# Patient Record
Sex: FEMALE | Race: WHITE | NOT HISPANIC OR LATINO | Employment: FULL TIME
[De-identification: names, ages, dates, MRNs, and addresses within clinical notes are randomized per-mention and may not be internally consistent; named-entity substitution may affect disease eponyms.]

---

## 2017-03-14 ENCOUNTER — PRIOR ORIGINAL RECORDS (OUTPATIENT)
Dept: OTHER | Age: 62
End: 2017-03-14

## 2017-04-06 ENCOUNTER — HOSPITAL ENCOUNTER (OUTPATIENT)
Facility: HOSPITAL | Age: 62
Setting detail: OBSERVATION
Discharge: HOME | End: 2017-04-07
Attending: EMERGENCY MEDICINE | Admitting: INTERNAL MEDICINE
Payer: COMMERCIAL

## 2017-04-06 DIAGNOSIS — I95.9 HYPOTENSION, UNSPECIFIED HYPOTENSION TYPE: ICD-10-CM

## 2017-04-06 DIAGNOSIS — R55 SYNCOPE, NEAR: ICD-10-CM

## 2017-04-06 DIAGNOSIS — R19.7 DIARRHEA, UNSPECIFIED TYPE: Primary | ICD-10-CM

## 2017-04-06 PROCEDURE — 99219 INITIAL OBSERVATION CARE,LEVL II: CPT | Performed by: INTERNAL MEDICINE

## 2017-04-06 RX ORDER — ONDANSETRON 2 MG/ML
4 INJECTION INTRAMUSCULAR; INTRAVENOUS EVERY 4 HOURS PRN
Status: DISCONTINUED | OUTPATIENT
Start: 2017-04-06 | End: 2017-04-06

## 2017-04-06 RX ORDER — POTASSIUM CHLORIDE 20 MEQ/1
40 TABLET, EXTENDED RELEASE ORAL ONCE
Status: COMPLETED | OUTPATIENT
Start: 2017-04-06 | End: 2017-04-06

## 2017-04-06 RX ORDER — DULOXETIN HYDROCHLORIDE 60 MG/1
60 CAPSULE, DELAYED RELEASE ORAL DAILY
COMMUNITY
End: 2019-01-07

## 2017-04-06 RX ORDER — HYDROCODONE BITARTRATE AND ACETAMINOPHEN 5; 325 MG/1; MG/1
1 TABLET ORAL EVERY 6 HOURS PRN
Status: DISCONTINUED | OUTPATIENT
Start: 2017-04-06 | End: 2017-04-07

## 2017-04-06 RX ORDER — LISINOPRIL 40 MG/1
40 TABLET ORAL DAILY
Status: ON HOLD | COMMUNITY
End: 2017-04-07

## 2017-04-06 RX ORDER — HYDROCODONE BITARTRATE AND ACETAMINOPHEN 5; 325 MG/1; MG/1
1 TABLET ORAL ONCE
Status: COMPLETED | OUTPATIENT
Start: 2017-04-06 | End: 2017-04-06

## 2017-04-06 RX ORDER — ASPIRIN 81 MG/1
81 TABLET ORAL DAILY
Status: DISCONTINUED | OUTPATIENT
Start: 2017-04-06 | End: 2017-04-07

## 2017-04-06 RX ORDER — SODIUM CHLORIDE 9 MG/ML
INJECTION, SOLUTION INTRAVENOUS ONCE
Status: COMPLETED | OUTPATIENT
Start: 2017-04-06 | End: 2017-04-06

## 2017-04-06 RX ORDER — DULOXETIN HYDROCHLORIDE 30 MG/1
30 CAPSULE, DELAYED RELEASE ORAL DAILY
Status: DISCONTINUED | OUTPATIENT
Start: 2017-04-06 | End: 2017-04-07

## 2017-04-06 RX ORDER — ATORVASTATIN CALCIUM 10 MG/1
10 TABLET, FILM COATED ORAL NIGHTLY
Status: DISCONTINUED | OUTPATIENT
Start: 2017-04-06 | End: 2017-04-07

## 2017-04-06 RX ORDER — HYDROCODONE BITARTRATE AND ACETAMINOPHEN 5; 325 MG/1; MG/1
1 TABLET ORAL EVERY 6 HOURS PRN
COMMUNITY
End: 2021-06-17

## 2017-04-06 RX ORDER — PYRIDOXINE HCL (VITAMIN B6) 50 MG
500 TABLET ORAL DAILY
COMMUNITY

## 2017-04-06 RX ORDER — PRIMIDONE 50 MG/1
50 TABLET ORAL EVERY 6 HOURS SCHEDULED
COMMUNITY
End: 2017-04-06

## 2017-04-06 RX ORDER — CLOPIDOGREL BISULFATE 75 MG/1
75 TABLET ORAL DAILY
Status: ON HOLD | COMMUNITY
End: 2018-06-04

## 2017-04-06 RX ORDER — ONDANSETRON 2 MG/ML
4 INJECTION INTRAMUSCULAR; INTRAVENOUS EVERY 6 HOURS PRN
Status: DISCONTINUED | OUTPATIENT
Start: 2017-04-06 | End: 2017-04-07

## 2017-04-06 RX ORDER — PRIMIDONE 50 MG/1
100 TABLET ORAL NIGHTLY
COMMUNITY
End: 2018-12-18

## 2017-04-06 RX ORDER — PRIMIDONE 50 MG/1
50 TABLET ORAL 2 TIMES DAILY
COMMUNITY
End: 2019-01-07

## 2017-04-06 RX ORDER — ACETAMINOPHEN 325 MG/1
650 TABLET ORAL EVERY 6 HOURS PRN
Status: DISCONTINUED | OUTPATIENT
Start: 2017-04-06 | End: 2017-04-07

## 2017-04-06 RX ORDER — SODIUM CHLORIDE 9 MG/ML
INJECTION, SOLUTION INTRAVENOUS CONTINUOUS
Status: DISCONTINUED | OUTPATIENT
Start: 2017-04-06 | End: 2017-04-07

## 2017-04-06 RX ORDER — GABAPENTIN 300 MG/1
600 CAPSULE ORAL 2 TIMES DAILY
Status: DISCONTINUED | OUTPATIENT
Start: 2017-04-06 | End: 2017-04-07

## 2017-04-06 RX ORDER — FLECAINIDE ACETATE 100 MG/1
100 TABLET ORAL 2 TIMES DAILY
Status: ON HOLD | COMMUNITY
End: 2018-06-07

## 2017-04-06 RX ORDER — SODIUM CHLORIDE 9 MG/ML
INJECTION, SOLUTION INTRAVENOUS CONTINUOUS
Status: ACTIVE | OUTPATIENT
Start: 2017-04-06 | End: 2017-04-06

## 2017-04-06 NOTE — H&P
[facility]ITAL[facility]  History and Physical     Kell Route Patient Status:  Emergency    1/15/1955 MRN [id number]   Location [facility] Attending Shanita Devlin MD   Hosp Day # 0 PCP Elenora Books     Chief Complaint: COLONOSCOPY  2/7/2014    Comment Procedure: COLONOSCOPY;  Surgeon: Alexandro Crane MD;  Location: [address] [place] [address] ENDOSCOPY    TUBAL LIGATION      OTHER SURGICAL HISTORY      Comment Lt bunionectomy    OTHER SURGICAL HISTORY      Comment stent in [facility] Oral Tab TAKE 1 TO 2 TABLETS BY MOUTH EVERY 4 HOURS AS NEEDED Disp: 60 tablet Rfl: 0   Vitamin B12 (CYANOCOBALAMIN) 100 MCG Oral Tab Take 100 mcg by mouth daily. Disp:  Rfl:    aspirin 81 MG Oral Tab Take 81 mg by mouth daily.  Disp:  Rfl:        Review of after fluid hydration  2.  Diarrhea -timing seems to be in line with entero-toxic or viral gastritis from lunch or breakfast.  Check all stool cultures and shigella toxin   hold any antibiotics and antidiarrheal until further tests are done or diarrhea self

## 2017-04-06 NOTE — ED PROVIDER NOTES
Patient Seen in: [facility] Emergency Department    History   Patient presents with:  Nausea/Vomiting/Diarrhea (gastrointestinal)    Stated Complaint: hypotention    HPI    Patient is a pleasant 20-year-old female, who arrives via EMS for evaluation every 6 (six) hours as needed for Pain.    Acetaminophen-Codeine #3 (TYLENOL #3) 300-30 MG Oral Tab,  TAKE 1 OR 2 TABLETS BY MOUTH EVERY 4 HOURS AS NEEDED FOR PAIN   HYDROcodone-acetaminophen (NORCO) 5-325 MG Oral Tab,  1-2 tab Q 4-6 hrs PRN   HYDROcodone-a Current:BP 87/64 mmHg  Pulse 86  Temp(Src) 97.8 °F (36.6 °C)  Resp 18  Ht 157.5 cm (5' 2\")  Wt 50.803 kg  BMI 20.48 kg/m2  SpO2 100%      Physical Exam    Vital signs are reviewed noted as documented in the nursing chart.    GENERAL: Patient is awake DIFFERENTIAL[[id number]]          Abnormal            Final result                 Please view results for these tests on the individual orders.    RAINBOW DRAW BLUE   RAINBOW DRAW GOLD   RAINBOW DRAW LAVENDER   RAINBOW DRAW LIGHT GREEN   C. DIFFICILE(TOXIGE studies pending. Remainder of the patient's emergency room stay was without incident.     Disposition and Plan     Clinical Impression:  Diarrhea, unspecified type  (primary encounter diagnosis)  Hypotension, unspecified hypotension type  Syncope, near

## 2017-04-06 NOTE — ED INITIAL ASSESSMENT (HPI)
Pt states had diarrhea at work and than felt like passing out, medics got 40's over palp in field, had 2 fluid boluses

## 2017-04-07 ENCOUNTER — APPOINTMENT (OUTPATIENT)
Dept: GENERAL RADIOLOGY | Facility: HOSPITAL | Age: 62
End: 2017-04-07
Attending: HOSPITALIST
Payer: COMMERCIAL

## 2017-04-07 ENCOUNTER — APPOINTMENT (OUTPATIENT)
Dept: CT IMAGING | Facility: HOSPITAL | Age: 62
End: 2017-04-07
Attending: INTERNAL MEDICINE
Payer: COMMERCIAL

## 2017-04-07 VITALS
HEART RATE: 81 BPM | BODY MASS INDEX: 20.61 KG/M2 | OXYGEN SATURATION: 97 % | SYSTOLIC BLOOD PRESSURE: 134 MMHG | HEIGHT: 62 IN | RESPIRATION RATE: 20 BRPM | WEIGHT: 112 LBS | DIASTOLIC BLOOD PRESSURE: 63 MMHG | TEMPERATURE: 98 F

## 2017-04-07 PROBLEM — Z91.81 AT RISK FOR FALLING: Status: ACTIVE | Noted: 2017-04-07

## 2017-04-07 PROCEDURE — 74176 CT ABD & PELVIS W/O CONTRAST: CPT

## 2017-04-07 PROCEDURE — 99217 OBSERVATION CARE DISCHARGE: CPT | Performed by: INTERNAL MEDICINE

## 2017-04-07 PROCEDURE — 73502 X-RAY EXAM HIP UNI 2-3 VIEWS: CPT

## 2017-04-07 RX ORDER — CLOPIDOGREL BISULFATE 75 MG/1
75 TABLET ORAL DAILY
Status: DISCONTINUED | OUTPATIENT
Start: 2017-04-07 | End: 2017-04-07

## 2017-04-07 RX ORDER — LISINOPRIL 40 MG/1
40 TABLET ORAL DAILY
Status: DISCONTINUED | OUTPATIENT
Start: 2017-04-07 | End: 2017-04-07

## 2017-04-07 RX ORDER — FLECAINIDE ACETATE 100 MG/1
100 TABLET ORAL 2 TIMES DAILY
Status: DISCONTINUED | OUTPATIENT
Start: 2017-04-07 | End: 2017-04-07

## 2017-04-07 RX ORDER — MORPHINE SULFATE 2 MG/ML
2 INJECTION, SOLUTION INTRAMUSCULAR; INTRAVENOUS EVERY 4 HOURS PRN
Status: DISCONTINUED | OUTPATIENT
Start: 2017-04-07 | End: 2017-04-07

## 2017-04-07 RX ORDER — PRIMIDONE 50 MG/1
100 TABLET ORAL NIGHTLY
Status: DISCONTINUED | OUTPATIENT
Start: 2017-04-07 | End: 2017-04-07

## 2017-04-07 RX ORDER — PRIMIDONE 50 MG/1
150 TABLET ORAL
Status: DISCONTINUED | OUTPATIENT
Start: 2017-04-07 | End: 2017-04-07

## 2017-04-07 NOTE — DISCHARGE SUMMARY
[facility] HOSPITAL[facility]  DISCHARGE SUMMARY     Rosalinda Hart Patient Status:  Observation    1/15/1955 MRN [id number]   [facility] 4NW-A Attending Jose Canales MD   [facility] Day # 1 PCP Rob Garsia     Date of Admission: 2017  Date of throughout the week. Brief Synopsis: Patient is admitted and monitored with given aggressive fluids replacement and she stool was checked for infectious etiology.   She had a CT abdomen pelvis done and lactate which were normal. Home meds resumed except Acetate 100 MG Tabs   Last time this was given:  100 mg on 4/7/2017 11:12 AM   Commonly known as:  TAMBOCOR        Take 100 mg by mouth 2 (two) times daily.     Refills:  0       gabapentin 300 MG Caps   Last time this was given:  600 mg on 4/7/2017  8:19 A

## 2017-04-07 NOTE — PAYOR COMM NOTE
Attending Physician: Adilene Novoa MD    Review Type: ADMISSION   Reviewer: Glynn LOMAX       Date: April 7, 2017 - 9:07 AM  Payor: [facility]  Authorization Number: N/A  Admit date: 4/6/2017  1:38 PM   Admitted from Emergency Dept.: yes      REVIEWER HYDROcodone-acetaminophen (NORCO) 5-325 MG per tab 1 tablet     Date Action Dose Route User    4/7/2017 0819 Given 1 tablet Oral Marlyn Javed RN    4/7/2017 0051 Given 1 tablet Oral Celestina Hensley RN    4/6/2017 1841 Given 1 tablet Oral Aleksandr Turk 34.8 (*)    TROPONIN I - Normal   C. DIFFICILE(TOXIGENIC)PCR - Normal   STOOL CULTURE(P)   SHIGATOXIN       ASSESSMENT / PLAN:        1.  Syncope most likely from large volume shifts with copious diarrhea                  Continue aggressive fluid hydration

## 2017-04-07 NOTE — PROGRESS NOTES
[facility]  Progress Note     Codie Winston Patient Status:  Observation    1/15/1955 MRN [id number]   [facility] 4NW-A Attending Zoltan Deng MD   Hosp Day # 1 PCP Alex Quach     Chief Complaint: diarrhea    S: Patient h reviewed in Epic. Medications:   • aspirin  81 mg Oral Daily   • Atorvastatin Calcium  10 mg Oral Nightly   • DULoxetine HCl  30 mg Oral Daily   • gabapentin  600 mg Oral BID       ASSESSMENT / PLAN:     1.  Syncope most likely from large volume shifts w

## 2017-04-07 NOTE — PLAN OF CARE
GASTROINTESTINAL - ADULT    • Maintains or returns to baseline bowel function Not Progressing          GASTROINTESTINAL - ADULT    • Minimal or absence of nausea and vomiting Progressing        PAIN - ADULT    • Verbalizes/displays adequate comfort level o

## 2017-04-07 NOTE — PLAN OF CARE
GASTROINTESTINAL - ADULT    • Maintains or returns to baseline bowel function Not Progressing          MUSCULOSKELETAL - ADULT    • Return mobility to safest level of function Progressing        PAIN - ADULT    • Verbalizes/displays adequate comfort level

## 2017-04-07 NOTE — PLAN OF CARE
GASTROINTESTINAL - ADULT    • Minimal or absence of nausea and vomiting Adequate for Discharge    • Maintains or returns to baseline bowel function Adequate for Discharge        MUSCULOSKELETAL - ADULT    • Return mobility to safest level of function Adequ

## 2018-01-04 ENCOUNTER — PRIOR ORIGINAL RECORDS (OUTPATIENT)
Dept: OTHER | Age: 63
End: 2018-01-04

## 2018-01-04 ENCOUNTER — HOSPITAL ENCOUNTER (EMERGENCY)
Facility: HOSPITAL | Age: 63
Discharge: HOME | End: 2018-01-04
Attending: EMERGENCY MEDICINE
Payer: COMMERCIAL

## 2018-01-04 ENCOUNTER — APPOINTMENT (OUTPATIENT)
Dept: GENERAL RADIOLOGY | Facility: HOSPITAL | Age: 63
End: 2018-01-04
Attending: EMERGENCY MEDICINE
Payer: COMMERCIAL

## 2018-01-04 VITALS
SYSTOLIC BLOOD PRESSURE: 144 MMHG | BODY MASS INDEX: 20.24 KG/M2 | WEIGHT: 110 LBS | OXYGEN SATURATION: 98 % | TEMPERATURE: 99 F | HEIGHT: 62 IN | RESPIRATION RATE: 15 BRPM | HEART RATE: 97 BPM | DIASTOLIC BLOOD PRESSURE: 76 MMHG

## 2018-01-04 DIAGNOSIS — D64.9 ANEMIA, UNSPECIFIED TYPE: ICD-10-CM

## 2018-01-04 DIAGNOSIS — B34.9 VIRAL SYNDROME: ICD-10-CM

## 2018-01-04 DIAGNOSIS — E87.1 HYPONATREMIA: ICD-10-CM

## 2018-01-04 DIAGNOSIS — R53.83 OTHER FATIGUE: Primary | ICD-10-CM

## 2018-01-04 LAB
ALBUMIN SERPL-MCNC: 3.7 G/DL (ref 3.5–4.8)
ALP LIVER SERPL-CCNC: 58 U/L (ref 50–130)
ALT SERPL-CCNC: 17 U/L (ref 14–54)
APTT PPP: 39.9 SECONDS (ref 25–34)
AST SERPL-CCNC: 20 U/L (ref 15–41)
ATRIAL RATE: 99 BPM
BASOPHILS # BLD AUTO: 0.01 X10(3) UL (ref 0–0.1)
BASOPHILS NFR BLD AUTO: 0.2 %
BILIRUB SERPL-MCNC: 0.2 MG/DL (ref 0.1–2)
BILIRUB UR QL STRIP.AUTO: NEGATIVE
BUN BLD-MCNC: 9 MG/DL (ref 8–20)
CALCIUM BLD-MCNC: 8.9 MG/DL (ref 8.3–10.3)
CHLORIDE: 93 MMOL/L (ref 101–111)
CLARITY UR REFRACT.AUTO: CLEAR
CO2: 26 MMOL/L (ref 22–32)
COLOR UR AUTO: YELLOW
CREAT BLD-MCNC: 1.12 MG/DL (ref 0.55–1.02)
EOSINOPHIL # BLD AUTO: 0 X10(3) UL (ref 0–0.3)
EOSINOPHIL NFR BLD AUTO: 0 %
ERYTHROCYTE [DISTWIDTH] IN BLOOD BY AUTOMATED COUNT: 15.3 % (ref 11.5–16)
GLUCOSE BLD-MCNC: 101 MG/DL (ref 70–99)
GLUCOSE UR STRIP.AUTO-MCNC: NEGATIVE MG/DL
HCT VFR BLD AUTO: 25.7 % (ref 34–50)
HGB BLD-MCNC: 8.5 G/DL (ref 12–16)
IMMATURE GRANULOCYTE COUNT: 0.02 X10(3) UL (ref 0–1)
IMMATURE GRANULOCYTE RATIO %: 0.5 %
INR BLD: 1.01 (ref 0.89–1.11)
KETONES UR STRIP.AUTO-MCNC: NEGATIVE MG/DL
LACTIC ACID: 1.2 MMOL/L (ref 0.5–2)
LEUKOCYTE ESTERASE UR QL STRIP.AUTO: NEGATIVE
LYMPHOCYTES # BLD AUTO: 0.43 X10(3) UL (ref 0.9–4)
LYMPHOCYTES NFR BLD AUTO: 10.2 %
M PROTEIN MFR SERPL ELPH: 7.3 G/DL (ref 6.1–8.3)
MCH RBC QN AUTO: 28.9 PG (ref 27–33.2)
MCHC RBC AUTO-ENTMCNC: 33.1 G/DL (ref 31–37)
MCV RBC AUTO: 87.4 FL (ref 81–100)
MONOCYTES # BLD AUTO: 0.56 X10(3) UL (ref 0.1–0.6)
MONOCYTES NFR BLD AUTO: 13.3 %
NEUTROPHIL ABS PRELIM: 3.19 X10 (3) UL (ref 1.3–6.7)
NEUTROPHILS # BLD AUTO: 3.19 X10(3) UL (ref 1.3–6.7)
NEUTROPHILS NFR BLD AUTO: 75.8 %
NITRITE UR QL STRIP.AUTO: NEGATIVE
P AXIS: 67 DEGREES
P-R INTERVAL: 170 MS
PH UR STRIP.AUTO: 7 [PH] (ref 4.5–8)
PLATELET # BLD AUTO: 202 10(3)UL (ref 150–450)
POTASSIUM SERPL-SCNC: 3.9 MMOL/L (ref 3.6–5.1)
PROT UR STRIP.AUTO-MCNC: NEGATIVE MG/DL
PSA SERPL DL<=0.01 NG/ML-MCNC: 13.3 SECONDS (ref 12–14.3)
Q-T INTERVAL: 372 MS
QRS DURATION: 102 MS
QTC CALCULATION (BEZET): 477 MS
R AXIS: 51 DEGREES
RBC # BLD AUTO: 2.94 X10(6)UL (ref 3.8–5.1)
RBC UR QL AUTO: NEGATIVE
RED CELL DISTRIBUTION WIDTH-SD: 49.1 FL (ref 35.1–46.3)
SODIUM SERPL-SCNC: 128 MMOL/L (ref 136–144)
SP GR UR STRIP.AUTO: 1.01 (ref 1–1.03)
T AXIS: 56 DEGREES
TROPONIN: <0.046 NG/ML (ref ?–0.05)
UROBILINOGEN UR STRIP.AUTO-MCNC: <2 MG/DL
VENTRICULAR RATE: 99 BPM
WBC # BLD AUTO: 4.2 X10(3) UL (ref 4–13)

## 2018-01-04 PROCEDURE — 36415 COLL VENOUS BLD VENIPUNCTURE: CPT

## 2018-01-04 PROCEDURE — 83605 ASSAY OF LACTIC ACID: CPT | Performed by: EMERGENCY MEDICINE

## 2018-01-04 PROCEDURE — 81003 URINALYSIS AUTO W/O SCOPE: CPT | Performed by: EMERGENCY MEDICINE

## 2018-01-04 PROCEDURE — 93010 ELECTROCARDIOGRAM REPORT: CPT

## 2018-01-04 PROCEDURE — 99285 EMERGENCY DEPT VISIT HI MDM: CPT

## 2018-01-04 PROCEDURE — 96360 HYDRATION IV INFUSION INIT: CPT

## 2018-01-04 PROCEDURE — 85025 COMPLETE CBC W/AUTO DIFF WBC: CPT | Performed by: EMERGENCY MEDICINE

## 2018-01-04 PROCEDURE — 80053 COMPREHEN METABOLIC PANEL: CPT | Performed by: EMERGENCY MEDICINE

## 2018-01-04 PROCEDURE — 85610 PROTHROMBIN TIME: CPT | Performed by: EMERGENCY MEDICINE

## 2018-01-04 PROCEDURE — 71046 X-RAY EXAM CHEST 2 VIEWS: CPT | Performed by: EMERGENCY MEDICINE

## 2018-01-04 PROCEDURE — 93005 ELECTROCARDIOGRAM TRACING: CPT

## 2018-01-04 PROCEDURE — 96361 HYDRATE IV INFUSION ADD-ON: CPT

## 2018-01-04 PROCEDURE — 84484 ASSAY OF TROPONIN QUANT: CPT | Performed by: EMERGENCY MEDICINE

## 2018-01-04 PROCEDURE — 85730 THROMBOPLASTIN TIME PARTIAL: CPT | Performed by: EMERGENCY MEDICINE

## 2018-01-04 PROCEDURE — 87040 BLOOD CULTURE FOR BACTERIA: CPT | Performed by: EMERGENCY MEDICINE

## 2018-01-04 RX ORDER — DIAZEPAM 2 MG/1
2 TABLET ORAL
COMMUNITY
End: 2019-01-07

## 2018-01-04 RX ORDER — ALPRAZOLAM 0.25 MG/1
0.25 TABLET ORAL 2 TIMES DAILY PRN
COMMUNITY
End: 2021-06-01

## 2018-01-04 RX ORDER — FERROUS SULFATE 325(65) MG
325 TABLET ORAL
Qty: 30 TABLET | Refills: 0 | Status: SHIPPED | OUTPATIENT
Start: 2018-01-04 | End: 2018-12-18

## 2018-01-04 NOTE — ED PROVIDER NOTES
Patient Seen in: [facility] Emergency Department    History   Patient presents with:  Dizziness (neurologic)    Stated Complaint:     HPI    This is a 79-year-old female who presents with complaints of malaise, flulike symptoms and dizziness.   The p Cigarettes     Quit date: 9/14/2011  Smokeless tobacco: Former User                     Alcohol use: Yes           2.4 oz/week     Cans of beer: 4 per week     Comment: 4/week      Review of Systems    Positive for stated complaint:   Other systems are as limits    Narrative: The aPTT Heparin Therapeutic Range is approximately 65- 104 seconds. The therapeutic range has been validated against 0.3-0.7 heparin anti-Xa units/mL.      CBC W/ DIFFERENTIAL - Abnormal; Notable for the following:     RBC 2.94 (*) normal, urinalysis was negative,.   I discussed with her with the 2 abnormalities seen I would recommend that she stay in the hospital  Xr Chest Pa + Lat Chest (cpt=71046)    Result Date: 1/4/2018  PROCEDURE:  XR CHEST PA + LAT CHEST (CPT=71020)  INDICATION She was discharged home she feels much better she has no complaints of headache, chest pain, dizziness.   She does have a little bit of a cold, runny nose this is probably a viral syndrome as there is no overt pneumonia she is not having any fever lactic ac

## 2018-03-27 ENCOUNTER — PRIOR ORIGINAL RECORDS (OUTPATIENT)
Dept: OTHER | Age: 63
End: 2018-03-27

## 2018-04-02 LAB
ALBUMIN: 3.7 G/DL
ALKALINE PHOSPHATATE(ALK PHOS): 58 IU/L
BILIRUBIN TOTAL: 0.2 MG/DL
BUN: 9 MG/DL
CALCIUM: 8.9 MG/DL
CHLORIDE: 93 MEQ/L
CREATININE, SERUM: 1.12 MG/DL
GLUCOSE: 101 MG/DL
HEMATOCRIT: 25.7 %
HEMOGLOBIN: 8.5 G/DL
PLATELETS: 202 K/UL
POTASSIUM, SERUM: 3.9 MEQ/L
PROTEIN, TOTAL: 7.3 G/DL
RED BLOOD COUNT: 2.94 X 10-6/U
SGOT (AST): 20 IU/L
SGPT (ALT): 17 IU/L
SODIUM: 128 MEQ/L
WHITE BLOOD COUNT: 4.2 X 10-3/U

## 2018-04-03 ENCOUNTER — PRIOR ORIGINAL RECORDS (OUTPATIENT)
Dept: OTHER | Age: 63
End: 2018-04-03

## 2018-04-20 ENCOUNTER — HOSPITAL ENCOUNTER (OUTPATIENT)
Dept: CV DIAGNOSTICS | Facility: HOSPITAL | Age: 63
Discharge: HOME | End: 2018-04-20
Attending: INTERNAL MEDICINE
Payer: COMMERCIAL

## 2018-04-20 ENCOUNTER — HOSPITAL ENCOUNTER (OUTPATIENT)
Dept: CARDIOLOGY CLINIC | Facility: HOSPITAL | Age: 63
Discharge: HOME | End: 2018-04-20
Attending: INTERNAL MEDICINE

## 2018-04-20 ENCOUNTER — MYAURORA ACCOUNT LINK (OUTPATIENT)
Dept: OTHER | Age: 63
End: 2018-04-20

## 2018-04-20 DIAGNOSIS — I65.23 BILATERAL CAROTID ARTERY STENOSIS: ICD-10-CM

## 2018-04-20 DIAGNOSIS — I48.0 AF (PAROXYSMAL ATRIAL FIBRILLATION) (HCC): ICD-10-CM

## 2018-04-20 PROCEDURE — 78452 HT MUSCLE IMAGE SPECT MULT: CPT | Performed by: INTERNAL MEDICINE

## 2018-04-20 PROCEDURE — 93018 CV STRESS TEST I&R ONLY: CPT | Performed by: INTERNAL MEDICINE

## 2018-04-20 PROCEDURE — 93017 CV STRESS TEST TRACING ONLY: CPT | Performed by: INTERNAL MEDICINE

## 2018-05-31 ENCOUNTER — APPOINTMENT (OUTPATIENT)
Dept: GENERAL RADIOLOGY | Facility: HOSPITAL | Age: 63
DRG: 378 | End: 2018-05-31
Attending: EMERGENCY MEDICINE
Payer: COMMERCIAL

## 2018-05-31 ENCOUNTER — HOSPITAL ENCOUNTER (INPATIENT)
Facility: HOSPITAL | Age: 63
LOS: 3 days | Discharge: HOME | DRG: 378 | End: 2018-06-04
Attending: EMERGENCY MEDICINE | Admitting: HOSPITALIST
Payer: COMMERCIAL

## 2018-05-31 DIAGNOSIS — E87.1 HYPONATREMIA: ICD-10-CM

## 2018-05-31 DIAGNOSIS — K92.2 GASTROINTESTINAL HEMORRHAGE, UNSPECIFIED GASTROINTESTINAL HEMORRHAGE TYPE: Primary | ICD-10-CM

## 2018-05-31 DIAGNOSIS — R58 BLEEDING: ICD-10-CM

## 2018-05-31 DIAGNOSIS — D64.9 ANEMIA, UNSPECIFIED TYPE: ICD-10-CM

## 2018-05-31 PROCEDURE — 30233N1 TRANSFUSION OF NONAUTOLOGOUS RED BLOOD CELLS INTO PERIPHERAL VEIN, PERCUTANEOUS APPROACH: ICD-10-PCS | Performed by: HOSPITALIST

## 2018-05-31 PROCEDURE — 99223 1ST HOSP IP/OBS HIGH 75: CPT | Performed by: HOSPITALIST

## 2018-05-31 PROCEDURE — 71045 X-RAY EXAM CHEST 1 VIEW: CPT | Performed by: EMERGENCY MEDICINE

## 2018-06-01 ENCOUNTER — APPOINTMENT (OUTPATIENT)
Dept: CT IMAGING | Facility: HOSPITAL | Age: 63
DRG: 378 | End: 2018-06-01
Attending: INTERNAL MEDICINE
Payer: COMMERCIAL

## 2018-06-01 PROBLEM — K92.2 GASTROINTESTINAL HEMORRHAGE, UNSPECIFIED GASTROINTESTINAL HEMORRHAGE TYPE: Status: ACTIVE | Noted: 2018-06-01

## 2018-06-01 PROBLEM — D64.9 ANEMIA, UNSPECIFIED TYPE: Status: ACTIVE | Noted: 2018-06-01

## 2018-06-01 PROCEDURE — 74177 CT ABD & PELVIS W/CONTRAST: CPT | Performed by: INTERNAL MEDICINE

## 2018-06-01 PROCEDURE — 99233 SBSQ HOSP IP/OBS HIGH 50: CPT | Performed by: HOSPITALIST

## 2018-06-01 RX ORDER — ALPRAZOLAM 0.25 MG/1
0.25 TABLET ORAL NIGHTLY
Status: DISCONTINUED | OUTPATIENT
Start: 2018-06-01 | End: 2018-06-04

## 2018-06-01 RX ORDER — ACETAMINOPHEN 325 MG/1
650 TABLET ORAL EVERY 6 HOURS PRN
Status: DISCONTINUED | OUTPATIENT
Start: 2018-06-01 | End: 2018-06-04

## 2018-06-01 RX ORDER — TRAZODONE HYDROCHLORIDE 50 MG/1
50 TABLET ORAL NIGHTLY PRN
Status: DISCONTINUED | OUTPATIENT
Start: 2018-06-01 | End: 2018-06-04

## 2018-06-01 RX ORDER — ASPIRIN 81 MG/1
81 TABLET, CHEWABLE ORAL DAILY
Status: DISCONTINUED | OUTPATIENT
Start: 2018-06-01 | End: 2018-06-01

## 2018-06-01 RX ORDER — PRIMIDONE 50 MG/1
100 TABLET ORAL NIGHTLY
Status: DISCONTINUED | OUTPATIENT
Start: 2018-06-01 | End: 2018-06-04

## 2018-06-01 RX ORDER — DULOXETIN HYDROCHLORIDE 30 MG/1
60 CAPSULE, DELAYED RELEASE ORAL DAILY
Status: DISCONTINUED | OUTPATIENT
Start: 2018-06-01 | End: 2018-06-04

## 2018-06-01 RX ORDER — DIAZEPAM 2 MG/1
2 TABLET ORAL EVERY 12 HOURS PRN
Status: DISCONTINUED | OUTPATIENT
Start: 2018-06-01 | End: 2018-06-04

## 2018-06-01 RX ORDER — FLECAINIDE ACETATE 100 MG/1
100 TABLET ORAL 2 TIMES DAILY
Status: DISCONTINUED | OUTPATIENT
Start: 2018-06-01 | End: 2018-06-04

## 2018-06-01 RX ORDER — HYDROCODONE BITARTRATE AND ACETAMINOPHEN 5; 325 MG/1; MG/1
1 TABLET ORAL EVERY 6 HOURS PRN
Status: DISCONTINUED | OUTPATIENT
Start: 2018-06-01 | End: 2018-06-04

## 2018-06-01 RX ORDER — GABAPENTIN 300 MG/1
600 CAPSULE ORAL 2 TIMES DAILY
Status: DISCONTINUED | OUTPATIENT
Start: 2018-06-01 | End: 2018-06-04

## 2018-06-01 RX ORDER — ATORVASTATIN CALCIUM 10 MG/1
10 TABLET, FILM COATED ORAL NIGHTLY
Status: DISCONTINUED | OUTPATIENT
Start: 2018-06-01 | End: 2018-06-04

## 2018-06-01 RX ORDER — ONDANSETRON 2 MG/ML
4 INJECTION INTRAMUSCULAR; INTRAVENOUS EVERY 6 HOURS PRN
Status: DISCONTINUED | OUTPATIENT
Start: 2018-06-01 | End: 2018-06-04

## 2018-06-01 RX ORDER — PRIMIDONE 50 MG/1
150 TABLET ORAL
Status: DISCONTINUED | OUTPATIENT
Start: 2018-06-01 | End: 2018-06-04

## 2018-06-01 RX ORDER — ONDANSETRON 2 MG/ML
4 INJECTION INTRAMUSCULAR; INTRAVENOUS EVERY 4 HOURS PRN
Status: DISCONTINUED | OUTPATIENT
Start: 2018-06-01 | End: 2018-06-01

## 2018-06-01 RX ORDER — SODIUM CHLORIDE 9 MG/ML
INJECTION, SOLUTION INTRAVENOUS CONTINUOUS
Status: DISCONTINUED | OUTPATIENT
Start: 2018-06-01 | End: 2018-06-02

## 2018-06-01 RX ORDER — SODIUM CHLORIDE 9 MG/ML
INJECTION, SOLUTION INTRAVENOUS CONTINUOUS
Status: ACTIVE | OUTPATIENT
Start: 2018-06-01 | End: 2018-06-01

## 2018-06-01 NOTE — PLAN OF CARE
NURSING ADMISSION NOTE  Celso Crisostomo  1/15/1955    Patient admitted via Cart  Oriented to room. Safety precautions initiated. Bed in low position. Call light in reach.   /76 (BP Location: Right arm)   Pulse 80   Temp 98.3 °F (36.8 °C) (O

## 2018-06-01 NOTE — PAYOR COMM NOTE
--------------  ADMISSION REVIEW     Payor: [facility]  Subscriber #:  [id number]  Authorization Number: [id number]    Admit date: 6/1/18  Admit time: 4264       Admitting Physician: Beni Connor MD  Attending Physician:  Tonie Kaur MD  Primary Care C  1/1991: HYSTERECTOMY  No date: LUMPECTOMY RIGHT  No date: OTHER SURGICAL HISTORY      Comment: Lt bunionectomy  No date: OTHER SURGICAL HISTORY      Comment: stent in CMA  No date: TUBAL LIGATION        Family History:   Family History   Problem Relatio Pulse 75   Temp 97.1 °F (36.2 °C) (Temporal)   Resp 16   Ht 5' 2\" (1.575 m)   Wt 110 lb 0.2 oz (49.9 kg)   SpO2 97%   BMI 20.12 kg/m²    General: No acute distress. Alert and oriented x 3. HEENT: Normocephalic atraumatic. Moist mucous membranes.  EOM-I. ASA; cautiously hold plavix for now; GI consult to advise on antiplatelet therapy, along with cards input  4.  essential tremors-resume primidone; has vagal stimulator  5.  Paroxysmal aifb-resume flecainide; cards consult to advise on anticoag and antiplate Estela Hendrix

## 2018-06-01 NOTE — PROGRESS NOTES
[facility]ITAL[facility]  Progress Note     Rashawn Quintero Patient Status:  Inpatient    1/15/1955 MRN [id number]   [facility] 7NE-A Attending Concetta Currie MD   Hosp Day # 0 PCP Jazmin Bagley     Chief Complaint: Hematochezia    S: Alma Kruse Oral BID   • gabapentin  600 mg Oral BID   • primidone  150 mg Oral Daily with breakfast   • primidone  100 mg Oral Nightly       ASSESSMENT / PLAN:     1.  Symptomatic anemia, Hgb improved with PRBCs  2. GI bleed, GI to eval, hold plavix at this time, ?col

## 2018-06-01 NOTE — H&P
[facility]ITAL[facility]  History and Physical     Bill Florez Patient Status:  Emergency    1/15/1955 MRN [id number]   Location [facility] Attending Syed Devlin MD   Hosp Day # 0 PCP Doris Mann     Chief Complaint: she has been smoking Cigarettes. She has a 18.00 pack-year smoking history. She has quit using smokeless tobacco. She reports that she drinks about 2.4 oz of alcohol per week . She reports that she does not use drugs.     Family History:   Family History mouth daily. Disp:  Rfl:        Review of Systems:   A comprehensive 14 point review of systems was completed. Pertinent positives and negatives noted in the HPI.     Physical Exam:    /55   Pulse 75   Temp 97.1 °F (36.2 °C) (Temporal)   Resp 16 intake-check urine osm; since will be NPO, will give gentle NS; monitor bmp; when resumes diet, will likely need fluid restriction  3.  Ischemic bowel s/p SMA, HERNANDEZ stenting in 2013; on DAPT; resume ASA; cautiously hold plavix for now; GI consult to advise o

## 2018-06-01 NOTE — ED PROVIDER NOTES
Patient Seen in: [facility] Emergency Department    History   Patient presents with:  Fatigue (constitutional, neurologic)    Stated Complaint: Fatigue    HPI    Patient is a pleasant 22-year-old female, presenting for evaluation of general weakness Location: [facility] ENDOSCOPY  No date: D & C  1/1991: HYSTERECTOMY  No date: LUMPECTOMY RIGHT  No date: OTHER SURGICAL HISTORY      Comment: Lt bunionectomy  No date: OTHER SURGICAL HISTORY      Comment: stent in CMA  No date: TUBAL LIGATION        Smoking statu identified.   RECTAL:  Gross blood present    ED Course     Labs Reviewed   COMP METABOLIC PANEL (14) - Abnormal; Notable for the following:        Result Value    BUN 5 (*)     Calcium, Total 7.8 (*)     Alkaline Phosphatase 44 (*)     AST 11 (*)     Total SCREEN   PREPARE RBC   RAINBOW DRAW BLUE   RAINBOW DRAW LAVENDER   RAINBOW DRAW LIGHT GREEN   RAINBOW DRAW GOLD     EKG: The EKG revealed rate, intervals, and axis as noted on the EKG report. I have reviewed and agree with these readings.   Sinus rhythm at 7 the plan as recommended. Remainder the patient's emergency room stay was without incident.     Disposition and Plan     Clinical Impression:  Gastrointestinal hemorrhage, unspecified gastrointestinal hemorrhage type  (primary encounter diagnosis)  Anemia

## 2018-06-01 NOTE — CONSULTS
[facility] Patient Status:  Inpatient   Date of Birth 1/15/1955 MRN [id number]   [facility] 7NE-A Attending Concetta Currie MD   Hosp Day # 0 PCP Jazmin Bagley     Date of Consultation , End Date , Taking? Yes, Authorizing Provider External/Patient, Reported    Medication Flecainide Acetate 100 MG Oral Tab, Sig Take 100 mg by mouth 2 (two) times daily. , Start Date , End Date , Taking?  Yes, Authorizing Provider External/Patient, Reported mg by mouth every 12 (twelve) hours as needed for Anxiety. , Start Date , End Date , Taking? , Authorizing Provider External/Patient, Reported    Medication Ferrous Sulfate 325 (65 Fe) MG Oral Tab, Sig Take 1 tablet (325 mg total) by mouth daily with breakf noted in the the HPI.       Physical Exam:    Vital Signs:    06/01/18  1244   BP: 156/69   Pulse: 76   Resp: 16   Temp: 97.8 °F (36.6 °C)     General: Alert, oriented and in no acute distress  HEENT: normocephalic; non-icteric; oral cavity free of lesions

## 2018-06-01 NOTE — HISTORICAL OFFICE NOTE
Ravi Romaine  : 01/15/1955  ACCOUNT:  [id number]  [phone]  PCP: Dr. Izabel Ruiz     TODAY'S DATE: 2018  DICTATED BY:  [Dr. Bernard Noyola: [Followup of .  CAD, established, Followup of Atherosclerosis, aorta, Followup denies smoking. CAFFEINE: 1 beverage daily. ALCOHOL: denies drinking. EXERCISE: walks 1 mile daily. DIET: no special diet. MARITAL STATUS: . OCCUPATION: accounts receiveable supervisor.      ALLERGIES: Sulfa Antibiotics - CLASS    MEDICATIONS: [employer] Previous studies have demonstrated non-obstructive carotid disease but we do not have any recent studies in our system.   If they have not been performed through the [employer] system we can consider that when she has her stress test.    Overall very pleased w

## 2018-06-01 NOTE — CONSULTS
[facility]    Report of Consultation    Martin Nieto Patient Status:  Inpatient    1/15/1955 MRN [id number]   [facility] 7NE-A Attending Isabella Villatoro MD   Hosp Day # 0 PCP Mandi Emery     Date of Admission:  2018 Procedure: COLONOSCOPY;  Surgeon: Deborah Byrne MD;  Location: [address] [place] [address] ENDOSCOPY  No date: D & C  1/1991: HYSTERECTOMY  No date: LUMPECTOMY RIGHT  No date: OTHER SURGICAL HISTORY      Comment: Lt bunionectomy  No date: OTHER SURGICAL HISTORY lb 0.2 oz (49.9 kg), SpO2 97 %.   Temp (24hrs), Av.1 °F (36.7 °C), Min:97.1 °F (36.2 °C), Max:98.7 °F (37.1 °C)    Wt Readings from Last 3 Encounters:  18 : 110 lb 0.2 oz (49.9 kg)  18 : 110 lb (49.9 kg)  17 : 112 lb (50.8 kg)      Tel origin     Vasovagal syncope     Blood in stool     Anemia, unspecified     Hyponatremia     Diarrhea, unspecified type     Hypotension, unspecified hypotension type     Syncope, near     Hypotension     Abdominal cramping     Hx of ischemic bowel disease

## 2018-06-01 NOTE — PLAN OF CARE
Received pt A/Ox4, RA, NSR on tele at 0700  Pt has tremors in BUE which is baseline and pt has brain stimulator  Pt last hgb 8.8  Pt has chronic back and neck pain, receiving Norco per MAR  Pt on clear liquid diet   CT of ABD/pelvis ordered  Hematology con

## 2018-06-02 PROBLEM — K55.9 ISCHEMIC COLITIS (HCC): Status: ACTIVE | Noted: 2018-06-02

## 2018-06-02 PROCEDURE — 99233 SBSQ HOSP IP/OBS HIGH 50: CPT | Performed by: HOSPITALIST

## 2018-06-02 PROCEDURE — 99253 IP/OBS CNSLTJ NEW/EST LOW 45: CPT | Performed by: INTERNAL MEDICINE

## 2018-06-02 RX ORDER — ASPIRIN 81 MG/1
81 TABLET, CHEWABLE ORAL DAILY
Status: DISCONTINUED | OUTPATIENT
Start: 2018-06-02 | End: 2018-06-04

## 2018-06-02 RX ORDER — CLOPIDOGREL BISULFATE 75 MG/1
75 TABLET ORAL DAILY
Status: DISCONTINUED | OUTPATIENT
Start: 2018-06-02 | End: 2018-06-04

## 2018-06-02 NOTE — PROGRESS NOTES
[facility]PITAL[facility]  Progress Note     [facility] Patient Status:  Inpatient    1/15/1955 MRN [id number]   [facility] 7NE-A Attending John Paulino MD   Hosp Day # 1 PCP Litzy Camargo     Chief Complaint: Hematochezia    S: Michael in [address]     Medications:   • iron sucrose  200 mg Intravenous Once   • ALPRAZolam  0.25 mg Oral Nightly   • atorvastatin  10 mg Oral Nightly   • DULoxetine HCl  60 mg Oral Daily   • Flecainide Acetate  100 mg Oral BID   • gabapentin  600 mg Oral BID   • pr

## 2018-06-02 NOTE — PLAN OF CARE
Assumed care of pt @ 1900. Pt A/O x4, VSS. C/o of 8/10 pain on her neck and back, states chronic considering multiple spinal surgeries. norco provided PRN. SR on tele. Dr Nathalie Ferguson notified of CT results, no new orders at this time. Voids frequently.  Up with

## 2018-06-02 NOTE — PROGRESS NOTES
[facility] Cardiology Progress Note    Subjective:  jsut starting to eat. Has a bad headache. Still a bit nauseated.      Objective:  BP (!) 164/84 (BP Location: Right arm)   Pulse 74   Temp 98.4 °F (36.9 °C) (Oral)   Resp 18   Ht 157.5 cm (5' 2\")   Wt 110 lb

## 2018-06-02 NOTE — PROGRESS NOTES
[facility]  GI Progress Note      Barbara Duke Patient Status:  Inpatient    1/15/1955 MRN [id number]   [facility] 7NE-A Attending Camille Aldridge MD   Hosp Day # 1 PCP JAIRO SANCHEZ          SUBJECTIVE:     Had small smear of b atrophy, abnormal density, or significant focal lesion. BILIARY:  No visible dilatation or calcification. PANCREAS:  The pancreatic duct at the level pancreatic head appears dilated measuring 5 mm. This can be retrospectively seen and is stable.   No noted and appears within normal limits. Dictated by: Soha Del Rosario DO on 6/01/2018 at 18:34       Approved by: Soha Del Rosario DO      ASSESSMENT:    1. Rectal bleeding  2. History of ischemic colitis  3.  Iron deficiency     The etiology of her ble

## 2018-06-02 NOTE — CONSULTS
[facility]  Report of Consultation    Mya Lawson Patient Status:  Inpatient    1/15/1955 MRN [id number]   [facility] 7NE-A Attending Jorge L Moser MD   Hosp Day # 1 PCP Silvestre Carreon     Reason for Consultation:    Recurrent bunionectomy  No date: OTHER SURGICAL HISTORY      Comment: stent in CMA  No date: TUBAL LIGATION  Family History   Problem Relation Age of Onset   • Hypertension Father    • Heart Disorder Mother    • Cancer Mother      pharyngeal      reports that she ha CA 8.4 06/01/2018   MG 2.0 06/01/2018       Imaging:    Imaging data reviewed in Epic. Assessment/Plan:    # Recurrent ischemic colitis: Known to have vascular disease, active smoker. No h/o DVT/PE.   But given recurrent episodes, can consider hyperco

## 2018-06-03 ENCOUNTER — SURGERY (OUTPATIENT)
Age: 63
End: 2018-06-03

## 2018-06-03 PROCEDURE — 99233 SBSQ HOSP IP/OBS HIGH 50: CPT | Performed by: HOSPITALIST

## 2018-06-03 PROCEDURE — 0W3P8ZZ CONTROL BLEEDING IN GASTROINTESTINAL TRACT, VIA NATURAL OR ARTIFICIAL OPENING ENDOSCOPIC: ICD-10-PCS | Performed by: INTERNAL MEDICINE

## 2018-06-03 RX ORDER — SODIUM CHLORIDE 9 MG/ML
INJECTION, SOLUTION INTRAVENOUS CONTINUOUS
Status: DISCONTINUED | OUTPATIENT
Start: 2018-06-03 | End: 2018-06-04

## 2018-06-03 RX ORDER — MIDAZOLAM HYDROCHLORIDE 1 MG/ML
INJECTION INTRAMUSCULAR; INTRAVENOUS
Status: DISCONTINUED | OUTPATIENT
Start: 2018-06-03 | End: 2018-06-03 | Stop reason: HOSPADM

## 2018-06-03 NOTE — PLAN OF CARE
Assumed care at 0700  A&0x4  No c/o pain   Colonoscopy today   Nursing will continue to monitor           GASTROINTESTINAL - ADULT    • Maintains or returns to baseline bowel function Progressing        HEMATOLOGIC - ADULT    • Maintains hematologic stabil

## 2018-06-03 NOTE — OPERATIVE REPORT
[facility]  Colonoscopy Report      Jona William Patient Status:  Inpatient    1/15/1955 MRN [id number]   [facility] ENDOSCOPY Attending Davey Duran MD       DATE OF OPERATION: 6/3/2018     PREOPERATIVE DIAGNOSIS:     1.  Rect nurse was present to assist in monitoring the patient during the entire length of moderate sedation time. RECOMMENDATIONS:    1. Advance diet. 2. Anticipate discharge tomorrow.

## 2018-06-03 NOTE — PROGRESS NOTES
[facility] Cardiology Progress Note    Subjective:  2 bloody stools overnight. No further HA.      Objective:  /67   Pulse 78   Temp 98.6 °F (37 °C) (Oral)   Resp 12   Ht 157.5 cm (5' 2\")   Wt 110 lb 0.2 oz (49.9 kg)   SpO2 98%   BMI 20.12 kg/m²     Tel

## 2018-06-03 NOTE — PLAN OF CARE
Assumed care at [address] [place] [address]. Patient had x 1 bloody stool during day shift and another during shift change. Dr. Dolly Yip notified. Dr. Neo Beltran notified, plan for colonoscopy tomorrow at 6. Clear liq diet, bowel prep ordered.   Patient c/o nausea, PRN zofran with

## 2018-06-03 NOTE — PROGRESS NOTES
[facility]PITAL[facility]  Progress Note     Alicia Johana Patient Status:  Inpatient    1/15/1955 MRN [id number]   [facility] 7NE-A Attending Rome Martin MD   Hosp Day # 2 PCP Lei Morales     Chief Complaint: Hematochezia    S: Michael mg/dL). Recent Labs   Lab  05/31/18   2132   PTP  14.2   INR  1.06       No results for input(s): TROP, CK in the last 168 hours. Imaging: Imaging data reviewed in Epic.     Medications:   • aspirin  81 mg Oral Daily   • Clopidogrel Bisulfate  75

## 2018-06-03 NOTE — PLAN OF CARE
Assumed care at 0730. A&Ox4, slow responses, hand tremors noted (pts baseline). 100% on Ra. NSR on tele. PRN norco given for chronic back pain. Tylenol given for Headache. PRN zofran given for nausea w/ relief. Up w/ standby.    Blood tinged stool n

## 2018-06-04 VITALS
DIASTOLIC BLOOD PRESSURE: 70 MMHG | RESPIRATION RATE: 20 BRPM | TEMPERATURE: 98 F | SYSTOLIC BLOOD PRESSURE: 144 MMHG | HEART RATE: 81 BPM | WEIGHT: 110 LBS | BODY MASS INDEX: 20.24 KG/M2 | HEIGHT: 62 IN | OXYGEN SATURATION: 100 %

## 2018-06-04 PROCEDURE — 99239 HOSP IP/OBS DSCHRG MGMT >30: CPT | Performed by: HOSPITALIST

## 2018-06-04 RX ORDER — CLOPIDOGREL BISULFATE 75 MG/1
75 TABLET ORAL DAILY
Qty: 30 TABLET | Refills: 5 | Status: SHIPPED | OUTPATIENT
Start: 2018-06-08 | End: 2021-02-09

## 2018-06-04 NOTE — PROGRESS NOTES
[facility]    Progress Note    Rosalinda Hart Patient Status:  Inpatient    1/15/1955 MRN [id number]   [facility] 7NE-A Attending Siomara Arnold MD   [address] Day # 3 PCP Rob Garsia     Subjective:  Rosalinda Hart is a(n) 6 cauterization. Plan:  [address] Jeanine Ritchie to d/c home today from a GI standpoint as pt has no further GI bleeding  Pt on Plavix for SMA stent - recommend restarting Plavix 5 days after GI intervention (So restart Friday 6/8) if ok with Cardiology.       Clarence Asher  6/4/20

## 2018-06-04 NOTE — PAYOR COMM NOTE
--------------  DISCHARGE REVIEW    Payor: [employer]  Subscriber #:  [id number]  Authorization Number: [id number]    Admit date: 6/1/18  Admit time:  0037  Discharge Date: 6/4/2018  5:10 PM     Admitting Physician: Glo Del Cid MD  Attending Physician: Reviewed weekend course with Dr. Ld Aranda. Endoscopy results noted. Certainly fine to hold aspirin and plavix as long as requested. I would be conservative here and hold even longer if it would reduce her risk of recurrent bleeding.  Can start by resuming low Aronchick bowel prep score was 1. (1 - excellent > 95% mucosa seen; 2 - good - clear liquid up to 25% of the mucosa, 90% mucosa seen;  3 - fair - semisolid stool not suctioned, but 90% of the mucosa seen; 4 - poor - semisolid stool not suctioned, but < 90% [facility]     [address], [address]     6/2/2018         Electronically signed by Kristyn Gill MD at 6/2/2018 10:58 AM      ssment:      · Acute gi bleed, secondary to colitis, hgb 6.7 to 8

## 2018-06-04 NOTE — PROGRESS NOTES
[facility]  Cardiology Progress Note    Subjective:  No chest pain or shortness of breath.     Objective:  /68 (BP Location: Right arm)   Pulse 75   Temp 98.3 °F (36.8 °C) (Oral)   Resp 20   Ht 5' 2\" (1.575 m)   Wt 110 lb 0.2 oz (49.9 kg)   SpO2

## 2018-06-04 NOTE — PLAN OF CARE
Assumed care at [address]. C/o headache and chronic back pain, PRN tylenol/norco with relief. No BM over night. No s/s of distress. Plan to d/c home tomorrow. Will cont to monitor.     GASTROINTESTINAL - ADULT    • Maintains or returns to baseline bowel fun

## 2018-06-04 NOTE — PLAN OF CARE
GASTROINTESTINAL - ADULT    • Maintains or returns to baseline bowel function Adequate for Discharge        HEMATOLOGIC - ADULT    • Maintains hematologic stability Adequate for Discharge        PAIN - ADULT    • Verbalizes/displays adequate comfort level

## 2018-06-04 NOTE — PROGRESS NOTES
[facility]PITAL[facility]  Progress Note     Alicia Vaughn Patient Status:  Inpatient    1/15/1955 MRN [id number]   [facility] 7NE-A Attending Rome Martin MD   Hosp Day # 3 PCP Lei Morales     Chief Complaint: Hematochezia    S: Michael 1.06       No results for input(s): TROP, CK in the last 168 hours. Imaging: Imaging data reviewed in Epic.     Medications:   • aspirin  81 mg Oral Daily   • Clopidogrel Bisulfate  75 mg Oral Daily   • ALPRAZolam  0.25 mg Oral Nightly   • atorvasta

## 2018-06-05 NOTE — DISCHARGE SUMMARY
[facility] HOSPITALIST  DISCHARGE SUMMARY     Robbie Christina Patient Status:  Inpatient    1/15/1955 MRN [id number]   [facility] 7NE-A Attending No att. providers found   Hosp Day # 3  [address]     Date of Admission: 2018  John resuming a low-dose aspirin and Plavix if no further bleeding. All questions/concerns addressed patient discharged home in stable condition.     Procedures during hospitalization:   • Colonoscopy with cauterization of AVMs    Incidental or significant find HYDROcodone-acetaminophen 5-325 MG Tabs  Commonly known as:  NORCO      Take 1 tablet by mouth every 6 (six) hours as needed for Pain. Refills:  0     primidone 50 MG Tabs  Commonly known as:   MYSOLINE      Take 150 mg by mouth daily with breakfast.

## 2018-06-06 ENCOUNTER — PRIOR ORIGINAL RECORDS (OUTPATIENT)
Dept: OTHER | Age: 63
End: 2018-06-06

## 2018-06-06 ENCOUNTER — HOSPITAL ENCOUNTER (OUTPATIENT)
Age: 63
Discharge: ANOTHER HOSPITAL | End: 2018-06-06
Attending: EMERGENCY MEDICINE
Payer: COMMERCIAL

## 2018-06-06 ENCOUNTER — HOSPITAL ENCOUNTER (OUTPATIENT)
Facility: HOSPITAL | Age: 63
Setting detail: OBSERVATION
Discharge: HOME | End: 2018-06-07
Attending: EMERGENCY MEDICINE | Admitting: HOSPITALIST
Payer: COMMERCIAL

## 2018-06-06 VITALS
OXYGEN SATURATION: 98 % | DIASTOLIC BLOOD PRESSURE: 87 MMHG | HEART RATE: 98 BPM | RESPIRATION RATE: 16 BRPM | SYSTOLIC BLOOD PRESSURE: 150 MMHG | TEMPERATURE: 97 F

## 2018-06-06 DIAGNOSIS — I48.92 ATRIAL FLUTTER, PAROXYSMAL (HCC): Primary | ICD-10-CM

## 2018-06-06 DIAGNOSIS — I48.20 ATRIAL FIBRILLATION, CHRONIC (HCC): Primary | ICD-10-CM

## 2018-06-06 PROCEDURE — 93010 ELECTROCARDIOGRAM REPORT: CPT | Performed by: INTERNAL MEDICINE

## 2018-06-06 PROCEDURE — 99214 OFFICE O/P EST MOD 30 MIN: CPT

## 2018-06-06 PROCEDURE — 93010 ELECTROCARDIOGRAM REPORT: CPT

## 2018-06-06 PROCEDURE — 93005 ELECTROCARDIOGRAM TRACING: CPT

## 2018-06-06 PROCEDURE — 99220 INITIAL OBSERVATION CARE,LEVL III: CPT | Performed by: HOSPITALIST

## 2018-06-06 RX ORDER — HEPARIN SODIUM 5000 [USP'U]/ML
5000 INJECTION, SOLUTION INTRAVENOUS; SUBCUTANEOUS EVERY 8 HOURS SCHEDULED
Status: DISCONTINUED | OUTPATIENT
Start: 2018-06-06 | End: 2018-06-07

## 2018-06-06 RX ORDER — HYDROCODONE BITARTRATE AND ACETAMINOPHEN 5; 325 MG/1; MG/1
1 TABLET ORAL ONCE
Status: COMPLETED | OUTPATIENT
Start: 2018-06-06 | End: 2018-06-06

## 2018-06-06 RX ORDER — PRIMIDONE 50 MG/1
100 TABLET ORAL NIGHTLY
Status: DISCONTINUED | OUTPATIENT
Start: 2018-06-06 | End: 2018-06-07

## 2018-06-06 RX ORDER — SODIUM CHLORIDE 9 MG/ML
125 INJECTION, SOLUTION INTRAVENOUS CONTINUOUS
Status: DISCONTINUED | OUTPATIENT
Start: 2018-06-06 | End: 2018-06-07

## 2018-06-06 RX ORDER — HYDROCODONE BITARTRATE AND ACETAMINOPHEN 5; 325 MG/1; MG/1
1 TABLET ORAL EVERY 6 HOURS PRN
Status: DISCONTINUED | OUTPATIENT
Start: 2018-06-06 | End: 2018-06-07

## 2018-06-06 RX ORDER — ATORVASTATIN CALCIUM 10 MG/1
10 TABLET, FILM COATED ORAL NIGHTLY
Status: DISCONTINUED | OUTPATIENT
Start: 2018-06-06 | End: 2018-06-07

## 2018-06-06 RX ORDER — FLECAINIDE ACETATE 100 MG/1
100 TABLET ORAL 2 TIMES DAILY
Status: DISCONTINUED | OUTPATIENT
Start: 2018-06-06 | End: 2018-06-07

## 2018-06-06 RX ORDER — ALPRAZOLAM 0.25 MG/1
0.25 TABLET ORAL NIGHTLY
Status: DISCONTINUED | OUTPATIENT
Start: 2018-06-06 | End: 2018-06-07

## 2018-06-06 RX ORDER — PRIMIDONE 50 MG/1
150 TABLET ORAL
Status: DISCONTINUED | OUTPATIENT
Start: 2018-06-07 | End: 2018-06-07

## 2018-06-06 RX ORDER — GABAPENTIN 300 MG/1
600 CAPSULE ORAL 2 TIMES DAILY
Status: DISCONTINUED | OUTPATIENT
Start: 2018-06-06 | End: 2018-06-07

## 2018-06-06 RX ORDER — DULOXETIN HYDROCHLORIDE 30 MG/1
60 CAPSULE, DELAYED RELEASE ORAL DAILY
Status: DISCONTINUED | OUTPATIENT
Start: 2018-06-06 | End: 2018-06-07

## 2018-06-06 RX ORDER — MELATONIN
325
Status: DISCONTINUED | OUTPATIENT
Start: 2018-06-07 | End: 2018-06-07

## 2018-06-06 RX ORDER — METOPROLOL TARTRATE 5 MG/5ML
5 INJECTION INTRAVENOUS
Status: DISCONTINUED | OUTPATIENT
Start: 2018-06-06 | End: 2018-06-07

## 2018-06-07 ENCOUNTER — APPOINTMENT (OUTPATIENT)
Dept: CV DIAGNOSTICS | Facility: HOSPITAL | Age: 63
End: 2018-06-07
Attending: INTERNAL MEDICINE
Payer: COMMERCIAL

## 2018-06-07 VITALS
HEART RATE: 74 BPM | TEMPERATURE: 98 F | SYSTOLIC BLOOD PRESSURE: 149 MMHG | DIASTOLIC BLOOD PRESSURE: 84 MMHG | BODY MASS INDEX: 21.1 KG/M2 | RESPIRATION RATE: 18 BRPM | WEIGHT: 114.63 LBS | HEIGHT: 62 IN | OXYGEN SATURATION: 99 %

## 2018-06-07 PROCEDURE — 93306 TTE W/DOPPLER COMPLETE: CPT | Performed by: INTERNAL MEDICINE

## 2018-06-07 PROCEDURE — 99217 OBSERVATION CARE DISCHARGE: CPT | Performed by: INTERNAL MEDICINE

## 2018-06-07 RX ORDER — METOPROLOL SUCCINATE 25 MG/1
25 TABLET, EXTENDED RELEASE ORAL
Status: DISCONTINUED | OUTPATIENT
Start: 2018-06-07 | End: 2018-06-07

## 2018-06-07 RX ORDER — METOPROLOL SUCCINATE 25 MG/1
25 TABLET, EXTENDED RELEASE ORAL
Qty: 30 TABLET | Refills: 5 | Status: SHIPPED | OUTPATIENT
Start: 2018-06-08 | End: 2018-12-18

## 2018-06-07 RX ORDER — FLECAINIDE ACETATE 150 MG/1
150 TABLET ORAL 2 TIMES DAILY
Qty: 60 TABLET | Refills: 5 | Status: ON HOLD | OUTPATIENT
Start: 2018-06-07 | End: 2018-06-20

## 2018-06-07 RX ORDER — FLECAINIDE ACETATE 100 MG/1
150 TABLET ORAL 2 TIMES DAILY
Status: DISCONTINUED | OUTPATIENT
Start: 2018-06-07 | End: 2018-06-07

## 2018-06-07 NOTE — PROGRESS NOTES
06/06/18 2332 06/06/18 2335 06/06/18 2339   Vital Signs   Temp 97.6 °F (36.4 °C) 97.6 °F (36.4 °C) 97.6 °F (36.4 °C)   Temp src Oral Oral Oral   Pulse 69 75 76   Heart Rate Source Monitor Monitor Monitor   Resp 16 21 18   Respiratory Quality Normal Norm

## 2018-06-07 NOTE — PAYOR COMM NOTE
--------------  ADMISSION REVIEW     Payor: [facility] PP  Subscriber #:  [id number]  Authorization Number: N/A    Admit date: 6/6/18      Admitting Physician: Leydi Roy MD  Attending Physician:  Adriane Rey MD  Primary Care Physician: Elba Dougherty antineoplastic chemotherapy    • Personal history of endometriosis    • Plaque psoriasis        Past Surgical History:  No date: APPENDECTOMY  1/1999: BACK SURGERY      Comment: lumbar laminectomy L1-L4  6/2004:  BACK SURGERY      Comment: cervical spinal f pulses are strong and symmetric. Abdomen: Soft, nondistended. Completely nontender  Extremities: Unremarkable. Calves nonswollen, symmetric, nontender. No pedal edema. Neurologic:  Mental status as above.   Patient moves all extremities with good stren Impression:  Atrial flutter, paroxysmal ([facility] [address]  (primary encounter diagnosis)        Present on Admission  Date Reviewed: 6/26/2014          ICD-10-CM Noted POA    Atrial flutter, paroxysmal ([facility] [address] I48.92 6/6/2018 Unknown                [facility]  KOMAL atraumatic. Moist mucous membranes. EOM-I. PERRLA. Anicteric. Neck: No lymphadenopathy. No JVD. No carotid bruits. Respiratory: Clear to auscultation bilaterally. No wheezes. No rhonchi. Cardiovascular: S1, S2. Regular rate and rhythm.  No murmurs, rubs Imaging: Imaging data reviewed in Epic.       ASSESSMENT / PLAN:     Recent anemia and GI bleed with Ischemic colitis-stable now off of aspirin and Plavix  Hyponatremia mild monitor  CAD resume aspirin and Plavix on [place] [address]  Sol pickett Dose Route User    6/7/2018 0830 Given 25 mg Oral Todd Francisco, PITO      metoprolol Tartrate (LOPRESSOR) 5 MG/5ML injection 5 mg     Date Action Dose Route User    6/6/2018 2146 Given 5 mg Intravenous Nicole Palmer      primidone (MYSOLINE) tab 150

## 2018-06-07 NOTE — PLAN OF CARE
CARDIOVASCULAR - ADULT    • Maintains optimal cardiac output and hemodynamic stability Progressing    • Absence of cardiac arrhythmias or at baseline Progressing        Pt A&O x 4, SPO2 95% on RA, NSR on tele, denies any pain/SOB at this time, IVF 0.9 @ 12

## 2018-06-07 NOTE — ED PROVIDER NOTES
Patient Seen in: [facility] Emergency Department    History   Patient presents with:  Arrythmia/Palpitations (cardiovascular)    Stated Complaint: in afib    HPI    Patient presented to urgent care.   She was just discharged from the hospital on [facility] L1-L4  2004:  BACK SURGERY      Comment: cervical spinal fusion C1-6  No date: BRAIN SURGERY      Comment: Brain stimulator   No date:       Comment: x3  2014: COLONOSCOPY      Comment: Procedure: COLONOSCOPY;  Surgeon: Jillian Kathleen, with no JVD  Lungs: Clear to auscultation bilaterally. No rhonchi or rales. Heart: Irregular S1 and S2, without murmur or rub. Distal pulses are strong and symmetric. Abdomen: Soft, nondistended. Completely nontender  Extremities: Unremarkable.   Calve variable block  Reading:  nonspecific ST change with atrial flutter with variable block           ED Course as of Jun 06 2230  ------------------------------------------------------------      MDM   Patient has a history of atrial fibrillation.   She is in

## 2018-06-07 NOTE — PROGRESS NOTES
[facility]  Progress Note    Arlinda Cooks Patient Status:  Observation    1/15/1955 MRN [id number]   [facility] 8NE-A Attending Maki Segura MD   Hosp Day # 0 PCP JAIRO SANCHEZ       Assessment:    · PAF  · Anemia  · Recent G Flecainide Acetate  150 mg Oral BID   • metoprolol succinate  25 mg Oral Daily Beta Blocker   • ALPRAZolam  0.25 mg Oral Nightly   • atorvastatin  10 mg Oral Nightly   • DULoxetine HCl  60 mg Oral Daily   • ferrous sulfate  325 mg Oral Daily with breakfast

## 2018-06-07 NOTE — PROGRESS NOTES
06/07/18 1104   Clinical Encounter Type   Visited With Patient and family together   Sacramental Encounters   Sacrament of Sick-Anointing Anointed   The patient was seen by Arabella Aguilar.  Received prayer, Scripture, support and Sacrament of t

## 2018-06-07 NOTE — ED INITIAL ASSESSMENT (HPI)
Was discharged from [facility] on Monday for a GI bleed. Had elevated BP on discharge. States she goes in and out of A-fib for the past 3 days. Cardiologist suggested she go to the ED and patient wanted to come here. Feels short of breath.   Stopped P

## 2018-06-07 NOTE — DISCHARGE SUMMARY
[facility]  Discharge Summary    Rita Bolaños Patient Status:  Observation    1/15/1955 MRN [id number]   [facility] 8NE-A Attending Dash Duque MD   Hosp Day # 0 PCP Meenu Ego     Date of Admission: 2018    Date of D compliant with all her medicines. Please refer to to history and physical done by Dr. Nael Medina for details on admission    Brief Synopsis: Monitored on telemetry. Seen by cardiology.   Patient received IV Lopressor for atrial fibrillation with rapid ventricu pending at Discharge:   · None      Discharge Medications:        Discharge Medications      START taking these medications      Instructions Prescription details   Metoprolol Succinate ER 25 MG Tb24  Commonly known as:   Toprol XL  Start taking on:  6/8/20 times daily. Refills:  0     HYDROcodone-acetaminophen 5-325 MG Tabs  Commonly known as:  NORCO      Take 1 tablet by mouth every 6 (six) hours as needed for Pain. Refills:  0     primidone 50 MG Tabs  Commonly known as:   MYSOLINE      Take 150 mg by m See electronic chart      More than 30 minutes on discharge    Kayla Gill MD  6/7/2018  5:01 PM

## 2018-06-07 NOTE — ED INITIAL ASSESSMENT (HPI)
Presents with heart racing and SOB. Pt states has felt heart racing since Monday. Today woke up this am with rapid heart beat and increased SOB today. Discharged on Monday from [facility] [place] [facility] for ruptured AVM.

## 2018-06-07 NOTE — CONSULTS
[facility]    Report of Cardiology Consultation    Nessa Mckeon Patient Status:  Observation    1/15/1955 MRN [id number]   [facility] 8NE-A Attending Claudia Camara MD   Hosp Day # 0 PCP Kevin Guillermo     Date of Admission:   hospital.  No urinary symptoms.   No calf pain or swelling    CBC shows hemoglobin 9.3 with adequate platelets  Metabolic panel shows normal glucose, creatinine, LFTs, and only mild hyponatremia  Troponin negative  D-dimer negative  Iv lopressor given in er file    Social History Narrative    None on file            Current Medications:    Current Facility-Administered Medications:  0.9%  NaCl infusion 125 mL/hr Intravenous Continuous   metoprolol Tartrate (LOPRESSOR) 5 MG/5ML injection 5 mg 5 mg Intravenous SILVER) Oral Tab Take 1 tablet by mouth daily. gabapentin (NEURONTIN) 300 MG Oral Cap Take 600 mg by mouth 2 (two) times daily. Atorvastatin Calcium (LIPITOR) 10 MG Oral Tab Take 10 mg by mouth nightly.    aspirin 81 MG Oral Tab Take 81 mg by mouth yola (L) 06/06/2018   TP 7.1 06/06/2018   AST 23 06/06/2018   ALT 24 06/06/2018   PTT 33.1 06/06/2018   INR 0.98 06/06/2018   PTP 13.4 06/06/2018   T4F 1.1 02/10/2014   TSH 4.500 01/29/2014   LIP 65 (L) 01/28/2014   DDIMER <0.27 06/06/2018   MG 2.0 06/01/2018

## 2018-06-07 NOTE — H&P
[facility]  History and Physical     Gay Hodgkin Patient Status:  Emergency    1/15/1955 MRN [id number]   Location [address] Attending Chantel Lee MD   Hosp Day # 0 SHANTA Champagne Rank     Chief Complaint: p No date:       Comment: x3  2014: COLONOSCOPY      Comment: Procedure: COLONOSCOPY;  Surgeon: Jeramy Ojeda MD;  Location: [address] [place] [address]  6/3/2018: COLONOSCOPY N/A      Comment: Procedure: COLONOSCOPY;  Surgeon: Rajat Brewer, Cyanocobalamin (B-12) 500 MCG Oral Tab Take 500 mcg by mouth daily. Disp:  Rfl:    DULoxetine HCl 60 MG Oral Cap DR Particles Take 60 mg by mouth daily.  Disp:  Rfl:    Calcium Carbonate-Vitamin D (OSCAL-500) 500-400 MG-UNIT Oral Tab Take 2 tablets by meghana MCV  81.2   < >  84.4  84.7  85.5  86.3   --   85.8   PLT  241.0   < >  250.0  222.0  227.0  269.0   --   258.0   INR  1.06   --    --    --    --    --    --   0.98    < > = values in this interval not displayed.        Recent Labs   Lab  05/31/18   [id number]

## 2018-06-07 NOTE — PROGRESS NOTES
[facility]  Progress Note    Robbie Christina Patient Status:  Observation    1/15/1955 MRN [id number]   [facility] 8NE-A Attending Audra De Dios MD   Hosp Day # 0  [facility]     CC: Palpitations    SUBJECTIVE:  Palpitation 06/07/2018   K 4.0 06/07/2018    06/07/2018   CO2 24.0 06/07/2018   GLU 77 06/07/2018   CA 8.1 06/07/2018   ALB 3.8 06/06/2018   ALKPHO 48 06/06/2018   BILT 0.2 06/06/2018   TP 7.1 06/06/2018   AST 23 06/06/2018   ALT 24 06/06/2018   PTT 33.1 06/06/2 hospital on 6/4/2018  3. Anemia due to recent GI bleed with dilutional anemia–Hep-Lock IV,. Follow CBC in 4 hours. Hemoglobin 8.1 in a.m. Today  4. Hyperlipidemia–continue statin  5. Depression–continue home medication  6.  Essential tremor– patient on pr

## 2018-06-07 NOTE — PROGRESS NOTES
Explained discharge instructions including medications and follow ups to the patient, verbalize understanding, IV removed, tele monitor discontinued, will be transported via wheelchair.

## 2018-06-07 NOTE — ED PROVIDER NOTES
Patient Seen in: [facility]    History   Patient presents with:  Arrythmia/Palpitations (cardiovascular)    Stated Complaint: A-FIB TROUBLE    HPI    78-year-old white female who was just discharged out of the hospital on M Sheryl Boyd MD;  Location: [facility] ENDOSCOPY  No date: D & C  1/1991: HYSTERECTOMY  No date: LUMPECTOMY RIGHT  No date: OTHER SURGICAL HISTORY      Comment: Lt bunionectomy  No date: OTHER SURGICAL HISTORY      Comment: stent in CMA  No date: Reviewed - No data to display  EKG    Rate, intervals and axes as noted on EKG Report. Rate: 96 bpm  Rhythm: Atrial flutter  Reading: Atrial flutter with variable AV block. No acute ischemic changes are noted.   Incomplete right bundle branch block is not

## 2018-06-17 ENCOUNTER — HOSPITAL ENCOUNTER (OUTPATIENT)
Facility: HOSPITAL | Age: 63
Setting detail: OBSERVATION
Discharge: HOME | End: 2018-06-20
Attending: EMERGENCY MEDICINE | Admitting: HOSPITALIST
Payer: COMMERCIAL

## 2018-06-17 ENCOUNTER — APPOINTMENT (OUTPATIENT)
Dept: GENERAL RADIOLOGY | Facility: HOSPITAL | Age: 63
End: 2018-06-17
Attending: EMERGENCY MEDICINE
Payer: COMMERCIAL

## 2018-06-17 ENCOUNTER — PRIOR ORIGINAL RECORDS (OUTPATIENT)
Dept: OTHER | Age: 63
End: 2018-06-17

## 2018-06-17 ENCOUNTER — APPOINTMENT (OUTPATIENT)
Dept: CT IMAGING | Facility: HOSPITAL | Age: 63
End: 2018-06-17
Attending: EMERGENCY MEDICINE
Payer: COMMERCIAL

## 2018-06-17 DIAGNOSIS — J18.9 COMMUNITY ACQUIRED PNEUMONIA, UNSPECIFIED LATERALITY: Primary | ICD-10-CM

## 2018-06-17 DIAGNOSIS — R53.1 WEAKNESS GENERALIZED: ICD-10-CM

## 2018-06-17 DIAGNOSIS — R06.00 DYSPNEA, UNSPECIFIED TYPE: ICD-10-CM

## 2018-06-17 PROCEDURE — 71275 CT ANGIOGRAPHY CHEST: CPT | Performed by: EMERGENCY MEDICINE

## 2018-06-17 PROCEDURE — 71046 X-RAY EXAM CHEST 2 VIEWS: CPT | Performed by: EMERGENCY MEDICINE

## 2018-06-17 PROCEDURE — 99220 INITIAL OBSERVATION CARE,LEVL III: CPT | Performed by: STUDENT IN AN ORGANIZED HEALTH CARE EDUCATION/TRAINING PROGRAM

## 2018-06-17 RX ORDER — FUROSEMIDE 10 MG/ML
20 INJECTION INTRAMUSCULAR; INTRAVENOUS ONCE
Status: DISCONTINUED | OUTPATIENT
Start: 2018-06-17 | End: 2018-06-17

## 2018-06-17 RX ORDER — ONDANSETRON 2 MG/ML
4 INJECTION INTRAMUSCULAR; INTRAVENOUS EVERY 4 HOURS PRN
Status: DISCONTINUED | OUTPATIENT
Start: 2018-06-17 | End: 2018-06-18

## 2018-06-17 RX ORDER — HYDROMORPHONE HYDROCHLORIDE 1 MG/ML
0.5 INJECTION, SOLUTION INTRAMUSCULAR; INTRAVENOUS; SUBCUTANEOUS ONCE
Status: COMPLETED | OUTPATIENT
Start: 2018-06-17 | End: 2018-06-17

## 2018-06-18 ENCOUNTER — TELEPHONE (OUTPATIENT)
Dept: HEMATOLOGY/ONCOLOGY | Facility: HOSPITAL | Age: 63
End: 2018-06-18

## 2018-06-18 ENCOUNTER — APPOINTMENT (OUTPATIENT)
Dept: CT IMAGING | Facility: HOSPITAL | Age: 63
End: 2018-06-18
Attending: HOSPITALIST
Payer: COMMERCIAL

## 2018-06-18 PROCEDURE — 99226 SUBSEQUENT OBSERVATION CARE: CPT | Performed by: HOSPITALIST

## 2018-06-18 PROCEDURE — 70450 CT HEAD/BRAIN W/O DYE: CPT | Performed by: HOSPITALIST

## 2018-06-18 RX ORDER — ALPRAZOLAM 0.25 MG/1
0.25 TABLET ORAL NIGHTLY
Status: DISCONTINUED | OUTPATIENT
Start: 2018-06-18 | End: 2018-06-18

## 2018-06-18 RX ORDER — ACETAMINOPHEN 325 MG/1
650 TABLET ORAL EVERY 6 HOURS PRN
Status: DISCONTINUED | OUTPATIENT
Start: 2018-06-18 | End: 2018-06-20

## 2018-06-18 RX ORDER — METHYLPREDNISOLONE SODIUM SUCCINATE 125 MG/2ML
60 INJECTION, POWDER, LYOPHILIZED, FOR SOLUTION INTRAMUSCULAR; INTRAVENOUS EVERY 8 HOURS
Status: DISCONTINUED | OUTPATIENT
Start: 2018-06-18 | End: 2018-06-19

## 2018-06-18 RX ORDER — METOCLOPRAMIDE HYDROCHLORIDE 5 MG/ML
10 INJECTION INTRAMUSCULAR; INTRAVENOUS EVERY 8 HOURS PRN
Status: DISCONTINUED | OUTPATIENT
Start: 2018-06-18 | End: 2018-06-20

## 2018-06-18 RX ORDER — FLECAINIDE ACETATE 100 MG/1
150 TABLET ORAL 2 TIMES DAILY
Status: DISCONTINUED | OUTPATIENT
Start: 2018-06-18 | End: 2018-06-20

## 2018-06-18 RX ORDER — GABAPENTIN 300 MG/1
600 CAPSULE ORAL 2 TIMES DAILY
Status: DISCONTINUED | OUTPATIENT
Start: 2018-06-18 | End: 2018-06-20

## 2018-06-18 RX ORDER — HYDROCODONE BITARTRATE AND ACETAMINOPHEN 5; 325 MG/1; MG/1
2 TABLET ORAL EVERY 6 HOURS PRN
Status: DISCONTINUED | OUTPATIENT
Start: 2018-06-18 | End: 2018-06-20

## 2018-06-18 RX ORDER — IPRATROPIUM BROMIDE AND ALBUTEROL SULFATE 2.5; .5 MG/3ML; MG/3ML
3 SOLUTION RESPIRATORY (INHALATION)
Status: DISCONTINUED | OUTPATIENT
Start: 2018-06-18 | End: 2018-06-20

## 2018-06-18 RX ORDER — ONDANSETRON 2 MG/ML
4 INJECTION INTRAMUSCULAR; INTRAVENOUS EVERY 6 HOURS PRN
Status: DISCONTINUED | OUTPATIENT
Start: 2018-06-18 | End: 2018-06-20

## 2018-06-18 RX ORDER — ASPIRIN 81 MG/1
81 TABLET, CHEWABLE ORAL DAILY
Status: DISCONTINUED | OUTPATIENT
Start: 2018-06-18 | End: 2018-06-20

## 2018-06-18 RX ORDER — IPRATROPIUM BROMIDE AND ALBUTEROL SULFATE 2.5; .5 MG/3ML; MG/3ML
3 SOLUTION RESPIRATORY (INHALATION)
Status: DISCONTINUED | OUTPATIENT
Start: 2018-06-18 | End: 2018-06-18

## 2018-06-18 RX ORDER — HYDROCODONE BITARTRATE AND ACETAMINOPHEN 5; 325 MG/1; MG/1
1-2 TABLET ORAL EVERY 6 HOURS PRN
Status: DISCONTINUED | OUTPATIENT
Start: 2018-06-18 | End: 2018-06-18 | Stop reason: SDUPTHER

## 2018-06-18 RX ORDER — DULOXETIN HYDROCHLORIDE 30 MG/1
60 CAPSULE, DELAYED RELEASE ORAL DAILY
Status: DISCONTINUED | OUTPATIENT
Start: 2018-06-18 | End: 2018-06-20

## 2018-06-18 RX ORDER — METOPROLOL SUCCINATE 25 MG/1
25 TABLET, EXTENDED RELEASE ORAL
Status: DISCONTINUED | OUTPATIENT
Start: 2018-06-19 | End: 2018-06-20

## 2018-06-18 RX ORDER — HYDROCODONE BITARTRATE AND ACETAMINOPHEN 5; 325 MG/1; MG/1
1 TABLET ORAL EVERY 6 HOURS PRN
Status: DISCONTINUED | OUTPATIENT
Start: 2018-06-18 | End: 2018-06-20

## 2018-06-18 RX ORDER — ATORVASTATIN CALCIUM 10 MG/1
10 TABLET, FILM COATED ORAL NIGHTLY
Status: DISCONTINUED | OUTPATIENT
Start: 2018-06-18 | End: 2018-06-20

## 2018-06-18 RX ORDER — HYDROCODONE BITARTRATE AND ACETAMINOPHEN 5; 325 MG/1; MG/1
1 TABLET ORAL EVERY 6 HOURS PRN
Status: DISCONTINUED | OUTPATIENT
Start: 2018-06-18 | End: 2018-06-18

## 2018-06-18 RX ORDER — CLOPIDOGREL BISULFATE 75 MG/1
75 TABLET ORAL DAILY
Status: DISCONTINUED | OUTPATIENT
Start: 2018-06-18 | End: 2018-06-20

## 2018-06-18 RX ORDER — FUROSEMIDE 10 MG/ML
20 INJECTION INTRAMUSCULAR; INTRAVENOUS ONCE
Status: COMPLETED | OUTPATIENT
Start: 2018-06-18 | End: 2018-06-18

## 2018-06-18 RX ORDER — MELATONIN
325
Status: DISCONTINUED | OUTPATIENT
Start: 2018-06-18 | End: 2018-06-20

## 2018-06-18 RX ORDER — PRIMIDONE 50 MG/1
150 TABLET ORAL
Status: DISCONTINUED | OUTPATIENT
Start: 2018-06-18 | End: 2018-06-20

## 2018-06-18 RX ORDER — ENOXAPARIN SODIUM 100 MG/ML
40 INJECTION SUBCUTANEOUS DAILY
Status: DISCONTINUED | OUTPATIENT
Start: 2018-06-18 | End: 2018-06-18

## 2018-06-18 RX ORDER — ALPRAZOLAM 0.25 MG/1
0.25 TABLET ORAL EVERY 6 HOURS PRN
Status: DISCONTINUED | OUTPATIENT
Start: 2018-06-18 | End: 2018-06-20

## 2018-06-18 RX ORDER — CHOLECALCIFEROL (VITAMIN D3) 125 MCG
500 CAPSULE ORAL DAILY
Status: DISCONTINUED | OUTPATIENT
Start: 2018-06-18 | End: 2018-06-20

## 2018-06-18 RX ORDER — PRIMIDONE 50 MG/1
100 TABLET ORAL NIGHTLY
Status: DISCONTINUED | OUTPATIENT
Start: 2018-06-18 | End: 2018-06-20

## 2018-06-18 NOTE — PLAN OF CARE
NURSING ADMISSION NOTE      Patient admitted via Cart  Oriented to room. Safety precautions initiated. Bed in low position. Call light in reach. Patient received from ER. Patient a&ox4. SR on tele. Lungs diminished with crackles on 2LO2. .  C/o

## 2018-06-18 NOTE — PROGRESS NOTES
06/18/18 1810   Clinical Encounter Type   Visited With Patient  (Responded to Advanced Directive consult.)   Patient's Supportive Strategies/Resources Patient was not interested in receiving information about Advanced Directives or the POA for [employer]

## 2018-06-18 NOTE — H&P
[facility]  History and Physical     Ruth Nieto Patient Status:  Observation    1/15/1955 MRN [id number]   [facility] 2NE-A Attending Elza Dimas MD   Hosp Day # 0 PCP Francheska Mccall     Chief Complaint: SOB    History spinal fusion C1-6  No date: BRAIN SURGERY      Comment: Brain stimulator   No date:       Comment: x3  2014: COLONOSCOPY      Comment: Procedure: COLONOSCOPY;  Surgeon: Jac Jiang MD;  Location: [facility] ENDOSCOPY  6/3/2018: COL 500 mcg by mouth daily. Disp:  Rfl:    DULoxetine HCl 60 MG Oral Cap DR Particles Take 60 mg by mouth daily. Disp:  Rfl:    Calcium Carbonate-Vitamin D (OSCAL-500) 500-400 MG-UNIT Oral Tab Take 1 tablet by mouth daily.    Disp:  Rfl:    Multiple Vitamins-Mi 87.8   PLT  306.0       Recent Labs   Lab  06/17/18   1903   GLU  82   BUN  8   CREATSERUM  0.76   GFRAA  97   GFRNAA  84   CA  9.1   ALB  3.6   NA  134*   K  4.1   CL  101   CO2  27.0   ALKPHO  63   AST  23   ALT  35   BILT  0.3   TP  6.8       Estimated

## 2018-06-18 NOTE — PAYOR COMM NOTE
--------------  ADMISSION REVIEW     Payor: [facility] PPO  Subscriber #:  [id number]  Authorization Number: N/A    Admit date: 6/17/18      Admitting Physician: Erica Moreno MD  Attending Physician:  Lexus Pa MD  Primary Care Physician: Josué Juarez BACK SURGERY      Comment: lumbar laminectomy L1-L4  2004:  BACK SURGERY      Comment: cervical spinal fusion C1-6  No date: BRAIN SURGERY      Comment: Brain stimulator   No date:       Comment: x3  2014: COLONOSCOPY      Comment: Procedure: Neuro: Grossly intact to patient's baseline      ED Course     Labs Reviewed   COMP METABOLIC PANEL (14) - Abnormal; Notable for the following:        Result Value    Sodium 134 (*)     All other components within normal limits   URINALYSIS WITH CULTURE acquired pneumonia J18.9 2018 Unknown                [facility]  History and Physical     Abranome Kranthi Patient Status:  Emergency    1/15/1955 MRN [id number]   Location [address] [place] [address] Attending Nikki Guerin every 12 (twelve) hours as needed for Anxiety. Disp:  Rfl:    ALPRAZolam 0.25 MG Oral Tab Take 0.25 mg by mouth nightly.  Disp:  Rfl:    Ferrous Sulfate 325 (65 Fe) MG Oral Tab Take 1 tablet (325 mg total) by mouth daily with breakfast. Disp: 30 tablet Rfl: rebound, guarding or organomegaly. Neurologic: No focal neurological deficits. CNII-XII grossly intact. Musculoskeletal: Moves all extremities. Extremities: No edema or cyanosis. Integument: No rashes or lesions.    Psychiatric: Appropriate mood and aff MG per tab 1 tablet     Date Action Dose Route User    6/18/2018 0859 Given 1 tablet Oral Rian Dsouza RN    6/18/2018 0228 Given 1 tablet Oral Jennifer Zuluaga RN      HYDROmorphone HCl (DILAUDID) 1 MG/ML injection 0.5 mg     Date Action Dose Route User

## 2018-06-18 NOTE — SLP NOTE
ADULT SWALLOWING EVALUATION    ASSESSMENT    ASSESSMENT/OVERALL IMPRESSION:  Patient seen for swallowing assessment to rule out aspiration given concern for pneumonia and her associated risk factors.   Patient does have a deep brain stimulator as well as hi • Benign essential tremor    • Bowel obstruction (HCC)    • Cancer (HCC)     Right breast   • Cervical spine degeneration    • Colitis     Ischemic Colitis   • Depression    • Essential hypertension    • Exposure to medical diagnostic radiation    • GI b Phase of Swallow: No complaints consistent with possible esophageal involvement            FOLLOW UP  Treatment Plan/Recommendations: No further inpatient SLP service warranted  Number of Visits to Meet Established Goals: 0  Follow Up Needed: No       Than

## 2018-06-18 NOTE — CM/SW NOTE
06/18/18 1524   CM/SW Screening   Referral [facility] staff; Chart review;Nursing rounds   Patient's Mental Status Alert;Oriented   Patient's [address]   Patient lives with Spouse   Discharge Needs   Anticip

## 2018-06-18 NOTE — ED PROVIDER NOTES
Patient Seen in: [facility] Emergency Department    History   Patient presents with:  Dyspnea COLLIN SOB (respiratory)    Stated Complaint: shortness of breath    HPI    Patient complains that over the last 3 days she is developing weakness, easy fatig Deborah Byrne MD;  Location: [address] [place] [address]  6/3/2018: COLONOSCOPY N/A      Comment: Procedure: COLONOSCOPY;  Surgeon: Deborah Byrne MD;  Location: [address] [place] [address] ENDOSCOPY  No date: D & C  1/1991: HYSTERECTOMY  No date: LUMPECTOMY JOSEPH tenderness, swelling, deformity   Skin: No significant lesions   Neuro: Grossly intact to patient's baseline      ED Course     Labs Reviewed   COMP METABOLIC PANEL (14) - Abnormal; Notable for the following:        Result Value    Sodium 134 (*)     All o shortness of breath and sense of weakness. Will start a workup for PE, pneumonia, other etiologies. Patient's workup shows pneumonia but also surprisingly shows fluid consistent with CHF.   Patient had normal echocardiogram 1 week ago with an EF of 60-6

## 2018-06-18 NOTE — CONSULTS
[facility]    PATIENT'S NAME: Bradley Ordaz   ATTENDING PHYSICIAN: [facility]: Williams Manuel M.D.    PATIENT ACCOUNT#:   [de-identified]    LOCATION:  [address] [place] [address] [facility]  MEDICAL RECORD #:   [id number]       DATE OF B artery disease. REVIEW OF SYSTEMS:  Shortness of breath, dizziness. PHYSICAL EXAMINATION:    VITAL SIGNS:  98, 57, 17, 148/70, 100% on room air. NECK:  No carotid bruits. No increased jugular venous pressure at 90 degrees or at 30 degrees.   LUNGS

## 2018-06-18 NOTE — PROGRESS NOTES
[facility]  Progress Note     All Rush Patient Status:  Observation    1/15/1955 MRN [id number]   [facility] 2NE-A Attending Logan Calle MD   Hosp Day # 0 PCP Rosanna Courser     Chief Complaint: dyspnea    S: Patient Nightly   • Flecainide Acetate  150 mg Oral BID   • MethylPREDNISolone Sodium Succ  60 mg Intravenous Q8H   • ipratropium-albuterol  3 mL Nebulization Q6H WA       ASSESSMENT / PLAN:     1. Dyspnea  1. S/s not consistent with PNA or CHF  2.  Mild fluid on C

## 2018-06-18 NOTE — CONSULTS
Cardiology Consult Note     PRIMARY CARDIOLOGIST: DIRK/KENNEDY      CONSULT FOR: DIZZINESS AND SOB        HISTORY: 64 Y/O FEMALE WITH KNOWN PAF AND TREATED WITH FLEICANIDE AND NO ANTICOAG WITH GI BLEED HX.  ECHO : NORMAL EF AND MILD/MOD ME  STRESS NUC: NORM

## 2018-06-18 NOTE — H&P
[facility]  History and Physical     Mirella Shabazz Patient Status:  Emergency    1/15/1955 MRN [id number]   Location [facility] Attending Tommy Wilde MD   Hosp Day # 0 PCP Jacinda Saldivar     Chief Complai Comment: cervical spinal fusion C1-6  No date: BRAIN SURGERY      Comment: Brain stimulator   No date:       Comment: x3  2014: COLONOSCOPY      Comment: Procedure: COLONOSCOPY;  Surgeon: Deborah Byrne MD;  Location: [address] [place] [address] tablet by mouth every 6 (six) hours as needed for Pain. Disp:  Rfl:    primidone 50 MG Oral Tab Take 150 mg by mouth daily with breakfast. Disp:  Rfl:    primidone 50 MG Oral Tab Take 100 mg by mouth nightly.  Disp:  Rfl:    Cyanocobalamin (B-12) 500 MCG Or 06/17/18 1903   WBC  4.7   HGB  9.4*   MCV  87.8   PLT  306.0       Recent Labs   Lab  06/17/18 1903   GLU  82   BUN  8   CREATSERUM  0.76   GFRAA  97   GFRNAA  84   CA  9.1   ALB  3.6   NA  134*   K  4.1   CL  101   CO2  27.0   ALKPHO  63   AST  23

## 2018-06-18 NOTE — PROGRESS NOTES
06/18/18 1810   Clinical Encounter Type   Visited With Patient  (Responded to Advanced Directive consult.)   Continue Visiting No  ( remains available at pager #[phone] as needed/requested.)   Patient's Supportive Strategies/Resources Patient was n

## 2018-06-18 NOTE — RESPIRATORY THERAPY NOTE
RT Attempted to obtain sputum culture via Deep Nasal Suction at this time with no success. Patient given specimen cup and instructed to produce sample in it and notify RN when she is able to do so.

## 2018-06-18 NOTE — HISTORICAL OFFICE NOTE
Capochelsie Fernando  : 01/15/1955  ACCOUNT:  [id number]  [id number]  PCP: Dr. Mariama Valles     TODAY'S DATE: 2018  DICTATED BY:  [Dr. Vaughn [facility]: [Followup of .  CAD, established, Followup of Atherosclerosis, aorta, Followup denies smoking. CAFFEINE: 1 beverage daily. ALCOHOL: denies drinking. EXERCISE: walks 1 mile daily. DIET: no special diet. MARITAL STATUS: . OCCUPATION: accounts receiveable supervisor.      ALLERGIES: Sulfa Antibiotics - CLASS    MEDICATIONS: Select Previous studies have demonstrated non-obstructive carotid disease but we do not have any recent studies in our system.   If they have not been performed through the [employer] system we can consider that when she has her stress test.    Overall very pleased w

## 2018-06-18 NOTE — HOME CARE LIAISON
Referral received from [facility]. Met with patient at bedside to discuss home health care services. Patient declined home health at this time.

## 2018-06-19 ENCOUNTER — APPOINTMENT (OUTPATIENT)
Dept: HEMATOLOGY/ONCOLOGY | Facility: HOSPITAL | Age: 63
End: 2018-06-19
Attending: INTERNAL MEDICINE
Payer: COMMERCIAL

## 2018-06-19 ENCOUNTER — APPOINTMENT (OUTPATIENT)
Dept: CT IMAGING | Facility: HOSPITAL | Age: 63
End: 2018-06-19
Attending: HOSPITALIST
Payer: COMMERCIAL

## 2018-06-19 ENCOUNTER — PRIOR ORIGINAL RECORDS (OUTPATIENT)
Dept: OTHER | Age: 63
End: 2018-06-19

## 2018-06-19 PROCEDURE — 70498 CT ANGIOGRAPHY NECK: CPT | Performed by: HOSPITALIST

## 2018-06-19 PROCEDURE — 99225 SUBSEQUENT OBSERVATION CARE: CPT | Performed by: HOSPITALIST

## 2018-06-19 PROCEDURE — 70496 CT ANGIOGRAPHY HEAD: CPT | Performed by: HOSPITALIST

## 2018-06-19 PROCEDURE — 0042T CTA BRAIN+CTA NECK+CT BRAIN PERFUSION (CPT=70496/70498/0042T): CPT | Performed by: HOSPITALIST

## 2018-06-19 NOTE — PLAN OF CARE
Impaired Swallowing    • Minimize aspiration risk Progressing            Patient is alert and oriented. Patient given norco for chronic back pain as ordered. CT of the head ordered and completed. Vitals are stable and call light in reach.  Patient up ambula

## 2018-06-19 NOTE — PROGRESS NOTES
[facility]  Cardiology Progress Note    All Wing Patient Status:  Observation    1/15/1955 MRN [id number]   [facility] 2NE-A Attending Logan Calle MD   Hosp Day # 0 PCP Rosanna Courser     Subjective:  C/o blurred vision improved. CT with bronchitis, on nebs, steroids  · PAF - currently in SR. Not on [facility] historically due to AVM and chronic anemia. On Flecainide  · Normal LV, diastolic dysfunction, moderate MR  · HTN  · Dyslipidemia  · Parkonsonism    Plan:   1.  No further

## 2018-06-19 NOTE — HISTORICAL OFFICE NOTE
Shirazhumberto Davisjoey  : 01/15/1955  ACCOUNT:  [id number]  [phone]  PCP: Dr. Dayami Horn     TODAY'S DATE: 2018  DICTATED BY:  [Dr. Joselin Justin: [Followup of .  CAD, established, Followup of Atherosclerosis, aorta, Followup denies smoking. CAFFEINE: 1 beverage daily. ALCOHOL: denies drinking. EXERCISE: walks 1 mile daily. DIET: no special diet. MARITAL STATUS: . OCCUPATION: accounts receiveable supervisor.      ALLERGIES: Sulfa Antibiotics - CLASS    MEDICATIONS: Select Previous studies have demonstrated non-obstructive carotid disease but we do not have any recent studies in our system.   If they have not been performed through the [employer] system we can consider that when she has her stress test.    Overall very pleased w

## 2018-06-19 NOTE — PROGRESS NOTES
[facility]  Progress Note     Traci Sanchez Patient Status:  Observation    1/15/1955 MRN [id number]   [facility] 2NE-A Attending Raudel Conner MD   Hosp Day # 0 PCP Ahmet Starr     Chief Complaint: dyspnea    S: Patient Bisulfate  75 mg Oral Daily   • Vitamin B-12  500 mcg Oral Daily   • DULoxetine HCl  60 mg Oral Daily   • ferrous sulfate  325 mg Oral Daily with breakfast   • gabapentin  600 mg Oral BID   • primidone  150 mg Oral Daily with breakfast   • primidone  100 m

## 2018-06-19 NOTE — PLAN OF CARE
CARDIOVASCULAR - ADULT    • Maintains optimal cardiac output and hemodynamic stability Progressing    • Absence of cardiac arrhythmias or at baseline Progressing        Impaired Swallowing    • Minimize aspiration risk Progressing        Tele monitoring.  I

## 2018-06-20 VITALS
HEIGHT: 62 IN | TEMPERATURE: 98 F | RESPIRATION RATE: 18 BRPM | DIASTOLIC BLOOD PRESSURE: 67 MMHG | HEART RATE: 76 BPM | OXYGEN SATURATION: 97 % | SYSTOLIC BLOOD PRESSURE: 145 MMHG | WEIGHT: 114 LBS | BODY MASS INDEX: 20.98 KG/M2

## 2018-06-20 PROCEDURE — 99217 OBSERVATION CARE DISCHARGE: CPT | Performed by: HOSPITALIST

## 2018-06-20 RX ORDER — PREDNISONE 10 MG/1
TABLET ORAL
Qty: 33 TABLET | Refills: 0 | Status: SHIPPED | OUTPATIENT
Start: 2018-06-20 | End: 2018-12-18

## 2018-06-20 RX ORDER — ALBUTEROL SULFATE 90 UG/1
2 AEROSOL, METERED RESPIRATORY (INHALATION) EVERY 6 HOURS PRN
Qty: 1 INHALER | Refills: 6 | Status: SHIPPED | OUTPATIENT
Start: 2018-06-20 | End: 2018-12-18

## 2018-06-20 RX ORDER — FLECAINIDE ACETATE 100 MG/1
100 TABLET ORAL 2 TIMES DAILY
Status: DISCONTINUED | OUTPATIENT
Start: 2018-06-20 | End: 2018-06-20

## 2018-06-20 RX ORDER — FLECAINIDE ACETATE 100 MG/1
100 TABLET ORAL 2 TIMES DAILY
Status: SHIPPED | COMMUNITY
Start: 2018-06-20

## 2018-06-20 RX ORDER — BUDESONIDE AND FORMOTEROL FUMARATE DIHYDRATE 160; 4.5 UG/1; UG/1
2 AEROSOL RESPIRATORY (INHALATION) 2 TIMES DAILY
Qty: 1 INHALER | Refills: 6 | Status: SHIPPED | OUTPATIENT
Start: 2018-06-20 | End: 2018-12-18

## 2018-06-20 NOTE — PROGRESS NOTES
[facility]  Progress Note     Crystal Soto Patient Status:  Observation    1/15/1955 MRN [id number]   [facility] 2NE-A Attending Rayray Arnold [address] Day # 0 PCP Daniel Chamberlain     Chief Complaint: Dyspnea    S: P reviewed in Epic.     Medications:   • Flecainide Acetate  100 mg Oral BID   • predniSONE  60 mg Oral Daily with breakfast   • aspirin  81 mg Oral Daily   • atorvastatin  10 mg Oral Nightly   • Clopidogrel Bisulfate  75 mg Oral Daily   • Vitamin B-12  500 m

## 2018-06-20 NOTE — DISCHARGE SUMMARY
[facility] [place] [facility]  DISCHARGE SUMMARY     Deonte Akins Patient Status:  Observation    1/15/1955 MRN [id number]   [facility] 2NE-A Attending Dr. Andrei Avila Day # 0 PCP Vercarlo Miracle     Date of Admission: 2018  Date of D any chocking incidents with food intake but does get botox injection for cervical disc dx? And states she had a few episodes after most recent injection ~ 3 mo ago. Brief Synopsis: Patient is a 80-year-old female who presented with worsening SOB.   She Inhale 2 puffs into the lungs every 6 (six) hours as needed for Wheezing or Shortness of Breath.    Quantity:  1 Inhaler  Refills:  6     Budesonide-Formoterol Fumarate 160-4.5 MCG/ACT Aero  Commonly known as:  SYMBICORT      Inhale 2 puffs into the lung mouth daily with breakfast.   Quantity:  30 tablet  Refills:  0     gabapentin 300 MG Caps  Commonly known as:  NEURONTIN      Take 600 mg by mouth 2 (two) times daily.    Refills:  0     HYDROcodone-acetaminophen 5-325 MG Tabs  Commonly known as:  Reyes Juan Please see physical examined daily progress

## 2018-06-20 NOTE — PROGRESS NOTES
[facility]  Cardiology Progress Note    Karishma Hay Patient Status:  Observation    1/15/1955 MRN [id number]   [facility] 2NE-A Attending Keo Weaver MD   Hosp Day # 0 PCP JAIRO SANCHEZ     Subjective:  No blurred vision o 325 mg Oral Daily with breakfast   • gabapentin  600 mg Oral BID   • primidone  150 mg Oral Daily with breakfast   • primidone  100 mg Oral Nightly   • Flecainide Acetate  150 mg Oral BID   • ipratropium-albuterol  3 mL Nebulization QID   • Metoprolol Succ

## 2018-06-21 PROBLEM — G20.A1 PARKINSON'S DISEASE: Chronic | Status: ACTIVE | Noted: 2018-06-21

## 2018-06-21 PROBLEM — F17.219 CIGARETTE NICOTINE DEPENDENCE WITH NICOTINE-INDUCED DISORDER: Chronic | Status: ACTIVE | Noted: 2018-06-21

## 2018-06-21 PROBLEM — G20.A1 PARKINSON'S DISEASE (HCC): Chronic | Status: ACTIVE | Noted: 2018-06-21

## 2018-06-21 PROBLEM — G20 PARKINSON'S DISEASE (HCC): Chronic | Status: ACTIVE | Noted: 2018-06-21

## 2018-06-21 PROBLEM — G20 PARKINSON'S DISEASE: Chronic | Status: ACTIVE | Noted: 2018-06-21

## 2018-06-21 PROBLEM — E78.00 PURE HYPERCHOLESTEROLEMIA: Chronic | Status: ACTIVE | Noted: 2018-06-21

## 2018-09-18 ENCOUNTER — PRIOR ORIGINAL RECORDS (OUTPATIENT)
Dept: OTHER | Age: 63
End: 2018-09-18

## 2018-09-18 ENCOUNTER — MYAURORA ACCOUNT LINK (OUTPATIENT)
Dept: OTHER | Age: 63
End: 2018-09-18

## 2018-09-27 LAB
ALBUMIN: 3.5 G/DL
ALKALINE PHOSPHATATE(ALK PHOS): 51 IU/L
BILIRUBIN TOTAL: 0.2 MG/DL
BUN: 10 MG/DL
CALCIUM: 8.9 MG/DL
CHLORIDE: 100 MEQ/L
CREATININE, SERUM: 0.67 MG/DL
GLUCOSE: 125 MG/DL
HEMATOCRIT: 30 %
HEMOGLOBIN: 9.6 G/DL
MAGNESIUM: 1.9 MG/DL
PLATELETS: 269 K/UL
POTASSIUM, SERUM: 4.4 MEQ/L
PROBNP: 1840 PG/ML
PROTEIN, TOTAL: 6.7 G/DL
RED BLOOD COUNT: 3.34 X 10-6/U
SGOT (AST): 10 IU/L
SGPT (ALT): 28 IU/L
SODIUM: 137 MEQ/L
THYROID STIMULATING HORMONE: 0.57 MLU/L
WHITE BLOOD COUNT: 5.3 X 10-3/U

## 2018-12-10 ENCOUNTER — APPOINTMENT (OUTPATIENT)
Dept: GENERAL RADIOLOGY | Facility: HOSPITAL | Age: 63
End: 2018-12-10
Attending: EMERGENCY MEDICINE
Payer: COMMERCIAL

## 2018-12-10 ENCOUNTER — HOSPITAL ENCOUNTER (EMERGENCY)
Facility: HOSPITAL | Age: 63
Discharge: HOME | End: 2018-12-10
Attending: EMERGENCY MEDICINE
Payer: COMMERCIAL

## 2018-12-10 VITALS
RESPIRATION RATE: 16 BRPM | OXYGEN SATURATION: 98 % | HEIGHT: 62 IN | HEART RATE: 78 BPM | SYSTOLIC BLOOD PRESSURE: 136 MMHG | DIASTOLIC BLOOD PRESSURE: 71 MMHG | WEIGHT: 116 LBS | BODY MASS INDEX: 21.35 KG/M2 | TEMPERATURE: 99 F

## 2018-12-10 DIAGNOSIS — D64.9 SYMPTOMATIC ANEMIA: Primary | ICD-10-CM

## 2018-12-10 PROCEDURE — 99285 EMERGENCY DEPT VISIT HI MDM: CPT | Performed by: EMERGENCY MEDICINE

## 2018-12-10 PROCEDURE — 86900 BLOOD TYPING SEROLOGIC ABO: CPT | Performed by: EMERGENCY MEDICINE

## 2018-12-10 PROCEDURE — 86920 COMPATIBILITY TEST SPIN: CPT

## 2018-12-10 PROCEDURE — 80053 COMPREHEN METABOLIC PANEL: CPT | Performed by: EMERGENCY MEDICINE

## 2018-12-10 PROCEDURE — 85025 COMPLETE CBC W/AUTO DIFF WBC: CPT | Performed by: EMERGENCY MEDICINE

## 2018-12-10 PROCEDURE — 93005 ELECTROCARDIOGRAM TRACING: CPT

## 2018-12-10 PROCEDURE — 84484 ASSAY OF TROPONIN QUANT: CPT | Performed by: EMERGENCY MEDICINE

## 2018-12-10 PROCEDURE — 36415 COLL VENOUS BLD VENIPUNCTURE: CPT | Performed by: EMERGENCY MEDICINE

## 2018-12-10 PROCEDURE — 71046 X-RAY EXAM CHEST 2 VIEWS: CPT | Performed by: EMERGENCY MEDICINE

## 2018-12-10 PROCEDURE — 86850 RBC ANTIBODY SCREEN: CPT | Performed by: EMERGENCY MEDICINE

## 2018-12-10 PROCEDURE — 93010 ELECTROCARDIOGRAM REPORT: CPT | Performed by: EMERGENCY MEDICINE

## 2018-12-10 PROCEDURE — 86901 BLOOD TYPING SEROLOGIC RH(D): CPT | Performed by: EMERGENCY MEDICINE

## 2018-12-10 PROCEDURE — 81003 URINALYSIS AUTO W/O SCOPE: CPT | Performed by: EMERGENCY MEDICINE

## 2018-12-10 RX ORDER — HYDROCODONE BITARTRATE AND ACETAMINOPHEN 5; 325 MG/1; MG/1
1 TABLET ORAL ONCE
Status: COMPLETED | OUTPATIENT
Start: 2018-12-10 | End: 2018-12-10

## 2018-12-10 NOTE — ED PROVIDER NOTES
Patient Seen in: [facility] Emergency Department    History   Patient presents with:  Anemia (hematologic)  Abnormal Result (metabolic, cardiac)    Stated Complaint: Sent here for anemia , possible GI bleeding, hgb by fingerstick was 8.5    HPI    6 COLONOSCOPY N/A 6/3/2018    Performed by Teresa Mai MD at [facility] ENDOSCOPY   • COLONOSCOPY N/A 2/7/2014    Performed by Teresa Mai MD at [facility] ENDOSCOPY   • D & C     • HYSTERECTOMY  1/1991   • LUMPECTOMY RIGHT     • OTHER SURGICAL HISTORY       ginger (*)     AST 12 (*)     All other components within normal limits   CBC W/ DIFFERENTIAL - Abnormal; Notable for the following components:    WBC 3.9 (*)     RBC 2.83 (*)     HGB 7.8 (*)     HCT 24.8 (*)     All other components within normal limits   TROPON in the typical transfusing range the patient reports this is how she has felt in the past when she needed transfusions. She reports significant fatigue and difficulty getting around due to rapid shortness of breath and fatigue.   I discussed this case with

## 2018-12-10 NOTE — ED INITIAL ASSESSMENT (HPI)
Hx of anemia a few months ago. Today at PMD's office c/o feeling tired recently and a fingerstick Hgb was 8.5. + nausea, denies vomiting or dark stools.

## 2018-12-10 NOTE — ED NOTES
Patient reported that she was at her PCPs office and was told to go to the ER due to her hemoglobin being 8. Patient reports that she is chronically around 10.  Noted patient does have tremors, per patient and her  at the bedside this is normal for p

## 2018-12-10 NOTE — ED NOTES
Orthostatic BP completed. Lyin/89 HR 76  Sittin/83 HR 89  Standin/84 HR 94  Patient did report feeling light headed when she initially stood up.

## 2018-12-18 ENCOUNTER — OFFICE VISIT (OUTPATIENT)
Dept: HEMATOLOGY/ONCOLOGY | Facility: HOSPITAL | Age: 63
End: 2018-12-18
Attending: INTERNAL MEDICINE
Payer: COMMERCIAL

## 2018-12-18 ENCOUNTER — TELEPHONE (OUTPATIENT)
Dept: HEMATOLOGY/ONCOLOGY | Facility: HOSPITAL | Age: 63
End: 2018-12-18

## 2018-12-18 VITALS
BODY MASS INDEX: 20.63 KG/M2 | HEIGHT: 62.52 IN | RESPIRATION RATE: 18 BRPM | SYSTOLIC BLOOD PRESSURE: 163 MMHG | HEART RATE: 104 BPM | OXYGEN SATURATION: 99 % | DIASTOLIC BLOOD PRESSURE: 75 MMHG | WEIGHT: 115 LBS | TEMPERATURE: 97 F

## 2018-12-18 VITALS — SYSTOLIC BLOOD PRESSURE: 136 MMHG | DIASTOLIC BLOOD PRESSURE: 79 MMHG | HEART RATE: 89 BPM

## 2018-12-18 DIAGNOSIS — D50.0 IRON DEFICIENCY ANEMIA DUE TO CHRONIC BLOOD LOSS: ICD-10-CM

## 2018-12-18 DIAGNOSIS — D64.9 ANEMIA, UNSPECIFIED TYPE: Primary | ICD-10-CM

## 2018-12-18 DIAGNOSIS — K92.2 GASTROINTESTINAL HEMORRHAGE, UNSPECIFIED GASTROINTESTINAL HEMORRHAGE TYPE: ICD-10-CM

## 2018-12-18 PROCEDURE — 82728 ASSAY OF FERRITIN: CPT

## 2018-12-18 PROCEDURE — 36415 COLL VENOUS BLD VENIPUNCTURE: CPT

## 2018-12-18 PROCEDURE — 86901 BLOOD TYPING SEROLOGIC RH(D): CPT

## 2018-12-18 PROCEDURE — 83550 IRON BINDING TEST: CPT

## 2018-12-18 PROCEDURE — 99214 OFFICE O/P EST MOD 30 MIN: CPT | Performed by: INTERNAL MEDICINE

## 2018-12-18 PROCEDURE — 86850 RBC ANTIBODY SCREEN: CPT

## 2018-12-18 PROCEDURE — 96374 THER/PROPH/DIAG INJ IV PUSH: CPT

## 2018-12-18 PROCEDURE — 83540 ASSAY OF IRON: CPT

## 2018-12-18 PROCEDURE — 80053 COMPREHEN METABOLIC PANEL: CPT

## 2018-12-18 PROCEDURE — 85025 COMPLETE CBC W/AUTO DIFF WBC: CPT

## 2018-12-18 PROCEDURE — 86900 BLOOD TYPING SEROLOGIC ABO: CPT

## 2018-12-18 NOTE — PROGRESS NOTES
Pt here for post hospital f/u. Pt was seen inpatient 6 months ago. Pt received 1 U RBC last week from her PCP. Pt denies any bleeding, unable to tolerated oral iron, craving ice, no SOB, but very fatigued. Pt states she is still working full time.  Plan tod

## 2018-12-18 NOTE — TELEPHONE ENCOUNTER
Jasbir Slater MD  P [facility] [address] [facility] [address]             Call, fe quite low, diya 4 additional dose venofer per pt convenience, labs 1 month after last dose     Pt already notified and appts scheduled.

## 2018-12-18 NOTE — PROGRESS NOTES
Education Record    Learner:  Patient    Disease / Diagnosis: CINTIA    Barriers / Limitations:  None   Comments:    Method:  Brief focused   Comments:    General Topics:  Medication, Procedure, Side effects and symptom management and Plan of care reviewed

## 2018-12-18 NOTE — PROGRESS NOTES
[facility] Progress Note      Patient Name:  Jamil Wood  YOB: 1955  Medical Record Number:  [id number]    Date of visit:  12/18/2018    CHIEF COMPLAINT: Anemia    HPI:     61year old   female that I saw in [facility] 6/18 for ane Calcium Carbonate-Vitamin D (OSCAL-500) 500-400 MG-UNIT Oral Tab Take 1 tablet by mouth daily. Disp:  Rfl:    gabapentin (NEURONTIN) 300 MG Oral Cap Take 600 mg by mouth 2 (two) times daily.  Disp:  Rfl:    Atorvastatin Calcium (LIPITOR) 10 MG Oral Tab 77557    12/18/2018

## 2018-12-20 ENCOUNTER — OFFICE VISIT (OUTPATIENT)
Dept: HEMATOLOGY/ONCOLOGY | Facility: HOSPITAL | Age: 63
End: 2018-12-20
Attending: INTERNAL MEDICINE
Payer: COMMERCIAL

## 2018-12-20 VITALS
RESPIRATION RATE: 18 BRPM | HEART RATE: 76 BPM | TEMPERATURE: 99 F | DIASTOLIC BLOOD PRESSURE: 82 MMHG | SYSTOLIC BLOOD PRESSURE: 137 MMHG | OXYGEN SATURATION: 97 %

## 2018-12-20 DIAGNOSIS — D50.0 IRON DEFICIENCY ANEMIA DUE TO CHRONIC BLOOD LOSS: Primary | ICD-10-CM

## 2018-12-20 PROCEDURE — 96374 THER/PROPH/DIAG INJ IV PUSH: CPT

## 2018-12-20 NOTE — PROGRESS NOTES
Education Record    Learner:  Patient    Disease / Diagnosis: Venofer infusion #2    Barriers / Limitations:  None   Comments:    Method:  Brief focused   Comments:    General Topics:  Plan of care reviewed   Comments:    Outcome:  Shows understanding   Co

## 2018-12-24 ENCOUNTER — OFFICE VISIT (OUTPATIENT)
Dept: HEMATOLOGY/ONCOLOGY | Facility: HOSPITAL | Age: 63
End: 2018-12-24
Attending: INTERNAL MEDICINE
Payer: COMMERCIAL

## 2018-12-24 VITALS
WEIGHT: 116 LBS | HEART RATE: 96 BPM | TEMPERATURE: 97 F | DIASTOLIC BLOOD PRESSURE: 83 MMHG | SYSTOLIC BLOOD PRESSURE: 172 MMHG | HEIGHT: 62.52 IN | OXYGEN SATURATION: 100 % | BODY MASS INDEX: 20.81 KG/M2

## 2018-12-24 DIAGNOSIS — D50.0 IRON DEFICIENCY ANEMIA DUE TO CHRONIC BLOOD LOSS: Primary | ICD-10-CM

## 2018-12-24 PROCEDURE — 96374 THER/PROPH/DIAG INJ IV PUSH: CPT

## 2018-12-24 NOTE — PROGRESS NOTES
Education Record    Learner:  Patient    Disease / Diagnosis:fe def anemia    Barriers / Limitations:  None   Comments:    Method:  Discussion   Comments:    General Topics:  Medication, Side effects and symptom management, Plan of care reviewed and Fall r

## 2018-12-26 ENCOUNTER — OFFICE VISIT (OUTPATIENT)
Dept: HEMATOLOGY/ONCOLOGY | Facility: HOSPITAL | Age: 63
End: 2018-12-26
Attending: INTERNAL MEDICINE
Payer: COMMERCIAL

## 2018-12-26 VITALS
DIASTOLIC BLOOD PRESSURE: 71 MMHG | RESPIRATION RATE: 18 BRPM | OXYGEN SATURATION: 99 % | HEART RATE: 88 BPM | TEMPERATURE: 101 F | SYSTOLIC BLOOD PRESSURE: 124 MMHG

## 2018-12-26 DIAGNOSIS — D50.0 IRON DEFICIENCY ANEMIA DUE TO CHRONIC BLOOD LOSS: Primary | ICD-10-CM

## 2018-12-26 PROCEDURE — 96374 THER/PROPH/DIAG INJ IV PUSH: CPT

## 2018-12-26 NOTE — PROGRESS NOTES
Education Record    Learner:  Patient    Disease / Diagnosis: anemia    Barriers / Limitations:  None   Comments:    Method:  Brief focused   Comments:    General Topics:  Plan of care reviewed   Comments:    Outcome:  Shows understanding   Comments:

## 2019-01-02 ENCOUNTER — OFFICE VISIT (OUTPATIENT)
Dept: HEMATOLOGY/ONCOLOGY | Facility: HOSPITAL | Age: 64
End: 2019-01-02
Attending: INTERNAL MEDICINE
Payer: COMMERCIAL

## 2019-01-02 VITALS
TEMPERATURE: 98 F | DIASTOLIC BLOOD PRESSURE: 83 MMHG | HEART RATE: 97 BPM | OXYGEN SATURATION: 99 % | SYSTOLIC BLOOD PRESSURE: 131 MMHG | RESPIRATION RATE: 16 BRPM

## 2019-01-02 DIAGNOSIS — D50.0 IRON DEFICIENCY ANEMIA DUE TO CHRONIC BLOOD LOSS: Primary | ICD-10-CM

## 2019-01-02 PROCEDURE — 96374 THER/PROPH/DIAG INJ IV PUSH: CPT

## 2019-01-02 NOTE — PROGRESS NOTES
Education Record    Learner:  Patient    Disease / Diagnosis: Venofer    Barriers / Limitations:  None   Comments:    Method:  Brief focused   Comments:    General Topics:  Medication, Side effects and symptom management and Plan of care reviewed   Comment

## 2019-01-07 ENCOUNTER — HOSPITAL ENCOUNTER (OUTPATIENT)
Facility: HOSPITAL | Age: 64
Setting detail: OBSERVATION
Discharge: HOME | End: 2019-01-09
Attending: EMERGENCY MEDICINE | Admitting: HOSPITALIST
Payer: COMMERCIAL

## 2019-01-07 DIAGNOSIS — E86.0 DEHYDRATION: ICD-10-CM

## 2019-01-07 DIAGNOSIS — R55 NEAR SYNCOPE: Primary | ICD-10-CM

## 2019-01-07 DIAGNOSIS — R19.7 DIARRHEA, UNSPECIFIED TYPE: ICD-10-CM

## 2019-01-07 LAB
ALBUMIN SERPL-MCNC: 3.5 G/DL (ref 3.1–4.5)
ALBUMIN/GLOB SERPL: 1.1 {RATIO} (ref 1–2)
ALP LIVER SERPL-CCNC: 57 U/L (ref 50–130)
ALT SERPL-CCNC: 17 U/L (ref 14–54)
ANION GAP SERPL CALC-SCNC: 7 MMOL/L (ref 0–18)
AST SERPL-CCNC: 33 U/L (ref 15–41)
ATRIAL RATE: 84 BPM
BASOPHILS # BLD AUTO: 0.03 X10(3) UL (ref 0–0.1)
BASOPHILS NFR BLD AUTO: 0.4 %
BILIRUB SERPL-MCNC: 0.3 MG/DL (ref 0.1–2)
BUN BLD-MCNC: 17 MG/DL (ref 8–20)
BUN/CREAT SERPL: 14.8 (ref 10–20)
CALCIUM BLD-MCNC: 8.7 MG/DL (ref 8.3–10.3)
CHLORIDE SERPL-SCNC: 106 MMOL/L (ref 101–111)
CO2 SERPL-SCNC: 24 MMOL/L (ref 22–32)
CREAT BLD-MCNC: 1.15 MG/DL (ref 0.55–1.02)
EOSINOPHIL # BLD AUTO: 0.15 X10(3) UL (ref 0–0.3)
EOSINOPHIL NFR BLD AUTO: 1.8 %
ERYTHROCYTE [DISTWIDTH] IN BLOOD BY AUTOMATED COUNT: 20.3 % (ref 11.5–16)
GLOBULIN PLAS-MCNC: 3.3 G/DL (ref 2.8–4.4)
GLUCOSE BLD-MCNC: 114 MG/DL (ref 70–99)
HCT VFR BLD AUTO: 31.8 % (ref 34–50)
HGB BLD-MCNC: 10.3 G/DL (ref 12–16)
IMMATURE GRANULOCYTE COUNT: 0.03 X10(3) UL (ref 0–1)
IMMATURE GRANULOCYTE RATIO %: 0.4 %
LYMPHOCYTES # BLD AUTO: 1 X10(3) UL (ref 0.9–4)
LYMPHOCYTES NFR BLD AUTO: 11.8 %
M PROTEIN MFR SERPL ELPH: 6.8 G/DL (ref 6.4–8.2)
MCH RBC QN AUTO: 30.5 PG (ref 27–33.2)
MCHC RBC AUTO-ENTMCNC: 32.4 G/DL (ref 31–37)
MCV RBC AUTO: 94.1 FL (ref 81–100)
MONOCYTES # BLD AUTO: 0.61 X10(3) UL (ref 0.1–1)
MONOCYTES NFR BLD AUTO: 7.2 %
NEUTROPHIL ABS PRELIM: 6.67 X10 (3) UL (ref 1.3–6.7)
NEUTROPHILS # BLD AUTO: 6.67 X10(3) UL (ref 1.3–6.7)
NEUTROPHILS NFR BLD AUTO: 78.4 %
OSMOLALITY SERPL CALC.SUM OF ELEC: 286 MOSM/KG (ref 275–295)
P AXIS: 88 DEGREES
P-R INTERVAL: 160 MS
PLATELET # BLD AUTO: 205 10(3)UL (ref 150–450)
PLATELET MORPHOLOGY: NORMAL
POTASSIUM SERPL-SCNC: 4.6 MMOL/L (ref 3.6–5.1)
Q-T INTERVAL: 416 MS
QRS DURATION: 106 MS
QTC CALCULATION (BEZET): 491 MS
R AXIS: 67 DEGREES
RBC # BLD AUTO: 3.38 X10(6)UL (ref 3.8–5.1)
RED CELL DISTRIBUTION WIDTH-SD: 68.4 FL (ref 35.1–46.3)
SODIUM SERPL-SCNC: 137 MMOL/L (ref 136–144)
T AXIS: 71 DEGREES
VENTRICULAR RATE: 84 BPM
WBC # BLD AUTO: 8.5 X10(3) UL (ref 4–13)

## 2019-01-07 PROCEDURE — 99220 INITIAL OBSERVATION CARE,LEVL III: CPT | Performed by: INTERNAL MEDICINE

## 2019-01-07 RX ORDER — DULOXETIN HYDROCHLORIDE 30 MG/1
30 CAPSULE, DELAYED RELEASE ORAL DAILY
Status: DISCONTINUED | OUTPATIENT
Start: 2019-01-08 | End: 2019-01-09

## 2019-01-07 RX ORDER — ONDANSETRON 2 MG/ML
4 INJECTION INTRAMUSCULAR; INTRAVENOUS EVERY 6 HOURS PRN
Status: DISCONTINUED | OUTPATIENT
Start: 2019-01-07 | End: 2019-01-09

## 2019-01-07 RX ORDER — CLOPIDOGREL BISULFATE 75 MG/1
75 TABLET ORAL DAILY
Status: DISCONTINUED | OUTPATIENT
Start: 2019-01-08 | End: 2019-01-08

## 2019-01-07 RX ORDER — CHOLECALCIFEROL (VITAMIN D3) 125 MCG
500 CAPSULE ORAL DAILY
Status: DISCONTINUED | OUTPATIENT
Start: 2019-01-08 | End: 2019-01-09

## 2019-01-07 RX ORDER — PRIMIDONE 50 MG/1
100 TABLET ORAL NIGHTLY
Status: DISCONTINUED | OUTPATIENT
Start: 2019-01-07 | End: 2019-01-09

## 2019-01-07 RX ORDER — HYDROCODONE BITARTRATE AND ACETAMINOPHEN 5; 325 MG/1; MG/1
1 TABLET ORAL EVERY 6 HOURS PRN
Status: DISCONTINUED | OUTPATIENT
Start: 2019-01-07 | End: 2019-01-09

## 2019-01-07 RX ORDER — MORPHINE SULFATE 4 MG/ML
2 INJECTION, SOLUTION INTRAMUSCULAR; INTRAVENOUS ONCE
Status: COMPLETED | OUTPATIENT
Start: 2019-01-07 | End: 2019-01-07

## 2019-01-07 RX ORDER — METOCLOPRAMIDE HYDROCHLORIDE 5 MG/ML
10 INJECTION INTRAMUSCULAR; INTRAVENOUS EVERY 8 HOURS PRN
Status: DISCONTINUED | OUTPATIENT
Start: 2019-01-07 | End: 2019-01-09

## 2019-01-07 RX ORDER — ASPIRIN 81 MG/1
81 TABLET, CHEWABLE ORAL DAILY
Status: DISCONTINUED | OUTPATIENT
Start: 2019-01-08 | End: 2019-01-08

## 2019-01-07 RX ORDER — ALPRAZOLAM 0.25 MG/1
0.25 TABLET ORAL NIGHTLY
Status: DISCONTINUED | OUTPATIENT
Start: 2019-01-07 | End: 2019-01-09

## 2019-01-07 RX ORDER — DULOXETIN HYDROCHLORIDE 30 MG/1
30 CAPSULE, DELAYED RELEASE ORAL DAILY
Refills: 0 | COMMUNITY
Start: 2018-11-18 | End: 2019-03-22

## 2019-01-07 RX ORDER — ONDANSETRON 2 MG/ML
4 INJECTION INTRAMUSCULAR; INTRAVENOUS ONCE
Status: COMPLETED | OUTPATIENT
Start: 2019-01-07 | End: 2019-01-07

## 2019-01-07 RX ORDER — SODIUM CHLORIDE 9 MG/ML
INJECTION, SOLUTION INTRAVENOUS CONTINUOUS
Status: ACTIVE | OUTPATIENT
Start: 2019-01-07 | End: 2019-01-07

## 2019-01-07 RX ORDER — ATORVASTATIN CALCIUM 10 MG/1
10 TABLET, FILM COATED ORAL DAILY
Status: DISCONTINUED | OUTPATIENT
Start: 2019-01-08 | End: 2019-01-09

## 2019-01-07 RX ORDER — SODIUM CHLORIDE 9 MG/ML
INJECTION, SOLUTION INTRAVENOUS CONTINUOUS
Status: DISCONTINUED | OUTPATIENT
Start: 2019-01-07 | End: 2019-01-09

## 2019-01-07 RX ORDER — PRIMIDONE 50 MG/1
100 TABLET ORAL 2 TIMES DAILY
COMMUNITY

## 2019-01-07 RX ORDER — FLECAINIDE ACETATE 100 MG/1
100 TABLET ORAL 2 TIMES DAILY
Status: DISCONTINUED | OUTPATIENT
Start: 2019-01-07 | End: 2019-01-09

## 2019-01-07 RX ORDER — ENOXAPARIN SODIUM 100 MG/ML
40 INJECTION SUBCUTANEOUS DAILY
Status: DISCONTINUED | OUTPATIENT
Start: 2019-01-07 | End: 2019-01-08

## 2019-01-07 RX ORDER — PRIMIDONE 50 MG/1
150 TABLET ORAL NIGHTLY
COMMUNITY
End: 2021-01-25

## 2019-01-07 RX ORDER — ONDANSETRON 2 MG/ML
4 INJECTION INTRAMUSCULAR; INTRAVENOUS EVERY 4 HOURS PRN
Status: DISCONTINUED | OUTPATIENT
Start: 2019-01-07 | End: 2019-01-07

## 2019-01-07 RX ORDER — GABAPENTIN 300 MG/1
600 CAPSULE ORAL 2 TIMES DAILY
Status: DISCONTINUED | OUTPATIENT
Start: 2019-01-07 | End: 2019-01-09

## 2019-01-07 RX ORDER — PRIMIDONE 50 MG/1
50 TABLET ORAL 2 TIMES DAILY
Status: DISCONTINUED | OUTPATIENT
Start: 2019-01-07 | End: 2019-01-07

## 2019-01-07 RX ORDER — PRIMIDONE 50 MG/1
150 TABLET ORAL EVERY MORNING
Status: DISCONTINUED | OUTPATIENT
Start: 2019-01-07 | End: 2019-01-09

## 2019-01-07 NOTE — H&P
[facility]ITAL[facility]  History and Physical     [facility] Patient Status:  Observation    1/15/1955 MRN [id number]   [facility] 4NW-A Attending Solange Weinberg MD   Hosp Day # 0 PCP Litzy Camarog     Chief Complaint: N/V/diarrhea bunionectomy   • OTHER SURGICAL HISTORY      stent in CMA   • TUBAL LIGATION         Social History:  reports that she quit smoking about 2 weeks ago. Her smoking use included cigarettes. She has a 18.00 pack-year smoking history.  She has quit using smokel positives and negatives noted in the HPI. Physical Exam:    /57 (BP Location: Right arm)   Pulse 90   Temp 97.9 °F (36.6 °C) (Oral)   Resp 18   Ht 5' 2\" (1.575 m)   Wt 112 lb (50.8 kg)   SpO2 98%   BMI 20.49 kg/m²   General: No acute distress.  Al 8. nicotine dependence  9. Dyslipidemia   10. Hx of Breast cancer   11. Parkinson's syndrome  12. anxiety  13. Quality:  · DVT Prophylaxis: lovenox  · CODE status: full  · Alfaro: none    Plan of care discussed with pt and ER.     Addendum: stool neg

## 2019-01-07 NOTE — ED INITIAL ASSESSMENT (HPI)
Arrives via [facility] after was found on the floor in the bathroom at place of work. EMS states BP was initially in the 50s. Started an IV, gave fluids, BP increased to 90 PTA. Pt reports has had vomiting and diarrhea for the last few days.   Report

## 2019-01-07 NOTE — ED PROVIDER NOTES
Patient Seen in: [facility] Emergency Department    History   Patient presents with:  Syncope (cardiovascular, neurologic)  Hypotension (cardiovascular)    Stated Complaint: syncope    HPI    12-year-old female presents emergency room with chief com COLONOSCOPY N/A 6/3/2018    Performed by Konrad Rosenthal MD at [facility] ENDOSCOPY   • COLONOSCOPY N/A 2/7/2014    Performed by Konrad Rosenthal MD at [facility] ENDOSCOPY   • D & C     • HYSTERECTOMY  1/1991   • LUMPECTOMY RIGHT     • OTHER SURGICAL HISTORY      Lt brown No midline thoracic or lumbar spine tenderness step-off, tenderness to the musculature to the right and left of the lumbar spine without erythema or rash.   EXTREMITIES: No peripheral edema, no calf tenderness, dorsal pedal pulses present and equal bilatera to EKG dated December 10, 2018                    MDM   The patient had IV established, placed on cardiac monitor and given IV fluid hydration.   Patient's blood pressure has responded to the IV fluids, patient's vomiting and diarrhea are consistent with vi

## 2019-01-08 ENCOUNTER — APPOINTMENT (OUTPATIENT)
Dept: CT IMAGING | Facility: HOSPITAL | Age: 64
End: 2019-01-08
Attending: INTERNAL MEDICINE
Payer: COMMERCIAL

## 2019-01-08 LAB
ANION GAP SERPL CALC-SCNC: 5 MMOL/L (ref 0–18)
BUN BLD-MCNC: 11 MG/DL (ref 8–20)
BUN/CREAT SERPL: 14.5 (ref 10–20)
CALCIUM BLD-MCNC: 7.4 MG/DL (ref 8.3–10.3)
CHLORIDE SERPL-SCNC: 107 MMOL/L (ref 101–111)
CO2 SERPL-SCNC: 21 MMOL/L (ref 22–32)
CREAT BLD-MCNC: 0.76 MG/DL (ref 0.55–1.02)
GLUCOSE BLD-MCNC: 91 MG/DL (ref 70–99)
HAV IGM SER QL: 1.6 MG/DL (ref 1.8–2.5)
LACTIC ACID: 0.7 MMOL/L (ref 0.5–2)
OSMOLALITY SERPL CALC.SUM OF ELEC: 275 MOSM/KG (ref 275–295)
POTASSIUM SERPL-SCNC: 3.7 MMOL/L (ref 3.6–5.1)
SODIUM SERPL-SCNC: 133 MMOL/L (ref 136–144)

## 2019-01-08 PROCEDURE — 74177 CT ABD & PELVIS W/CONTRAST: CPT | Performed by: INTERNAL MEDICINE

## 2019-01-08 PROCEDURE — 99225 SUBSEQUENT OBSERVATION CARE: CPT | Performed by: INTERNAL MEDICINE

## 2019-01-08 RX ORDER — HYDROMORPHONE HYDROCHLORIDE 1 MG/ML
0.5 INJECTION, SOLUTION INTRAMUSCULAR; INTRAVENOUS; SUBCUTANEOUS EVERY 2 HOUR PRN
Status: DISCONTINUED | OUTPATIENT
Start: 2019-01-08 | End: 2019-01-09

## 2019-01-08 RX ORDER — METRONIDAZOLE 500 MG/100ML
500 INJECTION, SOLUTION INTRAVENOUS EVERY 8 HOURS
Status: DISCONTINUED | OUTPATIENT
Start: 2019-01-08 | End: 2019-01-09

## 2019-01-08 RX ORDER — MAGNESIUM SULFATE HEPTAHYDRATE 40 MG/ML
2 INJECTION, SOLUTION INTRAVENOUS ONCE
Status: DISCONTINUED | OUTPATIENT
Start: 2019-01-08 | End: 2019-01-08

## 2019-01-08 RX ORDER — MAGNESIUM OXIDE 400 MG (241.3 MG MAGNESIUM) TABLET
400 TABLET ONCE
Status: COMPLETED | OUTPATIENT
Start: 2019-01-08 | End: 2019-01-08

## 2019-01-08 RX ORDER — CIPROFLOXACIN 2 MG/ML
400 INJECTION, SOLUTION INTRAVENOUS EVERY 12 HOURS
Status: DISCONTINUED | OUTPATIENT
Start: 2019-01-08 | End: 2019-01-09

## 2019-01-08 RX ORDER — ACETAMINOPHEN 325 MG/1
650 TABLET ORAL EVERY 6 HOURS PRN
Status: DISCONTINUED | OUTPATIENT
Start: 2019-01-08 | End: 2019-01-09

## 2019-01-08 RX ORDER — POTASSIUM CHLORIDE 20 MEQ/1
40 TABLET, EXTENDED RELEASE ORAL ONCE
Status: COMPLETED | OUTPATIENT
Start: 2019-01-08 | End: 2019-01-08

## 2019-01-08 NOTE — PLAN OF CARE
DISCHARGE PLANNING    • Discharge to home or other facility with appropriate resources Progressing        PAIN - ADULT    • Verbalizes/displays adequate comfort level or patient's stated pain goal Progressing          Pt alert and oriented x4. CASSIDY Cox

## 2019-01-08 NOTE — CONSULTS
[facility] Patient Status:  Observation    1/15/1955 MRN [id number]   [facility] 4NW-A Attending Randall Aschoff, MD   Hosp Day # 0 PCP JAIRO SANCHEZ     Eduardo Stokes is a 61year old female for diarrhea ab [facility] ENDOSCOPY   • COLONOSCOPY N/A 2/7/2014    Performed by Mago Jensen MD at [facility] ENDOSCOPY   • D & C     • HYSTERECTOMY  1/1991   • LUMPECTOMY RIGHT     • OTHER SURGICAL HISTORY      Lt bunionectomy   • OTHER SURGICAL HISTORY      stent in CMA   • TUBAL follow. 1.  Cipro/Falgy x 10 days. 2.  Liquid diet. cc: Dr. Jana Amanda    The patient indicates understanding of these issues and agrees to the plan.     Tomas Davidson MD  1/8/2019  2:59 PM

## 2019-01-08 NOTE — PROGRESS NOTES
[facility]  Progress Note     Ana M Course Patient Status:  Observation    1/15/1955 MRN [id number]   [facility] 4NW-A Attending Zelda Frank MD   Hosp Day # 0 PCP Ronnie Stuart     Chief Complaint: abd cramping     S: Felix Cohn Daily   • gabapentin  600 mg Oral BID   • Flecainide Acetate  100 mg Oral BID   • enoxaparin  40 mg Subcutaneous Daily   • primidone  150 mg Oral QAM   • primidone  100 mg Oral Nightly       ASSESSMENT / PLAN:     1.   Hypotension due to hypovolemia /dehydr

## 2019-01-08 NOTE — PROGRESS NOTES
NURSING ADMISSION NOTE      Patient admitted via Cart  Oriented to room. Safety precautions initiated. Bed in low position. Call light in reach.   Pt is admitted from ER for N/V/D, near syncope, aaox4, accompanied by her , pt appears very weak

## 2019-01-09 VITALS
RESPIRATION RATE: 20 BRPM | HEART RATE: 92 BPM | HEIGHT: 62 IN | BODY MASS INDEX: 20.61 KG/M2 | SYSTOLIC BLOOD PRESSURE: 131 MMHG | OXYGEN SATURATION: 98 % | WEIGHT: 112 LBS | DIASTOLIC BLOOD PRESSURE: 48 MMHG | TEMPERATURE: 99 F

## 2019-01-09 LAB
HAV IGM SER QL: 1.8 MG/DL (ref 1.8–2.5)
POTASSIUM SERPL-SCNC: 3.9 MMOL/L (ref 3.6–5.1)

## 2019-01-09 PROCEDURE — 99217 OBSERVATION CARE DISCHARGE: CPT | Performed by: HOSPITALIST

## 2019-01-09 RX ORDER — CIPROFLOXACIN 500 MG/1
500 TABLET, FILM COATED ORAL 2 TIMES DAILY
Qty: 20 TABLET | Refills: 0 | Status: ON HOLD | OUTPATIENT
Start: 2019-01-09 | End: 2019-01-15

## 2019-01-09 RX ORDER — METRONIDAZOLE 500 MG/1
TABLET ORAL
Qty: 20 TABLET | Refills: 2 | Status: ON HOLD | OUTPATIENT
Start: 2019-01-09 | End: 2019-01-15

## 2019-01-09 NOTE — PROGRESS NOTES
[facility]  Progress Note     Jona William Patient Status:  Observation    1/15/1955 MRN [id number]   [facility] 4NW-A Attending Inocente Homans, MD   Hosp Day # 0 PCP Shadi De Leon     Chief Complaint: abd cramping     S: Rufino Doing atorvastatin  10 mg Oral Daily   • Vitamin B-12  500 mcg Oral Daily   • DULoxetine HCl  30 mg Oral Daily   • gabapentin  600 mg Oral BID   • Flecainide Acetate  100 mg Oral BID   • primidone  150 mg Oral QAM   • primidone  100 mg Oral Nightly       ASSESSM

## 2019-01-09 NOTE — PLAN OF CARE
DISCHARGE PLANNING    • Discharge to home or other facility with appropriate resources Progressing        PAIN - ADULT    • Verbalizes/displays adequate comfort level or patient's stated pain goal Progressing        Patient received this am alert and orien

## 2019-01-09 NOTE — PROGRESS NOTES
[facility]  Progress Note    Natacha Brush Patient Status:  Observation    1/15/1955 MRN [id number]   [facility] 4NW-A Attending Emmanuel Myers MD   Hosp Day # 0 PCP Luis Thurman     Subjective:  Natacha Brush is a(n) 61 gastrointestinal hemorrhage type     Anemia, unspecified type     Ischemic colitis ([facility]     Atrial flutter, paroxysmal ([facility]     Community acquired pneumonia     Community acquired pneumonia, unspecified laterality     Dyspnea, unspecified type     [employer]

## 2019-01-09 NOTE — PLAN OF CARE
DISCHARGE PLANNING    • Discharge to home or other facility with appropriate resources Progressing        PAIN - ADULT    • Verbalizes/displays adequate comfort level or patient's stated pain goal Progressing          Pt alert and oriented x4, hx of tremor

## 2019-01-09 NOTE — PROGRESS NOTES
Alert and oriented x 4, v.s.s., pt has history of essential tremors with neurotransmitter in place, medicated for c/o back pain, continues on IVF and antibiotics, tolerating liquid diet, no signs of distress, will continue to monitor.

## 2019-01-10 NOTE — DISCHARGE SUMMARY
[facility] HOSPITALIST  DISCHARGE SUMMARY     Karishma Hay Patient Status:  Observation    1/15/1955 MRN [id number]   [facility] 4NW-A Attending No att. providers found   Hosp Day # 0 PCP JAIRO SANCHEZ     Date of Admission: 2019  D medications      Instructions Prescription details   Ciprofloxacin HCl 500 MG Tabs  Commonly known as:  CIPRO      Take 1 tablet (500 mg total) by mouth 2 (two) times daily for 10 days.    Stop taking on:  1/19/2019  Quantity:  20 tablet  Refills:  0     me [address] [facility], [address] [place] [address], [phone], [address] [facility], [address] [address]    Phone:  [phone]   · Ciprofloxacin HCl 500 MG Tabs  · metRONIDAZOLE 500 MG Tabs         [place]    Follow-up appoint

## 2019-01-13 ENCOUNTER — HOSPITAL ENCOUNTER (INPATIENT)
Facility: HOSPITAL | Age: 64
LOS: 3 days | Discharge: HOME | DRG: 392 | End: 2019-01-16
Attending: INTERNAL MEDICINE | Admitting: INTERNAL MEDICINE
Payer: COMMERCIAL

## 2019-01-13 PROCEDURE — 99223 1ST HOSP IP/OBS HIGH 75: CPT | Performed by: INTERNAL MEDICINE

## 2019-01-13 RX ORDER — ONDANSETRON 2 MG/ML
4 INJECTION INTRAMUSCULAR; INTRAVENOUS EVERY 6 HOURS PRN
Status: DISCONTINUED | OUTPATIENT
Start: 2019-01-13 | End: 2019-01-16

## 2019-01-13 RX ORDER — PRIMIDONE 50 MG/1
100 TABLET ORAL EVERY EVENING
Status: DISCONTINUED | OUTPATIENT
Start: 2019-01-13 | End: 2019-01-16

## 2019-01-13 RX ORDER — CIPROFLOXACIN 2 MG/ML
400 INJECTION, SOLUTION INTRAVENOUS EVERY 12 HOURS
Status: DISCONTINUED | OUTPATIENT
Start: 2019-01-13 | End: 2019-01-15

## 2019-01-13 RX ORDER — PRIMIDONE 50 MG/1
150 TABLET ORAL EVERY MORNING
Status: DISCONTINUED | OUTPATIENT
Start: 2019-01-14 | End: 2019-01-16

## 2019-01-13 RX ORDER — MORPHINE SULFATE 4 MG/ML
2 INJECTION, SOLUTION INTRAMUSCULAR; INTRAVENOUS EVERY 2 HOUR PRN
Status: DISCONTINUED | OUTPATIENT
Start: 2019-01-13 | End: 2019-01-16

## 2019-01-13 RX ORDER — ASPIRIN 81 MG/1
81 TABLET, CHEWABLE ORAL DAILY
Status: DISCONTINUED | OUTPATIENT
Start: 2019-01-14 | End: 2019-01-16

## 2019-01-13 RX ORDER — HYDROCODONE BITARTRATE AND ACETAMINOPHEN 5; 325 MG/1; MG/1
2 TABLET ORAL EVERY 4 HOURS PRN
Status: DISCONTINUED | OUTPATIENT
Start: 2019-01-13 | End: 2019-01-16

## 2019-01-13 RX ORDER — CLOPIDOGREL BISULFATE 75 MG/1
75 TABLET ORAL DAILY
Status: DISCONTINUED | OUTPATIENT
Start: 2019-01-14 | End: 2019-01-16

## 2019-01-13 RX ORDER — METRONIDAZOLE 500 MG/100ML
500 INJECTION, SOLUTION INTRAVENOUS EVERY 8 HOURS
Status: DISCONTINUED | OUTPATIENT
Start: 2019-01-13 | End: 2019-01-15

## 2019-01-13 RX ORDER — ALPRAZOLAM 0.25 MG/1
0.25 TABLET ORAL NIGHTLY
Status: DISCONTINUED | OUTPATIENT
Start: 2019-01-13 | End: 2019-01-16

## 2019-01-13 RX ORDER — ATORVASTATIN CALCIUM 10 MG/1
10 TABLET, FILM COATED ORAL NIGHTLY
Status: DISCONTINUED | OUTPATIENT
Start: 2019-01-13 | End: 2019-01-16

## 2019-01-13 RX ORDER — FLECAINIDE ACETATE 100 MG/1
100 TABLET ORAL 2 TIMES DAILY
Status: DISCONTINUED | OUTPATIENT
Start: 2019-01-13 | End: 2019-01-16

## 2019-01-13 RX ORDER — GABAPENTIN 300 MG/1
600 CAPSULE ORAL 3 TIMES DAILY
Status: DISCONTINUED | OUTPATIENT
Start: 2019-01-13 | End: 2019-01-14

## 2019-01-13 RX ORDER — MORPHINE SULFATE 4 MG/ML
1 INJECTION, SOLUTION INTRAMUSCULAR; INTRAVENOUS EVERY 2 HOUR PRN
Status: DISCONTINUED | OUTPATIENT
Start: 2019-01-13 | End: 2019-01-16

## 2019-01-13 RX ORDER — DULOXETIN HYDROCHLORIDE 30 MG/1
30 CAPSULE, DELAYED RELEASE ORAL DAILY
Status: DISCONTINUED | OUTPATIENT
Start: 2019-01-14 | End: 2019-01-16

## 2019-01-13 RX ORDER — ENOXAPARIN SODIUM 100 MG/ML
40 INJECTION SUBCUTANEOUS NIGHTLY
Status: DISCONTINUED | OUTPATIENT
Start: 2019-01-13 | End: 2019-01-16

## 2019-01-13 RX ORDER — ACETAMINOPHEN 325 MG/1
650 TABLET ORAL EVERY 4 HOURS PRN
Status: DISCONTINUED | OUTPATIENT
Start: 2019-01-13 | End: 2019-01-16

## 2019-01-13 RX ORDER — HYDROCODONE BITARTRATE AND ACETAMINOPHEN 5; 325 MG/1; MG/1
1 TABLET ORAL EVERY 4 HOURS PRN
Status: DISCONTINUED | OUTPATIENT
Start: 2019-01-13 | End: 2019-01-16

## 2019-01-13 RX ORDER — MORPHINE SULFATE 4 MG/ML
4 INJECTION, SOLUTION INTRAMUSCULAR; INTRAVENOUS EVERY 2 HOUR PRN
Status: DISCONTINUED | OUTPATIENT
Start: 2019-01-13 | End: 2019-01-16

## 2019-01-13 RX ORDER — SODIUM CHLORIDE 9 MG/ML
INJECTION, SOLUTION INTRAVENOUS CONTINUOUS
Status: DISCONTINUED | OUTPATIENT
Start: 2019-01-13 | End: 2019-01-16

## 2019-01-14 LAB
ANION GAP SERPL CALC-SCNC: 5 MMOL/L (ref 0–18)
BASOPHILS # BLD AUTO: 0.01 X10(3) UL (ref 0–0.1)
BASOPHILS NFR BLD AUTO: 0.2 %
BUN BLD-MCNC: 3 MG/DL (ref 8–20)
BUN/CREAT SERPL: 4.8 (ref 10–20)
CALCIUM BLD-MCNC: 7.8 MG/DL (ref 8.3–10.3)
CHLORIDE SERPL-SCNC: 108 MMOL/L (ref 101–111)
CO2 SERPL-SCNC: 26 MMOL/L (ref 22–32)
CREAT BLD-MCNC: 0.63 MG/DL (ref 0.55–1.02)
EOSINOPHIL # BLD AUTO: 0.12 X10(3) UL (ref 0–0.3)
EOSINOPHIL NFR BLD AUTO: 3 %
ERYTHROCYTE [DISTWIDTH] IN BLOOD BY AUTOMATED COUNT: 20.5 % (ref 11.5–16)
GLUCOSE BLD-MCNC: 74 MG/DL (ref 70–99)
HAV IGM SER QL: 1.8 MG/DL (ref 1.8–2.5)
HCT VFR BLD AUTO: 24.6 % (ref 34–50)
HGB BLD-MCNC: 7.9 G/DL (ref 12–16)
IMMATURE GRANULOCYTE COUNT: 0.02 X10(3) UL (ref 0–1)
IMMATURE GRANULOCYTE RATIO %: 0.5 %
LYMPHOCYTES # BLD AUTO: 1.11 X10(3) UL (ref 0.9–4)
LYMPHOCYTES NFR BLD AUTO: 27.6 %
MCH RBC QN AUTO: 30.9 PG (ref 27–33.2)
MCHC RBC AUTO-ENTMCNC: 32.1 G/DL (ref 31–37)
MCV RBC AUTO: 96.1 FL (ref 81–100)
MONOCYTES # BLD AUTO: 0.57 X10(3) UL (ref 0.1–1)
MONOCYTES NFR BLD AUTO: 14.2 %
NEUTROPHIL ABS PRELIM: 2.19 X10 (3) UL (ref 1.3–6.7)
NEUTROPHILS # BLD AUTO: 2.19 X10(3) UL (ref 1.3–6.7)
NEUTROPHILS NFR BLD AUTO: 54.5 %
OSMOLALITY SERPL CALC.SUM OF ELEC: 283 MOSM/KG (ref 275–295)
PLATELET # BLD AUTO: 207 10(3)UL (ref 150–450)
PLATELET MORPHOLOGY: NORMAL
POTASSIUM SERPL-SCNC: 3.4 MMOL/L (ref 3.6–5.1)
POTASSIUM SERPL-SCNC: 4.1 MMOL/L (ref 3.6–5.1)
RBC # BLD AUTO: 2.56 X10(6)UL (ref 3.8–5.1)
RED CELL DISTRIBUTION WIDTH-SD: 70.9 FL (ref 35.1–46.3)
SODIUM SERPL-SCNC: 139 MMOL/L (ref 136–144)
WBC # BLD AUTO: 4 X10(3) UL (ref 4–13)

## 2019-01-14 PROCEDURE — 99232 SBSQ HOSP IP/OBS MODERATE 35: CPT | Performed by: INTERNAL MEDICINE

## 2019-01-14 RX ORDER — DICYCLOMINE HYDROCHLORIDE 10 MG/1
10 CAPSULE ORAL 3 TIMES DAILY PRN
Status: DISCONTINUED | OUTPATIENT
Start: 2019-01-14 | End: 2019-01-16

## 2019-01-14 RX ORDER — MAGNESIUM OXIDE 400 MG (241.3 MG MAGNESIUM) TABLET
400 TABLET ONCE
Status: COMPLETED | OUTPATIENT
Start: 2019-01-14 | End: 2019-01-14

## 2019-01-14 RX ORDER — POTASSIUM CHLORIDE 20 MEQ/1
40 TABLET, EXTENDED RELEASE ORAL EVERY 4 HOURS
Status: COMPLETED | OUTPATIENT
Start: 2019-01-14 | End: 2019-01-14

## 2019-01-14 RX ORDER — GABAPENTIN 300 MG/1
600 CAPSULE ORAL 2 TIMES DAILY
Status: DISCONTINUED | OUTPATIENT
Start: 2019-01-15 | End: 2019-01-16

## 2019-01-14 NOTE — PAYOR COMM NOTE
--------------  ADMISSION REVIEW     Payor: [facility]  Subscriber #:  [id number]  Authorization Number: [id number]    Admit date: 1/13/19  Admit time: 1775       Admitting Physician: Robbie Arce DO  Attending Physician:  Opal Ramos MD  Primary C rebound tenderness  Integument: Psoriasis lesions seen on extensor surface of bilateral forearm      Diagnostic Data:      Laboratory Data:   Labs were done at urgent care center reviewed as below   Ref.  Range 1/13/2019 13:02   WBC Latest Ref Range: 4.00 - Albumin Latest Ref Range: 3.5 - 4.8 g/dL 3.0 (L)       Recent Labs   Lab  01/13/19   1302   TROP  <0.017     Imaging:  Imaging data done at urgent care center reviewed in [facility] as below    [address]: 01.13.2019  CT ANGIOGRAPHY, MESENTERIC ARTERIES ( 2.56      Patient has received 5 doses IVP Morphine since admission.           MEDICATIONS ADMINISTERED IN LAST 1 DAY:  Ciprofloxacin in D5W (CIPRO) IVPB premix SOLN 400 mg     Date Action Dose Route User    1/14/2019 0818 New Bag 400 mg Intravenous Chance Sweeney,

## 2019-01-14 NOTE — H&P
[facility]                                                               History & Physical         Martin Nieto Patient Status:  Inpatient    1/15/1955 MRN [id number]   [facility] 3NW-A Attending Cristal Lino MD   [facility] Day antineoplastic chemotherapy    • Personal history of endometriosis    • Plaque psoriasis        Past Surgical History:   Procedure Laterality Date   • APPENDECTOMY     • BACK SURGERY  1/1999    lumbar laminectomy L1-L4   • BACK SURGERY  6/2004    cervical times daily. Disp:  Rfl:  1/13/2019 at Unknown time   Clopidogrel Bisulfate 75 MG Oral Tab Take 1 tablet (75 mg total) by mouth daily. Disp: 30 tablet Rfl: 5 1/13/2019 at Unknown time   ALPRAZolam 0.25 MG Oral Tab Take 0.25 mg by mouth nightly.  Disp:  Rfl: of bilateral forearm  Psychiatric: Appropriate mood and affect. Diagnostic Data:      Laboratory Data:   Labs were done at urgent care center reviewed as below   Ref.  Range 1/13/2019 13:02   WBC Latest Ref Range: 4.00 - 13.00 10^3/uL 4.77   Hemoglobin g/dL 3.0 (L)       Recent Labs   Lab  01/13/19   1302   TROP  <0.017     Imaging:  Imaging data done at urgent care center reviewed in [address] [place] [address] Rd as below    [address]: 01.13.2019  CT ANGIOGRAPHY, MESENTERIC ARTERIES (CPT=74175)  CLINICAL INDICATION: Gener series  602.2 and seen on axial images 45 through 48 of series 2. Contrast extends distally without evidence  of significant focal delay. The inferior mesenteric artery opacifies and is without significant atherosclerosis.    MAIN RENAL ARTERIES: Calcified the visualized lungs. There is no free air or significant free fluid.  =====  IMPRESSION:  1. Long segment of mucosal irregularity again identified in the distal transverse through proximal  sigmoid colon.  Endovascular stents of the superior mesenteric a admissions under [facility] hospitalist team.  Discussed in detail with patient,, RN            Kim Leigh MD  1/13/2019  6:22 PM

## 2019-01-14 NOTE — PLAN OF CARE
Pt a&o, VSS. Tremors noted to head and BLE, baseline per pt report. On RA, no sob noted. Tele NSR 80s. Active bowel sounds, reports flatus, denies N/V. States she had 4 BMs PTA, no BM overnight. Pain localized to LLQ, prn dilaudid.  NPO status discussed, ve

## 2019-01-14 NOTE — PLAN OF CARE
Problem: Patient/Family Goals  Goal: Patient/Family Long Term Goal  Patient's Long Term Goal: WANTS TO GO HOME     Interventions:  -  NPO   UP IN ROOM AND AMBULATED IN VALIENTE   IVF , IV ANTIBIOTICS   - See additional Care Plan goals for specific intervention

## 2019-01-14 NOTE — PROGRESS NOTES
[facility]  Progress Note    Loreto Tolentino Patient Status:  Inpatient    1/15/1955 MRN [id number]   [facility] 3NW-A Attending Riya Deng MD   Hosp Day # 1 PCP Jacinda Saldivar     CC: Abdominal pain nausea vomiting and diarrhe 01/14/2019         Meds:     Current Facility-Administered Medications:  Dicyclomine HCl (BENTYL) cap 10 mg 10 mg Oral TID PRN   aspirin chewable tab 81 mg 81 mg Oral Daily   gabapentin (NEURONTIN) cap 600 mg 600 mg Oral TID   atorvastatin (LIPITOR) tab 10 tremor–continue home medication          Quality:  · DVT Prophylaxis: SCD, subcu and his Lovenox  · CODE status: Full  · Alfaro: No  · Central line: No    Estimated date of discharge: 1-2 days  Discharge is dependent on: Clinical progress, tolerates diet  A

## 2019-01-14 NOTE — PROGRESS NOTES
NURSING ADMISSION NOTE      Patient admitted via Wheelchair  Oriented to room. Safety precautions initiated. Bed in low position. Call light in reach. RECEIVED PATIENT FROM IMMEDIATE CARE .  ALERT AND ORIENT X 4 , ON ROOM AIR , TELE , B/L SCD'S , ABD

## 2019-01-14 NOTE — CONSULTS
GASTROENTEROLOGY CONSULTATION  Faby Simental MD    Department of Gastroenterology  [address] [facility] [address] Patient Status:  Inpatient    1/15/1955 MRN [id number]   [facility] 3NW-A Attending Qing Mendoza MD   [facility] Day # Date of Consultation:  6/1/2018     History of Present Illness:     Jack William is a 61year old female who is known to me with ischemic colitis.      She presents with an episode of hematochezia last night. He has not had further bleeding.  She den • OTHER SURGICAL HISTORY      Lt bunionectomy   • OTHER SURGICAL HISTORY      stent in CMA   • TUBAL LIGATION       Family History   Problem Relation Age of Onset   • Hypertension Father    • Heart Disorder Mother    • Cancer Mother         pharyngeal •  metRONIDAZOLE in NaCl (FLAGYL) 5 mg/ml IVPB premix 500 mg, 500 mg, Intravenous, Q8H  •  Ciprofloxacin in D5W (CIPRO) IVPB premix SOLN 400 mg, 400 mg, Intravenous, Q12H    Review of Systems:  Gastrointestinal: See above  General: Denies fatigue, chills/f Component Value Date    WBC 4.0 01/14/2019    HGB 7.9 01/14/2019    HCT 24.6 01/14/2019    .0 01/14/2019    CREATSERUM 0.63 01/14/2019    BUN 3 01/14/2019     01/14/2019    K 3.4 01/14/2019     01/14/2019    CO2 26.0 01/14/2019    GLU 74 without associated thrombus or occlusion. Very minimal attenuation due to normal curvature is seen  which is likely not significant. It is well appreciated on sagittal images 112 and 113 of series  602.2 and seen on axial images 45 through 48 of series 2. MESENTERY/RETROPERITONEUM: There is no evidence of mesenteric, retroperitoneal or inguinal  adenopathy. OSSEOUS STRUCTURES: The visualized osseous structures are without significant focal lytic or blastic  lesions.   LUNG BASES: There are no discrete anya The procedure was reviewed with the patient. The patient is aware of the indications. The risks of bleeding, perforation and anesthetic complications have been reviewed.  The possibility of missed lesions were reviewed.     After the patient was placed in t Over the last week, pt developed a watery diarrheal illness associated with LLQ pain, lasted about 3 days, and has been followed by recurrent post prandial LLQ pain. Prior hx of ischemic colitis, s/p stenting of the SMA.   STools for c diff and culture n

## 2019-01-14 NOTE — PLAN OF CARE
Problem: Patient/Family Goals  Goal: Patient/Family Short Term Goal  Patient's Short Term Goal: FREE FROM PAIN     Interventions:   -  PROVIDED PAIN MEDICINE AS NEEDED   - See additional Care Plan goals for specific interventions  Outcome: Progressing scale  - Administer analgesics based on type and severity of pain and evaluate response  - Implement non-pharmacological measures as appropriate and evaluate response  - Consider cultural and social influences on pain and pain management  - Manage/alleviat

## 2019-01-15 LAB
ANION GAP SERPL CALC-SCNC: 4 MMOL/L (ref 0–18)
BASOPHILS # BLD AUTO: 0 X10(3) UL (ref 0–0.1)
BASOPHILS NFR BLD AUTO: 0 %
BUN BLD-MCNC: 3 MG/DL (ref 8–20)
BUN/CREAT SERPL: 4.2 (ref 10–20)
CALCIUM BLD-MCNC: 8 MG/DL (ref 8.3–10.3)
CHLORIDE SERPL-SCNC: 109 MMOL/L (ref 101–111)
CO2 SERPL-SCNC: 27 MMOL/L (ref 22–32)
CREAT BLD-MCNC: 0.71 MG/DL (ref 0.55–1.02)
EOSINOPHIL # BLD AUTO: 0.08 X10(3) UL (ref 0–0.3)
EOSINOPHIL NFR BLD AUTO: 2.3 %
ERYTHROCYTE [DISTWIDTH] IN BLOOD BY AUTOMATED COUNT: 20.6 % (ref 11.5–16)
GLUCOSE BLD-MCNC: 102 MG/DL (ref 70–99)
HAV IGM SER QL: 1.8 MG/DL (ref 1.8–2.5)
HCT VFR BLD AUTO: 25.1 % (ref 34–50)
HGB BLD-MCNC: 8.2 G/DL (ref 12–16)
IMMATURE GRANULOCYTE COUNT: 0.01 X10(3) UL (ref 0–1)
IMMATURE GRANULOCYTE RATIO %: 0.3 %
LYMPHOCYTES # BLD AUTO: 0.74 X10(3) UL (ref 0.9–4)
LYMPHOCYTES NFR BLD AUTO: 21.5 %
MCH RBC QN AUTO: 30.8 PG (ref 27–33.2)
MCHC RBC AUTO-ENTMCNC: 32.7 G/DL (ref 31–37)
MCV RBC AUTO: 94.4 FL (ref 81–100)
MONOCYTES # BLD AUTO: 0.54 X10(3) UL (ref 0.1–1)
MONOCYTES NFR BLD AUTO: 15.7 %
NEUTROPHIL ABS PRELIM: 2.07 X10 (3) UL (ref 1.3–6.7)
NEUTROPHILS # BLD AUTO: 2.07 X10(3) UL (ref 1.3–6.7)
NEUTROPHILS NFR BLD AUTO: 60.2 %
OSMOLALITY SERPL CALC.SUM OF ELEC: 287 MOSM/KG (ref 275–295)
PLATELET # BLD AUTO: 235 10(3)UL (ref 150–450)
PLATELET MORPHOLOGY: NORMAL
POTASSIUM SERPL-SCNC: 3.8 MMOL/L (ref 3.6–5.1)
RBC # BLD AUTO: 2.66 X10(6)UL (ref 3.8–5.1)
RED CELL DISTRIBUTION WIDTH-SD: 69.1 FL (ref 35.1–46.3)
SODIUM SERPL-SCNC: 140 MMOL/L (ref 136–144)
WBC # BLD AUTO: 3.4 X10(3) UL (ref 4–13)

## 2019-01-15 PROCEDURE — 99232 SBSQ HOSP IP/OBS MODERATE 35: CPT | Performed by: INTERNAL MEDICINE

## 2019-01-15 RX ORDER — METRONIDAZOLE 500 MG/1
500 TABLET ORAL EVERY 8 HOURS SCHEDULED
Status: DISCONTINUED | OUTPATIENT
Start: 2019-01-15 | End: 2019-01-16

## 2019-01-15 RX ORDER — CIPROFLOXACIN 500 MG/1
500 TABLET, FILM COATED ORAL EVERY 12 HOURS SCHEDULED
Status: DISCONTINUED | OUTPATIENT
Start: 2019-01-15 | End: 2019-01-16

## 2019-01-15 RX ORDER — MAGNESIUM OXIDE 400 MG (241.3 MG MAGNESIUM) TABLET
400 TABLET ONCE
Status: COMPLETED | OUTPATIENT
Start: 2019-01-15 | End: 2019-01-15

## 2019-01-15 NOTE — PLAN OF CARE
GASTROINTESTINAL - ADULT    • Minimal or absence of nausea and vomiting Progressing    • Maintains or returns to baseline bowel function Progressing        GENITOURINARY - ADULT    • Absence of urinary retention Progressing        PAIN - ADULT    • Christopher Talavera

## 2019-01-15 NOTE — PLAN OF CARE
GASTROINTESTINAL - ADULT    • Minimal or absence of nausea and vomiting Progressing    • Maintains or returns to baseline bowel function Progressing        GENITOURINARY - ADULT    • Absence of urinary retention Progressing        PAIN - ADULT    • Cindi Hall

## 2019-01-15 NOTE — CONSULTS
[facility] Pharmacy Note: Route Optimization for Ciprofloxacin (CIPRO)    Patient is currently on Ciprofloxacin (CIPRO) 400 mg IV every 12 hours.    The patient meets the criteria to convert to the oral equivalent as established by the IV to Oral conversion protoc

## 2019-01-15 NOTE — PAYOR COMM NOTE
--------------  CONTINUED STAY REVIEW    Payor: [facility] PPO  Subscriber #:  [id number]  Authorization Number: [id number]    Admit date: 1/13/19  Admit time: 1648    Admitting Physician: Corey Tran MD  Attending Physician:  Corey Tran MD  Primary Car 01/14/2019      01/14/2019     CO2 26.0 01/14/2019     GLU 74 01/14/2019     CA 7.8 01/14/2019     MG 1.8 01/14/2019            Meds:      Current Facility-Administered Medications:  Dicyclomine HCl (BENTYL) cap 10 mg 10 mg Oral TID PRN   aspirin jose rafael Plavix  3. Hyperlipidemia–continue statin  4. Depression anxiety and panic disorder–continue home medications  5.  Benign essential tremor–continue home medication              Quality:  · DVT Prophylaxis: SCD, subcu and his Lovenox  · CODE status: Full  · 1/14/2019 1935 Given 4 mg Intravenous Clifton Fam RN      ondansetron HCl [facility]) injection 4 mg     Date Action Dose Route User    1/14/2019 0928 Given 4 mg Intravenous Clifton Fam RN      Potassium Chloride ER (K-DUR M20) CR tab 40 mEq     Date

## 2019-01-15 NOTE — PROGRESS NOTES
[facility]  Progress Note    Mya Lawson Patient Status:  Inpatient    1/15/1955 MRN [id number]   [facility] 3NW-A Attending Francheska Garcia MD   [facility] Day # 2 PCP JAIRO SANCHEZ     Subjective:  Feeling better.   Had some diarrhe tremor    • Bowel obstruction (HCC)    • Cancer (HCC)     Right breast   • Cervical spine degeneration    • Colitis     Ischemic Colitis   • Depression    • Essential hypertension    • Exposure to medical diagnostic radiation    • GI bleed    • High blood Value Date    K 4.1 01/14/2019    MG 1.8 01/15/2019       Assessment:     LLQ abdominal pain and diarrhea  Hx of ischemic colitis  Hx of SBO        Improved today with no abdominal pain - tolerating diet without diarrhea.          Presentation most consiste

## 2019-01-15 NOTE — PROGRESS NOTES
[facility]  Progress Note    Jona William Patient Status:  Inpatient    1/15/1955 MRN [id number]   [facility] 3NW-A Attending Bubab Bermudez MD   Hosp Day # 2 PCP Shadi De Leon     CC: Abdominal pain nausea vomiting and diarrhe Oral 2 times per day   Dicyclomine HCl (BENTYL) cap 10 mg 10 mg Oral TID PRN   gabapentin (NEURONTIN) cap 600 mg 600 mg Oral BID   aspirin chewable tab 81 mg 81 mg Oral Daily   atorvastatin (LIPITOR) tab 10 mg 10 mg Oral Nightly   ALPRAZolam (XANAX) tab 0. products for the time being for at least 2 weeks. Advised brat diet.     Plan of care : will advance to soft diet and if tolerates with no increasing pain or nausea vomiting or diarrhea will plan discharge today          Quality:  · DVT Prophylaxis: LIVAN s

## 2019-01-16 VITALS
OXYGEN SATURATION: 94 % | HEART RATE: 74 BPM | BODY MASS INDEX: 20.98 KG/M2 | WEIGHT: 114 LBS | DIASTOLIC BLOOD PRESSURE: 66 MMHG | HEIGHT: 62 IN | RESPIRATION RATE: 18 BRPM | SYSTOLIC BLOOD PRESSURE: 140 MMHG | TEMPERATURE: 99 F

## 2019-01-16 LAB
ANION GAP SERPL CALC-SCNC: 5 MMOL/L (ref 0–18)
BASOPHILS # BLD AUTO: 0.01 X10(3) UL (ref 0–0.1)
BASOPHILS NFR BLD AUTO: 0.3 %
BUN BLD-MCNC: 3 MG/DL (ref 8–20)
BUN/CREAT SERPL: 4.2 (ref 10–20)
CALCIUM BLD-MCNC: 8.4 MG/DL (ref 8.3–10.3)
CHLORIDE SERPL-SCNC: 112 MMOL/L (ref 101–111)
CO2 SERPL-SCNC: 26 MMOL/L (ref 22–32)
CREAT BLD-MCNC: 0.71 MG/DL (ref 0.55–1.02)
EOSINOPHIL # BLD AUTO: 0.14 X10(3) UL (ref 0–0.3)
EOSINOPHIL NFR BLD AUTO: 4.9 %
ERYTHROCYTE [DISTWIDTH] IN BLOOD BY AUTOMATED COUNT: 20.8 % (ref 11.5–16)
GLUCOSE BLD-MCNC: 95 MG/DL (ref 70–99)
HAV IGM SER QL: 1.8 MG/DL (ref 1.8–2.5)
HCT VFR BLD AUTO: 25.4 % (ref 34–50)
HGB BLD-MCNC: 8.4 G/DL (ref 12–16)
IMMATURE GRANULOCYTE COUNT: 0.01 X10(3) UL (ref 0–1)
IMMATURE GRANULOCYTE RATIO %: 0.3 %
LYMPHOCYTES # BLD AUTO: 0.73 X10(3) UL (ref 0.9–4)
LYMPHOCYTES NFR BLD AUTO: 25.5 %
MCH RBC QN AUTO: 31.1 PG (ref 27–33.2)
MCHC RBC AUTO-ENTMCNC: 33.1 G/DL (ref 31–37)
MCV RBC AUTO: 94.1 FL (ref 81–100)
MONOCYTES # BLD AUTO: 0.4 X10(3) UL (ref 0.1–1)
MONOCYTES NFR BLD AUTO: 14 %
NEUTROPHIL ABS PRELIM: 1.57 X10 (3) UL (ref 1.3–6.7)
NEUTROPHILS # BLD AUTO: 1.57 X10(3) UL (ref 1.3–6.7)
NEUTROPHILS NFR BLD AUTO: 55 %
OSMOLALITY SERPL CALC.SUM OF ELEC: 292 MOSM/KG (ref 275–295)
PLATELET # BLD AUTO: 258 10(3)UL (ref 150–450)
POTASSIUM SERPL-SCNC: 3.9 MMOL/L (ref 3.6–5.1)
RBC # BLD AUTO: 2.7 X10(6)UL (ref 3.8–5.1)
RED CELL DISTRIBUTION WIDTH-SD: 69.2 FL (ref 35.1–46.3)
SODIUM SERPL-SCNC: 143 MMOL/L (ref 136–144)
WBC # BLD AUTO: 2.9 X10(3) UL (ref 4–13)

## 2019-01-16 PROCEDURE — 99239 HOSP IP/OBS DSCHRG MGMT >30: CPT | Performed by: INTERNAL MEDICINE

## 2019-01-16 RX ORDER — MAGNESIUM OXIDE 400 MG (241.3 MG MAGNESIUM) TABLET
400 TABLET ONCE
Status: COMPLETED | OUTPATIENT
Start: 2019-01-16 | End: 2019-01-16

## 2019-01-16 RX ORDER — DICYCLOMINE HYDROCHLORIDE 10 MG/1
CAPSULE ORAL
Qty: 30 CAPSULE | Refills: 2 | Status: SHIPPED | OUTPATIENT
Start: 2019-01-16 | End: 2019-07-19

## 2019-01-16 RX ORDER — DICYCLOMINE HYDROCHLORIDE 10 MG/1
10 CAPSULE ORAL 3 TIMES DAILY PRN
Status: DISCONTINUED | OUTPATIENT
Start: 2019-01-16 | End: 2019-01-16

## 2019-01-16 NOTE — PLAN OF CARE
GASTROINTESTINAL - ADULT    • Minimal or absence of nausea and vomiting Not Progressing    • Maintains or returns to baseline bowel function Not Progressing        GENITOURINARY - ADULT    • Absence of urinary retention Not Progressing        PAIN - ADULT

## 2019-01-16 NOTE — DISCHARGE SUMMARY
[facility]  Discharge Summary    Barbara Duke Patient Status:  Inpatient    1/15/1955 MRN [id number]   [facility] 3NW-A Attending Wendi Gomes MD   [address] Day # 3 PCP Vandana Patel     Date of Admission: 2019    Date of Luis Turner abdomen and pelvis. This data was then used  to create three dimensional coronal and sagittal reconstructed images. Additional manipulation of  the images was performed at an independent console and MRP images were produced.  Nonionic  intravenous contrast seem to extend distally without occlusion. ILIAC ARTERIES: Calcified atherosclerosis extends into the common, internal and external iliac  arteries without significant stenosis.   NONVASCULAR FINDINGS:  BOWEL: Circumferential mucosal thickening is reidenti be  excluded and again, infectious or inflammatory etiology cannot be excluded. 2. Calcified aortoiliac atherosclerosis and additional calcified atherosclerosis at the origins of  the main renal arteries bilaterally.   3. Right renal cortical cyst.  4. Sta LAURA    Consultations:   Mar Torres MD      Gastroenterology     Procedures during hospitalization:   • none    Incidental or significant findings and recommendations (brief descriptions):  • none    Lab/Test results pending at Discharge:   · none mg by mouth every evening. Refills:  0        STOP taking these medications    Ciprofloxacin HCl 500 MG Tabs  Commonly known as:  CIPRO        metRONIDAZOLE 500 MG Tabs  Commonly known as:  FLAGYL                  Follow up Visits:  Follow-up with JAIRO SON discharge    Simon Cesar MD  1/16/2019  2:17 PM

## 2019-01-16 NOTE — PROGRESS NOTES
NURSING DISCHARGE NOTE    Discharged Home via Wheelchair. Accompanied by Support staff  Belongings Taken by patient/family     VERBAL AND WRITTEN DISCHARGE INSTRUCTIONS GIVEN TO PATIENT. VERBALIZED UNDERSTANDING. TOLERATED DIET. DENIES PAIN AT THIS TIME.

## 2019-01-16 NOTE — PROGRESS NOTES
[facility]  Progress Note    Ranjeet Keenan Patient Status:  Inpatient    1/15/1955 MRN [id number]   [facility] 3NW-A Attending Jaqueline Hancock MD   Hosp Day # 3 PCP JAIRO SANCHEZ     Subjective:  Pt nearly at baseline.   No furthe • Cancer [facility])     Right breast   • Cervical spine degeneration    • Colitis     Ischemic Colitis   • Depression    • Essential hypertension    • Exposure to medical diagnostic radiation    • GI bleed    • High blood pressure    • High cholesterol    • H HGB 8.4 01/16/2019    HCT 25.4 01/16/2019    .0 01/16/2019    CREATSERUM 0.71 01/16/2019    BUN 3 01/16/2019     01/16/2019    K 3.9 01/16/2019     01/16/2019    CO2 26.0 01/16/2019    GLU 95 01/16/2019    CA 8.4 01/16/2019    MG 1.8

## 2019-01-16 NOTE — PROGRESS NOTES
[facility]  Progress Note    Rita Bolaños Patient Status:  Inpatient    1/15/1955 MRN [id number]   [facility] 3NW-A Attending Dash Duque MD   Hosp Day # 3 PCP Meenu Bates     CC: Abdominal pain nausea vomiting and diarrhe GLU 95 01/16/2019    CA 8.4 01/16/2019    MG 1.8 01/16/2019         Meds:     Current Facility-Administered Medications:  metRONIDAZOLE (FLAGYL) tab 500 mg 500 mg Oral Q8H [facility]   Ciprofloxacin HCl (CIPRO) tab 500 mg 500 mg Oral 2 times per day   Dicyclom Plavix  3. Hyperlipidemia–continue statin  4. Depression anxiety and panic disorder–continue home medications  5. Benign essential tremor–continue home medication  6.  Diarrhea– possible lactose intolerance– advised to avoid milk products for the time being

## 2019-01-24 NOTE — PAYOR COMM NOTE
--------------  REQUESTING APPROVAL FOR ALL DAYS UP TO ( but not including)  1/16    PLEASE FAX  DAYS AUTHORIZED  [facility] YOU      DISCHARGE REVIEW    Payor: KENDY SHARPE  Subscriber #:  [id number]  Authorization Number: [id number]    Admit date: 1/13/19  Admi Synopsis:  Imaging data done at urgent care center reviewed in [facility] as below     DATE OF SERVICE: 01.13.2019  CT ANGIOGRAPHY, MESENTERIC ARTERIES (CPT=74175)  CLINICAL INDICATION: Generalized abdominal pain. COMPARISON STUDY: January 11, 2019 CT.   TECHNIQ extends distally without evidence  of significant focal delay. The inferior mesenteric artery opacifies and is without significant atherosclerosis. MAIN RENAL ARTERIES: Calcified atherosclerosis is seen at the origins of both main renal arteries.   Some fluid.  =====  IMPRESSION:  1.  Long segment of mucosal irregularity again identified in the distal transverse through proximal  sigmoid colon.  Endovascular stents of the superior mesenteric artery does not demonstrate  significant stenosis, thrombosis or condition      TCM Diagnosis at discharge from Hospital: Other: see above; still recommend for TCM follow-up    Lace+ Score: 64  59-90 High Risk  29-58 Medium Risk  0-28   Low Risk. TCM Follow-Up Recommendation:Tamara Meraz   LACE > 58:  High Ris ACETAMINOPHEN IN 24 HRS  *DON'T DRIVE OR DRINK ALCOHOL IF TAKING NORCO      Take 1 tablet by mouth every 6 (six) hours as needed for Pain. Refills:  0     primidone 50 MG Tabs  Commonly known as: MYSOLINE      Take 150 mg by mouth every morning.    Refil extremities normal,no cyanosis or edema  Pulses: 2+ and symmetric  Skin: Psoriasis lesions seen on extensor surface of bilateral forearm  Neurologic: Awake,alert,nonfocal  Psych: Mood and affect appears normal          Discharge Condition: stable    Patien

## 2019-01-25 ENCOUNTER — NURSE ONLY (OUTPATIENT)
Dept: HEMATOLOGY/ONCOLOGY | Facility: HOSPITAL | Age: 64
End: 2019-01-25
Attending: INTERNAL MEDICINE
Payer: COMMERCIAL

## 2019-01-25 ENCOUNTER — TELEPHONE (OUTPATIENT)
Dept: HEMATOLOGY/ONCOLOGY | Facility: HOSPITAL | Age: 64
End: 2019-01-25

## 2019-01-25 DIAGNOSIS — D64.9 ANEMIA, UNSPECIFIED TYPE: ICD-10-CM

## 2019-01-25 LAB
ALBUMIN SERPL-MCNC: 3.6 G/DL (ref 3.1–4.5)
ALBUMIN/GLOB SERPL: 1 {RATIO} (ref 1–2)
ALP LIVER SERPL-CCNC: 55 U/L (ref 50–130)
ALT SERPL-CCNC: 16 U/L (ref 14–54)
ANION GAP SERPL CALC-SCNC: 7 MMOL/L (ref 0–18)
AST SERPL-CCNC: 16 U/L (ref 15–41)
BASOPHILS # BLD AUTO: 0.03 X10(3) UL (ref 0–0.1)
BASOPHILS NFR BLD AUTO: 0.5 %
BILIRUB SERPL-MCNC: 0.2 MG/DL (ref 0.1–2)
BUN BLD-MCNC: 8 MG/DL (ref 8–20)
BUN/CREAT SERPL: 9.8 (ref 10–20)
CALCIUM BLD-MCNC: 8.7 MG/DL (ref 8.3–10.3)
CHLORIDE SERPL-SCNC: 103 MMOL/L (ref 101–111)
CO2 SERPL-SCNC: 27 MMOL/L (ref 22–32)
CREAT BLD-MCNC: 0.82 MG/DL (ref 0.55–1.02)
DEPRECATED HBV CORE AB SER IA-ACNC: 179.3 NG/ML (ref 18–340)
EOSINOPHIL # BLD AUTO: 0.08 X10(3) UL (ref 0–0.3)
EOSINOPHIL NFR BLD AUTO: 1.2 %
ERYTHROCYTE [DISTWIDTH] IN BLOOD BY AUTOMATED COUNT: 21 % (ref 11.5–16)
GLOBULIN PLAS-MCNC: 3.7 G/DL (ref 2.8–4.4)
GLUCOSE BLD-MCNC: 85 MG/DL (ref 70–99)
HCT VFR BLD AUTO: 30.6 % (ref 34–50)
HGB BLD-MCNC: 9.8 G/DL (ref 12–16)
IMMATURE GRANULOCYTE COUNT: 0.02 X10(3) UL (ref 0–1)
IMMATURE GRANULOCYTE RATIO %: 0.3 %
IRON SATURATION: 8 % (ref 15–50)
IRON: 21 UG/DL (ref 28–170)
LYMPHOCYTES # BLD AUTO: 0.73 X10(3) UL (ref 0.9–4)
LYMPHOCYTES NFR BLD AUTO: 11.2 %
M PROTEIN MFR SERPL ELPH: 7.3 G/DL (ref 6.4–8.2)
MCH RBC QN AUTO: 31.1 PG (ref 27–33.2)
MCHC RBC AUTO-ENTMCNC: 32 G/DL (ref 31–37)
MCV RBC AUTO: 97.1 FL (ref 81–100)
MONOCYTES # BLD AUTO: 0.91 X10(3) UL (ref 0.1–1)
MONOCYTES NFR BLD AUTO: 13.9 %
NEUTROPHIL ABS PRELIM: 4.77 X10 (3) UL (ref 1.3–6.7)
NEUTROPHILS # BLD AUTO: 4.77 X10(3) UL (ref 1.3–6.7)
NEUTROPHILS NFR BLD AUTO: 72.9 %
OSMOLALITY SERPL CALC.SUM OF ELEC: 282 MOSM/KG (ref 275–295)
PLATELET # BLD AUTO: 375 10(3)UL (ref 150–450)
PLATELET MORPHOLOGY: NORMAL
POTASSIUM SERPL-SCNC: 3.9 MMOL/L (ref 3.6–5.1)
RBC # BLD AUTO: 3.15 X10(6)UL (ref 3.8–5.1)
RED CELL DISTRIBUTION WIDTH-SD: 74.5 FL (ref 35.1–46.3)
SODIUM SERPL-SCNC: 137 MMOL/L (ref 136–144)
TOTAL IRON BINDING CAPACITY: 267 UG/DL (ref 240–450)
TRANSFERRIN SERPL-MCNC: 179 MG/DL (ref 200–360)
WBC # BLD AUTO: 6.5 X10(3) UL (ref 4–13)

## 2019-01-25 PROCEDURE — 83540 ASSAY OF IRON: CPT

## 2019-01-25 PROCEDURE — 83550 IRON BINDING TEST: CPT

## 2019-01-25 PROCEDURE — 85025 COMPLETE CBC W/AUTO DIFF WBC: CPT

## 2019-01-25 PROCEDURE — 36415 COLL VENOUS BLD VENIPUNCTURE: CPT

## 2019-01-25 PROCEDURE — 82728 ASSAY OF FERRITIN: CPT

## 2019-01-25 PROCEDURE — 80053 COMPREHEN METABOLIC PANEL: CPT

## 2019-01-30 ENCOUNTER — OFFICE VISIT (OUTPATIENT)
Dept: HEMATOLOGY/ONCOLOGY | Facility: HOSPITAL | Age: 64
End: 2019-01-30
Attending: INTERNAL MEDICINE
Payer: COMMERCIAL

## 2019-01-30 NOTE — PROGRESS NOTES
Pt. Will not be here today due to a current [facility] hospitalization. She will be calling to reschedule.

## 2019-02-04 NOTE — PAYOR COMM NOTE
-----SERVICES PROVIDED AT [facility]---------    --------------REQUESTING ADDITIONAL DAYS 1/14 AND 1/15 WITH DISCHARGE ON 1/16  PLEASE FAX BACK APPROVED DAYS OR UPDATE [place].   THANK YOU.    DISCHARGE REVIEW    Payor: [facility] PPO  Subscriber #:  [id number] Shane Waite is a 61year old female admitted with abdominal pain nausea vomiting and diarrhea. Left lower quadrant abdominal pain 7 out of 10 constant started 3 days back.   Associated with intractable nausea vomiting and diarrhea and abdominal cra THORACIC AND ABDOMINAL AORTA: Some scattered calcified and noncalcified atherosclerosis is  identified extending through the visualized aorta.  Small focal ulceration is seen in the anterior  and right lateral aspect of the infrarenal abdominal aorta and a LIVER: The liver is grossly normal in size and homogeneous without focal solid or cystic lesions. There is no intrahepatic biliary ductal dilatation. SPLEEN: The spleen is grossly normal in size and homogeneous without focal solid or cystic lesions.   PAN Abdominal pain with nausea acute colitis treated conservatively. Kept n.p.o. initially and was given IV fluids and IV Zofran as needed. Given IV antibiotics IV Cipro and Flagyl. Seen by GI on consult.   Given dental as needed for abdominal pain and cramp Instructions Prescription details   ALPRAZolam 0.25 MG Tabs  Commonly known as:  XANAX      Take 0.25 mg by mouth nightly. Refills:  0     aspirin 81 MG Tabs      Take 81 mg by mouth daily.    Refills:  0     atorvastatin 10 MG Tabs  Commonly known as: Appointments for Next 30 Days 1/16/2019 - 2/15/2019      Date Arrival Time Visit Type Length Department Provider     1/25/2019 11:00 AM  PERIPHERAL LAB [2088] 10 min. [address]    Location Instructions:       Your appoi CC: Abdominal pain nausea vomiting and diarrhea     SUBJECTIVE:  Abdominal pain improving. Nausea and vomiting improving. Diarrhea improving. Afebrile. Denies any chest pain or shortness of breath.   Review of Systems:   A comprehensive 14 point review aspirin chewable tab 81 mg 81 mg Oral Daily   gabapentin (NEURONTIN) cap 600 mg 600 mg Oral TID   atorvastatin (LIPITOR) tab 10 mg 10 mg Oral Nightly   ALPRAZolam (XANAX) tab 0.25 mg 0.25 mg Oral Nightly   Clopidogrel Bisulfate (PLAVIX) tab 75 mg 75 mg Ora · CODE status: Full  · Alfaro: No  · Central line: No     Estimated date of discharge: 1-2 days  Discharge is dependent on: Clinical progress, tolerates diet  At this point Ms. Priscilla Sharpe  is expected to be discharge to: Home           Questions/concerns and Heart: S1, S2 normal, no murmur,  regular rate and rhythm  Abdomen: soft,mild LLQ tenderness; bowel sounds normal  Extremities: extremities normal,no cyanosis or edema  Pulses: 2+ and symmetric  Skin: Psoriasis lesions seen on extensor surface of bilateral 1. Acute colitis with abdominal pain nausea vomiting and diarrhea  1. improving with IV antibiotics. 2. Diet being advanced to soft diet. 3.  GI on consult. 4. Patient has history of mesenteric angina with previous SMA stent.   5. Advised to avoid mi

## 2019-02-07 ENCOUNTER — OFFICE VISIT (OUTPATIENT)
Dept: HEMATOLOGY/ONCOLOGY | Facility: HOSPITAL | Age: 64
End: 2019-02-07
Attending: INTERNAL MEDICINE
Payer: COMMERCIAL

## 2019-02-07 VITALS
WEIGHT: 111.38 LBS | HEIGHT: 62.52 IN | DIASTOLIC BLOOD PRESSURE: 66 MMHG | TEMPERATURE: 98 F | BODY MASS INDEX: 19.98 KG/M2 | SYSTOLIC BLOOD PRESSURE: 115 MMHG | OXYGEN SATURATION: 97 % | HEART RATE: 78 BPM | RESPIRATION RATE: 16 BRPM

## 2019-02-07 DIAGNOSIS — D50.0 IRON DEFICIENCY ANEMIA DUE TO CHRONIC BLOOD LOSS: Primary | ICD-10-CM

## 2019-02-07 PROCEDURE — 96374 THER/PROPH/DIAG INJ IV PUSH: CPT

## 2019-02-07 RX ORDER — VANCOMYCIN HYDROCHLORIDE 25 MG/ML
KIT ORAL
Refills: 0 | COMMUNITY
Start: 2019-02-01 | End: 2019-07-19

## 2019-02-07 RX ORDER — RANITIDINE 300 MG/1
TABLET ORAL
Refills: 5 | COMMUNITY
Start: 2019-01-25 | End: 2019-07-19

## 2019-02-07 RX ORDER — ENOXAPARIN SODIUM 100 MG/ML
INJECTION SUBCUTANEOUS
Refills: 0 | COMMUNITY
Start: 2019-01-31 | End: 2019-03-22

## 2019-02-07 RX ORDER — WARFARIN SODIUM 2.5 MG/1
TABLET ORAL
Refills: 2 | COMMUNITY
Start: 2019-02-01 | End: 2019-04-28

## 2019-02-07 RX ORDER — DULOXETIN HYDROCHLORIDE 60 MG/1
CAPSULE, DELAYED RELEASE ORAL
Refills: 4 | COMMUNITY
Start: 2019-01-22 | End: 2020-03-13

## 2019-02-07 NOTE — PROGRESS NOTES
Education Record    Learner:  Patient    Disease / Diagnosis: anemia    Barriers / Limitations:  None   Comments:    Method:  Brief focused and Discussion   Comments:    General Topics:  Medication, Precautions, Side effects and symptom management, Plan of

## 2019-02-11 ENCOUNTER — OFFICE VISIT (OUTPATIENT)
Dept: HEMATOLOGY/ONCOLOGY | Facility: HOSPITAL | Age: 64
End: 2019-02-11
Attending: INTERNAL MEDICINE
Payer: COMMERCIAL

## 2019-02-11 VITALS
DIASTOLIC BLOOD PRESSURE: 73 MMHG | RESPIRATION RATE: 16 BRPM | TEMPERATURE: 97 F | OXYGEN SATURATION: 96 % | HEART RATE: 80 BPM | SYSTOLIC BLOOD PRESSURE: 114 MMHG

## 2019-02-11 DIAGNOSIS — D50.0 IRON DEFICIENCY ANEMIA DUE TO CHRONIC BLOOD LOSS: Primary | ICD-10-CM

## 2019-02-11 PROCEDURE — 96374 THER/PROPH/DIAG INJ IV PUSH: CPT

## 2019-02-13 ENCOUNTER — APPOINTMENT (OUTPATIENT)
Dept: HEMATOLOGY/ONCOLOGY | Facility: HOSPITAL | Age: 64
End: 2019-02-13
Attending: INTERNAL MEDICINE
Payer: COMMERCIAL

## 2019-02-15 ENCOUNTER — OFFICE VISIT (OUTPATIENT)
Dept: HEMATOLOGY/ONCOLOGY | Facility: HOSPITAL | Age: 64
End: 2019-02-15
Attending: INTERNAL MEDICINE
Payer: COMMERCIAL

## 2019-02-15 VITALS
OXYGEN SATURATION: 99 % | TEMPERATURE: 97 F | DIASTOLIC BLOOD PRESSURE: 65 MMHG | RESPIRATION RATE: 18 BRPM | SYSTOLIC BLOOD PRESSURE: 121 MMHG | HEART RATE: 73 BPM

## 2019-02-15 DIAGNOSIS — D50.0 IRON DEFICIENCY ANEMIA DUE TO CHRONIC BLOOD LOSS: Primary | ICD-10-CM

## 2019-02-15 PROCEDURE — 96374 THER/PROPH/DIAG INJ IV PUSH: CPT

## 2019-02-15 NOTE — PROGRESS NOTES
Education Record    Learner:  Patient    Disease / Diagnosis: iron deficiency anemia    Barriers / Limitations:  None   Comments:    Method:  Brief focused   Comments:    General Topics:  Plan of care reviewed   Comments:    Outcome:  Shows understanding

## 2019-02-19 ENCOUNTER — OFFICE VISIT (OUTPATIENT)
Dept: HEMATOLOGY/ONCOLOGY | Facility: HOSPITAL | Age: 64
End: 2019-02-19
Attending: INTERNAL MEDICINE
Payer: COMMERCIAL

## 2019-02-19 VITALS
HEART RATE: 81 BPM | DIASTOLIC BLOOD PRESSURE: 65 MMHG | SYSTOLIC BLOOD PRESSURE: 103 MMHG | OXYGEN SATURATION: 99 % | RESPIRATION RATE: 16 BRPM | TEMPERATURE: 97 F

## 2019-02-19 DIAGNOSIS — D50.0 IRON DEFICIENCY ANEMIA DUE TO CHRONIC BLOOD LOSS: Primary | ICD-10-CM

## 2019-02-19 PROCEDURE — 96374 THER/PROPH/DIAG INJ IV PUSH: CPT

## 2019-02-19 NOTE — PROGRESS NOTES
Education Record    Learner:  Patient    Disease / Diagnosis: anemia  Barriers / Limitations:  None   Comments:    Method:  Brief focused   Comments:    General Topics:  Plan of care reviewed   Comments:    Outcome:  Shows understanding   Comments:    Sandra

## 2019-02-21 ENCOUNTER — APPOINTMENT (OUTPATIENT)
Dept: HEMATOLOGY/ONCOLOGY | Facility: HOSPITAL | Age: 64
End: 2019-02-21
Attending: INTERNAL MEDICINE
Payer: COMMERCIAL

## 2019-02-21 VITALS
SYSTOLIC BLOOD PRESSURE: 124 MMHG | HEART RATE: 80 BPM | OXYGEN SATURATION: 99 % | DIASTOLIC BLOOD PRESSURE: 73 MMHG | RESPIRATION RATE: 16 BRPM | TEMPERATURE: 96 F

## 2019-02-21 DIAGNOSIS — D50.0 IRON DEFICIENCY ANEMIA DUE TO CHRONIC BLOOD LOSS: Primary | ICD-10-CM

## 2019-02-21 PROCEDURE — 96374 THER/PROPH/DIAG INJ IV PUSH: CPT

## 2019-02-22 ENCOUNTER — APPOINTMENT (OUTPATIENT)
Dept: HEMATOLOGY/ONCOLOGY | Facility: HOSPITAL | Age: 64
End: 2019-02-22
Attending: INTERNAL MEDICINE
Payer: COMMERCIAL

## 2019-02-27 RX ORDER — DULOXETIN HYDROCHLORIDE 30 MG/1
CAPSULE, DELAYED RELEASE ORAL
COMMUNITY
Start: 2012-12-04 | End: 2019-03-05 | Stop reason: SDUPTHER

## 2019-02-27 RX ORDER — HYDROCODONE BITARTRATE AND ACETAMINOPHEN 5; 325 MG/1; MG/1
TABLET ORAL
COMMUNITY
Start: 2014-04-29

## 2019-02-27 RX ORDER — CLOPIDOGREL BISULFATE 75 MG/1
TABLET ORAL
COMMUNITY
Start: 2014-03-13 | End: 2019-03-05 | Stop reason: SDUPTHER

## 2019-02-27 RX ORDER — FLECAINIDE ACETATE 100 MG/1
TABLET ORAL
COMMUNITY
Start: 2018-09-18 | End: 2019-03-05 | Stop reason: SDUPTHER

## 2019-02-27 RX ORDER — ATORVASTATIN CALCIUM 10 MG/1
TABLET, FILM COATED ORAL
COMMUNITY
Start: 2013-04-09 | End: 2019-03-05 | Stop reason: SDUPTHER

## 2019-02-27 RX ORDER — LANOLIN ALCOHOL/MO/W.PET/CERES
CREAM (GRAM) TOPICAL
COMMUNITY
End: 2019-03-05 | Stop reason: SDUPTHER

## 2019-02-27 RX ORDER — DIPHENOXYLATE HYDROCHLORIDE AND ATROPINE SULFATE 2.5; .025 MG/1; MG/1
TABLET ORAL
COMMUNITY
Start: 2012-12-04

## 2019-02-27 RX ORDER — GABAPENTIN 300 MG/1
CAPSULE ORAL
COMMUNITY
Start: 2013-01-18 | End: 2020-02-01 | Stop reason: SDUPTHER

## 2019-02-27 RX ORDER — CALCIUM CARBONATE 500(1250)
TABLET ORAL
COMMUNITY

## 2019-02-27 RX ORDER — LISINOPRIL 40 MG/1
TABLET ORAL
COMMUNITY
Start: 2017-03-14

## 2019-02-28 VITALS
WEIGHT: 118 LBS | HEIGHT: 62 IN | HEART RATE: 85 BPM | BODY MASS INDEX: 21.71 KG/M2 | DIASTOLIC BLOOD PRESSURE: 90 MMHG | SYSTOLIC BLOOD PRESSURE: 178 MMHG

## 2019-02-28 VITALS
DIASTOLIC BLOOD PRESSURE: 66 MMHG | SYSTOLIC BLOOD PRESSURE: 130 MMHG | WEIGHT: 111 LBS | BODY MASS INDEX: 20.43 KG/M2 | HEART RATE: 88 BPM | HEIGHT: 62 IN

## 2019-03-01 VITALS
HEIGHT: 62 IN | SYSTOLIC BLOOD PRESSURE: 146 MMHG | DIASTOLIC BLOOD PRESSURE: 88 MMHG | WEIGHT: 120 LBS | HEART RATE: 84 BPM | BODY MASS INDEX: 22.08 KG/M2

## 2019-03-05 ENCOUNTER — OFFICE VISIT (OUTPATIENT)
Dept: CARDIOLOGY | Age: 64
End: 2019-03-05

## 2019-03-05 VITALS
BODY MASS INDEX: 20.49 KG/M2 | HEART RATE: 78 BPM | DIASTOLIC BLOOD PRESSURE: 64 MMHG | WEIGHT: 112 LBS | SYSTOLIC BLOOD PRESSURE: 100 MMHG

## 2019-03-05 DIAGNOSIS — E78.00 PURE HYPERCHOLESTEROLEMIA: ICD-10-CM

## 2019-03-05 DIAGNOSIS — I48.0 AF (PAROXYSMAL ATRIAL FIBRILLATION) (CMD): ICD-10-CM

## 2019-03-05 DIAGNOSIS — I25.10 CAD IN NATIVE ARTERY: Primary | ICD-10-CM

## 2019-03-05 DIAGNOSIS — I70.0 ATHEROSCLEROSIS OF ABDOMINAL AORTA (CMD): ICD-10-CM

## 2019-03-05 DIAGNOSIS — I10 ESSENTIAL HYPERTENSION: ICD-10-CM

## 2019-03-05 PROCEDURE — 99215 OFFICE O/P EST HI 40 MIN: CPT | Performed by: INTERNAL MEDICINE

## 2019-03-05 PROCEDURE — 3078F DIAST BP <80 MM HG: CPT | Performed by: INTERNAL MEDICINE

## 2019-03-05 PROCEDURE — 3074F SYST BP LT 130 MM HG: CPT | Performed by: INTERNAL MEDICINE

## 2019-03-05 RX ORDER — CLOPIDOGREL BISULFATE 75 MG/1
75 TABLET ORAL
COMMUNITY
Start: 2018-05-31 | End: 2019-07-09 | Stop reason: SDUPTHER

## 2019-03-05 RX ORDER — ATORVASTATIN CALCIUM 10 MG/1
TABLET, FILM COATED ORAL
COMMUNITY
Start: 2015-05-21

## 2019-03-05 RX ORDER — ALPRAZOLAM 0.25 MG/1
0.25 TABLET ORAL
COMMUNITY
Start: 2015-07-09

## 2019-03-05 RX ORDER — WARFARIN SODIUM 2.5 MG/1
TABLET ORAL
COMMUNITY
Start: 2019-02-01 | End: 2021-02-16

## 2019-03-05 RX ORDER — DULOXETIN HYDROCHLORIDE 30 MG/1
30 CAPSULE, DELAYED RELEASE ORAL
COMMUNITY
Start: 2018-11-18

## 2019-03-05 RX ORDER — CYANOCOBALAMIN (VITAMIN B-12) 500 MCG
500 TABLET ORAL
COMMUNITY

## 2019-03-05 RX ORDER — PRIMIDONE 50 MG/1
TABLET ORAL
COMMUNITY
Start: 2018-08-17

## 2019-03-05 RX ORDER — FLECAINIDE ACETATE 100 MG/1
100 TABLET ORAL
COMMUNITY
Start: 2018-06-20 | End: 2019-04-15 | Stop reason: SDUPTHER

## 2019-03-21 NOTE — PROGRESS NOTES
[facility] Progress Note      Patient Name:  Jatinder Augustin  YOB: 1955  Medical Record Number:  [id number]    Date of visit:  3/22/2019    CHIEF COMPLAINT: Anemia    HPI:     59year old   female that I saw in [facility] 6/18 for anem needed for Pain. Disp:  Rfl:    Cyanocobalamin (B-12) 500 MCG Oral Tab Take 500 mcg by mouth daily. Disp:  Rfl:    Calcium Carbonate-Vitamin D (OSCAL-500) 500-400 MG-UNIT Oral Tab Take 1 tablet by mouth daily.    Disp:  Rfl:    gabapentin (NEURONTIN) 300 MG - 4.4 g/dL    A/G Ratio 1.1 1.0 - 2.0   FERRITIN    Collection Time: 03/22/19 11:08 AM   Result Value Ref Range    Ferritin 463.3 (H) 18.0 - 340.0 ng/mL   IRON AND TIBC    Collection Time: 03/22/19 11:08 AM   Result Value Ref Range    Iron 90 50 - 170 ug/d for Labs, MD visit. Rancho Sutton M.D.     [facility] [place] [facility]    [facility] [address], [address] [id number]    3/22/2019

## 2019-03-22 ENCOUNTER — TELEPHONE (OUTPATIENT)
Dept: HEMATOLOGY/ONCOLOGY | Facility: HOSPITAL | Age: 64
End: 2019-03-22

## 2019-03-22 ENCOUNTER — OFFICE VISIT (OUTPATIENT)
Dept: HEMATOLOGY/ONCOLOGY | Facility: HOSPITAL | Age: 64
End: 2019-03-22
Attending: INTERNAL MEDICINE
Payer: COMMERCIAL

## 2019-03-22 VITALS
OXYGEN SATURATION: 99 % | DIASTOLIC BLOOD PRESSURE: 99 MMHG | WEIGHT: 110 LBS | RESPIRATION RATE: 18 BRPM | SYSTOLIC BLOOD PRESSURE: 164 MMHG | HEART RATE: 83 BPM | BODY MASS INDEX: 20 KG/M2 | TEMPERATURE: 97 F

## 2019-03-22 DIAGNOSIS — K92.2 GASTROINTESTINAL HEMORRHAGE, UNSPECIFIED GASTROINTESTINAL HEMORRHAGE TYPE: Primary | ICD-10-CM

## 2019-03-22 DIAGNOSIS — D50.0 IRON DEFICIENCY ANEMIA DUE TO CHRONIC BLOOD LOSS: Primary | ICD-10-CM

## 2019-03-22 DIAGNOSIS — D50.0 IRON DEFICIENCY ANEMIA DUE TO CHRONIC BLOOD LOSS: ICD-10-CM

## 2019-03-22 LAB
ALBUMIN SERPL-MCNC: 4.1 G/DL (ref 3.4–5)
ALBUMIN/GLOB SERPL: 1.1 {RATIO} (ref 1–2)
ALP LIVER SERPL-CCNC: 64 U/L (ref 50–130)
ALT SERPL-CCNC: 25 U/L (ref 13–56)
ANION GAP SERPL CALC-SCNC: 5 MMOL/L (ref 0–18)
AST SERPL-CCNC: 12 U/L (ref 15–37)
BASOPHILS # BLD AUTO: 0.01 X10(3) UL (ref 0–0.2)
BASOPHILS NFR BLD AUTO: 0.4 %
BILIRUB SERPL-MCNC: 0.2 MG/DL (ref 0.1–2)
BUN BLD-MCNC: 12 MG/DL (ref 7–18)
BUN/CREAT SERPL: 13 (ref 10–20)
CALCIUM BLD-MCNC: 9.2 MG/DL (ref 8.5–10.1)
CHLORIDE SERPL-SCNC: 100 MMOL/L (ref 98–107)
CO2 SERPL-SCNC: 28 MMOL/L (ref 21–32)
CREAT BLD-MCNC: 0.92 MG/DL (ref 0.55–1.02)
DEPRECATED HBV CORE AB SER IA-ACNC: 463.3 NG/ML (ref 18–340)
DEPRECATED RDW RBC AUTO: 53.6 FL (ref 35.1–46.3)
EOSINOPHIL # BLD AUTO: 0.06 X10(3) UL (ref 0–0.7)
EOSINOPHIL NFR BLD AUTO: 2.7 %
ERYTHROCYTE [DISTWIDTH] IN BLOOD BY AUTOMATED COUNT: 14.7 % (ref 11–15)
GLOBULIN PLAS-MCNC: 3.6 G/DL (ref 2.8–4.4)
GLUCOSE BLD-MCNC: 60 MG/DL (ref 70–99)
HCT VFR BLD AUTO: 33.6 % (ref 35–48)
HGB BLD-MCNC: 11.1 G/DL (ref 12–16)
IMM GRANULOCYTES # BLD AUTO: 0 X10(3) UL (ref 0–1)
IMM GRANULOCYTES NFR BLD: 0 %
IRON SATURATION: 32 % (ref 15–50)
IRON SERPL-MCNC: 90 UG/DL (ref 50–170)
LYMPHOCYTES # BLD AUTO: 0.8 X10(3) UL (ref 1–4)
LYMPHOCYTES NFR BLD AUTO: 35.6 %
M PROTEIN MFR SERPL ELPH: 7.7 G/DL (ref 6.4–8.2)
MCH RBC QN AUTO: 33.4 PG (ref 26–34)
MCHC RBC AUTO-ENTMCNC: 33 G/DL (ref 31–37)
MCV RBC AUTO: 101.2 FL (ref 80–100)
MONOCYTES # BLD AUTO: 0.29 X10(3) UL (ref 0.1–1)
MONOCYTES NFR BLD AUTO: 12.9 %
NEUTROPHILS # BLD AUTO: 1.09 X10 (3) UL (ref 1.5–7.7)
NEUTROPHILS # BLD AUTO: 1.09 X10(3) UL (ref 1.5–7.7)
NEUTROPHILS NFR BLD AUTO: 48.4 %
OSMOLALITY SERPL CALC.SUM OF ELEC: 274 MOSM/KG (ref 275–295)
PLATELET # BLD AUTO: 187 10(3)UL (ref 150–450)
POTASSIUM SERPL-SCNC: 4.8 MMOL/L (ref 3.5–5.1)
RBC # BLD AUTO: 3.32 X10(6)UL (ref 3.8–5.3)
SODIUM SERPL-SCNC: 133 MMOL/L (ref 136–145)
TOTAL IRON BINDING CAPACITY: 282 UG/DL (ref 240–450)
TRANSFERRIN SERPL-MCNC: 189 MG/DL (ref 200–360)
WBC # BLD AUTO: 2.3 X10(3) UL (ref 4–11)

## 2019-03-22 PROCEDURE — 96374 THER/PROPH/DIAG INJ IV PUSH: CPT

## 2019-03-22 PROCEDURE — 99213 OFFICE O/P EST LOW 20 MIN: CPT | Performed by: INTERNAL MEDICINE

## 2019-03-22 NOTE — TELEPHONE ENCOUNTER
Tabby House MD  P [facility]w [facility] Rns             Call, Hb and Fe better, may continue I.V iron q month as previously diya x 3 then see me in 3 months      Patient made aware of results/MD orders and verbalized understanding.     Call transferred to PS

## 2019-03-22 NOTE — PROGRESS NOTES
Pt here for MD f/ga for CINTIA and due to have 1st monthly maintenance dose of venofer. Reports that she is feeling fatigue. Pt also states she was diagnosed with intestinal blood clot in January and has been on coumadin since; PCP managing.  She was told the b

## 2019-03-22 NOTE — PROGRESS NOTES
Education Record    Learner:  Patient    Disease / Diagnosis: Iron Deficiency    Barriers / Limitations:  None   Comments:    Method:  Brief focused   Comments:    General Topics:  Plan of care reviewed   Comments:    Outcome:  Shows understanding   Commen

## 2019-04-15 RX ORDER — FLECAINIDE ACETATE 100 MG/1
TABLET ORAL
Qty: 180 TABLET | Refills: 0 | Status: SHIPPED | OUTPATIENT
Start: 2019-04-15 | End: 2019-08-30 | Stop reason: SDUPTHER

## 2019-04-19 ENCOUNTER — OFFICE VISIT (OUTPATIENT)
Dept: HEMATOLOGY/ONCOLOGY | Facility: HOSPITAL | Age: 64
End: 2019-04-19
Attending: INTERNAL MEDICINE
Payer: COMMERCIAL

## 2019-04-19 VITALS
OXYGEN SATURATION: 98 % | DIASTOLIC BLOOD PRESSURE: 80 MMHG | TEMPERATURE: 99 F | RESPIRATION RATE: 16 BRPM | HEART RATE: 78 BPM | SYSTOLIC BLOOD PRESSURE: 148 MMHG

## 2019-04-19 DIAGNOSIS — D50.0 IRON DEFICIENCY ANEMIA DUE TO CHRONIC BLOOD LOSS: Primary | ICD-10-CM

## 2019-04-19 PROCEDURE — 96374 THER/PROPH/DIAG INJ IV PUSH: CPT

## 2019-04-19 NOTE — PROGRESS NOTES
Pt here for venofer. Pt tolerated the IVP and 30min. Observation period without incident. VSS upon discharge. She will RTC in 1 month for another dose.     Education Record    Learner:  Patient    Disease / Diagnosis:    Barriers / Limitations:  None   C

## 2019-04-28 ENCOUNTER — APPOINTMENT (OUTPATIENT)
Dept: GENERAL RADIOLOGY | Age: 64
End: 2019-04-28
Attending: FAMILY MEDICINE
Payer: COMMERCIAL

## 2019-04-28 ENCOUNTER — HOSPITAL ENCOUNTER (OUTPATIENT)
Age: 64
Discharge: HOME | End: 2019-04-28
Attending: FAMILY MEDICINE
Payer: COMMERCIAL

## 2019-04-28 VITALS
SYSTOLIC BLOOD PRESSURE: 133 MMHG | OXYGEN SATURATION: 97 % | WEIGHT: 110 LBS | BODY MASS INDEX: 20.24 KG/M2 | RESPIRATION RATE: 18 BRPM | HEIGHT: 62 IN | TEMPERATURE: 97 F | DIASTOLIC BLOOD PRESSURE: 79 MMHG | HEART RATE: 95 BPM

## 2019-04-28 DIAGNOSIS — J40 BRONCHITIS: Primary | ICD-10-CM

## 2019-04-28 PROCEDURE — 71046 X-RAY EXAM CHEST 2 VIEWS: CPT | Performed by: FAMILY MEDICINE

## 2019-04-28 PROCEDURE — 99214 OFFICE O/P EST MOD 30 MIN: CPT

## 2019-04-28 PROCEDURE — 99213 OFFICE O/P EST LOW 20 MIN: CPT

## 2019-04-28 RX ORDER — ALBUTEROL SULFATE 90 UG/1
2 AEROSOL, METERED RESPIRATORY (INHALATION) EVERY 6 HOURS PRN
Qty: 1 INHALER | Refills: 0 | Status: SHIPPED | OUTPATIENT
Start: 2019-04-28 | End: 2019-07-19

## 2019-04-28 RX ORDER — AZITHROMYCIN 250 MG/1
TABLET, FILM COATED ORAL
Qty: 1 PACKAGE | Refills: 0 | Status: SHIPPED | OUTPATIENT
Start: 2019-04-28 | End: 2019-05-03

## 2019-04-28 RX ORDER — METHYLPREDNISOLONE 4 MG/1
TABLET ORAL
Qty: 1 PACKAGE | Refills: 0 | Status: SHIPPED | OUTPATIENT
Start: 2019-04-28 | End: 2019-07-19

## 2019-04-28 NOTE — ED PROVIDER NOTES
Patient Seen in: [address] [facility]    History   Patient presents with:  Cough/URI    Stated Complaint: cough x 10 days    HPI    20-year-old female presents for worsening cough and congestion.   States started with cold symptoms 10 history reviewed and is not pertinent to presenting problem.     Social History    Tobacco Use      Smoking status: Former Smoker        Packs/day: 0.50        Years: 36.00        Pack years: 18        Types: Cigarettes        Quit date: 12/24/2018        Y Patient presents with worsening cough and congestion for past 10 days. Chest x-ray was unremarkable. Start Z-Marek, Medrol Dosepak and [employer] as prescribed. Monitor for worsening symptoms, follow-up with PCP if not better.           Disposition and Pl

## 2019-04-28 NOTE — ED INITIAL ASSESSMENT (HPI)
PT. C/o cough for 10 days. Saw APN at [place] 4/22/19, had neg. Chest x-ray. Saw PCP @ [place] on 4/23/19. Continued to treat as viral bronchitis. Received Tessaon for cough. PT. Feeling she is getting worse.   Reports a fever at beginning of illness, b

## 2019-05-17 ENCOUNTER — OFFICE VISIT (OUTPATIENT)
Dept: HEMATOLOGY/ONCOLOGY | Facility: HOSPITAL | Age: 64
End: 2019-05-17
Attending: INTERNAL MEDICINE
Payer: COMMERCIAL

## 2019-05-17 VITALS
DIASTOLIC BLOOD PRESSURE: 68 MMHG | HEART RATE: 83 BPM | OXYGEN SATURATION: 98 % | SYSTOLIC BLOOD PRESSURE: 112 MMHG | RESPIRATION RATE: 17 BRPM | TEMPERATURE: 98 F

## 2019-05-17 DIAGNOSIS — D50.0 IRON DEFICIENCY ANEMIA DUE TO CHRONIC BLOOD LOSS: Primary | ICD-10-CM

## 2019-05-17 PROCEDURE — 96374 THER/PROPH/DIAG INJ IV PUSH: CPT

## 2019-05-17 NOTE — PROGRESS NOTES
Education Record    Learner:  Patient    Disease / Joann Schultz deficiency anemia due to chronic blood loss     Barriers / Limitations:  None   Comments:    Method:  Brief focused   Comments:    General Topics:  Infection, Medication, Pain, Precautions,

## 2019-05-20 ENCOUNTER — OFFICE VISIT (OUTPATIENT)
Dept: HEMATOLOGY/ONCOLOGY | Facility: HOSPITAL | Age: 64
End: 2019-05-20
Attending: INTERNAL MEDICINE
Payer: COMMERCIAL

## 2019-05-20 VITALS
SYSTOLIC BLOOD PRESSURE: 137 MMHG | TEMPERATURE: 97 F | HEART RATE: 71 BPM | RESPIRATION RATE: 18 BRPM | DIASTOLIC BLOOD PRESSURE: 81 MMHG | OXYGEN SATURATION: 98 %

## 2019-05-20 DIAGNOSIS — D50.0 IRON DEFICIENCY ANEMIA DUE TO CHRONIC BLOOD LOSS: Primary | ICD-10-CM

## 2019-05-20 PROCEDURE — 96374 THER/PROPH/DIAG INJ IV PUSH: CPT

## 2019-05-20 NOTE — PROGRESS NOTES
Education Record    Learner:  Patient and Family Member    Disease / Diagnosis: anemia    Barriers / Limitations:  None   Comments:    Method:  Brief focused   Comments:    General Topics:  Plan of care reviewed   Comments:    Outcome:  Shows understanding

## 2019-06-21 ENCOUNTER — OFFICE VISIT (OUTPATIENT)
Dept: HEMATOLOGY/ONCOLOGY | Facility: HOSPITAL | Age: 64
End: 2019-06-21
Attending: INTERNAL MEDICINE
Payer: COMMERCIAL

## 2019-06-21 VITALS
TEMPERATURE: 97 F | WEIGHT: 106.63 LBS | OXYGEN SATURATION: 99 % | HEIGHT: 62.01 IN | RESPIRATION RATE: 20 BRPM | SYSTOLIC BLOOD PRESSURE: 159 MMHG | BODY MASS INDEX: 19.38 KG/M2 | DIASTOLIC BLOOD PRESSURE: 89 MMHG | HEART RATE: 84 BPM

## 2019-06-21 DIAGNOSIS — D50.0 IRON DEFICIENCY ANEMIA DUE TO CHRONIC BLOOD LOSS: Primary | ICD-10-CM

## 2019-06-21 DIAGNOSIS — D64.9 ANEMIA, UNSPECIFIED TYPE: ICD-10-CM

## 2019-06-21 DIAGNOSIS — D50.0 IRON DEFICIENCY ANEMIA DUE TO CHRONIC BLOOD LOSS: ICD-10-CM

## 2019-06-21 PROCEDURE — 36415 COLL VENOUS BLD VENIPUNCTURE: CPT

## 2019-06-21 PROCEDURE — 80053 COMPREHEN METABOLIC PANEL: CPT

## 2019-06-21 PROCEDURE — 85025 COMPLETE CBC W/AUTO DIFF WBC: CPT

## 2019-06-21 PROCEDURE — 82728 ASSAY OF FERRITIN: CPT

## 2019-06-21 PROCEDURE — 99214 OFFICE O/P EST MOD 30 MIN: CPT | Performed by: NURSE PRACTITIONER

## 2019-06-21 PROCEDURE — 83540 ASSAY OF IRON: CPT

## 2019-06-21 PROCEDURE — 83550 IRON BINDING TEST: CPT

## 2019-06-21 NOTE — PROGRESS NOTES
ANP Visit Note    Patient Name: Crystal Soto   YOB: 1955   Medical Record Number: [id number]   CSN: [id number]   Date of visit: 6/21/2019       Chief Complaint/Reason for Visit: Follow up/ Anemia     History of Present Illness: 64 year Community acquired pneumonia     Community acquired pneumonia, unspecified laterality     Dyspnea, unspecified type     Weakness generalized     Grade II diastolic dysfunction     AVM (arteriovenous malformation)     CAD in native artery     Benign essent Relation Age of Onset   • Hypertension Father    • Heart Disorder Mother    • Cancer Mother         pharyngeal       Social History:  Social History    Socioeconomic History      Marital status:       Spouse name: Not on file      Number of children Disp: , Rfl: 0  •  DULoxetine HCl 60 MG Oral Cap DR Particles, , Disp: , Rfl: 4  •  primidone 50 MG Oral Tab, Take 150 mg by mouth every morning., Disp: , Rfl:   •  primidone 50 MG Oral Tab, Take 100 mg by mouth every evening., Disp: , Rfl:   •  Flecainide m (5' 2.01\")   Wt 48.4 kg (106 lb 9.6 oz)   SpO2 99%   BMI 19.49 kg/m²   HEENT: EOMs intact. PERRL. Oropharynx is clear. Neck: No JVD. No palpable lymphadenopathy. Neck is supple. Chest: Clear to auscultation. Heart: Regular rate and rhythm.    Abdomen American                     Date: 03/22/2019  Value: 66          Ref range: >=60               Status: Final  GFR, -American                         Date: 03/22/2019  Value: 76          Ref range: >=60               Status: Final  AST 03/22/2019  Value: 189*        Ref range: 200 - 360 mg/dL    Status: Final  Total Iron Binding Capacity                   Date: 03/22/2019  Value: 282         Ref range: 240 - 450 ug/dL    Status: Final  % Saturation                                  Date: Ref range: 1.00 - 4.00 x10(*  Status: Final  Monocyte Absolute                             Date: 03/22/2019  Value: 0.29        Ref range: 0.10 - 1.00 x10(*  Status: Final  Eosinophil Absolute                           Date: 03/22/2019  Value: 0.06 care.    Emotional Well Being:  I have assessed the patient's emotional well-being and any concerns about anxiety or depression. We discussed issues of distress, coping difficulties and social support systems and currently there are no active problems.

## 2019-06-21 NOTE — PROGRESS NOTES
Patient presents with: Follow - Up: apn assessment with possible infusion    Apn assessment prior to possible infusion. Pt states she is feeling tired and has pain in her spine 5/10 on pain scale.     Education Record    Learner:  Patient    Disease / Cornell Moe

## 2019-07-10 RX ORDER — CLOPIDOGREL BISULFATE 75 MG/1
TABLET ORAL
Qty: 30 TABLET | Refills: 4 | Status: SHIPPED | OUTPATIENT
Start: 2019-07-10 | End: 2019-11-22 | Stop reason: SDUPTHER

## 2019-07-18 NOTE — PROGRESS NOTES
[facility] Progress Note      Patient Name:  Traci Sanchez  YOB: 1955  Medical Record Number:  [id number]    Date of visit: 7/19/2019    CHIEF COMPLAINT: Anemia    HPI:     59year old   female that I saw in [facility] 6/18 for anemi gabapentin (NEURONTIN) 300 MG Oral Cap Take 600 mg by mouth 3 (three) times daily. Disp:  Rfl:    Atorvastatin Calcium (LIPITOR) 10 MG Oral Tab Take 10 mg by mouth daily. Disp:  Rfl:    aspirin 81 MG Oral Tab Take 81 mg by mouth.  Disp:  Rfl:    lisin Gaurav Soliz, [id number]    7/19/2019

## 2019-07-19 ENCOUNTER — OFFICE VISIT (OUTPATIENT)
Dept: HEMATOLOGY/ONCOLOGY | Facility: HOSPITAL | Age: 64
End: 2019-07-19
Attending: INTERNAL MEDICINE
Payer: COMMERCIAL

## 2019-07-19 VITALS
DIASTOLIC BLOOD PRESSURE: 77 MMHG | OXYGEN SATURATION: 100 % | TEMPERATURE: 97 F | WEIGHT: 105.38 LBS | HEIGHT: 62.01 IN | HEART RATE: 91 BPM | BODY MASS INDEX: 19.15 KG/M2 | SYSTOLIC BLOOD PRESSURE: 131 MMHG | RESPIRATION RATE: 16 BRPM

## 2019-07-19 VITALS
TEMPERATURE: 98 F | SYSTOLIC BLOOD PRESSURE: 126 MMHG | OXYGEN SATURATION: 99 % | DIASTOLIC BLOOD PRESSURE: 72 MMHG | HEART RATE: 79 BPM | RESPIRATION RATE: 16 BRPM

## 2019-07-19 DIAGNOSIS — K92.2 GASTROINTESTINAL HEMORRHAGE, UNSPECIFIED GASTROINTESTINAL HEMORRHAGE TYPE: ICD-10-CM

## 2019-07-19 DIAGNOSIS — D50.0 IRON DEFICIENCY ANEMIA DUE TO CHRONIC BLOOD LOSS: Primary | ICD-10-CM

## 2019-07-19 PROCEDURE — 99213 OFFICE O/P EST LOW 20 MIN: CPT | Performed by: INTERNAL MEDICINE

## 2019-07-19 PROCEDURE — 96374 THER/PROPH/DIAG INJ IV PUSH: CPT

## 2019-07-19 RX ORDER — LISINOPRIL 20 MG/1
TABLET ORAL
Refills: 2 | Status: ON HOLD | COMMUNITY
Start: 2019-07-04 | End: 2020-12-08

## 2019-07-19 RX ORDER — PROPRANOLOL HYDROCHLORIDE 120 MG/1
120 CAPSULE, EXTENDED RELEASE ORAL
COMMUNITY
End: 2020-07-07

## 2019-07-19 NOTE — PROGRESS NOTES
Education Record    Learner:  Patient    Disease / Diagnosis: CINTIA - IV venofer infusion    Barriers / Limitations:  None   Comments:    Method:  Brief focused and Reinforcement   Comments:    General Topics:  Plan of care reviewed   Comments:    Outcome:

## 2019-07-19 NOTE — PROGRESS NOTES
MD f/u for CINTIA. Has been receiving monthly venofer infusions. Labs were drawn last month when she saw APN. States she is feeling well. Denies any issues or concerns at this time.      Education Record    Learner:  Patient    Barriers / Limitations:  None

## 2019-08-30 RX ORDER — FLECAINIDE ACETATE 100 MG/1
TABLET ORAL
Qty: 180 TABLET | Refills: 2 | Status: SHIPPED | OUTPATIENT
Start: 2019-08-30 | End: 2020-04-29

## 2019-09-13 ENCOUNTER — TELEPHONE (OUTPATIENT)
Dept: HEMATOLOGY/ONCOLOGY | Facility: HOSPITAL | Age: 64
End: 2019-09-13

## 2019-09-13 ENCOUNTER — OFFICE VISIT (OUTPATIENT)
Dept: HEMATOLOGY/ONCOLOGY | Facility: HOSPITAL | Age: 64
End: 2019-09-13
Attending: INTERNAL MEDICINE
Payer: COMMERCIAL

## 2019-09-13 VITALS
WEIGHT: 106 LBS | BODY MASS INDEX: 19.26 KG/M2 | HEIGHT: 62.01 IN | DIASTOLIC BLOOD PRESSURE: 90 MMHG | RESPIRATION RATE: 18 BRPM | OXYGEN SATURATION: 99 % | TEMPERATURE: 97 F | SYSTOLIC BLOOD PRESSURE: 163 MMHG | HEART RATE: 80 BPM

## 2019-09-13 DIAGNOSIS — D50.0 IRON DEFICIENCY ANEMIA DUE TO CHRONIC BLOOD LOSS: Primary | ICD-10-CM

## 2019-09-13 LAB
ALBUMIN SERPL-MCNC: 4.4 G/DL (ref 3.4–5)
ALBUMIN/GLOB SERPL: 1.4 {RATIO} (ref 1–2)
ALP LIVER SERPL-CCNC: 57 U/L (ref 50–130)
ALT SERPL-CCNC: 22 U/L (ref 13–56)
ANION GAP SERPL CALC-SCNC: 5 MMOL/L (ref 0–18)
AST SERPL-CCNC: 17 U/L (ref 15–37)
BASOPHILS # BLD AUTO: 0.02 X10(3) UL (ref 0–0.2)
BASOPHILS NFR BLD AUTO: 0.6 %
BILIRUB SERPL-MCNC: 0.2 MG/DL (ref 0.1–2)
BUN BLD-MCNC: 12 MG/DL (ref 7–18)
BUN/CREAT SERPL: 15.4 (ref 10–20)
CALCIUM BLD-MCNC: 9.7 MG/DL (ref 8.5–10.1)
CHLORIDE SERPL-SCNC: 98 MMOL/L (ref 98–112)
CO2 SERPL-SCNC: 28 MMOL/L (ref 21–32)
CREAT BLD-MCNC: 0.78 MG/DL (ref 0.55–1.02)
DEPRECATED HBV CORE AB SER IA-ACNC: 191 NG/ML (ref 18–340)
DEPRECATED RDW RBC AUTO: 46.4 FL (ref 35.1–46.3)
EOSINOPHIL # BLD AUTO: 0.04 X10(3) UL (ref 0–0.7)
EOSINOPHIL NFR BLD AUTO: 1.2 %
ERYTHROCYTE [DISTWIDTH] IN BLOOD BY AUTOMATED COUNT: 12.2 % (ref 11–15)
GLOBULIN PLAS-MCNC: 3.2 G/DL (ref 2.8–4.4)
GLUCOSE BLD-MCNC: 76 MG/DL (ref 70–99)
HCT VFR BLD AUTO: 35.3 % (ref 35–48)
HGB BLD-MCNC: 11.9 G/DL (ref 12–16)
IMM GRANULOCYTES # BLD AUTO: 0.01 X10(3) UL (ref 0–1)
IMM GRANULOCYTES NFR BLD: 0.3 %
IRON SATURATION: 22 % (ref 15–50)
IRON SERPL-MCNC: 72 UG/DL (ref 50–170)
LYMPHOCYTES # BLD AUTO: 0.92 X10(3) UL (ref 1–4)
LYMPHOCYTES NFR BLD AUTO: 27.1 %
M PROTEIN MFR SERPL ELPH: 7.6 G/DL (ref 6.4–8.2)
MCH RBC QN AUTO: 34.8 PG (ref 26–34)
MCHC RBC AUTO-ENTMCNC: 33.7 G/DL (ref 31–37)
MCV RBC AUTO: 103.2 FL (ref 80–100)
MONOCYTES # BLD AUTO: 0.43 X10(3) UL (ref 0.1–1)
MONOCYTES NFR BLD AUTO: 12.7 %
NEUTROPHILS # BLD AUTO: 1.97 X10 (3) UL (ref 1.5–7.7)
NEUTROPHILS # BLD AUTO: 1.97 X10(3) UL (ref 1.5–7.7)
NEUTROPHILS NFR BLD AUTO: 58.1 %
OSMOLALITY SERPL CALC.SUM OF ELEC: 271 MOSM/KG (ref 275–295)
PLATELET # BLD AUTO: 245 10(3)UL (ref 150–450)
POTASSIUM SERPL-SCNC: 4.8 MMOL/L (ref 3.5–5.1)
RBC # BLD AUTO: 3.42 X10(6)UL (ref 3.8–5.3)
SODIUM SERPL-SCNC: 131 MMOL/L (ref 136–145)
TOTAL IRON BINDING CAPACITY: 328 UG/DL (ref 240–450)
TRANSFERRIN SERPL-MCNC: 220 MG/DL (ref 200–360)
WBC # BLD AUTO: 3.4 X10(3) UL (ref 4–11)

## 2019-09-13 PROCEDURE — 96374 THER/PROPH/DIAG INJ IV PUSH: CPT

## 2019-09-13 PROCEDURE — 36415 COLL VENOUS BLD VENIPUNCTURE: CPT

## 2019-09-13 PROCEDURE — 83550 IRON BINDING TEST: CPT

## 2019-09-13 PROCEDURE — 83540 ASSAY OF IRON: CPT

## 2019-09-13 PROCEDURE — 80053 COMPREHEN METABOLIC PANEL: CPT

## 2019-09-13 PROCEDURE — 85025 COMPLETE CBC W/AUTO DIFF WBC: CPT

## 2019-09-13 PROCEDURE — 82728 ASSAY OF FERRITIN: CPT

## 2019-09-13 NOTE — PROGRESS NOTES
Education Record    Learner:  Patient    Disease / Reina Plata deficiency anemia due to chronic blood loss    Barriers / Limitations:  None   Comments:    Method:  Brief focused   Comments:    General Topics:  Infection, Medication, Pain, Precautions, P

## 2019-09-13 NOTE — TELEPHONE ENCOUNTER
Margo Shine MD  P [facility] [address] [facility] Rns             Call, Hb and fe better, continue monthly iron. Labs and MD 2-3 months     Pt viewed results on [employer]. Attempted to reach pt, but NA.  Left VM stating to continue with same plan and call back to schedu

## 2019-11-07 NOTE — PROGRESS NOTES
[facility] Progress Note      Patient Name:  Cristina Wu  YOB: 1955  Medical Record Number:  [id number]    Date of visit: 11/8/2019    CHIEF COMPLAINT: Anemia    HPI:     59year old female that I saw in [facility] 6/18 for anemia Cyanocobalamin (B-12) 500 MCG Oral Tab Take 500 mcg by mouth daily. • Calcium Carbonate-Vitamin D (OSCAL-500) 500-400 MG-UNIT Oral Tab Take 1 tablet by mouth daily.        • gabapentin (NEURONTIN) 300 MG Oral Cap Take 600 mg by mouth 3 (three) times elisabeth Osmolality 282 275 - 295 mOsm/kg    GFR, Non- 70 >=60    GFR, -American 80 >=60    AST 15 15 - 37 U/L    ALT 20 13 - 56 U/L    Alkaline Phosphatase 52 50 - 130 U/L    Bilirubin, Total 0.2 0.1 - 2.0 mg/dL    Total Protein 7.2 6.4 - 8. patient to eat small infrequent meals. If additional labs for anemia/leukopenia are unremarkable, will need bone marrow biopsy.     ORDERS PLACED:        VITAMIN B12      SED RATE      Reticulocyte Count      LDH      MYELOID MALIGNANCIES MUT PNL BY NSG

## 2019-11-08 ENCOUNTER — OFFICE VISIT (OUTPATIENT)
Dept: HEMATOLOGY/ONCOLOGY | Facility: HOSPITAL | Age: 64
End: 2019-11-08
Attending: INTERNAL MEDICINE
Payer: COMMERCIAL

## 2019-11-08 VITALS
SYSTOLIC BLOOD PRESSURE: 146 MMHG | HEART RATE: 96 BPM | HEIGHT: 62.01 IN | RESPIRATION RATE: 16 BRPM | WEIGHT: 107 LBS | BODY MASS INDEX: 19.44 KG/M2 | OXYGEN SATURATION: 100 % | TEMPERATURE: 97 F | DIASTOLIC BLOOD PRESSURE: 71 MMHG

## 2019-11-08 DIAGNOSIS — D75.89 MACROCYTOSIS: ICD-10-CM

## 2019-11-08 DIAGNOSIS — D64.9 ANEMIA, UNSPECIFIED TYPE: ICD-10-CM

## 2019-11-08 DIAGNOSIS — D50.0 IRON DEFICIENCY ANEMIA DUE TO CHRONIC BLOOD LOSS: Primary | ICD-10-CM

## 2019-11-08 DIAGNOSIS — D72.819 LEUKOPENIA, UNSPECIFIED TYPE: ICD-10-CM

## 2019-11-08 PROCEDURE — 96374 THER/PROPH/DIAG INJ IV PUSH: CPT

## 2019-11-08 PROCEDURE — 99214 OFFICE O/P EST MOD 30 MIN: CPT | Performed by: INTERNAL MEDICINE

## 2019-11-08 NOTE — PROGRESS NOTES
Education Record    Learner:  Patient    Disease / Diagnosis:fe def anemia    Barriers / Limitations:  None   Comments:    Method:  Discussion and Reinforcement   Comments:    General Topics:  Medication, Side effects and symptom management, Plan of care r

## 2019-11-08 NOTE — PROGRESS NOTES
MD f/u for CINTIA. Last labs done 9/13/19. Pt has been getting Venofer approx every other month. Reports that energy level has not improved much. She will be going to [facility] today for a reclast infusion.      Education Record    Learner:  Patient    Barriers /

## 2019-11-25 RX ORDER — CLOPIDOGREL BISULFATE 75 MG/1
TABLET ORAL
Qty: 30 TABLET | Refills: 3 | Status: SHIPPED | OUTPATIENT
Start: 2019-11-25 | End: 2020-03-30

## 2019-11-26 ENCOUNTER — TELEPHONE (OUTPATIENT)
Dept: HEMATOLOGY/ONCOLOGY | Facility: HOSPITAL | Age: 64
End: 2019-11-26

## 2019-11-26 NOTE — TELEPHONE ENCOUNTER
Spoke to pt about Myelod malig panel results and diya BM bx. She is agreeable. Will schedule in Dec and she is starting Medicare in [place]. Should hold Plavix for a week.  She will contact her GI at [facility] to let them know, they are prescribing Plavix for isch

## 2019-11-27 ENCOUNTER — TELEPHONE (OUTPATIENT)
Dept: HEMATOLOGY/ONCOLOGY | Facility: HOSPITAL | Age: 64
End: 2019-11-27

## 2019-11-27 NOTE — TELEPHONE ENCOUNTER
----- Message from Juan Escudero MD sent at 11/26/2019  3:49 PM CST -----  Please contact pt to schedule BM bx in Dec thanks    Rox Ordaz

## 2019-12-18 ENCOUNTER — OFFICE VISIT (OUTPATIENT)
Dept: HEMATOLOGY/ONCOLOGY | Facility: HOSPITAL | Age: 64
End: 2019-12-18
Attending: INTERNAL MEDICINE
Payer: COMMERCIAL

## 2019-12-18 VITALS
BODY MASS INDEX: 19.08 KG/M2 | OXYGEN SATURATION: 100 % | DIASTOLIC BLOOD PRESSURE: 99 MMHG | HEIGHT: 62.01 IN | RESPIRATION RATE: 16 BRPM | SYSTOLIC BLOOD PRESSURE: 157 MMHG | WEIGHT: 105 LBS | HEART RATE: 77 BPM | TEMPERATURE: 99 F

## 2019-12-18 DIAGNOSIS — D50.0 IRON DEFICIENCY ANEMIA DUE TO CHRONIC BLOOD LOSS: Primary | ICD-10-CM

## 2019-12-18 DIAGNOSIS — D46.9 MDS (MYELODYSPLASTIC SYNDROME) (HCC): ICD-10-CM

## 2019-12-18 DIAGNOSIS — D64.9 ANEMIA, UNSPECIFIED TYPE: ICD-10-CM

## 2019-12-18 DIAGNOSIS — D72.819 LEUKOPENIA, UNSPECIFIED TYPE: ICD-10-CM

## 2019-12-18 PROCEDURE — 38222 DX BONE MARROW BX & ASPIR: CPT | Performed by: INTERNAL MEDICINE

## 2019-12-18 NOTE — PROGRESS NOTES
Pt here with spouse for bone marrow biopsy. Consent signed, procedure explained. Procedure performed by Dr. Felicita Severe with no complications, pressure dressing applied. Pt given post bone marrow biopsy instructions. Pt understands.  Pt discharged home in stable

## 2019-12-18 NOTE — PROGRESS NOTES
PROCEDURE NOTE-BONE MARROW ASPIRATION AND BIOPSY      Date of visit: 12/18/2019    Diagnosis:    Anemia    Narrative:    Barbara Duke presents for a bone marrow biopsy and aspirate.   I explained the procedure to the patient, including the risk and b

## 2019-12-23 ENCOUNTER — TELEPHONE (OUTPATIENT)
Dept: HEMATOLOGY/ONCOLOGY | Facility: HOSPITAL | Age: 64
End: 2019-12-23

## 2019-12-23 NOTE — TELEPHONE ENCOUNTER
Pt called. NA. LMTCB triage RN for test results. Per Dr. Eli Bueno- Call, preliminary bone marrow biopsy-no cancer.  Will call when all results back

## 2020-01-09 ENCOUNTER — TELEPHONE (OUTPATIENT)
Dept: HEMATOLOGY/ONCOLOGY | Facility: HOSPITAL | Age: 65
End: 2020-01-09

## 2020-01-09 NOTE — TELEPHONE ENCOUNTER
Spoke to pt. MARY JANE bx ok. [facility] labs this fri. If Fe low end normal again, can increase frequency.  Patient verbalized understanding, has f/u with me in Mar.

## 2020-01-10 ENCOUNTER — OFFICE VISIT (OUTPATIENT)
Dept: HEMATOLOGY/ONCOLOGY | Facility: HOSPITAL | Age: 65
End: 2020-01-10
Attending: INTERNAL MEDICINE
Payer: MEDICARE

## 2020-01-10 VITALS
RESPIRATION RATE: 16 BRPM | SYSTOLIC BLOOD PRESSURE: 130 MMHG | BODY MASS INDEX: 19.44 KG/M2 | OXYGEN SATURATION: 98 % | HEART RATE: 77 BPM | HEIGHT: 62.01 IN | WEIGHT: 107 LBS | TEMPERATURE: 98 F | DIASTOLIC BLOOD PRESSURE: 74 MMHG

## 2020-01-10 DIAGNOSIS — D72.819 LEUKOPENIA, UNSPECIFIED TYPE: ICD-10-CM

## 2020-01-10 DIAGNOSIS — D50.0 IRON DEFICIENCY ANEMIA DUE TO CHRONIC BLOOD LOSS: Primary | ICD-10-CM

## 2020-01-10 DIAGNOSIS — D64.9 ANEMIA, UNSPECIFIED TYPE: ICD-10-CM

## 2020-01-10 LAB
ALBUMIN SERPL-MCNC: 4 G/DL (ref 3.4–5)
ALBUMIN/GLOB SERPL: 1.3 {RATIO} (ref 1–2)
ALP LIVER SERPL-CCNC: 42 U/L (ref 50–130)
ALT SERPL-CCNC: 17 U/L (ref 13–56)
ANION GAP SERPL CALC-SCNC: 2 MMOL/L (ref 0–18)
AST SERPL-CCNC: 16 U/L (ref 15–37)
BASOPHILS # BLD AUTO: 0.01 X10(3) UL (ref 0–0.2)
BASOPHILS NFR BLD AUTO: 0.3 %
BILIRUB SERPL-MCNC: 0.2 MG/DL (ref 0.1–2)
BUN BLD-MCNC: 12 MG/DL (ref 7–18)
BUN/CREAT SERPL: 14.5 (ref 10–20)
CALCIUM BLD-MCNC: 9.2 MG/DL (ref 8.5–10.1)
CHLORIDE SERPL-SCNC: 101 MMOL/L (ref 98–112)
CO2 SERPL-SCNC: 29 MMOL/L (ref 21–32)
CREAT BLD-MCNC: 0.83 MG/DL (ref 0.55–1.02)
DEPRECATED HBV CORE AB SER IA-ACNC: 45.9 NG/ML (ref 18–340)
DEPRECATED RDW RBC AUTO: 48.8 FL (ref 35.1–46.3)
EOSINOPHIL # BLD AUTO: 0.04 X10(3) UL (ref 0–0.7)
EOSINOPHIL NFR BLD AUTO: 1 %
ERYTHROCYTE [DISTWIDTH] IN BLOOD BY AUTOMATED COUNT: 12.9 % (ref 11–15)
GLOBULIN PLAS-MCNC: 3.2 G/DL (ref 2.8–4.4)
GLUCOSE BLD-MCNC: 88 MG/DL (ref 70–99)
HCT VFR BLD AUTO: 29.1 % (ref 35–48)
HGB BLD-MCNC: 9.7 G/DL (ref 12–16)
IGA SERPL-MCNC: 147 MG/DL (ref 70–312)
IGM SERPL-MCNC: 187 MG/DL (ref 43–279)
IMM GRANULOCYTES # BLD AUTO: 0.02 X10(3) UL (ref 0–1)
IMM GRANULOCYTES NFR BLD: 0.5 %
IMMUNOGLOBULIN PNL SER-MCNC: 850 MG/DL (ref 791–1643)
IRON SATURATION: 26 % (ref 15–50)
IRON SERPL-MCNC: 88 UG/DL (ref 50–170)
LYMPHOCYTES # BLD AUTO: 0.64 X10(3) UL (ref 1–4)
LYMPHOCYTES NFR BLD AUTO: 16.4 %
M PROTEIN MFR SERPL ELPH: 7.2 G/DL (ref 6.4–8.2)
MCH RBC QN AUTO: 34.6 PG (ref 26–34)
MCHC RBC AUTO-ENTMCNC: 33.3 G/DL (ref 31–37)
MCV RBC AUTO: 103.9 FL (ref 80–100)
MONOCYTES # BLD AUTO: 0.34 X10(3) UL (ref 0.1–1)
MONOCYTES NFR BLD AUTO: 8.7 %
NEUTROPHILS # BLD AUTO: 2.86 X10 (3) UL (ref 1.5–7.7)
NEUTROPHILS # BLD AUTO: 2.86 X10(3) UL (ref 1.5–7.7)
NEUTROPHILS NFR BLD AUTO: 73.1 %
OSMOLALITY SERPL CALC.SUM OF ELEC: 273 MOSM/KG (ref 275–295)
PATIENT FASTING Y/N/NP: NO
PLATELET # BLD AUTO: 254 10(3)UL (ref 150–450)
POTASSIUM SERPL-SCNC: 4.3 MMOL/L (ref 3.5–5.1)
RBC # BLD AUTO: 2.8 X10(6)UL (ref 3.8–5.3)
SODIUM SERPL-SCNC: 132 MMOL/L (ref 136–145)
TOTAL IRON BINDING CAPACITY: 334 UG/DL (ref 240–450)
TRANSFERRIN SERPL-MCNC: 224 MG/DL (ref 200–360)
WBC # BLD AUTO: 3.9 X10(3) UL (ref 4–11)

## 2020-01-10 PROCEDURE — 86334 IMMUNOFIX E-PHORESIS SERUM: CPT

## 2020-01-10 PROCEDURE — 83550 IRON BINDING TEST: CPT

## 2020-01-10 PROCEDURE — 83540 ASSAY OF IRON: CPT

## 2020-01-10 PROCEDURE — 80053 COMPREHEN METABOLIC PANEL: CPT

## 2020-01-10 PROCEDURE — 85025 COMPLETE CBC W/AUTO DIFF WBC: CPT

## 2020-01-10 PROCEDURE — 86038 ANTINUCLEAR ANTIBODIES: CPT

## 2020-01-10 PROCEDURE — 84165 PROTEIN E-PHORESIS SERUM: CPT

## 2020-01-10 PROCEDURE — 36415 COLL VENOUS BLD VENIPUNCTURE: CPT

## 2020-01-10 PROCEDURE — 86235 NUCLEAR ANTIGEN ANTIBODY: CPT

## 2020-01-10 PROCEDURE — 96374 THER/PROPH/DIAG INJ IV PUSH: CPT

## 2020-01-10 PROCEDURE — 83883 ASSAY NEPHELOMETRY NOT SPEC: CPT

## 2020-01-10 PROCEDURE — 82728 ASSAY OF FERRITIN: CPT

## 2020-01-10 PROCEDURE — 82784 ASSAY IGA/IGD/IGG/IGM EACH: CPT

## 2020-01-10 PROCEDURE — 86225 DNA ANTIBODY NATIVE: CPT

## 2020-01-10 NOTE — PROGRESS NOTES
Education Record    Learner:  Patient    Disease / Diagnosis:iron deficiency  Barriers / Limitations:  None   Comments:    Method:  Discussion   Comments:    General Topics:  Medication and Plan of care reviewed   Comments:    Outcome:  Shows understanding

## 2020-01-13 ENCOUNTER — TELEPHONE (OUTPATIENT)
Dept: CARDIOLOGY | Age: 65
End: 2020-01-13

## 2020-01-13 LAB
ANA SCREEN: POSITIVE
CENTROMERE AUTOAB: <100 AU/ML (ref ?–100)
DSDNA AUTOAB: <100 IU/ML (ref ?–100)
HISTONE AUTOAB: <100 AU/ML (ref ?–100)
JO-1 AUTOAB: <100 AU/ML (ref ?–100)
KAPPA FREE LIGHT CHAIN: 1.65 MG/DL (ref 0.33–1.94)
KAPPA/LAMBDA FLC RATIO: 1.19 (ref 0.26–1.65)
LAMBDA FREE LIGHT CHAIN: 1.39 MG/DL (ref 0.57–2.63)
RNP AUTOAB: <100 AU/ML (ref ?–100)
SCL-70 AUTOAB: <100 AU/ML (ref ?–100)
SM AUTOAB (SMITH): <100 AU/ML (ref ?–100)
SSA AUTOAB: 139 AU/ML (ref ?–100)
SSB AUTOAB: <100 AU/ML (ref ?–100)

## 2020-01-14 LAB
ALBUMIN SERPL ELPH-MCNC: 4.37 G/DL (ref 3.75–5.21)
ALBUMIN/GLOB SERPL: 1.72 {RATIO} (ref 1–2)
ALPHA1 GLOB SERPL ELPH-MCNC: 0.33 G/DL (ref 0.19–0.46)
ALPHA2 GLOB SERPL ELPH-MCNC: 0.67 G/DL (ref 0.48–1.05)
B-GLOBULIN SERPL ELPH-MCNC: 0.64 G/DL (ref 0.68–1.23)
GAMMA GLOB SERPL ELPH-MCNC: 0.89 G/DL (ref 0.62–1.7)
M PROTEIN MFR SERPL ELPH: 6.9 G/DL (ref 6.4–8.2)

## 2020-01-17 ENCOUNTER — TELEPHONE (OUTPATIENT)
Dept: HEMATOLOGY/ONCOLOGY | Facility: HOSPITAL | Age: 65
End: 2020-01-17

## 2020-01-17 NOTE — TELEPHONE ENCOUNTER
Spoke to pt, MERCEDES +. Has arthritis and did see rheumatology few yrs back for poss psoriatic arthritis. This can cause anemia chr dis. [facility]argas consider rheum eval and f/u with me in Mar as planned.   Patient verbalized understanding, encouraged to call for quest

## 2020-01-20 ENCOUNTER — TELEPHONE (OUTPATIENT)
Dept: CARDIOLOGY | Age: 65
End: 2020-01-20

## 2020-01-28 ENCOUNTER — TELEPHONE (OUTPATIENT)
Dept: CARDIOLOGY | Age: 65
End: 2020-01-28

## 2020-02-11 ENCOUNTER — OFFICE VISIT (OUTPATIENT)
Dept: CARDIOLOGY | Age: 65
End: 2020-02-11

## 2020-02-11 VITALS
DIASTOLIC BLOOD PRESSURE: 80 MMHG | HEIGHT: 61 IN | SYSTOLIC BLOOD PRESSURE: 140 MMHG | WEIGHT: 104 LBS | HEART RATE: 68 BPM | BODY MASS INDEX: 19.63 KG/M2

## 2020-02-11 DIAGNOSIS — I70.0 ATHEROSCLEROSIS OF ABDOMINAL AORTA (CMD): ICD-10-CM

## 2020-02-11 DIAGNOSIS — I48.0 AF (PAROXYSMAL ATRIAL FIBRILLATION) (CMD): ICD-10-CM

## 2020-02-11 DIAGNOSIS — E78.00 PURE HYPERCHOLESTEROLEMIA: Primary | ICD-10-CM

## 2020-02-11 DIAGNOSIS — I25.10 CAD IN NATIVE ARTERY: ICD-10-CM

## 2020-02-11 DIAGNOSIS — K55.1 MESENTERIC ISCHEMIA, CHRONIC (CMD): ICD-10-CM

## 2020-02-11 DIAGNOSIS — I10 ESSENTIAL HYPERTENSION: ICD-10-CM

## 2020-02-11 PROCEDURE — 93000 ELECTROCARDIOGRAM COMPLETE: CPT | Performed by: INTERNAL MEDICINE

## 2020-02-11 PROCEDURE — 99214 OFFICE O/P EST MOD 30 MIN: CPT | Performed by: INTERNAL MEDICINE

## 2020-02-11 PROCEDURE — X1094 ELECTROCARDIOGRAM 12-LEAD: HCPCS | Performed by: INTERNAL MEDICINE

## 2020-02-11 ASSESSMENT — PATIENT HEALTH QUESTIONNAIRE - PHQ9
2. FEELING DOWN, DEPRESSED OR HOPELESS: NOT AT ALL
1. LITTLE INTEREST OR PLEASURE IN DOING THINGS: NOT AT ALL
SUM OF ALL RESPONSES TO PHQ9 QUESTIONS 1 AND 2: 0
SUM OF ALL RESPONSES TO PHQ9 QUESTIONS 1 AND 2: 0

## 2020-03-06 ENCOUNTER — APPOINTMENT (OUTPATIENT)
Dept: HEMATOLOGY/ONCOLOGY | Facility: HOSPITAL | Age: 65
End: 2020-03-06
Attending: INTERNAL MEDICINE
Payer: MEDICARE

## 2020-03-12 NOTE — PROGRESS NOTES
[facility] Progress Note      Patient Name:  Barbara Duke  YOB: 1955  Medical Record Number:  [id number]    Date of visit: 3/13/2020    CHIEF COMPLAINT: Anemia    HPI:     72year old female that I saw in [facility] 6/18 for anemia total) by mouth 2 (two) times daily. • Clopidogrel Bisulfate 75 MG Oral Tab Take 1 tablet (75 mg total) by mouth daily. (Patient not taking: Reported on 12/18/2019 ) 30 tablet 5   • ALPRAZolam 0.25 MG Oral Tab Take 0.25 mg by mouth nightly.      • HYDRO mmol/L    Chloride 98 98 - 112 mmol/L    CO2 24.0 21.0 - 32.0 mmol/L    Anion Gap 8 0 - 18 mmol/L    BUN 13 7 - 18 mg/dL    Creatinine 0.75 0.55 - 1.02 mg/dL    BUN/CREA Ratio 17.3 10.0 - 20.0    Calcium, Total 9.3 8.5 - 10.1 mg/dL    Calculated Osmolality Colonoscopy 6/18-Colonic AVM with bleeding. Has had colitis. On venofer every 2 months. Bone marrow biopsy 12/19-no malignancy. Also likely anemia of chronic disease, has positive MERCEDES. She has not seen rheumatologist yet.   Today, iron level is a little

## 2020-03-13 ENCOUNTER — OFFICE VISIT (OUTPATIENT)
Dept: HEMATOLOGY/ONCOLOGY | Facility: HOSPITAL | Age: 65
End: 2020-03-13
Attending: INTERNAL MEDICINE
Payer: MEDICARE

## 2020-03-13 VITALS
OXYGEN SATURATION: 94 % | RESPIRATION RATE: 16 BRPM | HEIGHT: 62.01 IN | SYSTOLIC BLOOD PRESSURE: 142 MMHG | TEMPERATURE: 97 F | BODY MASS INDEX: 19.17 KG/M2 | DIASTOLIC BLOOD PRESSURE: 71 MMHG | HEART RATE: 81 BPM | WEIGHT: 105.5 LBS

## 2020-03-13 VITALS
SYSTOLIC BLOOD PRESSURE: 111 MMHG | HEART RATE: 74 BPM | RESPIRATION RATE: 16 BRPM | OXYGEN SATURATION: 98 % | TEMPERATURE: 98 F | DIASTOLIC BLOOD PRESSURE: 69 MMHG

## 2020-03-13 DIAGNOSIS — D46.9 MDS (MYELODYSPLASTIC SYNDROME) (HCC): ICD-10-CM

## 2020-03-13 DIAGNOSIS — D50.0 IRON DEFICIENCY ANEMIA DUE TO CHRONIC BLOOD LOSS: Primary | ICD-10-CM

## 2020-03-13 DIAGNOSIS — D64.9 ANEMIA, UNSPECIFIED TYPE: ICD-10-CM

## 2020-03-13 DIAGNOSIS — D72.819 LEUKOPENIA, UNSPECIFIED TYPE: ICD-10-CM

## 2020-03-13 PROCEDURE — 96374 THER/PROPH/DIAG INJ IV PUSH: CPT

## 2020-03-13 PROCEDURE — 99214 OFFICE O/P EST MOD 30 MIN: CPT | Performed by: INTERNAL MEDICINE

## 2020-03-13 RX ORDER — DULOXETIN HYDROCHLORIDE 30 MG/1
30 CAPSULE, DELAYED RELEASE ORAL DAILY
COMMUNITY
Start: 2020-02-10 | End: 2021-10-07

## 2020-03-13 NOTE — PROGRESS NOTES
Pt here for venofer.   Arrives Ambulating independently, accompanied by Self           Patient reports possible pregnancy since last therapy cycle: No    Modifications in dose or schedule: No     Frequency of blood return and site check throughout administr

## 2020-03-13 NOTE — PROGRESS NOTES
Pt energy level low. Has cold symptoms at this time.    Education Record    Learner:  Patient    Disease / Kandy Kellering of care    Barriers / Limitations:  None   Comments:    Method:  Discussion   Comments:    General Topics:  Plan of care reviewed   C

## 2020-03-30 RX ORDER — CLOPIDOGREL BISULFATE 75 MG/1
TABLET ORAL
Qty: 90 TABLET | Refills: 3 | Status: SHIPPED | OUTPATIENT
Start: 2020-03-30 | End: 2021-02-16

## 2020-04-10 ENCOUNTER — OFFICE VISIT (OUTPATIENT)
Dept: HEMATOLOGY/ONCOLOGY | Facility: HOSPITAL | Age: 65
End: 2020-04-10
Attending: INTERNAL MEDICINE
Payer: MEDICARE

## 2020-04-10 VITALS
DIASTOLIC BLOOD PRESSURE: 74 MMHG | SYSTOLIC BLOOD PRESSURE: 135 MMHG | WEIGHT: 107 LBS | OXYGEN SATURATION: 100 % | RESPIRATION RATE: 18 BRPM | TEMPERATURE: 98 F | HEIGHT: 62.01 IN | BODY MASS INDEX: 19.44 KG/M2 | HEART RATE: 76 BPM

## 2020-04-10 DIAGNOSIS — D50.0 IRON DEFICIENCY ANEMIA DUE TO CHRONIC BLOOD LOSS: Primary | ICD-10-CM

## 2020-04-10 PROCEDURE — 96374 THER/PROPH/DIAG INJ IV PUSH: CPT

## 2020-04-10 NOTE — PROGRESS NOTES
Education Record    Learner:  Patient    Disease / Diagnosis:  Anemia    Barriers / Limitations:  None   Comments:    Method:  Brief focused   Comments:    General Topics:  Medication, Procedure, Side effects and symptom management and Plan of care reviewe

## 2020-04-10 NOTE — PROGRESS NOTES
Pt. Tolerated iron infusion without incident. 30 min post vitals- 125/82, 76, 98.6, 18. Will return in 4 weeks as already scheduled.

## 2020-04-29 RX ORDER — FLECAINIDE ACETATE 100 MG/1
TABLET ORAL
Qty: 180 TABLET | Refills: 3 | Status: SHIPPED | OUTPATIENT
Start: 2020-04-29 | End: 2021-04-30

## 2020-05-08 ENCOUNTER — APPOINTMENT (OUTPATIENT)
Dept: HEMATOLOGY/ONCOLOGY | Facility: HOSPITAL | Age: 65
End: 2020-05-08
Attending: INTERNAL MEDICINE
Payer: MEDICARE

## 2020-05-15 ENCOUNTER — OFFICE VISIT (OUTPATIENT)
Dept: HEMATOLOGY/ONCOLOGY | Facility: HOSPITAL | Age: 65
End: 2020-05-15
Attending: INTERNAL MEDICINE
Payer: MEDICARE

## 2020-05-15 VITALS
HEART RATE: 81 BPM | SYSTOLIC BLOOD PRESSURE: 140 MMHG | DIASTOLIC BLOOD PRESSURE: 77 MMHG | TEMPERATURE: 98 F | OXYGEN SATURATION: 100 % | RESPIRATION RATE: 18 BRPM

## 2020-05-15 DIAGNOSIS — D50.0 IRON DEFICIENCY ANEMIA DUE TO CHRONIC BLOOD LOSS: Primary | ICD-10-CM

## 2020-05-15 PROCEDURE — 96374 THER/PROPH/DIAG INJ IV PUSH: CPT

## 2020-05-15 NOTE — PROGRESS NOTES
Education Record    Learner:  Patient    Disease / Diagnosis: CINTIA - Venofer    Barriers / Limitations:  None   Comments:    Method:  Brief focused   Comments:    General Topics:  Medication, Side effects and symptom management and Plan of care reviewed   C

## 2020-06-12 ENCOUNTER — OFFICE VISIT (OUTPATIENT)
Dept: HEMATOLOGY/ONCOLOGY | Facility: HOSPITAL | Age: 65
End: 2020-06-12
Attending: INTERNAL MEDICINE
Payer: MEDICARE

## 2020-06-12 VITALS
TEMPERATURE: 99 F | HEART RATE: 84 BPM | BODY MASS INDEX: 19 KG/M2 | OXYGEN SATURATION: 97 % | DIASTOLIC BLOOD PRESSURE: 74 MMHG | WEIGHT: 103 LBS | SYSTOLIC BLOOD PRESSURE: 141 MMHG | RESPIRATION RATE: 16 BRPM

## 2020-06-12 DIAGNOSIS — D50.0 IRON DEFICIENCY ANEMIA DUE TO CHRONIC BLOOD LOSS: Primary | ICD-10-CM

## 2020-06-12 PROCEDURE — 96374 THER/PROPH/DIAG INJ IV PUSH: CPT

## 2020-06-12 NOTE — PROGRESS NOTES
Education Record    Learner:  Patient    Disease / Diagnosis: CNITIA - IV Venofer infusion    Barriers / Limitations:  None   Comments:    Method:  Brief focused and Reinforcement   Comments:    General Topics:  Plan of care reviewed   Comments:    Outcome:

## 2020-07-06 NOTE — PROGRESS NOTES
[facility] Progress Note      Patient Name:  Leti Cruz  YOB: 1955  Medical Record Number:  [id number]    Date of visit: 7/7/2020    CHIEF COMPLAINT: Anemia    HPI:     72year old female that I saw in [facility] 6/18 for anemia d (two) times daily. • Clopidogrel Bisulfate 75 MG Oral Tab Take 1 tablet (75 mg total) by mouth daily. 30 tablet 5   • ALPRAZolam 0.25 MG Oral Tab Take 0.25 mg by mouth nightly.      • HYDROcodone-acetaminophen 5-325 MG Oral Tab Take 1 tablet by mouth ev macrocytosis, concern for MDS. Also check monoclonal protein studies. # Hypoglycemia: Noted before. Instructed patient to eat small infrequent meals. ORDERS PLACED:      Return for I.V iron 1,2,3,4 months. Labs, MD 4 months.     Leona Selby M.D.

## 2020-07-07 ENCOUNTER — OFFICE VISIT (OUTPATIENT)
Dept: HEMATOLOGY/ONCOLOGY | Facility: HOSPITAL | Age: 65
End: 2020-07-07
Attending: INTERNAL MEDICINE
Payer: MEDICARE

## 2020-07-07 ENCOUNTER — TELEPHONE (OUTPATIENT)
Dept: HEMATOLOGY/ONCOLOGY | Facility: HOSPITAL | Age: 65
End: 2020-07-07

## 2020-07-07 VITALS
RESPIRATION RATE: 18 BRPM | HEART RATE: 76 BPM | TEMPERATURE: 97 F | DIASTOLIC BLOOD PRESSURE: 63 MMHG | SYSTOLIC BLOOD PRESSURE: 119 MMHG

## 2020-07-07 VITALS
BODY MASS INDEX: 19 KG/M2 | WEIGHT: 104.81 LBS | RESPIRATION RATE: 18 BRPM | SYSTOLIC BLOOD PRESSURE: 166 MMHG | OXYGEN SATURATION: 100 % | HEART RATE: 90 BPM | TEMPERATURE: 98 F | DIASTOLIC BLOOD PRESSURE: 75 MMHG

## 2020-07-07 DIAGNOSIS — D50.0 IRON DEFICIENCY ANEMIA DUE TO CHRONIC BLOOD LOSS: ICD-10-CM

## 2020-07-07 DIAGNOSIS — D46.9 MDS (MYELODYSPLASTIC SYNDROME) (HCC): ICD-10-CM

## 2020-07-07 DIAGNOSIS — K92.2 GASTROINTESTINAL HEMORRHAGE, UNSPECIFIED GASTROINTESTINAL HEMORRHAGE TYPE: ICD-10-CM

## 2020-07-07 DIAGNOSIS — D72.819 LEUKOPENIA, UNSPECIFIED TYPE: Primary | ICD-10-CM

## 2020-07-07 DIAGNOSIS — D50.0 IRON DEFICIENCY ANEMIA DUE TO CHRONIC BLOOD LOSS: Primary | ICD-10-CM

## 2020-07-07 LAB
ALBUMIN SERPL-MCNC: 4.2 G/DL (ref 3.4–5)
ALBUMIN/GLOB SERPL: 1.3 {RATIO} (ref 1–2)
ALP LIVER SERPL-CCNC: 43 U/L (ref 50–130)
ALT SERPL-CCNC: 21 U/L (ref 13–56)
ANION GAP SERPL CALC-SCNC: 4 MMOL/L (ref 0–18)
AST SERPL-CCNC: 16 U/L (ref 15–37)
BASOPHILS # BLD AUTO: 0.01 X10(3) UL (ref 0–0.2)
BASOPHILS NFR BLD AUTO: 0.3 %
BILIRUB SERPL-MCNC: 0.2 MG/DL (ref 0.1–2)
BUN BLD-MCNC: 9 MG/DL (ref 7–18)
BUN/CREAT SERPL: 12.2 (ref 10–20)
CALCIUM BLD-MCNC: 8.6 MG/DL (ref 8.5–10.1)
CHLORIDE SERPL-SCNC: 98 MMOL/L (ref 98–112)
CO2 SERPL-SCNC: 27 MMOL/L (ref 21–32)
CREAT BLD-MCNC: 0.74 MG/DL (ref 0.55–1.02)
DEPRECATED HBV CORE AB SER IA-ACNC: 35.8 NG/ML (ref 18–340)
DEPRECATED RDW RBC AUTO: 51.4 FL (ref 35.1–46.3)
EOSINOPHIL # BLD AUTO: 0.02 X10(3) UL (ref 0–0.7)
EOSINOPHIL NFR BLD AUTO: 0.6 %
ERYTHROCYTE [DISTWIDTH] IN BLOOD BY AUTOMATED COUNT: 13.8 % (ref 11–15)
GLOBULIN PLAS-MCNC: 3.2 G/DL (ref 2.8–4.4)
GLUCOSE BLD-MCNC: 91 MG/DL (ref 70–99)
HCT VFR BLD AUTO: 29.3 % (ref 35–48)
HGB BLD-MCNC: 9.6 G/DL (ref 12–16)
IMM GRANULOCYTES # BLD AUTO: 0 X10(3) UL (ref 0–1)
IMM GRANULOCYTES NFR BLD: 0 %
IRON SATURATION: 13 % (ref 15–50)
IRON SERPL-MCNC: 56 UG/DL (ref 50–170)
LDH SERPL L TO P-CCNC: 120 U/L
LYMPHOCYTES # BLD AUTO: 0.6 X10(3) UL (ref 1–4)
LYMPHOCYTES NFR BLD AUTO: 19.4 %
M PROTEIN MFR SERPL ELPH: 7.4 G/DL (ref 6.4–8.2)
MCH RBC QN AUTO: 33 PG (ref 26–34)
MCHC RBC AUTO-ENTMCNC: 32.8 G/DL (ref 31–37)
MCV RBC AUTO: 100.7 FL (ref 80–100)
MONOCYTES # BLD AUTO: 0.3 X10(3) UL (ref 0.1–1)
MONOCYTES NFR BLD AUTO: 9.7 %
NEUTROPHILS # BLD AUTO: 2.16 X10 (3) UL (ref 1.5–7.7)
NEUTROPHILS # BLD AUTO: 2.16 X10(3) UL (ref 1.5–7.7)
NEUTROPHILS NFR BLD AUTO: 70 %
OSMOLALITY SERPL CALC.SUM OF ELEC: 266 MOSM/KG (ref 275–295)
PATIENT FASTING Y/N/NP: NO
PLATELET # BLD AUTO: 243 10(3)UL (ref 150–450)
POTASSIUM SERPL-SCNC: 4.4 MMOL/L (ref 3.5–5.1)
RBC # BLD AUTO: 2.91 X10(6)UL (ref 3.8–5.3)
SODIUM SERPL-SCNC: 129 MMOL/L (ref 136–145)
TOTAL IRON BINDING CAPACITY: 420 UG/DL (ref 240–450)
TRANSFERRIN SERPL-MCNC: 282 MG/DL (ref 200–360)
WBC # BLD AUTO: 3.1 X10(3) UL (ref 4–11)

## 2020-07-07 PROCEDURE — 96374 THER/PROPH/DIAG INJ IV PUSH: CPT

## 2020-07-07 PROCEDURE — 99214 OFFICE O/P EST MOD 30 MIN: CPT | Performed by: INTERNAL MEDICINE

## 2020-07-07 NOTE — TELEPHONE ENCOUNTER
Shalom Irving MD  P Edw Vandana Koehler Rns             Call, Fe still low, will change to monthly feraheme. Vargas I.V iron monthly and labs/md 4 months      Reviewed the above with patient, appts already made accordingly.

## 2020-07-07 NOTE — PROGRESS NOTES
Education Record    Learner:  Patient  Barriers / Limitations:  None  Method:  Discussion  Outcome:  Shows understanding  Comments:pt feeling well.

## 2020-07-07 NOTE — PROGRESS NOTES
Education Record    Learner:  Patient    Disease / Diagnosis: iron-deficiency anemia    Barriers / Limitations:  None   Comments:    Method:  Brief focused and Discussion   Comments:    General Topics:  Medication, Side effects and symptom management, Plan

## 2020-08-04 ENCOUNTER — OFFICE VISIT (OUTPATIENT)
Dept: HEMATOLOGY/ONCOLOGY | Facility: HOSPITAL | Age: 65
End: 2020-08-04
Attending: INTERNAL MEDICINE
Payer: MEDICARE

## 2020-08-04 VITALS
RESPIRATION RATE: 16 BRPM | SYSTOLIC BLOOD PRESSURE: 108 MMHG | BODY MASS INDEX: 19 KG/M2 | OXYGEN SATURATION: 99 % | TEMPERATURE: 99 F | HEART RATE: 90 BPM | DIASTOLIC BLOOD PRESSURE: 65 MMHG | WEIGHT: 104.38 LBS

## 2020-08-04 DIAGNOSIS — D50.0 IRON DEFICIENCY ANEMIA DUE TO CHRONIC BLOOD LOSS: Primary | ICD-10-CM

## 2020-08-04 PROCEDURE — 96374 THER/PROPH/DIAG INJ IV PUSH: CPT

## 2020-08-04 NOTE — PROGRESS NOTES
Pt here for Feraheme.   Arrives Ambulating independently, accompanied by Self           Patient reports possible pregnancy since last therapy cycle: No    Modifications in dose or schedule: No     Frequency of blood return and site check throughout administ

## 2020-09-01 ENCOUNTER — OFFICE VISIT (OUTPATIENT)
Dept: HEMATOLOGY/ONCOLOGY | Facility: HOSPITAL | Age: 65
End: 2020-09-01
Attending: INTERNAL MEDICINE
Payer: MEDICARE

## 2020-09-01 VITALS
TEMPERATURE: 98 F | DIASTOLIC BLOOD PRESSURE: 71 MMHG | HEART RATE: 61 BPM | RESPIRATION RATE: 16 BRPM | OXYGEN SATURATION: 97 % | SYSTOLIC BLOOD PRESSURE: 123 MMHG

## 2020-09-01 DIAGNOSIS — D50.0 IRON DEFICIENCY ANEMIA DUE TO CHRONIC BLOOD LOSS: Primary | ICD-10-CM

## 2020-09-01 PROCEDURE — 96374 THER/PROPH/DIAG INJ IV PUSH: CPT

## 2020-09-01 NOTE — PROGRESS NOTES
Education Record    Learner:  Patient    Disease / Diagnosis: CINTIA - IV Feraheme infusion    Barriers / Limitations:  None   Comments:    Method:  Brief focused and Reinforcement   Comments:    General Topics:  Plan of care reviewed   Comments:    Outcome:

## 2020-09-06 ENCOUNTER — APPOINTMENT (OUTPATIENT)
Dept: GENERAL RADIOLOGY | Facility: HOSPITAL | Age: 65
End: 2020-09-06
Attending: EMERGENCY MEDICINE
Payer: MEDICARE

## 2020-09-06 ENCOUNTER — HOSPITAL ENCOUNTER (OUTPATIENT)
Facility: HOSPITAL | Age: 65
Setting detail: OBSERVATION
Discharge: HOME | End: 2020-09-09
Attending: EMERGENCY MEDICINE | Admitting: HOSPITALIST
Payer: MEDICARE

## 2020-09-06 ENCOUNTER — APPOINTMENT (OUTPATIENT)
Dept: CT IMAGING | Facility: HOSPITAL | Age: 65
End: 2020-09-06
Attending: EMERGENCY MEDICINE
Payer: MEDICARE

## 2020-09-06 DIAGNOSIS — K55.9 ISCHEMIC COLITIS (HCC): Primary | ICD-10-CM

## 2020-09-06 DIAGNOSIS — R55 SYNCOPE, VASOVAGAL: ICD-10-CM

## 2020-09-06 PROBLEM — D63.8 ANEMIA OF CHRONIC DISEASE: Status: ACTIVE | Noted: 2018-06-01

## 2020-09-06 PROBLEM — R73.9 HYPERGLYCEMIA: Status: ACTIVE | Noted: 2020-09-06

## 2020-09-06 LAB
ALBUMIN SERPL-MCNC: 3.5 G/DL (ref 3.4–5)
ALBUMIN/GLOB SERPL: 1.3 {RATIO} (ref 1–2)
ALP LIVER SERPL-CCNC: 60 U/L (ref 50–130)
ALT SERPL-CCNC: 15 U/L (ref 13–56)
ANION GAP SERPL CALC-SCNC: 6 MMOL/L (ref 0–18)
APTT PPP: 29.9 SECONDS (ref 25.4–36.1)
AST SERPL-CCNC: 12 U/L (ref 15–37)
BASOPHILS # BLD AUTO: 0.01 X10(3) UL (ref 0–0.2)
BASOPHILS NFR BLD AUTO: 0.3 %
BILIRUB SERPL-MCNC: 0.2 MG/DL (ref 0.1–2)
BUN BLD-MCNC: 10 MG/DL (ref 7–18)
BUN/CREAT SERPL: 11.8 (ref 10–20)
CALCIUM BLD-MCNC: 8.6 MG/DL (ref 8.5–10.1)
CHLORIDE SERPL-SCNC: 101 MMOL/L (ref 98–112)
CO2 SERPL-SCNC: 25 MMOL/L (ref 21–32)
CREAT BLD-MCNC: 0.85 MG/DL (ref 0.55–1.02)
DEPRECATED RDW RBC AUTO: 58.4 FL (ref 35.1–46.3)
EOSINOPHIL # BLD AUTO: 0.09 X10(3) UL (ref 0–0.7)
EOSINOPHIL NFR BLD AUTO: 2.7 %
ERYTHROCYTE [DISTWIDTH] IN BLOOD BY AUTOMATED COUNT: 15.9 % (ref 11–15)
GLOBULIN PLAS-MCNC: 2.6 G/DL (ref 2.8–4.4)
GLUCOSE BLD-MCNC: 102 MG/DL (ref 70–99)
HCT VFR BLD AUTO: 27.1 % (ref 35–48)
HGB BLD-MCNC: 9 G/DL (ref 12–16)
IMM GRANULOCYTES # BLD AUTO: 0 X10(3) UL (ref 0–1)
IMM GRANULOCYTES NFR BLD: 0 %
INR BLD: 0.96 (ref 0.89–1.11)
LYMPHOCYTES # BLD AUTO: 1.25 X10(3) UL (ref 1–4)
LYMPHOCYTES NFR BLD AUTO: 37.2 %
M PROTEIN MFR SERPL ELPH: 6.1 G/DL (ref 6.4–8.2)
MCH RBC QN AUTO: 33.3 PG (ref 26–34)
MCHC RBC AUTO-ENTMCNC: 33.2 G/DL (ref 31–37)
MCV RBC AUTO: 100.4 FL (ref 80–100)
MONOCYTES # BLD AUTO: 0.35 X10(3) UL (ref 0.1–1)
MONOCYTES NFR BLD AUTO: 10.4 %
NEUTROPHILS # BLD AUTO: 1.66 X10 (3) UL (ref 1.5–7.7)
NEUTROPHILS # BLD AUTO: 1.66 X10(3) UL (ref 1.5–7.7)
NEUTROPHILS NFR BLD AUTO: 49.4 %
OSMOLALITY SERPL CALC.SUM OF ELEC: 273 MOSM/KG (ref 275–295)
PLATELET # BLD AUTO: 284 10(3)UL (ref 150–450)
POTASSIUM SERPL-SCNC: 4.1 MMOL/L (ref 3.5–5.1)
PSA SERPL DL<=0.01 NG/ML-MCNC: 13.1 SECONDS (ref 12.4–14.6)
RBC # BLD AUTO: 2.7 X10(6)UL (ref 3.8–5.3)
SARS-COV-2 RNA RESP QL NAA+PROBE: NOT DETECTED
SODIUM SERPL-SCNC: 132 MMOL/L (ref 136–145)
TROPONIN I SERPL-MCNC: <0.045 NG/ML (ref ?–0.04)
WBC # BLD AUTO: 3.4 X10(3) UL (ref 4–11)

## 2020-09-06 PROCEDURE — 71045 X-RAY EXAM CHEST 1 VIEW: CPT | Performed by: EMERGENCY MEDICINE

## 2020-09-06 PROCEDURE — 70450 CT HEAD/BRAIN W/O DYE: CPT | Performed by: EMERGENCY MEDICINE

## 2020-09-06 PROCEDURE — 99220 INITIAL OBSERVATION CARE,LEVL III: CPT | Performed by: HOSPITALIST

## 2020-09-06 PROCEDURE — 74177 CT ABD & PELVIS W/CONTRAST: CPT | Performed by: EMERGENCY MEDICINE

## 2020-09-06 RX ORDER — ONDANSETRON 2 MG/ML
4 INJECTION INTRAMUSCULAR; INTRAVENOUS EVERY 4 HOURS PRN
Status: DISCONTINUED | OUTPATIENT
Start: 2020-09-06 | End: 2020-09-06

## 2020-09-06 RX ORDER — PRIMIDONE 50 MG/1
100 TABLET ORAL EVERY EVENING
Status: DISCONTINUED | OUTPATIENT
Start: 2020-09-06 | End: 2020-09-06

## 2020-09-06 RX ORDER — ONDANSETRON 2 MG/ML
INJECTION INTRAMUSCULAR; INTRAVENOUS
Status: COMPLETED
Start: 2020-09-06 | End: 2020-09-06

## 2020-09-06 RX ORDER — GABAPENTIN 300 MG/1
600 CAPSULE ORAL 3 TIMES DAILY
Status: DISCONTINUED | OUTPATIENT
Start: 2020-09-06 | End: 2020-09-06

## 2020-09-06 RX ORDER — ONDANSETRON 2 MG/ML
4 INJECTION INTRAMUSCULAR; INTRAVENOUS EVERY 6 HOURS PRN
Status: DISCONTINUED | OUTPATIENT
Start: 2020-09-07 | End: 2020-09-09

## 2020-09-06 RX ORDER — PRIMIDONE 50 MG/1
100 TABLET ORAL DAILY
Status: DISCONTINUED | OUTPATIENT
Start: 2020-09-07 | End: 2020-09-09

## 2020-09-06 RX ORDER — MORPHINE SULFATE 2 MG/ML
2 INJECTION, SOLUTION INTRAMUSCULAR; INTRAVENOUS ONCE
Status: COMPLETED | OUTPATIENT
Start: 2020-09-06 | End: 2020-09-06

## 2020-09-06 RX ORDER — ONDANSETRON 2 MG/ML
4 INJECTION INTRAMUSCULAR; INTRAVENOUS ONCE
Status: COMPLETED | OUTPATIENT
Start: 2020-09-06 | End: 2020-09-06

## 2020-09-06 RX ORDER — FLECAINIDE ACETATE 100 MG/1
100 TABLET ORAL 2 TIMES DAILY
Status: DISCONTINUED | OUTPATIENT
Start: 2020-09-06 | End: 2020-09-09

## 2020-09-06 RX ORDER — ATORVASTATIN CALCIUM 10 MG/1
10 TABLET, FILM COATED ORAL NIGHTLY
Status: DISCONTINUED | OUTPATIENT
Start: 2020-09-06 | End: 2020-09-09

## 2020-09-06 RX ORDER — PRIMIDONE 50 MG/1
150 TABLET ORAL NIGHTLY
Status: DISCONTINUED | OUTPATIENT
Start: 2020-09-06 | End: 2020-09-09

## 2020-09-06 RX ORDER — MORPHINE SULFATE 2 MG/ML
2 INJECTION, SOLUTION INTRAMUSCULAR; INTRAVENOUS EVERY 2 HOUR PRN
Status: DISCONTINUED | OUTPATIENT
Start: 2020-09-06 | End: 2020-09-09

## 2020-09-06 RX ORDER — CLOPIDOGREL BISULFATE 75 MG/1
75 TABLET ORAL DAILY
Status: DISCONTINUED | OUTPATIENT
Start: 2020-09-07 | End: 2020-09-07

## 2020-09-06 RX ORDER — GABAPENTIN 300 MG/1
600 CAPSULE ORAL 2 TIMES DAILY
Status: DISCONTINUED | OUTPATIENT
Start: 2020-09-07 | End: 2020-09-06

## 2020-09-06 RX ORDER — ONDANSETRON 2 MG/ML
4 INJECTION INTRAMUSCULAR; INTRAVENOUS ONCE
Status: DISCONTINUED | OUTPATIENT
Start: 2020-09-06 | End: 2020-09-09

## 2020-09-06 RX ORDER — PRIMIDONE 50 MG/1
150 TABLET ORAL EVERY MORNING
Status: DISCONTINUED | OUTPATIENT
Start: 2020-09-07 | End: 2020-09-06

## 2020-09-06 RX ORDER — MORPHINE SULFATE 2 MG/ML
INJECTION, SOLUTION INTRAMUSCULAR; INTRAVENOUS
Status: COMPLETED
Start: 2020-09-06 | End: 2020-09-06

## 2020-09-06 RX ORDER — SODIUM CHLORIDE 9 MG/ML
INJECTION, SOLUTION INTRAVENOUS CONTINUOUS
Status: ACTIVE | OUTPATIENT
Start: 2020-09-06 | End: 2020-09-06

## 2020-09-06 RX ORDER — MORPHINE SULFATE 2 MG/ML
2 INJECTION, SOLUTION INTRAMUSCULAR; INTRAVENOUS EVERY 30 MIN PRN
Status: DISCONTINUED | OUTPATIENT
Start: 2020-09-06 | End: 2020-09-06

## 2020-09-06 RX ORDER — SODIUM CHLORIDE 9 MG/ML
1000 INJECTION, SOLUTION INTRAVENOUS ONCE
Status: COMPLETED | OUTPATIENT
Start: 2020-09-06 | End: 2020-09-06

## 2020-09-06 RX ORDER — MORPHINE SULFATE 2 MG/ML
4 INJECTION, SOLUTION INTRAMUSCULAR; INTRAVENOUS EVERY 2 HOUR PRN
Status: DISCONTINUED | OUTPATIENT
Start: 2020-09-06 | End: 2020-09-09

## 2020-09-06 RX ORDER — DULOXETIN HYDROCHLORIDE 30 MG/1
30 CAPSULE, DELAYED RELEASE ORAL DAILY
Status: DISCONTINUED | OUTPATIENT
Start: 2020-09-07 | End: 2020-09-09

## 2020-09-06 RX ORDER — MORPHINE SULFATE 2 MG/ML
1 INJECTION, SOLUTION INTRAMUSCULAR; INTRAVENOUS EVERY 2 HOUR PRN
Status: DISCONTINUED | OUTPATIENT
Start: 2020-09-06 | End: 2020-09-09

## 2020-09-06 RX ORDER — GABAPENTIN 300 MG/1
600 CAPSULE ORAL 2 TIMES DAILY
Status: DISCONTINUED | OUTPATIENT
Start: 2020-09-06 | End: 2020-09-09

## 2020-09-06 RX ORDER — METOCLOPRAMIDE HYDROCHLORIDE 5 MG/ML
10 INJECTION INTRAMUSCULAR; INTRAVENOUS EVERY 8 HOURS PRN
Status: DISCONTINUED | OUTPATIENT
Start: 2020-09-06 | End: 2020-09-09

## 2020-09-06 RX ORDER — ACETAMINOPHEN 500 MG
1000 TABLET ORAL EVERY 6 HOURS PRN
Status: DISCONTINUED | OUTPATIENT
Start: 2020-09-06 | End: 2020-09-09

## 2020-09-06 RX ORDER — ASPIRIN 81 MG/1
81 TABLET ORAL DAILY
Status: DISCONTINUED | OUTPATIENT
Start: 2020-09-07 | End: 2020-09-07

## 2020-09-06 RX ORDER — SODIUM CHLORIDE 9 MG/ML
INJECTION, SOLUTION INTRAVENOUS CONTINUOUS
Status: DISCONTINUED | OUTPATIENT
Start: 2020-09-06 | End: 2020-09-08

## 2020-09-06 NOTE — ED PROVIDER NOTES
Patient Seen in: [facility] Emergency Department      History   Patient presents with:  Nausea/Vomiting/Diarrhea  Abdomen/Flank Pain    Stated Complaint: abd pain, N/V, hypotension    HPI    Patient is a pleasant 26-year-old female, with history of COLONOSCOPY N/A 2/7/2014    Performed by Francisco Adkins MD at [facility] ENDOSCOPY   • D & C     • HYSTERECTOMY  1/1991   • LUMPECTOMY RIGHT     • OTHER SURGICAL HISTORY      Lt bunionectomy   • OTHER SURGICAL HISTORY      stent in CMA   • TUBAL LIGATION The patient moves all 4 extremities freely. No cyanosis, clubbing, or edema. NEUROLOGIC: The patient is awake, alert, and oriented x3. Cranial nerves are grossly intact. There is no gross motor or sensory deficits identified.       ED Course     Labs Rev injury  TECHNIQUE:  Noncontrast CT scanning is performed through the brain. Dose reduction techniques were used.  Dose information is transmitted to the ACR ([facility] [employer]) Rosa. Tiffanie Benítez [address] ([address]) which includes the Dose Inde ABDOMEN PELVIS IV CONTRAST, NO ORAL (ER)  COMPARISON:  [facility] , CT, CT ABDOMEN+PELVIS(CONTRAST ONLY)(CPT=74177), 1/08/2019, 11:18 AM.  INDICATIONS:  abd pain, N/V, hypotension  TECHNIQUE:  CT scanning was performed from the dome of the diaphragm to the pub URINARY BLADDER:  No visible focal wall thickening, lesion, or calculus. PELVIC NODES:  No adenopathy. PELVIC ORGANS:  No visible mass. Pelvic organs appropriate for patient age. BONES:  No bony lesion or fracture.   Moderate degenerative changes most p vasovagal    Disposition:  Admit  9/6/2020  8:02 pm    Follow-up:  No follow-up provider specified.         Medications Prescribed:  Current Discharge Medication List                       Present on Admission  Date Reviewed: 7/7/2020          ICD-10-CM Not

## 2020-09-07 LAB
ANION GAP SERPL CALC-SCNC: 3 MMOL/L (ref 0–18)
ANTIBODY SCREEN: NEGATIVE
BUN BLD-MCNC: 10 MG/DL (ref 7–18)
BUN/CREAT SERPL: 12.7 (ref 10–20)
C DIFF TOX B STL QL: NEGATIVE
CALCIUM BLD-MCNC: 6.8 MG/DL (ref 8.5–10.1)
CHLORIDE SERPL-SCNC: 107 MMOL/L (ref 98–112)
CO2 SERPL-SCNC: 24 MMOL/L (ref 21–32)
CREAT BLD-MCNC: 0.79 MG/DL (ref 0.55–1.02)
DEPRECATED RDW RBC AUTO: 63.2 FL (ref 35.1–46.3)
ERYTHROCYTE [DISTWIDTH] IN BLOOD BY AUTOMATED COUNT: 16.2 % (ref 11–15)
GLUCOSE BLD-MCNC: 97 MG/DL (ref 70–99)
HCT VFR BLD AUTO: 20.5 % (ref 35–48)
HGB BLD-MCNC: 6.5 G/DL (ref 12–16)
HGB BLD-MCNC: 8.1 G/DL (ref 12–16)
LACTATE SERPL-SCNC: 1.1 MMOL/L (ref 0.4–2)
MCH RBC QN AUTO: 33.9 PG (ref 26–34)
MCHC RBC AUTO-ENTMCNC: 31.7 G/DL (ref 31–37)
MCV RBC AUTO: 106.8 FL (ref 80–100)
OSMOLALITY SERPL CALC.SUM OF ELEC: 277 MOSM/KG (ref 275–295)
PLATELET # BLD AUTO: 193 10(3)UL (ref 150–450)
POTASSIUM SERPL-SCNC: 4.3 MMOL/L (ref 3.5–5.1)
RBC # BLD AUTO: 1.92 X10(6)UL (ref 3.8–5.3)
RH BLOOD TYPE: POSITIVE
SODIUM SERPL-SCNC: 134 MMOL/L (ref 136–145)
WBC # BLD AUTO: 7.1 X10(3) UL (ref 4–11)

## 2020-09-07 PROCEDURE — 99225 SUBSEQUENT OBSERVATION CARE: CPT | Performed by: INTERNAL MEDICINE

## 2020-09-07 PROCEDURE — 30233N1 TRANSFUSION OF NONAUTOLOGOUS RED BLOOD CELLS INTO PERIPHERAL VEIN, PERCUTANEOUS APPROACH: ICD-10-PCS | Performed by: HOSPITALIST

## 2020-09-07 RX ORDER — SODIUM CHLORIDE 9 MG/ML
INJECTION, SOLUTION INTRAVENOUS ONCE
Status: COMPLETED | OUTPATIENT
Start: 2020-09-07 | End: 2020-09-07

## 2020-09-07 RX ORDER — CIPROFLOXACIN 2 MG/ML
400 INJECTION, SOLUTION INTRAVENOUS EVERY 12 HOURS SCHEDULED
Status: DISCONTINUED | OUTPATIENT
Start: 2020-09-07 | End: 2020-09-09

## 2020-09-07 RX ORDER — DICYCLOMINE HYDROCHLORIDE 10 MG/1
10 CAPSULE ORAL 3 TIMES DAILY PRN
Status: DISCONTINUED | OUTPATIENT
Start: 2020-09-07 | End: 2020-09-09

## 2020-09-07 RX ORDER — ALPRAZOLAM 0.25 MG/1
0.25 TABLET ORAL NIGHTLY PRN
Status: DISCONTINUED | OUTPATIENT
Start: 2020-09-07 | End: 2020-09-08

## 2020-09-07 RX ORDER — METRONIDAZOLE 500 MG/100ML
500 INJECTION, SOLUTION INTRAVENOUS EVERY 8 HOURS SCHEDULED
Status: DISCONTINUED | OUTPATIENT
Start: 2020-09-07 | End: 2020-09-09

## 2020-09-07 NOTE — CONSULTS
[facility] IP Report of Consultation Gastroenterology    9/7/2020    Eulah Laughter  female   Rajan Waterman     [id number]  1/15/1955 Primary Care Physician  [address] [place] [address] [place]     Reason for Consultation: ischemic colitis    HPI:   71 yo F with histor Atrial flutter, paroxysmal ([facility] 75.)     Community acquired pneumonia     Community acquired pneumonia, unspecified laterality     Dyspnea, unspecified type     Weakness generalized     Grade II diastolic dysfunction     AVM (arteriovenous malformation)     CA LIGATION        Family History   Problem Relation Age of Onset   • Hypertension Father    • Heart Disorder Mother    • Cancer Mother         pharyngeal      Social History    Tobacco Use      Smoking status: Former Smoker        Packs/day: 0.50        Year REVIEW OF SYSTEMS:   10 point ros reviewed. Pertinent positives per HPI.       EXAM:   /62 (BP Location: Right arm)   Pulse 85   Temp 98 °F (36.7 °C) (Oral)   Resp 18   Ht 5' 2\" (1.575 m)   Wt 108 lb (49 kg)   SpO2 99%   BMI 19.75 kg/m²  Body mas Colonscopy 2018  The visualized colonic mucosa reveals a punctate AVM with active oozing of blood in the proximal ascending colon. Hemostasis was achieved using BiCap cautery.  The remainder of the colon is normal.  At the anal verge, grade 2 internal h

## 2020-09-07 NOTE — PROGRESS NOTES
[facility]ITAL[facility]  Progress Note     Ed Eye Patient Status:  Observation    1/15/1955 MRN [id number]   [facility] 7NE-A Attending Dianelys Arzate, DO   Hosp Day # 0 PCP Charmcodinah Gavin     Chief Complaint: abdominal pain --        Estimated Creatinine Clearance: 54.9 mL/min (based on SCr of 0.79 mg/dL). Recent Labs   Lab 09/06/20  1615   PTP 13.1   INR 0.96       Recent Labs   Lab 09/06/20  1615   TROP <0.045            Imaging: Imaging data reviewed in Epic.     Kamaljit Bueno

## 2020-09-07 NOTE — PLAN OF CARE
Received report at 0700. Patient alert and oriented. X4. Complains of abdominal pain, somewhat relieved by Tylenol and IV morphine. Need to take BP prior to morphine administration due to hypotensive episodes after morphine being given. Spouse at bedside.

## 2020-09-07 NOTE — H&P
[facility]  History and Physical     Karishma Hay Patient Status:  Emergency    1/15/1955 MRN [id number]   Location [address] [facility] Attending Batool Devlin MD   Hosp Day # 0 PCP Randa Cordero     Chief Complaint: Amanda Barron MD at [facility] ENDOSCOPY   • COLONOSCOPY N/A 2/7/2014    Performed by Raul Reid MD at [facility] ENDOSCOPY   • D & C     • HYSTERECTOMY  1/1991   • LUMPECTOMY RIGHT     • OTHER SURGICAL HISTORY      Lt bunionectomy   • OTHER SURGICAL HISTORY      stent in Disp: , Rfl:   gabapentin (NEURONTIN) 300 MG Oral Cap, Take 600 mg by mouth 3 (three) times daily. , Disp: , Rfl:   Atorvastatin Calcium (LIPITOR) 10 MG Oral Tab, Take 10 mg by mouth daily.   , Disp: , Rfl:       Physical Exam:    BP 98/50   Pulse 92   Tem fluids and hold blood pressure medications  6. Ascites-no intervention at this time  7. Depression-SSRI  8. Dyslipidemia-statin  9. Neuropathy-gabapentin  10. A. fib on flecainide and status post permanent pacemaker  11.  Parkinson's syndrome- primidone

## 2020-09-07 NOTE — PLAN OF CARE
Patient admitted from ER. Admission navigator verified with patient. Home medications verified with patient and spouse. Patient is alert and oriented. Baseline tremors noted to upper extremities. Complaining of severe abdominal pain.  IV Morphine administer

## 2020-09-08 LAB
ATRIAL RATE: 74 BPM
BLOOD TYPE BARCODE: 6200
HGB BLD-MCNC: 8.4 G/DL (ref 12–16)
P AXIS: 61 DEGREES
P-R INTERVAL: 140 MS
Q-T INTERVAL: 416 MS
QRS DURATION: 96 MS
QTC CALCULATION (BEZET): 461 MS
R AXIS: 55 DEGREES
T AXIS: 74 DEGREES
VENTRICULAR RATE: 74 BPM

## 2020-09-08 PROCEDURE — 99225 SUBSEQUENT OBSERVATION CARE: CPT | Performed by: INTERNAL MEDICINE

## 2020-09-08 RX ORDER — ALPRAZOLAM 0.25 MG/1
0.25 TABLET ORAL 2 TIMES DAILY PRN
Status: DISCONTINUED | OUTPATIENT
Start: 2020-09-08 | End: 2020-09-09

## 2020-09-08 NOTE — PLAN OF CARE
Assumed care at 0730  A&Ox4  NSR on tele  PRN morphine given for pain with relief  On full liquids, c/o nausea after meal  PRN zofran given with relief  IV abx infusing per order  Up x1 assist tolerating well  Will continue to monitor

## 2020-09-08 NOTE — PROGRESS NOTES
[facility]  Progress Note     Alejandro Miss Patient Status:  Observation    1/15/1955 MRN [id number]   [facility] 7NE-A Attending Juan Castillo,    Hosp Day # 0 PCP Familia Lindo     Chief Complaint: abdominal pain Recent Labs   Lab 09/06/20  1615   TROP <0.045            Imaging: Imaging data reviewed in Epic.     Medications:   • metRONIDAZOLE  500 mg Intravenous [facility]   • ciprofloxacin  400 mg Intravenous Q12H   • ondansetron HCl  4 mg Intravenous Once   • On Physical Exam:   General: Alert and Oriented x 3, NAD  Lungs: CTAB, no wheezing  Heart: RRR, no murmurs  Abdomen: Soft, nondistended, diffusely TTP, bowel sounds normoactive in all 4 quadrants  Extremities: No Edema     Assessment and Plan:  Felt quit

## 2020-09-08 NOTE — PLAN OF CARE
Patient is alert and oriented. Continues to report abdominal cramping, relieved with IV Morphine. Occasional complaints of nausea but not emesis. Zofran administered. Oxygen saturation remains above 90% on room air. NSR on telemetry.  Briefed, occasionally

## 2020-09-08 NOTE — PROGRESS NOTES
[facility]  1/15/1955  [id number]   Provider:    Litzy Camargo         No diarrhea or blood in stool overnight. Patient still with abdominal pain alleviated with medication.   Allergies:    Sulfa Drugs Cross R*    RASH    Comment Colitis   • Depression    • Endometriosis    • Essential hypertension    • Exposure to medical diagnostic radiation    • GI bleed    • High blood pressure    • High cholesterol    • History of blood transfusion     Jan 2018   • Osteoarthritis    • Osteoart kg/m²  Body mass index is 19.75 kg/m². GENERAL: Well developed, well nourished, in no obvious distress. ABDOMEN: Bowel sounds normoactive. Soft, no organomegaly or masses appreciated. TTP diffusely. EXTREMITIES: Without cyanosis.  No peripheral edema

## 2020-09-09 VITALS
DIASTOLIC BLOOD PRESSURE: 74 MMHG | OXYGEN SATURATION: 96 % | WEIGHT: 108 LBS | HEART RATE: 81 BPM | SYSTOLIC BLOOD PRESSURE: 145 MMHG | TEMPERATURE: 99 F | BODY MASS INDEX: 19.88 KG/M2 | RESPIRATION RATE: 20 BRPM | HEIGHT: 62 IN

## 2020-09-09 LAB
ANION GAP SERPL CALC-SCNC: 1 MMOL/L (ref 0–18)
BASOPHILS # BLD AUTO: 0.02 X10(3) UL (ref 0–0.2)
BASOPHILS NFR BLD AUTO: 0.4 %
BUN BLD-MCNC: 3 MG/DL (ref 7–18)
BUN/CREAT SERPL: 4.2 (ref 10–20)
CALCIUM BLD-MCNC: 8.1 MG/DL (ref 8.5–10.1)
CHLORIDE SERPL-SCNC: 107 MMOL/L (ref 98–112)
CO2 SERPL-SCNC: 28 MMOL/L (ref 21–32)
CREAT BLD-MCNC: 0.71 MG/DL (ref 0.55–1.02)
DEPRECATED RDW RBC AUTO: 62.5 FL (ref 35.1–46.3)
EOSINOPHIL # BLD AUTO: 0.09 X10(3) UL (ref 0–0.7)
EOSINOPHIL NFR BLD AUTO: 1.8 %
ERYTHROCYTE [DISTWIDTH] IN BLOOD BY AUTOMATED COUNT: 16.3 % (ref 11–15)
GLUCOSE BLD-MCNC: 88 MG/DL (ref 70–99)
HCT VFR BLD AUTO: 26.7 % (ref 35–48)
HGB BLD-MCNC: 8.6 G/DL (ref 12–16)
IMM GRANULOCYTES # BLD AUTO: 0.02 X10(3) UL (ref 0–1)
IMM GRANULOCYTES NFR BLD: 0.4 %
LYMPHOCYTES # BLD AUTO: 0.59 X10(3) UL (ref 1–4)
LYMPHOCYTES NFR BLD AUTO: 12.1 %
MCH RBC QN AUTO: 33.7 PG (ref 26–34)
MCHC RBC AUTO-ENTMCNC: 32.2 G/DL (ref 31–37)
MCV RBC AUTO: 104.7 FL (ref 80–100)
MONOCYTES # BLD AUTO: 0.45 X10(3) UL (ref 0.1–1)
MONOCYTES NFR BLD AUTO: 9.2 %
NEUTROPHILS # BLD AUTO: 3.71 X10 (3) UL (ref 1.5–7.7)
NEUTROPHILS # BLD AUTO: 3.71 X10(3) UL (ref 1.5–7.7)
NEUTROPHILS NFR BLD AUTO: 76.1 %
OSMOLALITY SERPL CALC.SUM OF ELEC: 278 MOSM/KG (ref 275–295)
PLATELET # BLD AUTO: 175 10(3)UL (ref 150–450)
POTASSIUM SERPL-SCNC: 4.4 MMOL/L (ref 3.5–5.1)
RBC # BLD AUTO: 2.55 X10(6)UL (ref 3.8–5.3)
SODIUM SERPL-SCNC: 136 MMOL/L (ref 136–145)
WBC # BLD AUTO: 4.9 X10(3) UL (ref 4–11)

## 2020-09-09 PROCEDURE — 99217 OBSERVATION CARE DISCHARGE: CPT | Performed by: INTERNAL MEDICINE

## 2020-09-09 RX ORDER — DICYCLOMINE HYDROCHLORIDE 10 MG/1
10 CAPSULE ORAL 3 TIMES DAILY PRN
Qty: 15 CAPSULE | Refills: 0 | Status: SHIPPED | OUTPATIENT
Start: 2020-09-09 | End: 2021-01-25 | Stop reason: ALTCHOICE

## 2020-09-09 RX ORDER — METRONIDAZOLE 500 MG/1
500 TABLET ORAL 3 TIMES DAILY
Qty: 12 TABLET | Refills: 0 | Status: SHIPPED | OUTPATIENT
Start: 2020-09-09 | End: 2020-09-13

## 2020-09-09 RX ORDER — CIPROFLOXACIN 500 MG/1
500 TABLET, FILM COATED ORAL 2 TIMES DAILY
Qty: 8 TABLET | Refills: 0 | Status: SHIPPED | OUTPATIENT
Start: 2020-09-09 | End: 2020-09-13

## 2020-09-09 NOTE — PROGRESS NOTES
[facility]  Progress Note     All Wing Patient Status:  Observation    1/15/1955 MRN [id number]   [facility] 7NE-A Attending Maura Burgess, DO    Hosp Day # 0 PCP Rosanna Courser     Chief Complaint: abdominal pain    S: P Estimated Creatinine Clearance: 61.1 mL/min (based on SCr of 0.71 mg/dL). Recent Labs   Lab 09/06/20  1615   PTP 13.1   INR 0.96       Recent Labs   Lab 09/06/20 1615   TROP <0.045            Imaging: Imaging data reviewed in Epic.     Medications discussed with patient, PITO Welsh, NP               Patient seen and examined independently, agree with above except as otherwise noted. Subjective: Patient tolerated breakfast today without any significant nausea.   She has been moving well per

## 2020-09-09 NOTE — PROGRESS NOTES
Valeriano Cruz  1/15/1955  [id number]   Provider:    Litzy Camargo         Abdominal pain improved. Patient would like to try general diet and go home if tolerates.    Allergies:    Sulfa Drugs Cross R*    RASH    Comment:Headache • Exposure to medical diagnostic radiation    • GI bleed    • High blood pressure    • High cholesterol    • History of blood transfusion     Jan 2018   • Osteoarthritis    • Osteoarthrosis, unspecified whether generalized or localized, unspecified site nourished, in no obvious distress. ABDOMEN: Bowel sounds normoactive. Soft, no organomegaly or masses appreciated. Non-tender. CV: RRR  PULM: CTAB  EXTREMITIES: Without cyanosis. No peripheral edema appreciated. NEURO: Alert and Oriented x 3.     Lab

## 2020-09-09 NOTE — PLAN OF CARE
Assumed care at 0730  Patient alert and oriented x4  Advanced to regular diet for breakfast. No nausea.  Tolerating well  Tylenol given for pain  AVS reviewed with patient and spouse   All questions answered    NURSING DISCHARGE NOTE    Discharged Home via

## 2020-09-10 NOTE — DISCHARGE SUMMARY
[facility]  DISCHARGE SUMMARY     Bill Florez Patient Status:  Observation    1/15/1955 MRN [id number]   [facility] 7NE-A Attending No att. providers found   Hosp Day # 0 [facility] [address] [place]     Date of Admission: 2020  D diet patient has been asymptomatic as far as dizziness no drops in blood pressure noted. Has been cleared for discharge home to continue antibiotics.   Okay to resume aspirin Plavix with her physicians        Lace+ Score: 58  59-90 High Risk  29-58 Medium XANAX      Take 0.25 mg by mouth 2 (two) times daily as needed. Refills:  0     aspirin 81 MG Tabs      Take 81 mg by mouth. Refills:  0     atorvastatin 10 MG Tabs  Commonly known as:  LIPITOR      Take 10 mg by mouth daily.    Refills:  0     [address] [facility] [address]  [phone]    Schedule an appointment as soon as possible for a visit      Denver Brewer, [address] [address] Vaishali Hyatt [phone]      As needed    Appointments for Next 30 Days 9/9/2020 - bowel sounds. NT  No rebound or guarding.  + flatus no BM   Neurologic: No focal neurological deficits. Musculoskeletal: Moves all extremities. Extremities: No edema.   -----------------------------------------------------------------------------------

## 2020-09-29 ENCOUNTER — OFFICE VISIT (OUTPATIENT)
Dept: HEMATOLOGY/ONCOLOGY | Facility: HOSPITAL | Age: 65
End: 2020-09-29
Attending: INTERNAL MEDICINE
Payer: MEDICARE

## 2020-09-29 VITALS
OXYGEN SATURATION: 94 % | RESPIRATION RATE: 18 BRPM | DIASTOLIC BLOOD PRESSURE: 71 MMHG | SYSTOLIC BLOOD PRESSURE: 118 MMHG | HEART RATE: 80 BPM | TEMPERATURE: 97 F

## 2020-09-29 DIAGNOSIS — D50.0 IRON DEFICIENCY ANEMIA DUE TO CHRONIC BLOOD LOSS: Primary | ICD-10-CM

## 2020-09-29 PROCEDURE — 96374 THER/PROPH/DIAG INJ IV PUSH: CPT

## 2020-09-29 NOTE — PROGRESS NOTES
Education Record    Learner:  Patient    Disease / Diagnosis: feraheme infusion - tolerated well without issue. Vitals taken 30 minutes post infusion.      Barriers / Limitations:  None   Comments:    Method:  Brief focused   Comments:    General Topics:  P

## 2020-10-26 NOTE — PROGRESS NOTES
[facility] Progress Note      Patient Name:  Jona William  YOB: 1955  Medical Record Number:  [id number]    Date of visit: 10/27/2020    CHIEF COMPLAINT: Anemia    HPI:     72year old female that I saw in [facility] 6/18 for anemia mg by mouth. • lisinopril 20 MG Oral Tab TK 1 T PO D  2   • primidone 50 MG Oral Tab Take 150 mg by mouth nightly. • primidone 50 MG Oral Tab Take 100 mg by mouth daily.        • Flecainide Acetate 100 MG Oral Tab Take 1 tablet (100 mg total) by m 112 mmol/L    CO2 26.0 21.0 - 32.0 mmol/L    Anion Gap 4 0 - 18 mmol/L    BUN 10 7 - 18 mg/dL    Creatinine 0.74 0.55 - 1.02 mg/dL    BUN/CREA Ratio 13.5 10.0 - 20.0    Calcium, Total 8.9 8.5 - 10.1 mg/dL    Calculated Osmolality 267 (L) 275 - 295 mOsm/kg 249 pg/mL   FOLIC ACID SERUM(FOLATE)    Collection Time: 10/27/20 11:14 AM   Result Value Ref Range    Folate (Folic Acid) 44.8 >=5.0 ng/mL       ASSESSMENT AND PLAN:     # Anemia: Previously had GIB and noted to be Fe defi.  Colonoscopy 6/18-Colonic AVM wi

## 2020-10-27 ENCOUNTER — OFFICE VISIT (OUTPATIENT)
Dept: HEMATOLOGY/ONCOLOGY | Facility: HOSPITAL | Age: 65
End: 2020-10-27
Attending: INTERNAL MEDICINE
Payer: MEDICARE

## 2020-10-27 VITALS
RESPIRATION RATE: 18 BRPM | DIASTOLIC BLOOD PRESSURE: 74 MMHG | HEART RATE: 70 BPM | OXYGEN SATURATION: 100 % | TEMPERATURE: 99 F | SYSTOLIC BLOOD PRESSURE: 124 MMHG

## 2020-10-27 VITALS
SYSTOLIC BLOOD PRESSURE: 146 MMHG | OXYGEN SATURATION: 100 % | HEART RATE: 94 BPM | BODY MASS INDEX: 18 KG/M2 | WEIGHT: 100.81 LBS | RESPIRATION RATE: 18 BRPM | TEMPERATURE: 97 F | DIASTOLIC BLOOD PRESSURE: 84 MMHG

## 2020-10-27 DIAGNOSIS — D46.4 REFRACTORY ANEMIA, UNSPECIFIED (HCC): ICD-10-CM

## 2020-10-27 DIAGNOSIS — D50.0 IRON DEFICIENCY ANEMIA DUE TO CHRONIC BLOOD LOSS: Primary | ICD-10-CM

## 2020-10-27 DIAGNOSIS — K92.2 GASTROINTESTINAL HEMORRHAGE, UNSPECIFIED GASTROINTESTINAL HEMORRHAGE TYPE: ICD-10-CM

## 2020-10-27 DIAGNOSIS — D46.9 MDS (MYELODYSPLASTIC SYNDROME) (HCC): ICD-10-CM

## 2020-10-27 DIAGNOSIS — D50.0 IRON DEFICIENCY ANEMIA DUE TO CHRONIC BLOOD LOSS: ICD-10-CM

## 2020-10-27 DIAGNOSIS — D64.9 ANEMIA, UNSPECIFIED TYPE: Primary | ICD-10-CM

## 2020-10-27 DIAGNOSIS — D75.89 MACROCYTOSIS: ICD-10-CM

## 2020-10-27 PROCEDURE — 99214 OFFICE O/P EST MOD 30 MIN: CPT | Performed by: INTERNAL MEDICINE

## 2020-10-27 PROCEDURE — 96374 THER/PROPH/DIAG INJ IV PUSH: CPT

## 2020-10-27 RX ORDER — OMEPRAZOLE 40 MG/1
CAPSULE, DELAYED RELEASE ORAL
COMMUNITY
Start: 2020-10-05 | End: 2020-12-04

## 2020-10-27 NOTE — PROGRESS NOTES
MD follow up for anemia. Had an upper GI at [facility], showed an ulcer. Biopsies negative. Energy level is good.    Education Record    Learner:  Patient    Disease / Del Ar of care    Barriers / Limitations:  None   Comments:    Method:  Discussio

## 2020-10-27 NOTE — PROGRESS NOTES
Education Record    Learner:  Patient    Disease / Diagnosis:iron Deficiency Anemia    Barriers / Limitations:  None   Comments:    Method:  Discussion   Comments:    General Topics:  Plan of care reviewed   Comments:    Outcome:  Shows understanding   Com

## 2020-11-24 ENCOUNTER — OFFICE VISIT (OUTPATIENT)
Dept: HEMATOLOGY/ONCOLOGY | Facility: HOSPITAL | Age: 65
End: 2020-11-24
Attending: INTERNAL MEDICINE
Payer: MEDICARE

## 2020-11-24 VITALS
RESPIRATION RATE: 16 BRPM | SYSTOLIC BLOOD PRESSURE: 119 MMHG | DIASTOLIC BLOOD PRESSURE: 71 MMHG | HEART RATE: 96 BPM | BODY MASS INDEX: 18.47 KG/M2 | OXYGEN SATURATION: 100 % | HEIGHT: 62.01 IN | WEIGHT: 101.63 LBS

## 2020-11-24 DIAGNOSIS — D50.0 IRON DEFICIENCY ANEMIA DUE TO CHRONIC BLOOD LOSS: Primary | ICD-10-CM

## 2020-11-24 PROCEDURE — 85025 COMPLETE CBC W/AUTO DIFF WBC: CPT

## 2020-11-24 PROCEDURE — 80053 COMPREHEN METABOLIC PANEL: CPT

## 2020-11-24 PROCEDURE — 96365 THER/PROPH/DIAG IV INF INIT: CPT

## 2020-11-24 PROCEDURE — 36415 COLL VENOUS BLD VENIPUNCTURE: CPT

## 2020-11-24 PROCEDURE — 83550 IRON BINDING TEST: CPT

## 2020-11-24 PROCEDURE — 83540 ASSAY OF IRON: CPT

## 2020-11-24 PROCEDURE — 82728 ASSAY OF FERRITIN: CPT

## 2020-11-24 NOTE — PROGRESS NOTES
Education Record    Learner:  Patient    Disease / Diagnosis: CINTIA    Barriers / Limitations:  None   Comments:    Method:  Brief focused   Comments:    General Topics:  Plan of care reviewed   Comments:    Outcome:  Shows understanding   Comments:    Dillon

## 2020-12-04 ENCOUNTER — HOSPITAL ENCOUNTER (INPATIENT)
Facility: HOSPITAL | Age: 65
LOS: 4 days | Discharge: HOME | DRG: 315 | End: 2020-12-08
Attending: STUDENT IN AN ORGANIZED HEALTH CARE EDUCATION/TRAINING PROGRAM | Admitting: HOSPITALIST
Payer: MEDICARE

## 2020-12-04 ENCOUNTER — APPOINTMENT (OUTPATIENT)
Dept: GENERAL RADIOLOGY | Facility: HOSPITAL | Age: 65
DRG: 315 | End: 2020-12-04
Attending: STUDENT IN AN ORGANIZED HEALTH CARE EDUCATION/TRAINING PROGRAM
Payer: MEDICARE

## 2020-12-04 ENCOUNTER — APPOINTMENT (OUTPATIENT)
Dept: CT IMAGING | Facility: HOSPITAL | Age: 65
DRG: 315 | End: 2020-12-04
Attending: STUDENT IN AN ORGANIZED HEALTH CARE EDUCATION/TRAINING PROGRAM
Payer: MEDICARE

## 2020-12-04 DIAGNOSIS — R55 SYNCOPE AND COLLAPSE: Primary | ICD-10-CM

## 2020-12-04 DIAGNOSIS — D64.9 ANEMIA, UNSPECIFIED TYPE: ICD-10-CM

## 2020-12-04 DIAGNOSIS — N28.9 RENAL INSUFFICIENCY: ICD-10-CM

## 2020-12-04 DIAGNOSIS — I95.9 HYPOTENSION, UNSPECIFIED HYPOTENSION TYPE: ICD-10-CM

## 2020-12-04 DIAGNOSIS — E87.1 HYPONATREMIA: ICD-10-CM

## 2020-12-04 PROCEDURE — 70450 CT HEAD/BRAIN W/O DYE: CPT | Performed by: STUDENT IN AN ORGANIZED HEALTH CARE EDUCATION/TRAINING PROGRAM

## 2020-12-04 PROCEDURE — 99223 1ST HOSP IP/OBS HIGH 75: CPT | Performed by: HOSPITALIST

## 2020-12-04 PROCEDURE — 73140 X-RAY EXAM OF FINGER(S): CPT | Performed by: STUDENT IN AN ORGANIZED HEALTH CARE EDUCATION/TRAINING PROGRAM

## 2020-12-04 PROCEDURE — 74176 CT ABD & PELVIS W/O CONTRAST: CPT | Performed by: STUDENT IN AN ORGANIZED HEALTH CARE EDUCATION/TRAINING PROGRAM

## 2020-12-04 RX ORDER — PRIMIDONE 50 MG/1
150 TABLET ORAL NIGHTLY
Status: DISCONTINUED | OUTPATIENT
Start: 2020-12-04 | End: 2020-12-08

## 2020-12-04 RX ORDER — PRIMIDONE 50 MG/1
100 TABLET ORAL DAILY
Status: DISCONTINUED | OUTPATIENT
Start: 2020-12-05 | End: 2020-12-08

## 2020-12-04 RX ORDER — FLECAINIDE ACETATE 100 MG/1
100 TABLET ORAL 2 TIMES DAILY
Status: DISCONTINUED | OUTPATIENT
Start: 2020-12-04 | End: 2020-12-08

## 2020-12-04 RX ORDER — SODIUM CHLORIDE 9 MG/ML
INJECTION, SOLUTION INTRAVENOUS CONTINUOUS
Status: ACTIVE | OUTPATIENT
Start: 2020-12-04 | End: 2020-12-04

## 2020-12-04 RX ORDER — PANTOPRAZOLE SODIUM 40 MG/1
40 TABLET, DELAYED RELEASE ORAL
Status: DISCONTINUED | OUTPATIENT
Start: 2020-12-05 | End: 2020-12-08

## 2020-12-04 RX ORDER — ONDANSETRON 2 MG/ML
4 INJECTION INTRAMUSCULAR; INTRAVENOUS EVERY 6 HOURS PRN
Status: DISCONTINUED | OUTPATIENT
Start: 2020-12-04 | End: 2020-12-08

## 2020-12-04 RX ORDER — MORPHINE SULFATE 2 MG/ML
2 INJECTION, SOLUTION INTRAMUSCULAR; INTRAVENOUS EVERY 2 HOUR PRN
Status: DISCONTINUED | OUTPATIENT
Start: 2020-12-04 | End: 2020-12-08

## 2020-12-04 RX ORDER — ATORVASTATIN CALCIUM 10 MG/1
10 TABLET, FILM COATED ORAL NIGHTLY
Status: DISCONTINUED | OUTPATIENT
Start: 2020-12-04 | End: 2020-12-08

## 2020-12-04 RX ORDER — ALPRAZOLAM 0.25 MG/1
0.25 TABLET ORAL 2 TIMES DAILY PRN
Status: DISCONTINUED | OUTPATIENT
Start: 2020-12-04 | End: 2020-12-08

## 2020-12-04 RX ORDER — HYDROCODONE BITARTRATE AND ACETAMINOPHEN 5; 325 MG/1; MG/1
2 TABLET ORAL ONCE
Status: COMPLETED | OUTPATIENT
Start: 2020-12-04 | End: 2020-12-04

## 2020-12-04 RX ORDER — LANSOPRAZOLE 30 MG/1
30 CAPSULE, DELAYED RELEASE ORAL
COMMUNITY
End: 2021-01-25 | Stop reason: ALTCHOICE

## 2020-12-04 RX ORDER — DULOXETIN HYDROCHLORIDE 30 MG/1
30 CAPSULE, DELAYED RELEASE ORAL DAILY
Status: DISCONTINUED | OUTPATIENT
Start: 2020-12-05 | End: 2020-12-08

## 2020-12-04 RX ORDER — GABAPENTIN 300 MG/1
600 CAPSULE ORAL 2 TIMES DAILY
Status: DISCONTINUED | OUTPATIENT
Start: 2020-12-04 | End: 2020-12-08

## 2020-12-04 RX ORDER — DOXEPIN HYDROCHLORIDE 50 MG/1
1 CAPSULE ORAL DAILY
Status: DISCONTINUED | OUTPATIENT
Start: 2020-12-05 | End: 2020-12-08

## 2020-12-04 RX ORDER — CHOLECALCIFEROL (VITAMIN D3) 125 MCG
500 CAPSULE ORAL DAILY
Status: DISCONTINUED | OUTPATIENT
Start: 2020-12-05 | End: 2020-12-08

## 2020-12-04 RX ORDER — HYDROCODONE BITARTRATE AND ACETAMINOPHEN 5; 325 MG/1; MG/1
1 TABLET ORAL EVERY 8 HOURS
Status: DISCONTINUED | OUTPATIENT
Start: 2020-12-04 | End: 2020-12-08

## 2020-12-04 RX ORDER — SODIUM CHLORIDE 9 MG/ML
INJECTION, SOLUTION INTRAVENOUS CONTINUOUS
Status: DISCONTINUED | OUTPATIENT
Start: 2020-12-04 | End: 2020-12-07

## 2020-12-04 RX ORDER — MORPHINE SULFATE 2 MG/ML
1 INJECTION, SOLUTION INTRAMUSCULAR; INTRAVENOUS EVERY 2 HOUR PRN
Status: DISCONTINUED | OUTPATIENT
Start: 2020-12-04 | End: 2020-12-08

## 2020-12-04 NOTE — ED INITIAL ASSESSMENT (HPI)
PT reports falling this am, denies hitting head, or LOC. PT on blood thinners.  Reports R 5th digit pain and back pain
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
## 2020-12-05 ENCOUNTER — APPOINTMENT (OUTPATIENT)
Dept: CV DIAGNOSTICS | Facility: HOSPITAL | Age: 65
DRG: 315 | End: 2020-12-05
Attending: HOSPITALIST
Payer: MEDICARE

## 2020-12-05 ENCOUNTER — APPOINTMENT (OUTPATIENT)
Dept: GENERAL RADIOLOGY | Facility: HOSPITAL | Age: 65
DRG: 315 | End: 2020-12-05
Attending: INTERNAL MEDICINE
Payer: MEDICARE

## 2020-12-05 PROCEDURE — 71045 X-RAY EXAM CHEST 1 VIEW: CPT | Performed by: INTERNAL MEDICINE

## 2020-12-05 PROCEDURE — 30233N1 TRANSFUSION OF NONAUTOLOGOUS RED BLOOD CELLS INTO PERIPHERAL VEIN, PERCUTANEOUS APPROACH: ICD-10-PCS | Performed by: INTERNAL MEDICINE

## 2020-12-05 PROCEDURE — 93306 TTE W/DOPPLER COMPLETE: CPT | Performed by: HOSPITALIST

## 2020-12-05 PROCEDURE — 99232 SBSQ HOSP IP/OBS MODERATE 35: CPT | Performed by: INTERNAL MEDICINE

## 2020-12-05 PROCEDURE — 99221 1ST HOSP IP/OBS SF/LOW 40: CPT | Performed by: INTERNAL MEDICINE

## 2020-12-05 RX ORDER — SODIUM CHLORIDE 9 MG/ML
INJECTION, SOLUTION INTRAVENOUS ONCE
Status: COMPLETED | OUTPATIENT
Start: 2020-12-05 | End: 2020-12-05

## 2020-12-05 RX ORDER — ACETAMINOPHEN 325 MG/1
650 TABLET ORAL EVERY 6 HOURS PRN
Status: DISCONTINUED | OUTPATIENT
Start: 2020-12-05 | End: 2020-12-08

## 2020-12-05 NOTE — CONSULTS
[address] [facility] Patient Status:  Inpatient   Date of Birth 1/15/1955 MRN [id number]   [facility] 2NE-A Attending Barrett Bartholomew MD   Hosp Day # 1 PCP Doris Mann     Date of Consultat mg total) by mouth 3 (three) times daily as needed (cramping). , Start Date 9/9/20, End Date , Taking?  Yes, Authorizing Provider Candelaria Euceda NP    Medication Multiple Vitamin (MULTI-VITAMIN) Oral Tab, Sig Take 1 tablet by mouth., Start Date , End Devante Tab, Sig Take 500 mcg by mouth daily. , Start Date , End Date , Taking? Yes, Authorizing Provider External/Patient, Reported    Medication Calcium Carbonate-Vitamin D (OSCAL-500) 500-400 MG-UNIT Oral Tab, Sig Take 1 tablet by mouth daily.   , Start Date , En Social History:    Social History    Tobacco Use      Smoking status: Former Smoker        Packs/day: 0.50        Years: 36.00        Pack years: 18        Types: Cigarettes        Quit date: 2018        Years since quittin.9      Smokeless diaphragm to the pubic symphysis. Dose reduction techniques were used. Dose information is transmitted to the [facility] [employer] [facility] Radiology) NRDR ([address]) which includes the Dose Index Registry.      PATIENT STATED HISTORY: is not quite as severe as the study from 9/6/2020. Dictated by (CST): Tanja Homans, MD on 12/04/2020 at 7:11 PM       Finalized by (CST): Tanja Homans, MD on 12/04/2020 at 7:15 PM     Assessment:    1.  Ischemic colitis    The patient has ischemic co

## 2020-12-05 NOTE — PHYSICAL THERAPY NOTE
PT orders received, chart reviewed. Spoke with RN, pt HgB 6.3, pt being transfused at this time. Will  Re attempt to see pt when appropriate and as able.

## 2020-12-05 NOTE — H&P
[facility]  History and Physical     Karishma Hay Patient Status:  Emergency    1/15/1955 MRN [id number]   [address] Attending Addie Beck MD   Hosp Day # 0 PCP Randa Cordero     Chief Complaint cervical spinal fusion C1-6   • BRAIN SURGERY      Brain stimulator    •       x3   • COLONOSCOPY N/A 6/3/2018    Performed by Francisco Adkins MD at [facility] ENDOSCOPY   • COLONOSCOPY N/A 2014    Performed by Francisco Adkins MD at [facility] ENDOSCOPY   • D mouth daily. , Disp: , Rfl:     •  Flecainide Acetate 100 MG Oral Tab, Take 1 tablet (100 mg total) by mouth 2 (two) times daily. , Disp: , Rfl:     •  Clopidogrel Bisulfate 75 MG Oral Tab, Take 1 tablet (75 mg total) by mouth daily. , Disp: 30 tablet, Rfl: 8.4*   ALB 3.3*   *   K 4.0   CL 98   CO2 27.0   ALKPHO 61   AST 8*   ALT 17   BILT 0.1   TP 6.1*       Estimated Creatinine Clearance: 28.4 mL/min (A) (based on SCr of 1.43 mg/dL (H)).     Recent Labs   Lab 12/04/20  1744   PTP 12.6   INR 0.92

## 2020-12-05 NOTE — PLAN OF CARE
Patient A&Ox4. NSR on tele. Lung sounds clear on room air. Denies SOB. Patient in chronic pain prior to admission, still verbalizes pain today. IV morphine 2mg given Q2 and scheduled norco given as scheduled.  Patient received one unit of PRBC with no compl

## 2020-12-05 NOTE — SIGNIFICANT EVENT
Received patient from ED. She was alert and oriented. She is having tremors in her hands. Right fifth finger has a splint. She was oriented to room set up. Needs attended.

## 2020-12-05 NOTE — ED NOTES
Upon further assessment PT states she experiences lower abdominal pain and cramping prior to the syncopal and dizzy spells.  PT states yesterday she experienced the lower abdominal pain just prior to the event and also experienced lower abdominal pain today

## 2020-12-05 NOTE — PHYSICAL THERAPY NOTE
Per RN pt ok to be seen. Attempted to see pt for PT eval, pt reporting dizziness and light headedness in supine and is transfusing and not appropriate for OOB activity at this time. RN aware. Will re attempt to see pt when pt is appropriate and as able.

## 2020-12-05 NOTE — PROGRESS NOTES
[facility]  Progress Note     Karishma Hay Patient Status:  Inpatient    1/15/1955 MRN [id number]   [facility] 2NE-A Attending Dinh Combs MD   Hosp Day # 1 PCP Randa Cordero     Chief Complaint: syncope    S: Chantale Carmichael 12/04/20  1744   TROP <0.045            Imaging: Imaging data reviewed in Epic.     Medications:   • atorvastatin  10 mg Oral Nightly   • Vitamin B-12  500 mcg Oral Daily   • DULoxetine HCl  30 mg Oral Daily   • gabapentin  600 mg Oral BID   • Flecainide Ac current state of illness, I anticipate that, after discharge, patient will require TBD.

## 2020-12-05 NOTE — ED PROVIDER NOTES
Patient Seen in: [facility] Emergency Department      History   Patient presents with:  Syncope    Stated Complaint: syncope     HPI    Patient is a 59-year-old female who presents emergency department reporting a syncopal episode yesterday while  Plaque psoriasis    • Status post deep brain stimulator placement               Past Surgical History:   Procedure Laterality Date   • APPENDECTOMY     • BACK SURGERY  1/1999    lumbar laminectomy L1-L4   • BACK SURGERY  6/2004    cervical spinal fusion C1 Ear: External ear normal. No hemotympanum. Nose: Nose normal.   Mouth/Throat: Oropharynx is clear and moist.   Eyes: Conjunctivae and EOM are normal. Pupils are equal, round, and reactive to light.    Neck: Normal range of motion and full passive range of ---------                               -----------         ------                     CBC W/ DIFFERENTIAL[[id number]]          Abnormal            Final result                 Please view results for these tests on the individual orders.    URINALYSIS WIT type  Anemia, unspecified type  Renal insufficiency  Hyponatremia    Disposition:  Admit  12/4/2020  7:27 pm    Follow-up:  No follow-up provider specified.         Medications Prescribed:  Current Discharge Medication List                          Present

## 2020-12-06 ENCOUNTER — APPOINTMENT (OUTPATIENT)
Dept: CT IMAGING | Facility: HOSPITAL | Age: 65
DRG: 315 | End: 2020-12-06
Attending: INTERNAL MEDICINE
Payer: MEDICARE

## 2020-12-06 PROCEDURE — 99232 SBSQ HOSP IP/OBS MODERATE 35: CPT | Performed by: INTERNAL MEDICINE

## 2020-12-06 PROCEDURE — 72128 CT CHEST SPINE W/O DYE: CPT | Performed by: INTERNAL MEDICINE

## 2020-12-06 NOTE — PHYSICAL THERAPY NOTE
PHYSICAL THERAPY EVALUATION - INPATIENT     Room Number: 9365/6113-D  Evaluation Date: 12/6/2020  Type of Evaluation: Initial  Physician Order: PT Eval and Treat    Presenting Problem: syncope and collapse  Reason for Therapy: Mobility Dysfunction an significant labs:  hgb 7.8           Problem List  Principal Problem:    Syncope and collapse  Active Problems:    Hyponatremia    Hypotension, unspecified hypotension type    Anemia, unspecified type    Renal insufficiency      Past Medical History  Past Level of [place]: Pt reports typically ind with self care and mobility. Pt spouse present and reports he assists as needed. Pt reports spouse is helpful with cooking, cleaning and chores. Pt typically does not use ad.  Pt denies history of falls except wheelchair)?: A Little   -   Need to walk in hospital room?: A Little   -   Climbing 3-5 steps with a railing?: A Little       AM-PAC Score:  Raw Score: 21   Approx Degree of Impairment: 28.97%   Standardized Score (AM-PAC Scale): 50.25   CMS Modifier (G-C demonstrating a 28.97% degree of impairment in mobility. Research supports that patients with this level of impairment may benefit from home with HHPT.     Based on this evaluation, patient's clinical presentation is stable and overall the evaluation comple

## 2020-12-06 NOTE — PROGRESS NOTES
Received pt at 1930. A&Ox4. NSR on tele, breath sounds clear bilaterally on RA. Pt complains of chronic back pain. Morphine 2mg Q2H given along with scheduled norco. Urine culture sent tonight, no abnormalities. Pt up with SBA. 0.9% NS running at 100ml/hr.

## 2020-12-06 NOTE — PROGRESS NOTES
[facility]  Progress Note     Barbara Duke Patient Status:  Inpatient    1/15/1955 MRN [id number]   [facility] 2NE-A Attending Philip Solomon MD   [facility] Day # 2 PCP Vandana Patel     Chief Complaint: syncope    S: Nikki Lius TP 6.1* 5.3*  --        Estimated Creatinine Clearance: 68.8 mL/min (based on SCr of 0.58 mg/dL).     Recent Labs   Lab 12/04/20 1744   PTP 12.6   INR 0.92       Recent Labs   Lab 12/04/20 1744   TROP <0.045            Imaging: Imaging data reviewed in Hospitalization - Initial Certification    Patient will require inpatient services that will reasonably be expected to span two midnight's based on the clinical documentation in H+P.    Based on patients current state of illness, I anticipate that, after [facility]

## 2020-12-06 NOTE — PROGRESS NOTES
[facility]  Cardiology Progress Note    Mya Lawson Patient Status:  Inpatient    1/15/1955 MRN [id number]   [facility] [place] 2NE-A Attending Monica Ellis MD   Hosp Day # 2 PCP JAIRO SANCHEZ       Subjective:  Denies any cardia Flecainide Acetate  100 mg Oral BID   • multivitamin  1 tablet Oral Daily   • HYDROcodone-acetaminophen  1 tablet Oral Q8H   • Pantoprazole Sodium  40 mg Oral QAM AC   • primidone  100 mg Oral Daily   • primidone  150 mg Oral Nightly     • sodium chloride

## 2020-12-06 NOTE — PLAN OF CARE
Patient A&Ox4. NSR on tele. Lung sounds clear on room air. Denies SOB. Patient is consistently rating pain score around 7/10, morphine given IV and norco given as scheduled.  Patient reports that this helps her pain and has been keeping the pain at a tolera

## 2020-12-06 NOTE — PROGRESS NOTES
[facility]  Progress Note    Jamil Wood Patient Status:  Inpatient    1/15/1955 MRN [id number]   [facility] 2NE-A Attending Cherry Tovar MD   Hosp Day # 2 PCP Dallas Estrada     Subjective:  Jamil Wood is a(n) 72 unspecified type     Hypotension, unspecified hypotension type     Syncope, near     Hypotension     Abdominal cramping     Hx of ischemic bowel disease     At risk for falling     Gastrointestinal hemorrhage     Alcohol dependence with unspecified alcohol

## 2020-12-07 ENCOUNTER — TELEPHONE (OUTPATIENT)
Dept: HEMATOLOGY/ONCOLOGY | Facility: HOSPITAL | Age: 65
End: 2020-12-07

## 2020-12-07 PROCEDURE — 99222 1ST HOSP IP/OBS MODERATE 55: CPT | Performed by: INTERNAL MEDICINE

## 2020-12-07 PROCEDURE — 99232 SBSQ HOSP IP/OBS MODERATE 35: CPT | Performed by: NURSE PRACTITIONER

## 2020-12-07 PROCEDURE — 99232 SBSQ HOSP IP/OBS MODERATE 35: CPT | Performed by: INTERNAL MEDICINE

## 2020-12-07 RX ORDER — DIPHENHYDRAMINE HCL 25 MG
25 CAPSULE ORAL ONCE
Status: DISCONTINUED | OUTPATIENT
Start: 2020-12-07 | End: 2020-12-08

## 2020-12-07 RX ORDER — SODIUM CHLORIDE 9 MG/ML
INJECTION, SOLUTION INTRAVENOUS ONCE
Status: DISCONTINUED | OUTPATIENT
Start: 2020-12-07 | End: 2020-12-07

## 2020-12-07 RX ORDER — ACETAMINOPHEN 325 MG/1
650 TABLET ORAL ONCE
Status: DISCONTINUED | OUTPATIENT
Start: 2020-12-07 | End: 2020-12-08

## 2020-12-07 NOTE — PLAN OF CARE
Received patient at 0730. Alert and Oriented x4. Tele Rhythm NSR. O2 saturation 96% On room air. Breath sounds clear. Bed is locked and in low position. Call light and personal items within reach. No C/O chest pain or SOB. Pt voiding with no issue.  Pt tole signs of decreased cardiac output  - Evaluate effectiveness of vasoactive medications to optimize hemodynamic stability  - Monitor arterial and/or venous puncture sites for bleeding and/or hematoma  - Assess quality of pulses, skin color and temperature  -

## 2020-12-07 NOTE — PROGRESS NOTES
[facility]  Progress Note    Ranjeet Keenan Patient Status:  Inpatient    1/15/1955 MRN [id number]   [facility] 2NE-A Attending Ashley Tucker MD   Hosp Day # 3 PCP Delayne Bi     Subjective:  Ranjeet Kenean is a(n) 72 (ZOFRAN) injection 4 mg, 4 mg, Intravenous, Q6H PRN    •  morphINE sulfate (PF) 2 MG/ML injection 1 mg, 1 mg, Intravenous, Q2H PRN    Or    •  morphINE sulfate (PF) 2 MG/ML injection 2 mg, 2 mg, Intravenous, Q2H PRN         Objective:    /75 (BP Loca Hypotension, unspecified hypotension type     Syncope, near     Hypotension     Abdominal cramping     Hx of ischemic bowel disease     At risk for falling     Gastrointestinal hemorrhage     Alcohol dependence with unspecified alcohol-induced disorder (HC demonstrated understanding of risks of morbidity/mortality if diagnoses and/or treatments were delayed balanced against risks of further investigation and/or treatment.      --the /patient demonstrated understanding of the results and recommendations

## 2020-12-07 NOTE — PHYSICAL THERAPY NOTE
PHYSICAL THERAPY TREATMENT NOTE - INPATIENT    Room Number: 9594/6873-N     Session: 1   Number of Visits to Meet Established Goals: 2    Presenting Problem: syncope and collapse    Problem List  Principal Problem:    Syncope and collapse  Active Problems RESTRICTION  Weight Bearing Restriction: None                PAIN ASSESSMENT   Ratin  Location: Back  Management Techniques:  Activity promotion;Relaxation;Repositioning    BALANCE exercises, including ankle pumps, LAQ and marching x 10 each      Patient End of Session: Up in chair;Needs met;Call light within reach;RN aware of session/findings; All patient questions and concerns addressed    ASSESSMENT   The patient tolerated treatmen

## 2020-12-07 NOTE — CONSULTS
[facility]  Report of Consultation    Gladys Alfaro Patient Status:  Inpatient    1/15/1955 MRN [id number]   [facility] 2NE-A Attending Cyntha Leventhal, MD   Hosp Day # 3 PCP Lossie Holiday     Reason for Consultation:    Anemia Brain stimulator    •       x3   • COLONOSCOPY N/A 6/3/2018    Performed by Mago Jensen MD at [facility] ENDOSCOPY   • COLONOSCOPY N/A 2014    Performed by Mago Jensen MD at [facility] ENDOSCOPY   • D & C     • HYSTERECTOMY  1991   • LUMPECTOMY [facility] Appears pale, frustrated and tearful. HEENT: Moist mucous membranes. EOM-I. PERRL  Neck: No lymphadenopathy. No JVD. Respiratory: Clear to auscultation bilaterally. No wheezes. No rhonchi. Cardiovascular: S1, S2.  Regular rate and rhythm.   No murmurs

## 2020-12-07 NOTE — PROGRESS NOTES
[facility]  Progress Note     Aliciabulmaro Vaughn Patient Status:  Inpatient    1/15/1955 MRN [id number]   [facility] 2NE-A Attending Yomaira Thompson MD   Hosp Day # 3 PCP Lei Morales     Chief Complaint: syncope    S: [facility] Clearance: 72.8 mL/min (based on SCr of 0.58 mg/dL). Recent Labs   Lab 12/04/20 1744   PTP 12.6   INR 0.92       Recent Labs   Lab 12/04/20 1744   TROP <0.045            Imaging: Imaging data reviewed in Epic.     Medications:   • iron sucrose  200 mg RN    Natalia Malagon MD          **Certification      PHYSICIAN Certification of Need for Inpatient Hospitalization - Initial Certification    Patient will require inpatient services that will reasonably be expected to span two midnight's based on the cl
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
## 2020-12-07 NOTE — PLAN OF CARE
A&Ox4. NSR on tele, breath sounds clear bilaterally on RA. Chronic back pain, pt receiving scheduled norco and Q2 morphine PRN. Pt complained of more pain in back today, CT spine done and negative for acute changes.  Pt up with stand by assist. SCDs applied

## 2020-12-08 VITALS
DIASTOLIC BLOOD PRESSURE: 89 MMHG | HEART RATE: 84 BPM | WEIGHT: 105.19 LBS | TEMPERATURE: 98 F | SYSTOLIC BLOOD PRESSURE: 160 MMHG | HEIGHT: 62 IN | BODY MASS INDEX: 19.36 KG/M2 | RESPIRATION RATE: 18 BRPM | OXYGEN SATURATION: 97 %

## 2020-12-08 PROCEDURE — 99232 SBSQ HOSP IP/OBS MODERATE 35: CPT | Performed by: INTERNAL MEDICINE

## 2020-12-08 PROCEDURE — 99233 SBSQ HOSP IP/OBS HIGH 50: CPT | Performed by: INTERNAL MEDICINE

## 2020-12-08 PROCEDURE — 99239 HOSP IP/OBS DSCHRG MGMT >30: CPT | Performed by: INTERNAL MEDICINE

## 2020-12-08 RX ORDER — POLYETHYLENE GLYCOL 3350 17 G/17G
17 POWDER, FOR SOLUTION ORAL DAILY
Status: DISCONTINUED | OUTPATIENT
Start: 2020-12-08 | End: 2020-12-08

## 2020-12-08 NOTE — PROGRESS NOTES
[facility]  Progress Note    Ranjeet Keenan Patient Status:  Inpatient    1/15/1955 MRN [id number]   [facility] 2NE-A Attending Ashley Tucker MD   Hosp Day # 4 PCP JAIRO SANCHEZ       SUBJECTIVE:    Better today.     OBJECTIVE EXAM:    General: Patient is alert and oriented x 3, not in acute distress. Chest: Clear to auscultation. Heart: Regular rate and rhythm. Abdomen: Soft, non tender with good bowel sounds. Extremities: No edema. Neurological: Grossly intact.      RADIO

## 2020-12-08 NOTE — CM/SW NOTE
Order noted for HH/PT. Spoke with patient who states she has not had HHC in the past.  Patient states she and her  have a good system in place at home as far as him assisting her. She does not feel HHC is necessary at this time.

## 2020-12-08 NOTE — PROGRESS NOTES
[facility]  Progress Note    Sanjay Carrion Patient Status:  Inpatient    1/15/1955 MRN [id number]   [facility] 2NE-A Attending Femi Joseph MD   [address] Day # 4 PCP JAIRO SANCHEZ     Subjective:  Sanjaynerissa Carrion is a(n) 72 bilaterally  Heart: reg rate  Abdomen: soft. Mild tender w/ deep palp in llq, but no pain even w/ deep palpation in other quadrants.  No reg/guard/rigidity  Extremities: no le edema  Neurologic: Grossly normal          Labs:     Recent Labs   Lab 12/05/20 Dyspnea, unspecified type     Weakness generalized     Grade II diastolic dysfunction     AVM (arteriovenous malformation)     CAD in native artery     Essential hypertension     Bronchitis     Cigarette nicotine dependence with nicotine-induced disorder

## 2020-12-08 NOTE — PROGRESS NOTES
Discharge instructions discussed with patient and given. Unable to set up event monitor at this time, [facility] APN aware. Patient aware to call tomorrow to schedule event monitor. IV and tele discontinued.    Waiting for patient's  to pick patie

## 2020-12-08 NOTE — PROGRESS NOTES
[facility]  Progress Note     Claradinah Tashi Patient Status:  Inpatient    1/15/1955 MRN [id number]   [facility] 2NE-A Attending Jasmin Stafford MD   Hosp Day # 4 PCP Nazario Rausch     Chief Complaint: syncope    S: Law Seo BILT 0.1 0.2  --    TP 6.1* 5.3*  --        Estimated Creatinine Clearance: 72.8 mL/min (based on SCr of 0.58 mg/dL).     Recent Labs   Lab 12/04/20 1744   PTP 12.6   INR 0.92       Recent Labs   Lab 12/04/20 1744   TROP <0.045            Imaging: Imag point Ms. Luciano Marie is expected to be discharge to: home    Plan of care discussed with patient, RN    Daryle Ast, MD          **Certification      PHYSICIAN Certification of Need for Inpatient Hospitalization - Initial Certification    Patient will re
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
## 2020-12-08 NOTE — PLAN OF CARE
Problem: Impaired Activities of Daily Living  Goal: Achieve highest/safest level of independence in self care  Description: Interventions:  - Assess ability and encourage patient to participate in ADLs to maximize function  - Promote sitting position [facility]

## 2020-12-08 NOTE — PROGRESS NOTES
12/08/20 0707 12/08/20 0708 12/08/20 0709   Vital Signs   BP (!) 148/95 132/79 131/74   MAP (mmHg) 107 93 86   BP Location Left arm Left arm Left arm   BP Method Automatic Automatic Automatic   Patient Position Lying Sitting Standing   Ortho's.  Pt. Is a
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
## 2020-12-08 NOTE — PROGRESS NOTES
12/08/20 0645   Vital Signs   BP (!) 168/94   MAP (mmHg) 110   BP Location Right arm   BP Method Automatic   Patient Position Lying   Pt. Stated she felt dizzy this morning. BP was checked. WILMAN Kang notified.  This RN received orders to recheck BP i
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
## 2020-12-08 NOTE — DISCHARGE SUMMARY
[facility]  DISCHARGE SUMMARY     Natacha Brush Patient Status:  Inpatient    1/15/1955 MRN [id number]   [place] 2NE-A Attending Uzma Cole MD   [facility] Day # 4 PCP Luis Thurman     Date of Admission: 2020  Date Discharge Medications      CONTINUE taking these medications      Instructions Prescription details   ALPRAZolam 0.25 MG Tabs  Commonly known as: XANAX      Take 0.25 mg by mouth 2 (two) times daily as needed.    Refills: 0     aspirin 81 MG Tabs      [facility] Irineo Harden MD  [phone]    In 6 weeks      Appointments for Next 30 Days 12/10/2020 - 1/9/2021      Date Arrival Time Visit Type Length Department Provider     12/22/2020 11:15 AM  ON TREATMENT VISIT FOL Juan 112, we have put the following visitor restrictions in place during the Coronavirus outbreak.   -Visitors 18 years and under are not allowed at the [facility]. -Only one visitor is allowed to accompany you to a physician visit. -No vis

## 2020-12-10 ENCOUNTER — OFFICE VISIT (OUTPATIENT)
Dept: HEMATOLOGY/ONCOLOGY | Facility: HOSPITAL | Age: 65
End: 2020-12-10
Attending: INTERNAL MEDICINE
Payer: MEDICARE

## 2020-12-10 VITALS
RESPIRATION RATE: 16 BRPM | HEART RATE: 85 BPM | OXYGEN SATURATION: 99 % | SYSTOLIC BLOOD PRESSURE: 126 MMHG | DIASTOLIC BLOOD PRESSURE: 78 MMHG | TEMPERATURE: 98 F

## 2020-12-10 DIAGNOSIS — D46.4 REFRACTORY ANEMIA, UNSPECIFIED (HCC): ICD-10-CM

## 2020-12-10 DIAGNOSIS — D64.9 ANEMIA, UNSPECIFIED TYPE: ICD-10-CM

## 2020-12-10 DIAGNOSIS — D50.0 IRON DEFICIENCY ANEMIA DUE TO CHRONIC BLOOD LOSS: Primary | ICD-10-CM

## 2020-12-10 PROCEDURE — 36415 COLL VENOUS BLD VENIPUNCTURE: CPT

## 2020-12-10 PROCEDURE — 82525 ASSAY OF COPPER: CPT

## 2020-12-10 PROCEDURE — 82728 ASSAY OF FERRITIN: CPT

## 2020-12-10 PROCEDURE — 83540 ASSAY OF IRON: CPT

## 2020-12-10 PROCEDURE — 83550 IRON BINDING TEST: CPT

## 2020-12-10 PROCEDURE — 96374 THER/PROPH/DIAG INJ IV PUSH: CPT

## 2020-12-10 PROCEDURE — 85025 COMPLETE CBC W/AUTO DIFF WBC: CPT

## 2020-12-10 NOTE — PROGRESS NOTES
Education Record    Learner:  Patient    Disease / Donnal Arrant deficiency anemia    Barriers / Limitations:  None   Comments:    Method:  Discussion   Comments:    General Topics:  Medication, Side effects and symptom management and Plan of care reviewe

## 2020-12-21 NOTE — PROGRESS NOTES
[facility] Progress Note      Patient Name:  Eduardo Stokes  YOB: 1955  Medical Record Number:  [id number]    Date of visit: 12/22/2020    CHIEF COMPLAINT: Anemia    HPI:     72year old female that I saw in [facility] 6/18 for anemia needed (cramping). 15 capsule 0   • Multiple Vitamin (MULTI-VITAMIN) Oral Tab Take 1 tablet by mouth. • aspirin 81 MG Oral Tab Take 81 mg by mouth. • primidone 50 MG Oral Tab Take 150 mg by mouth nightly.        • primidone 50 MG Oral Tab Take 100 m acute psychosocial intervention were identified.      LABS:     Recent Results (from the past 24 hour(s))   COMP METABOLIC PANEL (14)    Collection Time: 12/22/20 11:23 AM   Result Value Ref Range    Glucose 88 70 - 99 mg/dL    Sodium 133 (L) 136 - 145 mmol 0.20 x10(3) uL    Immature Granulocyte Absolute 0.00 0.00 - 1.00 x10(3) uL    Neutrophil % 51.2 %    Lymphocyte % 30.4 %    Monocyte % 15.7 %    Eosinophil % 2.0 %    Basophil % 0.7 %    Immature Granulocyte % 0.0 %       ASSESSMENT AND PLAN:     # Anemia: Darrian Wilson M.D.     [facility]    [address], [address]    12/22/2020

## 2020-12-22 ENCOUNTER — OFFICE VISIT (OUTPATIENT)
Dept: HEMATOLOGY/ONCOLOGY | Facility: HOSPITAL | Age: 65
End: 2020-12-22
Attending: INTERNAL MEDICINE
Payer: MEDICARE

## 2020-12-22 VITALS — SYSTOLIC BLOOD PRESSURE: 165 MMHG | DIASTOLIC BLOOD PRESSURE: 79 MMHG | HEART RATE: 84 BPM | OXYGEN SATURATION: 99 %

## 2020-12-22 VITALS
DIASTOLIC BLOOD PRESSURE: 78 MMHG | HEIGHT: 62.01 IN | SYSTOLIC BLOOD PRESSURE: 145 MMHG | BODY MASS INDEX: 18.53 KG/M2 | HEART RATE: 83 BPM | TEMPERATURE: 98 F | RESPIRATION RATE: 18 BRPM | OXYGEN SATURATION: 100 % | WEIGHT: 102 LBS

## 2020-12-22 DIAGNOSIS — C50.811 MALIGNANT NEOPLASM OF OVERLAPPING SITES OF RIGHT BREAST IN FEMALE, ESTROGEN RECEPTOR NEGATIVE (HCC): Primary | ICD-10-CM

## 2020-12-22 DIAGNOSIS — Z17.1 MALIGNANT NEOPLASM OF OVERLAPPING SITES OF RIGHT BREAST IN FEMALE, ESTROGEN RECEPTOR NEGATIVE (HCC): Primary | ICD-10-CM

## 2020-12-22 DIAGNOSIS — D50.0 IRON DEFICIENCY ANEMIA DUE TO CHRONIC BLOOD LOSS: ICD-10-CM

## 2020-12-22 DIAGNOSIS — R63.4 WEIGHT LOSS: ICD-10-CM

## 2020-12-22 DIAGNOSIS — D50.0 IRON DEFICIENCY ANEMIA DUE TO CHRONIC BLOOD LOSS: Primary | ICD-10-CM

## 2020-12-22 DIAGNOSIS — K92.2 GASTROINTESTINAL HEMORRHAGE, UNSPECIFIED GASTROINTESTINAL HEMORRHAGE TYPE: ICD-10-CM

## 2020-12-22 DIAGNOSIS — D64.9 ANEMIA, UNSPECIFIED TYPE: ICD-10-CM

## 2020-12-22 DIAGNOSIS — D72.819 LEUKOPENIA, UNSPECIFIED TYPE: ICD-10-CM

## 2020-12-22 PROCEDURE — 96374 THER/PROPH/DIAG INJ IV PUSH: CPT

## 2020-12-22 PROCEDURE — 99215 OFFICE O/P EST HI 40 MIN: CPT | Performed by: INTERNAL MEDICINE

## 2020-12-22 RX ORDER — LISINOPRIL 20 MG/1
TABLET ORAL
COMMUNITY
Start: 2020-12-21

## 2020-12-22 NOTE — PROGRESS NOTES
Pt tolerated FERAHEME infusion well without issue or complaint and completed 30 minute post observation period.      Education Record    Learner:  Patient    Disease / Diagnosis: CINTIA    Barriers / Limitations:  None   Comments:    Method:  Discussion   Comm
95

## 2020-12-22 NOTE — PROGRESS NOTES
MD follow up for anemia. Pt feeling tired.    Education Record    Learner:  Patient    Disease / Del Ar of care    Barriers / Limitations:  None   Comments:    Method:  Discussion   Comments:    General Topics:  Plan of care reviewed   Comments:

## 2020-12-30 ENCOUNTER — HOSPITAL ENCOUNTER (OUTPATIENT)
Dept: MAMMOGRAPHY | Facility: HOSPITAL | Age: 65
Discharge: HOME | End: 2020-12-30
Attending: INTERNAL MEDICINE
Payer: MEDICARE

## 2020-12-30 DIAGNOSIS — C50.811 MALIGNANT NEOPLASM OF OVERLAPPING SITES OF RIGHT BREAST IN FEMALE, ESTROGEN RECEPTOR NEGATIVE (HCC): ICD-10-CM

## 2020-12-30 DIAGNOSIS — Z17.1 MALIGNANT NEOPLASM OF OVERLAPPING SITES OF RIGHT BREAST IN FEMALE, ESTROGEN RECEPTOR NEGATIVE (HCC): ICD-10-CM

## 2020-12-30 PROCEDURE — 77066 DX MAMMO INCL CAD BI: CPT | Performed by: INTERNAL MEDICINE

## 2020-12-30 PROCEDURE — 77062 BREAST TOMOSYNTHESIS BI: CPT | Performed by: INTERNAL MEDICINE

## 2021-01-05 ENCOUNTER — NURSE ONLY (OUTPATIENT)
Dept: HEMATOLOGY/ONCOLOGY | Facility: HOSPITAL | Age: 66
End: 2021-01-05
Attending: INTERNAL MEDICINE
Payer: MEDICARE

## 2021-01-05 DIAGNOSIS — D50.0 IRON DEFICIENCY ANEMIA DUE TO CHRONIC BLOOD LOSS: ICD-10-CM

## 2021-01-05 LAB
ALBUMIN SERPL-MCNC: 3.3 G/DL (ref 3.4–5)
ALBUMIN/GLOB SERPL: 0.9 {RATIO} (ref 1–2)
ALP LIVER SERPL-CCNC: 72 U/L
ALT SERPL-CCNC: 16 U/L
ANION GAP SERPL CALC-SCNC: 4 MMOL/L (ref 0–18)
AST SERPL-CCNC: 11 U/L (ref 15–37)
BASOPHILS # BLD AUTO: 0.01 X10(3) UL (ref 0–0.2)
BASOPHILS NFR BLD AUTO: 0.2 %
BILIRUB SERPL-MCNC: 0.1 MG/DL (ref 0.1–2)
BUN BLD-MCNC: 15 MG/DL (ref 7–18)
BUN/CREAT SERPL: 11 (ref 10–20)
CALCIUM BLD-MCNC: 9.2 MG/DL (ref 8.5–10.1)
CHLORIDE SERPL-SCNC: 101 MMOL/L (ref 98–112)
CO2 SERPL-SCNC: 26 MMOL/L (ref 21–32)
CREAT BLD-MCNC: 1.36 MG/DL
DEPRECATED HBV CORE AB SER IA-ACNC: 854.4 NG/ML
DEPRECATED RDW RBC AUTO: 53.7 FL (ref 35.1–46.3)
EOSINOPHIL # BLD AUTO: 0.1 X10(3) UL (ref 0–0.7)
EOSINOPHIL NFR BLD AUTO: 1.7 %
ERYTHROCYTE [DISTWIDTH] IN BLOOD BY AUTOMATED COUNT: 13.9 % (ref 11–15)
GLOBULIN PLAS-MCNC: 3.7 G/DL (ref 2.8–4.4)
GLUCOSE BLD-MCNC: 97 MG/DL (ref 70–99)
HCT VFR BLD AUTO: 27 %
HGB BLD-MCNC: 8.7 G/DL
IMM GRANULOCYTES # BLD AUTO: 0.04 X10(3) UL (ref 0–1)
IMM GRANULOCYTES NFR BLD: 0.7 %
IRON SATURATION: 16 %
IRON SERPL-MCNC: 34 UG/DL
LYMPHOCYTES # BLD AUTO: 0.59 X10(3) UL (ref 1–4)
LYMPHOCYTES NFR BLD AUTO: 10.3 %
M PROTEIN MFR SERPL ELPH: 7 G/DL (ref 6.4–8.2)
MCH RBC QN AUTO: 34.1 PG (ref 26–34)
MCHC RBC AUTO-ENTMCNC: 32.2 G/DL (ref 31–37)
MCV RBC AUTO: 105.9 FL
MONOCYTES # BLD AUTO: 0.58 X10(3) UL (ref 0.1–1)
MONOCYTES NFR BLD AUTO: 10.1 %
NEUTROPHILS # BLD AUTO: 4.43 X10 (3) UL (ref 1.5–7.7)
NEUTROPHILS # BLD AUTO: 4.43 X10(3) UL (ref 1.5–7.7)
NEUTROPHILS NFR BLD AUTO: 77 %
OSMOLALITY SERPL CALC.SUM OF ELEC: 273 MOSM/KG (ref 275–295)
PLATELET # BLD AUTO: 243 10(3)UL (ref 150–450)
POTASSIUM SERPL-SCNC: 4.6 MMOL/L (ref 3.5–5.1)
RBC # BLD AUTO: 2.55 X10(6)UL
SODIUM SERPL-SCNC: 131 MMOL/L (ref 136–145)
TOTAL IRON BINDING CAPACITY: 216 UG/DL (ref 240–450)
TRANSFERRIN SERPL-MCNC: 145 MG/DL (ref 200–360)
WBC # BLD AUTO: 5.8 X10(3) UL (ref 4–11)

## 2021-01-05 PROCEDURE — 85025 COMPLETE CBC W/AUTO DIFF WBC: CPT

## 2021-01-05 PROCEDURE — 80053 COMPREHEN METABOLIC PANEL: CPT

## 2021-01-05 PROCEDURE — 36415 COLL VENOUS BLD VENIPUNCTURE: CPT

## 2021-01-05 PROCEDURE — 83540 ASSAY OF IRON: CPT

## 2021-01-05 PROCEDURE — 82728 ASSAY OF FERRITIN: CPT

## 2021-01-05 PROCEDURE — 83550 IRON BINDING TEST: CPT

## 2021-01-06 ENCOUNTER — APPOINTMENT (OUTPATIENT)
Dept: CT IMAGING | Facility: HOSPITAL | Age: 66
End: 2021-01-06
Attending: EMERGENCY MEDICINE
Payer: MEDICARE

## 2021-01-06 ENCOUNTER — APPOINTMENT (OUTPATIENT)
Dept: GENERAL RADIOLOGY | Facility: HOSPITAL | Age: 66
End: 2021-01-06
Attending: EMERGENCY MEDICINE
Payer: MEDICARE

## 2021-01-06 ENCOUNTER — HOSPITAL ENCOUNTER (EMERGENCY)
Facility: HOSPITAL | Age: 66
Discharge: HOME | End: 2021-01-06
Attending: EMERGENCY MEDICINE
Payer: MEDICARE

## 2021-01-06 VITALS
OXYGEN SATURATION: 100 % | RESPIRATION RATE: 20 BRPM | HEIGHT: 62 IN | SYSTOLIC BLOOD PRESSURE: 123 MMHG | TEMPERATURE: 99 F | BODY MASS INDEX: 18.58 KG/M2 | DIASTOLIC BLOOD PRESSURE: 98 MMHG | WEIGHT: 101 LBS | HEART RATE: 77 BPM

## 2021-01-06 DIAGNOSIS — K55.1 CHRONIC ISCHEMIC COLITIS (HCC): Primary | ICD-10-CM

## 2021-01-06 DIAGNOSIS — D64.9 ANEMIA, UNSPECIFIED TYPE: ICD-10-CM

## 2021-01-06 LAB
ALBUMIN SERPL-MCNC: 3.3 G/DL (ref 3.4–5)
ALBUMIN/GLOB SERPL: 0.9 {RATIO} (ref 1–2)
ALP LIVER SERPL-CCNC: 70 U/L
ALT SERPL-CCNC: 14 U/L
ANION GAP SERPL CALC-SCNC: 7 MMOL/L (ref 0–18)
APTT PPP: 46.4 SECONDS (ref 25.4–36.1)
AST SERPL-CCNC: 7 U/L (ref 15–37)
BASOPHILS # BLD AUTO: 0.02 X10(3) UL (ref 0–0.2)
BASOPHILS NFR BLD AUTO: 0.4 %
BILIRUB SERPL-MCNC: 0.1 MG/DL (ref 0.1–2)
BILIRUB UR QL STRIP.AUTO: NEGATIVE
BUN BLD-MCNC: 11 MG/DL (ref 7–18)
BUN/CREAT SERPL: 8.7 (ref 10–20)
CALCIUM BLD-MCNC: 8.7 MG/DL (ref 8.5–10.1)
CHLORIDE SERPL-SCNC: 100 MMOL/L (ref 98–112)
CLARITY UR REFRACT.AUTO: CLEAR
CO2 SERPL-SCNC: 25 MMOL/L (ref 21–32)
CREAT BLD-MCNC: 1.26 MG/DL
DEPRECATED RDW RBC AUTO: 52.7 FL (ref 35.1–46.3)
EOSINOPHIL # BLD AUTO: 0.11 X10(3) UL (ref 0–0.7)
EOSINOPHIL NFR BLD AUTO: 2 %
ERYTHROCYTE [DISTWIDTH] IN BLOOD BY AUTOMATED COUNT: 13.8 % (ref 11–15)
GLOBULIN PLAS-MCNC: 3.8 G/DL (ref 2.8–4.4)
GLUCOSE BLD-MCNC: 93 MG/DL (ref 70–99)
GLUCOSE UR STRIP.AUTO-MCNC: NEGATIVE MG/DL
HCT VFR BLD AUTO: 26.7 %
HGB BLD-MCNC: 8.8 G/DL
IMM GRANULOCYTES # BLD AUTO: 0.02 X10(3) UL (ref 0–1)
IMM GRANULOCYTES NFR BLD: 0.4 %
INR BLD: 0.96 (ref 0.89–1.11)
KETONES UR STRIP.AUTO-MCNC: NEGATIVE MG/DL
LIPASE SERPL-CCNC: 95 U/L (ref 73–393)
LYMPHOCYTES # BLD AUTO: 0.48 X10(3) UL (ref 1–4)
LYMPHOCYTES NFR BLD AUTO: 8.6 %
M PROTEIN MFR SERPL ELPH: 7.1 G/DL (ref 6.4–8.2)
MCH RBC QN AUTO: 34.1 PG (ref 26–34)
MCHC RBC AUTO-ENTMCNC: 33 G/DL (ref 31–37)
MCV RBC AUTO: 103.5 FL
MONOCYTES # BLD AUTO: 0.46 X10(3) UL (ref 0.1–1)
MONOCYTES NFR BLD AUTO: 8.3 %
NEUTROPHILS # BLD AUTO: 4.47 X10 (3) UL (ref 1.5–7.7)
NEUTROPHILS # BLD AUTO: 4.47 X10(3) UL (ref 1.5–7.7)
NEUTROPHILS NFR BLD AUTO: 80.3 %
NITRITE UR QL STRIP.AUTO: NEGATIVE
OSMOLALITY SERPL CALC.SUM OF ELEC: 273 MOSM/KG (ref 275–295)
PH UR STRIP.AUTO: 6 [PH] (ref 4.5–8)
PLATELET # BLD AUTO: 269 10(3)UL (ref 150–450)
POTASSIUM SERPL-SCNC: 4 MMOL/L (ref 3.5–5.1)
PROT UR STRIP.AUTO-MCNC: NEGATIVE MG/DL
PSA SERPL DL<=0.01 NG/ML-MCNC: 13.1 SECONDS (ref 12.4–14.6)
RBC # BLD AUTO: 2.58 X10(6)UL
RBC UR QL AUTO: NEGATIVE
SARS-COV-2 RNA RESP QL NAA+PROBE: NOT DETECTED
SODIUM SERPL-SCNC: 132 MMOL/L (ref 136–145)
SP GR UR STRIP.AUTO: <1.005 (ref 1–1.03)
UROBILINOGEN UR STRIP.AUTO-MCNC: <2 MG/DL
WBC # BLD AUTO: 5.6 X10(3) UL (ref 4–11)

## 2021-01-06 PROCEDURE — 99284 EMERGENCY DEPT VISIT MOD MDM: CPT | Performed by: EMERGENCY MEDICINE

## 2021-01-06 PROCEDURE — 80053 COMPREHEN METABOLIC PANEL: CPT | Performed by: EMERGENCY MEDICINE

## 2021-01-06 PROCEDURE — 36415 COLL VENOUS BLD VENIPUNCTURE: CPT | Performed by: EMERGENCY MEDICINE

## 2021-01-06 PROCEDURE — 74177 CT ABD & PELVIS W/CONTRAST: CPT | Performed by: EMERGENCY MEDICINE

## 2021-01-06 PROCEDURE — 85025 COMPLETE CBC W/AUTO DIFF WBC: CPT | Performed by: EMERGENCY MEDICINE

## 2021-01-06 PROCEDURE — 71045 X-RAY EXAM CHEST 1 VIEW: CPT | Performed by: EMERGENCY MEDICINE

## 2021-01-06 PROCEDURE — 85610 PROTHROMBIN TIME: CPT | Performed by: EMERGENCY MEDICINE

## 2021-01-06 PROCEDURE — 83690 ASSAY OF LIPASE: CPT | Performed by: EMERGENCY MEDICINE

## 2021-01-06 PROCEDURE — 87086 URINE CULTURE/COLONY COUNT: CPT | Performed by: EMERGENCY MEDICINE

## 2021-01-06 PROCEDURE — 81001 URINALYSIS AUTO W/SCOPE: CPT | Performed by: EMERGENCY MEDICINE

## 2021-01-06 PROCEDURE — 85730 THROMBOPLASTIN TIME PARTIAL: CPT | Performed by: EMERGENCY MEDICINE

## 2021-01-06 RX ORDER — ACETAMINOPHEN AND CODEINE PHOSPHATE 300; 30 MG/1; MG/1
1 TABLET ORAL ONCE
Status: COMPLETED | OUTPATIENT
Start: 2021-01-06 | End: 2021-01-06

## 2021-01-06 RX ORDER — HYDROCODONE BITARTRATE AND ACETAMINOPHEN 5; 325 MG/1; MG/1
1-2 TABLET ORAL EVERY 4 HOURS PRN
Qty: 20 TABLET | Refills: 0 | Status: SHIPPED | OUTPATIENT
Start: 2021-01-06 | End: 2021-03-09

## 2021-01-06 NOTE — ED PROVIDER NOTES
Patient Seen in: [facility] Emergency Department      History   Patient presents with:  Abdomen/Flank Pain    Stated Complaint: abd pain x 4 days blood in stool     HPI/Subjective:   HPI    55-year-old female presents to the emerge part with complai Procedure Laterality Date   • APPENDECTOMY     • APPENDECTOMY     • BACK SURGERY  1999    lumbar laminectomy L1-L4   • BACK SURGERY  2004    cervical spinal fusion C1-6   • BRAIN SURGERY      Brain stimulator    •       x3   • CHEMOTHERAPY well-developed and normal weight. HENT:      Head: Normocephalic and atraumatic. Eyes:      Pupils: Pupils are equal, round, and reactive to light. Neck:      Musculoskeletal: Normal range of motion and neck supple.    Cardiovascular:      Rate and Rh Notable for the following components:    RBC 2.58 (*)     HGB 8.8 (*)     HCT 26.7 (*)     .5 (*)     MCH 34.1 (*)     RDW-SD 52.7 (*)     Lymphocyte Absolute 0.48 (*)     All other components within normal limits   LIPASE - Normal   PROTHROMBIN JORDAN the superior to midpole region of the right kidney measuring 2.4 cm. ADRENALS:  No mass or enlargement. AORTA/VASCULAR:  Mild to moderate diffuse atherosclerotic calcifications of the aorta and iliac vessels without evidence of aneurysmal dilatation.  RETR (CPT=71045), 12/05/2020, 4:07 PM.  INDICATIONS:  abd pain x 4 days blood in stool  PATIENT STATED HISTORY: (As transcribed by Technologist)  She complains of abdominal pain and bloody diarrhea for the past 4 days.     FINDINGS:  Normal heart size and pulmon Lisandro. [address]  [phone]    Call in 1 day            Medications Prescribed:  Discharge Medication List as of 1/6/2021 12:40 PM    START taking these medications    !! HYDROcodone-acetaminophen 5-325 MG Oral Tab  Take 1-2 tablets by meghana

## 2021-01-06 NOTE — ED INITIAL ASSESSMENT (HPI)
Pt here from MD for abdominal pain and bloody diarrhea for past 4 days. Pt statea nausea. Pt denies v or fever.

## 2021-01-07 ENCOUNTER — OFFICE VISIT (OUTPATIENT)
Dept: HEMATOLOGY/ONCOLOGY | Facility: HOSPITAL | Age: 66
End: 2021-01-07
Attending: INTERNAL MEDICINE
Payer: MEDICARE

## 2021-01-07 VITALS
HEIGHT: 62.01 IN | BODY MASS INDEX: 18.17 KG/M2 | WEIGHT: 100 LBS | SYSTOLIC BLOOD PRESSURE: 110 MMHG | DIASTOLIC BLOOD PRESSURE: 66 MMHG | HEART RATE: 87 BPM | OXYGEN SATURATION: 95 % | RESPIRATION RATE: 16 BRPM | TEMPERATURE: 99 F

## 2021-01-07 DIAGNOSIS — D50.0 IRON DEFICIENCY ANEMIA DUE TO CHRONIC BLOOD LOSS: Primary | ICD-10-CM

## 2021-01-07 PROCEDURE — 96374 THER/PROPH/DIAG INJ IV PUSH: CPT

## 2021-01-07 NOTE — PROGRESS NOTES
Nutrition Consultation    Patient Name: Karishma Hay  YOB: 1955  Medical Record Number: [id number]   Account Number: [de-identified]  Dietitian: Jaimie Zepeda RD, LDN      Date of visit: 1/7/2021    Diet Rx: low residue, small/frequent Rfl:   •  aspirin 81 MG Oral Tab, Take 81 mg by mouth., Disp: , Rfl:   •  primidone 50 MG Oral Tab, Take 150 mg by mouth nightly.  , Disp: , Rfl:   •  primidone 50 MG Oral Tab, Take 100 mg by mouth daily.   , Disp: , Rfl:   •  Flecainide Acetate 100 MG Oral removed. Pt noted ~ 15-25 lbs wt loss over the past 5 years. Pt stated she has never been told what to eat w/ active colitis so will often choose not to eat.  After most recent bout and hospitalization in early December 2020 and ER visit earlier this wee

## 2021-01-08 ENCOUNTER — APPOINTMENT (OUTPATIENT)
Dept: HEMATOLOGY/ONCOLOGY | Facility: HOSPITAL | Age: 66
End: 2021-01-08
Attending: INTERNAL MEDICINE
Payer: MEDICARE

## 2021-01-12 ENCOUNTER — ANCILLARY PROCEDURE (OUTPATIENT)
Dept: CARDIOLOGY | Age: 66
End: 2021-01-12
Attending: INTERNAL MEDICINE

## 2021-01-12 ENCOUNTER — TELEPHONE (OUTPATIENT)
Dept: CARDIOLOGY | Age: 66
End: 2021-01-12

## 2021-01-12 ENCOUNTER — APPOINTMENT (OUTPATIENT)
Dept: CARDIOLOGY | Age: 66
End: 2021-01-12

## 2021-01-12 DIAGNOSIS — R55 SYNCOPE AND COLLAPSE: ICD-10-CM

## 2021-01-12 DIAGNOSIS — R55 SYNCOPE AND COLLAPSE: Primary | ICD-10-CM

## 2021-01-14 ENCOUNTER — OFFICE VISIT (OUTPATIENT)
Dept: HEMATOLOGY/ONCOLOGY | Facility: HOSPITAL | Age: 66
End: 2021-01-14
Attending: INTERNAL MEDICINE
Payer: MEDICARE

## 2021-01-14 ENCOUNTER — APPOINTMENT (OUTPATIENT)
Dept: CARDIOLOGY | Age: 66
End: 2021-01-14

## 2021-01-14 VITALS
SYSTOLIC BLOOD PRESSURE: 117 MMHG | RESPIRATION RATE: 16 BRPM | HEART RATE: 83 BPM | OXYGEN SATURATION: 99 % | DIASTOLIC BLOOD PRESSURE: 71 MMHG | TEMPERATURE: 99 F

## 2021-01-14 DIAGNOSIS — D50.0 IRON DEFICIENCY ANEMIA DUE TO CHRONIC BLOOD LOSS: Primary | ICD-10-CM

## 2021-01-14 PROCEDURE — 96374 THER/PROPH/DIAG INJ IV PUSH: CPT

## 2021-01-14 NOTE — PROGRESS NOTES
Nutrition F/U Consultation     Patient Name: Rashawn Quintero  YOB: 1955  Medical Record Number: [id number]      Account Number: [de-identified]  Dietitian: Hoda Hough RD, VIGNESH     Date of visit: 1/14/2021     Diet Rx: low residue, small/freq has appointments made. She does c/o 1st normal formed BM on 1/12 since her bout of colitis. She had another small, BM today but \"marbles. \" She's not sure what to take/do to maintain daily normal BM.  She has tried Miralax in the past but states BM are

## 2021-01-19 ENCOUNTER — APPOINTMENT (OUTPATIENT)
Dept: HEMATOLOGY/ONCOLOGY | Facility: HOSPITAL | Age: 66
End: 2021-01-19
Attending: INTERNAL MEDICINE
Payer: MEDICARE

## 2021-01-22 ENCOUNTER — OFFICE VISIT (OUTPATIENT)
Dept: HEMATOLOGY/ONCOLOGY | Facility: HOSPITAL | Age: 66
End: 2021-01-22
Attending: INTERNAL MEDICINE
Payer: MEDICARE

## 2021-01-22 ENCOUNTER — TELEPHONE (OUTPATIENT)
Dept: HEMATOLOGY/ONCOLOGY | Facility: HOSPITAL | Age: 66
End: 2021-01-22

## 2021-01-22 VITALS
OXYGEN SATURATION: 99 % | BODY MASS INDEX: 18.81 KG/M2 | SYSTOLIC BLOOD PRESSURE: 93 MMHG | TEMPERATURE: 98 F | HEIGHT: 62.01 IN | WEIGHT: 103.5 LBS | HEART RATE: 79 BPM | RESPIRATION RATE: 18 BRPM | DIASTOLIC BLOOD PRESSURE: 60 MMHG

## 2021-01-22 DIAGNOSIS — D64.9 ANEMIA, UNSPECIFIED TYPE: Primary | ICD-10-CM

## 2021-01-22 DIAGNOSIS — D50.0 IRON DEFICIENCY ANEMIA DUE TO CHRONIC BLOOD LOSS: Primary | ICD-10-CM

## 2021-01-22 DIAGNOSIS — D64.9 ANEMIA, UNSPECIFIED TYPE: ICD-10-CM

## 2021-01-22 LAB
BASOPHILS # BLD AUTO: 0.03 X10(3) UL (ref 0–0.2)
BASOPHILS NFR BLD AUTO: 0.8 %
DEPRECATED HBV CORE AB SER IA-ACNC: 1669.8 NG/ML
DEPRECATED RDW RBC AUTO: 52.7 FL (ref 35.1–46.3)
EOSINOPHIL # BLD AUTO: 0.12 X10(3) UL (ref 0–0.7)
EOSINOPHIL NFR BLD AUTO: 3 %
ERYTHROCYTE [DISTWIDTH] IN BLOOD BY AUTOMATED COUNT: 13.7 % (ref 11–15)
HCT VFR BLD AUTO: 25.6 %
HGB BLD-MCNC: 8.5 G/DL
HGB RETIC QN AUTO: 40.9 PG (ref 28.2–36.6)
IMM GRANULOCYTES # BLD AUTO: 0.01 X10(3) UL (ref 0–1)
IMM GRANULOCYTES NFR BLD: 0.3 %
IMM RETICS NFR: 0.12 RATIO (ref 0.1–0.3)
IRON SATURATION: 37 %
IRON SERPL-MCNC: 95 UG/DL
LYMPHOCYTES # BLD AUTO: 0.57 X10(3) UL (ref 1–4)
LYMPHOCYTES NFR BLD AUTO: 14.4 %
MCH RBC QN AUTO: 35 PG (ref 26–34)
MCHC RBC AUTO-ENTMCNC: 33.2 G/DL (ref 31–37)
MCV RBC AUTO: 105.3 FL
MONOCYTES # BLD AUTO: 0.44 X10(3) UL (ref 0.1–1)
MONOCYTES NFR BLD AUTO: 11.1 %
NEUTROPHILS # BLD AUTO: 2.8 X10 (3) UL (ref 1.5–7.7)
NEUTROPHILS # BLD AUTO: 2.8 X10(3) UL (ref 1.5–7.7)
NEUTROPHILS NFR BLD AUTO: 70.4 %
PLATELET # BLD AUTO: 381 10(3)UL (ref 150–450)
RBC # BLD AUTO: 2.43 X10(6)UL
RETICS # AUTO: 108.2 X10(3) UL (ref 22.5–147.5)
RETICS/RBC NFR AUTO: 4.4 %
TOTAL IRON BINDING CAPACITY: 255 UG/DL (ref 240–450)
TRANSFERRIN SERPL-MCNC: 171 MG/DL (ref 200–360)
WBC # BLD AUTO: 4 X10(3) UL (ref 4–11)

## 2021-01-22 PROCEDURE — 85025 COMPLETE CBC W/AUTO DIFF WBC: CPT

## 2021-01-22 PROCEDURE — 86356 MONONUCLEAR CELL ANTIGEN: CPT

## 2021-01-22 PROCEDURE — 96374 THER/PROPH/DIAG INJ IV PUSH: CPT

## 2021-01-22 PROCEDURE — 82728 ASSAY OF FERRITIN: CPT

## 2021-01-22 PROCEDURE — 83550 IRON BINDING TEST: CPT

## 2021-01-22 PROCEDURE — 83540 ASSAY OF IRON: CPT

## 2021-01-22 PROCEDURE — 85045 AUTOMATED RETICULOCYTE COUNT: CPT

## 2021-01-22 NOTE — TELEPHONE ENCOUNTER
Spoke to pt. Fe better but Hb still low. Vargas repeat BM bx, she is agreeable. Will schedule with IR. Got1st covid vax already.  Keep on weekly iron

## 2021-01-22 NOTE — PROGRESS NOTES
Iron schedule reviewed with dr Jessi Be and orders received to draw cbc and iron studies today, infuse iron today, and she will reassess need based on lab results. Patient states understanding.     Education Record    Learner:  Patient    Disease / Diagnosis

## 2021-01-25 LAB
% PNH MONOCYTES: 0 %
% PNH PMN: 0 %
% PNH RBC: 0 %

## 2021-01-25 NOTE — OR NURSING
Patient has a cardiology monitor that she has to wear for 30 days. I instructed her to call Dr. Herring Ip office and ask for advisement on what to do before CT bone biopsy.

## 2021-01-28 ENCOUNTER — OFFICE VISIT (OUTPATIENT)
Dept: HEMATOLOGY/ONCOLOGY | Facility: HOSPITAL | Age: 66
End: 2021-01-28
Attending: INTERNAL MEDICINE
Payer: MEDICARE

## 2021-01-28 VITALS
SYSTOLIC BLOOD PRESSURE: 99 MMHG | DIASTOLIC BLOOD PRESSURE: 55 MMHG | TEMPERATURE: 97 F | BODY MASS INDEX: 19 KG/M2 | HEART RATE: 83 BPM | OXYGEN SATURATION: 100 % | WEIGHT: 102.19 LBS | RESPIRATION RATE: 16 BRPM

## 2021-01-28 DIAGNOSIS — D50.0 IRON DEFICIENCY ANEMIA DUE TO CHRONIC BLOOD LOSS: Primary | ICD-10-CM

## 2021-01-28 PROCEDURE — 96374 THER/PROPH/DIAG INJ IV PUSH: CPT

## 2021-01-28 NOTE — PROGRESS NOTES
Pt tolerated Feraheme infusion well without issue or complaint and completed 30 minute post observation period.      Education Record    Learner:  Patient    Disease / Diagnosis: CINTIA    Barriers / Limitations:  None   Comments:    Method:  Discussion   Comm

## 2021-01-30 ENCOUNTER — LAB ENCOUNTER (OUTPATIENT)
Dept: LAB | Facility: HOSPITAL | Age: 66
End: 2021-01-30
Attending: INTERNAL MEDICINE
Payer: MEDICARE

## 2021-01-30 DIAGNOSIS — D64.9 ANEMIA, UNSPECIFIED TYPE: ICD-10-CM

## 2021-01-31 LAB — SARS-COV-2 RNA RESP QL NAA+PROBE: NOT DETECTED

## 2021-02-02 ENCOUNTER — HOSPITAL ENCOUNTER (OUTPATIENT)
Dept: CT IMAGING | Facility: HOSPITAL | Age: 66
Discharge: HOME | End: 2021-02-02
Attending: INTERNAL MEDICINE
Payer: MEDICARE

## 2021-02-02 ENCOUNTER — NURSE ONLY (OUTPATIENT)
Dept: LAB | Facility: HOSPITAL | Age: 66
End: 2021-02-02
Attending: INTERNAL MEDICINE
Payer: MEDICARE

## 2021-02-02 VITALS
TEMPERATURE: 98 F | HEIGHT: 62 IN | WEIGHT: 104 LBS | DIASTOLIC BLOOD PRESSURE: 64 MMHG | OXYGEN SATURATION: 100 % | BODY MASS INDEX: 19.14 KG/M2 | RESPIRATION RATE: 16 BRPM | HEART RATE: 79 BPM | SYSTOLIC BLOOD PRESSURE: 100 MMHG

## 2021-02-02 DIAGNOSIS — D64.9 ANEMIA, UNSPECIFIED: Primary | ICD-10-CM

## 2021-02-02 DIAGNOSIS — D64.9 ANEMIA, UNSPECIFIED TYPE: ICD-10-CM

## 2021-02-02 DIAGNOSIS — D50.0 IRON DEFICIENCY ANEMIA DUE TO CHRONIC BLOOD LOSS: ICD-10-CM

## 2021-02-02 DIAGNOSIS — D64.9 ANEMIA, UNSPECIFIED: ICD-10-CM

## 2021-02-02 LAB
ALBUMIN SERPL-MCNC: 3.6 G/DL (ref 3.4–5)
ALBUMIN/GLOB SERPL: 1 {RATIO} (ref 1–2)
ALP LIVER SERPL-CCNC: 58 U/L
ALT SERPL-CCNC: 18 U/L
ANION GAP SERPL CALC-SCNC: 1 MMOL/L (ref 0–18)
AST SERPL-CCNC: 12 U/L (ref 15–37)
BASOPHILS # BLD AUTO: 0.04 X10(3) UL (ref 0–0.2)
BASOPHILS NFR BLD AUTO: 1.1 %
BILIRUB SERPL-MCNC: 0.1 MG/DL (ref 0.1–2)
BUN BLD-MCNC: 18 MG/DL (ref 7–18)
BUN/CREAT SERPL: 14.9 (ref 10–20)
CALCIUM BLD-MCNC: 9 MG/DL (ref 8.5–10.1)
CHLORIDE SERPL-SCNC: 108 MMOL/L (ref 98–112)
CO2 SERPL-SCNC: 27 MMOL/L (ref 21–32)
CREAT BLD-MCNC: 1.21 MG/DL
DEPRECATED HBV CORE AB SER IA-ACNC: 1651.9 NG/ML
DEPRECATED RDW RBC AUTO: 53.7 FL (ref 35.1–46.3)
EOSINOPHIL # BLD AUTO: 0.1 X10(3) UL (ref 0–0.7)
EOSINOPHIL NFR BLD AUTO: 2.8 %
ERYTHROCYTE [DISTWIDTH] IN BLOOD BY AUTOMATED COUNT: 13.7 % (ref 11–15)
GLOBULIN PLAS-MCNC: 3.6 G/DL (ref 2.8–4.4)
GLUCOSE BLD-MCNC: 98 MG/DL (ref 70–99)
HCT VFR BLD AUTO: 27.2 %
HGB BLD-MCNC: 8.7 G/DL
IMM GRANULOCYTES # BLD AUTO: 0.01 X10(3) UL (ref 0–1)
IMM GRANULOCYTES NFR BLD: 0.3 %
INR BLD: 1 (ref 0.89–1.11)
IRON SATURATION: 106 %
IRON SERPL-MCNC: 220 UG/DL
LYMPHOCYTES # BLD AUTO: 0.69 X10(3) UL (ref 1–4)
LYMPHOCYTES NFR BLD AUTO: 19.1 %
M PROTEIN MFR SERPL ELPH: 7.2 G/DL (ref 6.4–8.2)
MCH RBC QN AUTO: 34.9 PG (ref 26–34)
MCHC RBC AUTO-ENTMCNC: 32 G/DL (ref 31–37)
MCV RBC AUTO: 109.2 FL
MONOCYTES # BLD AUTO: 0.33 X10(3) UL (ref 0.1–1)
MONOCYTES NFR BLD AUTO: 9.1 %
NEUTROPHILS # BLD AUTO: 2.45 X10 (3) UL (ref 1.5–7.7)
NEUTROPHILS # BLD AUTO: 2.45 X10(3) UL (ref 1.5–7.7)
NEUTROPHILS NFR BLD AUTO: 67.6 %
OSMOLALITY SERPL CALC.SUM OF ELEC: 284 MOSM/KG (ref 275–295)
PATIENT FASTING Y/N/NP: YES
PLATELET # BLD AUTO: 285 10(3)UL (ref 150–450)
POTASSIUM SERPL-SCNC: 4.7 MMOL/L (ref 3.5–5.1)
PSA SERPL DL<=0.01 NG/ML-MCNC: 13.5 SECONDS (ref 12.4–14.6)
RBC # BLD AUTO: 2.49 X10(6)UL
SODIUM SERPL-SCNC: 136 MMOL/L (ref 136–145)
TOTAL IRON BINDING CAPACITY: 207 UG/DL (ref 240–450)
TRANSFERRIN SERPL-MCNC: 139 MG/DL (ref 200–360)
WBC # BLD AUTO: 3.6 X10(3) UL (ref 4–11)

## 2021-02-02 PROCEDURE — 88237 TISSUE CULTURE BONE MARROW: CPT

## 2021-02-02 PROCEDURE — 88185 FLOWCYTOMETRY/TC ADD-ON: CPT | Performed by: INTERNAL MEDICINE

## 2021-02-02 PROCEDURE — 36415 COLL VENOUS BLD VENIPUNCTURE: CPT

## 2021-02-02 PROCEDURE — 85610 PROTHROMBIN TIME: CPT

## 2021-02-02 PROCEDURE — 80053 COMPREHEN METABOLIC PANEL: CPT

## 2021-02-02 PROCEDURE — 38221 DX BONE MARROW BIOPSIES: CPT | Performed by: INTERNAL MEDICINE

## 2021-02-02 PROCEDURE — 83550 IRON BINDING TEST: CPT

## 2021-02-02 PROCEDURE — 88264 CHROMOSOME ANALYSIS 20-25: CPT

## 2021-02-02 PROCEDURE — 83540 ASSAY OF IRON: CPT

## 2021-02-02 PROCEDURE — 99152 MOD SED SAME PHYS/QHP 5/>YRS: CPT | Performed by: INTERNAL MEDICINE

## 2021-02-02 PROCEDURE — 88341 IMHCHEM/IMCYTCHM EA ADD ANTB: CPT | Performed by: INTERNAL MEDICINE

## 2021-02-02 PROCEDURE — 88305 TISSUE EXAM BY PATHOLOGIST: CPT | Performed by: INTERNAL MEDICINE

## 2021-02-02 PROCEDURE — 77012 CT SCAN FOR NEEDLE BIOPSY: CPT | Performed by: INTERNAL MEDICINE

## 2021-02-02 PROCEDURE — 88313 SPECIAL STAINS GROUP 2: CPT | Performed by: INTERNAL MEDICINE

## 2021-02-02 PROCEDURE — 88184 FLOWCYTOMETRY/ TC 1 MARKER: CPT | Performed by: INTERNAL MEDICINE

## 2021-02-02 PROCEDURE — 85025 COMPLETE CBC W/AUTO DIFF WBC: CPT

## 2021-02-02 PROCEDURE — 88342 IMHCHEM/IMCYTCHM 1ST ANTB: CPT | Performed by: INTERNAL MEDICINE

## 2021-02-02 PROCEDURE — 82728 ASSAY OF FERRITIN: CPT

## 2021-02-02 PROCEDURE — 38222 DX BONE MARROW BX & ASPIR: CPT | Performed by: INTERNAL MEDICINE

## 2021-02-02 PROCEDURE — 85097 BONE MARROW INTERPRETATION: CPT | Performed by: INTERNAL MEDICINE

## 2021-02-02 PROCEDURE — 88291 CYTO/MOLECULAR REPORT: CPT

## 2021-02-02 RX ORDER — MIDAZOLAM HYDROCHLORIDE 1 MG/ML
1 INJECTION INTRAMUSCULAR; INTRAVENOUS EVERY 5 MIN PRN
Status: ACTIVE | OUTPATIENT
Start: 2021-02-02 | End: 2021-02-02

## 2021-02-02 RX ORDER — MIDAZOLAM HYDROCHLORIDE 1 MG/ML
INJECTION INTRAMUSCULAR; INTRAVENOUS
Status: COMPLETED
Start: 2021-02-02 | End: 2021-02-02

## 2021-02-02 RX ORDER — HYDROCODONE BITARTRATE AND ACETAMINOPHEN 5; 325 MG/1; MG/1
TABLET ORAL
Status: DISPENSED
Start: 2021-02-02 | End: 2021-02-02

## 2021-02-02 RX ORDER — SODIUM CHLORIDE 9 MG/ML
INJECTION, SOLUTION INTRAVENOUS CONTINUOUS
Status: DISCONTINUED | OUTPATIENT
Start: 2021-02-02 | End: 2021-02-04

## 2021-02-02 RX ORDER — FLUMAZENIL 0.1 MG/ML
0.2 INJECTION, SOLUTION INTRAVENOUS AS NEEDED
Status: DISCONTINUED | OUTPATIENT
Start: 2021-02-02 | End: 2021-02-04

## 2021-02-02 RX ORDER — NALOXONE HYDROCHLORIDE 0.4 MG/ML
80 INJECTION, SOLUTION INTRAMUSCULAR; INTRAVENOUS; SUBCUTANEOUS AS NEEDED
Status: DISCONTINUED | OUTPATIENT
Start: 2021-02-02 | End: 2021-02-04

## 2021-02-02 RX ORDER — HYDROCODONE BITARTRATE AND ACETAMINOPHEN 5; 325 MG/1; MG/1
1 TABLET ORAL ONCE AS NEEDED
Status: COMPLETED | OUTPATIENT
Start: 2021-02-02 | End: 2021-02-02

## 2021-02-02 RX ADMIN — HYDROCODONE BITARTRATE AND ACETAMINOPHEN 1 TABLET: 5; 325 TABLET ORAL at 09:52:00

## 2021-02-02 RX ADMIN — MIDAZOLAM HYDROCHLORIDE 1 MG: 1 INJECTION INTRAMUSCULAR; INTRAVENOUS at 09:02:00

## 2021-02-02 RX ADMIN — SODIUM CHLORIDE: 9 INJECTION, SOLUTION INTRAVENOUS at 08:50:00

## 2021-02-02 NOTE — PROCEDURES
[facility]  Procedure Note    Mya Frierson Patient Status:  Outpatient    1/15/1955 MRN [id number]   [facility] Attending Rosie Vides MD   Hosp Day # 0 PCP Silvestre Carreon     Procedure: Bone marrow biopsy    Pre-Procedu

## 2021-02-02 NOTE — IMAGING NOTE
Dr. Ceasar Solano explained procedure to patient and . Patient signed consent. Time out performed. Dressing clean and dry. Patient able to roll over form table to bed. Remained stable prior to transport. Yong Khan RN received report.

## 2021-02-03 ENCOUNTER — OFFICE VISIT (OUTPATIENT)
Dept: HEMATOLOGY/ONCOLOGY | Facility: HOSPITAL | Age: 66
End: 2021-02-03
Attending: INTERNAL MEDICINE
Payer: MEDICARE

## 2021-02-03 VITALS
DIASTOLIC BLOOD PRESSURE: 62 MMHG | HEART RATE: 90 BPM | SYSTOLIC BLOOD PRESSURE: 95 MMHG | OXYGEN SATURATION: 100 % | RESPIRATION RATE: 16 BRPM | TEMPERATURE: 98 F

## 2021-02-03 DIAGNOSIS — D50.0 IRON DEFICIENCY ANEMIA DUE TO CHRONIC BLOOD LOSS: Primary | ICD-10-CM

## 2021-02-03 PROCEDURE — 96374 THER/PROPH/DIAG INJ IV PUSH: CPT

## 2021-02-05 ENCOUNTER — APPOINTMENT (OUTPATIENT)
Dept: HEMATOLOGY/ONCOLOGY | Facility: HOSPITAL | Age: 66
End: 2021-02-05
Attending: INTERNAL MEDICINE
Payer: MEDICARE

## 2021-02-08 NOTE — PROGRESS NOTES
[facility] Progress Note      Patient Name:  Deonte Akins  YOB: 1955  Medical Record Number:  [id number]    Date of visit: 2/9/2021    CHIEF COMPLAINT: Anemia    HPI:     77year old female that I have followed since 6/18 fo aspirin 81 MG Oral Tab Take 81 mg by mouth. • primidone 50 MG Oral Tab Take 100 mg by mouth daily. • Flecainide Acetate 100 MG Oral Tab Take 1 tablet (100 mg total) by mouth 2 (two) times daily.      • ALPRAZolam 0.25 MG Oral Tab Take 0.25 mg by m once approved. # Recurrent ischemic colitis: likely due to vascular dis, smoking. Hypercoag w/u negative. Last admitted 12/20. Continue to follow closely. # Leucopenia: Noted since 2/19. Bone marrow biopsy 12/19-no malignancy.  Myeloid malignancy pa

## 2021-02-09 ENCOUNTER — OFFICE VISIT (OUTPATIENT)
Dept: HEMATOLOGY/ONCOLOGY | Facility: HOSPITAL | Age: 66
End: 2021-02-09
Attending: INTERNAL MEDICINE
Payer: MEDICARE

## 2021-02-09 VITALS
DIASTOLIC BLOOD PRESSURE: 71 MMHG | OXYGEN SATURATION: 98 % | RESPIRATION RATE: 18 BRPM | SYSTOLIC BLOOD PRESSURE: 117 MMHG | HEART RATE: 85 BPM

## 2021-02-09 VITALS
OXYGEN SATURATION: 97 % | HEART RATE: 102 BPM | WEIGHT: 100.63 LBS | BODY MASS INDEX: 18 KG/M2 | SYSTOLIC BLOOD PRESSURE: 148 MMHG | RESPIRATION RATE: 18 BRPM | TEMPERATURE: 98 F | DIASTOLIC BLOOD PRESSURE: 87 MMHG

## 2021-02-09 DIAGNOSIS — C50.811 MALIGNANT NEOPLASM OF OVERLAPPING SITES OF RIGHT BREAST IN FEMALE, ESTROGEN RECEPTOR NEGATIVE (HCC): ICD-10-CM

## 2021-02-09 DIAGNOSIS — Z17.1 MALIGNANT NEOPLASM OF OVERLAPPING SITES OF RIGHT BREAST IN FEMALE, ESTROGEN RECEPTOR NEGATIVE (HCC): ICD-10-CM

## 2021-02-09 DIAGNOSIS — D46.9 MDS (MYELODYSPLASTIC SYNDROME) (HCC): ICD-10-CM

## 2021-02-09 DIAGNOSIS — D46.4 REFRACTORY ANEMIA, UNSPECIFIED (HCC): ICD-10-CM

## 2021-02-09 DIAGNOSIS — D50.0 IRON DEFICIENCY ANEMIA DUE TO CHRONIC BLOOD LOSS: Primary | ICD-10-CM

## 2021-02-09 DIAGNOSIS — K92.2 GASTROINTESTINAL HEMORRHAGE, UNSPECIFIED GASTROINTESTINAL HEMORRHAGE TYPE: ICD-10-CM

## 2021-02-09 DIAGNOSIS — Z23 NEED FOR VACCINATION: ICD-10-CM

## 2021-02-09 DIAGNOSIS — R63.4 WEIGHT LOSS: ICD-10-CM

## 2021-02-09 PROCEDURE — 99214 OFFICE O/P EST MOD 30 MIN: CPT | Performed by: INTERNAL MEDICINE

## 2021-02-09 PROCEDURE — 96374 THER/PROPH/DIAG INJ IV PUSH: CPT

## 2021-02-09 NOTE — PROGRESS NOTES
Education Record    Per MD, patient to receive feraheme today. Will then return weekly for labs, and alternate procrit inj and feraheme. So:  2/16 - pl, procrit  2/23 - pl, feraheme  3/2 - pl, procrit  3/9 - pl, will see calixto DEL ROSARIO. Iron.     Patient aware of

## 2021-02-09 NOTE — PROGRESS NOTES
Pt is being weaned off xanax, and slowly off hydrocodone. Has stopped plavix as well, asa only. Feeling about the same.    Education Record    Learner:  Patient    Disease / Varinder Wagoner of care    Barriers / Limitations:  None   Comments:    Method:  D

## 2021-02-15 NOTE — PROGRESS NOTES
Nutrition F/U Consultation     Patient Name: Tamara Oh  YOB: 1955  Medical Record Number: [id number]      Account Number: [de-identified]  Dietitian: Marta Jules RD, LDN     Date of visit: 2/9/2021     Diet Rx: low residue, small/frequ standpoint. \" She noted oncologist/hematologist is to start her on Procrit.      She noted she continues wearing a heart monitor and has f/u appointment w/ cardiologist next week.      She requested RD reach out to GI via email requesting where pt should go

## 2021-02-16 ENCOUNTER — OFFICE VISIT (OUTPATIENT)
Dept: CARDIOLOGY | Age: 66
End: 2021-02-16

## 2021-02-16 ENCOUNTER — OFFICE VISIT (OUTPATIENT)
Dept: HEMATOLOGY/ONCOLOGY | Facility: HOSPITAL | Age: 66
End: 2021-02-16
Attending: INTERNAL MEDICINE
Payer: MEDICARE

## 2021-02-16 VITALS
WEIGHT: 102 LBS | HEART RATE: 100 BPM | HEIGHT: 62 IN | BODY MASS INDEX: 18.77 KG/M2 | DIASTOLIC BLOOD PRESSURE: 68 MMHG | SYSTOLIC BLOOD PRESSURE: 134 MMHG

## 2021-02-16 VITALS
BODY MASS INDEX: 18.53 KG/M2 | SYSTOLIC BLOOD PRESSURE: 126 MMHG | TEMPERATURE: 97 F | DIASTOLIC BLOOD PRESSURE: 75 MMHG | OXYGEN SATURATION: 98 % | WEIGHT: 102 LBS | RESPIRATION RATE: 16 BRPM | HEART RATE: 100 BPM | HEIGHT: 62.01 IN

## 2021-02-16 DIAGNOSIS — K55.1 MESENTERIC ISCHEMIA, CHRONIC (CMD): ICD-10-CM

## 2021-02-16 DIAGNOSIS — E78.00 PURE HYPERCHOLESTEROLEMIA: ICD-10-CM

## 2021-02-16 DIAGNOSIS — I70.0 ATHEROSCLEROSIS OF ABDOMINAL AORTA (CMD): ICD-10-CM

## 2021-02-16 DIAGNOSIS — I25.10 CAD IN NATIVE ARTERY: Primary | ICD-10-CM

## 2021-02-16 DIAGNOSIS — I48.0 AF (PAROXYSMAL ATRIAL FIBRILLATION) (CMD): ICD-10-CM

## 2021-02-16 DIAGNOSIS — I10 ESSENTIAL HYPERTENSION: ICD-10-CM

## 2021-02-16 DIAGNOSIS — D46.9 MDS (MYELODYSPLASTIC SYNDROME) (HCC): Primary | ICD-10-CM

## 2021-02-16 LAB
BASOPHILS # BLD AUTO: 0.03 X10(3) UL (ref 0–0.2)
BASOPHILS NFR BLD AUTO: 0.5 %
DEPRECATED RDW RBC AUTO: 47.4 FL (ref 35.1–46.3)
EOSINOPHIL # BLD AUTO: 0.08 X10(3) UL (ref 0–0.7)
EOSINOPHIL NFR BLD AUTO: 1.5 %
ERYTHROCYTE [DISTWIDTH] IN BLOOD BY AUTOMATED COUNT: 12.6 % (ref 11–15)
HCT VFR BLD AUTO: 27.7 %
HGB BLD-MCNC: 9.4 G/DL
IMM GRANULOCYTES # BLD AUTO: 0.02 X10(3) UL (ref 0–1)
IMM GRANULOCYTES NFR BLD: 0.4 %
LYMPHOCYTES # BLD AUTO: 0.95 X10(3) UL (ref 1–4)
LYMPHOCYTES NFR BLD AUTO: 17.4 %
MCH RBC QN AUTO: 35.2 PG (ref 26–34)
MCHC RBC AUTO-ENTMCNC: 33.9 G/DL (ref 31–37)
MCV RBC AUTO: 103.7 FL
MONOCYTES # BLD AUTO: 0.46 X10(3) UL (ref 0.1–1)
MONOCYTES NFR BLD AUTO: 8.4 %
NEUTROPHILS # BLD AUTO: 3.93 X10 (3) UL (ref 1.5–7.7)
NEUTROPHILS # BLD AUTO: 3.93 X10(3) UL (ref 1.5–7.7)
NEUTROPHILS NFR BLD AUTO: 71.8 %
PLATELET # BLD AUTO: 216 10(3)UL (ref 150–450)
RBC # BLD AUTO: 2.67 X10(6)UL
WBC # BLD AUTO: 5.5 X10(3) UL (ref 4–11)

## 2021-02-16 PROCEDURE — 99215 OFFICE O/P EST HI 40 MIN: CPT | Performed by: INTERNAL MEDICINE

## 2021-02-16 PROCEDURE — 36415 COLL VENOUS BLD VENIPUNCTURE: CPT

## 2021-02-16 PROCEDURE — 96372 THER/PROPH/DIAG INJ SC/IM: CPT

## 2021-02-16 PROCEDURE — 85025 COMPLETE CBC W/AUTO DIFF WBC: CPT

## 2021-02-16 PROCEDURE — 93272 ECG/REVIEW INTERPRET ONLY: CPT | Performed by: INTERNAL MEDICINE

## 2021-02-16 SDOH — HEALTH STABILITY: MENTAL HEALTH: HOW OFTEN DO YOU HAVE A DRINK CONTAINING ALCOHOL?: NEVER

## 2021-02-16 ASSESSMENT — PATIENT HEALTH QUESTIONNAIRE - PHQ9
SUM OF ALL RESPONSES TO PHQ9 QUESTIONS 1 AND 2: 0
SUM OF ALL RESPONSES TO PHQ9 QUESTIONS 1 AND 2: 0
CLINICAL INTERPRETATION OF PHQ2 SCORE: NO FURTHER SCREENING NEEDED
1. LITTLE INTEREST OR PLEASURE IN DOING THINGS: NOT AT ALL
CLINICAL INTERPRETATION OF PHQ9 SCORE: NO FURTHER SCREENING NEEDED
2. FEELING DOWN, DEPRESSED OR HOPELESS: NOT AT ALL

## 2021-02-16 NOTE — PROGRESS NOTES
Education Record    Learner:  Patient    Disease / Diagnosis: MDS    Barriers / Limitations:  None   Comments:    Method:  Brief focused and Discussion   Comments:    General Topics:  Medication, Side effects and symptom management, Plan of care reviewed a

## 2021-02-23 ENCOUNTER — OFFICE VISIT (OUTPATIENT)
Dept: HEMATOLOGY/ONCOLOGY | Facility: HOSPITAL | Age: 66
End: 2021-02-23
Attending: INTERNAL MEDICINE
Payer: MEDICARE

## 2021-02-23 VITALS
OXYGEN SATURATION: 99 % | WEIGHT: 102 LBS | BODY MASS INDEX: 19 KG/M2 | RESPIRATION RATE: 18 BRPM | HEART RATE: 77 BPM | DIASTOLIC BLOOD PRESSURE: 69 MMHG | TEMPERATURE: 98 F | SYSTOLIC BLOOD PRESSURE: 112 MMHG

## 2021-02-23 DIAGNOSIS — D50.0 IRON DEFICIENCY ANEMIA DUE TO CHRONIC BLOOD LOSS: Primary | ICD-10-CM

## 2021-02-23 DIAGNOSIS — D46.9 MDS (MYELODYSPLASTIC SYNDROME) (HCC): Primary | ICD-10-CM

## 2021-02-23 LAB
BASOPHILS # BLD AUTO: 0.01 X10(3) UL (ref 0–0.2)
BASOPHILS NFR BLD AUTO: 0.3 %
DEPRECATED RDW RBC AUTO: 49.9 FL (ref 35.1–46.3)
EOSINOPHIL # BLD AUTO: 0.06 X10(3) UL (ref 0–0.7)
EOSINOPHIL NFR BLD AUTO: 2 %
ERYTHROCYTE [DISTWIDTH] IN BLOOD BY AUTOMATED COUNT: 13.8 % (ref 11–15)
HCT VFR BLD AUTO: 30.8 %
HGB BLD-MCNC: 10.5 G/DL
IMM GRANULOCYTES # BLD AUTO: 0.01 X10(3) UL (ref 0–1)
IMM GRANULOCYTES NFR BLD: 0.3 %
LYMPHOCYTES # BLD AUTO: 0.52 X10(3) UL (ref 1–4)
LYMPHOCYTES NFR BLD AUTO: 17.6 %
MCH RBC QN AUTO: 36.3 PG (ref 26–34)
MCHC RBC AUTO-ENTMCNC: 34.1 G/DL (ref 31–37)
MCV RBC AUTO: 106.6 FL
MONOCYTES # BLD AUTO: 0.38 X10(3) UL (ref 0.1–1)
MONOCYTES NFR BLD AUTO: 12.9 %
NEUTROPHILS # BLD AUTO: 1.97 X10 (3) UL (ref 1.5–7.7)
NEUTROPHILS # BLD AUTO: 1.97 X10(3) UL (ref 1.5–7.7)
NEUTROPHILS NFR BLD AUTO: 66.9 %
PLATELET # BLD AUTO: 253 10(3)UL (ref 150–450)
RBC # BLD AUTO: 2.89 X10(6)UL
WBC # BLD AUTO: 3 X10(3) UL (ref 4–11)

## 2021-02-23 PROCEDURE — 36415 COLL VENOUS BLD VENIPUNCTURE: CPT

## 2021-02-23 PROCEDURE — 85025 COMPLETE CBC W/AUTO DIFF WBC: CPT

## 2021-02-23 PROCEDURE — 96374 THER/PROPH/DIAG INJ IV PUSH: CPT

## 2021-02-23 NOTE — PROGRESS NOTES
Education Record    Learner:  Patient    Disease / Diagnosis: MDS    Barriers / Limitations:  None   Comments:    Method:  Brief focused and Discussion   Comments:    General Topics:  Medication, Side effects and symptom management and Plan of care reviewe

## 2021-03-02 ENCOUNTER — OFFICE VISIT (OUTPATIENT)
Dept: HEMATOLOGY/ONCOLOGY | Facility: HOSPITAL | Age: 66
End: 2021-03-02
Attending: INTERNAL MEDICINE
Payer: MEDICARE

## 2021-03-02 DIAGNOSIS — D46.9 MDS (MYELODYSPLASTIC SYNDROME) (HCC): Primary | ICD-10-CM

## 2021-03-02 LAB
BASOPHILS # BLD AUTO: 0.01 X10(3) UL (ref 0–0.2)
BASOPHILS NFR BLD AUTO: 0.3 %
DEPRECATED RDW RBC AUTO: 52.3 FL (ref 35.1–46.3)
EOSINOPHIL # BLD AUTO: 0.04 X10(3) UL (ref 0–0.7)
EOSINOPHIL NFR BLD AUTO: 1 %
ERYTHROCYTE [DISTWIDTH] IN BLOOD BY AUTOMATED COUNT: 13.7 % (ref 11–15)
HCT VFR BLD AUTO: 31.6 %
HGB BLD-MCNC: 10.8 G/DL
IMM GRANULOCYTES # BLD AUTO: 0 X10(3) UL (ref 0–1)
IMM GRANULOCYTES NFR BLD: 0 %
LYMPHOCYTES # BLD AUTO: 0.6 X10(3) UL (ref 1–4)
LYMPHOCYTES NFR BLD AUTO: 15.7 %
MCH RBC QN AUTO: 36 PG (ref 26–34)
MCHC RBC AUTO-ENTMCNC: 34.2 G/DL (ref 31–37)
MCV RBC AUTO: 105.3 FL
MONOCYTES # BLD AUTO: 0.39 X10(3) UL (ref 0.1–1)
MONOCYTES NFR BLD AUTO: 10.2 %
NEUTROPHILS # BLD AUTO: 2.79 X10 (3) UL (ref 1.5–7.7)
NEUTROPHILS # BLD AUTO: 2.79 X10(3) UL (ref 1.5–7.7)
NEUTROPHILS NFR BLD AUTO: 72.8 %
PLATELET # BLD AUTO: 158 10(3)UL (ref 150–450)
RBC # BLD AUTO: 3 X10(6)UL
WBC # BLD AUTO: 3.8 X10(3) UL (ref 4–11)

## 2021-03-02 PROCEDURE — 85025 COMPLETE CBC W/AUTO DIFF WBC: CPT

## 2021-03-02 PROCEDURE — 36415 COLL VENOUS BLD VENIPUNCTURE: CPT

## 2021-03-02 NOTE — PROGRESS NOTES
Patient here for lab draw and possible injection. Patient with hgb level of 10.8 today. Therefore, no retacrit injection indicated. Patient to return in one week for iron infusion. Patient verbalized understanding of plan of care.     Education Record    [place]
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
## 2021-03-09 ENCOUNTER — OFFICE VISIT (OUTPATIENT)
Dept: HEMATOLOGY/ONCOLOGY | Facility: HOSPITAL | Age: 66
End: 2021-03-09
Attending: INTERNAL MEDICINE
Payer: MEDICARE

## 2021-03-09 VITALS
BODY MASS INDEX: 18.17 KG/M2 | WEIGHT: 100 LBS | OXYGEN SATURATION: 99 % | DIASTOLIC BLOOD PRESSURE: 77 MMHG | TEMPERATURE: 98 F | SYSTOLIC BLOOD PRESSURE: 131 MMHG | HEIGHT: 62.01 IN | HEART RATE: 68 BPM | RESPIRATION RATE: 16 BRPM

## 2021-03-09 VITALS
HEART RATE: 78 BPM | DIASTOLIC BLOOD PRESSURE: 77 MMHG | OXYGEN SATURATION: 98 % | SYSTOLIC BLOOD PRESSURE: 117 MMHG | RESPIRATION RATE: 16 BRPM

## 2021-03-09 DIAGNOSIS — D50.0 IRON DEFICIENCY ANEMIA DUE TO CHRONIC BLOOD LOSS: Primary | ICD-10-CM

## 2021-03-09 DIAGNOSIS — D46.9 MDS (MYELODYSPLASTIC SYNDROME) (HCC): Primary | ICD-10-CM

## 2021-03-09 DIAGNOSIS — D46.4 REFRACTORY ANEMIA, UNSPECIFIED (HCC): ICD-10-CM

## 2021-03-09 DIAGNOSIS — D50.0 IRON DEFICIENCY ANEMIA DUE TO CHRONIC BLOOD LOSS: ICD-10-CM

## 2021-03-09 DIAGNOSIS — D72.819 LEUKOPENIA, UNSPECIFIED TYPE: ICD-10-CM

## 2021-03-09 LAB
ALBUMIN SERPL-MCNC: 3.5 G/DL (ref 3.4–5)
ALBUMIN/GLOB SERPL: 1.2 {RATIO} (ref 1–2)
ALP LIVER SERPL-CCNC: 44 U/L
ALT SERPL-CCNC: 18 U/L
ANION GAP SERPL CALC-SCNC: 5 MMOL/L (ref 0–18)
AST SERPL-CCNC: 11 U/L (ref 15–37)
BASOPHILS # BLD AUTO: 0.01 X10(3) UL (ref 0–0.2)
BASOPHILS NFR BLD AUTO: 0.4 %
BILIRUB SERPL-MCNC: 0.1 MG/DL (ref 0.1–2)
BUN BLD-MCNC: 10 MG/DL (ref 7–18)
BUN/CREAT SERPL: 14.1 (ref 10–20)
CALCIUM BLD-MCNC: 8.9 MG/DL (ref 8.5–10.1)
CHLORIDE SERPL-SCNC: 101 MMOL/L (ref 98–112)
CO2 SERPL-SCNC: 26 MMOL/L (ref 21–32)
CREAT BLD-MCNC: 0.71 MG/DL
DEPRECATED HBV CORE AB SER IA-ACNC: 1909.8 NG/ML
DEPRECATED RDW RBC AUTO: 48.1 FL (ref 35.1–46.3)
EOSINOPHIL # BLD AUTO: 0.02 X10(3) UL (ref 0–0.7)
EOSINOPHIL NFR BLD AUTO: 0.8 %
ERYTHROCYTE [DISTWIDTH] IN BLOOD BY AUTOMATED COUNT: 13.1 % (ref 11–15)
GLOBULIN PLAS-MCNC: 3 G/DL (ref 2.8–4.4)
GLUCOSE BLD-MCNC: 90 MG/DL (ref 70–99)
HCT VFR BLD AUTO: 31.1 %
HGB BLD-MCNC: 10.9 G/DL
IMM GRANULOCYTES # BLD AUTO: 0.01 X10(3) UL (ref 0–1)
IMM GRANULOCYTES NFR BLD: 0.4 %
IRON SATURATION: 87 %
IRON SERPL-MCNC: 166 UG/DL
LYMPHOCYTES # BLD AUTO: 0.7 X10(3) UL (ref 1–4)
LYMPHOCYTES NFR BLD AUTO: 27.9 %
M PROTEIN MFR SERPL ELPH: 6.5 G/DL (ref 6.4–8.2)
MCH RBC QN AUTO: 36 PG (ref 26–34)
MCHC RBC AUTO-ENTMCNC: 35 G/DL (ref 31–37)
MCV RBC AUTO: 102.6 FL
MONOCYTES # BLD AUTO: 0.34 X10(3) UL (ref 0.1–1)
MONOCYTES NFR BLD AUTO: 13.5 %
NEUTROPHILS # BLD AUTO: 1.43 X10 (3) UL (ref 1.5–7.7)
NEUTROPHILS # BLD AUTO: 1.43 X10(3) UL (ref 1.5–7.7)
NEUTROPHILS NFR BLD AUTO: 57 %
OSMOLALITY SERPL CALC.SUM OF ELEC: 273 MOSM/KG (ref 275–295)
PATIENT FASTING Y/N/NP: NO
PLATELET # BLD AUTO: 165 10(3)UL (ref 150–450)
POTASSIUM SERPL-SCNC: 4 MMOL/L (ref 3.5–5.1)
RBC # BLD AUTO: 3.03 X10(6)UL
SODIUM SERPL-SCNC: 132 MMOL/L (ref 136–145)
TOTAL IRON BINDING CAPACITY: 191 UG/DL (ref 240–450)
TRANSFERRIN SERPL-MCNC: 128 MG/DL (ref 200–360)
WBC # BLD AUTO: 2.5 X10(3) UL (ref 4–11)

## 2021-03-09 PROCEDURE — 99214 OFFICE O/P EST MOD 30 MIN: CPT | Performed by: INTERNAL MEDICINE

## 2021-03-09 PROCEDURE — 96374 THER/PROPH/DIAG INJ IV PUSH: CPT

## 2021-03-09 NOTE — PROGRESS NOTES
Routine MD visit. Reports sl increase in energy.    Education Record    Learner:  Patient    Disease / Varinder Wagoner of care    Barriers / Limitations:  None   Comments:    Method:  Discussion   Comments:    General Topics:  Plan of care reviewed   Comme

## 2021-03-11 LAB
CD10 CELLS NFR SPEC: 1 %
CD11C CELLS NFR SPEC: 9 %
CD14 CELLS NFR SPEC: 1 %
CD19 CELLS NFR SPEC: 13 %
CD19/CD10 CELLS: 1 %
CD20 CELLS NFR SPEC: 15 %
CD22 CELLS NFR SPEC: 13 %
CD23 CELLS NFR SPEC: 3 %
CD3 CELLS NFR SPEC: 68 %
CD3+CD4+ CELLS NFR SPEC: 21 %
CD3+CD4+ CELLS/CD3+CD8+ CLL SPEC: 0.5
CD3+CD8+ CELLS NFR SPEC: 46 %
CD45 CELLS NFR SPEC: 100 %
CD5 CELLS NFR SPEC: 63 %
CD5/CD19 CELLS: 1 %
CD56 CELLS NFR SPEC: 20 %
CD7 CELLS NFR SPEC: 82 %
CELL SURF KAPPA/LAMBDA RATIO: 1.7
CELL SURF LAMBDA LIGHT CHAIN: 3 %
CELL SURFACE KAPPA LIGHT CHAIN: 5 %
FMC7 CELLS NFR SPEC: 6 %

## 2021-03-11 NOTE — PROGRESS NOTES
Nutrition F/U Consultation     Patient Name: Tamara Jennings  YOB: 1955  Medical Record Number: [id number]      Account Number: [de-identified]  Dietitian: Zoila Worley RD, VIGNESH     Date of visit: 3/9/2021     Diet Rx: low residue, small/frequ steady despite feeling she is eating well. She noted diarrhea controlled. She noted apt w/ cardiologist went well w/ good report.      RD inquired whether pt had ever been tested for celiac dz. Pt could not recall for sure.      Pending MD discretion, pt ma

## 2021-03-23 ENCOUNTER — OFFICE VISIT (OUTPATIENT)
Dept: HEMATOLOGY/ONCOLOGY | Facility: HOSPITAL | Age: 66
End: 2021-03-23
Attending: INTERNAL MEDICINE
Payer: MEDICARE

## 2021-03-23 DIAGNOSIS — D46.9 MDS (MYELODYSPLASTIC SYNDROME) (HCC): ICD-10-CM

## 2021-03-23 LAB
BASOPHILS # BLD AUTO: 0.02 X10(3) UL (ref 0–0.2)
BASOPHILS NFR BLD AUTO: 0.5 %
DEPRECATED RDW RBC AUTO: 44.9 FL (ref 35.1–46.3)
EOSINOPHIL # BLD AUTO: 0.04 X10(3) UL (ref 0–0.7)
EOSINOPHIL NFR BLD AUTO: 0.9 %
ERYTHROCYTE [DISTWIDTH] IN BLOOD BY AUTOMATED COUNT: 12.3 % (ref 11–15)
HCT VFR BLD AUTO: 29.8 %
HGB BLD-MCNC: 10.4 G/DL
IMM GRANULOCYTES # BLD AUTO: 0.01 X10(3) UL (ref 0–1)
IMM GRANULOCYTES NFR BLD: 0.2 %
LYMPHOCYTES # BLD AUTO: 0.76 X10(3) UL (ref 1–4)
LYMPHOCYTES NFR BLD AUTO: 17.9 %
MCH RBC QN AUTO: 35.4 PG (ref 26–34)
MCHC RBC AUTO-ENTMCNC: 34.9 G/DL (ref 31–37)
MCV RBC AUTO: 101.4 FL
MONOCYTES # BLD AUTO: 0.35 X10(3) UL (ref 0.1–1)
MONOCYTES NFR BLD AUTO: 8.2 %
NEUTROPHILS # BLD AUTO: 3.07 X10 (3) UL (ref 1.5–7.7)
NEUTROPHILS # BLD AUTO: 3.07 X10(3) UL (ref 1.5–7.7)
NEUTROPHILS NFR BLD AUTO: 72.3 %
PLATELET # BLD AUTO: 185 10(3)UL (ref 150–450)
RBC # BLD AUTO: 2.94 X10(6)UL
WBC # BLD AUTO: 4.3 X10(3) UL (ref 4–11)

## 2021-03-23 PROCEDURE — 36415 COLL VENOUS BLD VENIPUNCTURE: CPT

## 2021-03-23 PROCEDURE — 85025 COMPLETE CBC W/AUTO DIFF WBC: CPT

## 2021-03-23 NOTE — PROGRESS NOTES
Education Record    Learner:  Patient    Disease / Diagnosis: Did not receive retacrit injection; Hbg. 10.4 today    Barriers / Limitations:  None   Comments:    Method:  Discussion   Comments:    General Topics:  Medication and Plan of care reviewed- awar

## 2021-04-06 ENCOUNTER — APPOINTMENT (OUTPATIENT)
Dept: HEMATOLOGY/ONCOLOGY | Facility: HOSPITAL | Age: 66
End: 2021-04-06
Attending: INTERNAL MEDICINE
Payer: MEDICARE

## 2021-04-08 ENCOUNTER — OFFICE VISIT (OUTPATIENT)
Dept: HEMATOLOGY/ONCOLOGY | Facility: HOSPITAL | Age: 66
End: 2021-04-08
Attending: INTERNAL MEDICINE
Payer: MEDICARE

## 2021-04-08 VITALS
HEART RATE: 93 BPM | SYSTOLIC BLOOD PRESSURE: 119 MMHG | DIASTOLIC BLOOD PRESSURE: 63 MMHG | BODY MASS INDEX: 18.53 KG/M2 | OXYGEN SATURATION: 96 % | WEIGHT: 102 LBS | HEIGHT: 62.01 IN | RESPIRATION RATE: 18 BRPM | TEMPERATURE: 98 F

## 2021-04-08 DIAGNOSIS — D50.0 IRON DEFICIENCY ANEMIA DUE TO CHRONIC BLOOD LOSS: Primary | ICD-10-CM

## 2021-04-08 PROCEDURE — 96374 THER/PROPH/DIAG INJ IV PUSH: CPT

## 2021-04-08 NOTE — PROGRESS NOTES
Education Record    Learner:  Patient    Disease / Diagnosis: iron-deficiency anemia    Barriers / Limitations:  None   Comments:    Method:  Brief focused and Discussion   Comments:    General Topics:  Plan of care reviewed and Fall risk and prevention
calm

## 2021-04-15 ENCOUNTER — OFFICE VISIT (OUTPATIENT)
Dept: HEMATOLOGY/ONCOLOGY | Facility: HOSPITAL | Age: 66
End: 2021-04-15
Attending: INTERNAL MEDICINE
Payer: MEDICARE

## 2021-04-15 DIAGNOSIS — D50.0 IRON DEFICIENCY ANEMIA DUE TO CHRONIC BLOOD LOSS: Primary | ICD-10-CM

## 2021-04-15 DIAGNOSIS — D46.9 MDS (MYELODYSPLASTIC SYNDROME) (HCC): ICD-10-CM

## 2021-04-15 PROCEDURE — 80053 COMPREHEN METABOLIC PANEL: CPT

## 2021-04-15 PROCEDURE — 96372 THER/PROPH/DIAG INJ SC/IM: CPT

## 2021-04-15 PROCEDURE — 83540 ASSAY OF IRON: CPT

## 2021-04-15 PROCEDURE — 36415 COLL VENOUS BLD VENIPUNCTURE: CPT

## 2021-04-15 PROCEDURE — 83550 IRON BINDING TEST: CPT

## 2021-04-15 PROCEDURE — 85025 COMPLETE CBC W/AUTO DIFF WBC: CPT

## 2021-04-15 PROCEDURE — 82728 ASSAY OF FERRITIN: CPT

## 2021-04-15 NOTE — PROGRESS NOTES
Education Record    Learner:  Patient    Disease / Diagnosis: MDS, hgb 9.1 - retacrit injection    Barriers / Limitations:  None   Comments:    Method:  Brief focused and Reinforcement   Comments:    General Topics:  Plan of care reviewed   Comments:     Franco Daly

## 2021-04-19 ENCOUNTER — TELEPHONE (OUTPATIENT)
Dept: HEMATOLOGY/ONCOLOGY | Facility: HOSPITAL | Age: 66
End: 2021-04-19

## 2021-04-20 ENCOUNTER — APPOINTMENT (OUTPATIENT)
Dept: HEMATOLOGY/ONCOLOGY | Facility: HOSPITAL | Age: 66
End: 2021-04-20
Attending: INTERNAL MEDICINE
Payer: MEDICARE

## 2021-04-30 RX ORDER — FLECAINIDE ACETATE 100 MG/1
TABLET ORAL
Qty: 180 TABLET | Refills: 3 | Status: SHIPPED | OUTPATIENT
Start: 2021-04-30

## 2021-05-04 ENCOUNTER — APPOINTMENT (OUTPATIENT)
Dept: HEMATOLOGY/ONCOLOGY | Facility: HOSPITAL | Age: 66
End: 2021-05-04
Attending: INTERNAL MEDICINE
Payer: MEDICARE

## 2021-05-11 ENCOUNTER — OFFICE VISIT (OUTPATIENT)
Dept: HEMATOLOGY/ONCOLOGY | Facility: HOSPITAL | Age: 66
End: 2021-05-11
Attending: INTERNAL MEDICINE
Payer: MEDICARE

## 2021-05-11 DIAGNOSIS — D50.0 IRON DEFICIENCY ANEMIA DUE TO CHRONIC BLOOD LOSS: ICD-10-CM

## 2021-05-11 DIAGNOSIS — D46.9 MDS (MYELODYSPLASTIC SYNDROME) (HCC): Primary | ICD-10-CM

## 2021-05-11 PROCEDURE — 82728 ASSAY OF FERRITIN: CPT

## 2021-05-11 PROCEDURE — 85025 COMPLETE CBC W/AUTO DIFF WBC: CPT

## 2021-05-11 PROCEDURE — 36415 COLL VENOUS BLD VENIPUNCTURE: CPT

## 2021-05-11 PROCEDURE — 80053 COMPREHEN METABOLIC PANEL: CPT

## 2021-05-11 PROCEDURE — 83550 IRON BINDING TEST: CPT

## 2021-05-11 PROCEDURE — 83540 ASSAY OF IRON: CPT

## 2021-05-11 NOTE — PROGRESS NOTES
Education Record    Learner:  Patient    Disease / Diagnosis: MDS    Barriers / Limitations:  None   Comments:    Method:  Brief focused   Comments:    General Topics:  Plan of care reviewed   Comments:    Outcome:  Shows understanding   Comments:    Dillon

## 2021-05-18 ENCOUNTER — OFFICE VISIT (OUTPATIENT)
Dept: HEMATOLOGY/ONCOLOGY | Facility: HOSPITAL | Age: 66
End: 2021-05-18
Attending: INTERNAL MEDICINE
Payer: MEDICARE

## 2021-05-18 DIAGNOSIS — D46.9 MDS (MYELODYSPLASTIC SYNDROME) (HCC): ICD-10-CM

## 2021-05-18 PROCEDURE — 36415 COLL VENOUS BLD VENIPUNCTURE: CPT

## 2021-05-18 PROCEDURE — 85025 COMPLETE CBC W/AUTO DIFF WBC: CPT

## 2021-05-25 ENCOUNTER — OFFICE VISIT (OUTPATIENT)
Dept: HEMATOLOGY/ONCOLOGY | Facility: HOSPITAL | Age: 66
End: 2021-05-25
Attending: INTERNAL MEDICINE
Payer: MEDICARE

## 2021-05-25 DIAGNOSIS — D46.9 MDS (MYELODYSPLASTIC SYNDROME) (HCC): Primary | ICD-10-CM

## 2021-05-25 PROCEDURE — 96372 THER/PROPH/DIAG INJ SC/IM: CPT

## 2021-05-25 PROCEDURE — 85025 COMPLETE CBC W/AUTO DIFF WBC: CPT

## 2021-05-25 PROCEDURE — 36415 COLL VENOUS BLD VENIPUNCTURE: CPT

## 2021-05-25 NOTE — PROGRESS NOTES
Education Record    Learner:  Patient    Disease / Diagnosis: MDS, hgb 9.7 - retacrit injection    Barriers / Limitations:  None   Comments:    Method:  Brief focused and Reinforcement   Comments:    General Topics:  Plan of care reviewed   Comments:     Charmaine Ambriz

## 2021-06-01 ENCOUNTER — OFFICE VISIT (OUTPATIENT)
Dept: HEMATOLOGY/ONCOLOGY | Facility: HOSPITAL | Age: 66
End: 2021-06-01
Attending: INTERNAL MEDICINE
Payer: MEDICARE

## 2021-06-01 VITALS
HEART RATE: 88 BPM | BODY MASS INDEX: 18.13 KG/M2 | TEMPERATURE: 98 F | OXYGEN SATURATION: 99 % | HEIGHT: 62.01 IN | WEIGHT: 99.81 LBS | SYSTOLIC BLOOD PRESSURE: 129 MMHG | RESPIRATION RATE: 16 BRPM | DIASTOLIC BLOOD PRESSURE: 80 MMHG

## 2021-06-01 DIAGNOSIS — D72.819 LEUKOPENIA, UNSPECIFIED TYPE: ICD-10-CM

## 2021-06-01 DIAGNOSIS — C50.811 MALIGNANT NEOPLASM OF OVERLAPPING SITES OF RIGHT BREAST IN FEMALE, ESTROGEN RECEPTOR NEGATIVE (HCC): ICD-10-CM

## 2021-06-01 DIAGNOSIS — Z17.1 MALIGNANT NEOPLASM OF OVERLAPPING SITES OF RIGHT BREAST IN FEMALE, ESTROGEN RECEPTOR NEGATIVE (HCC): ICD-10-CM

## 2021-06-01 DIAGNOSIS — D50.0 IRON DEFICIENCY ANEMIA DUE TO CHRONIC BLOOD LOSS: ICD-10-CM

## 2021-06-01 DIAGNOSIS — D46.9 MDS (MYELODYSPLASTIC SYNDROME) (HCC): ICD-10-CM

## 2021-06-01 DIAGNOSIS — D46.4 REFRACTORY ANEMIA, UNSPECIFIED (HCC): Primary | ICD-10-CM

## 2021-06-01 DIAGNOSIS — D46.9 MDS (MYELODYSPLASTIC SYNDROME) (HCC): Primary | ICD-10-CM

## 2021-06-01 PROBLEM — R55 SYNCOPE AND COLLAPSE: Status: RESOLVED | Noted: 2020-12-04 | Resolved: 2021-06-01

## 2021-06-01 PROBLEM — K92.2 GASTROINTESTINAL HEMORRHAGE: Status: RESOLVED | Noted: 2018-05-31 | Resolved: 2021-06-01

## 2021-06-01 PROBLEM — J18.9 COMMUNITY ACQUIRED PNEUMONIA: Status: RESOLVED | Noted: 2018-06-17 | Resolved: 2021-06-01

## 2021-06-01 PROBLEM — K92.2 GASTROINTESTINAL HEMORRHAGE, UNSPECIFIED GASTROINTESTINAL HEMORRHAGE TYPE: Status: RESOLVED | Noted: 2018-06-01 | Resolved: 2021-06-01

## 2021-06-01 PROBLEM — K55.9 ISCHEMIC COLITIS (HCC): Status: RESOLVED | Noted: 2018-06-02 | Resolved: 2021-06-01

## 2021-06-01 PROBLEM — R55 SYNCOPE, VASOVAGAL: Status: RESOLVED | Noted: 2020-09-06 | Resolved: 2021-06-01

## 2021-06-01 PROBLEM — E86.0 DEHYDRATION: Status: RESOLVED | Noted: 2019-01-07 | Resolved: 2021-06-01

## 2021-06-01 PROBLEM — R55 SYNCOPE, NEAR: Status: RESOLVED | Noted: 2017-04-06 | Resolved: 2021-06-01

## 2021-06-01 PROBLEM — R06.00 DYSPNEA, UNSPECIFIED TYPE: Status: RESOLVED | Noted: 2018-06-17 | Resolved: 2021-06-01

## 2021-06-01 PROBLEM — J18.9 COMMUNITY ACQUIRED PNEUMONIA, UNSPECIFIED LATERALITY: Status: RESOLVED | Noted: 2018-06-17 | Resolved: 2021-06-01

## 2021-06-01 PROBLEM — I95.9 HYPOTENSION, UNSPECIFIED HYPOTENSION TYPE: Status: RESOLVED | Noted: 2017-04-06 | Resolved: 2021-06-01

## 2021-06-01 PROBLEM — M81.0 AGE-RELATED OSTEOPOROSIS WITHOUT CURRENT PATHOLOGICAL FRACTURE: Status: ACTIVE | Noted: 2021-06-01

## 2021-06-01 PROBLEM — N28.9 RENAL INSUFFICIENCY: Status: RESOLVED | Noted: 2020-12-04 | Resolved: 2021-06-01

## 2021-06-01 PROBLEM — Z91.81 AT RISK FOR FALLING: Status: RESOLVED | Noted: 2017-04-07 | Resolved: 2021-06-01

## 2021-06-01 PROBLEM — Z85.3 HISTORY OF BREAST CANCER: Status: ACTIVE | Noted: 2021-06-01

## 2021-06-01 PROBLEM — R55 NEAR SYNCOPE: Status: RESOLVED | Noted: 2019-01-07 | Resolved: 2021-06-01

## 2021-06-01 PROBLEM — Z87.19 HISTORY OF GI BLEED: Status: ACTIVE | Noted: 2021-06-01

## 2021-06-01 PROBLEM — D63.8 ANEMIA OF CHRONIC DISEASE: Status: RESOLVED | Noted: 2018-06-01 | Resolved: 2021-06-01

## 2021-06-01 PROBLEM — R19.7 DIARRHEA, UNSPECIFIED TYPE: Status: RESOLVED | Noted: 2017-04-06 | Resolved: 2021-06-01

## 2021-06-01 PROBLEM — R53.1 WEAKNESS GENERALIZED: Status: RESOLVED | Noted: 2018-06-17 | Resolved: 2021-06-01

## 2021-06-01 PROCEDURE — 96372 THER/PROPH/DIAG INJ SC/IM: CPT

## 2021-06-01 PROCEDURE — 99214 OFFICE O/P EST MOD 30 MIN: CPT | Performed by: INTERNAL MEDICINE

## 2021-06-01 RX ORDER — CLONAZEPAM 0.5 MG/1
0.5 TABLET ORAL NIGHTLY
COMMUNITY
Start: 2021-05-20 | End: 2021-07-14

## 2021-06-11 ENCOUNTER — TELEPHONE (OUTPATIENT)
Dept: PAIN CLINIC | Facility: CLINIC | Age: 66
End: 2021-06-11

## 2021-06-11 ENCOUNTER — OFFICE VISIT (OUTPATIENT)
Dept: PAIN CLINIC | Facility: CLINIC | Age: 66
End: 2021-06-11
Payer: MEDICARE

## 2021-06-11 VITALS
HEART RATE: 83 BPM | SYSTOLIC BLOOD PRESSURE: 112 MMHG | OXYGEN SATURATION: 99 % | DIASTOLIC BLOOD PRESSURE: 58 MMHG | WEIGHT: 99 LBS | BODY MASS INDEX: 18 KG/M2

## 2021-06-11 DIAGNOSIS — M48.061 SPINAL STENOSIS OF LUMBAR REGION WITHOUT NEUROGENIC CLAUDICATION: ICD-10-CM

## 2021-06-11 DIAGNOSIS — Z98.1 S/P CERVICAL SPINAL FUSION: Primary | ICD-10-CM

## 2021-06-11 DIAGNOSIS — G20 PARKINSON'S DISEASE (HCC): Chronic | ICD-10-CM

## 2021-06-11 DIAGNOSIS — M54.16 LUMBAR RADICULOPATHY: Primary | ICD-10-CM

## 2021-06-11 PROCEDURE — 99204 OFFICE O/P NEW MOD 45 MIN: CPT | Performed by: ANESTHESIOLOGY

## 2021-06-11 NOTE — H&P
Name: Nessa Mckeon   : 1/15/1955   DOS: 2021     Chief complaint: Low back pain    History of present illness:  Nessa Mckeon is a 77year old female with a history of atrial fibrillation, Parkinson's status post deep brain stimulator, endometriosis    • Plaque psoriasis    • Psoriatic arthropathy ([facility] [address] 11/1/2011   • Status post deep brain stimulator placement       Current Outpatient Medications   Medication Sig Dispense Refill   • clonazePAM 0.5 MG Oral Tab Take 0.5 mg by mouth.      • TOTAL ABDOM HYSTERECTOMY     • TUBAL LIGATION     • UPPER GI ENDOSCOPY,BIOPSY      ulcer, at [address] History   Problem Relation Age of Onset   • Hypertension Father    • Heart Disorder Mother    • Cancer Mother         pharyngeal   • Breast Canc 5   Hip Flexion:    5    5   Knee Extension:   5    5   Knee Flexion:    5    5   Ant. Tibialis:    5    5  Extensor Hallucis Longus:   5    5  Peroneals:     5    5  Gastrocsoleus:     5    5    Radiology diagnostic studies:   Lumbar MRI report reviewed.

## 2021-06-11 NOTE — TELEPHONE ENCOUNTER
[address]  PRE-PROCEDURE INSTRUCTIONS WITHOUT SEDATION    Appointment Date: 6/15/2021  Check-In Time: 12 Noon       ** TO AVOID CANCELLATION AND/OR RESCHEDULING: PLEASE CALL [facility] PRE-PROCEDURE LINE -[phone] FOR DETAILED INSTRUCTIONS hours  • Greater than 81mg but less than 325mg   5 days  • 325mg and greater                  7 days  • Coumadin       5 days  • Procedure may be cancelled if INR is elevated.    • Excedrin (with aspirin) 7 days  • Plavix (Clopidogrel) comfort.       ** TO AVOID CANCELLATION AND/OR RESCHEDULING: PLEASE CALL [facility] PRE-PROCEDURE LINE -[phone] FOR DETAILED INSTRUCTIONS FIVE TO SEVEN DAYS PRIOR TO PROCEDURE**

## 2021-06-11 NOTE — PROGRESS NOTES
HPI/Subjective:   Patient ID: Crystal Soto is a 77year old female. HPI    History/Other:   Review of Systems  Current Outpatient Medications   Medication Sig Dispense Refill   • clonazePAM 0.5 MG Oral Tab Take 0.5 mg by mouth.      • triamcinolon Minimum Pain:   5  Maximum Pain  9    Distribution of Pain:    bilateral    Quality of Pain:   aching and throbbing    Origin of Pain:    Degenerative    Aggravating Factors:    Cold, Sitting, Standing and Walking    Past Treatments for Current Pain

## 2021-06-15 ENCOUNTER — HOSPITAL ENCOUNTER (OUTPATIENT)
Facility: HOSPITAL | Age: 66
Setting detail: HOSPITAL OUTPATIENT SURGERY
Discharge: HOME | End: 2021-06-15
Attending: ANESTHESIOLOGY | Admitting: ANESTHESIOLOGY
Payer: MEDICARE

## 2021-06-15 ENCOUNTER — APPOINTMENT (OUTPATIENT)
Dept: HEMATOLOGY/ONCOLOGY | Facility: HOSPITAL | Age: 66
End: 2021-06-15
Attending: INTERNAL MEDICINE
Payer: MEDICARE

## 2021-06-15 ENCOUNTER — APPOINTMENT (OUTPATIENT)
Dept: GENERAL RADIOLOGY | Facility: HOSPITAL | Age: 66
End: 2021-06-15
Attending: ANESTHESIOLOGY
Payer: MEDICARE

## 2021-06-15 VITALS
TEMPERATURE: 98 F | RESPIRATION RATE: 18 BRPM | DIASTOLIC BLOOD PRESSURE: 77 MMHG | HEART RATE: 82 BPM | SYSTOLIC BLOOD PRESSURE: 152 MMHG | OXYGEN SATURATION: 100 %

## 2021-06-15 DIAGNOSIS — M54.16 LUMBAR RADICULOPATHY: ICD-10-CM

## 2021-06-15 PROCEDURE — 3E0R33Z INTRODUCTION OF ANTI-INFLAMMATORY INTO SPINAL CANAL, PERCUTANEOUS APPROACH: ICD-10-PCS | Performed by: ANESTHESIOLOGY

## 2021-06-15 RX ORDER — SODIUM CHLORIDE 9 MG/ML
INJECTION INTRAVENOUS
Status: DISCONTINUED | OUTPATIENT
Start: 2021-06-15 | End: 2021-06-15

## 2021-06-15 RX ORDER — DIPHENHYDRAMINE HYDROCHLORIDE 50 MG/ML
50 INJECTION INTRAMUSCULAR; INTRAVENOUS ONCE AS NEEDED
Status: CANCELLED | OUTPATIENT
Start: 2021-06-15 | End: 2021-06-15

## 2021-06-15 RX ORDER — ONDANSETRON 2 MG/ML
4 INJECTION INTRAMUSCULAR; INTRAVENOUS ONCE AS NEEDED
Status: CANCELLED | OUTPATIENT
Start: 2021-06-15 | End: 2021-06-15

## 2021-06-15 RX ORDER — DEXAMETHASONE SODIUM PHOSPHATE 10 MG/ML
INJECTION, SOLUTION INTRAMUSCULAR; INTRAVENOUS
Status: DISCONTINUED | OUTPATIENT
Start: 2021-06-15 | End: 2021-06-15

## 2021-06-15 RX ORDER — LIDOCAINE HYDROCHLORIDE 10 MG/ML
INJECTION, SOLUTION EPIDURAL; INFILTRATION; INTRACAUDAL; PERINEURAL
Status: DISCONTINUED | OUTPATIENT
Start: 2021-06-15 | End: 2021-06-15

## 2021-06-15 NOTE — H&P
History & Physical Examination    Patient Name: Cristina Wu  MRN: [id number]  CSN: [id number]  YOB: 1955    Pre-Operative Diagnosis:  Lumbar radiculopathy [M54.16]    Present Illness: Patient with lumbar radiculopathy for transforamina pathological fracture 6/1/2021   • Anemia    • Arrhythmia     afib   • Back problem    • Benign essential tremor    • Bowel obstruction (HCC)    • Breast cancer ([facility] [address] 2002   • Cancer [facility])     Right breast   • Cervical spine degeneration    • Colitis     I Family History   Problem Relation Age of Onset   • Hypertension Father    • Heart Disorder Mother    • Cancer Mother         pharyngeal   • Breast Cancer Self      Social History    Tobacco Use      Smoking status: Former Smoker        Packs/day: 0.50

## 2021-06-15 NOTE — OPERATIVE REPORT
[facility]  Operative Report  6/15/2021     Jatinder Augustin Patient Status:  Hospital Outpatient Surgery    1/15/1955 MRN [id number]   Location [id number] [place] [address] Attending Tamika Moreira MD   Hosp Day # 0 PCP Karis Dsouza of [facility] [address] was injected. An excellent contrast spread along the epidural space and the nerve root was obtained. At this point, 2 cc of normal saline with 10 mg of dexamethasone was injected without complication.   The needle was withdrawn with style

## 2021-06-15 NOTE — OR NURSING
Pt up at cartside with steady gait. Pt denies weakness to BLLE. Discharge instructions provided to pt with verb understanding.

## 2021-06-16 ENCOUNTER — OFFICE VISIT (OUTPATIENT)
Dept: HEMATOLOGY/ONCOLOGY | Facility: HOSPITAL | Age: 66
End: 2021-06-16
Attending: INTERNAL MEDICINE
Payer: MEDICARE

## 2021-06-16 DIAGNOSIS — D46.9 MDS (MYELODYSPLASTIC SYNDROME) (HCC): Primary | ICD-10-CM

## 2021-06-16 PROCEDURE — 36415 COLL VENOUS BLD VENIPUNCTURE: CPT

## 2021-06-16 PROCEDURE — 85025 COMPLETE CBC W/AUTO DIFF WBC: CPT

## 2021-06-16 NOTE — PROGRESS NOTES
Patient here today for labs and possible injection. Hgb level 11.3 today. Therefore, no retacrit injection indicated. Patient with next appointment already set up.     Education Record    Learner:  Patient    Disease / Diagnosis: MDS    Barriers / Shabbir Gallardo

## 2021-06-17 ENCOUNTER — OFFICE VISIT (OUTPATIENT)
Dept: INTERNAL MEDICINE CLINIC | Facility: CLINIC | Age: 66
End: 2021-06-17
Payer: MEDICARE

## 2021-06-17 VITALS
TEMPERATURE: 98 F | HEIGHT: 61.25 IN | OXYGEN SATURATION: 98 % | HEART RATE: 83 BPM | SYSTOLIC BLOOD PRESSURE: 126 MMHG | DIASTOLIC BLOOD PRESSURE: 68 MMHG | WEIGHT: 95 LBS | BODY MASS INDEX: 17.71 KG/M2

## 2021-06-17 DIAGNOSIS — I25.10 CAD IN NATIVE ARTERY: Primary | ICD-10-CM

## 2021-06-17 DIAGNOSIS — M48.061 SPINAL STENOSIS OF LUMBAR REGION WITHOUT NEUROGENIC CLAUDICATION: ICD-10-CM

## 2021-06-17 DIAGNOSIS — F33.40 RECURRENT MAJOR DEPRESSIVE DISORDER, IN REMISSION (HCC): ICD-10-CM

## 2021-06-17 DIAGNOSIS — M81.0 AGE-RELATED OSTEOPOROSIS WITHOUT CURRENT PATHOLOGICAL FRACTURE: ICD-10-CM

## 2021-06-17 DIAGNOSIS — Z85.3 HISTORY OF BREAST CANCER: ICD-10-CM

## 2021-06-17 DIAGNOSIS — F17.200 TOBACCO DEPENDENCE: ICD-10-CM

## 2021-06-17 DIAGNOSIS — G25.0 BENIGN ESSENTIAL TREMOR: ICD-10-CM

## 2021-06-17 DIAGNOSIS — K55.1 SUPERIOR MESENTERIC ARTERY ATHEROSCLEROSIS (HCC): ICD-10-CM

## 2021-06-17 DIAGNOSIS — I48.0 PAROXYSMAL ATRIAL FIBRILLATION (HCC): ICD-10-CM

## 2021-06-17 DIAGNOSIS — E78.00 PURE HYPERCHOLESTEROLEMIA: ICD-10-CM

## 2021-06-17 DIAGNOSIS — D50.0 IRON DEFICIENCY ANEMIA DUE TO CHRONIC BLOOD LOSS: ICD-10-CM

## 2021-06-17 DIAGNOSIS — I73.9 PAD (PERIPHERAL ARTERY DISEASE) (HCC): ICD-10-CM

## 2021-06-17 DIAGNOSIS — G24.3 CERVICAL DYSTONIA: ICD-10-CM

## 2021-06-17 DIAGNOSIS — D46.9 MDS (MYELODYSPLASTIC SYNDROME) (HCC): ICD-10-CM

## 2021-06-17 DIAGNOSIS — F10.19 DISORDER DUE TO ALCOHOL ABUSE (HCC): ICD-10-CM

## 2021-06-17 DIAGNOSIS — Z87.19 HX OF ISCHEMIC BOWEL DISEASE: ICD-10-CM

## 2021-06-17 DIAGNOSIS — Z86.73 HISTORY OF CVA (CEREBROVASCULAR ACCIDENT): ICD-10-CM

## 2021-06-17 PROBLEM — G20 PARKINSON'S DISEASE: Chronic | Status: RESOLVED | Noted: 2018-06-21 | Resolved: 2021-06-17

## 2021-06-17 PROBLEM — Z86.718 HISTORY OF DVT (DEEP VEIN THROMBOSIS): Status: ACTIVE | Noted: 2021-06-17

## 2021-06-17 PROBLEM — A04.72 INTESTINAL INFECTION DUE TO CLOSTRIDIUM DIFFICILE: Status: ACTIVE | Noted: 2021-06-17

## 2021-06-17 PROBLEM — G20 PARKINSON'S DISEASE (HCC): Chronic | Status: RESOLVED | Noted: 2018-06-21 | Resolved: 2021-06-17

## 2021-06-17 PROBLEM — G20.A1 PARKINSON'S DISEASE: Chronic | Status: RESOLVED | Noted: 2018-06-21 | Resolved: 2021-06-17

## 2021-06-17 PROBLEM — G20.A1 PARKINSON'S DISEASE (HCC): Chronic | Status: RESOLVED | Noted: 2018-06-21 | Resolved: 2021-06-17

## 2021-06-17 PROCEDURE — G2212 PROLONG OUTPT/OFFICE VIS: HCPCS | Performed by: INTERNAL MEDICINE

## 2021-06-17 PROCEDURE — 99215 OFFICE O/P EST HI 40 MIN: CPT | Performed by: INTERNAL MEDICINE

## 2021-06-17 RX ORDER — HYDROCODONE BITARTRATE AND ACETAMINOPHEN 5; 325 MG/1; MG/1
1 TABLET ORAL EVERY 6 HOURS PRN
Qty: 90 TABLET | Refills: 0 | Status: SHIPPED | OUTPATIENT
Start: 2021-06-17 | End: 2021-07-30

## 2021-06-17 NOTE — H&P
[facility],  ADELA THOMPSON    History and Physical    Eulah Laughter Patient Status:  No patient class for patient encounter    1/15/1955 MRN [id number]   Location [address] Attending No at spinal fusion C1-6   • CHEMOTHERAPY  2002   • COLONOSCOPY  2/7/2014    Procedure: COLONOSCOPY;  Surgeon: Raul Reid MD;  Location: [facility] ENDOSCOPY   • COLONOSCOPY N/A 6/3/2018    Procedure: COLONOSCOPY;  Surgeon: Raul Reid MD;  Location: [facility] ENDOSCO HCT 33.4 (L) 06/16/2021    .0 06/16/2021    CREATSERUM 0.81 06/01/2021    BUN 15 06/01/2021     (L) 06/01/2021    K 4.6 06/01/2021     06/01/2021    CO2 30.0 06/01/2021     (H) 06/01/2021    CA 9.4 06/01/2021    ALB 3.7 06/01/2021

## 2021-06-17 NOTE — PROGRESS NOTES
[facility]    CHIEF COMPLAINT:  Patient presents with:  Establish Care  Medication Follow-Up  Follow - Up: multiple issues        HISTORY OF PRESENT ILLNESS:  The patient is a 77year old year old female who presents with multiple medical issues bowel   • Intestinal infection due to Clostridium difficile 6/17/2021 2019   • Ischemic colitis ([facility] 6/2/2018   • Lacunar infarction ([facility] 9/9/2013   • Neuropathy    • Osteoarthritis    • Osteoarthrosis, unspecified whether generalized or localized, un native artery     Essential hypertension     Tobacco dependence     Pure hypercholesterolemia     Iron deficiency anemia due to chronic blood loss     Hyperglycemia     Anemia, unspecified type     MDS (myelodysplastic syndrome) ([facility] [address]     History of GI ble tablet 0         Allergies:     Allergies As of Date: 06/17/2021  Allergen                          Noted       Reaction  SULFA DRUGS CROSS REACTORS        10/24/2011  RASH    Review Complete  06/17/2021      SOCIAL HISTORY:  Social History    Socioeconomic Sexually Abused:     FAMILY HISTORY:   Family History   Problem Relation Age of Onset   • Hypertension Father    • Heart Disorder Mother    • Cancer Mother         pharyngeal   • Breast Cancer Self    • Lung Disorder Sister         COPD   • Heart Attack Br dependence- counseled re: role in her various other comoribidities, needs to quit,      Discussion: I informed pt that I am not comfortable prescribing opiates, benzos and gabapentin at hs, I informed her she needs to have a pain contract with pain clinic

## 2021-06-17 NOTE — PROGRESS NOTES
[facility]    CHIEF COMPLAINT:  Patient presents with:  Establish Care  Medication Follow-Up  Follow - Up: multiple issues        HISTORY OF PRESENT ILLNESS:  The patient is a 77year old year old female who presents with need to transfer pain, b bleed    • High blood pressure    • High cholesterol    • History of blood transfusion     Jan 2018   • History of breast cancer 6/1/2021    XRT/Chemo   • History of GI bleed 6/1/2021    Ischemic bowel   • Ischem[facility] ([facility] 6/2/2018   • Neuropathy (arteriovenous malformation)     CAD in native artery     Essential hypertension     Cigarette nicotine dependence with nicotine-induced disorder     Parkinson's disease ([facility] [address]     Pure hypercholesterolemia     Iron deficiency anemia due to chronic blood lo Not on file      Number of children: Not on file      Years of education: Not on file      Highest education level: Not on file    Occupational History      Not on file    Tobacco Use      Smoking status: Former Smoker        Packs/day: 0.50        Years: sweats, weight loss, rash, joint pain,   SKIN: denies any unusual skin lesions, no bothersome rashes  EYES:denies visual disturbances, or blurred vision, eye discharge, redness or irritation  HEENT: denies nasal congestion, sinus pain , recurrent STs or sw tenderness  EXTREMITIES: no cyanosis, clubbing or edema, no varicosities or stasis change  NEURO: Oriented times three,cranial nerves 2-12 are intact,motor and  gait normal    ASSESSMENT AND PLAN:   There are no diagnoses linked to this encounter.     Karina Herrera

## 2021-06-25 ENCOUNTER — HOSPITAL ENCOUNTER (OUTPATIENT)
Dept: MAMMOGRAPHY | Facility: HOSPITAL | Age: 66
Discharge: HOME | End: 2021-06-25
Attending: INTERNAL MEDICINE
Payer: MEDICARE

## 2021-06-25 DIAGNOSIS — Z17.1 MALIGNANT NEOPLASM OF OVERLAPPING SITES OF RIGHT BREAST IN FEMALE, ESTROGEN RECEPTOR NEGATIVE (HCC): ICD-10-CM

## 2021-06-25 DIAGNOSIS — R92.8 ABNORMAL MAMMOGRAM OF RIGHT BREAST: Primary | ICD-10-CM

## 2021-06-25 DIAGNOSIS — C50.811 MALIGNANT NEOPLASM OF OVERLAPPING SITES OF RIGHT BREAST IN FEMALE, ESTROGEN RECEPTOR NEGATIVE (HCC): ICD-10-CM

## 2021-06-25 PROCEDURE — 77065 DX MAMMO INCL CAD UNI: CPT | Performed by: INTERNAL MEDICINE

## 2021-06-25 PROCEDURE — 77061 BREAST TOMOSYNTHESIS UNI: CPT | Performed by: INTERNAL MEDICINE

## 2021-06-25 PROCEDURE — 76642 ULTRASOUND BREAST LIMITED: CPT | Performed by: INTERNAL MEDICINE

## 2021-06-25 NOTE — IMAGING NOTE
Maciej Weinberg \"Deya\" Moise Later is recommended for an ultrasound guided biopsy of the right breast by .     History-History of breast cancer-right breast: lumpectomy,radiation, chemotherapy  INDICATIONS:  Z17.1 Malignant neoplasm of overlapping sites o Pain. 90 tablet 0   • clonazePAM 0.5 MG Oral Tab Take 0.5 mg by mouth nightly.        • triamcinolone acetonide 0.1 % External Cream Apply topically to affected area 2 times daily     • lisinopril 20 MG Oral Tab TAKE 1 TABLET BY MOUTH EVERY DAY     • Multip

## 2021-06-28 NOTE — IMAGING NOTE
Pt called and LM on breast care RN VM asking if she should hold her baby ASA for breast bx. Called returned and LM on VM that baby ASA does not need to be held. Call back # left if further questions.

## 2021-06-29 ENCOUNTER — OFFICE VISIT (OUTPATIENT)
Dept: HEMATOLOGY/ONCOLOGY | Facility: HOSPITAL | Age: 66
End: 2021-06-29
Attending: INTERNAL MEDICINE
Payer: MEDICARE

## 2021-06-29 ENCOUNTER — LAB ENCOUNTER (OUTPATIENT)
Dept: LAB | Age: 66
End: 2021-06-29
Attending: INTERNAL MEDICINE
Payer: MEDICARE

## 2021-06-29 VITALS
TEMPERATURE: 99 F | DIASTOLIC BLOOD PRESSURE: 76 MMHG | OXYGEN SATURATION: 99 % | RESPIRATION RATE: 18 BRPM | SYSTOLIC BLOOD PRESSURE: 133 MMHG | HEART RATE: 95 BPM

## 2021-06-29 DIAGNOSIS — D46.9 MDS (MYELODYSPLASTIC SYNDROME) (HCC): Primary | ICD-10-CM

## 2021-06-29 DIAGNOSIS — I25.10 CAD IN NATIVE ARTERY: ICD-10-CM

## 2021-06-29 LAB
CHOLEST SMN-MCNC: 169 MG/DL (ref ?–200)
HDLC SERPL-MCNC: 87 MG/DL (ref 40–59)
LDLC SERPL CALC-MCNC: 50 MG/DL (ref ?–100)
NONHDLC SERPL-MCNC: 82 MG/DL (ref ?–130)
PATIENT FASTING Y/N/NP: YES
TRIGL SERPL-MCNC: 203 MG/DL (ref 30–149)
VLDLC SERPL CALC-MCNC: 29 MG/DL (ref 0–30)

## 2021-06-29 PROCEDURE — 36415 COLL VENOUS BLD VENIPUNCTURE: CPT

## 2021-06-29 PROCEDURE — 80061 LIPID PANEL: CPT

## 2021-06-29 PROCEDURE — 85025 COMPLETE CBC W/AUTO DIFF WBC: CPT

## 2021-06-29 NOTE — PROGRESS NOTES
Education Record    Learner:  Patient    Disease / Diagnosis:MDS (myelodysplastic syndrome    Barriers / Limitations:  None   Comments:    Method:  Brief focused   Comments:    General Topics:  Infection, Medication, Pain, Precautions, Procedure, Side effe

## 2021-07-06 PROBLEM — F10.19 DISORDER DUE TO ALCOHOL ABUSE (HCC): Status: ACTIVE | Noted: 2021-07-06

## 2021-07-06 PROBLEM — F10.19 DISORDER DUE TO ALCOHOL ABUSE (HCC): Status: RESOLVED | Noted: 2021-07-06 | Resolved: 2021-07-06

## 2021-07-06 PROBLEM — F10.19: Status: RESOLVED | Noted: 2021-07-06 | Resolved: 2021-07-06

## 2021-07-06 PROBLEM — F10.19: Status: ACTIVE | Noted: 2021-07-06

## 2021-07-06 PROBLEM — Z86.73 HISTORY OF CVA (CEREBROVASCULAR ACCIDENT): Status: ACTIVE | Noted: 2021-07-06

## 2021-07-06 PROBLEM — F17.200 TOBACCO DEPENDENCE: Status: ACTIVE | Noted: 2018-06-21

## 2021-07-09 ENCOUNTER — HOSPITAL ENCOUNTER (OUTPATIENT)
Dept: MAMMOGRAPHY | Facility: HOSPITAL | Age: 66
Discharge: HOME | End: 2021-07-09
Attending: INTERNAL MEDICINE
Payer: MEDICARE

## 2021-07-09 DIAGNOSIS — R92.8 ABNORMAL MAMMOGRAM OF RIGHT BREAST: ICD-10-CM

## 2021-07-09 PROCEDURE — 88305 TISSUE EXAM BY PATHOLOGIST: CPT | Performed by: INTERNAL MEDICINE

## 2021-07-09 PROCEDURE — 19083 BX BREAST 1ST LESION US IMAG: CPT | Performed by: INTERNAL MEDICINE

## 2021-07-09 PROCEDURE — 77065 DX MAMMO INCL CAD UNI: CPT | Performed by: INTERNAL MEDICINE

## 2021-07-12 ENCOUNTER — OFFICE VISIT (OUTPATIENT)
Dept: PAIN CLINIC | Facility: CLINIC | Age: 66
End: 2021-07-12
Payer: MEDICARE

## 2021-07-12 ENCOUNTER — MED REC SCAN ONLY (OUTPATIENT)
Dept: PAIN CLINIC | Facility: CLINIC | Age: 66
End: 2021-07-12

## 2021-07-12 ENCOUNTER — LAB ENCOUNTER (OUTPATIENT)
Dept: LAB | Age: 66
End: 2021-07-12
Attending: ANESTHESIOLOGY
Payer: MEDICARE

## 2021-07-12 VITALS — HEART RATE: 102 BPM | SYSTOLIC BLOOD PRESSURE: 142 MMHG | DIASTOLIC BLOOD PRESSURE: 80 MMHG

## 2021-07-12 DIAGNOSIS — F11.90 CHRONIC NARCOTIC USE: ICD-10-CM

## 2021-07-12 DIAGNOSIS — Z79.891 LONG TERM (CURRENT) USE OF OPIATE ANALGESIC: ICD-10-CM

## 2021-07-12 DIAGNOSIS — G24.3 CERVICAL DYSTONIA: ICD-10-CM

## 2021-07-12 DIAGNOSIS — M48.061 SPINAL STENOSIS OF LUMBAR REGION WITHOUT NEUROGENIC CLAUDICATION: Primary | ICD-10-CM

## 2021-07-12 DIAGNOSIS — Z98.1 S/P CERVICAL SPINAL FUSION: ICD-10-CM

## 2021-07-12 PROCEDURE — 80307 DRUG TEST PRSMV CHEM ANLYZR: CPT

## 2021-07-12 PROCEDURE — 99214 OFFICE O/P EST MOD 30 MIN: CPT | Performed by: ANESTHESIOLOGY

## 2021-07-12 NOTE — PROGRESS NOTES
[facility] Progress Note      Patient Name:  Neelima Deng  YOB: 1955  Medical Record Number:  [id number]    Date of visit: 7/14/2021    CHIEF COMPLAINT: Anemia    HPI:     77year old female that I have followed since 6/18 f times daily     • lisinopril 20 MG Oral Tab TAKE 1 TABLET BY MOUTH EVERY DAY     • Multiple Vitamin (MULTI-VITAMIN) Oral Tab Take 1 tablet by mouth. • DULoxetine HCl 30 MG Oral Cap DR Particles Take 30 mg by mouth daily.        • aspirin 81 MG Oral Tab 18 mmol/L    BUN 14 7 - 18 mg/dL    Creatinine 0.94 0.55 - 1.02 mg/dL    BUN/CREA Ratio 14.9 10.0 - 20.0    Calcium, Total 9.8 8.5 - 10.1 mg/dL    Calculated Osmolality 270 (L) 275 - 295 mOsm/kg    GFR, Non- 63 >=60    GFR, -American with MDS. [employer] normal.  Has received IV iron in the past, last 4/21. Iron level still high so hold off. Currently on Procrit as needed. # Recurrent ischemic colitis: likely due to vascular dis, smoking. Hypercoag w/u negative.  Last admitted

## 2021-07-12 NOTE — PROGRESS NOTES
Last procedure: BILATERAL LUMBAR 5 TRANSFORAMINAL LUMBAR EPIDURAL STEROID INJECTION WITH LOCAL  Date: 6/15/21  Percentage of relief obtained: 60%---but in the evening the pain will get worse, about 7/10  Duration of relief: still at 60%  Current Pain Score

## 2021-07-13 ENCOUNTER — APPOINTMENT (OUTPATIENT)
Dept: HEMATOLOGY/ONCOLOGY | Facility: HOSPITAL | Age: 66
End: 2021-07-13
Attending: INTERNAL MEDICINE
Payer: MEDICARE

## 2021-07-14 ENCOUNTER — OFFICE VISIT (OUTPATIENT)
Dept: HEMATOLOGY/ONCOLOGY | Facility: HOSPITAL | Age: 66
End: 2021-07-14
Attending: INTERNAL MEDICINE
Payer: MEDICARE

## 2021-07-14 VITALS
BODY MASS INDEX: 17.81 KG/M2 | TEMPERATURE: 99 F | HEART RATE: 101 BPM | DIASTOLIC BLOOD PRESSURE: 78 MMHG | WEIGHT: 98 LBS | HEIGHT: 62.01 IN | SYSTOLIC BLOOD PRESSURE: 135 MMHG | RESPIRATION RATE: 18 BRPM | OXYGEN SATURATION: 100 %

## 2021-07-14 DIAGNOSIS — D46.9 MDS (MYELODYSPLASTIC SYNDROME) (HCC): Primary | ICD-10-CM

## 2021-07-14 DIAGNOSIS — K92.2 GASTROINTESTINAL HEMORRHAGE, UNSPECIFIED GASTROINTESTINAL HEMORRHAGE TYPE: ICD-10-CM

## 2021-07-14 DIAGNOSIS — D50.0 IRON DEFICIENCY ANEMIA DUE TO CHRONIC BLOOD LOSS: ICD-10-CM

## 2021-07-14 DIAGNOSIS — D46.9 MDS (MYELODYSPLASTIC SYNDROME) (HCC): ICD-10-CM

## 2021-07-14 DIAGNOSIS — Z17.1 MALIGNANT NEOPLASM OF OVERLAPPING SITES OF RIGHT BREAST IN FEMALE, ESTROGEN RECEPTOR NEGATIVE (HCC): Primary | ICD-10-CM

## 2021-07-14 DIAGNOSIS — C50.811 MALIGNANT NEOPLASM OF OVERLAPPING SITES OF RIGHT BREAST IN FEMALE, ESTROGEN RECEPTOR NEGATIVE (HCC): Primary | ICD-10-CM

## 2021-07-14 LAB
ALBUMIN SERPL-MCNC: 4.3 G/DL (ref 3.4–5)
ALBUMIN/GLOB SERPL: 1.5 {RATIO} (ref 1–2)
ALP LIVER SERPL-CCNC: 53 U/L
ALT SERPL-CCNC: 27 U/L
ANION GAP SERPL CALC-SCNC: 3 MMOL/L (ref 0–18)
AST SERPL-CCNC: 15 U/L (ref 15–37)
BASOPHILS # BLD AUTO: 0.01 X10(3) UL (ref 0–0.2)
BASOPHILS NFR BLD AUTO: 0.2 %
BILIRUB SERPL-MCNC: 0.2 MG/DL (ref 0.1–2)
BUN BLD-MCNC: 14 MG/DL (ref 7–18)
BUN/CREAT SERPL: 14.9 (ref 10–20)
CALCIUM BLD-MCNC: 9.8 MG/DL (ref 8.5–10.1)
CHLORIDE SERPL-SCNC: 96 MMOL/L (ref 98–112)
CO2 SERPL-SCNC: 31 MMOL/L (ref 21–32)
CREAT BLD-MCNC: 0.94 MG/DL
DEPRECATED HBV CORE AB SER IA-ACNC: 1272.9 NG/ML
DEPRECATED RDW RBC AUTO: 50 FL (ref 35.1–46.3)
EOSINOPHIL # BLD AUTO: 0.02 X10(3) UL (ref 0–0.7)
EOSINOPHIL NFR BLD AUTO: 0.4 %
ERYTHROCYTE [DISTWIDTH] IN BLOOD BY AUTOMATED COUNT: 13.1 % (ref 11–15)
GLOBULIN PLAS-MCNC: 2.8 G/DL (ref 2.8–4.4)
GLUCOSE BLD-MCNC: 91 MG/DL (ref 70–99)
HCT VFR BLD AUTO: 29.2 %
HGB BLD-MCNC: 9.9 G/DL
IMM GRANULOCYTES # BLD AUTO: 0.01 X10(3) UL (ref 0–1)
IMM GRANULOCYTES NFR BLD: 0.2 %
IRON SATURATION: 61 %
IRON SERPL-MCNC: 148 UG/DL
LYMPHOCYTES # BLD AUTO: 0.88 X10(3) UL (ref 1–4)
LYMPHOCYTES NFR BLD AUTO: 19.1 %
M PROTEIN MFR SERPL ELPH: 7.1 G/DL (ref 6.4–8.2)
MCH RBC QN AUTO: 35.5 PG (ref 26–34)
MCHC RBC AUTO-ENTMCNC: 33.9 G/DL (ref 31–37)
MCV RBC AUTO: 104.7 FL
MONOCYTES # BLD AUTO: 0.48 X10(3) UL (ref 0.1–1)
MONOCYTES NFR BLD AUTO: 10.4 %
NEUTROPHILS # BLD AUTO: 3.2 X10 (3) UL (ref 1.5–7.7)
NEUTROPHILS # BLD AUTO: 3.2 X10(3) UL (ref 1.5–7.7)
NEUTROPHILS NFR BLD AUTO: 69.7 %
OSMOLALITY SERPL CALC.SUM OF ELEC: 270 MOSM/KG (ref 275–295)
PATIENT FASTING Y/N/NP: NO
PLATELET # BLD AUTO: 218 10(3)UL (ref 150–450)
POTASSIUM SERPL-SCNC: 5 MMOL/L (ref 3.5–5.1)
RBC # BLD AUTO: 2.79 X10(6)UL
SODIUM SERPL-SCNC: 130 MMOL/L (ref 136–145)
TOTAL IRON BINDING CAPACITY: 241 UG/DL (ref 240–450)
TRANSFERRIN SERPL-MCNC: 162 MG/DL (ref 200–360)
WBC # BLD AUTO: 4.6 X10(3) UL (ref 4–11)

## 2021-07-14 PROCEDURE — 96372 THER/PROPH/DIAG INJ SC/IM: CPT

## 2021-07-14 PROCEDURE — 99214 OFFICE O/P EST MOD 30 MIN: CPT | Performed by: INTERNAL MEDICINE

## 2021-07-14 NOTE — PROGRESS NOTES
Education Record    Learner:  Patient    Disease / Diagnosis: MDS; hgb 9.9 - retacrit injection    Barriers / Limitations:  None   Comments:    Method:  Brief focused, Discussion and Reinforcement   Comments:    General Topics:  Plan of care reviewed   Com

## 2021-07-14 NOTE — PROGRESS NOTES
Education Record    Learner:  Patient    Disease / Diagnosis:  anemia    Barriers / Limitations:  None   Comments:    Method:  Discussion   Comments:    General Topics:  Plan of care reviewed   Comments:    Outcome:  Shows understanding   Comments  Pt feel

## 2021-07-15 LAB
6-ACETYLMORHINE CUTOFF 20 NG/ML: NOT DETECTED
7-AMINOCLONAZEPAM 40 NG/ML: NOT DETECTED
A-OH-ALPRAZOLM CUTOFF 20 NG/ML: PRESENT
ALPHA-OH-MIDAZOLAM (CUTOFF 20 NG/ML): NOT DETECTED
ALPRAZOLAM CUTOFF 40 NG/ML: NOT DETECTED
AMPHETAMINE CUTOFF 10 NG/ML: NOT DETECTED
BARBITURATES CUTOFF 200 NG/ML: PRESENT
BENZOYLECGONINE  150 NG/ML: NOT DETECTED
BUPRENORPHINE CUTOFF 5 NG/ML: NOT DETECTED
CARISOPRODOL CUTOFF 100 NG/ML: NOT DETECTED
CODINE CUTOFF 40 NG/ML: NOT DETECTED
COLNAZEPAM CUTOFF 20 NG/ML: NOT DETECTED
CREATININE,URINE: 30.3 MG/DL
DIAZEPAM CUTOFF 50 NG/ML: NOT DETECTED
ETHYL-GLUCURONIDE 500 NG/ML: PRESENT
FENTANYL CUTOFF 2 NG/ML: NOT DETECTED
GABAPENTIN (CUTOFF 100 NG/ML): PRESENT
HYDROCODONE CUTOFF 40 NG/ML: NOT DETECTED
HYDROMORPHONE CUTOFF 40 NG/ML: PRESENT
LORAZEPAM CUTOFF 60 NG/ML: NOT DETECTED
MARIJUANA CUTOFF 20 NG/ML: NOT DETECTED
MDA CUTOFF 200 NG/ML: NOT DETECTED
MDEA EVE CUTOFF 200 NG/ML: NOT DETECTED
MDMA-ECSTASY CUTOFF 200 NG/ML: NOT DETECTED
MEPERIDINE MET CUTOFF 50 NG/ML: NOT DETECTED
METHADONE CUTOFF 150 NG/ML: NOT DETECTED
METHAMPHETMN CUTOFF 400 NG/ML: NOT DETECTED
METHYLPHENIDATE (CUTOFF 100 NG/ML): NOT DETECTED
MIDAZOLAM CUTOFF 20 NG/ML: NOT DETECTED
MORHINE CUTOFF 20 NG/ML: NOT DETECTED
NALOXONE (CUTOFF 100 NG/ML): NOT DETECTED
NORBUPRENORPHINE  20 NG/ML: NOT DETECTED
NORDIAZEPAM CUTOFF 50 NG/ML: NOT DETECTED
NORFENTANYL CUTOFF 2 NG/ML: NOT DETECTED
NORHYDRCODONE CUTOFF 100 NG/ML: NOT DETECTED
NOROXYCODONE CUTOFF 100 NG/ML: NOT DETECTED
NOROXYMORPHNE CUTOFF 100 NG/ML: NOT DETECTED
OXAZEPAM CUTOFF 50 NG/ML: NOT DETECTED
OXYCODONE CUTOFF 40 NG/ML: NOT DETECTED
OXYMORPHONE CUTOFF 40 NG/ML: NOT DETECTED
PCP CUTOFF 25 NG/ML: NOT DETECTED
PHENTERMINE CUTOFF 100 NG/ML: NOT DETECTED
PREGABALIN (CUTOFF 100 NG/ML): NOT DETECTED
TAPENTADOL CUTOFF 100 NG/ML: NOT DETECTED
TAPENTADOL-O SULF 200 NG/ML: NOT DETECTED
TEMAZEPAM CUTOFF 50 NG/ML: NOT DETECTED
TRAMADOL CUTOFF 200 NG/ML: NOT DETECTED
ZOLPIDEM CUTOFF 20 NG/ML: NOT DETECTED
ZOLPIDEM METABOLITE (CUTOFF 100 NG/ML): NOT DETECTED

## 2021-07-28 ENCOUNTER — OFFICE VISIT (OUTPATIENT)
Dept: HEMATOLOGY/ONCOLOGY | Facility: HOSPITAL | Age: 66
End: 2021-07-28
Attending: INTERNAL MEDICINE
Payer: MEDICARE

## 2021-07-28 DIAGNOSIS — D46.9 MDS (MYELODYSPLASTIC SYNDROME) (HCC): Primary | ICD-10-CM

## 2021-07-28 LAB
BASOPHILS # BLD AUTO: 0.02 X10(3) UL (ref 0–0.2)
BASOPHILS NFR BLD AUTO: 0.6 %
DEPRECATED RDW RBC AUTO: 61.1 FL (ref 35.1–46.3)
EOSINOPHIL # BLD AUTO: 0.05 X10(3) UL (ref 0–0.7)
EOSINOPHIL NFR BLD AUTO: 1.5 %
ERYTHROCYTE [DISTWIDTH] IN BLOOD BY AUTOMATED COUNT: 15.2 % (ref 11–15)
HCT VFR BLD AUTO: 33.3 %
HGB BLD-MCNC: 11 G/DL
IMM GRANULOCYTES # BLD AUTO: 0.01 X10(3) UL (ref 0–1)
IMM GRANULOCYTES NFR BLD: 0.3 %
LYMPHOCYTES # BLD AUTO: 0.8 X10(3) UL (ref 1–4)
LYMPHOCYTES NFR BLD AUTO: 23.4 %
MCH RBC QN AUTO: 36.3 PG (ref 26–34)
MCHC RBC AUTO-ENTMCNC: 33 G/DL (ref 31–37)
MCV RBC AUTO: 109.9 FL
MONOCYTES # BLD AUTO: 0.56 X10(3) UL (ref 0.1–1)
MONOCYTES NFR BLD AUTO: 16.4 %
NEUTROPHILS # BLD AUTO: 1.98 X10 (3) UL (ref 1.5–7.7)
NEUTROPHILS # BLD AUTO: 1.98 X10(3) UL (ref 1.5–7.7)
NEUTROPHILS NFR BLD AUTO: 57.8 %
PLATELET # BLD AUTO: 200 10(3)UL (ref 150–450)
RBC # BLD AUTO: 3.03 X10(6)UL
WBC # BLD AUTO: 3.4 X10(3) UL (ref 4–11)

## 2021-07-28 PROCEDURE — 36415 COLL VENOUS BLD VENIPUNCTURE: CPT

## 2021-07-28 PROCEDURE — 85025 COMPLETE CBC W/AUTO DIFF WBC: CPT

## 2021-07-29 ENCOUNTER — PATIENT MESSAGE (OUTPATIENT)
Dept: PAIN CLINIC | Facility: CLINIC | Age: 66
End: 2021-07-29

## 2021-07-29 DIAGNOSIS — M48.061 SPINAL STENOSIS OF LUMBAR REGION WITHOUT NEUROGENIC CLAUDICATION: Primary | ICD-10-CM

## 2021-07-29 DIAGNOSIS — M54.16 LUMBAR RADICULOPATHY: ICD-10-CM

## 2021-07-29 RX ORDER — HYDROCODONE BITARTRATE AND ACETAMINOPHEN 5; 325 MG/1; MG/1
1 TABLET ORAL EVERY 6 HOURS PRN
Qty: 90 TABLET | Refills: 0 | OUTPATIENT
Start: 2021-07-29

## 2021-07-29 NOTE — TELEPHONE ENCOUNTER
From: Rita Bolaños  To: Chip Black MD  Sent: 7/29/2021 10:38 AM CDT  Subject: Prescription Question    Dr. Carlos Enrique Roach,  I need a refill of my Hydrocodone. I didn't realize that my request was sent to my primary physicians'.  I am currently on my last

## 2021-07-29 NOTE — TELEPHONE ENCOUNTER
Medication: HYDROcodone-acetaminophen 5-325 MG Oral Tab    Date of last refill: 6/17/2021  [place] 6[place]/2108 Verified by MF    Last office visit: 7/12/2021  Due back to clinic per last office note:  Not indicated  Date n will call the office prior to her refill.        Medications filled today:  Requested Prescriptions        No prescriptions requested or ordered in this encounter      Orders:Orders Placed This Encounter      Pain Management Drug Panel, Urine           Rad

## 2021-07-30 RX ORDER — HYDROCODONE BITARTRATE AND ACETAMINOPHEN 5; 325 MG/1; MG/1
1 TABLET ORAL EVERY 8 HOURS PRN
Qty: 90 TABLET | Refills: 0 | Status: SHIPPED | OUTPATIENT
Start: 2021-07-30 | End: 2021-08-26

## 2021-07-30 NOTE — TELEPHONE ENCOUNTER
Discussed with patient re: her UDS results/she admits to using 2 beers/night. Patient also uses primidone for her [place]" issues and she uses benzos for anxiety.   She lives with her  who helps her/she states that she typically uses norco 5/325 on

## 2021-08-11 ENCOUNTER — APPOINTMENT (OUTPATIENT)
Dept: HEMATOLOGY/ONCOLOGY | Facility: HOSPITAL | Age: 66
End: 2021-08-11
Attending: INTERNAL MEDICINE
Payer: MEDICARE

## 2021-08-12 ENCOUNTER — OFFICE VISIT (OUTPATIENT)
Dept: HEMATOLOGY/ONCOLOGY | Facility: HOSPITAL | Age: 66
End: 2021-08-12
Attending: INTERNAL MEDICINE
Payer: MEDICARE

## 2021-08-12 VITALS
DIASTOLIC BLOOD PRESSURE: 84 MMHG | RESPIRATION RATE: 18 BRPM | TEMPERATURE: 98 F | WEIGHT: 98.81 LBS | HEART RATE: 82 BPM | HEIGHT: 62.01 IN | OXYGEN SATURATION: 100 % | SYSTOLIC BLOOD PRESSURE: 178 MMHG | BODY MASS INDEX: 17.95 KG/M2

## 2021-08-12 DIAGNOSIS — D50.0 IRON DEFICIENCY ANEMIA DUE TO CHRONIC BLOOD LOSS: ICD-10-CM

## 2021-08-12 LAB
ALBUMIN SERPL-MCNC: 3.9 G/DL (ref 3.4–5)
ALBUMIN/GLOB SERPL: 1.2 {RATIO} (ref 1–2)
ALP LIVER SERPL-CCNC: 44 U/L
ALT SERPL-CCNC: 25 U/L
ANION GAP SERPL CALC-SCNC: 3 MMOL/L (ref 0–18)
AST SERPL-CCNC: 12 U/L (ref 15–37)
BASOPHILS # BLD AUTO: 0.02 X10(3) UL (ref 0–0.2)
BASOPHILS NFR BLD AUTO: 0.6 %
BILIRUB SERPL-MCNC: 0.1 MG/DL (ref 0.1–2)
BUN BLD-MCNC: 14 MG/DL (ref 7–18)
CALCIUM BLD-MCNC: 8.8 MG/DL (ref 8.5–10.1)
CHLORIDE SERPL-SCNC: 99 MMOL/L (ref 98–112)
CO2 SERPL-SCNC: 29 MMOL/L (ref 21–32)
CREAT BLD-MCNC: 0.79 MG/DL
DEPRECATED HBV CORE AB SER IA-ACNC: 1122.1 NG/ML
EOSINOPHIL # BLD AUTO: 0.07 X10(3) UL (ref 0–0.7)
EOSINOPHIL NFR BLD AUTO: 1.9 %
ERYTHROCYTE [DISTWIDTH] IN BLOOD BY AUTOMATED COUNT: 13.4 %
GLOBULIN PLAS-MCNC: 3.3 G/DL (ref 2.8–4.4)
GLUCOSE BLD-MCNC: 83 MG/DL (ref 70–99)
HCT VFR BLD AUTO: 29.8 %
HGB BLD-MCNC: 10.3 G/DL
IMM GRANULOCYTES # BLD AUTO: 0.01 X10(3) UL (ref 0–1)
IMM GRANULOCYTES NFR BLD: 0.3 %
IRON SATURATION: 57 %
IRON SERPL-MCNC: 147 UG/DL
LYMPHOCYTES # BLD AUTO: 0.88 X10(3) UL (ref 1–4)
LYMPHOCYTES NFR BLD AUTO: 24.4 %
M PROTEIN MFR SERPL ELPH: 7.2 G/DL (ref 6.4–8.2)
MCH RBC QN AUTO: 36.8 PG (ref 26–34)
MCHC RBC AUTO-ENTMCNC: 34.6 G/DL (ref 31–37)
MCV RBC AUTO: 106.4 FL
MONOCYTES # BLD AUTO: 0.41 X10(3) UL (ref 0.1–1)
MONOCYTES NFR BLD AUTO: 11.4 %
NEUTROPHILS # BLD AUTO: 2.22 X10 (3) UL (ref 1.5–7.7)
NEUTROPHILS # BLD AUTO: 2.22 X10(3) UL (ref 1.5–7.7)
NEUTROPHILS NFR BLD AUTO: 61.4 %
OSMOLALITY SERPL CALC.SUM OF ELEC: 272 MOSM/KG (ref 275–295)
PATIENT FASTING Y/N/NP: NO
PLATELET # BLD AUTO: 210 10(3)UL (ref 150–450)
POTASSIUM SERPL-SCNC: 4.8 MMOL/L (ref 3.5–5.1)
RBC # BLD AUTO: 2.8 X10(6)UL
SODIUM SERPL-SCNC: 131 MMOL/L (ref 136–145)
TOTAL IRON BINDING CAPACITY: 259 UG/DL (ref 240–450)
TRANSFERRIN SERPL-MCNC: 174 MG/DL (ref 200–360)
WBC # BLD AUTO: 3.6 X10(3) UL (ref 4–11)

## 2021-08-12 PROCEDURE — 83550 IRON BINDING TEST: CPT

## 2021-08-12 PROCEDURE — 80053 COMPREHEN METABOLIC PANEL: CPT

## 2021-08-12 PROCEDURE — 83540 ASSAY OF IRON: CPT

## 2021-08-12 PROCEDURE — 36415 COLL VENOUS BLD VENIPUNCTURE: CPT

## 2021-08-12 PROCEDURE — 85025 COMPLETE CBC W/AUTO DIFF WBC: CPT

## 2021-08-12 PROCEDURE — 82728 ASSAY OF FERRITIN: CPT

## 2021-08-12 NOTE — PROGRESS NOTES
Education Record    Learner:  Patient    [facility]    Barriers / Limitations:  None   Comments:    Method:  Discussion   Comments:    General Topics:  Plan of care reviewed   Comments:    Outcome:  Shows understanding   Comments:Pt hgb 10.

## 2021-08-13 ENCOUNTER — OFFICE VISIT (OUTPATIENT)
Dept: PHYSICAL THERAPY | Age: 66
End: 2021-08-13
Attending: ANESTHESIOLOGY
Payer: MEDICARE

## 2021-08-13 DIAGNOSIS — M48.061 SPINAL STENOSIS OF LUMBAR REGION WITHOUT NEUROGENIC CLAUDICATION: ICD-10-CM

## 2021-08-13 PROCEDURE — 97110 THERAPEUTIC EXERCISES: CPT

## 2021-08-13 PROCEDURE — 97162 PT EVAL MOD COMPLEX 30 MIN: CPT

## 2021-08-13 NOTE — PROGRESS NOTES
SPINE EVALUATION:   Referring Physician: Dr. Ancelmo Magdaleno  Diagnosis: Lumbar spine Stenosis     Date of Service: 8/13/2021     PATIENT SUMMARY   Arlinda Cooks is a 77year old female who presents to therapy today with complaints of pain lumbar spine refer pathology, POC and HEP. Pt voiced understanding and performs HEP correctly without reported pain. Skilled Physical Therapy is medically necessary to address the above impairments and reach functional goals.      Precautions:  Drug Allergy, Cancer  OBJECTIV factors/comorbidities, 4+ body structures involved/activity limitations, and unstable symptoms including changing pain levels. PLAN OF CARE:    Goals: (to be met in 8 visits)   1.  Increase hip and knee strength to 4/5 to enable pt to get up from sitting 5

## 2021-08-16 ENCOUNTER — OFFICE VISIT (OUTPATIENT)
Dept: PHYSICAL THERAPY | Age: 66
End: 2021-08-16
Attending: ANESTHESIOLOGY
Payer: MEDICARE

## 2021-08-16 PROCEDURE — 97112 NEUROMUSCULAR REEDUCATION: CPT

## 2021-08-16 PROCEDURE — 97110 THERAPEUTIC EXERCISES: CPT

## 2021-08-16 NOTE — PROGRESS NOTES
Dx: Lumbar spine Stenosis        Insurance (Authorized # of Visits):  6           Authorizing Physician: Dr. Madeleine Caballero MD visit: none scheduled  Fall Risk: standard           Precautions:Breast Cancer, lumpectomy and Chemo, Cervical and Lumbar spine fusio lengths of parallel bars  Standing rows with green cord x 10  B sh ext with green cord x 10  Standing B sh flexion, abduction with 2# wts x 10       Prone over pillow knee flexion R/L x 10  Manual quads stretch R/L 30 sec hold  STM across lower back 5 mins

## 2021-08-19 ENCOUNTER — APPOINTMENT (OUTPATIENT)
Dept: HEMATOLOGY/ONCOLOGY | Facility: HOSPITAL | Age: 66
End: 2021-08-19
Attending: INTERNAL MEDICINE
Payer: MEDICARE

## 2021-08-20 ENCOUNTER — OFFICE VISIT (OUTPATIENT)
Dept: PHYSICAL THERAPY | Age: 66
End: 2021-08-20
Attending: ANESTHESIOLOGY
Payer: MEDICARE

## 2021-08-20 PROCEDURE — 97110 THERAPEUTIC EXERCISES: CPT

## 2021-08-20 PROCEDURE — 97140 MANUAL THERAPY 1/> REGIONS: CPT

## 2021-08-20 NOTE — PROGRESS NOTES
Dx: Lumbar spine Stenosis        Insurance (Authorized # of Visits):  6           Authorizing Physician: Dr. Glenn Sanchez  Next MD visit: none scheduled  Fall Risk: standard           Precautions:Breast Cancer, lumpectomy and Chemo, Cervical and Lumbar spine fusio bands R/L x 20  x 10  Bent leg lift with abdominal bracing R/L x 10  Seated knee ext 2# R/L x 10  Prone manual quads stretch R/L 20 sec hold  Prone alternating knee flexion R/L x 10  STM lumbar spine and PSIS 5 mins  Standing green cord rows, B sh ext x 10

## 2021-08-23 ENCOUNTER — OFFICE VISIT (OUTPATIENT)
Dept: PHYSICAL THERAPY | Age: 66
End: 2021-08-23
Attending: ANESTHESIOLOGY
Payer: MEDICARE

## 2021-08-23 PROCEDURE — 97110 THERAPEUTIC EXERCISES: CPT

## 2021-08-23 PROCEDURE — 97140 MANUAL THERAPY 1/> REGIONS: CPT

## 2021-08-23 NOTE — PROGRESS NOTES
Dx: Lumbar spine Stenosis        Insurance (Authorized # of Visits):  6           Authorizing Physician: Dr. Thelma Conway  Next MD visit: none scheduled  Fall Risk: standard           Precautions:Breast Cancer, lumpectomy and Chemo, Cervical and Lumbar spine fusio R/L x 10  Side lying passive hip flexor stretch R/L 30 secs  DBL leg press 4 bands x 20  SL press 3 bands R/L x 20  x 10  Bent leg lift with abdominal bracing R/L x 10  Seated knee ext 2# R/L x 10  Prone manual quads stretch R/L 20 sec hold  Prone alternat

## 2021-08-24 ENCOUNTER — PATIENT MESSAGE (OUTPATIENT)
Dept: INTERNAL MEDICINE CLINIC | Facility: CLINIC | Age: 66
End: 2021-08-24

## 2021-08-24 NOTE — TELEPHONE ENCOUNTER
Dr. Praveen Allen please see pt message below  Pt requesting to complete the form to clear her to join [facility]  Do you recommend OV or ok to fill out the form?

## 2021-08-24 NOTE — TELEPHONE ENCOUNTER
From: Sanjay Carrion  To: Daxa Amaya MD  Sent: 8/24/2021 2:13 PM CDT  Subject: Non-Urgent Medical Question    I have a form that needs to be signed by Dr. Conner Ragsdale for permission that I am cleared to join [facility].  Can I d

## 2021-08-24 NOTE — TELEPHONE ENCOUNTER
My one visit w/pt:  \"AF on flecainide, diffuse ASVD, CAD, lucunar CVA and ischemic bowel s/p SMA stent and followed by vascular at [facility] every year , profound anemia both CINTIA and MDS seeing heme and receiving procrit and iron, hx of alcohol abuse, hx of

## 2021-08-25 ENCOUNTER — OFFICE VISIT (OUTPATIENT)
Dept: HEMATOLOGY/ONCOLOGY | Facility: HOSPITAL | Age: 66
End: 2021-08-25
Attending: INTERNAL MEDICINE
Payer: MEDICARE

## 2021-08-25 DIAGNOSIS — D50.0 IRON DEFICIENCY ANEMIA DUE TO CHRONIC BLOOD LOSS: ICD-10-CM

## 2021-08-25 LAB
BASOPHILS # BLD AUTO: 0.01 X10(3) UL (ref 0–0.2)
BASOPHILS NFR BLD AUTO: 0.2 %
EOSINOPHIL # BLD AUTO: 0.06 X10(3) UL (ref 0–0.7)
EOSINOPHIL NFR BLD AUTO: 1.4 %
ERYTHROCYTE [DISTWIDTH] IN BLOOD BY AUTOMATED COUNT: 13.6 %
HCT VFR BLD AUTO: 30.5 %
HGB BLD-MCNC: 10.5 G/DL
IMM GRANULOCYTES # BLD AUTO: 0 X10(3) UL (ref 0–1)
IMM GRANULOCYTES NFR BLD: 0 %
LYMPHOCYTES # BLD AUTO: 0.9 X10(3) UL (ref 1–4)
LYMPHOCYTES NFR BLD AUTO: 21.6 %
MCH RBC QN AUTO: 36.6 PG (ref 26–34)
MCHC RBC AUTO-ENTMCNC: 34.4 G/DL (ref 31–37)
MCV RBC AUTO: 106.3 FL
MONOCYTES # BLD AUTO: 0.46 X10(3) UL (ref 0.1–1)
MONOCYTES NFR BLD AUTO: 11 %
NEUTROPHILS # BLD AUTO: 2.74 X10 (3) UL (ref 1.5–7.7)
NEUTROPHILS # BLD AUTO: 2.74 X10(3) UL (ref 1.5–7.7)
NEUTROPHILS NFR BLD AUTO: 65.8 %
PLATELET # BLD AUTO: 215 10(3)UL (ref 150–450)
RBC # BLD AUTO: 2.87 X10(6)UL
WBC # BLD AUTO: 4.2 X10(3) UL (ref 4–11)

## 2021-08-25 PROCEDURE — 85025 COMPLETE CBC W/AUTO DIFF WBC: CPT

## 2021-08-25 PROCEDURE — 36415 COLL VENOUS BLD VENIPUNCTURE: CPT

## 2021-08-25 NOTE — PROGRESS NOTES
Pt came in for pl/poss inj. Hgb 10.5. Per MD order, no inj needed at this time. Will continue to monitor.

## 2021-08-26 ENCOUNTER — APPOINTMENT (OUTPATIENT)
Dept: HEMATOLOGY/ONCOLOGY | Facility: HOSPITAL | Age: 66
End: 2021-08-26
Attending: INTERNAL MEDICINE
Payer: MEDICARE

## 2021-08-26 DIAGNOSIS — M54.16 LUMBAR RADICULOPATHY: ICD-10-CM

## 2021-08-26 DIAGNOSIS — M48.061 SPINAL STENOSIS OF LUMBAR REGION WITHOUT NEUROGENIC CLAUDICATION: ICD-10-CM

## 2021-08-26 NOTE — TELEPHONE ENCOUNTER
Medication: HYDROcodone-acetaminophen 5-325 MG Oral Tab    Date of last refill: 07/30/21  [place]    Last office visit: 07/12/21  Due back to clinic per last office note:  na  Date next office visit scheduled:

## 2021-08-27 ENCOUNTER — OFFICE VISIT (OUTPATIENT)
Dept: PHYSICAL THERAPY | Age: 66
End: 2021-08-27
Attending: ANESTHESIOLOGY
Payer: MEDICARE

## 2021-08-27 PROCEDURE — 97140 MANUAL THERAPY 1/> REGIONS: CPT

## 2021-08-27 PROCEDURE — 97110 THERAPEUTIC EXERCISES: CPT

## 2021-08-27 RX ORDER — HYDROCODONE BITARTRATE AND ACETAMINOPHEN 5; 325 MG/1; MG/1
1 TABLET ORAL EVERY 8 HOURS PRN
Qty: 90 TABLET | Refills: 0 | Status: SHIPPED | OUTPATIENT
Start: 2021-08-27 | End: 2021-09-29

## 2021-08-27 NOTE — TELEPHONE ENCOUNTER
Contacted patient to discuss the UDS + ethyl glucoride and need to d/w patient as she is on hydrocodone from our office and alprazolam as well. Awaiting call back.

## 2021-08-27 NOTE — PROGRESS NOTES
Dx: Lumbar spine Stenosis        Insurance (Authorized # of Visits):  6           Authorizing Physician: Dr. Lizzie Roper  Next MD visit: none scheduled  Fall Risk: standard           Precautions:Breast Cancer, lumpectomy and Chemo, Cervical and Lumbar spine fusio stretch R/L 30 secs  DBL leg press 4 bands x 20  SL press 3 bands R/L x 20  x 10  Bent leg lift with abdominal bracing R/L x 10  Seated knee ext 2# R/L x 10  Prone manual quads stretch R/L 20 sec hold  Prone alternating knee flexion R/L x 10  STM lumbar sp

## 2021-08-30 ENCOUNTER — APPOINTMENT (OUTPATIENT)
Dept: PHYSICAL THERAPY | Age: 66
End: 2021-08-30
Attending: ANESTHESIOLOGY
Payer: MEDICARE

## 2021-08-30 ENCOUNTER — TELEPHONE (OUTPATIENT)
Dept: PHYSICAL THERAPY | Facility: HOSPITAL | Age: 66
End: 2021-08-30

## 2021-09-01 ENCOUNTER — OFFICE VISIT (OUTPATIENT)
Dept: PHYSICAL THERAPY | Age: 66
End: 2021-09-01
Attending: ANESTHESIOLOGY
Payer: MEDICARE

## 2021-09-01 PROCEDURE — 97110 THERAPEUTIC EXERCISES: CPT

## 2021-09-01 NOTE — PROGRESS NOTES
Dx: Lumbar spine Stenosis        Insurance (Authorized # of Visits):  6           Authorizing Physician: Dr. Corey Carpenter  Next MD visit: ? October  Fall Risk: standard           Precautions:Breast Cancer, lumpectomy and Chemo, Cervical and Lumbar spine fusion, OA R/L x 10  Manual hamstring stretch R/L 3 x 10 sec hold   Supine bent leg fallout R/L x 10  Hook lying hip abd with red t band 2 x 10  Hook lying bent leg lift R/L x 10  Side lying with pillow clam R/L x 10  Side lying passive hip flexor stretch R/L 30 secs stand x 5 with cues to perform abd bracing  x 20   x 10    Prone over pillow knee flexion R/L x 10  Manual quads stretch R/L 30 sec hold  STM across lower back 5 mins       HEP: Hook lying bent leg lift, bent leg fallout, side lying clam, seated knee ext

## 2021-09-03 ENCOUNTER — OFFICE VISIT (OUTPATIENT)
Dept: PHYSICAL THERAPY | Age: 66
End: 2021-09-03
Attending: ANESTHESIOLOGY
Payer: MEDICARE

## 2021-09-03 PROCEDURE — 97110 THERAPEUTIC EXERCISES: CPT

## 2021-09-03 NOTE — PROGRESS NOTES
Dx: Lumbar spine Stenosis        Insurance (Authorized # of Visits):  6           Authorizing Physician: Dr. Jj Yusuf  Next MD visit: ? October  Fall Risk: standard           Precautions:Breast Cancer, lumpectomy and Chemo, Cervical and Lumbar spine fusion, OA abduction, ER/IR R/L x 10  Hook lying bent leg lift R/L x 10  Manual hamstring stretch R/L 3 x 10 sec hold DBL leg press 4 bands x 20  Supine PROM hip flex, abd, ER R/L x 5  Hook lying bent leg lift R/L x 10  Side lying clam R/L x 10   Supine bent leg fall tiltboard A/P and lateral x 20 with light UE support  Standing green cord rows and B sh ext x 20 each   Side stepping in parallel bars with red t band around lower leg 6 lengths of parallel bars  Standing rows with green cord x 10  B sh ext with green cord

## 2021-09-08 ENCOUNTER — OFFICE VISIT (OUTPATIENT)
Dept: HEMATOLOGY/ONCOLOGY | Facility: HOSPITAL | Age: 66
End: 2021-09-08
Attending: INTERNAL MEDICINE
Payer: MEDICARE

## 2021-09-08 ENCOUNTER — OFFICE VISIT (OUTPATIENT)
Dept: PHYSICAL THERAPY | Age: 66
End: 2021-09-08
Attending: ANESTHESIOLOGY
Payer: MEDICARE

## 2021-09-08 VITALS
SYSTOLIC BLOOD PRESSURE: 154 MMHG | BODY MASS INDEX: 17.87 KG/M2 | TEMPERATURE: 97 F | HEART RATE: 88 BPM | OXYGEN SATURATION: 98 % | WEIGHT: 98.38 LBS | HEIGHT: 62.01 IN | DIASTOLIC BLOOD PRESSURE: 79 MMHG | RESPIRATION RATE: 16 BRPM

## 2021-09-08 DIAGNOSIS — D50.0 IRON DEFICIENCY ANEMIA DUE TO CHRONIC BLOOD LOSS: ICD-10-CM

## 2021-09-08 LAB
ALBUMIN SERPL-MCNC: 3.7 G/DL (ref 3.4–5)
ALBUMIN/GLOB SERPL: 1.1 {RATIO} (ref 1–2)
ALP LIVER SERPL-CCNC: 44 U/L
ALT SERPL-CCNC: 20 U/L
ANION GAP SERPL CALC-SCNC: 6 MMOL/L (ref 0–18)
AST SERPL-CCNC: 11 U/L (ref 15–37)
BASOPHILS # BLD AUTO: 0.01 X10(3) UL (ref 0–0.2)
BASOPHILS NFR BLD AUTO: 0.3 %
BILIRUB SERPL-MCNC: 0.2 MG/DL (ref 0.1–2)
BUN BLD-MCNC: 16 MG/DL (ref 7–18)
CALCIUM BLD-MCNC: 9.4 MG/DL (ref 8.5–10.1)
CHLORIDE SERPL-SCNC: 103 MMOL/L (ref 98–112)
CO2 SERPL-SCNC: 28 MMOL/L (ref 21–32)
CREAT BLD-MCNC: 0.85 MG/DL
DEPRECATED HBV CORE AB SER IA-ACNC: 935.3 NG/ML
EOSINOPHIL # BLD AUTO: 0.06 X10(3) UL (ref 0–0.7)
EOSINOPHIL NFR BLD AUTO: 2 %
ERYTHROCYTE [DISTWIDTH] IN BLOOD BY AUTOMATED COUNT: 13.5 %
GLOBULIN PLAS-MCNC: 3.4 G/DL (ref 2.8–4.4)
GLUCOSE BLD-MCNC: 100 MG/DL (ref 70–99)
HCT VFR BLD AUTO: 30.1 %
HGB BLD-MCNC: 10 G/DL
IMM GRANULOCYTES # BLD AUTO: 0 X10(3) UL (ref 0–1)
IMM GRANULOCYTES NFR BLD: 0 %
IRON SATURATION: 50 %
IRON SERPL-MCNC: 126 UG/DL
LYMPHOCYTES # BLD AUTO: 0.59 X10(3) UL (ref 1–4)
LYMPHOCYTES NFR BLD AUTO: 19.8 %
M PROTEIN MFR SERPL ELPH: 7.1 G/DL (ref 6.4–8.2)
MCH RBC QN AUTO: 35.8 PG (ref 26–34)
MCHC RBC AUTO-ENTMCNC: 33.2 G/DL (ref 31–37)
MCV RBC AUTO: 107.9 FL
MONOCYTES # BLD AUTO: 0.35 X10(3) UL (ref 0.1–1)
MONOCYTES NFR BLD AUTO: 11.7 %
NEUTROPHILS # BLD AUTO: 1.97 X10 (3) UL (ref 1.5–7.7)
NEUTROPHILS # BLD AUTO: 1.97 X10(3) UL (ref 1.5–7.7)
NEUTROPHILS NFR BLD AUTO: 66.2 %
OSMOLALITY SERPL CALC.SUM OF ELEC: 285 MOSM/KG (ref 275–295)
PATIENT FASTING Y/N/NP: NO
PLATELET # BLD AUTO: 227 10(3)UL (ref 150–450)
POTASSIUM SERPL-SCNC: 3.9 MMOL/L (ref 3.5–5.1)
RBC # BLD AUTO: 2.79 X10(6)UL
SODIUM SERPL-SCNC: 137 MMOL/L (ref 136–145)
TOTAL IRON BINDING CAPACITY: 253 UG/DL (ref 240–450)
TRANSFERRIN SERPL-MCNC: 170 MG/DL (ref 200–360)
WBC # BLD AUTO: 3 X10(3) UL (ref 4–11)

## 2021-09-08 PROCEDURE — 82728 ASSAY OF FERRITIN: CPT

## 2021-09-08 PROCEDURE — 97110 THERAPEUTIC EXERCISES: CPT

## 2021-09-08 PROCEDURE — 80053 COMPREHEN METABOLIC PANEL: CPT

## 2021-09-08 PROCEDURE — 85025 COMPLETE CBC W/AUTO DIFF WBC: CPT

## 2021-09-08 PROCEDURE — 36415 COLL VENOUS BLD VENIPUNCTURE: CPT

## 2021-09-08 PROCEDURE — 83550 IRON BINDING TEST: CPT

## 2021-09-08 PROCEDURE — 97140 MANUAL THERAPY 1/> REGIONS: CPT

## 2021-09-08 PROCEDURE — 83540 ASSAY OF IRON: CPT

## 2021-09-08 NOTE — PROGRESS NOTES
Dx: Lumbar spine Stenosis        Insurance (Authorized # of Visits):  8 + 8       Authorizing Physician: Dr. Carlos Enrique Roach  Next MD visit: ? October  Fall Risk: standard           Precautions:Breast Cancer, lumpectomy and Chemo, Cervical and Lumbar spine fusion, OA distraction mobilizations R/L 2 mins each side  x 10   x 10   x 10  2 mins Supine passive hip flexion, abduction, ER/IR R/L x 10  Hook lying bent leg lift R/L x 10  Manual hamstring stretch R/L 3 x 10 sec hold DBL leg press 4 bands x 20  Supine PROM hip fl 10  Prone knee flexion R/L x 10  STM across lumbar spine 5 mins  Seated knee ext 2# R/L x 20  Sit to stand using hands x 10  Standing tiltboard A/P and lateral x 20 with light UE support  Standing green cord rows and B sh ext x 20 each  x 10     x 10    5

## 2021-09-08 NOTE — PROGRESS NOTES
Patient here today for labs and possible injection. Hgb level of 10.0 today. Therefore, no retacrit injection indicated. Patient to return in two weeks for next appointment. Patient verbalized understanding.

## 2021-09-14 ENCOUNTER — PATIENT MESSAGE (OUTPATIENT)
Dept: PAIN CLINIC | Facility: CLINIC | Age: 66
End: 2021-09-14

## 2021-09-14 DIAGNOSIS — M54.16 LUMBAR RADICULOPATHY: ICD-10-CM

## 2021-09-14 DIAGNOSIS — M48.061 SPINAL STENOSIS OF LUMBAR REGION WITHOUT NEUROGENIC CLAUDICATION: Primary | ICD-10-CM

## 2021-09-14 NOTE — TELEPHONE ENCOUNTER
From: Mya Lawson  To: Junior Chandra MD  Sent: 9/14/2021 3:22 PM CDT  Subject: Valentino Dexter had an issue getting an appointment with my physical therapist. She has asked me to contact you for a NEW physical therapy note that w

## 2021-09-22 ENCOUNTER — OFFICE VISIT (OUTPATIENT)
Dept: HEMATOLOGY/ONCOLOGY | Facility: HOSPITAL | Age: 66
End: 2021-09-22
Attending: INTERNAL MEDICINE
Payer: MEDICARE

## 2021-09-22 DIAGNOSIS — D46.9 MDS (MYELODYSPLASTIC SYNDROME) (HCC): Primary | ICD-10-CM

## 2021-09-22 LAB
BASOPHILS # BLD AUTO: 0.01 X10(3) UL (ref 0–0.2)
BASOPHILS NFR BLD AUTO: 0.3 %
EOSINOPHIL # BLD AUTO: 0.04 X10(3) UL (ref 0–0.7)
EOSINOPHIL NFR BLD AUTO: 1.1 %
ERYTHROCYTE [DISTWIDTH] IN BLOOD BY AUTOMATED COUNT: 12.7 %
HCT VFR BLD AUTO: 30.8 %
HGB BLD-MCNC: 10.4 G/DL
IMM GRANULOCYTES # BLD AUTO: 0.01 X10(3) UL (ref 0–1)
IMM GRANULOCYTES NFR BLD: 0.3 %
LYMPHOCYTES # BLD AUTO: 0.73 X10(3) UL (ref 1–4)
LYMPHOCYTES NFR BLD AUTO: 19.9 %
MCH RBC QN AUTO: 36.5 PG (ref 26–34)
MCHC RBC AUTO-ENTMCNC: 33.8 G/DL (ref 31–37)
MCV RBC AUTO: 108.1 FL
MONOCYTES # BLD AUTO: 0.48 X10(3) UL (ref 0.1–1)
MONOCYTES NFR BLD AUTO: 13.1 %
NEUTROPHILS # BLD AUTO: 2.4 X10 (3) UL (ref 1.5–7.7)
NEUTROPHILS # BLD AUTO: 2.4 X10(3) UL (ref 1.5–7.7)
NEUTROPHILS NFR BLD AUTO: 65.3 %
PLATELET # BLD AUTO: 222 10(3)UL (ref 150–450)
RBC # BLD AUTO: 2.85 X10(6)UL
WBC # BLD AUTO: 3.7 X10(3) UL (ref 4–11)

## 2021-09-22 PROCEDURE — 85025 COMPLETE CBC W/AUTO DIFF WBC: CPT

## 2021-09-22 PROCEDURE — 36415 COLL VENOUS BLD VENIPUNCTURE: CPT

## 2021-09-29 ENCOUNTER — TELEPHONE (OUTPATIENT)
Dept: PAIN CLINIC | Facility: CLINIC | Age: 66
End: 2021-09-29

## 2021-09-29 DIAGNOSIS — M48.061 SPINAL STENOSIS OF LUMBAR REGION WITHOUT NEUROGENIC CLAUDICATION: ICD-10-CM

## 2021-09-29 DIAGNOSIS — M54.16 LUMBAR RADICULOPATHY: ICD-10-CM

## 2021-09-29 NOTE — TELEPHONE ENCOUNTER
Medicateion: HYDROcodone-acetaminophen 5-325 MG Oral Tab    Date of last refill: 08/30/21  [place] 71/98/81    Last office visit: 07/12/21  Due back to clinic per last office note:  na  Date next office visit scheduled:

## 2021-09-30 DIAGNOSIS — M54.16 LUMBAR RADICULOPATHY: ICD-10-CM

## 2021-09-30 DIAGNOSIS — M48.061 SPINAL STENOSIS OF LUMBAR REGION WITHOUT NEUROGENIC CLAUDICATION: ICD-10-CM

## 2021-09-30 RX ORDER — HYDROCODONE BITARTRATE AND ACETAMINOPHEN 5; 325 MG/1; MG/1
1 TABLET ORAL EVERY 8 HOURS PRN
Qty: 90 TABLET | Refills: 0 | Status: SHIPPED | OUTPATIENT
Start: 2021-09-30 | End: 2021-10-05

## 2021-09-30 RX ORDER — HYDROCODONE BITARTRATE AND ACETAMINOPHEN 5; 325 MG/1; MG/1
1 TABLET ORAL EVERY 8 HOURS PRN
Qty: 90 TABLET | Refills: 0 | OUTPATIENT
Start: 2021-09-30

## 2021-09-30 NOTE — TELEPHONE ENCOUNTER
appt scheduled.     Future Appointments   Date Time Provider Brandi Beach   10/6/2021  1:15 PM Saul Ludwig MD [address] [facility]   10/6/2021  1:30 PM [facility] [place] [address] [facility]   10/7/2021  1:00 PM Dyke Gaucher, MD [facility]

## 2021-10-01 NOTE — TELEPHONE ENCOUNTER
Per patient's message     Oralia Gregory am going on vacation beginning 10/20/21 and not returning until 11/2/21. This will mean that I will need a refill of Hydrocodone on 10/30/21 and it will need to [facility].   How do I go a

## 2021-10-04 NOTE — PROGRESS NOTES
[facility] Progress Note      Patient Name:  Rashawn Quintero  YOB: 1955  Medical Record Number:  [id number]    Date of visit: 10/6/2021      CHIEF COMPLAINT: Anemia    HPI:     77year old female that I have followed since 6/18 TABLET BY MOUTH EVERY DAY     • Multiple Vitamin (MULTI-VITAMIN) Oral Tab Take 1 tablet by mouth. • aspirin 81 MG Oral Tab Take 81 mg by mouth. • primidone 50 MG Oral Tab Take 100 mg by mouth 2 (two) times a day.        • Flecainide Acetate 100 MG O Last admitted 12/20. Has seen GI recently and colonoscopy has been deferred. # Leucopenia: Noted since 2/19. BM biopsy 12/19-no malignancy. Myeloid malignancy panel neg. SPEP neg. Likely mild MDS. continue to follow.     # Breast ca: R breast ca 2005, t

## 2021-10-05 RX ORDER — HYDROCODONE BITARTRATE AND ACETAMINOPHEN 5; 325 MG/1; MG/1
1 TABLET ORAL EVERY 8 HOURS PRN
Qty: 15 TABLET | Refills: 0 | Status: SHIPPED | OUTPATIENT
Start: 2021-10-05 | End: 2021-10-29

## 2021-10-05 NOTE — TELEPHONE ENCOUNTER
Pended script for 15 tabs to cover gap while pt is out of town to be dispensed on 10/19/2021. Note to pharmacy included on script indicating travel override through insurance will be necessary. If travel override is not obtained, pt may pay out of pocket.

## 2021-10-05 NOTE — TELEPHONE ENCOUNTER
I do not think so, unless the patient speaks with the pharmacy herself. If not she will need to get an early refill. The patient can check with the pharmacy that she uses and the pharmacy in [facility] where she will be and she can let us know.   Only ot

## 2021-10-06 ENCOUNTER — OFFICE VISIT (OUTPATIENT)
Dept: HEMATOLOGY/ONCOLOGY | Facility: HOSPITAL | Age: 66
End: 2021-10-06
Attending: INTERNAL MEDICINE
Payer: MEDICARE

## 2021-10-06 VITALS
HEART RATE: 83 BPM | RESPIRATION RATE: 18 BRPM | TEMPERATURE: 98 F | OXYGEN SATURATION: 100 % | BODY MASS INDEX: 18.5 KG/M2 | SYSTOLIC BLOOD PRESSURE: 161 MMHG | HEIGHT: 62.01 IN | DIASTOLIC BLOOD PRESSURE: 88 MMHG | WEIGHT: 101.81 LBS

## 2021-10-06 DIAGNOSIS — Z17.1 MALIGNANT NEOPLASM OF OVERLAPPING SITES OF RIGHT BREAST IN FEMALE, ESTROGEN RECEPTOR NEGATIVE (HCC): ICD-10-CM

## 2021-10-06 DIAGNOSIS — D46.9 MDS (MYELODYSPLASTIC SYNDROME) (HCC): Primary | ICD-10-CM

## 2021-10-06 DIAGNOSIS — D50.0 IRON DEFICIENCY ANEMIA DUE TO CHRONIC BLOOD LOSS: Primary | ICD-10-CM

## 2021-10-06 DIAGNOSIS — D46.4 REFRACTORY ANEMIA, UNSPECIFIED (HCC): ICD-10-CM

## 2021-10-06 DIAGNOSIS — C50.811 MALIGNANT NEOPLASM OF OVERLAPPING SITES OF RIGHT BREAST IN FEMALE, ESTROGEN RECEPTOR NEGATIVE (HCC): ICD-10-CM

## 2021-10-06 DIAGNOSIS — D46.9 MDS (MYELODYSPLASTIC SYNDROME) (HCC): ICD-10-CM

## 2021-10-06 PROCEDURE — 99214 OFFICE O/P EST MOD 30 MIN: CPT | Performed by: INTERNAL MEDICINE

## 2021-10-06 NOTE — PROGRESS NOTES
Education Record    Learner:  Patient  anemia  Disease / Diagnosis:    Barriers / Limitations:  None   Comments:    Method:  Discussion   Comments:    General Topics:  Plan of care reviewed   Comments:    Outcome:  Shows understanding   Comments:  Feeling

## 2021-10-07 ENCOUNTER — OFFICE VISIT (OUTPATIENT)
Dept: PAIN CLINIC | Facility: CLINIC | Age: 66
End: 2021-10-07
Payer: MEDICARE

## 2021-10-07 ENCOUNTER — PATIENT MESSAGE (OUTPATIENT)
Dept: INTERNAL MEDICINE CLINIC | Facility: CLINIC | Age: 66
End: 2021-10-07

## 2021-10-07 VITALS
DIASTOLIC BLOOD PRESSURE: 82 MMHG | RESPIRATION RATE: 18 BRPM | HEART RATE: 79 BPM | BODY MASS INDEX: 18 KG/M2 | SYSTOLIC BLOOD PRESSURE: 172 MMHG | OXYGEN SATURATION: 100 % | WEIGHT: 101 LBS

## 2021-10-07 DIAGNOSIS — M48.061 SPINAL STENOSIS OF LUMBAR REGION WITHOUT NEUROGENIC CLAUDICATION: ICD-10-CM

## 2021-10-07 DIAGNOSIS — G24.3 CERVICAL DYSTONIA: ICD-10-CM

## 2021-10-07 DIAGNOSIS — Z98.1 S/P CERVICAL SPINAL FUSION: Primary | ICD-10-CM

## 2021-10-07 DIAGNOSIS — F11.20 CHRONIC NARCOTIC DEPENDENCE (HCC): ICD-10-CM

## 2021-10-07 PROCEDURE — 99214 OFFICE O/P EST MOD 30 MIN: CPT | Performed by: ANESTHESIOLOGY

## 2021-10-07 RX ORDER — DULOXETIN HYDROCHLORIDE 30 MG/1
30 CAPSULE, DELAYED RELEASE ORAL DAILY
Qty: 90 CAPSULE | Refills: 0 | Status: SHIPPED | OUTPATIENT
Start: 2021-10-07 | End: 2022-01-05

## 2021-10-07 NOTE — PROGRESS NOTES
Name: Karishma Hay   : 1/15/1955   DOS: 10/7/2021     Pain Clinic Follow Up Visit:   Patient presents with:  Medication Follow-Up: spinal stenosis of lumbar region      Karishma Hay is a 77year old female with a history of spinal stenosis Cyanocobalamin (B-12) 500 MCG Oral Tab Take 500 mcg by mouth daily. • Calcium Carbonate-Vitamin D (OSCAL-500) 500-400 MG-UNIT Oral Tab Take 1 tablet by mouth daily.        • gabapentin (NEURONTIN) 300 MG Oral Cap Take 600 mg by mouth 2 (two) times a day death from overdose and mixing narcotic with alcohol. Pt verbalized understanding.      Medications filled today:  Requested Prescriptions      No prescriptions requested or ordered in this encounter     Orders:Orders Placed This Encounter      Pain Managem

## 2021-10-07 NOTE — PROGRESS NOTES
Patient presents in office today with reported pain in low back pain, left sided    Current pain level reported = 4/10    Last reported dose of tylenol      Narcotic Contract renewal 7/12/21    Urine Drug screen 7/12/21

## 2021-10-07 NOTE — TELEPHONE ENCOUNTER
Cymbalta previously prescribed by different dr. Was discussed with Dr Nena Cervantes at 6/17/21 visit and agreed upon that Dr Nena Cervantes would take over this medication. Pended.     Last OV relevant to medication: 6/17/21  Last refill date: historical     #/refills: hist

## 2021-10-14 ENCOUNTER — OFFICE VISIT (OUTPATIENT)
Dept: HEMATOLOGY/ONCOLOGY | Facility: HOSPITAL | Age: 66
End: 2021-10-14
Attending: INTERNAL MEDICINE
Payer: MEDICARE

## 2021-10-14 ENCOUNTER — APPOINTMENT (OUTPATIENT)
Dept: PHYSICAL THERAPY | Age: 66
End: 2021-10-14
Attending: ANESTHESIOLOGY
Payer: MEDICARE

## 2021-10-14 VITALS
SYSTOLIC BLOOD PRESSURE: 102 MMHG | OXYGEN SATURATION: 100 % | HEART RATE: 77 BPM | RESPIRATION RATE: 18 BRPM | TEMPERATURE: 98 F | DIASTOLIC BLOOD PRESSURE: 62 MMHG

## 2021-10-14 DIAGNOSIS — D50.0 IRON DEFICIENCY ANEMIA DUE TO CHRONIC BLOOD LOSS: Primary | ICD-10-CM

## 2021-10-14 PROCEDURE — 96374 THER/PROPH/DIAG INJ IV PUSH: CPT

## 2021-10-18 ENCOUNTER — APPOINTMENT (OUTPATIENT)
Dept: PHYSICAL THERAPY | Age: 66
End: 2021-10-18
Attending: ANESTHESIOLOGY
Payer: MEDICARE

## 2021-10-29 DIAGNOSIS — M54.16 LUMBAR RADICULOPATHY: ICD-10-CM

## 2021-10-29 DIAGNOSIS — M48.061 SPINAL STENOSIS OF LUMBAR REGION WITHOUT NEUROGENIC CLAUDICATION: ICD-10-CM

## 2021-10-29 RX ORDER — HYDROCODONE BITARTRATE AND ACETAMINOPHEN 5; 325 MG/1; MG/1
1 TABLET ORAL EVERY 8 HOURS PRN
Qty: 90 TABLET | Refills: 0 | Status: SHIPPED | OUTPATIENT
Start: 2021-10-29 | End: 2021-11-28

## 2021-10-29 NOTE — TELEPHONE ENCOUNTER
Noted an additional Rx for Norco 5-325 mg, #15 tabs was provided 10/5/2021.      Spoke with Carmel Martinez, pharmacist at [facility] who stated patient did not  Rx for Norco 5-325 mg , #15 tabs     Spoke with Neyda Hidalgo and cancelled Norco 5-325 mg ,

## 2021-10-29 NOTE — TELEPHONE ENCOUNTER
Patient calling to request refill of HYDROcodone-acetaminophen 5-325 MG     Pt is out of town and completely out of her medication. Pt would like this refilled today.     Pharmacy [facility] #[id number]- [facility], [place] - [address] [facility] AT [address] [place] [address] Dr GUILLAUME

## 2021-10-29 NOTE — TELEPHONE ENCOUNTER
Medication: HYDROcodone-acetaminophen 5-325 MG Oral Tab    Date of last refill: 09/30/2021  [place]/2021    Last office visit: 10/07/2021  Due back to clinic per last office note: None   Date next office visit sched file.     Chucky Guerra MD, 10/7/2021, 1:18 PM

## 2021-11-09 ENCOUNTER — HOSPITAL ENCOUNTER (OUTPATIENT)
Age: 66
Discharge: HOME | End: 2021-11-09
Payer: MEDICARE

## 2021-11-09 VITALS
HEIGHT: 62 IN | OXYGEN SATURATION: 99 % | BODY MASS INDEX: 18.58 KG/M2 | SYSTOLIC BLOOD PRESSURE: 139 MMHG | TEMPERATURE: 97 F | RESPIRATION RATE: 16 BRPM | DIASTOLIC BLOOD PRESSURE: 91 MMHG | HEART RATE: 93 BPM | WEIGHT: 101 LBS

## 2021-11-09 DIAGNOSIS — J01.10 ACUTE NON-RECURRENT FRONTAL SINUSITIS: Primary | ICD-10-CM

## 2021-11-09 PROCEDURE — 99213 OFFICE O/P EST LOW 20 MIN: CPT | Performed by: NURSE PRACTITIONER

## 2021-11-09 RX ORDER — FLUTICASONE PROPIONATE 50 MCG
2 SPRAY, SUSPENSION (ML) NASAL DAILY
Qty: 16 G | Refills: 0 | Status: SHIPPED | OUTPATIENT
Start: 2021-11-09 | End: 2021-12-09

## 2021-11-09 RX ORDER — AMOXICILLIN AND CLAVULANATE POTASSIUM 875; 125 MG/1; MG/1
1 TABLET, FILM COATED ORAL 2 TIMES DAILY
Qty: 20 TABLET | Refills: 0 | Status: SHIPPED | OUTPATIENT
Start: 2021-11-09 | End: 2021-11-19

## 2021-11-09 NOTE — ED PROVIDER NOTES
Patient Seen in: Immediate [address] [place] [address]      History   Patient presents with:  Sinus Problem    Stated Complaint: sinus problem    Subjective:   43-year-old female presents to immediate care for sinus pain and pressure and congestion.   Patient s [facility])    • Personal history of antineoplastic chemotherapy 2002   • Personal history of endometriosis    • Plaque psoriasis    • Psoriatic arthropathy ([facility] 11/1/2011   • Recurrent major depressive disorder, in remission ([facility] 6/17/2021   • Status post mary stated complaint: sinus problem  Other systems are as noted in HPI. Constitutional and vital signs reviewed. All other systems reviewed and negative except as noted above.     Physical Exam     ED Triage Vitals [11/09/21 1443]   BP (!) 139/91   Pulse 3:31 PM    START taking these medications    amoxicillin clavulanate 875-125 MG Oral Tab  Take 1 tablet by mouth 2 (two) times daily for 10 days. , Normal, Disp-20 tablet, R-0    fluticasone propionate 50 MCG/ACT Nasal Suspension  2 sprays by Nasal route da

## 2021-11-09 NOTE — ED INITIAL ASSESSMENT (HPI)
Pt c/o pnd and sneezing, fatigue and sinus pressure, pt had surgurcial procedure Friday testing neg for Covid, s/s started prior to procedure

## 2021-11-16 ENCOUNTER — APPOINTMENT (OUTPATIENT)
Dept: PHYSICAL THERAPY | Age: 66
End: 2021-11-16
Attending: ANESTHESIOLOGY
Payer: MEDICARE

## 2021-11-18 ENCOUNTER — APPOINTMENT (OUTPATIENT)
Dept: PHYSICAL THERAPY | Age: 66
End: 2021-11-18
Attending: ANESTHESIOLOGY
Payer: MEDICARE

## 2021-11-18 ENCOUNTER — OFFICE VISIT (OUTPATIENT)
Dept: HEMATOLOGY/ONCOLOGY | Facility: HOSPITAL | Age: 66
End: 2021-11-18
Attending: INTERNAL MEDICINE
Payer: MEDICARE

## 2021-11-18 DIAGNOSIS — D46.9 MDS (MYELODYSPLASTIC SYNDROME) (HCC): Primary | ICD-10-CM

## 2021-11-18 PROCEDURE — 36415 COLL VENOUS BLD VENIPUNCTURE: CPT

## 2021-11-18 PROCEDURE — 85025 COMPLETE CBC W/AUTO DIFF WBC: CPT

## 2021-11-18 NOTE — PROGRESS NOTES
Hgb 11.0 - no treatment needed. Patient has appts scheduled - understands to call with any questions of concerns.

## 2021-11-23 ENCOUNTER — APPOINTMENT (OUTPATIENT)
Dept: PHYSICAL THERAPY | Age: 66
End: 2021-11-23
Attending: ANESTHESIOLOGY
Payer: MEDICARE

## 2021-11-28 DIAGNOSIS — M48.061 SPINAL STENOSIS OF LUMBAR REGION WITHOUT NEUROGENIC CLAUDICATION: ICD-10-CM

## 2021-11-28 DIAGNOSIS — M54.16 LUMBAR RADICULOPATHY: ICD-10-CM

## 2021-11-29 DIAGNOSIS — E78.00 PURE HYPERCHOLESTEROLEMIA: Primary | ICD-10-CM

## 2021-11-29 RX ORDER — HYDROCODONE BITARTRATE AND ACETAMINOPHEN 5; 325 MG/1; MG/1
1 TABLET ORAL EVERY 8 HOURS PRN
Qty: 90 TABLET | Refills: 0 | Status: SHIPPED | OUTPATIENT
Start: 2021-11-29 | End: 2021-12-28

## 2021-11-29 NOTE — TELEPHONE ENCOUNTER
Medication: HYDROcodone-acetaminophen 5-325 MG Oral Tab    Date of last refill: 10/29/21  [place] 13/50/23    Last office visit: 10/07/21  Due back to clinic per last office note:  01/22  Date next office visit scheduled

## 2021-11-30 ENCOUNTER — OFFICE VISIT (OUTPATIENT)
Dept: PHYSICAL THERAPY | Age: 66
End: 2021-11-30
Attending: ANESTHESIOLOGY
Payer: MEDICARE

## 2021-11-30 PROCEDURE — 97164 PT RE-EVAL EST PLAN CARE: CPT

## 2021-11-30 PROCEDURE — 97110 THERAPEUTIC EXERCISES: CPT

## 2021-11-30 NOTE — PROGRESS NOTES
Dx:Spinal Stenosis, Lumbar radiculopathy         Insurance (Authorized # of Visits):  Medicare (15)           Authorizing Physician: Dr. [facility]  Next MD visit: none scheduled  Fall Risk: standard         Precautions: Osteoporosis,  Parkinson's, Breast Cancer TX#: 5/ Date:    Tx#: 6/   Re-eval ROM, core strength, flexibility 10 mins       Hook lying LTR x 10  Bridging x 10  Bent leg lift with abd bracing R/L x 10  Bent leg fallout R/L x 10       Hook lying clam with red t band x 15  Side lying hip

## 2021-12-02 ENCOUNTER — APPOINTMENT (OUTPATIENT)
Dept: PHYSICAL THERAPY | Age: 66
End: 2021-12-02
Attending: ANESTHESIOLOGY
Payer: MEDICARE

## 2021-12-02 RX ORDER — ATORVASTATIN CALCIUM 10 MG/1
TABLET, FILM COATED ORAL
Qty: 90 TABLET | Refills: 0 | Status: SHIPPED | OUTPATIENT
Start: 2021-12-02

## 2021-12-07 ENCOUNTER — OFFICE VISIT (OUTPATIENT)
Dept: PHYSICAL THERAPY | Age: 66
End: 2021-12-07
Attending: ANESTHESIOLOGY
Payer: MEDICARE

## 2021-12-07 PROCEDURE — 97140 MANUAL THERAPY 1/> REGIONS: CPT

## 2021-12-07 PROCEDURE — 97110 THERAPEUTIC EXERCISES: CPT

## 2021-12-07 NOTE — PROGRESS NOTES
Dx:Spinal Stenosis, Lumbar radiculopathy         Insurance (Authorized # of Visits):  Medicare (15)           Authorizing Physician: Dr. Catherine Sutton  Next MD visit: none scheduled  Fall Risk: standard         Precautions: Osteoporosis,  Parkinson's, Breast Cancer 10 mins Supine PROM hip flex, abd, ER R/L 5 mins      Hook lying LTR x 10  Bridging x 10  Bent leg lift with abd bracing R/L x 10  Bent leg fallout R/L x 10 Hook lying bent leg lift R/L x 10  Hip abd with red t band x 20  Bridging x 10  Bent leg fallout R/

## 2021-12-09 ENCOUNTER — OFFICE VISIT (OUTPATIENT)
Dept: PHYSICAL THERAPY | Age: 66
End: 2021-12-09
Attending: ANESTHESIOLOGY
Payer: MEDICARE

## 2021-12-09 PROCEDURE — 97140 MANUAL THERAPY 1/> REGIONS: CPT

## 2021-12-09 PROCEDURE — 97110 THERAPEUTIC EXERCISES: CPT

## 2021-12-09 NOTE — PROGRESS NOTES
Dx:Spinal Stenosis, Lumbar radiculopathy         Insurance (Authorized # of Visits):  Medicare (15)           Authorizing Physician: Dr. Sofi Lamb  Next MD visit: none scheduled  Fall Risk: standard         Precautions: Osteoporosis,  Parkinson's, Breast Cancer mobilizations. Date: 11/30/2021  TX#: 2/12 Date:12/7/2021                 TX#: 3/12 Date:12/9/2021                 TX#: 4/12 Date:                 TX#: 5/ Date:    Tx#: 6/   Re-eval ROM, core strength, flexibility 10 mins Supine PROM hip flex, abd, ER R/L

## 2021-12-20 ENCOUNTER — OFFICE VISIT (OUTPATIENT)
Dept: INTERNAL MEDICINE CLINIC | Facility: CLINIC | Age: 66
End: 2021-12-20
Payer: MEDICARE

## 2021-12-20 VITALS
SYSTOLIC BLOOD PRESSURE: 128 MMHG | HEIGHT: 62 IN | HEART RATE: 91 BPM | DIASTOLIC BLOOD PRESSURE: 78 MMHG | RESPIRATION RATE: 16 BRPM | TEMPERATURE: 97 F | OXYGEN SATURATION: 98 % | WEIGHT: 100.13 LBS | BODY MASS INDEX: 18.43 KG/M2

## 2021-12-20 DIAGNOSIS — F33.40 RECURRENT MAJOR DEPRESSIVE DISORDER, IN REMISSION (HCC): ICD-10-CM

## 2021-12-20 DIAGNOSIS — Z23 NEED FOR VACCINATION: ICD-10-CM

## 2021-12-20 DIAGNOSIS — E87.1 HYPONATREMIA: ICD-10-CM

## 2021-12-20 DIAGNOSIS — Z00.00 ENCOUNTER FOR ANNUAL HEALTH EXAMINATION: Primary | ICD-10-CM

## 2021-12-20 DIAGNOSIS — Z11.59 NEED FOR HEPATITIS C SCREENING TEST: ICD-10-CM

## 2021-12-20 DIAGNOSIS — E78.00 PURE HYPERCHOLESTEROLEMIA: ICD-10-CM

## 2021-12-20 PROBLEM — Z86.79 HISTORY OF ATRIAL FIBRILLATION: Status: ACTIVE | Noted: 2021-12-20

## 2021-12-20 PROBLEM — A04.72 INTESTINAL INFECTION DUE TO CLOSTRIDIUM DIFFICILE: Status: RESOLVED | Noted: 2021-06-17 | Resolved: 2021-12-20

## 2021-12-20 PROBLEM — R73.9 HYPERGLYCEMIA: Status: RESOLVED | Noted: 2020-09-06 | Resolved: 2021-12-20

## 2021-12-20 PROBLEM — I48.92 ATRIAL FLUTTER, PAROXYSMAL (HCC): Status: RESOLVED | Noted: 2018-06-06 | Resolved: 2021-12-20

## 2021-12-20 PROBLEM — F17.200 TOBACCO DEPENDENCE: Status: RESOLVED | Noted: 2018-06-21 | Resolved: 2021-12-20

## 2021-12-20 PROCEDURE — 99214 OFFICE O/P EST MOD 30 MIN: CPT | Performed by: INTERNAL MEDICINE

## 2021-12-20 PROCEDURE — G0009 ADMIN PNEUMOCOCCAL VACCINE: HCPCS | Performed by: INTERNAL MEDICINE

## 2021-12-20 PROCEDURE — G0438 PPPS, INITIAL VISIT: HCPCS | Performed by: INTERNAL MEDICINE

## 2021-12-20 PROCEDURE — 90732 PPSV23 VACC 2 YRS+ SUBQ/IM: CPT | Performed by: INTERNAL MEDICINE

## 2021-12-20 NOTE — PATIENT INSTRUCTIONS
Tamara Meraz's SCREENING SCHEDULE   Tests on this list are recommended by your physician but may not be covered, or covered at this frequency, by your insurer. Please check with your insurance carrier before scheduling to verify coverage.    PREV visit.      Anoop Bryson Never done   Pap and Pelvic    Pap   Covered every 2 years for women at normal risk;  Annually if at high risk -  No recommendations at this time    Chlamydia Annually if high risk -  No recommendations at this time   Screening Mammogr http://www. [place].state. [place]/public/books/advin.htm  A link to the [employer]. This site has a lot of good information including definitions of the different types of Advance Directives.  It also has the State forms available on it's webs

## 2021-12-20 NOTE — PROGRESS NOTES
HPI:   Josefina Hodgkin is a 77year old female who presents for a Medicare Initial Annual Wellness visit (Once after 12 month Medicare anniversary) .     relatively new pt to me  Multiple comorbidities  I am managing her high cholesterol and depressio Packs/day: 0.50        Years: 36.00        Pack years: 25        Types: Cigarettes        Quit date: 2018        Years since quittin.9      Smokeless tobacco: Never Used         CAGE screening score of 0 on 2021, showing low risk of alcohol Lab Results   Component Value Date    WBC 4.9 11/18/2021    HGB 11.0 (L) 11/18/2021    .0 11/18/2021        ALLERGIES:   She is allergic to sulfa drugs cross reactors.     CURRENT MEDICATIONS:   ATORVASTATIN 10 MG Oral Tab, TAKE 1 TABLET BY MOUTH E Hyponatremia (12/4/2020), Intestinal infection due to Clostridium difficile (6/17/2021), Ischemic colitis ([facility] (6/2/2018), Lacunar infarction ([facility] (9/9/2013), Neuropathy, Osteoarthritis, Osteoarthrosis, unspecified whether generalized or localized, unspe kg/m² as calculated from the following:    Height as of this encounter: 5' 2\" (1.575 m). Weight as of this encounter: 100 lb 1.6 oz (45.4 kg).     Medicare Hearing Assessment  (Required for AWV/SWV)    Hearing Screening    Time taken: 12/20/2021  1:09 P cymbalta, CPM, reviewed  labs UTD    Pure hypercholesterolemia- LDL at goal, CPM    Hyponatremia- possible d/t lisinopril, or other causes, stable for a long time, cont to monitor    Other comorbidities managed by specialists:  Chronic pain, spinal stenosi 06/29/2021    HDL 87 (H) 06/29/2021    LDL 50 06/29/2021    TRIG 203 (H) 06/29/2021         Electrocardiogram (EKG)   Covered if needed at Welcome to Medicare, and non-screening if indicated for medical reasons 12/05/2020      Ultrasound Screening for [facility] covered by Medicare Part B unless medically necessary (cut with metal); may be covered with your pharmacy prescription benefits -    Tetanus, Diptheria and Pertusis TD and TDaP Not covered by Medicare Part B -  No recommendations at this time    Zoster Not

## 2021-12-23 ENCOUNTER — MED REC SCAN ONLY (OUTPATIENT)
Dept: INTERNAL MEDICINE CLINIC | Facility: CLINIC | Age: 66
End: 2021-12-23

## 2021-12-28 ENCOUNTER — HOSPITAL ENCOUNTER (OUTPATIENT)
Dept: MAMMOGRAPHY | Facility: HOSPITAL | Age: 66
Discharge: HOME | End: 2021-12-28
Attending: INTERNAL MEDICINE
Payer: MEDICARE

## 2021-12-28 ENCOUNTER — LAB ENCOUNTER (OUTPATIENT)
Dept: LAB | Age: 66
End: 2021-12-28
Attending: ANESTHESIOLOGY
Payer: MEDICARE

## 2021-12-28 DIAGNOSIS — Z11.59 NEED FOR HEPATITIS C SCREENING TEST: ICD-10-CM

## 2021-12-28 DIAGNOSIS — M54.16 LUMBAR RADICULOPATHY: ICD-10-CM

## 2021-12-28 DIAGNOSIS — Z17.1 MALIGNANT NEOPLASM OF OVERLAPPING SITES OF RIGHT BREAST IN FEMALE, ESTROGEN RECEPTOR NEGATIVE (HCC): ICD-10-CM

## 2021-12-28 DIAGNOSIS — C50.811 MALIGNANT NEOPLASM OF OVERLAPPING SITES OF RIGHT BREAST IN FEMALE, ESTROGEN RECEPTOR NEGATIVE (HCC): ICD-10-CM

## 2021-12-28 DIAGNOSIS — M48.061 SPINAL STENOSIS OF LUMBAR REGION WITHOUT NEUROGENIC CLAUDICATION: ICD-10-CM

## 2021-12-28 DIAGNOSIS — F11.20 CHRONIC NARCOTIC DEPENDENCE (HCC): ICD-10-CM

## 2021-12-28 DIAGNOSIS — Z98.1 S/P CERVICAL SPINAL FUSION: ICD-10-CM

## 2021-12-28 PROCEDURE — 80307 DRUG TEST PRSMV CHEM ANLYZR: CPT

## 2021-12-28 PROCEDURE — 86803 HEPATITIS C AB TEST: CPT

## 2021-12-28 PROCEDURE — 77062 BREAST TOMOSYNTHESIS BI: CPT | Performed by: INTERNAL MEDICINE

## 2021-12-28 PROCEDURE — 77066 DX MAMMO INCL CAD BI: CPT | Performed by: INTERNAL MEDICINE

## 2021-12-28 PROCEDURE — 36415 COLL VENOUS BLD VENIPUNCTURE: CPT

## 2021-12-29 RX ORDER — HYDROCODONE BITARTRATE AND ACETAMINOPHEN 5; 325 MG/1; MG/1
1 TABLET ORAL EVERY 8 HOURS PRN
Qty: 90 TABLET | Refills: 0 | Status: SHIPPED | OUTPATIENT
Start: 2021-12-29 | End: 2022-01-07

## 2021-12-29 NOTE — TELEPHONE ENCOUNTER
Medication: HYDROcodone-acetaminophen 5-325 MG     Date of last refill: 11/29/21  [place] 54/35/19    Last office visit: 10/07/21  Due back to clinic per last office note:  January  Date next office visit scheduled:  01/

## 2022-01-03 NOTE — PROGRESS NOTES
[facility] Progress Note      Patient Name:  Kenneth Tarango  YOB: 1955  Medical Record Number:  [id number]    Date of visit: 1/5/2022      CHIEF COMPLAINT: Anemia    HPI:     77year old female that I have followed since 6/18 Apply topically to affected area 2 times daily     • lisinopril 20 MG Oral Tab TAKE 1 TABLET BY MOUTH EVERY DAY     • Multiple Vitamin (MULTI-VITAMIN) Oral Tab Take 1 tablet by mouth. • primidone 50 MG Oral Tab Take 100 mg by mouth 2 (two) times a day. HGB 10.9 (L) 12.0 - 16.0 g/dL    HCT 33.2 (L) 35.0 - 48.0 %    .0 150.0 - 450.0 10(3)uL    .4 (H) 80.0 - 100.0 fL    MCH 34.6 (H) 26.0 - 34.0 pg    MCHC 32.8 31.0 - 37.0 g/dL    RDW 12.7 %    Neutrophil Absolute Prelim 2.01 1.50 - 7.70 x10 [facility]    [address], [address]    1/5/2022

## 2022-01-04 ENCOUNTER — OFFICE VISIT (OUTPATIENT)
Dept: PHYSICAL THERAPY | Age: 67
End: 2022-01-04
Attending: ANESTHESIOLOGY
Payer: MEDICARE

## 2022-01-04 PROCEDURE — 97140 MANUAL THERAPY 1/> REGIONS: CPT

## 2022-01-04 PROCEDURE — 97110 THERAPEUTIC EXERCISES: CPT

## 2022-01-04 NOTE — PROGRESS NOTES
Dx:Spinal Stenosis, Lumbar radiculopathy         Insurance (Authorized # of Visits):  Medicare (15)           Authorizing Physician: Dr. Meridee Cushing  Next MD visit: 1/7/2022  Fall Risk: standard         Precautions: Osteoporosis,  Parkinson's, Breast Cancer, A Fi LE stretching and strengthening. Date: 11/30/2021  TX#: 2/12 Date:12/7/2021                 TX#: 3/12 Date:12/9/2021                 TX#: 4/12 Date:1/4/2022                 TX#: 5/12 Date:    Tx#: 6/   Re-eval ROM, core strength, flexibility 10 mins Supine lying clam     Charges: EX 2, MT 1     Total Timed Treatment: 45 min  Total Treatment Time: 45 min

## 2022-01-05 ENCOUNTER — OFFICE VISIT (OUTPATIENT)
Dept: HEMATOLOGY/ONCOLOGY | Facility: HOSPITAL | Age: 67
End: 2022-01-05
Attending: INTERNAL MEDICINE
Payer: MEDICARE

## 2022-01-05 VITALS
HEART RATE: 88 BPM | SYSTOLIC BLOOD PRESSURE: 151 MMHG | RESPIRATION RATE: 18 BRPM | DIASTOLIC BLOOD PRESSURE: 87 MMHG | TEMPERATURE: 98 F | BODY MASS INDEX: 18 KG/M2 | OXYGEN SATURATION: 98 % | WEIGHT: 100.63 LBS

## 2022-01-05 DIAGNOSIS — D50.0 IRON DEFICIENCY ANEMIA DUE TO CHRONIC BLOOD LOSS: Primary | ICD-10-CM

## 2022-01-05 DIAGNOSIS — D46.9 MDS (MYELODYSPLASTIC SYNDROME) (HCC): ICD-10-CM

## 2022-01-05 DIAGNOSIS — D46.4 REFRACTORY ANEMIA, UNSPECIFIED (HCC): ICD-10-CM

## 2022-01-05 DIAGNOSIS — R63.4 WEIGHT LOSS: ICD-10-CM

## 2022-01-05 LAB
BASOPHILS # BLD AUTO: 0.02 X10(3) UL (ref 0–0.2)
BASOPHILS NFR BLD AUTO: 0.6 %
DEPRECATED HBV CORE AB SER IA-ACNC: 820.8 NG/ML
EOSINOPHIL # BLD AUTO: 0.11 X10(3) UL (ref 0–0.7)
EOSINOPHIL NFR BLD AUTO: 3.3 %
ERYTHROCYTE [DISTWIDTH] IN BLOOD BY AUTOMATED COUNT: 12.7 %
HCT VFR BLD AUTO: 33.2 %
HGB BLD-MCNC: 10.9 G/DL
IMM GRANULOCYTES # BLD AUTO: 0.01 X10(3) UL (ref 0–1)
IMM GRANULOCYTES NFR BLD: 0.3 %
IRON SATURATION: 44 %
IRON SERPL-MCNC: 124 UG/DL
LYMPHOCYTES # BLD AUTO: 0.76 X10(3) UL (ref 1–4)
LYMPHOCYTES NFR BLD AUTO: 22.7 %
MCH RBC QN AUTO: 34.6 PG (ref 26–34)
MCHC RBC AUTO-ENTMCNC: 32.8 G/DL (ref 31–37)
MCV RBC AUTO: 105.4 FL
MONOCYTES # BLD AUTO: 0.44 X10(3) UL (ref 0.1–1)
MONOCYTES NFR BLD AUTO: 13.1 %
NEUTROPHILS # BLD AUTO: 2.01 X10 (3) UL (ref 1.5–7.7)
NEUTROPHILS # BLD AUTO: 2.01 X10(3) UL (ref 1.5–7.7)
NEUTROPHILS NFR BLD AUTO: 60 %
PLATELET # BLD AUTO: 295 10(3)UL (ref 150–450)
RBC # BLD AUTO: 3.15 X10(6)UL
TOTAL IRON BINDING CAPACITY: 279 UG/DL (ref 240–450)
TRANSFERRIN SERPL-MCNC: 187 MG/DL (ref 200–360)
WBC # BLD AUTO: 3.4 X10(3) UL (ref 4–11)

## 2022-01-05 PROCEDURE — 99214 OFFICE O/P EST MOD 30 MIN: CPT | Performed by: INTERNAL MEDICINE

## 2022-01-05 PROCEDURE — 96374 THER/PROPH/DIAG INJ IV PUSH: CPT

## 2022-01-05 NOTE — PROGRESS NOTES
Education Record    Learner:  Patient    Disease / [place]    Barriers / Limitations:  None   Comments:    Method:  Discussion   Comments:    General Topics:  Plan of care reviewed   Comments:    Outcome:  Shows understanding   Comments:  feeling

## 2022-01-05 NOTE — PROGRESS NOTES
Education Record    Learner:  Patient    Disease / Diagnosis: Iron Deficiency    Barriers / Limitations:  None   Comments:    Method:  Discussion   Comments:    General Topics:  Plan of care reviewed   Comments:    Outcome:  Shows understanding   Comments:

## 2022-01-05 NOTE — TELEPHONE ENCOUNTER
Last OV relevant to medication: 12/20/21  Last refill date: 10/7/21    #/refills: 90/0  When pt was asked to return for OV: not available  Upcoming appt/reason: none scheduled  Was pt informed of any over due labs: n/a

## 2022-01-06 ENCOUNTER — TELEPHONE (OUTPATIENT)
Dept: HEMATOLOGY/ONCOLOGY | Facility: HOSPITAL | Age: 67
End: 2022-01-06

## 2022-01-06 ENCOUNTER — OFFICE VISIT (OUTPATIENT)
Dept: PHYSICAL THERAPY | Age: 67
End: 2022-01-06
Attending: ANESTHESIOLOGY
Payer: MEDICARE

## 2022-01-06 PROCEDURE — 97110 THERAPEUTIC EXERCISES: CPT

## 2022-01-06 NOTE — TELEPHONE ENCOUNTER
Moisés Salazar MD  P [facility]  Call, Hb and Fe are ok, no need I.V iron or procrit right now. Vargas labs 2 months and will decide then     Reviewed the above with pt. Verbalized understanding. appt made for labs in March.

## 2022-01-06 NOTE — PROGRESS NOTES
Dx:Spinal Stenosis, Lumbar radiculopathy         Insurance (Authorized # of Visits):  Medicare (15)           Authorizing Physician: Dr. Carroll Riley  Next MD visit: 1/7/2022  Fall Risk: standard         Precautions: Osteoporosis,  Parkinson's, Breast Cancer, A Fi promote lumbar stability and reduce radicular symptoms    Plan: Continue PT 6 remaining visits, core stabilization training, LE stretching and strengthening.   Date: 11/30/2021  TX#: 2/12 Date:12/7/2021                 TX#: 3/12 Date:12/9/2021 long axis distraction leg pull R/L 2 mins Prone over pillow STM lumbar spine 10 mins  Manual quads stretch R/L 30 sec hold Prone over pillow lumbar spine and R glute STM 10 mins  Manual quads stretch R/L 3 x 10 sec hold  Seated knee ext R/L x 12   Sit to s

## 2022-01-07 ENCOUNTER — OFFICE VISIT (OUTPATIENT)
Dept: PAIN CLINIC | Facility: CLINIC | Age: 67
End: 2022-01-07
Payer: MEDICARE

## 2022-01-07 ENCOUNTER — TELEPHONE (OUTPATIENT)
Dept: PAIN CLINIC | Facility: CLINIC | Age: 67
End: 2022-01-07

## 2022-01-07 VITALS
HEART RATE: 90 BPM | OXYGEN SATURATION: 99 % | BODY MASS INDEX: 18 KG/M2 | DIASTOLIC BLOOD PRESSURE: 78 MMHG | WEIGHT: 100 LBS | SYSTOLIC BLOOD PRESSURE: 130 MMHG

## 2022-01-07 DIAGNOSIS — M54.16 LUMBAR RADICULOPATHY: ICD-10-CM

## 2022-01-07 DIAGNOSIS — Z98.1 S/P CERVICAL SPINAL FUSION: ICD-10-CM

## 2022-01-07 DIAGNOSIS — M54.16 LUMBAR RADICULITIS: Primary | ICD-10-CM

## 2022-01-07 DIAGNOSIS — M48.061 SPINAL STENOSIS OF LUMBAR REGION WITHOUT NEUROGENIC CLAUDICATION: ICD-10-CM

## 2022-01-07 DIAGNOSIS — F33.40 RECURRENT MAJOR DEPRESSIVE DISORDER, IN REMISSION (HCC): ICD-10-CM

## 2022-01-07 PROCEDURE — 99214 OFFICE O/P EST MOD 30 MIN: CPT | Performed by: ANESTHESIOLOGY

## 2022-01-07 RX ORDER — HYDROCODONE BITARTRATE AND ACETAMINOPHEN 5; 325 MG/1; MG/1
1 TABLET ORAL EVERY 8 HOURS PRN
Qty: 90 TABLET | Refills: 0 | Status: SHIPPED | OUTPATIENT
Start: 2022-01-21

## 2022-01-07 RX ORDER — HYDROCODONE BITARTRATE AND ACETAMINOPHEN 5; 325 MG/1; MG/1
1 TABLET ORAL EVERY 8 HOURS PRN
Qty: 90 TABLET | Refills: 0 | Status: SHIPPED | OUTPATIENT
Start: 2022-01-21 | End: 2022-01-07

## 2022-01-07 RX ORDER — DULOXETIN HYDROCHLORIDE 30 MG/1
CAPSULE, DELAYED RELEASE ORAL
Qty: 90 CAPSULE | Refills: 0 | OUTPATIENT
Start: 2022-01-07

## 2022-01-07 NOTE — PROGRESS NOTES
Name: Tonia Moon   : 1/15/1955   DOS: 2022     Pain Clinic Follow Up Visit:   Patient presents with: Follow - Up      Tonia Moon is a 77year old female with a history of chronic lumbar radicular pain here for follow-up.   From a s Tab Take 1 tablet by mouth daily. • gabapentin (NEURONTIN) 300 MG Oral Cap Take 600 mg by mouth 2 (two) times a day.  600 mg am 900 mg night            EXAM:   /78   Pulse 90   Wt 100 lb (45.4 kg)   SpO2 99%   BMI 18.29 kg/m²   General:  Patient including dependence, abuse, and death from overdose and mixing narcotic with alcohol. Pt verbalized understanding.      Medications filled today:  Requested Prescriptions     Signed Prescriptions Disp Refills   • HYDROcodone-acetaminophen 5-325 MG Oral Tab

## 2022-01-07 NOTE — TELEPHONE ENCOUNTER
Question Answer   Anesthesia Type Local   Provider [facility]   Procedure Transforaminal   Laterality/Level left L5   Medical clearance requested (will send to Pain Navigator) No   Patient has Medicare coverage?  Yes   Comments (Please list entire

## 2022-01-07 NOTE — PROGRESS NOTES
Patient presents in office today with reported pain in back    Current pain level reported = 3/10    Last reported dose of norco last night      Narcotic Contract renewal 7/12/21    Urine Drug screen 12/28/21

## 2022-01-07 NOTE — TELEPHONE ENCOUNTER
Patient advised of insurance approval to proceed with injections and is agreeable to scheduling. Patient scheduled for procedure, pre-procedure instructions reviewed. Patient prefers local anaesthesia.  Reviewed sedation instructions including no fasting, n Please remember to turn device off for procedure.         Medication:   Number of days you need to be off for the following medications:  • Aggrenox 10 days   • Agrylin (Anagrelide) 10 days  • Brilinta (Ticagrelor) 7 days  • Imbruvica (Ibrutinib) 3 days   • any questions or concerns before or after your procedure at  [phone]. If you are a diabetic, please increase the frequency of your glucose monitoring after the procedure as this may cause a temporary increase in your blood sugar.   Contact your primar

## 2022-01-11 ENCOUNTER — APPOINTMENT (OUTPATIENT)
Dept: GENERAL RADIOLOGY | Facility: HOSPITAL | Age: 67
End: 2022-01-11
Attending: ANESTHESIOLOGY
Payer: MEDICARE

## 2022-01-11 ENCOUNTER — HOSPITAL ENCOUNTER (OUTPATIENT)
Facility: HOSPITAL | Age: 67
Setting detail: HOSPITAL OUTPATIENT SURGERY
Discharge: HOME | End: 2022-01-11
Attending: ANESTHESIOLOGY | Admitting: ANESTHESIOLOGY
Payer: MEDICARE

## 2022-01-11 VITALS
RESPIRATION RATE: 18 BRPM | TEMPERATURE: 98 F | DIASTOLIC BLOOD PRESSURE: 80 MMHG | OXYGEN SATURATION: 100 % | HEART RATE: 78 BPM | SYSTOLIC BLOOD PRESSURE: 146 MMHG

## 2022-01-11 DIAGNOSIS — M54.16 LUMBAR RADICULITIS: ICD-10-CM

## 2022-01-11 PROCEDURE — 3E0R33Z INTRODUCTION OF ANTI-INFLAMMATORY INTO SPINAL CANAL, PERCUTANEOUS APPROACH: ICD-10-PCS | Performed by: ANESTHESIOLOGY

## 2022-01-11 RX ORDER — DIPHENHYDRAMINE HYDROCHLORIDE 50 MG/ML
50 INJECTION INTRAMUSCULAR; INTRAVENOUS ONCE AS NEEDED
Status: CANCELLED | OUTPATIENT
Start: 2022-01-11 | End: 2022-01-11

## 2022-01-11 RX ORDER — LIDOCAINE HYDROCHLORIDE 10 MG/ML
INJECTION, SOLUTION EPIDURAL; INFILTRATION; INTRACAUDAL; PERINEURAL
Status: DISCONTINUED | OUTPATIENT
Start: 2022-01-11 | End: 2022-01-11

## 2022-01-11 RX ORDER — SODIUM CHLORIDE 9 MG/ML
INJECTION INTRAVENOUS
Status: DISCONTINUED | OUTPATIENT
Start: 2022-01-11 | End: 2022-01-11

## 2022-01-11 RX ORDER — DEXAMETHASONE SODIUM PHOSPHATE 10 MG/ML
INJECTION, SOLUTION INTRAMUSCULAR; INTRAVENOUS
Status: DISCONTINUED | OUTPATIENT
Start: 2022-01-11 | End: 2022-01-11

## 2022-01-11 RX ORDER — ONDANSETRON 2 MG/ML
4 INJECTION INTRAMUSCULAR; INTRAVENOUS ONCE AS NEEDED
Status: CANCELLED | OUTPATIENT
Start: 2022-01-11 | End: 2022-01-11

## 2022-01-11 NOTE — H&P
History & Physical Examination    Patient Name: Miles Coyne  MRN: [id number]  CSN: [id number]  YOB: 1955    Pre-Operative Diagnosis:  Lumbar radiculitis [M54.16]    Present Illness: Patient with lumbar radiculopathy for transforaminal Breast cancer ([facility] 2002   • Cancer [facility])     Right breast   • Cervical dystonia 6/17/2021    DBS b/l   • Cervical spine degeneration    • Colitis     Ischemic Colitis   • Depression    • Difficult intubation     big bony features in the bottom of mouth OVARIES REMOVED AS WELL   • LUMBAR SPINE SURGERY  1/1999    lumbar laminectomy L1-L4   • LUMPECTOMY RIGHT  2002   • OOPHORECTOMY  1991   • OTHER SURGICAL HISTORY      Lt bunionectomy   • OTHER SURGICAL HISTORY      stent in CMA   • PAIN MANAGEMENT [place]

## 2022-01-11 NOTE — OPERATIVE REPORT
[facility]  Operative Report  2022     [place] Patient Status:  Hospital Outpatient Surgery    1/15/1955 MRN [id number]   Location [address] [address] Attending Jessica Barroso MD   Hosp Day # 0 PCP Janette Deng- cc of normal saline with 10 mg of dexamethasone was injected without complication. The needle was withdrawn with stylet in situ. The patient tolerated procedure very well. The patient was observed until discharge criteria met.   Discharge instructions wer

## 2022-02-11 ENCOUNTER — MED REC SCAN ONLY (OUTPATIENT)
Dept: INTERNAL MEDICINE CLINIC | Facility: CLINIC | Age: 67
End: 2022-02-11

## 2022-02-11 NOTE — PROGRESS NOTES
Pt here in office for a visit. Reviewed pain agreement with pt. Pt verbalized understanding and signed. Copy provided. Pt sent to lab for urine specimen. Document sent to scanning. Snapshot and FYI updated. INTERVAL HPI/OVERNIGHT EVENTS:  pt seen and examined earlier, events noted  No N/V/D.  Tolerating diet. H/H stable      Allergies    No Known Allergies    Intolerances    General:  No wt loss, fevers, chills, night sweats, fatigue,   Eyes:  Good vision, no reported pain  ENT:  No sore throat, pain, runny nose, dysphagia  CV:  No pain, palpitations, hypo/hypertension  Resp:  No dyspnea, cough, tachypnea, wheezing  GI:  No pain, No nausea, No vomiting, No diarrhea, + constipation, No weight loss, No fever, No pruritis, No rectal bleeding, No tarry stools, No dysphagia,  :  No pain, bleeding, incontinence, nocturia  Muscle:  No pain, weakness  Neuro:  No weakness, tingling, memory problems  Psych:  No fatigue, insomnia, mood problems, depression  Endocrine:  No polyuria, polydipsia, cold/heat intolerance  Heme:  No petechiae, ecchymosis, easy bruisability  Skin:  No rash, tattoos, scars, edema      PHYSICAL EXAM:   Vital Signs Last 24 Hrs  T(C): 36.7 (11 Feb 2022 13:23), Max: 37.2 (10 Feb 2022 22:18)  T(F): 98.1 (11 Feb 2022 13:23), Max: 99 (10 Feb 2022 22:18)  HR: 85 (11 Feb 2022 13:23) (79 - 99)  BP: 112/72 (11 Feb 2022 13:23) (112/72 - 152/78)  BP(mean): --  RR: 18 (11 Feb 2022 13:23) (18 - 18)  SpO2: 96% (11 Feb 2022 13:23) (94% - 99%)  Daily     Daily   I&O's Summary    10 Feb 2022 07:01  -  11 Feb 2022 07:00  --------------------------------------------------------  IN: 1300 mL / OUT: 0 mL / NET: 1300 mL    11 Feb 2022 07:01  -  11 Feb 2022 14:18  --------------------------------------------------------  IN: 220 mL / OUT: 0 mL / NET: 220 mL        GENERAL:  Appears stated age, well-groomed, well-nourished, no distress  HEENT:  NC/AT,  conjunctivae clear and pink, no thyromegaly, nodules, adenopathy, no JVD, sclera -anicteric  CHEST:  Full & symmetric excursion, no increased effort, breath sounds clear  HEART:  Regular rhythm, S1, S2, no murmur/rub/S3/S4, no abdominal bruit, no edema  ABDOMEN:  Soft, non-tender, non-distended, normoactive bowel sounds,  no masses ,no hepato-splenomegaly, no signs of chronic liver disease  EXTEREMITIES:  no cyanosis,clubbing or edema  SKIN:  No rash/erythema/ecchymoses/petechiae/wounds/abscess/warm/dry, +Right thigh hematoma   NEURO:  Alert, oriented, no asterixis, no tremor, no encephalopathy      LABS:                                   8.7    6.15  )-----------( 282      ( 11 Feb 2022 07:22 )             28.3   02-10    142  |  105  |  18  ----------------------------<  100<H>  3.8   |  27  |  0.66    Ca    8.8      10 Feb 2022 07:12    TPro  5.8<L>  /  Alb  2.9<L>  /  TBili  0.4  /  DBili  x   /  AST  12  /  ALT  15  /  AlkPhos  49  02-10      amylase   lipase  RADIOLOGY & ADDITIONAL TESTS:

## 2022-02-15 ENCOUNTER — APPOINTMENT (OUTPATIENT)
Dept: CARDIOLOGY | Age: 67
End: 2022-02-15

## 2022-02-16 PROBLEM — M54.16 LUMBAR RADICULITIS: Status: ACTIVE | Noted: 2022-02-16

## 2022-02-16 NOTE — PROGRESS NOTES
Last procedure: LEFT LUMBAR 5 TRANSFORAMINAL EPIDURAL STEROID INJECTION SINGLE LEVEL with local  Date: 01/11/22    Percentage of relief obtained: 30%  Duration of relief: current    Current Pain Score: 3

## 2022-02-22 ENCOUNTER — LAB ENCOUNTER (OUTPATIENT)
Dept: LAB | Age: 67
End: 2022-02-22
Attending: ANESTHESIOLOGY
Payer: MEDICARE

## 2022-02-22 DIAGNOSIS — F11.20 CHRONIC NARCOTIC DEPENDENCE (HCC): ICD-10-CM

## 2022-02-22 PROCEDURE — 80307 DRUG TEST PRSMV CHEM ANLYZR: CPT

## 2022-02-23 RX ORDER — ATORVASTATIN CALCIUM 10 MG/1
TABLET, FILM COATED ORAL
Qty: 90 TABLET | Refills: 0 | Status: SHIPPED | OUTPATIENT
Start: 2022-02-23 | End: 2022-03-17

## 2022-02-26 LAB
6-ACETYLMORHINE CUTOFF 20 NG/ML: NOT DETECTED
7-AMINOCLONAZEPAM 40 NG/ML: NOT DETECTED
A-OH-ALPRAZOLM CUTOFF 20 NG/ML: NOT DETECTED
ALPHA-OH-MIDAZOLAM (CUTOFF 20 NG/ML): NOT DETECTED
ALPRAZOLAM CUTOFF 40 NG/ML: NOT DETECTED
AMPHETAMINE CUTOFF 10 NG/ML: NOT DETECTED
BARBITURATES CUTOFF 200 NG/ML: PRESENT
BENZOYLECGONINE  150 NG/ML: NOT DETECTED
BUPRENORPHINE CUTOFF 5 NG/ML: NOT DETECTED
CARISOPRODOL CUTOFF 100 NG/ML: NOT DETECTED
CODINE CUTOFF 40 NG/ML: NOT DETECTED
COLNAZEPAM CUTOFF 20 NG/ML: NOT DETECTED
CREATININE,URINE: 24 MG/DL
DIAZEPAM CUTOFF 50 NG/ML: NOT DETECTED
ETHYL-GLUCURONIDE 500 NG/ML: NOT DETECTED
FENTANYL CUTOFF 2 NG/ML: NOT DETECTED
GABAPENTIN (CUTOFF 100 NG/ML): PRESENT
HYDROMORPHONE CUTOFF 40 NG/ML: PRESENT
LORAZEPAM CUTOFF 60 NG/ML: NOT DETECTED
MARIJUANA CUTOFF 20 NG/ML: NOT DETECTED
MDA CUTOFF 200 NG/ML: NOT DETECTED
MDEA EVE CUTOFF 200 NG/ML: NOT DETECTED
MDMA-ECSTASY CUTOFF 200 NG/ML: NOT DETECTED
MEPERIDINE MET CUTOFF 50 NG/ML: NOT DETECTED
METHADONE CUTOFF 150 NG/ML: NOT DETECTED
METHAMPHETMN CUTOFF 400 NG/ML: NOT DETECTED
METHYLPHENIDATE (CUTOFF 100 NG/ML): NOT DETECTED
MIDAZOLAM CUTOFF 20 NG/ML: NOT DETECTED
MORHINE CUTOFF 20 NG/ML: NOT DETECTED
NALOXONE (CUTOFF 100 NG/ML): NOT DETECTED
NORDIAZEPAM CUTOFF 50 NG/ML: NOT DETECTED
NORFENTANYL CUTOFF 2 NG/ML: NOT DETECTED
NORHYDRCODONE CUTOFF 100 NG/ML: PRESENT
NOROXYCODONE CUTOFF 100 NG/ML: NOT DETECTED
NOROXYMORPHNE CUTOFF 100 NG/ML: NOT DETECTED
OXAZEPAM CUTOFF 50 NG/ML: NOT DETECTED
OXYCODONE CUTOFF 40 NG/ML: NOT DETECTED
OXYMORPHONE CUTOFF 40 NG/ML: NOT DETECTED
PCP CUTOFF 25 NG/ML: NOT DETECTED
PHENTERMINE CUTOFF 100 NG/ML: NOT DETECTED
PREGABALIN (CUTOFF 100 NG/ML): NOT DETECTED
TAPENTADOL CUTOFF 100 NG/ML: NOT DETECTED
TAPENTADOL-O SULF 200 NG/ML: NOT DETECTED
TEMAZEPAM CUTOFF 50 NG/ML: NOT DETECTED
ZOLPIDEM CUTOFF 20 NG/ML: NOT DETECTED
ZOLPIDEM METABOLITE (CUTOFF 100 NG/ML): NOT DETECTED

## 2022-03-08 ENCOUNTER — NURSE ONLY (OUTPATIENT)
Dept: HEMATOLOGY/ONCOLOGY | Facility: HOSPITAL | Age: 67
End: 2022-03-08
Attending: INTERNAL MEDICINE
Payer: MEDICARE

## 2022-03-08 DIAGNOSIS — D50.0 IRON DEFICIENCY ANEMIA DUE TO CHRONIC BLOOD LOSS: ICD-10-CM

## 2022-03-08 LAB
ALBUMIN SERPL-MCNC: 3.9 G/DL (ref 3.4–5)
ALBUMIN/GLOB SERPL: 1.2 {RATIO} (ref 1–2)
ALP LIVER SERPL-CCNC: 39 U/L
ALT SERPL-CCNC: 23 U/L
ANION GAP SERPL CALC-SCNC: 6 MMOL/L (ref 0–18)
AST SERPL-CCNC: 13 U/L (ref 15–37)
BASOPHILS # BLD AUTO: 0.02 X10(3) UL (ref 0–0.2)
BASOPHILS NFR BLD AUTO: 0.6 %
BILIRUB SERPL-MCNC: 0.2 MG/DL (ref 0.1–2)
BUN BLD-MCNC: 25 MG/DL (ref 7–18)
CALCIUM BLD-MCNC: 9.2 MG/DL (ref 8.5–10.1)
CHLORIDE SERPL-SCNC: 92 MMOL/L (ref 98–112)
CO2 SERPL-SCNC: 28 MMOL/L (ref 21–32)
CREAT BLD-MCNC: 1.19 MG/DL
DEPRECATED HBV CORE AB SER IA-ACNC: 771.2 NG/ML
EOSINOPHIL # BLD AUTO: 0.06 X10(3) UL (ref 0–0.7)
EOSINOPHIL NFR BLD AUTO: 1.7 %
ERYTHROCYTE [DISTWIDTH] IN BLOOD BY AUTOMATED COUNT: 12.2 %
FOLATE SERPL-MCNC: 98.5 NG/ML (ref 8.7–?)
GLOBULIN PLAS-MCNC: 3.3 G/DL (ref 2.8–4.4)
GLUCOSE BLD-MCNC: 64 MG/DL (ref 70–99)
HCT VFR BLD AUTO: 31.4 %
HGB BLD-MCNC: 10.5 G/DL
HGB RETIC QN AUTO: 40.8 PG (ref 28.2–36.6)
IMM GRANULOCYTES # BLD AUTO: 0 X10(3) UL (ref 0–1)
IMM GRANULOCYTES NFR BLD: 0 %
IMM RETICS NFR: 0.07 RATIO (ref 0.1–0.3)
IRON SATN MFR SERPL: 24 %
IRON SERPL-MCNC: 71 UG/DL
LDH SERPL L TO P-CCNC: 103 U/L
LYMPHOCYTES # BLD AUTO: 0.81 X10(3) UL (ref 1–4)
LYMPHOCYTES NFR BLD AUTO: 22.7 %
MCH RBC QN AUTO: 34.8 PG (ref 26–34)
MCHC RBC AUTO-ENTMCNC: 33.4 G/DL (ref 31–37)
MCV RBC AUTO: 104 FL
MONOCYTES # BLD AUTO: 0.51 X10(3) UL (ref 0.1–1)
MONOCYTES NFR BLD AUTO: 14.3 %
NEUTROPHILS # BLD AUTO: 2.17 X10 (3) UL (ref 1.5–7.7)
NEUTROPHILS # BLD AUTO: 2.17 X10(3) UL (ref 1.5–7.7)
NEUTROPHILS NFR BLD AUTO: 60.7 %
OSMOLALITY SERPL CALC.SUM OF ELEC: 264 MOSM/KG (ref 275–295)
PLATELET # BLD AUTO: 222 10(3)UL (ref 150–450)
POTASSIUM SERPL-SCNC: 4.3 MMOL/L (ref 3.5–5.1)
PROT SERPL-MCNC: 7.2 G/DL (ref 6.4–8.2)
RBC # BLD AUTO: 3.02 X10(6)UL
RETICS # AUTO: 63.7 X10(3) UL (ref 22.5–147.5)
RETICS/RBC NFR AUTO: 2.1 %
SODIUM SERPL-SCNC: 126 MMOL/L (ref 136–145)
TIBC SERPL-MCNC: 292 UG/DL (ref 240–450)
TRANSFERRIN SERPL-MCNC: 196 MG/DL (ref 200–360)
VIT B12 SERPL-MCNC: 1476 PG/ML (ref 193–986)
WBC # BLD AUTO: 3.6 X10(3) UL (ref 4–11)

## 2022-03-08 PROCEDURE — 36415 COLL VENOUS BLD VENIPUNCTURE: CPT

## 2022-03-08 PROCEDURE — 83550 IRON BINDING TEST: CPT

## 2022-03-08 PROCEDURE — 82607 VITAMIN B-12: CPT

## 2022-03-08 PROCEDURE — 80053 COMPREHEN METABOLIC PANEL: CPT

## 2022-03-08 PROCEDURE — 82746 ASSAY OF FOLIC ACID SERUM: CPT

## 2022-03-08 PROCEDURE — 83615 LACTATE (LD) (LDH) ENZYME: CPT

## 2022-03-08 PROCEDURE — 85045 AUTOMATED RETICULOCYTE COUNT: CPT

## 2022-03-08 PROCEDURE — 83540 ASSAY OF IRON: CPT

## 2022-03-08 PROCEDURE — 85025 COMPLETE CBC W/AUTO DIFF WBC: CPT

## 2022-03-08 PROCEDURE — 82728 ASSAY OF FERRITIN: CPT

## 2022-03-11 RX ORDER — HYDROCODONE BITARTRATE AND ACETAMINOPHEN 5; 325 MG/1; MG/1
1 TABLET ORAL EVERY 8 HOURS PRN
Qty: 90 TABLET | Refills: 0 | Status: SHIPPED | OUTPATIENT
Start: 2022-03-22

## 2022-03-17 RX ORDER — ATORVASTATIN CALCIUM 10 MG/1
TABLET, FILM COATED ORAL
Qty: 90 TABLET | Refills: 0 | Status: SHIPPED | OUTPATIENT
Start: 2022-03-17

## 2022-04-18 RX ORDER — DULOXETIN HYDROCHLORIDE 30 MG/1
30 CAPSULE, DELAYED RELEASE ORAL DAILY
Qty: 90 CAPSULE | Refills: 0 | Status: SHIPPED | OUTPATIENT
Start: 2022-04-18

## 2022-04-18 NOTE — TELEPHONE ENCOUNTER
Last OV relevant to medication: 12/20/21  Last refill date: 1/5/22 90    #/refills: 0  When pt was asked to return for OV: prn   Upcoming appt/reason:   Future Appointments   Date Time Provider Brandi Beach   6/8/2022 10:00 AM Maria E Jenkins MD [facility]   12/22/2022  9:40 AM Tariq Palafox MD [facility] [place]       Was pt informed of any over due labs: n/a   Lab Results   Component Value Date    GLU 64 (L) 03/08/2022    BUN 25 (H) 03/08/2022    BUNCREA 14.9 07/14/2021    CREATSERUM 1.19 (H) 03/08/2022    ANIONGAP 6 03/08/2022    GFR 53 (L) 01/04/2018    GFRNAA 47 (L) 03/08/2022    GFRAA 55 (L) 03/08/2022    CA 9.2 03/08/2022    OSMOCALC 264 (L) 03/08/2022    ALKPHO 39 (L) 03/08/2022    AST 13 (L) 03/08/2022    ALT 23 03/08/2022    BILT 0.2 03/08/2022    TP 7.2 03/08/2022    ALB 3.9 03/08/2022    GLOBULIN 3.3 03/08/2022     (L) 03/08/2022    K 4.3 03/08/2022    CL 92 (L) 03/08/2022    CO2 28.0 03/08/2022

## 2022-04-19 RX ORDER — DULOXETIN HYDROCHLORIDE 30 MG/1
CAPSULE, DELAYED RELEASE ORAL
Qty: 90 CAPSULE | Refills: 0 | OUTPATIENT
Start: 2022-04-19

## 2022-04-21 RX ORDER — HYDROCODONE BITARTRATE AND ACETAMINOPHEN 5; 325 MG/1; MG/1
1 TABLET ORAL EVERY 8 HOURS PRN
Qty: 90 TABLET | Refills: 0 | Status: SHIPPED | OUTPATIENT
Start: 2022-04-21 | End: 2022-05-21

## 2022-05-11 ENCOUNTER — HOSPITAL ENCOUNTER (EMERGENCY)
Facility: HOSPITAL | Age: 67
Discharge: HOME | End: 2022-05-11
Attending: EMERGENCY MEDICINE
Payer: MEDICARE

## 2022-05-11 ENCOUNTER — APPOINTMENT (OUTPATIENT)
Dept: GENERAL RADIOLOGY | Facility: HOSPITAL | Age: 67
End: 2022-05-11
Attending: EMERGENCY MEDICINE
Payer: MEDICARE

## 2022-05-11 ENCOUNTER — TELEPHONE (OUTPATIENT)
Dept: CASE MANAGEMENT | Age: 67
End: 2022-05-11

## 2022-05-11 VITALS
HEIGHT: 62 IN | RESPIRATION RATE: 18 BRPM | OXYGEN SATURATION: 97 % | SYSTOLIC BLOOD PRESSURE: 145 MMHG | HEART RATE: 90 BPM | BODY MASS INDEX: 18.77 KG/M2 | DIASTOLIC BLOOD PRESSURE: 83 MMHG | TEMPERATURE: 99 F | WEIGHT: 102 LBS

## 2022-05-11 DIAGNOSIS — E87.1 HYPONATREMIA: ICD-10-CM

## 2022-05-11 DIAGNOSIS — U07.1 COVID-19: Primary | ICD-10-CM

## 2022-05-11 DIAGNOSIS — R51.9 ACUTE NONINTRACTABLE HEADACHE, UNSPECIFIED HEADACHE TYPE: ICD-10-CM

## 2022-05-11 LAB
ALBUMIN SERPL-MCNC: 3.9 G/DL (ref 3.4–5)
ALBUMIN/GLOB SERPL: 1.1 {RATIO} (ref 1–2)
ALP LIVER SERPL-CCNC: 42 U/L
ALT SERPL-CCNC: 22 U/L
ANION GAP SERPL CALC-SCNC: 8 MMOL/L (ref 0–18)
AST SERPL-CCNC: 20 U/L (ref 15–37)
BASOPHILS # BLD AUTO: 0.01 X10(3) UL (ref 0–0.2)
BASOPHILS NFR BLD AUTO: 0.3 %
BILIRUB SERPL-MCNC: 0.2 MG/DL (ref 0.1–2)
BUN BLD-MCNC: 9 MG/DL (ref 7–18)
CALCIUM BLD-MCNC: 8.8 MG/DL (ref 8.5–10.1)
CHLORIDE SERPL-SCNC: 96 MMOL/L (ref 98–112)
CO2 SERPL-SCNC: 25 MMOL/L (ref 21–32)
CREAT BLD-MCNC: 0.83 MG/DL
EOSINOPHIL # BLD AUTO: 0 X10(3) UL (ref 0–0.7)
EOSINOPHIL NFR BLD AUTO: 0 %
ERYTHROCYTE [DISTWIDTH] IN BLOOD BY AUTOMATED COUNT: 12.4 %
GLOBULIN PLAS-MCNC: 3.6 G/DL (ref 2.8–4.4)
GLUCOSE BLD-MCNC: 142 MG/DL (ref 70–99)
HCT VFR BLD AUTO: 32.5 %
HGB BLD-MCNC: 11.3 G/DL
IMM GRANULOCYTES # BLD AUTO: 0.01 X10(3) UL (ref 0–1)
IMM GRANULOCYTES NFR BLD: 0.3 %
LYMPHOCYTES # BLD AUTO: 0.35 X10(3) UL (ref 1–4)
LYMPHOCYTES NFR BLD AUTO: 10.9 %
MCH RBC QN AUTO: 35.5 PG (ref 26–34)
MCHC RBC AUTO-ENTMCNC: 34.8 G/DL (ref 31–37)
MCV RBC AUTO: 102.2 FL
MONOCYTES # BLD AUTO: 0.5 X10(3) UL (ref 0.1–1)
MONOCYTES NFR BLD AUTO: 15.6 %
NEUTROPHILS # BLD AUTO: 2.34 X10 (3) UL (ref 1.5–7.7)
NEUTROPHILS # BLD AUTO: 2.34 X10(3) UL (ref 1.5–7.7)
NEUTROPHILS NFR BLD AUTO: 72.9 %
OSMOLALITY SERPL CALC.SUM OF ELEC: 269 MOSM/KG (ref 275–295)
PLATELET # BLD AUTO: 177 10(3)UL (ref 150–450)
POTASSIUM SERPL-SCNC: 4 MMOL/L (ref 3.5–5.1)
PROCALCITONIN SERPL-MCNC: 0.05 NG/ML (ref ?–0.16)
PROT SERPL-MCNC: 7.5 G/DL (ref 6.4–8.2)
RBC # BLD AUTO: 3.18 X10(6)UL
SARS-COV-2 RNA RESP QL NAA+PROBE: DETECTED
SODIUM SERPL-SCNC: 129 MMOL/L (ref 136–145)
TROPONIN I HIGH SENSITIVITY: 5 NG/L
WBC # BLD AUTO: 3.2 X10(3) UL (ref 4–11)

## 2022-05-11 PROCEDURE — 99284 EMERGENCY DEPT VISIT MOD MDM: CPT

## 2022-05-11 PROCEDURE — 71045 X-RAY EXAM CHEST 1 VIEW: CPT | Performed by: EMERGENCY MEDICINE

## 2022-05-11 PROCEDURE — 96361 HYDRATE IV INFUSION ADD-ON: CPT

## 2022-05-11 PROCEDURE — 96374 THER/PROPH/DIAG INJ IV PUSH: CPT

## 2022-05-11 PROCEDURE — 85025 COMPLETE CBC W/AUTO DIFF WBC: CPT | Performed by: EMERGENCY MEDICINE

## 2022-05-11 PROCEDURE — 84145 PROCALCITONIN (PCT): CPT | Performed by: EMERGENCY MEDICINE

## 2022-05-11 PROCEDURE — 96375 TX/PRO/DX INJ NEW DRUG ADDON: CPT

## 2022-05-11 PROCEDURE — 84484 ASSAY OF TROPONIN QUANT: CPT | Performed by: EMERGENCY MEDICINE

## 2022-05-11 PROCEDURE — 80053 COMPREHEN METABOLIC PANEL: CPT | Performed by: EMERGENCY MEDICINE

## 2022-05-11 RX ORDER — DIPHENHYDRAMINE HYDROCHLORIDE 50 MG/ML
25 INJECTION INTRAMUSCULAR; INTRAVENOUS ONCE
Status: COMPLETED | OUTPATIENT
Start: 2022-05-11 | End: 2022-05-11

## 2022-05-11 RX ORDER — BEBTELOVIMAB 87.5 MG/ML
175 INJECTION, SOLUTION INTRAVENOUS ONCE
Status: COMPLETED | OUTPATIENT
Start: 2022-05-11 | End: 2022-05-11

## 2022-05-11 RX ORDER — PROCHLORPERAZINE EDISYLATE 5 MG/ML
10 INJECTION INTRAMUSCULAR; INTRAVENOUS ONCE
Status: COMPLETED | OUTPATIENT
Start: 2022-05-11 | End: 2022-05-11

## 2022-05-11 RX ORDER — DEXAMETHASONE SODIUM PHOSPHATE 10 MG/ML
10 INJECTION, SOLUTION INTRAMUSCULAR; INTRAVENOUS ONCE
Status: COMPLETED | OUTPATIENT
Start: 2022-05-11 | End: 2022-05-11

## 2022-05-11 NOTE — ED INITIAL ASSESSMENT (HPI)
Pt states she started with frontal headache that wraps around her head and down to her neck since 1600 yesterday. Also reported to have fever (101.3) at 0200 today. States she's been exposed to a gathering that had covid positive.

## 2022-05-11 NOTE — ED QUICK NOTES
Pt informed of her covid test reports and was offered antibody, pt accepted the offer and verbalizing understanding

## 2022-05-11 NOTE — TELEPHONE ENCOUNTER
Pt received PAB infusion at ED on 5/11 for COVID-19. Please follow-up with pt for post-infusion assessment and home monitoring if needed. Thank you.

## 2022-05-18 DIAGNOSIS — M54.16 LUMBAR RADICULOPATHY: ICD-10-CM

## 2022-05-18 DIAGNOSIS — M48.061 SPINAL STENOSIS OF LUMBAR REGION WITHOUT NEUROGENIC CLAUDICATION: ICD-10-CM

## 2022-05-19 RX ORDER — HYDROCODONE BITARTRATE AND ACETAMINOPHEN 5; 325 MG/1; MG/1
1 TABLET ORAL EVERY 8 HOURS PRN
Qty: 90 TABLET | Refills: 0 | Status: SHIPPED | OUTPATIENT
Start: 2022-05-21 | End: 2022-06-20

## 2022-05-23 ENCOUNTER — HOSPITAL ENCOUNTER (OUTPATIENT)
Dept: CT IMAGING | Facility: HOSPITAL | Age: 67
Discharge: HOME | End: 2022-05-23
Attending: NURSE PRACTITIONER
Payer: MEDICARE

## 2022-05-23 DIAGNOSIS — I65.23 BILATERAL CAROTID ARTERY STENOSIS: ICD-10-CM

## 2022-05-23 PROCEDURE — 70498 CT ANGIOGRAPHY NECK: CPT | Performed by: NURSE PRACTITIONER

## 2022-05-26 NOTE — PROGRESS NOTES
[facility] Progress Note      Patient Name:  Martin Nieto  YOB: 1955  Medical Record Number:  [id number]    Date of visit: 6/1/2021    CHIEF COMPLAINT: Anemia    HPI:     77year old female that I have followed since 6/18 fo External Cream Apply topically to affected area 2 times daily     • lisinopril 20 MG Oral Tab TAKE 1 TABLET BY MOUTH EVERY DAY     • Multiple Vitamin (MULTI-VITAMIN) Oral Tab Take 1 tablet by mouth.      • DULoxetine HCl 30 MG Oral Cap DR Particles Take 30 145 mmol/L    Potassium 4.6 3.5 - 5.1 mmol/L    Chloride 101 98 - 112 mmol/L    CO2 30.0 21.0 - 32.0 mmol/L    Anion Gap 4 0 - 18 mmol/L    BUN 15 7 - 18 mg/dL    Creatinine 0.81 0.55 - 1.02 mg/dL    BUN/CREA Ratio 18.5 10.0 - 20.0    Calcium, Total 9.4 8. ASSESSMENT AND PLAN:     # Anemia: H/o chronic GIB due to ischemic colitis; AVM. bm bx 12/19-no malignancy. Repeat BM bx 2/21-dyspoiesis consistent with MDS. Cytogenetics normal.  Has received IV iron in the past, last 4/21.   Iron level was very hi Rhofade Counseling: Rhofade is a topical medication which can decrease superficial blood flow where applied. Side effects are uncommon and include stinging, redness and allergic reactions.

## 2022-06-01 ENCOUNTER — APPOINTMENT (OUTPATIENT)
Dept: CT IMAGING | Facility: HOSPITAL | Age: 67
End: 2022-06-01
Attending: EMERGENCY MEDICINE
Payer: MEDICARE

## 2022-06-01 ENCOUNTER — HOSPITAL ENCOUNTER (INPATIENT)
Facility: HOSPITAL | Age: 67
LOS: 3 days | Discharge: HOME | End: 2022-06-04
Attending: EMERGENCY MEDICINE | Admitting: HOSPITALIST
Payer: MEDICARE

## 2022-06-01 DIAGNOSIS — D50.0 IRON DEFICIENCY ANEMIA DUE TO CHRONIC BLOOD LOSS: ICD-10-CM

## 2022-06-01 DIAGNOSIS — D46.9 MDS (MYELODYSPLASTIC SYNDROME) (HCC): ICD-10-CM

## 2022-06-01 DIAGNOSIS — K62.5 RECTAL BLEEDING: ICD-10-CM

## 2022-06-01 DIAGNOSIS — K55.9 ISCHEMIC COLITIS (HCC): Primary | ICD-10-CM

## 2022-06-01 PROBLEM — D64.9 ANEMIA: Status: ACTIVE | Noted: 2022-06-01

## 2022-06-01 LAB
ALBUMIN SERPL-MCNC: 3.7 G/DL (ref 3.4–5)
ALBUMIN/GLOB SERPL: 0.9 {RATIO} (ref 1–2)
ALP LIVER SERPL-CCNC: 59 U/L
ALT SERPL-CCNC: 20 U/L
ANION GAP SERPL CALC-SCNC: 4 MMOL/L (ref 0–18)
ANTIBODY SCREEN: NEGATIVE
APTT PPP: 34.6 SECONDS (ref 23.3–35.6)
AST SERPL-CCNC: 16 U/L (ref 15–37)
BASOPHILS # BLD AUTO: 0.01 X10(3) UL (ref 0–0.2)
BASOPHILS NFR BLD AUTO: 0.1 %
BILIRUB SERPL-MCNC: 0.2 MG/DL (ref 0.1–2)
BUN BLD-MCNC: 15 MG/DL (ref 7–18)
CALCIUM BLD-MCNC: 10.6 MG/DL (ref 8.5–10.1)
CHLORIDE SERPL-SCNC: 100 MMOL/L (ref 98–112)
CO2 SERPL-SCNC: 29 MMOL/L (ref 21–32)
CREAT BLD-MCNC: 1 MG/DL
EOSINOPHIL # BLD AUTO: 0.03 X10(3) UL (ref 0–0.7)
EOSINOPHIL NFR BLD AUTO: 0.4 %
ERYTHROCYTE [DISTWIDTH] IN BLOOD BY AUTOMATED COUNT: 12.5 %
GLOBULIN PLAS-MCNC: 3.9 G/DL (ref 2.8–4.4)
GLUCOSE BLD-MCNC: 97 MG/DL (ref 70–99)
HCT VFR BLD AUTO: 34 %
HGB BLD-MCNC: 11.3 G/DL
IMM GRANULOCYTES # BLD AUTO: 0.02 X10(3) UL (ref 0–1)
IMM GRANULOCYTES NFR BLD: 0.3 %
INR BLD: 0.96 (ref 0.8–1.2)
LYMPHOCYTES # BLD AUTO: 1 X10(3) UL (ref 1–4)
LYMPHOCYTES NFR BLD AUTO: 14.2 %
MCH RBC QN AUTO: 34.8 PG (ref 26–34)
MCHC RBC AUTO-ENTMCNC: 33.2 G/DL (ref 31–37)
MCV RBC AUTO: 104.6 FL
MONOCYTES # BLD AUTO: 0.62 X10(3) UL (ref 0.1–1)
MONOCYTES NFR BLD AUTO: 8.8 %
NEUTROPHILS # BLD AUTO: 5.38 X10 (3) UL (ref 1.5–7.7)
NEUTROPHILS # BLD AUTO: 5.38 X10(3) UL (ref 1.5–7.7)
NEUTROPHILS NFR BLD AUTO: 76.2 %
OSMOLALITY SERPL CALC.SUM OF ELEC: 277 MOSM/KG (ref 275–295)
PLATELET # BLD AUTO: 198 10(3)UL (ref 150–450)
POTASSIUM SERPL-SCNC: 4.8 MMOL/L (ref 3.5–5.1)
PROT SERPL-MCNC: 7.6 G/DL (ref 6.4–8.2)
PROTHROMBIN TIME: 12.8 SECONDS (ref 11.6–14.8)
RBC # BLD AUTO: 3.25 X10(6)UL
RH BLOOD TYPE: POSITIVE
SODIUM SERPL-SCNC: 133 MMOL/L (ref 136–145)
WBC # BLD AUTO: 7.1 X10(3) UL (ref 4–11)

## 2022-06-01 PROCEDURE — 74177 CT ABD & PELVIS W/CONTRAST: CPT | Performed by: EMERGENCY MEDICINE

## 2022-06-01 PROCEDURE — 99223 1ST HOSP IP/OBS HIGH 75: CPT | Performed by: INTERNAL MEDICINE

## 2022-06-01 RX ORDER — ASPIRIN 81 MG/1
81 TABLET ORAL EVERY MORNING
Status: DISCONTINUED | OUTPATIENT
Start: 2022-06-02 | End: 2022-06-04

## 2022-06-01 RX ORDER — PRIMIDONE 50 MG/1
150 TABLET ORAL NIGHTLY
Status: DISCONTINUED | OUTPATIENT
Start: 2022-06-01 | End: 2022-06-04

## 2022-06-01 RX ORDER — HYDROMORPHONE HYDROCHLORIDE 1 MG/ML
0.5 INJECTION, SOLUTION INTRAMUSCULAR; INTRAVENOUS; SUBCUTANEOUS EVERY 30 MIN PRN
Status: CANCELLED | OUTPATIENT
Start: 2022-06-01 | End: 2022-06-01

## 2022-06-01 RX ORDER — MORPHINE SULFATE 2 MG/ML
1 INJECTION, SOLUTION INTRAMUSCULAR; INTRAVENOUS EVERY 2 HOUR PRN
Status: DISCONTINUED | OUTPATIENT
Start: 2022-06-01 | End: 2022-06-04

## 2022-06-01 RX ORDER — ASPIRIN 81 MG/1
81 TABLET ORAL EVERY MORNING
COMMUNITY

## 2022-06-01 RX ORDER — IOHEXOL 350 MG/ML
65 INJECTION, SOLUTION INTRAVENOUS
Status: COMPLETED | OUTPATIENT
Start: 2022-06-01 | End: 2022-06-01

## 2022-06-01 RX ORDER — GABAPENTIN 300 MG/1
600 CAPSULE ORAL 2 TIMES DAILY
Status: DISCONTINUED | OUTPATIENT
Start: 2022-06-01 | End: 2022-06-04

## 2022-06-01 RX ORDER — SODIUM CHLORIDE 9 MG/ML
INJECTION, SOLUTION INTRAVENOUS CONTINUOUS
Status: CANCELLED | OUTPATIENT
Start: 2022-06-01 | End: 2022-06-01

## 2022-06-01 RX ORDER — ATORVASTATIN CALCIUM 10 MG/1
10 TABLET, FILM COATED ORAL NIGHTLY
Status: DISCONTINUED | OUTPATIENT
Start: 2022-06-01 | End: 2022-06-04

## 2022-06-01 RX ORDER — FLECAINIDE ACETATE 100 MG/1
100 TABLET ORAL 2 TIMES DAILY
Status: DISCONTINUED | OUTPATIENT
Start: 2022-06-01 | End: 2022-06-04

## 2022-06-01 RX ORDER — MORPHINE SULFATE 4 MG/ML
4 INJECTION, SOLUTION INTRAMUSCULAR; INTRAVENOUS EVERY 2 HOUR PRN
Status: DISCONTINUED | OUTPATIENT
Start: 2022-06-01 | End: 2022-06-04

## 2022-06-01 RX ORDER — ONDANSETRON 2 MG/ML
4 INJECTION INTRAMUSCULAR; INTRAVENOUS ONCE
Status: COMPLETED | OUTPATIENT
Start: 2022-06-01 | End: 2022-06-01

## 2022-06-01 RX ORDER — SODIUM CHLORIDE 9 MG/ML
INJECTION, SOLUTION INTRAVENOUS CONTINUOUS
Status: DISCONTINUED | OUTPATIENT
Start: 2022-06-01 | End: 2022-06-03

## 2022-06-01 RX ORDER — PRIMIDONE 50 MG/1
150 TABLET ORAL EVERY EVENING
COMMUNITY

## 2022-06-01 RX ORDER — ATORVASTATIN CALCIUM 10 MG/1
10 TABLET, FILM COATED ORAL DAILY
COMMUNITY
Start: 2022-03-13 | End: 2022-06-13

## 2022-06-01 RX ORDER — PRIMIDONE 50 MG/1
100 TABLET ORAL EVERY MORNING
Status: DISCONTINUED | OUTPATIENT
Start: 2022-06-02 | End: 2022-06-04

## 2022-06-01 RX ORDER — ONDANSETRON 2 MG/ML
4 INJECTION INTRAMUSCULAR; INTRAVENOUS EVERY 4 HOURS PRN
Status: CANCELLED | OUTPATIENT
Start: 2022-06-01 | End: 2022-06-01

## 2022-06-01 RX ORDER — LISINOPRIL 20 MG/1
20 TABLET ORAL DAILY
Status: DISCONTINUED | OUTPATIENT
Start: 2022-06-02 | End: 2022-06-04

## 2022-06-01 RX ORDER — ACETAMINOPHEN 325 MG/1
650 TABLET ORAL EVERY 6 HOURS PRN
Status: DISCONTINUED | OUTPATIENT
Start: 2022-06-01 | End: 2022-06-04

## 2022-06-01 RX ORDER — HYDROCODONE BITARTRATE AND ACETAMINOPHEN 5; 325 MG/1; MG/1
1 TABLET ORAL EVERY 8 HOURS PRN
Status: DISCONTINUED | OUTPATIENT
Start: 2022-06-01 | End: 2022-06-04

## 2022-06-01 RX ORDER — ONDANSETRON 2 MG/ML
4 INJECTION INTRAMUSCULAR; INTRAVENOUS EVERY 6 HOURS PRN
Status: DISCONTINUED | OUTPATIENT
Start: 2022-06-01 | End: 2022-06-04

## 2022-06-01 RX ORDER — MELATONIN
3 NIGHTLY PRN
Status: DISCONTINUED | OUTPATIENT
Start: 2022-06-01 | End: 2022-06-04

## 2022-06-01 RX ORDER — DULOXETIN HYDROCHLORIDE 30 MG/1
30 CAPSULE, DELAYED RELEASE ORAL DAILY
Status: DISCONTINUED | OUTPATIENT
Start: 2022-06-02 | End: 2022-06-04

## 2022-06-01 RX ORDER — GABAPENTIN 300 MG/1
600 CAPSULE ORAL 2 TIMES DAILY
COMMUNITY

## 2022-06-01 RX ORDER — MORPHINE SULFATE 2 MG/ML
2 INJECTION, SOLUTION INTRAMUSCULAR; INTRAVENOUS EVERY 2 HOUR PRN
Status: DISCONTINUED | OUTPATIENT
Start: 2022-06-01 | End: 2022-06-04

## 2022-06-01 RX ORDER — HYDROCODONE BITARTRATE AND ACETAMINOPHEN 5; 325 MG/1; MG/1
1 TABLET ORAL EVERY 8 HOURS PRN
COMMUNITY
Start: 2022-05-19

## 2022-06-01 RX ORDER — MORPHINE SULFATE 4 MG/ML
4 INJECTION, SOLUTION INTRAMUSCULAR; INTRAVENOUS ONCE
Status: COMPLETED | OUTPATIENT
Start: 2022-06-01 | End: 2022-06-01

## 2022-06-01 RX ORDER — ENOXAPARIN SODIUM 100 MG/ML
40 INJECTION SUBCUTANEOUS DAILY
Status: DISCONTINUED | OUTPATIENT
Start: 2022-06-02 | End: 2022-06-04

## 2022-06-01 NOTE — ED INITIAL ASSESSMENT (HPI)
PT states that she has been having bloody stools for two days. PT states that she has also been feeling weak and dizzy for three days, PT report three near syncope episodes. PT states that her her G.I. doctor directed the PT to come to the E.D. for blood testing and CT scan.

## 2022-06-01 NOTE — ED QUICK NOTES
Orders for admission, patient is aware of plan and ready to go upstairs. Any questions, please call ED RN Zoe Lopez at extension [phone]    Patient Covid vaccination status: Fully vaccinated     COVID Test Ordered in ED: None    COVID Suspicion at Admission: N/A    Running Infusions: 1L 0.9% normal saline bolus    Mental Status/LOC at time of transport: A&Ox4    Other pertinent information: Ambulatory.  at bedside.   CIWA score: N/A   NIH score:  N/A

## 2022-06-02 LAB
ANION GAP SERPL CALC-SCNC: 5 MMOL/L (ref 0–18)
BASOPHILS # BLD AUTO: 0.01 X10(3) UL (ref 0–0.2)
BASOPHILS NFR BLD AUTO: 0.2 %
BUN BLD-MCNC: 11 MG/DL (ref 7–18)
CALCIUM BLD-MCNC: 8.9 MG/DL (ref 8.5–10.1)
CHLORIDE SERPL-SCNC: 103 MMOL/L (ref 98–112)
CO2 SERPL-SCNC: 29 MMOL/L (ref 21–32)
CREAT BLD-MCNC: 1.03 MG/DL
EOSINOPHIL # BLD AUTO: 0.06 X10(3) UL (ref 0–0.7)
EOSINOPHIL NFR BLD AUTO: 1.4 %
ERYTHROCYTE [DISTWIDTH] IN BLOOD BY AUTOMATED COUNT: 12.4 %
GLUCOSE BLD-MCNC: 70 MG/DL (ref 70–99)
HCT VFR BLD AUTO: 28.9 %
HGB BLD-MCNC: 9.2 G/DL
IMM GRANULOCYTES # BLD AUTO: 0.01 X10(3) UL (ref 0–1)
IMM GRANULOCYTES NFR BLD: 0.2 %
LYMPHOCYTES # BLD AUTO: 0.79 X10(3) UL (ref 1–4)
LYMPHOCYTES NFR BLD AUTO: 18.3 %
MAGNESIUM SERPL-MCNC: 1.6 MG/DL (ref 1.6–2.6)
MCH RBC QN AUTO: 35.1 PG (ref 26–34)
MCHC RBC AUTO-ENTMCNC: 31.8 G/DL (ref 31–37)
MCV RBC AUTO: 110.3 FL
MONOCYTES # BLD AUTO: 0.38 X10(3) UL (ref 0.1–1)
MONOCYTES NFR BLD AUTO: 8.8 %
NEUTROPHILS # BLD AUTO: 3.06 X10 (3) UL (ref 1.5–7.7)
NEUTROPHILS # BLD AUTO: 3.06 X10(3) UL (ref 1.5–7.7)
NEUTROPHILS NFR BLD AUTO: 71.1 %
OSMOLALITY SERPL CALC.SUM OF ELEC: 282 MOSM/KG (ref 275–295)
PLATELET # BLD AUTO: 167 10(3)UL (ref 150–450)
POTASSIUM SERPL-SCNC: 4.2 MMOL/L (ref 3.5–5.1)
RBC # BLD AUTO: 2.62 X10(6)UL
SODIUM SERPL-SCNC: 137 MMOL/L (ref 136–145)
WBC # BLD AUTO: 4.3 X10(3) UL (ref 4–11)

## 2022-06-02 PROCEDURE — 99233 SBSQ HOSP IP/OBS HIGH 50: CPT | Performed by: HOSPITALIST

## 2022-06-02 RX ORDER — MULTIPLE VITAMINS W/ MINERALS TAB 9MG-400MCG
1 TAB ORAL DAILY
Status: DISCONTINUED | OUTPATIENT
Start: 2022-06-02 | End: 2022-06-04

## 2022-06-02 NOTE — PROGRESS NOTES
06/02/22 0753 06/02/22 0755 06/02/22 0757   Vital Signs   /64 114/55 111/54   MAP (mmHg) 78 68 69   BP Location Right arm  --   --    BP Method Automatic  --   --    Patient Position Lying Sitting Standing

## 2022-06-02 NOTE — PROGRESS NOTES
06/02/22 1108   Clinical Encounter Type   Visited With Patient; Health care provider  Tri Tolentino RN)   Routine Visit Introduction   Continue Visiting No   Responded to consult for advance directives. RN stated that patient is decisional.  Patient declined the need to work on advance directives at this time. RN informed. For further spiritual care, please enter a consult in Epic, followed by contacting pager [phone] as needed.

## 2022-06-02 NOTE — PROGRESS NOTES
NURSING ADMISSION NOTE      Patient admitted via Cart to room 523  Oriented to room. Safety precautions initiated. Bed in low position. Call light in reach. Admission makenna complete. Pt abd  at bedside updated on poc.  VSS, no c/o at this time wctm hourly

## 2022-06-02 NOTE — PROGRESS NOTES
Patient alert x4. Slurred and slow speech is baseline due to parkinsons. Fine NAKIA extrem tremors noted and is baseline. Poor eye sight: glasses. Lungs are clear. Abdomen is soft and flat. Active bowels x4. Complaints of diarrhea at home but none in hospital yet. Need to collect stool for c diff and stool panel. Up to bathroom on shift x1 assist with walker. Cont of urine. Reports dizziness and near syncope when walking and up on toilet multiple times today. Checked orthostats and they are negative. Tele called with abnormal heart rhythm and possible heart block. Cards consulted and possible wenckebach is intermit. Continue to monitor at this time. Patient cleared to drink water per GI. NPO at midtnight. Plan is for ABD ultrasound to check for ischemic colitis tomorrow. Continues IV ABX. Updated on plan of care and condition. Continues IV fluids 0.9% at 100/hr.

## 2022-06-02 NOTE — PLAN OF CARE
PROBLEM: GIB    ASSESSMENT: Pt is A&OX4. Slurred speech at baseline d/t parkinsons. VSS, afebrile. Maintaining O2 sats >95% on RA. Tele, NSR. Voids, pt c/o nausea this shift, prn zofran per MAR. PT c/o diarrhea prior to admission waiting to collect stool sample. Pt c/o abdominal pain, prn pain meds per MAR. IVF. IV abx. Safety and fall precautions in place no falls or injuries this shift wctm hourly.      EDUCATION: updated pt on poc    RESPONSE: verbalized understanding of poc       Problem: Patient/Family Goals  Goal: Patient/Family Long Term Goal  Description: Patient's Long Term Goal: to discharge home with adequate resources     Interventions:  - comply with poc  -gi consult  -IVF  -IV abx  - See additional Care Plan goals for specific interventions  Outcome: Progressing  Goal: Patient/Family Short Term Goal  Description: Patient's Short Term Goal:   6/1 NOC: sleep    Interventions:   - cluster care  - See additional Care Plan goals for specific interventions  Outcome: Progressing     Problem: PAIN - ADULT  Goal: Verbalizes/displays adequate comfort level or patient's stated pain goal  Description: INTERVENTIONS:  - Encourage pt to monitor pain and request assistance  - Assess pain using appropriate pain scale  - Administer analgesics based on type and severity of pain and evaluate response  - Implement non-pharmacological measures as appropriate and evaluate response  - Consider cultural and social influences on pain and pain management  - Manage/alleviate anxiety  - Utilize distraction and/or relaxation techniques  - Monitor for opioid side effects  - Notify MD/LIP if interventions unsuccessful or patient reports new pain  - Anticipate increased pain with activity and pre-medicate as appropriate  Outcome: Progressing     Problem: RISK FOR INFECTION - ADULT  Goal: Absence of fever/infection during anticipated neutropenic period  Description: INTERVENTIONS  - Monitor WBC  - Administer growth factors as ordered  - Implement neutropenic guidelines  Outcome: Progressing     Problem: SAFETY ADULT - FALL  Goal: Free from fall injury  Description: INTERVENTIONS:  - Assess pt frequently for physical needs  - Identify cognitive and physical deficits and behaviors that affect risk of falls.   - [place] fall precautions as indicated by assessment.  - Educate pt/family on patient safety including physical limitations  - Instruct pt to call for assistance with activity based on assessment  - Modify environment to reduce risk of injury  - Provide assistive devices as appropriate  - Consider OT/PT consult to assist with strengthening/mobility  - Encourage toileting schedule  Outcome: Progressing     Problem: DISCHARGE PLANNING  Goal: Discharge to home or other facility with appropriate resources  Description: INTERVENTIONS:  - Identify barriers to discharge w/pt and caregiver  - Include patient/family/discharge partner in discharge planning  - Arrange for needed discharge resources and transportation as appropriate  - Identify discharge learning needs (meds, wound care, etc)  - Arrange for interpreters to assist at discharge as needed  - Consider post-discharge preferences of patient/family/discharge partner  - Complete POLST form as appropriate  - Assess patient's ability to be responsible for managing their own health  - Refer to Case Management Department for coordinating discharge planning if the patient needs post-hospital services based on physician/LIP order or complex needs related to functional status, cognitive ability or social support system  Outcome: Progressing     Problem: Altered Communication/Language Barrier  Goal: Patient/Family is able to understand and participate in their care  Description: Interventions:  - Assess communication ability and preferred communication style  - Implement communication aides and strategies  - Use visual cues when possible  - Listen attentively, be patient, do not interrupt  - Minimize distractions  - Allow time for understanding and response  - Establish method for patient to ask for assistance (call light)  - Provide an  as needed  - Communicate barriers and strategies to overcome with those who interact with patient  Outcome: Progressing     Problem: GASTROINTESTINAL - ADULT  Goal: Minimal or absence of nausea and vomiting  Description: INTERVENTIONS:  - Maintain adequate hydration with IV or PO as ordered and tolerated  - Nasogastric tube to low intermittent suction as ordered  - Evaluate effectiveness of ordered antiemetic medications  - Provide nonpharmacologic comfort measures as appropriate  - Advance diet as tolerated, if ordered  - Obtain nutritional consult as needed  - Evaluate fluid balance  Outcome: Progressing  Goal: Maintains or returns to baseline bowel function  Description: INTERVENTIONS:  - Assess bowel function  - Maintain adequate hydration with IV or PO as ordered and tolerated  - Evaluate effectiveness of GI medications  - Encourage mobilization and activity  - Obtain nutritional consult as needed  - Establish a toileting routine/schedule  - Consider collaborating with pharmacy to review patient's medication profile  Outcome: Progressing  Goal: Maintains adequate nutritional intake (undernourished)  Description: INTERVENTIONS:  - Monitor percentage of each meal consumed  - Identify factors contributing to decreased intake, treat as appropriate  - Assist with meals as needed  - Monitor I&O, WT and lab values  - Obtain nutritional consult as needed  - Optimize oral hygiene and moisture  - Encourage food from home; allow for food preferences  - Enhance eating environment  Outcome: Progressing  Goal: Achieves appropriate nutritional intake (bariatric)  Description: INTERVENTIONS:  - Monitor for over-consumption  - Identify factors contributing to increased intake, treat as appropriate  - Monitor I&O, WT and lab values  - Obtain nutritional consult as needed  - Evaluate psychosocial factors contributing to over-consumption  Outcome: Progressing

## 2022-06-03 ENCOUNTER — APPOINTMENT (OUTPATIENT)
Dept: ULTRASOUND IMAGING | Facility: HOSPITAL | Age: 67
End: 2022-06-03
Attending: INTERNAL MEDICINE
Payer: MEDICARE

## 2022-06-03 LAB
ADENOVIRUS F 40/41 PCR: NEGATIVE
ANION GAP SERPL CALC-SCNC: 13 MMOL/L (ref 0–18)
ASTROVIRUS PCR: NEGATIVE
ATRIAL RATE: 84 BPM
BASOPHILS # BLD AUTO: 0.01 X10(3) UL (ref 0–0.2)
BASOPHILS NFR BLD AUTO: 0.2 %
BUN BLD-MCNC: 13 MG/DL (ref 7–18)
C CAYETANENSIS DNA SPEC QL NAA+PROBE: NEGATIVE
C DIFF TOX B STL QL: NEGATIVE
CALCIUM BLD-MCNC: 8.6 MG/DL (ref 8.5–10.1)
CAMPY SP DNA.DIARRHEA STL QL NAA+PROBE: NEGATIVE
CHLORIDE SERPL-SCNC: 104 MMOL/L (ref 98–112)
CO2 SERPL-SCNC: 20 MMOL/L (ref 21–32)
CREAT BLD-MCNC: 0.9 MG/DL
CRYPTOSP DNA SPEC QL NAA+PROBE: NEGATIVE
EAEC PAA PLAS AGGR+AATA ST NAA+NON-PRB: NEGATIVE
EC STX1+STX2 + H7 FLIC SPEC NAA+PROBE: NEGATIVE
ENTAMOEBA HISTOLYTICA PCR: NEGATIVE
EOSINOPHIL # BLD AUTO: 0.09 X10(3) UL (ref 0–0.7)
EOSINOPHIL NFR BLD AUTO: 1.4 %
EPEC EAE GENE STL QL NAA+NON-PROBE: NEGATIVE
ERYTHROCYTE [DISTWIDTH] IN BLOOD BY AUTOMATED COUNT: 12.2 %
ETEC LTA+ST1A+ST1B TOX ST NAA+NON-PROBE: NEGATIVE
GIARDIA LAMBLIA PCR: NEGATIVE
GLUCOSE BLD-MCNC: 173 MG/DL (ref 70–99)
GLUCOSE BLD-MCNC: 238 MG/DL (ref 70–99)
GLUCOSE BLD-MCNC: 43 MG/DL (ref 70–99)
GLUCOSE BLD-MCNC: 51 MG/DL (ref 70–99)
GLUCOSE BLD-MCNC: 53 MG/DL (ref 70–99)
HCT VFR BLD AUTO: 28.6 %
HGB BLD-MCNC: 9.2 G/DL
IMM GRANULOCYTES # BLD AUTO: 0.07 X10(3) UL (ref 0–1)
IMM GRANULOCYTES NFR BLD: 1.1 %
LYMPHOCYTES # BLD AUTO: 1.1 X10(3) UL (ref 1–4)
LYMPHOCYTES NFR BLD AUTO: 17.1 %
MCH RBC QN AUTO: 35.1 PG (ref 26–34)
MCHC RBC AUTO-ENTMCNC: 32.2 G/DL (ref 31–37)
MCV RBC AUTO: 109.2 FL
MONOCYTES # BLD AUTO: 0.56 X10(3) UL (ref 0.1–1)
MONOCYTES NFR BLD AUTO: 8.7 %
NEUTROPHILS # BLD AUTO: 4.62 X10 (3) UL (ref 1.5–7.7)
NEUTROPHILS # BLD AUTO: 4.62 X10(3) UL (ref 1.5–7.7)
NEUTROPHILS NFR BLD AUTO: 71.5 %
NOROVIRUS GI/GII PCR: NEGATIVE
OSMOLALITY SERPL CALC.SUM OF ELEC: 281 MOSM/KG (ref 275–295)
P AXIS: 69 DEGREES
P SHIGELLOIDES DNA STL QL NAA+PROBE: NEGATIVE
P-R INTERVAL: 240 MS
PLATELET # BLD AUTO: 175 10(3)UL (ref 150–450)
POTASSIUM SERPL-SCNC: 4.1 MMOL/L (ref 3.5–5.1)
Q-T INTERVAL: 392 MS
QRS DURATION: 112 MS
QTC CALCULATION (BEZET): 463 MS
R AXIS: 49 DEGREES
RBC # BLD AUTO: 2.62 X10(6)UL
ROTAVIRUS A PCR: NEGATIVE
SALMONELLA DNA SPEC QL NAA+PROBE: NEGATIVE
SAPOVIRUS PCR: NEGATIVE
SHIGELLA SP+EIEC IPAH ST NAA+NON-PROBE: NEGATIVE
SODIUM SERPL-SCNC: 137 MMOL/L (ref 136–145)
T AXIS: 44 DEGREES
V CHOLERAE DNA SPEC QL NAA+PROBE: NEGATIVE
VENTRICULAR RATE: 84 BPM
VIBRIO DNA SPEC NAA+PROBE: NEGATIVE
WBC # BLD AUTO: 6.5 X10(3) UL (ref 4–11)
YERSINIA DNA SPEC NAA+PROBE: NEGATIVE

## 2022-06-03 PROCEDURE — 99232 SBSQ HOSP IP/OBS MODERATE 35: CPT | Performed by: HOSPITALIST

## 2022-06-03 PROCEDURE — 93975 VASCULAR STUDY: CPT | Performed by: INTERNAL MEDICINE

## 2022-06-03 PROCEDURE — 76700 US EXAM ABDOM COMPLETE: CPT | Performed by: INTERNAL MEDICINE

## 2022-06-03 RX ORDER — DEXTROSE MONOHYDRATE 25 G/50ML
INJECTION, SOLUTION INTRAVENOUS
Status: COMPLETED
Start: 2022-06-03 | End: 2022-06-03

## 2022-06-03 RX ORDER — POLYETHYLENE GLYCOL 3350 17 G/17G
17 POWDER, FOR SOLUTION ORAL DAILY
Status: DISCONTINUED | OUTPATIENT
Start: 2022-06-03 | End: 2022-06-04

## 2022-06-03 RX ORDER — DOCUSATE SODIUM 100 MG/1
100 CAPSULE, LIQUID FILLED ORAL 2 TIMES DAILY
Status: DISCONTINUED | OUTPATIENT
Start: 2022-06-03 | End: 2022-06-04

## 2022-06-03 RX ORDER — DEXTROSE AND SODIUM CHLORIDE 5; .9 G/100ML; G/100ML
INJECTION, SOLUTION INTRAVENOUS CONTINUOUS
Status: DISCONTINUED | OUTPATIENT
Start: 2022-06-03 | End: 2022-06-04

## 2022-06-03 NOTE — PROGRESS NOTES
Very hungry - frustrated she remains NPO and hasn't yet been taken for her imaging studies    Tele sinus - intermittent Wenckebach AV block    Afebrile  125/57  54    Lungs clear  Ht RR - mild bradycardia  abd soft - positive bowel sounds - mild generalized tenderness  Ext no edema  Neuro baseline tremor      Hgb 9.2      A/P: Sinus node dysfunction - no absolute indication for pacing.  Diffuse underlying atherosclerotic vascular disease with prior SMA stenting - SMA stent, HERNANDEZ and celiac axis widely patent with last angiogram performed at [address] [place] [address] and baseline CV regimen    Home any time from our end    Will see on as needed basis moving forward        L2

## 2022-06-03 NOTE — PLAN OF CARE
Cardiology will sign off. Please call with further questions/concerns.     Julissa Cool, NINA  6/3/2022  3:18 PM

## 2022-06-03 NOTE — PLAN OF CARE
Pt A&Ox4. HX of parkinson's w/ deep brain simulator. Sats > 90% on RA. NSR on tele. Ortho static negative. Pt voids per the bathroom. No BM during this shift. Pt is able to ambulate around the room 1x SB. NPO right now, Reg diet. ABD ultra sound pending. IVF and ABX. Pt updated on the plan of care and verbalized understanding. Pt had no questions at this time. Medications given per MAR.    Problem: Patient/Family Goals  Goal: Patient/Family Long Term Goal  Description: Patient's Long Term Goal: to discharge home with adequate resources     Interventions:  - comply with poc  -gi consult  -IVF  -IV abx  - See additional Care Plan goals for specific interventions  Outcome: Progressing  Goal: Patient/Family Short Term Goal  Description: Patient's Short Term Goal:   6/1 NOC: sleep  6/2 NOC: Abdominal US in AM  6/3: abd US     Interventions:   - cluster care  - See additional Care Plan goals for specific interventions  Outcome: Progressing     Problem: PAIN - ADULT  Goal: Verbalizes/displays adequate comfort level or patient's stated pain goal  Description: INTERVENTIONS:  - Encourage pt to monitor pain and request assistance  - Assess pain using appropriate pain scale  - Administer analgesics based on type and severity of pain and evaluate response  - Implement non-pharmacological measures as appropriate and evaluate response  - Consider cultural and social influences on pain and pain management  - Manage/alleviate anxiety  - Utilize distraction and/or relaxation techniques  - Monitor for opioid side effects  - Notify MD/LIP if interventions unsuccessful or patient reports new pain  - Anticipate increased pain with activity and pre-medicate as appropriate  Outcome: Progressing     Problem: RISK FOR INFECTION - ADULT  Goal: Absence of fever/infection during anticipated neutropenic period  Description: INTERVENTIONS  - Monitor WBC  - Administer growth factors as ordered  - Implement neutropenic guidelines  Outcome: Progressing     Problem: SAFETY ADULT - FALL  Goal: Free from fall injury  Description: INTERVENTIONS:  - Assess pt frequently for physical needs  - Identify cognitive and physical deficits and behaviors that affect risk of falls.   - [place] fall precautions as indicated by assessment.  - Educate pt/family on patient safety including physical limitations  - Instruct pt to call for assistance with activity based on assessment  - Modify environment to reduce risk of injury  - Provide assistive devices as appropriate  - Consider OT/PT consult to assist with strengthening/mobility  - Encourage toileting schedule  Outcome: Progressing     Problem: DISCHARGE PLANNING  Goal: Discharge to home or other facility with appropriate resources  Description: INTERVENTIONS:  - Identify barriers to discharge w/pt and caregiver  - Include patient/family/discharge partner in discharge planning  - Arrange for needed discharge resources and transportation as appropriate  - Identify discharge learning needs (meds, wound care, etc)  - Arrange for interpreters to assist at discharge as needed  - Consider post-discharge preferences of patient/family/discharge partner  - Complete POLST form as appropriate  - Assess patient's ability to be responsible for managing their own health  - Refer to Case Management Department for coordinating discharge planning if the patient needs post-hospital services based on physician/LIP order or complex needs related to functional status, cognitive ability or social support system  Outcome: Progressing     Problem: Altered Communication/Language Barrier  Goal: Patient/Family is able to understand and participate in their care  Description: Interventions:  - Assess communication ability and preferred communication style  - Implement communication aides and strategies  - Use visual cues when possible  - Listen attentively, be patient, do not interrupt  - Minimize distractions  - Allow time for understanding and response  - Establish method for patient to ask for assistance (call light)  - Provide an  as needed  - Communicate barriers and strategies to overcome with those who interact with patient  Outcome: Progressing     Problem: GASTROINTESTINAL - ADULT  Goal: Minimal or absence of nausea and vomiting  Description: INTERVENTIONS:  - Maintain adequate hydration with IV or PO as ordered and tolerated  - Nasogastric tube to low intermittent suction as ordered  - Evaluate effectiveness of ordered antiemetic medications  - Provide nonpharmacologic comfort measures as appropriate  - Advance diet as tolerated, if ordered  - Obtain nutritional consult as needed  - Evaluate fluid balance  Outcome: Progressing  Goal: Maintains or returns to baseline bowel function  Description: INTERVENTIONS:  - Assess bowel function  - Maintain adequate hydration with IV or PO as ordered and tolerated  - Evaluate effectiveness of GI medications  - Encourage mobilization and activity  - Obtain nutritional consult as needed  - Establish a toileting routine/schedule  - Consider collaborating with pharmacy to review patient's medication profile  Outcome: Progressing  Goal: Maintains adequate nutritional intake (undernourished)  Description: INTERVENTIONS:  - Monitor percentage of each meal consumed  - Identify factors contributing to decreased intake, treat as appropriate  - Assist with meals as needed  - Monitor I&O, WT and lab values  - Obtain nutritional consult as needed  - Optimize oral hygiene and moisture  - Encourage food from home; allow for food preferences  - Enhance eating environment  Outcome: Progressing

## 2022-06-04 VITALS
HEART RATE: 72 BPM | TEMPERATURE: 98 F | RESPIRATION RATE: 20 BRPM | WEIGHT: 100.06 LBS | OXYGEN SATURATION: 98 % | SYSTOLIC BLOOD PRESSURE: 116 MMHG | DIASTOLIC BLOOD PRESSURE: 71 MMHG | HEIGHT: 62 IN | BODY MASS INDEX: 18.41 KG/M2

## 2022-06-04 PROCEDURE — 99239 HOSP IP/OBS DSCHRG MGMT >30: CPT | Performed by: HOSPITALIST

## 2022-06-04 RX ORDER — CIPROFLOXACIN 500 MG/1
500 TABLET, FILM COATED ORAL 2 TIMES DAILY
Status: DISCONTINUED | OUTPATIENT
Start: 2022-06-04 | End: 2022-06-04

## 2022-06-04 RX ORDER — LISINOPRIL 20 MG/1
20 TABLET ORAL DAILY
Refills: 0 | Status: SHIPPED | COMMUNITY
Start: 2022-06-04 | End: 2022-06-08

## 2022-06-04 RX ORDER — METRONIDAZOLE 500 MG/1
500 TABLET ORAL 3 TIMES DAILY
Qty: 30 TABLET | Refills: 0 | Status: SHIPPED | OUTPATIENT
Start: 2022-06-04 | End: 2022-06-04

## 2022-06-04 RX ORDER — AMOXICILLIN AND CLAVULANATE POTASSIUM 875; 125 MG/1; MG/1
1 TABLET, FILM COATED ORAL 2 TIMES DAILY
Qty: 20 TABLET | Refills: 0 | Status: SHIPPED | OUTPATIENT
Start: 2022-06-04 | End: 2022-06-14

## 2022-06-04 NOTE — PHYSICAL THERAPY NOTE
IP PT attempted, pt politely refusing stating she's been up to BR , chair and ambulating within room. Pt states she has no concerns regarding mobility upon d/c including stair negotiation. Pt's spouse present and confirms there are no mobility concerns.

## 2022-06-04 NOTE — PLAN OF CARE
Problem: Patient/Family Goals  Goal: Patient/Family Short Term Goal  Description: Patient's Short Term Goal:   6/1 NOC: sleep  6/2 NOC: Abdominal US in AM  6/3: abd US   6/3 nocs: have diarrhea resolve  Interventions:   - held colace tonight  - cluster care  - See additional Care Plan goals for specific interventions  Outcome: Progressing     Problem: PAIN - ADULT  Goal: Verbalizes/displays adequate comfort level or patient's stated pain goal  Description: INTERVENTIONS:  - Encourage pt to monitor pain and request assistance  - Assess pain using appropriate pain scale  - Administer analgesics based on type and severity of pain and evaluate response  - Implement non-pharmacological measures as appropriate and evaluate response  - Consider cultural and social influences on pain and pain management  - Manage/alleviate anxiety  - Utilize distraction and/or relaxation techniques  - Monitor for opioid side effects  - Notify MD/LIP if interventions unsuccessful or patient reports new pain  - Anticipate increased pain with activity and pre-medicate as appropriate  Outcome: Progressing     Problem: RISK FOR INFECTION - ADULT  Goal: Absence of fever/infection during anticipated neutropenic period  Description: INTERVENTIONS  - Monitor WBC  - Administer growth factors as ordered  - Implement neutropenic guidelines  Outcome: Progressing     Problem: SAFETY ADULT - FALL  Goal: Free from fall injury  Description: INTERVENTIONS:  - Assess pt frequently for physical needs  - Identify cognitive and physical deficits and behaviors that affect risk of falls.   - [place] fall precautions as indicated by assessment.  - Educate pt/family on patient safety including physical limitations  - Instruct pt to call for assistance with activity based on assessment  - Modify environment to reduce risk of injury  - Provide assistive devices as appropriate  - Consider OT/PT consult to assist with strengthening/mobility  - Encourage toileting schedule  Outcome: Progressing     Problem: DISCHARGE PLANNING  Goal: Discharge to home or other facility with appropriate resources  Description: INTERVENTIONS:  - Identify barriers to discharge w/pt and caregiver  - Include patient/family/discharge partner in discharge planning  - Arrange for needed discharge resources and transportation as appropriate  - Identify discharge learning needs (meds, wound care, etc)  - Arrange for interpreters to assist at discharge as needed  - Consider post-discharge preferences of patient/family/discharge partner  - Complete POLST form as appropriate  - Assess patient's ability to be responsible for managing their own health  - Refer to Case Management Department for coordinating discharge planning if the patient needs post-hospital services based on physician/LIP order or complex needs related to functional status, cognitive ability or social support system  Outcome: Progressing     Problem: Altered Communication/Language Barrier  Goal: Patient/Family is able to understand and participate in their care  Description: Interventions:  - Assess communication ability and preferred communication style  - Implement communication aides and strategies  - Use visual cues when possible  - Listen attentively, be patient, do not interrupt  - Minimize distractions  - Allow time for understanding and response  - Establish method for patient to ask for assistance (call light)  - Provide an  as needed  - Communicate barriers and strategies to overcome with those who interact with patient  Outcome: Progressing     Problem: GASTROINTESTINAL - ADULT  Goal: Minimal or absence of nausea and vomiting  Description: INTERVENTIONS:  - Maintain adequate hydration with IV or PO as ordered and tolerated  - Nasogastric tube to low intermittent suction as ordered  - Evaluate effectiveness of ordered antiemetic medications  - Provide nonpharmacologic comfort measures as appropriate  - Advance diet as tolerated, if ordered  - Obtain nutritional consult as needed  - Evaluate fluid balance  Outcome: Progressing  Goal: Maintains or returns to baseline bowel function  Description: INTERVENTIONS:  - Assess bowel function  - Maintain adequate hydration with IV or PO as ordered and tolerated  - Evaluate effectiveness of GI medications  - Encourage mobilization and activity  - Obtain nutritional consult as needed  - Establish a toileting routine/schedule  - Consider collaborating with pharmacy to review patient's medication profile  Outcome: Progressing  Goal: Maintains adequate nutritional intake (undernourished)  Description: INTERVENTIONS:  - Monitor percentage of each meal consumed  - Identify factors contributing to decreased intake, treat as appropriate  - Assist with meals as needed  - Monitor I&O, WT and lab values  - Obtain nutritional consult as needed  - Optimize oral hygiene and moisture  - Encourage food from home; allow for food preferences  - Enhance eating environment  Outcome: Progressing     Patient received tonight alert and oriented x4, vss, she is on room air, tele with  mobitz1 block. She admits to loose stools after taking numerous medications to have a bowel movement. Held colace tonight, stool panel is negative. She has c/o abdominal pain, medicated with prn morphine and norco with relief. Discussed poc she verbalized understanding. Safety and comfort measures provided, will monitor.

## 2022-06-06 ENCOUNTER — PATIENT MESSAGE (OUTPATIENT)
Dept: HEMATOLOGY/ONCOLOGY | Facility: HOSPITAL | Age: 67
End: 2022-06-06

## 2022-06-06 ENCOUNTER — PATIENT OUTREACH (OUTPATIENT)
Dept: CASE MANAGEMENT | Age: 67
End: 2022-06-06

## 2022-06-06 DIAGNOSIS — D50.0 IRON DEFICIENCY ANEMIA DUE TO CHRONIC BLOOD LOSS: Primary | ICD-10-CM

## 2022-06-06 DIAGNOSIS — D46.9 MDS (MYELODYSPLASTIC SYNDROME) (HCC): ICD-10-CM

## 2022-06-06 LAB — DEPRECATED HBV CORE AB SER IA-ACNC: 490.3 NG/ML

## 2022-06-06 NOTE — TELEPHONE ENCOUNTER
From: Alice Armstrong  To: Luisa Hooper MD  Sent: 6/6/2022 2:10 PM CDT  Subject: Geoff Higgins,  I was in the hospital from 6/1/22 thru 6/4/22. At time of admit my hemoglobin was 11.3 and by Friday had dropped to 9.2, I believe. Since I don't have an upcoming appointment with you, should I schedule one in the future? Thanks.     Peg

## 2022-06-08 ENCOUNTER — LAB ENCOUNTER (OUTPATIENT)
Dept: LAB | Age: 67
End: 2022-06-08
Attending: ANESTHESIOLOGY
Payer: MEDICARE

## 2022-06-08 DIAGNOSIS — Z79.891 LONG TERM (CURRENT) USE OF OPIATE ANALGESIC: ICD-10-CM

## 2022-06-08 DIAGNOSIS — F11.90 CHRONIC NARCOTIC USE: ICD-10-CM

## 2022-06-08 PROCEDURE — 80307 DRUG TEST PRSMV CHEM ANLYZR: CPT

## 2022-06-08 NOTE — PROGRESS NOTES
Patient presents in office today with reported pain in low back left side    Current pain level reported = /10    Last reported dose of norco last night       Narcotic Contract renewal 07/12/21    Urine Drug screen 2/2022

## 2022-06-10 LAB
6-ACETYLMORHINE CUTOFF 20 NG/ML: NOT DETECTED
7-AMINOCLONAZEPAM 40 NG/ML: NOT DETECTED
A-OH-ALPRAZOLM CUTOFF 20 NG/ML: NOT DETECTED
ALPHA-OH-MIDAZOLAM (CUTOFF 20 NG/ML): NOT DETECTED
ALPRAZOLAM CUTOFF 40 NG/ML: NOT DETECTED
AMPHETAMINE CUTOFF 10 NG/ML: NOT DETECTED
BARBITURATES CUTOFF 200 NG/ML: PRESENT
BENZOYLECGONINE  150 NG/ML: NOT DETECTED
BUPRENORPHINE CUTOFF 5 NG/ML: NOT DETECTED
CARISOPRODOL CUTOFF 100 NG/ML: NOT DETECTED
CODINE CUTOFF 40 NG/ML: NOT DETECTED
COLNAZEPAM CUTOFF 20 NG/ML: NOT DETECTED
CREATININE,URINE: 60.2 MG/DL
DIAZEPAM CUTOFF 50 NG/ML: NOT DETECTED
ETHYL-GLUCURONIDE 500 NG/ML: NOT DETECTED
FENTANYL CUTOFF 2 NG/ML: NOT DETECTED
GABAPENTIN (CUTOFF 100 NG/ML): PRESENT
HYDROCODONE CUTOFF 40 NG/ML: NOT DETECTED
HYDROMORPHONE CUTOFF 40 NG/ML: PRESENT
LORAZEPAM CUTOFF 60 NG/ML: NOT DETECTED
MARIJUANA CUTOFF 20 NG/ML: NOT DETECTED
MDA CUTOFF 200 NG/ML: NOT DETECTED
MDEA EVE CUTOFF 200 NG/ML: NOT DETECTED
MDMA-ECSTASY CUTOFF 200 NG/ML: NOT DETECTED
MEPERIDINE MET CUTOFF 50 NG/ML: NOT DETECTED
METHADONE CUTOFF 150 NG/ML: NOT DETECTED
METHAMPHETMN CUTOFF 400 NG/ML: NOT DETECTED
METHYLPHENIDATE (CUTOFF 100 NG/ML): NOT DETECTED
MIDAZOLAM CUTOFF 20 NG/ML: NOT DETECTED
MORHINE CUTOFF 20 NG/ML: NOT DETECTED
NALOXONE (CUTOFF 100 NG/ML): NOT DETECTED
NORBUPRENORPHINE  20 NG/ML: NOT DETECTED
NORDIAZEPAM CUTOFF 50 NG/ML: NOT DETECTED
NORFENTANYL CUTOFF 2 NG/ML: NOT DETECTED
NORHYDRCODONE CUTOFF 100 NG/ML: PRESENT
NOROXYCODONE CUTOFF 100 NG/ML: NOT DETECTED
NOROXYMORPHNE CUTOFF 100 NG/ML: NOT DETECTED
OXAZEPAM CUTOFF 50 NG/ML: NOT DETECTED
OXYCODONE CUTOFF 40 NG/ML: NOT DETECTED
OXYMORPHONE CUTOFF 40 NG/ML: NOT DETECTED
PCP CUTOFF 25 NG/ML: NOT DETECTED
PHENTERMINE CUTOFF 100 NG/ML: NOT DETECTED
PREGABALIN (CUTOFF 100 NG/ML): NOT DETECTED
TAPENTADOL CUTOFF 100 NG/ML: NOT DETECTED
TAPENTADOL-O SULF 200 NG/ML: NOT DETECTED
TEMAZEPAM CUTOFF 50 NG/ML: NOT DETECTED
TRAMADOL CUTOFF 200 NG/ML: NOT DETECTED
ZOLPIDEM CUTOFF 20 NG/ML: NOT DETECTED
ZOLPIDEM METABOLITE (CUTOFF 100 NG/ML): NOT DETECTED

## 2022-06-13 RX ORDER — ATORVASTATIN CALCIUM 10 MG/1
TABLET, FILM COATED ORAL
Qty: 90 TABLET | Refills: 1 | Status: SHIPPED | OUTPATIENT
Start: 2022-06-13

## 2022-06-15 ENCOUNTER — OFFICE VISIT (OUTPATIENT)
Dept: HEMATOLOGY/ONCOLOGY | Facility: HOSPITAL | Age: 67
End: 2022-06-15
Attending: INTERNAL MEDICINE
Payer: MEDICARE

## 2022-06-15 VITALS
RESPIRATION RATE: 16 BRPM | TEMPERATURE: 98 F | HEART RATE: 84 BPM | SYSTOLIC BLOOD PRESSURE: 112 MMHG | DIASTOLIC BLOOD PRESSURE: 69 MMHG | OXYGEN SATURATION: 98 %

## 2022-06-15 DIAGNOSIS — D50.0 IRON DEFICIENCY ANEMIA DUE TO CHRONIC BLOOD LOSS: Primary | ICD-10-CM

## 2022-06-15 PROCEDURE — 96374 THER/PROPH/DIAG INJ IV PUSH: CPT

## 2022-06-15 NOTE — PROGRESS NOTES
Education Record    Learner:  Patient    Disease / Diagnosis: Feraheme. Tolerated well.     Barriers / Limitations:  None   Comments:    Method:  Discussion   Comments:    General Topics:  Plan of care reviewed   Comments:    Outcome:  Shows understanding   Comments:

## 2022-06-16 ENCOUNTER — PATIENT MESSAGE (OUTPATIENT)
Dept: PAIN CLINIC | Facility: CLINIC | Age: 67
End: 2022-06-16

## 2022-06-16 DIAGNOSIS — M54.16 LUMBAR RADICULOPATHY: ICD-10-CM

## 2022-06-16 DIAGNOSIS — M48.061 SPINAL STENOSIS OF LUMBAR REGION WITHOUT NEUROGENIC CLAUDICATION: ICD-10-CM

## 2022-06-16 NOTE — TELEPHONE ENCOUNTER
From: Curtis Fatima  To: Vanessa Sow MD  Sent: 6/16/2022 12:01 PM CDT  Subject: HYDROCODONE REFILL    Good afternoon,  I need a refill on my meds. Thank you.

## 2022-06-17 RX ORDER — HYDROCODONE BITARTRATE AND ACETAMINOPHEN 5; 325 MG/1; MG/1
1 TABLET ORAL EVERY 8 HOURS PRN
Qty: 90 TABLET | Refills: 0 | Status: SHIPPED | OUTPATIENT
Start: 2022-06-20 | End: 2022-07-20

## 2022-06-22 ENCOUNTER — OFFICE VISIT (OUTPATIENT)
Dept: HEMATOLOGY/ONCOLOGY | Facility: HOSPITAL | Age: 67
End: 2022-06-22
Attending: INTERNAL MEDICINE
Payer: MEDICARE

## 2022-06-22 VITALS
RESPIRATION RATE: 16 BRPM | HEART RATE: 72 BPM | OXYGEN SATURATION: 98 % | TEMPERATURE: 98 F | DIASTOLIC BLOOD PRESSURE: 63 MMHG | SYSTOLIC BLOOD PRESSURE: 119 MMHG

## 2022-06-22 DIAGNOSIS — D50.0 IRON DEFICIENCY ANEMIA DUE TO CHRONIC BLOOD LOSS: Primary | ICD-10-CM

## 2022-06-22 LAB
BASOPHILS # BLD AUTO: 0.03 X10(3) UL (ref 0–0.2)
BASOPHILS NFR BLD AUTO: 0.8 %
DEPRECATED HBV CORE AB SER IA-ACNC: 968.2 NG/ML
EOSINOPHIL # BLD AUTO: 0.18 X10(3) UL (ref 0–0.7)
EOSINOPHIL NFR BLD AUTO: 4.6 %
ERYTHROCYTE [DISTWIDTH] IN BLOOD BY AUTOMATED COUNT: 13.1 %
HCT VFR BLD AUTO: 28.8 %
HGB BLD-MCNC: 9.4 G/DL
IMM GRANULOCYTES # BLD AUTO: 0.01 X10(3) UL (ref 0–1)
IMM GRANULOCYTES NFR BLD: 0.3 %
IRON SATN MFR SERPL: 39 %
IRON SERPL-MCNC: 99 UG/DL
LYMPHOCYTES # BLD AUTO: 1.14 X10(3) UL (ref 1–4)
LYMPHOCYTES NFR BLD AUTO: 29.2 %
MCH RBC QN AUTO: 34.2 PG (ref 26–34)
MCHC RBC AUTO-ENTMCNC: 32.6 G/DL (ref 31–37)
MCV RBC AUTO: 104.7 FL
MONOCYTES # BLD AUTO: 0.49 X10(3) UL (ref 0.1–1)
MONOCYTES NFR BLD AUTO: 12.6 %
NEUTROPHILS # BLD AUTO: 2.05 X10 (3) UL (ref 1.5–7.7)
NEUTROPHILS # BLD AUTO: 2.05 X10(3) UL (ref 1.5–7.7)
NEUTROPHILS NFR BLD AUTO: 52.5 %
PLATELET # BLD AUTO: 240 10(3)UL (ref 150–450)
RBC # BLD AUTO: 2.75 X10(6)UL
TIBC SERPL-MCNC: 253 UG/DL (ref 240–450)
TRANSFERRIN SERPL-MCNC: 170 MG/DL (ref 200–360)
WBC # BLD AUTO: 3.9 X10(3) UL (ref 4–11)

## 2022-06-22 PROCEDURE — 83540 ASSAY OF IRON: CPT

## 2022-06-22 PROCEDURE — 85025 COMPLETE CBC W/AUTO DIFF WBC: CPT

## 2022-06-22 PROCEDURE — 82728 ASSAY OF FERRITIN: CPT

## 2022-06-22 PROCEDURE — 83550 IRON BINDING TEST: CPT

## 2022-06-22 PROCEDURE — 96374 THER/PROPH/DIAG INJ IV PUSH: CPT

## 2022-06-22 NOTE — PROGRESS NOTES
Education Record    Learner:  Patient    Disease / Diagnosis: Iron Deficiency Anemia    Barriers / Limitations:  None   Comments:    Method:  Brief focused and Reinforcement   Comments:    General Topics:  Plan of care reviewed   Comments:    Outcome:  Shows understanding   Comments:  Feraheme infused without any complications. Observed for half hour after infusion. Vital signs taken at the end of the 30-minute observation. Patient denied any complaints/issues. Discharged in good condition. Advised to call for any questions/concerns/issues. Patient has an appointment with Dr. Blaire Carvalho later this afternoon.

## 2022-06-28 ENCOUNTER — HOSPITAL ENCOUNTER (OUTPATIENT)
Dept: LAB | Facility: HOSPITAL | Age: 67
Discharge: HOME | End: 2022-06-28
Attending: NURSE PRACTITIONER
Payer: MEDICARE

## 2022-06-28 LAB — TROPONIN I HIGH SENSITIVITY: 4 NG/L

## 2022-06-28 PROCEDURE — 36415 COLL VENOUS BLD VENIPUNCTURE: CPT | Performed by: NURSE PRACTITIONER

## 2022-06-28 PROCEDURE — 84484 ASSAY OF TROPONIN QUANT: CPT | Performed by: NURSE PRACTITIONER

## 2022-07-13 ENCOUNTER — OFFICE VISIT (OUTPATIENT)
Dept: HEMATOLOGY/ONCOLOGY | Facility: HOSPITAL | Age: 67
End: 2022-07-13
Attending: INTERNAL MEDICINE
Payer: MEDICARE

## 2022-07-13 VITALS
WEIGHT: 103.31 LBS | BODY MASS INDEX: 19 KG/M2 | TEMPERATURE: 98 F | HEART RATE: 70 BPM | DIASTOLIC BLOOD PRESSURE: 107 MMHG | RESPIRATION RATE: 16 BRPM | OXYGEN SATURATION: 97 % | SYSTOLIC BLOOD PRESSURE: 206 MMHG

## 2022-07-13 DIAGNOSIS — D46.9 MDS (MYELODYSPLASTIC SYNDROME) (HCC): ICD-10-CM

## 2022-07-13 DIAGNOSIS — D50.0 IRON DEFICIENCY ANEMIA DUE TO CHRONIC BLOOD LOSS: Primary | ICD-10-CM

## 2022-07-13 DIAGNOSIS — Z17.1 MALIGNANT NEOPLASM OF OVERLAPPING SITES OF RIGHT BREAST IN FEMALE, ESTROGEN RECEPTOR NEGATIVE (HCC): ICD-10-CM

## 2022-07-13 DIAGNOSIS — D72.819 LEUKOPENIA, UNSPECIFIED TYPE: ICD-10-CM

## 2022-07-13 DIAGNOSIS — C50.811 MALIGNANT NEOPLASM OF OVERLAPPING SITES OF RIGHT BREAST IN FEMALE, ESTROGEN RECEPTOR NEGATIVE (HCC): ICD-10-CM

## 2022-07-13 DIAGNOSIS — D46.9 MDS (MYELODYSPLASTIC SYNDROME) (HCC): Primary | ICD-10-CM

## 2022-07-13 LAB
BASOPHILS # BLD AUTO: 0.01 X10(3) UL (ref 0–0.2)
BASOPHILS NFR BLD AUTO: 0.3 %
EOSINOPHIL # BLD AUTO: 0.1 X10(3) UL (ref 0–0.7)
EOSINOPHIL NFR BLD AUTO: 2.9 %
ERYTHROCYTE [DISTWIDTH] IN BLOOD BY AUTOMATED COUNT: 14.1 %
HCT VFR BLD AUTO: 31.9 %
HGB BLD-MCNC: 10.5 G/DL
IMM GRANULOCYTES # BLD AUTO: 0.01 X10(3) UL (ref 0–1)
IMM GRANULOCYTES NFR BLD: 0.3 %
LYMPHOCYTES # BLD AUTO: 0.89 X10(3) UL (ref 1–4)
LYMPHOCYTES NFR BLD AUTO: 25.6 %
MCH RBC QN AUTO: 34.8 PG (ref 26–34)
MCHC RBC AUTO-ENTMCNC: 32.9 G/DL (ref 31–37)
MCV RBC AUTO: 105.6 FL
MONOCYTES # BLD AUTO: 0.36 X10(3) UL (ref 0.1–1)
MONOCYTES NFR BLD AUTO: 10.3 %
NEUTROPHILS # BLD AUTO: 2.11 X10 (3) UL (ref 1.5–7.7)
NEUTROPHILS # BLD AUTO: 2.11 X10(3) UL (ref 1.5–7.7)
NEUTROPHILS NFR BLD AUTO: 60.6 %
PLATELET # BLD AUTO: 217 10(3)UL (ref 150–450)
RBC # BLD AUTO: 3.02 X10(6)UL
WBC # BLD AUTO: 3.5 X10(3) UL (ref 4–11)

## 2022-07-13 PROCEDURE — 99214 OFFICE O/P EST MOD 30 MIN: CPT | Performed by: INTERNAL MEDICINE

## 2022-07-13 RX ORDER — MIDODRINE HYDROCHLORIDE 2.5 MG/1
TABLET ORAL
COMMUNITY
Start: 2022-07-12

## 2022-07-13 NOTE — PROGRESS NOTES
Education Record    Learner:  Patient    Disease / Clevester Massing    Barriers / Limitations:  None   Comments:    Method:  Discussion   Comments:    General Topics:  Plan of care reviewed   Comments:    Outcome:  Shows understanding   Comments:  Pt continues with episodes of passing out. Has seen cardiology and physiologist, meds have recently been adjusted. Monitoring BP at home as well. High today in the office. Denies HA. Denies other symptoms of high blood pressure. Energy level is still low. Eating well, but unable to gain weight.

## 2022-07-18 DIAGNOSIS — F33.40 RECURRENT MAJOR DEPRESSIVE DISORDER, IN REMISSION (HCC): ICD-10-CM

## 2022-07-19 DIAGNOSIS — M48.061 SPINAL STENOSIS OF LUMBAR REGION WITHOUT NEUROGENIC CLAUDICATION: ICD-10-CM

## 2022-07-19 DIAGNOSIS — M54.16 LUMBAR RADICULOPATHY: ICD-10-CM

## 2022-07-19 RX ORDER — HYDROCODONE BITARTRATE AND ACETAMINOPHEN 5; 325 MG/1; MG/1
1 TABLET ORAL EVERY 8 HOURS PRN
Qty: 90 TABLET | Refills: 0 | Status: SHIPPED | OUTPATIENT
Start: 2022-07-20 | End: 2022-08-19

## 2022-07-19 RX ORDER — DULOXETIN HYDROCHLORIDE 30 MG/1
30 CAPSULE, DELAYED RELEASE ORAL DAILY
Qty: 90 CAPSULE | Refills: 1 | Status: SHIPPED | OUTPATIENT
Start: 2022-07-19

## 2022-07-19 NOTE — TELEPHONE ENCOUNTER
Last OV relevant to medication: 12/20/2021   Last refill date: 4/18/2022    #/refills: 90-0   When pt was asked to return for OV: none stated  Upcoming appt/reason: 12/22/2022 AWV  Was pt informed of any over due labs: n/a

## 2022-08-10 ENCOUNTER — NURSE ONLY (OUTPATIENT)
Dept: HEMATOLOGY/ONCOLOGY | Facility: HOSPITAL | Age: 67
End: 2022-08-10
Attending: INTERNAL MEDICINE
Payer: MEDICARE

## 2022-08-10 DIAGNOSIS — D50.0 IRON DEFICIENCY ANEMIA DUE TO CHRONIC BLOOD LOSS: ICD-10-CM

## 2022-08-10 LAB
ALBUMIN SERPL-MCNC: 4.1 G/DL (ref 3.4–5)
ALBUMIN/GLOB SERPL: 1.3 {RATIO} (ref 1–2)
ALP LIVER SERPL-CCNC: 35 U/L
ALT SERPL-CCNC: 23 U/L
ANION GAP SERPL CALC-SCNC: 3 MMOL/L (ref 0–18)
AST SERPL-CCNC: 14 U/L (ref 15–37)
BASOPHILS # BLD AUTO: 0.01 X10(3) UL (ref 0–0.2)
BASOPHILS NFR BLD AUTO: 0.3 %
BILIRUB SERPL-MCNC: 0.2 MG/DL (ref 0.1–2)
BUN BLD-MCNC: 14 MG/DL (ref 7–18)
CALCIUM BLD-MCNC: 9.5 MG/DL (ref 8.5–10.1)
CHLORIDE SERPL-SCNC: 104 MMOL/L (ref 98–112)
CO2 SERPL-SCNC: 30 MMOL/L (ref 21–32)
CREAT BLD-MCNC: 0.96 MG/DL
DEPRECATED HBV CORE AB SER IA-ACNC: 766.7 NG/ML
EOSINOPHIL # BLD AUTO: 0.05 X10(3) UL (ref 0–0.7)
EOSINOPHIL NFR BLD AUTO: 1.4 %
ERYTHROCYTE [DISTWIDTH] IN BLOOD BY AUTOMATED COUNT: 13 %
FASTING STATUS PATIENT QL REPORTED: NO
GFR SERPLBLD BASED ON 1.73 SQ M-ARVRAT: 65 ML/MIN/1.73M2 (ref 60–?)
GLOBULIN PLAS-MCNC: 3.1 G/DL (ref 2.8–4.4)
GLUCOSE BLD-MCNC: 125 MG/DL (ref 70–99)
HCT VFR BLD AUTO: 33.5 %
HGB BLD-MCNC: 11.1 G/DL
IMM GRANULOCYTES # BLD AUTO: 0.01 X10(3) UL (ref 0–1)
IMM GRANULOCYTES NFR BLD: 0.3 %
IRON SATN MFR SERPL: 41 %
IRON SERPL-MCNC: 114 UG/DL
LYMPHOCYTES # BLD AUTO: 0.96 X10(3) UL (ref 1–4)
LYMPHOCYTES NFR BLD AUTO: 26 %
MCH RBC QN AUTO: 35.1 PG (ref 26–34)
MCHC RBC AUTO-ENTMCNC: 33.1 G/DL (ref 31–37)
MCV RBC AUTO: 106 FL
MONOCYTES # BLD AUTO: 0.36 X10(3) UL (ref 0.1–1)
MONOCYTES NFR BLD AUTO: 9.8 %
NEUTROPHILS # BLD AUTO: 2.3 X10 (3) UL (ref 1.5–7.7)
NEUTROPHILS # BLD AUTO: 2.3 X10(3) UL (ref 1.5–7.7)
NEUTROPHILS NFR BLD AUTO: 62.2 %
OSMOLALITY SERPL CALC.SUM OF ELEC: 286 MOSM/KG (ref 275–295)
PLATELET # BLD AUTO: 226 10(3)UL (ref 150–450)
POTASSIUM SERPL-SCNC: 4.7 MMOL/L (ref 3.5–5.1)
PROT SERPL-MCNC: 7.2 G/DL (ref 6.4–8.2)
RBC # BLD AUTO: 3.16 X10(6)UL
SODIUM SERPL-SCNC: 137 MMOL/L (ref 136–145)
TIBC SERPL-MCNC: 280 UG/DL (ref 240–450)
TRANSFERRIN SERPL-MCNC: 188 MG/DL (ref 200–360)
WBC # BLD AUTO: 3.7 X10(3) UL (ref 4–11)

## 2022-08-10 PROCEDURE — 82728 ASSAY OF FERRITIN: CPT

## 2022-08-10 PROCEDURE — 85025 COMPLETE CBC W/AUTO DIFF WBC: CPT

## 2022-08-10 PROCEDURE — 83550 IRON BINDING TEST: CPT

## 2022-08-10 PROCEDURE — 83540 ASSAY OF IRON: CPT

## 2022-08-10 PROCEDURE — 80053 COMPREHEN METABOLIC PANEL: CPT

## 2022-08-10 PROCEDURE — 36415 COLL VENOUS BLD VENIPUNCTURE: CPT

## 2022-08-17 DIAGNOSIS — M48.061 SPINAL STENOSIS OF LUMBAR REGION WITHOUT NEUROGENIC CLAUDICATION: ICD-10-CM

## 2022-08-17 DIAGNOSIS — M54.16 LUMBAR RADICULOPATHY: ICD-10-CM

## 2022-08-17 RX ORDER — HYDROCODONE BITARTRATE AND ACETAMINOPHEN 5; 325 MG/1; MG/1
1 TABLET ORAL EVERY 8 HOURS PRN
Qty: 90 TABLET | Refills: 0 | Status: SHIPPED | OUTPATIENT
Start: 2022-08-19 | End: 2022-08-18

## 2022-08-18 ENCOUNTER — TELEPHONE (OUTPATIENT)
Dept: PAIN CLINIC | Facility: CLINIC | Age: 67
End: 2022-08-18

## 2022-08-18 DIAGNOSIS — M48.061 SPINAL STENOSIS OF LUMBAR REGION WITHOUT NEUROGENIC CLAUDICATION: ICD-10-CM

## 2022-08-18 DIAGNOSIS — M54.16 LUMBAR RADICULOPATHY: ICD-10-CM

## 2022-08-18 RX ORDER — HYDROCODONE BITARTRATE AND ACETAMINOPHEN 5; 325 MG/1; MG/1
1 TABLET ORAL EVERY 8 HOURS PRN
Qty: 90 TABLET | Refills: 0 | Status: ON HOLD | OUTPATIENT
Start: 2022-08-19 | End: 2022-09-18

## 2022-08-18 NOTE — TELEPHONE ENCOUNTER
Reached out to the pharmacist to verify what do they need for the script to be released. Per Pharmacist,the escript that they received yesterday had no provider's address and patient's address. Pharmacist is requesting to send new script. Advised to cancel the script and we will sent a new script. Josue Forrest.

## 2022-08-18 NOTE — TELEPHONE ENCOUNTER
Pt called because she states that the pharmacy will not fill her medication because it doesn't have our address or her address on the script. Pt is asking that we contact the pharmacy to have them release her medication by tomorrow. Please advise.

## 2022-08-21 ENCOUNTER — APPOINTMENT (OUTPATIENT)
Dept: CT IMAGING | Facility: HOSPITAL | Age: 67
DRG: 378 | End: 2022-08-21
Attending: EMERGENCY MEDICINE
Payer: MEDICARE

## 2022-08-21 ENCOUNTER — HOSPITAL ENCOUNTER (INPATIENT)
Facility: HOSPITAL | Age: 67
LOS: 2 days | Discharge: HOME | DRG: 378 | End: 2022-08-23
Attending: EMERGENCY MEDICINE | Admitting: HOSPITALIST
Payer: MEDICARE

## 2022-08-21 ENCOUNTER — HOSPITAL ENCOUNTER (INPATIENT)
Facility: HOSPITAL | Age: 67
LOS: 2 days | Discharge: HOME | End: 2022-08-23
Attending: EMERGENCY MEDICINE | Admitting: HOSPITALIST
Payer: MEDICARE

## 2022-08-21 ENCOUNTER — APPOINTMENT (OUTPATIENT)
Dept: CT IMAGING | Facility: HOSPITAL | Age: 67
End: 2022-08-21
Attending: EMERGENCY MEDICINE
Payer: MEDICARE

## 2022-08-21 DIAGNOSIS — I95.89 HYPOTENSION DUE TO HYPOVOLEMIA: ICD-10-CM

## 2022-08-21 DIAGNOSIS — K31.811 GASTROINTESTINAL HEMORRHAGE ASSOCIATED WITH ANGIODYSPLASIA OF STOMACH AND DUODENUM: Primary | ICD-10-CM

## 2022-08-21 DIAGNOSIS — Z87.19 H/O ISCHEMIC BOWEL DISEASE: ICD-10-CM

## 2022-08-21 DIAGNOSIS — R55 VASO VAGAL EPISODE: ICD-10-CM

## 2022-08-21 DIAGNOSIS — R55 SYNCOPE AND COLLAPSE: ICD-10-CM

## 2022-08-21 DIAGNOSIS — E86.1 HYPOTENSION DUE TO HYPOVOLEMIA: ICD-10-CM

## 2022-08-21 PROBLEM — R73.9 HYPERGLYCEMIA: Status: ACTIVE | Noted: 2022-08-21

## 2022-08-21 LAB
ALBUMIN SERPL-MCNC: 3.8 G/DL (ref 3.4–5)
ALBUMIN/GLOB SERPL: 1.2 {RATIO} (ref 1–2)
ALP LIVER SERPL-CCNC: 51 U/L
ALT SERPL-CCNC: 20 U/L
ANION GAP SERPL CALC-SCNC: 6 MMOL/L (ref 0–18)
ANTIBODY SCREEN: NEGATIVE
APTT PPP: 28.4 SECONDS (ref 23.3–35.6)
AST SERPL-CCNC: 15 U/L (ref 15–37)
ATRIAL RATE: 73 BPM
BASOPHILS # BLD AUTO: 0.01 X10(3) UL (ref 0–0.2)
BASOPHILS NFR BLD AUTO: 0.2 %
BILIRUB SERPL-MCNC: 0.3 MG/DL (ref 0.1–2)
BUN BLD-MCNC: 16 MG/DL (ref 7–18)
C DIFF TOX B STL QL: NEGATIVE
CALCIUM BLD-MCNC: 9.3 MG/DL (ref 8.5–10.1)
CHLORIDE SERPL-SCNC: 99 MMOL/L (ref 98–112)
CO2 SERPL-SCNC: 27 MMOL/L (ref 21–32)
CREAT BLD-MCNC: 1.13 MG/DL
EOSINOPHIL # BLD AUTO: 0.04 X10(3) UL (ref 0–0.7)
EOSINOPHIL NFR BLD AUTO: 0.8 %
ERYTHROCYTE [DISTWIDTH] IN BLOOD BY AUTOMATED COUNT: 12.5 %
GFR SERPLBLD BASED ON 1.73 SQ M-ARVRAT: 53 ML/MIN/1.73M2 (ref 60–?)
GLOBULIN PLAS-MCNC: 3.2 G/DL (ref 2.8–4.4)
GLUCOSE BLD-MCNC: 139 MG/DL (ref 70–99)
HCT VFR BLD AUTO: 35.2 %
HGB BLD-MCNC: 11.9 G/DL
IMM GRANULOCYTES # BLD AUTO: 0.02 X10(3) UL (ref 0–1)
IMM GRANULOCYTES NFR BLD: 0.4 %
INR BLD: 0.97 (ref 0.85–1.16)
LACTATE SERPL-SCNC: 1.5 MMOL/L (ref 0.4–2)
LYMPHOCYTES # BLD AUTO: 1.17 X10(3) UL (ref 1–4)
LYMPHOCYTES NFR BLD AUTO: 23.2 %
MCH RBC QN AUTO: 34.9 PG (ref 26–34)
MCHC RBC AUTO-ENTMCNC: 33.8 G/DL (ref 31–37)
MCV RBC AUTO: 103.2 FL
MONOCYTES # BLD AUTO: 0.48 X10(3) UL (ref 0.1–1)
MONOCYTES NFR BLD AUTO: 9.5 %
NEUTROPHILS # BLD AUTO: 3.32 X10 (3) UL (ref 1.5–7.7)
NEUTROPHILS # BLD AUTO: 3.32 X10(3) UL (ref 1.5–7.7)
NEUTROPHILS NFR BLD AUTO: 65.9 %
OSMOLALITY SERPL CALC.SUM OF ELEC: 277 MOSM/KG (ref 275–295)
P AXIS: 59 DEGREES
P-R INTERVAL: 158 MS
PLATELET # BLD AUTO: 207 10(3)UL (ref 150–450)
POTASSIUM SERPL-SCNC: 3.8 MMOL/L (ref 3.5–5.1)
PROT SERPL-MCNC: 7 G/DL (ref 6.4–8.2)
PROTHROMBIN TIME: 12.9 SECONDS (ref 11.6–14.8)
Q-T INTERVAL: 430 MS
QRS DURATION: 88 MS
QTC CALCULATION (BEZET): 473 MS
R AXIS: 73 DEGREES
RBC # BLD AUTO: 3.41 X10(6)UL
RH BLOOD TYPE: POSITIVE
SARS-COV-2 RNA RESP QL NAA+PROBE: NOT DETECTED
SODIUM SERPL-SCNC: 132 MMOL/L (ref 136–145)
T AXIS: 78 DEGREES
VENTRICULAR RATE: 73 BPM
WBC # BLD AUTO: 5 X10(3) UL (ref 4–11)

## 2022-08-21 PROCEDURE — 72125 CT NECK SPINE W/O DYE: CPT | Performed by: EMERGENCY MEDICINE

## 2022-08-21 PROCEDURE — 70450 CT HEAD/BRAIN W/O DYE: CPT | Performed by: EMERGENCY MEDICINE

## 2022-08-21 PROCEDURE — 99223 1ST HOSP IP/OBS HIGH 75: CPT | Performed by: HOSPITALIST

## 2022-08-21 PROCEDURE — 30233N1 TRANSFUSION OF NONAUTOLOGOUS RED BLOOD CELLS INTO PERIPHERAL VEIN, PERCUTANEOUS APPROACH: ICD-10-PCS | Performed by: HOSPITALIST

## 2022-08-21 RX ORDER — LISINOPRIL 2.5 MG/1
5 TABLET ORAL DAILY
COMMUNITY
Start: 2022-07-13

## 2022-08-21 RX ORDER — SODIUM CHLORIDE 9 MG/ML
INJECTION, SOLUTION INTRAVENOUS CONTINUOUS
Status: ACTIVE | OUTPATIENT
Start: 2022-08-21 | End: 2022-08-21

## 2022-08-21 RX ORDER — BENZONATATE 200 MG/1
200 CAPSULE ORAL 3 TIMES DAILY PRN
Status: DISCONTINUED | OUTPATIENT
Start: 2022-08-21 | End: 2022-08-23

## 2022-08-21 RX ORDER — TRAMADOL HYDROCHLORIDE 50 MG/1
50 TABLET ORAL EVERY 6 HOURS PRN
Status: DISCONTINUED | OUTPATIENT
Start: 2022-08-21 | End: 2022-08-23

## 2022-08-21 RX ORDER — PRIMIDONE 50 MG/1
100 TABLET ORAL EVERY MORNING
Status: DISCONTINUED | OUTPATIENT
Start: 2022-08-22 | End: 2022-08-23

## 2022-08-21 RX ORDER — ONDANSETRON 2 MG/ML
8 INJECTION INTRAMUSCULAR; INTRAVENOUS EVERY 6 HOURS PRN
Status: DISCONTINUED | OUTPATIENT
Start: 2022-08-21 | End: 2022-08-23

## 2022-08-21 RX ORDER — ONDANSETRON 2 MG/ML
4 INJECTION INTRAMUSCULAR; INTRAVENOUS ONCE
Status: COMPLETED | OUTPATIENT
Start: 2022-08-21 | End: 2022-08-21

## 2022-08-21 RX ORDER — LISINOPRIL 2.5 MG/1
2.5 TABLET ORAL DAILY
Status: DISCONTINUED | OUTPATIENT
Start: 2022-08-22 | End: 2022-08-23

## 2022-08-21 RX ORDER — BISACODYL 10 MG
10 SUPPOSITORY, RECTAL RECTAL
Status: DISCONTINUED | OUTPATIENT
Start: 2022-08-21 | End: 2022-08-23

## 2022-08-21 RX ORDER — GABAPENTIN 300 MG/1
600 CAPSULE ORAL 2 TIMES DAILY
Status: DISCONTINUED | OUTPATIENT
Start: 2022-08-21 | End: 2022-08-23

## 2022-08-21 RX ORDER — ECHINACEA PURPUREA EXTRACT 125 MG
1 TABLET ORAL
Status: DISCONTINUED | OUTPATIENT
Start: 2022-08-21 | End: 2022-08-23

## 2022-08-21 RX ORDER — SENNOSIDES 8.6 MG
17.2 TABLET ORAL NIGHTLY PRN
Status: DISCONTINUED | OUTPATIENT
Start: 2022-08-21 | End: 2022-08-23

## 2022-08-21 RX ORDER — SODIUM CHLORIDE 9 MG/ML
INJECTION, SOLUTION INTRAVENOUS CONTINUOUS
Status: DISCONTINUED | OUTPATIENT
Start: 2022-08-21 | End: 2022-08-23

## 2022-08-21 RX ORDER — POLYETHYLENE GLYCOL 3350 17 G/17G
17 POWDER, FOR SOLUTION ORAL DAILY PRN
Status: DISCONTINUED | OUTPATIENT
Start: 2022-08-21 | End: 2022-08-23

## 2022-08-21 RX ORDER — ACETAMINOPHEN 500 MG
500 TABLET ORAL EVERY 4 HOURS PRN
Status: DISCONTINUED | OUTPATIENT
Start: 2022-08-21 | End: 2022-08-23

## 2022-08-21 RX ORDER — MELATONIN
3 NIGHTLY PRN
Status: DISCONTINUED | OUTPATIENT
Start: 2022-08-21 | End: 2022-08-23

## 2022-08-21 RX ORDER — HYDROCODONE BITARTRATE AND ACETAMINOPHEN 5; 325 MG/1; MG/1
1 TABLET ORAL EVERY 8 HOURS PRN
Status: DISCONTINUED | OUTPATIENT
Start: 2022-08-21 | End: 2022-08-23

## 2022-08-21 RX ORDER — ATORVASTATIN CALCIUM 10 MG/1
10 TABLET, FILM COATED ORAL NIGHTLY
Status: DISCONTINUED | OUTPATIENT
Start: 2022-08-21 | End: 2022-08-23

## 2022-08-21 RX ORDER — PRIMIDONE 50 MG/1
150 TABLET ORAL NIGHTLY
Status: DISCONTINUED | OUTPATIENT
Start: 2022-08-21 | End: 2022-08-23

## 2022-08-21 RX ORDER — FLECAINIDE ACETATE 100 MG/1
100 TABLET ORAL 2 TIMES DAILY
Status: DISCONTINUED | OUTPATIENT
Start: 2022-08-21 | End: 2022-08-23

## 2022-08-21 RX ORDER — DULOXETIN HYDROCHLORIDE 30 MG/1
30 CAPSULE, DELAYED RELEASE ORAL DAILY
Status: DISCONTINUED | OUTPATIENT
Start: 2022-08-22 | End: 2022-08-23

## 2022-08-21 NOTE — ED INITIAL ASSESSMENT (HPI)
Pt arrives via EMS from home. Pt had syncopal episode while having a BM. Pt states she hit her head on an infant stool in the bathroom. Pt has hx of vasovagal. +thinners.

## 2022-08-21 NOTE — ED QUICK NOTES
Orders for admission, patient is aware of plan and ready to go upstairs. Any questions, please call ED RN Braxton Vides at extension [phone].      Patient Covid vaccination status: Fully vaccinated     COVID Test Ordered in ED: Rapid SARS-CoV-2 by PCR    COVID Suspicion at Admission: N/A    Running Infusions:  BLOOD    Mental Status/LOC at time of transport: aox4    Other pertinent information: n/a  CIWA score: N/A   NIH score:  N/A

## 2022-08-21 NOTE — ED QUICK NOTES
While getting patient changed into a gown, noted pt had an incontinent BM. Stool is noted to be dark, hemoccult done.

## 2022-08-21 NOTE — PLAN OF CARE
Assumed pt care at 1455. Admission navigator completed, MD paged to get home medications ordered, oried to room, belongings at bedside. Plan of care updated with patient and  at bedside.

## 2022-08-22 ENCOUNTER — APPOINTMENT (OUTPATIENT)
Dept: CV DIAGNOSTICS | Facility: HOSPITAL | Age: 67
DRG: 378 | End: 2022-08-22
Attending: NURSE PRACTITIONER
Payer: MEDICARE

## 2022-08-22 ENCOUNTER — APPOINTMENT (OUTPATIENT)
Dept: CV DIAGNOSTICS | Facility: HOSPITAL | Age: 67
End: 2022-08-22
Attending: NURSE PRACTITIONER
Payer: MEDICARE

## 2022-08-22 ENCOUNTER — ANESTHESIA (OUTPATIENT)
Dept: ENDOSCOPY | Facility: HOSPITAL | Age: 67
End: 2022-08-22
Payer: MEDICARE

## 2022-08-22 ENCOUNTER — ANESTHESIA EVENT (OUTPATIENT)
Dept: ENDOSCOPY | Facility: HOSPITAL | Age: 67
End: 2022-08-22
Payer: MEDICARE

## 2022-08-22 LAB
ANION GAP SERPL CALC-SCNC: 4 MMOL/L (ref 0–18)
BUN BLD-MCNC: 8 MG/DL (ref 7–18)
CALCIUM BLD-MCNC: 8 MG/DL (ref 8.5–10.1)
CHLORIDE SERPL-SCNC: 108 MMOL/L (ref 98–112)
CO2 SERPL-SCNC: 24 MMOL/L (ref 21–32)
CREAT BLD-MCNC: 0.71 MG/DL
ERYTHROCYTE [DISTWIDTH] IN BLOOD BY AUTOMATED COUNT: 15.3 %
GFR SERPLBLD BASED ON 1.73 SQ M-ARVRAT: 93 ML/MIN/1.73M2 (ref 60–?)
GLUCOSE BLD-MCNC: 82 MG/DL (ref 70–99)
HCT VFR BLD AUTO: 32.3 %
HGB BLD-MCNC: 10.9 G/DL
MCH RBC QN AUTO: 34 PG (ref 26–34)
MCHC RBC AUTO-ENTMCNC: 33.7 G/DL (ref 31–37)
MCV RBC AUTO: 100.6 FL
OSMOLALITY SERPL CALC.SUM OF ELEC: 279 MOSM/KG (ref 275–295)
PLATELET # BLD AUTO: 132 10(3)UL (ref 150–450)
POTASSIUM SERPL-SCNC: 3.7 MMOL/L (ref 3.5–5.1)
RBC # BLD AUTO: 3.21 X10(6)UL
SODIUM SERPL-SCNC: 136 MMOL/L (ref 136–145)
WBC # BLD AUTO: 6.2 X10(3) UL (ref 4–11)

## 2022-08-22 PROCEDURE — 0DB58ZX EXCISION OF ESOPHAGUS, VIA NATURAL OR ARTIFICIAL OPENING ENDOSCOPIC, DIAGNOSTIC: ICD-10-PCS | Performed by: INTERNAL MEDICINE

## 2022-08-22 PROCEDURE — 0DB78ZX EXCISION OF STOMACH, PYLORUS, VIA NATURAL OR ARTIFICIAL OPENING ENDOSCOPIC, DIAGNOSTIC: ICD-10-PCS | Performed by: INTERNAL MEDICINE

## 2022-08-22 PROCEDURE — 99232 SBSQ HOSP IP/OBS MODERATE 35: CPT | Performed by: HOSPITALIST

## 2022-08-22 RX ORDER — SODIUM CHLORIDE, SODIUM LACTATE, POTASSIUM CHLORIDE, CALCIUM CHLORIDE 600; 310; 30; 20 MG/100ML; MG/100ML; MG/100ML; MG/100ML
INJECTION, SOLUTION INTRAVENOUS CONTINUOUS PRN
Status: DISCONTINUED | OUTPATIENT
Start: 2022-08-22 | End: 2022-08-22 | Stop reason: SURG

## 2022-08-22 RX ORDER — HYDROMORPHONE HYDROCHLORIDE 1 MG/ML
0.4 INJECTION, SOLUTION INTRAMUSCULAR; INTRAVENOUS; SUBCUTANEOUS EVERY 2 HOUR PRN
Status: DISCONTINUED | OUTPATIENT
Start: 2022-08-22 | End: 2022-08-23

## 2022-08-22 RX ORDER — HYDROMORPHONE HYDROCHLORIDE 1 MG/ML
0.8 INJECTION, SOLUTION INTRAMUSCULAR; INTRAVENOUS; SUBCUTANEOUS EVERY 2 HOUR PRN
Status: DISCONTINUED | OUTPATIENT
Start: 2022-08-22 | End: 2022-08-23

## 2022-08-22 RX ORDER — HYDROMORPHONE HYDROCHLORIDE 1 MG/ML
0.2 INJECTION, SOLUTION INTRAMUSCULAR; INTRAVENOUS; SUBCUTANEOUS EVERY 2 HOUR PRN
Status: DISCONTINUED | OUTPATIENT
Start: 2022-08-22 | End: 2022-08-23

## 2022-08-22 RX ORDER — KETAMINE HYDROCHLORIDE 50 MG/ML
INJECTION, SOLUTION, CONCENTRATE INTRAMUSCULAR; INTRAVENOUS AS NEEDED
Status: DISCONTINUED | OUTPATIENT
Start: 2022-08-22 | End: 2022-08-22 | Stop reason: SURG

## 2022-08-22 RX ADMIN — KETAMINE HYDROCHLORIDE 12.5 MG: 50 INJECTION, SOLUTION, CONCENTRATE INTRAMUSCULAR; INTRAVENOUS at 14:05:00

## 2022-08-22 RX ADMIN — SODIUM CHLORIDE, SODIUM LACTATE, POTASSIUM CHLORIDE, CALCIUM CHLORIDE: 600; 310; 30; 20 INJECTION, SOLUTION INTRAVENOUS at 14:02:00

## 2022-08-22 NOTE — PLAN OF CARE
Subjective: Patient states that she still had some bleeding last night. Objective: Blood pressure stable. AM Hg is 10.9 (was 11.9 last night). IV of 0.9 NS infusing at 100 ml/hr. Pt. Is c/o pain. She states that she has spinal stenosis and that her fall made the pain worse. Assessment: Patient is hemoodynamically stable at present. Dr. Rhys Baltazar in to see    Plan: Prepare for endoscopy procedure today. Keep NPO.

## 2022-08-22 NOTE — PLAN OF CARE
Alert and oriented x4. On RA. Denies any SOB or chest pain. NSR on tele. Incontinent of bowel and bladder. Bloody stools, MD aware. Chronic back pain controlled with prn meds. Up with SBA. Call light within reach. Updated on POC. Plan for Endoscopy. NPO.

## 2022-08-22 NOTE — ANESTHESIA POSTPROCEDURE EVALUATION
[facility] Patient Status:  Inpatient   Age/Gender 79year old female MRN [id number]   Location [address] [address] Attending Keiko Godfrey MD   [facility] Day [facility] 1 PCP Jeana Leigh MD       Anesthesia Post-op Note    ESOPHAGOGASTRODUODENOSCOPY (EGD) with BIOPSY    Procedure Summary     Date: 08/22/22 Room / Location: [facility] ENDOSCOPY 03 / 1404 [facility] ENDOSCOPY    Anesthesia Start: 1074 Anesthesia Stop: 0052    Procedure: ESOPHAGOGASTRODUODENOSCOPY (EGD) with BIOPSY (N/A ) Diagnosis: (esophageal plaques, hiatal hernia, Antral erythema)    Surgeons: Mary Grace Christine MD Anesthesiologist: Alexander Segura MD    Anesthesia Type: MAC ASA Status: 3          Anesthesia Type: MAC    Vitals Value Taken Time   /57 08/22/22 1421   Temp 98 08/22/22 1424   Pulse 96 08/22/22 1424   Resp 16 08/22/22 1424   SpO2 97 % 08/22/22 1424   Vitals shown include unvalidated device data. Patient Location: Endoscopy    Anesthesia Type: MAC    Airway Patency: patent    Postop Pain Control: adequate    Mental Status: preanesthetic baseline    Nausea/Vomiting: none    Cardiopulmonary/Hydration status: stable euvolemic    Complications: no apparent anesthesia related complications    Postop vital signs: stable    Dental Exam: Unchanged from Preop    Patient to be discharged from PACU when criteria met.

## 2022-08-22 NOTE — PROGRESS NOTES
80 y/o female with hx afib, myelodysplastic syndrome, orthostatic hypotension, HTN, SMA stent, familial tremor with deep brain stimulator, chronic mesenteric ischemia previously evaluated by Dr. Yissel Davidson for 2:1 AV block. Last MCT no afib. Recent PET 6/22 normal. Presents now with syncope on toilet and melena. Most likely vagal. Folow HgB. Monitor rhythm. Discuss with EP.  If needs pacer in future would have FRANKLYN

## 2022-08-22 NOTE — PLAN OF CARE
Problem: GASTROINTESTINAL - ADULT  Goal: Minimal or absence of nausea and vomiting  Description: INTERVENTIONS:  - Maintain adequate hydration with IV or PO as ordered and tolerated  - Nasogastric tube to low intermittent suction as ordered  - Evaluate effectiveness of ordered antiemetic medications  - Provide nonpharmacologic comfort measures as appropriate  - Advance diet as tolerated, if ordered  - Obtain nutritional consult as needed  - Evaluate fluid balance  Outcome: Progressing  Goal: Maintains or returns to baseline bowel function  Description: INTERVENTIONS:  - Assess bowel function  - Maintain adequate hydration with IV or PO as ordered and tolerated  - Evaluate effectiveness of GI medications  - Encourage mobilization and activity  - Obtain nutritional consult as needed  - Establish a toileting routine/schedule  - Consider collaborating with pharmacy to review patient's medication profile  Outcome: Progressing  Goal: Maintains adequate nutritional intake (undernourished)  Description: INTERVENTIONS:  - Monitor percentage of each meal consumed  - Identify factors contributing to decreased intake, treat as appropriate  - Assist with meals as needed  - Monitor I&O, WT and lab values  - Obtain nutritional consult as needed  - Optimize oral hygiene and moisture  - Encourage food from home; allow for food preferences  - Enhance eating environment  Outcome: Progressing  Goal: Achieves appropriate nutritional intake (bariatric)  Description: INTERVENTIONS:  - Monitor for over-consumption  - Identify factors contributing to increased intake, treat as appropriate  - Monitor I&O, WT and lab values  - Obtain nutritional consult as needed  - Evaluate psychosocial factors contributing to over-consumption  Outcome: Progressing     Problem: CARDIOVASCULAR - ADULT  Goal: Maintains optimal cardiac output and hemodynamic stability  Description: INTERVENTIONS:  - Monitor vital signs, rhythm, and trends  - Monitor for bleeding, hypotension and signs of decreased cardiac output  - Evaluate effectiveness of vasoactive medications to optimize hemodynamic stability  - Monitor arterial and/or venous puncture sites for bleeding and/or hematoma  - Assess quality of pulses, skin color and temperature  - Assess for signs of decreased coronary artery perfusion - ex.  Angina  - Evaluate fluid balance, assess for edema, trend weights  Outcome: Progressing  Goal: Absence of cardiac arrhythmias or at baseline  Description: INTERVENTIONS:  - Continuous cardiac monitoring, monitor vital signs, obtain 12 lead EKG if indicated  - Evaluate effectiveness of antiarrhythmic and heart rate control medications as ordered  - Initiate emergency measures for life threatening arrhythmias  - Monitor electrolytes and administer replacement therapy as ordered  Outcome: Progressing     Problem: METABOLIC/FLUID AND ELECTROLYTES - ADULT  Goal: Electrolytes maintained within normal limits  Description: INTERVENTIONS:  - Monitor labs and rhythm and assess patient for signs and symptoms of electrolyte imbalances  - Administer electrolyte replacement as ordered  - Monitor response to electrolyte replacements, including rhythm and repeat lab results as appropriate  - Fluid restriction as ordered  - Instruct patient on fluid and nutrition restrictions as appropriate  Outcome: Progressing  Goal: Hemodynamic stability and optimal renal function maintained  Description: INTERVENTIONS:  - Monitor labs and assess for signs and symptoms of volume excess or deficit  - Monitor intake, output and patient weight  - Monitor urine specific gravity, serum osmolarity and serum sodium as indicated or ordered  - Monitor response to interventions for patient's volume status, including labs, urine output, blood pressure (other measures as available)  - Encourage oral intake as appropriate  - Instruct patient on fluid and nutrition restrictions as appropriate  Outcome: Progressing Problem: SKIN/TISSUE INTEGRITY - ADULT  Goal: Skin integrity remains intact  Description: INTERVENTIONS  - Assess and document risk factors for pressure ulcer development  - Assess and document skin integrity  - Monitor for areas of redness and/or skin breakdown  - Initiate interventions, skin care algorithm/standards of care as needed  Outcome: Progressing  Goal: Oral mucous membranes remain intact  Description: INTERVENTIONS  - Assess oral mucosa and hygiene practices  - Implement preventative oral hygiene regimen  - Implement oral medicated treatments as ordered  Outcome: Progressing     Problem: HEMATOLOGIC - ADULT  Goal: Maintains hematologic stability  Description: INTERVENTIONS  - Assess for signs and symptoms of bleeding or hemorrhage  - Monitor labs and vital signs for trends  - Administer supportive blood products/factors, fluids and medications as ordered and appropriate  - Administer supportive blood products/factors as ordered and appropriate  Outcome: Progressing  Goal: Free from bleeding injury  Description: (Example usage: patient with low platelets)  INTERVENTIONS:  - Avoid intramuscular injections, enemas and rectal medication administration  - Ensure safe mobilization of patient  - Hold pressure on venipuncture sites to achieve adequate hemostasis  - Assess for signs and symptoms of internal bleeding  - Monitor lab trends  - Patient is to report abnormal signs of bleeding to staff  - Avoid use of toothpicks and dental floss  - Use electric shaver for shaving  - Use soft bristle tooth brush  - Limit straining and forceful nose blowing  Outcome: Progressing     Problem: MUSCULOSKELETAL - ADULT  Goal: Return mobility to safest level of function  Description: INTERVENTIONS:  - Assess patient stability and activity tolerance for standing, transferring and ambulating w/ or w/o assistive devices  - Assist with transfers and ambulation using safe patient handling equipment as needed  - Ensure adequate protection for wounds/incisions during mobilization  - Obtain PT/OT consults as needed  - Advance activity as appropriate  - Communicate ordered activity level and limitations with patient/family  Outcome: Progressing

## 2022-08-22 NOTE — CONSULTS
[facility]    PATIENT'S NAME: Kennedy Rose   ATTENDING PHYSICIAN: TERELL Gregg Mensah: Yris Del Valle M.D. PATIENT ACCOUNT#:   [de-identified]    LOCATION:  [address] [place] [address]  MEDICAL RECORD #:   [id number]       YOB: 1955  ADMISSION DATE:       08/21/2022      CONSULT DATE:  08/22/2022    REPORT OF CONSULTATION    HISTORY OF PRESENT ILLNESS:  This is a 55-year-old female with history of atrial fibrillation myelodysplastic syndrome, hypertension, orthostatic hypotension, SMA, familial tremor with deep brain stimulator, and chronic mesenteric ischemia, who has been evaluated in the past for second-degree and first-degree AV block. Yesterday, she got up to go to the toilet and while on the toilet she had a syncopal event. She did hit her face. She then had melena. When paramedics arrived, blood pressure was somewhat low. She now feels good. She states she has not been on anticoagulation for any atrial fibrillation. Her last MCT did not show any atrial fibrillation. Her PET scan was normal.    PAST MEDICAL HISTORY:  Familial tremor, with a deep brain stimulator; SMA stent; atrial fibrillation; intermittent first-degree and second-degree AV block; prior ischemic colitis; myelodysplastic syndrome; hypertension; orthostatic hypotension. MEDICATIONS:  On admission include flecainide, hydrocodone, atorvastatin. She has been on midodrine in the past.    FAMILY HISTORY:  Noncontributory from a coronary standpoint. REVIEW OF SYSTEMS:  Denies hemoptysis, denies hematemesis, denies hematuria. Rest of systems normal except as in HPI. PHYSICAL EXAMINATION:    GENERAL:  No acute distress. VITAL SIGNS:  140/70, pulse 82, sinus. HEENT:  Pupils equal and reactive to light and accommodation. NECK:  No JVD at 30 degrees. No thyromegaly. LUNGS:  Clear to auscultation and percussion. HEART:  PMI nondisplaced. S1, S2.  ABDOMEN:  Soft.   No hepatosplenomegaly, no masses, nontender. EXTREMITIES:  No clubbing, cyanosis, or edema. BACK:  No kyphosis. NEUROLOGIC:  Cranial nerves II through XII intact. SKIN:  No rashes. LABORATORY DATA:  Hemoglobin 11.9. IMPRESSION:    1. Syncope. This most likely was vasovagal event. At the present time, we will talk to EP service. The notes from them say that if she needs pacemaker in the future, it should be a micro because of limited access. We will get an echo to look at LV function. 2.   History of gastrointestinal bleeds. We will monitor hemoglobin closely. 3.   Myelodysplastic syndrome. 4.   Prior superior mesenteric artery stent. 5.   History of orthostatic hypotension. She was tried on midodrine and then her blood pressure elevated.     Dictated By Jazmin Ambrose M.D.  d: 08/22/2022 07:57:14  t: 08/22/2022 08:59:26  Job [id number]  MJG/

## 2022-08-23 ENCOUNTER — APPOINTMENT (OUTPATIENT)
Dept: CV DIAGNOSTICS | Facility: HOSPITAL | Age: 67
DRG: 378 | End: 2022-08-23
Attending: NURSE PRACTITIONER
Payer: MEDICARE

## 2022-08-23 ENCOUNTER — APPOINTMENT (OUTPATIENT)
Dept: CV DIAGNOSTICS | Facility: HOSPITAL | Age: 67
End: 2022-08-23
Attending: NURSE PRACTITIONER
Payer: MEDICARE

## 2022-08-23 VITALS
OXYGEN SATURATION: 99 % | HEIGHT: 62 IN | TEMPERATURE: 99 F | HEART RATE: 74 BPM | BODY MASS INDEX: 19.31 KG/M2 | SYSTOLIC BLOOD PRESSURE: 166 MMHG | RESPIRATION RATE: 18 BRPM | WEIGHT: 104.94 LBS | DIASTOLIC BLOOD PRESSURE: 80 MMHG

## 2022-08-23 LAB
ANION GAP SERPL CALC-SCNC: 4 MMOL/L (ref 0–18)
BASOPHILS # BLD AUTO: 0.02 X10(3) UL (ref 0–0.2)
BASOPHILS NFR BLD AUTO: 0.4 %
BUN BLD-MCNC: 5 MG/DL (ref 7–18)
CALCIUM BLD-MCNC: 8.2 MG/DL (ref 8.5–10.1)
CHLORIDE SERPL-SCNC: 111 MMOL/L (ref 98–112)
CO2 SERPL-SCNC: 24 MMOL/L (ref 21–32)
CREAT BLD-MCNC: 0.5 MG/DL
EOSINOPHIL # BLD AUTO: 0.08 X10(3) UL (ref 0–0.7)
EOSINOPHIL NFR BLD AUTO: 1.6 %
ERYTHROCYTE [DISTWIDTH] IN BLOOD BY AUTOMATED COUNT: 15.1 %
GFR SERPLBLD BASED ON 1.73 SQ M-ARVRAT: 103 ML/MIN/1.73M2 (ref 60–?)
GLUCOSE BLD-MCNC: 83 MG/DL (ref 70–99)
HCT VFR BLD AUTO: 32.6 %
HGB BLD-MCNC: 10.4 G/DL
IMM GRANULOCYTES # BLD AUTO: 0.01 X10(3) UL (ref 0–1)
IMM GRANULOCYTES NFR BLD: 0.2 %
LYMPHOCYTES # BLD AUTO: 0.7 X10(3) UL (ref 1–4)
LYMPHOCYTES NFR BLD AUTO: 13.7 %
MCH RBC QN AUTO: 33.7 PG (ref 26–34)
MCHC RBC AUTO-ENTMCNC: 31.9 G/DL (ref 31–37)
MCV RBC AUTO: 105.5 FL
MONOCYTES # BLD AUTO: 0.4 X10(3) UL (ref 0.1–1)
MONOCYTES NFR BLD AUTO: 7.8 %
NEUTROPHILS # BLD AUTO: 3.9 X10 (3) UL (ref 1.5–7.7)
NEUTROPHILS # BLD AUTO: 3.9 X10(3) UL (ref 1.5–7.7)
NEUTROPHILS NFR BLD AUTO: 76.3 %
OSMOLALITY SERPL CALC.SUM OF ELEC: 284 MOSM/KG (ref 275–295)
PLATELET # BLD AUTO: 119 10(3)UL (ref 150–450)
POTASSIUM SERPL-SCNC: 3.6 MMOL/L (ref 3.5–5.1)
RBC # BLD AUTO: 3.09 X10(6)UL
SODIUM SERPL-SCNC: 139 MMOL/L (ref 136–145)
WBC # BLD AUTO: 5.1 X10(3) UL (ref 4–11)

## 2022-08-23 PROCEDURE — 93306 TTE W/DOPPLER COMPLETE: CPT | Performed by: NURSE PRACTITIONER

## 2022-08-23 PROCEDURE — 99239 HOSP IP/OBS DSCHRG MGMT >30: CPT | Performed by: HOSPITALIST

## 2022-08-23 PROCEDURE — 0DJ07ZZ INSPECTION OF UPPER INTESTINAL TRACT, VIA NATURAL OR ARTIFICIAL OPENING: ICD-10-PCS | Performed by: INTERNAL MEDICINE

## 2022-08-23 RX ORDER — NALOXONE HYDROCHLORIDE 0.4 MG/ML
80 INJECTION, SOLUTION INTRAMUSCULAR; INTRAVENOUS; SUBCUTANEOUS AS NEEDED
Status: ACTIVE | OUTPATIENT
Start: 2022-08-23 | End: 2022-08-23

## 2022-08-23 RX ORDER — PANTOPRAZOLE SODIUM 40 MG/1
40 TABLET, DELAYED RELEASE ORAL DAILY
Qty: 30 TABLET | Refills: 0 | Status: SHIPPED | OUTPATIENT
Start: 2022-08-23

## 2022-08-23 RX ORDER — FLUCONAZOLE 200 MG/1
200 TABLET ORAL DAILY
Qty: 21 TABLET | Refills: 0 | Status: SHIPPED | OUTPATIENT
Start: 2022-08-23 | End: 2022-09-13

## 2022-08-23 RX ORDER — PRIMIDONE 50 MG/1
150 TABLET ORAL EVERY EVENING
COMMUNITY

## 2022-08-23 RX ORDER — POTASSIUM CHLORIDE 20 MEQ/1
40 TABLET, EXTENDED RELEASE ORAL EVERY 4 HOURS
Status: COMPLETED | OUTPATIENT
Start: 2022-08-23 | End: 2022-08-23

## 2022-08-23 RX ORDER — SODIUM CHLORIDE, SODIUM LACTATE, POTASSIUM CHLORIDE, CALCIUM CHLORIDE 600; 310; 30; 20 MG/100ML; MG/100ML; MG/100ML; MG/100ML
INJECTION, SOLUTION INTRAVENOUS CONTINUOUS
Status: DISCONTINUED | OUTPATIENT
Start: 2022-08-23 | End: 2022-08-23

## 2022-08-23 NOTE — PLAN OF CARE
Alert and oriented x4. On RA. Denies any SOB or chest pain. NSR on tele. Continent of bowel and bladder. Pill camera recording in place. Chronic back pack. Up with 1 x walker. Call light within reach. Fall precautions in place.        Problem: GASTROINTESTINAL - ADULT  Goal: Minimal or absence of nausea and vomiting  Description: INTERVENTIONS:  - Maintain adequate hydration with IV or PO as ordered and tolerated  - Nasogastric tube to low intermittent suction as ordered  - Evaluate effectiveness of ordered antiemetic medications  - Provide nonpharmacologic comfort measures as appropriate  - Advance diet as tolerated, if ordered  - Obtain nutritional consult as needed  - Evaluate fluid balance  Outcome: Progressing  Goal: Maintains or returns to baseline bowel function  Description: INTERVENTIONS:  - Assess bowel function  - Maintain adequate hydration with IV or PO as ordered and tolerated  - Evaluate effectiveness of GI medications  - Encourage mobilization and activity  - Obtain nutritional consult as needed  - Establish a toileting routine/schedule  - Consider collaborating with pharmacy to review patient's medication profile  Outcome: Progressing  Goal: Maintains adequate nutritional intake (undernourished)  Description: INTERVENTIONS:  - Monitor percentage of each meal consumed  - Identify factors contributing to decreased intake, treat as appropriate  - Assist with meals as needed  - Monitor I&O, WT and lab values  - Obtain nutritional consult as needed  - Optimize oral hygiene and moisture  - Encourage food from home; allow for food preferences  - Enhance eating environment  Outcome: Progressing  Goal: Achieves appropriate nutritional intake (bariatric)  Description: INTERVENTIONS:  - Monitor for over-consumption  - Identify factors contributing to increased intake, treat as appropriate  - Monitor I&O, WT and lab values  - Obtain nutritional consult as needed  - Evaluate psychosocial factors contributing to over-consumption  Outcome: Progressing     Problem: CARDIOVASCULAR - ADULT  Goal: Maintains optimal cardiac output and hemodynamic stability  Description: INTERVENTIONS:  - Monitor vital signs, rhythm, and trends  - Monitor for bleeding, hypotension and signs of decreased cardiac output  - Evaluate effectiveness of vasoactive medications to optimize hemodynamic stability  - Monitor arterial and/or venous puncture sites for bleeding and/or hematoma  - Assess quality of pulses, skin color and temperature  - Assess for signs of decreased coronary artery perfusion - ex.  Angina  - Evaluate fluid balance, assess for edema, trend weights  Outcome: Progressing  Goal: Absence of cardiac arrhythmias or at baseline  Description: INTERVENTIONS:  - Continuous cardiac monitoring, monitor vital signs, obtain 12 lead EKG if indicated  - Evaluate effectiveness of antiarrhythmic and heart rate control medications as ordered  - Initiate emergency measures for life threatening arrhythmias  - Monitor electrolytes and administer replacement therapy as ordered  Outcome: Progressing

## 2022-08-23 NOTE — PROGRESS NOTES
Orthostatic vitals       08/23/22 1518 08/23/22 1519 08/23/22 1520   Vital Signs   Pulse 73 72 74   Heart Rate Source Monitor Monitor Monitor   Cardiac Rhythm NSR NSR NSR   Resp 18 18 18   Respiratory Quality Normal Normal Normal   /78 157/80 (!) 166/80   MAP (mmHg) 97 99 103   BP Location Right arm Right arm Right arm   BP Method Automatic Automatic Automatic   Patient Position Lying Sitting Standing

## 2022-08-23 NOTE — CM/SW NOTE
BPCI Bundled Advanced Medicare Program    Plan of care reviewed for care coordination and discharge planning. Noted pt falls under  BPCI/Medicare program, with  for Gastrointestinal Hemorrhage     NSOC TRUDI Proposal:  Home pending progress    / to remain available for support and/or discharge planning.

## 2022-08-23 NOTE — PLAN OF CARE
Assumed care at 0730. Pt is A&Ox4, slow speech. Pt is on RA with , sats maintaining >90%, lungs clear. NSR on tele, VSS. No complaints of cardiac symptoms. Continent of B&B. C/o chronic pain in back and neck, relieve with PRN medications. Up x1 w/ walker, tolerating well. Plan of care reviewed with patient, verbalizes understanding, all needs addressed at this time, pt seems to be resting comfortably.      Problem: GASTROINTESTINAL - ADULT  Goal: Minimal or absence of nausea and vomiting  Description: INTERVENTIONS:  - Maintain adequate hydration with IV or PO as ordered and tolerated  - Nasogastric tube to low intermittent suction as ordered  - Evaluate effectiveness of ordered antiemetic medications  - Provide nonpharmacologic comfort measures as appropriate  - Advance diet as tolerated, if ordered  - Obtain nutritional consult as needed  - Evaluate fluid balance  Outcome: Progressing  Goal: Maintains or returns to baseline bowel function  Description: INTERVENTIONS:  - Assess bowel function  - Maintain adequate hydration with IV or PO as ordered and tolerated  - Evaluate effectiveness of GI medications  - Encourage mobilization and activity  - Obtain nutritional consult as needed  - Establish a toileting routine/schedule  - Consider collaborating with pharmacy to review patient's medication profile  Outcome: Progressing  Goal: Maintains adequate nutritional intake (undernourished)  Description: INTERVENTIONS:  - Monitor percentage of each meal consumed  - Identify factors contributing to decreased intake, treat as appropriate  - Assist with meals as needed  - Monitor I&O, WT and lab values  - Obtain nutritional consult as needed  - Optimize oral hygiene and moisture  - Encourage food from home; allow for food preferences  - Enhance eating environment  Outcome: Progressing  Goal: Achieves appropriate nutritional intake (bariatric)  Description: INTERVENTIONS:  - Monitor for over-consumption  - Identify factors contributing to increased intake, treat as appropriate  - Monitor I&O, WT and lab values  - Obtain nutritional consult as needed  - Evaluate psychosocial factors contributing to over-consumption  Outcome: Progressing     Problem: CARDIOVASCULAR - ADULT  Goal: Maintains optimal cardiac output and hemodynamic stability  Description: INTERVENTIONS:  - Monitor vital signs, rhythm, and trends  - Monitor for bleeding, hypotension and signs of decreased cardiac output  - Evaluate effectiveness of vasoactive medications to optimize hemodynamic stability  - Monitor arterial and/or venous puncture sites for bleeding and/or hematoma  - Assess quality of pulses, skin color and temperature  - Assess for signs of decreased coronary artery perfusion - ex.  Angina  - Evaluate fluid balance, assess for edema, trend weights  Outcome: Progressing  Goal: Absence of cardiac arrhythmias or at baseline  Description: INTERVENTIONS:  - Continuous cardiac monitoring, monitor vital signs, obtain 12 lead EKG if indicated  - Evaluate effectiveness of antiarrhythmic and heart rate control medications as ordered  - Initiate emergency measures for life threatening arrhythmias  - Monitor electrolytes and administer replacement therapy as ordered  Outcome: Progressing     Problem: METABOLIC/FLUID AND ELECTROLYTES - ADULT  Goal: Electrolytes maintained within normal limits  Description: INTERVENTIONS:  - Monitor labs and rhythm and assess patient for signs and symptoms of electrolyte imbalances  - Administer electrolyte replacement as ordered  - Monitor response to electrolyte replacements, including rhythm and repeat lab results as appropriate  - Fluid restriction as ordered  - Instruct patient on fluid and nutrition restrictions as appropriate  Outcome: Progressing  Goal: Hemodynamic stability and optimal renal function maintained  Description: INTERVENTIONS:  - Monitor labs and assess for signs and symptoms of volume excess or deficit  - Monitor intake, output and patient weight  - Monitor urine specific gravity, serum osmolarity and serum sodium as indicated or ordered  - Monitor response to interventions for patient's volume status, including labs, urine output, blood pressure (other measures as available)  - Encourage oral intake as appropriate  - Instruct patient on fluid and nutrition restrictions as appropriate  Outcome: Progressing     Problem: SKIN/TISSUE INTEGRITY - ADULT  Goal: Skin integrity remains intact  Description: INTERVENTIONS  - Assess and document risk factors for pressure ulcer development  - Assess and document skin integrity  - Monitor for areas of redness and/or skin breakdown  - Initiate interventions, skin care algorithm/standards of care as needed  Outcome: Progressing  Goal: Oral mucous membranes remain intact  Description: INTERVENTIONS  - Assess oral mucosa and hygiene practices  - Implement preventative oral hygiene regimen  - Implement oral medicated treatments as ordered  Outcome: Progressing     Problem: HEMATOLOGIC - ADULT  Goal: Maintains hematologic stability  Description: INTERVENTIONS  - Assess for signs and symptoms of bleeding or hemorrhage  - Monitor labs and vital signs for trends  - Administer supportive blood products/factors, fluids and medications as ordered and appropriate  - Administer supportive blood products/factors as ordered and appropriate  Outcome: Progressing  Goal: Free from bleeding injury  Description: (Example usage: patient with low platelets)  INTERVENTIONS:  - Avoid intramuscular injections, enemas and rectal medication administration  - Ensure safe mobilization of patient  - Hold pressure on venipuncture sites to achieve adequate hemostasis  - Assess for signs and symptoms of internal bleeding  - Monitor lab trends  - Patient is to report abnormal signs of bleeding to staff  - Avoid use of toothpicks and dental floss  - Use electric shaver for shaving  - Use soft bristle tooth brush  - Limit straining and forceful nose blowing  Outcome: Progressing     Problem: MUSCULOSKELETAL - ADULT  Goal: Return mobility to safest level of function  Description: INTERVENTIONS:  - Assess patient stability and activity tolerance for standing, transferring and ambulating w/ or w/o assistive devices  - Assist with transfers and ambulation using safe patient handling equipment as needed  - Ensure adequate protection for wounds/incisions during mobilization  - Obtain PT/OT consults as needed  - Advance activity as appropriate  - Communicate ordered activity level and limitations with patient/family  Outcome: Progressing

## 2022-08-24 ENCOUNTER — PATIENT OUTREACH (OUTPATIENT)
Dept: CASE MANAGEMENT | Age: 67
End: 2022-08-24

## 2022-08-24 ENCOUNTER — TELEPHONE (OUTPATIENT)
Dept: INTERNAL MEDICINE CLINIC | Facility: CLINIC | Age: 67
End: 2022-08-24

## 2022-08-24 DIAGNOSIS — Z02.9 ENCOUNTERS FOR ADMINISTRATIVE PURPOSE: ICD-10-CM

## 2022-08-24 LAB
BLOOD TYPE BARCODE: 6200

## 2022-08-24 PROCEDURE — 1111F DSCHRG MED/CURRENT MED MERGE: CPT

## 2022-08-24 NOTE — TELEPHONE ENCOUNTER
S/w patient for TCM. TCM appt is on 8/29. She states that she is having sharp stomach pain/cramping since coming home. She states that she was having stomach pain in the hospital but it wasn't as bad due to her receiving pain medication there. She states that she did try to have a BM last night but was unsuccessful. She states that when she wiped she did notice a small amount of blood on the toilet paper. There has been none since. She states that her breathing is at baseline. She denies having any nausea or vomiting. No fever. She does report that she took her bp today at noon after she had been resting for a couple hours and it was  179/101 hr-80 (sitting) and 185/93 hr-86 (standing). She denies having any headache, lightheadedness, vision changes or any other symptoms than the aforementioned ones. Please advise.

## 2022-08-24 NOTE — TELEPHONE ENCOUNTER
Spoke w pt to see how she is doing   Not in pain is eating and drinking ok   Had her take her blood pressure to reassess it was 175/98 on left arm, sitting   Pt is taking lisinopril 2x/day 2.5mg per dr Fabiana Louis cardiology   Denies shortness of breath, chest pain, dizziness, weakness, numbness/tingling anywhere   Advised pt to go to er if she develops any of those s/s   Spoke w/manasa   Per [facility] instruction advised pt to call dr Fabiana Louis and let him know about her elevated bp readings since she may need a medication adjustment   Pt stated she would call asap and had no additional questions

## 2022-08-29 ENCOUNTER — OFFICE VISIT (OUTPATIENT)
Dept: INTERNAL MEDICINE CLINIC | Facility: CLINIC | Age: 67
End: 2022-08-29
Payer: MEDICARE

## 2022-08-29 VITALS
DIASTOLIC BLOOD PRESSURE: 76 MMHG | WEIGHT: 104.13 LBS | HEIGHT: 62 IN | BODY MASS INDEX: 19.16 KG/M2 | OXYGEN SATURATION: 98 % | HEART RATE: 58 BPM | RESPIRATION RATE: 14 BRPM | SYSTOLIC BLOOD PRESSURE: 134 MMHG | TEMPERATURE: 98 F

## 2022-08-29 DIAGNOSIS — D64.9 ANEMIA, UNSPECIFIED TYPE: ICD-10-CM

## 2022-08-29 DIAGNOSIS — K55.9 ISCHEMIC COLITIS (HCC): ICD-10-CM

## 2022-08-29 DIAGNOSIS — R55 SYNCOPE AND COLLAPSE: ICD-10-CM

## 2022-08-29 DIAGNOSIS — R55 VASO VAGAL EPISODE: ICD-10-CM

## 2022-08-29 DIAGNOSIS — Z09 HOSPITAL DISCHARGE FOLLOW-UP: Primary | ICD-10-CM

## 2022-08-29 DIAGNOSIS — K31.811 GASTROINTESTINAL HEMORRHAGE ASSOCIATED WITH ANGIODYSPLASIA OF STOMACH AND DUODENUM: ICD-10-CM

## 2022-08-29 DIAGNOSIS — H61.22 HEARING LOSS DUE TO CERUMEN IMPACTION, LEFT: ICD-10-CM

## 2022-08-29 DIAGNOSIS — D50.0 IRON DEFICIENCY ANEMIA DUE TO CHRONIC BLOOD LOSS: ICD-10-CM

## 2022-08-29 PROBLEM — R73.9 HYPERGLYCEMIA: Status: RESOLVED | Noted: 2022-08-21 | Resolved: 2022-08-29

## 2022-08-29 PROBLEM — E86.1 HYPOTENSION DUE TO HYPOVOLEMIA: Status: RESOLVED | Noted: 2022-08-21 | Resolved: 2022-08-29

## 2022-08-29 PROBLEM — I95.89 HYPOTENSION DUE TO HYPOVOLEMIA: Status: RESOLVED | Noted: 2022-08-21 | Resolved: 2022-08-29

## 2022-08-29 PROBLEM — Z87.19 H/O ISCHEMIC BOWEL DISEASE: Status: RESOLVED | Noted: 2022-08-21 | Resolved: 2022-08-29

## 2022-08-29 PROBLEM — E87.1 HYPONATREMIA: Status: RESOLVED | Noted: 2020-12-04 | Resolved: 2022-08-29

## 2022-08-29 PROBLEM — K62.5 RECTAL BLEEDING: Status: RESOLVED | Noted: 2022-06-01 | Resolved: 2022-08-29

## 2022-08-30 ENCOUNTER — LAB ENCOUNTER (OUTPATIENT)
Dept: LAB | Age: 67
End: 2022-08-30
Attending: INTERNAL MEDICINE
Payer: MEDICARE

## 2022-08-30 DIAGNOSIS — D50.0 IRON DEFICIENCY ANEMIA DUE TO CHRONIC BLOOD LOSS: ICD-10-CM

## 2022-08-30 LAB
BASOPHILS # BLD AUTO: 0.01 X10(3) UL (ref 0–0.2)
BASOPHILS NFR BLD AUTO: 0.1 %
EOSINOPHIL # BLD AUTO: 0.06 X10(3) UL (ref 0–0.7)
EOSINOPHIL NFR BLD AUTO: 0.7 %
ERYTHROCYTE [DISTWIDTH] IN BLOOD BY AUTOMATED COUNT: 14.4 %
HCT VFR BLD AUTO: 36.9 %
HGB BLD-MCNC: 11.9 G/DL
IMM GRANULOCYTES # BLD AUTO: 0.03 X10(3) UL (ref 0–1)
IMM GRANULOCYTES NFR BLD: 0.4 %
LYMPHOCYTES # BLD AUTO: 1.08 X10(3) UL (ref 1–4)
LYMPHOCYTES NFR BLD AUTO: 13.3 %
MCH RBC QN AUTO: 33.8 PG (ref 26–34)
MCHC RBC AUTO-ENTMCNC: 32.2 G/DL (ref 31–37)
MCV RBC AUTO: 104.8 FL
MONOCYTES # BLD AUTO: 0.92 X10(3) UL (ref 0.1–1)
MONOCYTES NFR BLD AUTO: 11.4 %
NEUTROPHILS # BLD AUTO: 5.99 X10 (3) UL (ref 1.5–7.7)
NEUTROPHILS # BLD AUTO: 5.99 X10(3) UL (ref 1.5–7.7)
NEUTROPHILS NFR BLD AUTO: 74.1 %
PLATELET # BLD AUTO: 227 10(3)UL (ref 150–450)
RBC # BLD AUTO: 3.52 X10(6)UL
WBC # BLD AUTO: 8.1 X10(3) UL (ref 4–11)

## 2022-08-30 PROCEDURE — 85025 COMPLETE CBC W/AUTO DIFF WBC: CPT

## 2022-08-30 PROCEDURE — 36415 COLL VENOUS BLD VENIPUNCTURE: CPT

## 2022-08-30 RX ORDER — ATORVASTATIN CALCIUM 10 MG/1
10 TABLET, FILM COATED ORAL DAILY
Qty: 90 TABLET | Refills: 1 | OUTPATIENT
Start: 2022-08-30

## 2022-08-30 RX ORDER — FLECAINIDE ACETATE 100 MG/1
100 TABLET ORAL 2 TIMES DAILY
Refills: 0 | OUTPATIENT
Start: 2022-08-30

## 2022-09-19 ENCOUNTER — PATIENT MESSAGE (OUTPATIENT)
Dept: PAIN CLINIC | Facility: CLINIC | Age: 67
End: 2022-09-19

## 2022-09-19 DIAGNOSIS — M48.061 SPINAL STENOSIS OF LUMBAR REGION WITHOUT NEUROGENIC CLAUDICATION: ICD-10-CM

## 2022-09-19 DIAGNOSIS — M54.16 LUMBAR RADICULOPATHY: ICD-10-CM

## 2022-09-19 RX ORDER — HYDROCODONE BITARTRATE AND ACETAMINOPHEN 5; 325 MG/1; MG/1
1 TABLET ORAL EVERY 8 HOURS PRN
Qty: 90 TABLET | Refills: 0 | Status: SHIPPED | OUTPATIENT
Start: 2022-09-19 | End: 2022-10-19

## 2022-09-19 NOTE — TELEPHONE ENCOUNTER
From: Dimitri Tobin  To: Chery Vicente MD  Sent: 9/19/2022 10:03 AM CDT  Subject: HYDRO CODEINE REFILL    Good morning,  My hydro-codeine is now due for re-fill. Thank you.

## 2022-09-19 NOTE — TELEPHONE ENCOUNTER
uds as expected/need to d/w patient concern re: mixing her primidone/barbiturate and norco at next visit

## 2022-09-27 ENCOUNTER — ORDER TRANSCRIPTION (OUTPATIENT)
Dept: ADMINISTRATIVE | Facility: HOSPITAL | Age: 67
End: 2022-09-27

## 2022-09-27 ENCOUNTER — HOSPITAL ENCOUNTER (OUTPATIENT)
Dept: LAB | Facility: HOSPITAL | Age: 67
Discharge: HOME | End: 2022-09-27
Attending: INTERNAL MEDICINE

## 2022-09-27 ENCOUNTER — LAB ENCOUNTER (OUTPATIENT)
Dept: LAB | Facility: HOSPITAL | Age: 67
End: 2022-09-27
Attending: INTERNAL MEDICINE

## 2022-09-27 DIAGNOSIS — Z01.818 PRE-OP TESTING: Primary | ICD-10-CM

## 2022-09-27 DIAGNOSIS — Z11.52 ENCOUNTER FOR SCREENING FOR COVID-19: ICD-10-CM

## 2022-09-27 DIAGNOSIS — Z01.818 PRE-OP TESTING: ICD-10-CM

## 2022-09-27 LAB
BASOPHILS # BLD AUTO: 0.01 X10(3) UL (ref 0–0.2)
BASOPHILS NFR BLD AUTO: 0.3 %
EOSINOPHIL # BLD AUTO: 0.06 X10(3) UL (ref 0–0.7)
EOSINOPHIL NFR BLD AUTO: 1.6 %
ERYTHROCYTE [DISTWIDTH] IN BLOOD BY AUTOMATED COUNT: 13.6 %
HCT VFR BLD AUTO: 35 %
HGB BLD-MCNC: 11.7 G/DL
IMM GRANULOCYTES # BLD AUTO: 0.01 X10(3) UL (ref 0–1)
IMM GRANULOCYTES NFR BLD: 0.3 %
LYMPHOCYTES # BLD AUTO: 0.85 X10(3) UL (ref 1–4)
LYMPHOCYTES NFR BLD AUTO: 22.6 %
MCH RBC QN AUTO: 34 PG (ref 26–34)
MCHC RBC AUTO-ENTMCNC: 33.4 G/DL (ref 31–37)
MCV RBC AUTO: 101.7 FL
MONOCYTES # BLD AUTO: 0.49 X10(3) UL (ref 0.1–1)
MONOCYTES NFR BLD AUTO: 13 %
NEUTROPHILS # BLD AUTO: 2.34 X10 (3) UL (ref 1.5–7.7)
NEUTROPHILS # BLD AUTO: 2.34 X10(3) UL (ref 1.5–7.7)
NEUTROPHILS NFR BLD AUTO: 62.2 %
PLATELET # BLD AUTO: 217 10(3)UL (ref 150–450)
RBC # BLD AUTO: 3.44 X10(6)UL
SARS-COV-2 RNA RESP QL NAA+PROBE: NOT DETECTED
WBC # BLD AUTO: 3.8 X10(3) UL (ref 4–11)

## 2022-09-27 PROCEDURE — 36415 COLL VENOUS BLD VENIPUNCTURE: CPT | Performed by: INTERNAL MEDICINE

## 2022-09-27 PROCEDURE — 85025 COMPLETE CBC W/AUTO DIFF WBC: CPT | Performed by: INTERNAL MEDICINE

## 2022-09-27 RX ORDER — PRIMIDONE 50 MG/1
150 TABLET ORAL EVERY EVENING
COMMUNITY

## 2022-09-29 ENCOUNTER — HOSPITAL ENCOUNTER (OUTPATIENT)
Dept: INTERVENTIONAL RADIOLOGY/VASCULAR | Facility: HOSPITAL | Age: 67
Discharge: HOME | End: 2022-09-30
Attending: INTERNAL MEDICINE | Admitting: INTERNAL MEDICINE
Payer: MEDICARE

## 2022-09-29 ENCOUNTER — HOSPITAL ENCOUNTER (INPATIENT)
Dept: GENERAL RADIOLOGY | Facility: HOSPITAL | Age: 67
Discharge: HOME | End: 2022-09-29
Attending: INTERNAL MEDICINE | Admitting: INTERNAL MEDICINE
Payer: MEDICARE

## 2022-09-29 DIAGNOSIS — I44.30 AVB (ATRIOVENTRICULAR BLOCK): ICD-10-CM

## 2022-09-29 DIAGNOSIS — R55 SYNCOPE: ICD-10-CM

## 2022-09-29 PROCEDURE — 71045 X-RAY EXAM CHEST 1 VIEW: CPT | Performed by: INTERNAL MEDICINE

## 2022-09-29 PROCEDURE — 99153 MOD SED SAME PHYS/QHP EA: CPT | Performed by: INTERNAL MEDICINE

## 2022-09-29 PROCEDURE — 02HK3NZ INSERTION OF INTRACARDIAC PACEMAKER INTO RIGHT VENTRICLE, PERCUTANEOUS APPROACH: ICD-10-PCS | Performed by: INTERNAL MEDICINE

## 2022-09-29 PROCEDURE — 33274 TCAT INSJ/RPL PERM LDLS PM: CPT | Performed by: INTERNAL MEDICINE

## 2022-09-29 PROCEDURE — 99152 MOD SED SAME PHYS/QHP 5/>YRS: CPT | Performed by: INTERNAL MEDICINE

## 2022-09-29 RX ORDER — FLECAINIDE ACETATE 100 MG/1
100 TABLET ORAL 2 TIMES DAILY
Status: DISCONTINUED | OUTPATIENT
Start: 2022-09-29 | End: 2022-09-30

## 2022-09-29 RX ORDER — ACETAMINOPHEN AND CODEINE PHOSPHATE 300; 30 MG/1; MG/1
2 TABLET ORAL EVERY 4 HOURS PRN
Status: DISCONTINUED | OUTPATIENT
Start: 2022-09-29 | End: 2022-09-30

## 2022-09-29 RX ORDER — ONDANSETRON 2 MG/ML
4 INJECTION INTRAMUSCULAR; INTRAVENOUS EVERY 6 HOURS PRN
Status: DISCONTINUED | OUTPATIENT
Start: 2022-09-29 | End: 2022-09-30

## 2022-09-29 RX ORDER — PRIMIDONE 50 MG/1
100 TABLET ORAL EVERY MORNING
Status: DISCONTINUED | OUTPATIENT
Start: 2022-09-29 | End: 2022-09-30

## 2022-09-29 RX ORDER — PRIMIDONE 50 MG/1
150 TABLET ORAL EVERY EVENING
Status: DISCONTINUED | OUTPATIENT
Start: 2022-09-29 | End: 2022-09-30

## 2022-09-29 RX ORDER — GABAPENTIN 300 MG/1
600 CAPSULE ORAL 2 TIMES DAILY
Status: DISCONTINUED | OUTPATIENT
Start: 2022-09-29 | End: 2022-09-30

## 2022-09-29 RX ORDER — SODIUM CHLORIDE 9 MG/ML
INJECTION, SOLUTION INTRAVENOUS
Status: DISCONTINUED | OUTPATIENT
Start: 2022-09-30 | End: 2022-09-29 | Stop reason: HOSPADM

## 2022-09-29 RX ORDER — HEPARIN SODIUM 5000 [USP'U]/ML
INJECTION, SOLUTION INTRAVENOUS; SUBCUTANEOUS
Status: COMPLETED
Start: 2022-09-29 | End: 2022-09-29

## 2022-09-29 RX ORDER — CEFAZOLIN SODIUM/WATER 2 G/20 ML
2 SYRINGE (ML) INTRAVENOUS
Status: DISCONTINUED | OUTPATIENT
Start: 2022-09-29 | End: 2022-09-29 | Stop reason: HOSPADM

## 2022-09-29 RX ORDER — DIPHENHYDRAMINE HYDROCHLORIDE 50 MG/ML
INJECTION INTRAMUSCULAR; INTRAVENOUS
Status: COMPLETED
Start: 2022-09-29 | End: 2022-09-29

## 2022-09-29 RX ORDER — CEFAZOLIN SODIUM/WATER 2 G/20 ML
2 SYRINGE (ML) INTRAVENOUS EVERY 8 HOURS
Status: COMPLETED | OUTPATIENT
Start: 2022-09-29 | End: 2022-09-30

## 2022-09-29 RX ORDER — MIDAZOLAM HYDROCHLORIDE 1 MG/ML
INJECTION INTRAMUSCULAR; INTRAVENOUS
Status: COMPLETED
Start: 2022-09-29 | End: 2022-09-29

## 2022-09-29 RX ORDER — LIDOCAINE HYDROCHLORIDE 10 MG/ML
INJECTION, SOLUTION EPIDURAL; INFILTRATION; INTRACAUDAL; PERINEURAL
Status: COMPLETED
Start: 2022-09-29 | End: 2022-09-29

## 2022-09-29 RX ORDER — ACETAMINOPHEN 325 MG/1
650 TABLET ORAL EVERY 4 HOURS PRN
Status: DISCONTINUED | OUTPATIENT
Start: 2022-09-29 | End: 2022-09-30

## 2022-09-29 RX ORDER — LISINOPRIL 20 MG/1
20 TABLET ORAL DAILY
Status: DISCONTINUED | OUTPATIENT
Start: 2022-09-29 | End: 2022-09-30

## 2022-09-29 RX ORDER — CEFAZOLIN SODIUM/WATER 2 G/20 ML
SYRINGE (ML) INTRAVENOUS
Status: COMPLETED
Start: 2022-09-29 | End: 2022-09-29

## 2022-09-29 RX ORDER — LISINOPRIL 20 MG/1
20 TABLET ORAL DAILY
COMMUNITY

## 2022-09-29 RX ORDER — ACETAMINOPHEN AND CODEINE PHOSPHATE 300; 30 MG/1; MG/1
1 TABLET ORAL EVERY 4 HOURS PRN
Status: DISCONTINUED | OUTPATIENT
Start: 2022-09-29 | End: 2022-09-30

## 2022-09-29 RX ADMIN — PRIMIDONE 150 MG: 50 TABLET ORAL at 21:54:00

## 2022-09-29 RX ADMIN — CEFAZOLIN SODIUM/WATER 2 G: 2 G/20 ML SYRINGE (ML) INTRAVENOUS at 17:30:00

## 2022-09-29 RX ADMIN — GABAPENTIN 600 MG: 300 CAPSULE ORAL at 21:54:00

## 2022-09-29 RX ADMIN — FLECAINIDE ACETATE 100 MG: 100 TABLET ORAL at 21:54:00

## 2022-09-29 RX ADMIN — ACETAMINOPHEN AND CODEINE PHOSPHATE 2 TABLET: 300; 30 TABLET ORAL at 22:12:00

## 2022-09-29 RX ADMIN — ACETAMINOPHEN AND CODEINE PHOSPHATE 2 TABLET: 300; 30 TABLET ORAL at 17:30:00

## 2022-09-29 NOTE — PROCEDURES
PREOPERATIVE DIAGNOSIS:     POSTOPERATIVE DIAGNOSIS:     PROCEDURE PERFORMED:  Leadless Micra pacemaker implant. INDICATIONS:  Symptomatic bradycardia. COMPLICATION:  None. SEDATION: IV sedation was administered with continuous ECG, pulse oximeter and non-invasive hemodynamic monitoring. I was present for the full duration of sedation administration [id number]. BRIEF CLINICAL HISTORY:      METHODS:  The patient was brought to the EP lab in a fasting nonsedated state after providing informed consent. IV sedation was administered during continuous ECG, pulse oximeter, and noninvasive hemodynamic monitoring. The right groin was prepped and draped in a sterile fashion. Lidocaine 1% was used for local anesthesia. A 6-Syrian venous sheath was inserted. Two Perclose closure devices were placed. We upsized the 6-Syrian sheath to a 14-Syrian sheath. Using a multipurpose catheter over a long guidewire, the multipurpose catheter was positioned at the SVC. The long guidewire was exchanged for an extra stiff guidewire. We then exchanged the 14-Syrian short sheath for the 23-Syrian Micra insertion sheath. This was then aspirated and flushed as the dilator was withdrawn. The [address] was then prepped and flushed. The Micra Pacemaker Delivery Catheter was then inserted into the sheath. The delivery sheath was then directed across the tricuspid valve to the right ventricle. We confirmed a midseptal position of the Micra pacemaker by injecting contrast into the right ventricle. The leadless pacemaker was then successfully deployed. We confirmed stability of the leadless pacemaker by confirming 2 of the nitinol struts to be fixated under cine imaging. Local electrograms and pacing thresholds were measured. The tether string was then cut and the pacemaker was fully deployed.   The delivery catheter and sheath were then removed and venous stasis was achieved using the Perclose Closure System. The patient was transported to telemetry in stable condition. CONCLUSIONS:     1. Successful placement of a leadless  Micra AV Medtronic Pacemaker System. Stable pacing and sensing thresholds were confirmed at the conclusion of the procedure.       Deon Mullen MD  [address] [place] [address]  Card[facility]  [phone]

## 2022-09-29 NOTE — PLAN OF CARE
Patient came from cath lab from a placement of a leadless pacemaker-AV Mycra Medtronic by Dr Saravanan Whiteside. . Currently in NSR. Left femoral vein with 2 perclose. Patient has a nerve stimulator device. Patient waiting for pain meds. Resting in bed.   Problem: Patient/Family Goals  Goal: Patient/Family Long Term Goal  Description: Patient's Long Term Goal: Pain control    Interventions:  - Pain meds  - See additional Care Plan goals for specific interventions  Outcome: Progressing  Goal: Patient/Family Short Term Goal  Description: Patient's Short Term Goal: Prevent infection    Interventions:   - antibiotic  - See additional Care Plan goals for specific interventions  Outcome: Progressing

## 2022-09-30 VITALS
HEART RATE: 79 BPM | TEMPERATURE: 98 F | BODY MASS INDEX: 18.03 KG/M2 | SYSTOLIC BLOOD PRESSURE: 118 MMHG | RESPIRATION RATE: 18 BRPM | DIASTOLIC BLOOD PRESSURE: 77 MMHG | OXYGEN SATURATION: 98 % | HEIGHT: 62 IN | WEIGHT: 98 LBS

## 2022-09-30 RX ADMIN — CEFAZOLIN SODIUM/WATER 2 G: 2 G/20 ML SYRINGE (ML) INTRAVENOUS at 00:37:00

## 2022-09-30 RX ADMIN — PRIMIDONE 100 MG: 50 TABLET ORAL at 09:21:00

## 2022-09-30 RX ADMIN — FLECAINIDE ACETATE 100 MG: 100 TABLET ORAL at 09:22:00

## 2022-09-30 RX ADMIN — GABAPENTIN 600 MG: 300 CAPSULE ORAL at 09:22:00

## 2022-09-30 RX ADMIN — LISINOPRIL 20 MG: 20 TABLET ORAL at 09:22:00

## 2022-09-30 RX ADMIN — ACETAMINOPHEN AND CODEINE PHOSPHATE 2 TABLET: 300; 30 TABLET ORAL at 03:38:00

## 2022-09-30 RX ADMIN — ACETAMINOPHEN AND CODEINE PHOSPHATE 1 TABLET: 300; 30 TABLET ORAL at 09:27:00

## 2022-09-30 NOTE — PLAN OF CARE
Assumed care of pt at [facility]. Pt A&Ox 4. On RA; SpO2 remaining greater than 92% with no complaints of SOB. NSR on tele; no c/o cardiac symptoms. Incision right groin soft, no hematoma; x2 perclose per report. Pt voided. Pt denies chest pain but c.o chronic discomfort; PRN pain medication seems to be resting comfortably at this time. Pt up with standby assist s/p procedure, tolerating well. POC anticipating discharge. Plan of care reviewed with pt; all questions answered at this time. Bed in lowest position; call light within reach. High fall risk precautions are in place per unit protocol. Problem: CARDIOVASCULAR - ADULT  Goal: Maintains optimal cardiac output and hemodynamic stability  Description: INTERVENTIONS:  - Monitor vital signs, rhythm, and trends  - Monitor for bleeding, hypotension and signs of decreased cardiac output  - Evaluate effectiveness of vasoactive medications to optimize hemodynamic stability  - Monitor arterial and/or venous puncture sites for bleeding and/or hematoma  - Assess quality of pulses, skin color and temperature  - Assess for signs of decreased coronary artery perfusion - ex.  Angina  - Evaluate fluid balance, assess for edema, trend weights  Outcome: Progressing  Goal: Absence of cardiac arrhythmias or at baseline  Description: INTERVENTIONS:  - Continuous cardiac monitoring, monitor vital signs, obtain 12 lead EKG if indicated  - Evaluate effectiveness of antiarrhythmic and heart rate control medications as ordered  - Initiate emergency measures for life threatening arrhythmias  - Monitor electrolytes and administer replacement therapy as ordered  Outcome: Progressing     Problem: RESPIRATORY - ADULT  Goal: Achieves optimal ventilation and oxygenation  Description: INTERVENTIONS:  - Assess for changes in respiratory status  - Assess for changes in mentation and behavior  - Position to facilitate oxygenation and minimize respiratory effort  - Oxygen supplementation based on oxygen saturation or ABGs  - Provide Smoking Cessation handout, if applicable  - Encourage broncho-pulmonary hygiene including cough, deep breathe, Incentive Spirometry  - Assess the need for suctioning and perform as needed  - Assess and instruct to report SOB or any respiratory difficulty  - Respiratory Therapy support as indicated  - Manage/alleviate anxiety  - Monitor for signs/symptoms of CO2 retention  Outcome: Progressing     Problem: METABOLIC/FLUID AND ELECTROLYTES - ADULT  Goal: Electrolytes maintained within normal limits  Description: INTERVENTIONS:  - Monitor labs and rhythm and assess patient for signs and symptoms of electrolyte imbalances  - Administer electrolyte replacement as ordered  - Monitor response to electrolyte replacements, including rhythm and repeat lab results as appropriate  - Fluid restriction as ordered  - Instruct patient on fluid and nutrition restrictions as appropriate  Outcome: Progressing     Problem: SKIN/TISSUE INTEGRITY - ADULT  Goal: Incision(s), wounds(s) or drain site(s) healing without S/S of infection  Description: INTERVENTIONS:  - Assess and document risk factors for pressure ulcer development  - Assess and document skin integrity  - Assess and document dressing/incision, wound bed, drain sites and surrounding tissue  - Implement wound care per orders  - Initiate isolation precautions as appropriate  - Initiate Pressure Ulcer prevention bundle as indicated  Outcome: Progressing     Problem: PAIN - ADULT  Goal: Verbalizes/displays adequate comfort level or patient's stated pain goal  Description: INTERVENTIONS:  - Encourage pt to monitor pain and request assistance  - Assess pain using appropriate pain scale  - Administer analgesics based on type and severity of pain and evaluate response  - Implement non-pharmacological measures as appropriate and evaluate response  - Consider cultural and social influences on pain and pain management  - Manage/alleviate anxiety  - Utilize distraction and/or relaxation techniques  - Monitor for opioid side effects  - Notify MD/LIP if interventions unsuccessful or patient reports new pain  - Anticipate increased pain with activity and pre-medicate as appropriate  Outcome: Progressing     Problem: RISK FOR INFECTION - ADULT  Goal: Absence of fever/infection during anticipated neutropenic period  Description: INTERVENTIONS  - Monitor WBC  - Administer growth factors as ordered  - Implement neutropenic guidelines  Outcome: Progressing     Problem: SAFETY ADULT - FALL  Goal: Free from fall injury  Description: INTERVENTIONS:  - Assess pt frequently for physical needs  - Identify cognitive and physical deficits and behaviors that affect risk of falls.   - Willard fall precautions as indicated by assessment.  - Educate pt/family on patient safety including physical limitations  - Instruct pt to call for assistance with activity based on assessment  - Modify environment to reduce risk of injury  - Provide assistive devices as appropriate  - Consider OT/PT consult to assist with strengthening/mobility  - Encourage toileting schedule  Outcome: Progressing     Problem: DISCHARGE PLANNING  Goal: Discharge to home or other facility with appropriate resources  Description: INTERVENTIONS:  - Identify barriers to discharge w/pt and caregiver  - Include patient/family/discharge partner in discharge planning  - Arrange for needed discharge resources and transportation as appropriate  - Identify discharge learning needs (meds, wound care, etc)  - Arrange for interpreters to assist at discharge as needed  - Consider post-discharge preferences of patient/family/discharge partner  - Complete POLST form as appropriate  - Assess patient's ability to be responsible for managing their own health  - Refer to Case Management Department for coordinating discharge planning if the patient needs post-hospital services based on physician/LIP order or complex needs related to functional status, cognitive ability or social support system  Outcome: Progressing

## 2022-09-30 NOTE — PLAN OF CARE
Pt discharged to home. Being driven by spouse. Reviewed all meds with pt, instructed to follow up with providers. Discharge education for s/p leadless PPM insertion reviewed. States understanding of all dc teaching. PIV removed. Off floor via wheelchair accompanied by PCT.

## 2022-10-13 ENCOUNTER — OFFICE VISIT (OUTPATIENT)
Dept: INTERNAL MEDICINE CLINIC | Facility: CLINIC | Age: 67
End: 2022-10-13
Payer: MEDICARE

## 2022-10-13 ENCOUNTER — HOSPITAL ENCOUNTER (OUTPATIENT)
Dept: GENERAL RADIOLOGY | Age: 67
Discharge: HOME | End: 2022-10-13
Attending: NURSE PRACTITIONER
Payer: MEDICARE

## 2022-10-13 ENCOUNTER — LAB ENCOUNTER (OUTPATIENT)
Dept: LAB | Age: 67
End: 2022-10-13
Attending: INTERNAL MEDICINE
Payer: MEDICARE

## 2022-10-13 VITALS
WEIGHT: 99.38 LBS | TEMPERATURE: 99 F | HEART RATE: 86 BPM | BODY MASS INDEX: 18.29 KG/M2 | OXYGEN SATURATION: 97 % | DIASTOLIC BLOOD PRESSURE: 74 MMHG | HEIGHT: 62 IN | SYSTOLIC BLOOD PRESSURE: 126 MMHG

## 2022-10-13 DIAGNOSIS — E78.00 PURE HYPERCHOLESTEROLEMIA: ICD-10-CM

## 2022-10-13 DIAGNOSIS — M25.50 ARTHRALGIA, UNSPECIFIED JOINT: ICD-10-CM

## 2022-10-13 DIAGNOSIS — Z13.29 SCREENING FOR THYROID DISORDER: ICD-10-CM

## 2022-10-13 DIAGNOSIS — D64.9 ANEMIA, UNSPECIFIED TYPE: ICD-10-CM

## 2022-10-13 DIAGNOSIS — R52 PAIN: ICD-10-CM

## 2022-10-13 DIAGNOSIS — M25.50 ARTHRALGIA, UNSPECIFIED JOINT: Primary | ICD-10-CM

## 2022-10-13 DIAGNOSIS — D50.0 IRON DEFICIENCY ANEMIA DUE TO CHRONIC BLOOD LOSS: ICD-10-CM

## 2022-10-13 DIAGNOSIS — D46.9 MDS (MYELODYSPLASTIC SYNDROME) (HCC): ICD-10-CM

## 2022-10-13 LAB
ALBUMIN SERPL-MCNC: 4.1 G/DL (ref 3.4–5)
ALBUMIN/GLOB SERPL: 1.1 {RATIO} (ref 1–2)
ALP LIVER SERPL-CCNC: 42 U/L
ALT SERPL-CCNC: 18 U/L
ANION GAP SERPL CALC-SCNC: 8 MMOL/L (ref 0–18)
AST SERPL-CCNC: 15 U/L (ref 15–37)
BASOPHILS # BLD AUTO: 0.02 X10(3) UL (ref 0–0.2)
BASOPHILS NFR BLD AUTO: 0.5 %
BILIRUB SERPL-MCNC: 0.2 MG/DL (ref 0.1–2)
BUN BLD-MCNC: 11 MG/DL (ref 7–18)
CALCIUM BLD-MCNC: 9.8 MG/DL (ref 8.5–10.1)
CHLORIDE SERPL-SCNC: 101 MMOL/L (ref 98–112)
CO2 SERPL-SCNC: 26 MMOL/L (ref 21–32)
CREAT BLD-MCNC: 0.88 MG/DL
CRP SERPL-MCNC: <0.29 MG/DL (ref ?–0.3)
EOSINOPHIL # BLD AUTO: 0.05 X10(3) UL (ref 0–0.7)
EOSINOPHIL NFR BLD AUTO: 1.2 %
ERYTHROCYTE [DISTWIDTH] IN BLOOD BY AUTOMATED COUNT: 14.4 %
ERYTHROCYTE [SEDIMENTATION RATE] IN BLOOD: 10 MM/HR
FASTING STATUS PATIENT QL REPORTED: YES
GFR SERPLBLD BASED ON 1.73 SQ M-ARVRAT: 72 ML/MIN/1.73M2 (ref 60–?)
GLOBULIN PLAS-MCNC: 3.7 G/DL (ref 2.8–4.4)
GLUCOSE BLD-MCNC: 84 MG/DL (ref 70–99)
HCT VFR BLD AUTO: 33.7 %
HGB BLD-MCNC: 11 G/DL
IMM GRANULOCYTES # BLD AUTO: 0.01 X10(3) UL (ref 0–1)
IMM GRANULOCYTES NFR BLD: 0.2 %
IRON SATN MFR SERPL: 24 %
IRON SERPL-MCNC: 67 UG/DL
LYMPHOCYTES # BLD AUTO: 0.84 X10(3) UL (ref 1–4)
LYMPHOCYTES NFR BLD AUTO: 20 %
MCH RBC QN AUTO: 34.3 PG (ref 26–34)
MCHC RBC AUTO-ENTMCNC: 32.6 G/DL (ref 31–37)
MCV RBC AUTO: 105 FL
MONOCYTES # BLD AUTO: 0.36 X10(3) UL (ref 0.1–1)
MONOCYTES NFR BLD AUTO: 8.6 %
NEUTROPHILS # BLD AUTO: 2.92 X10 (3) UL (ref 1.5–7.7)
NEUTROPHILS # BLD AUTO: 2.92 X10(3) UL (ref 1.5–7.7)
NEUTROPHILS NFR BLD AUTO: 69.5 %
OSMOLALITY SERPL CALC.SUM OF ELEC: 279 MOSM/KG (ref 275–295)
PLATELET # BLD AUTO: 273 10(3)UL (ref 150–450)
POTASSIUM SERPL-SCNC: 4 MMOL/L (ref 3.5–5.1)
PROT SERPL-MCNC: 7.8 G/DL (ref 6.4–8.2)
RBC # BLD AUTO: 3.21 X10(6)UL
RHEUMATOID FACT SERPL-ACNC: <10 IU/ML (ref ?–15)
SODIUM SERPL-SCNC: 135 MMOL/L (ref 136–145)
TIBC SERPL-MCNC: 282 UG/DL (ref 240–450)
TRANSFERRIN SERPL-MCNC: 189 MG/DL (ref 200–360)
TSI SER-ACNC: 1.02 MIU/ML (ref 0.36–3.74)
URATE SERPL-MCNC: 5.3 MG/DL
WBC # BLD AUTO: 4.2 X10(3) UL (ref 4–11)

## 2022-10-13 PROCEDURE — 83540 ASSAY OF IRON: CPT

## 2022-10-13 PROCEDURE — 86200 CCP ANTIBODY: CPT

## 2022-10-13 PROCEDURE — 86038 ANTINUCLEAR ANTIBODIES: CPT

## 2022-10-13 PROCEDURE — 86225 DNA ANTIBODY NATIVE: CPT

## 2022-10-13 PROCEDURE — 84550 ASSAY OF BLOOD/URIC ACID: CPT

## 2022-10-13 PROCEDURE — 86431 RHEUMATOID FACTOR QUANT: CPT

## 2022-10-13 PROCEDURE — 73564 X-RAY EXAM KNEE 4 OR MORE: CPT | Performed by: NURSE PRACTITIONER

## 2022-10-13 PROCEDURE — 80053 COMPREHEN METABOLIC PANEL: CPT

## 2022-10-13 PROCEDURE — 99214 OFFICE O/P EST MOD 30 MIN: CPT | Performed by: NURSE PRACTITIONER

## 2022-10-13 PROCEDURE — 73562 X-RAY EXAM OF KNEE 3: CPT | Performed by: NURSE PRACTITIONER

## 2022-10-13 PROCEDURE — 1111F DSCHRG MED/CURRENT MED MERGE: CPT | Performed by: NURSE PRACTITIONER

## 2022-10-13 PROCEDURE — 86140 C-REACTIVE PROTEIN: CPT

## 2022-10-13 PROCEDURE — 36415 COLL VENOUS BLD VENIPUNCTURE: CPT

## 2022-10-13 PROCEDURE — 85025 COMPLETE CBC W/AUTO DIFF WBC: CPT

## 2022-10-13 PROCEDURE — 83550 IRON BINDING TEST: CPT

## 2022-10-13 PROCEDURE — 73080 X-RAY EXAM OF ELBOW: CPT | Performed by: NURSE PRACTITIONER

## 2022-10-13 PROCEDURE — 85652 RBC SED RATE AUTOMATED: CPT

## 2022-10-13 PROCEDURE — 84443 ASSAY THYROID STIM HORMONE: CPT

## 2022-10-13 RX ORDER — METOPROLOL SUCCINATE 25 MG/1
25 TABLET, EXTENDED RELEASE ORAL DAILY
COMMUNITY
Start: 2022-10-03

## 2022-10-13 NOTE — PATIENT INSTRUCTIONS
Get your xrays completed    Get your labs done. See ortho    Use ice/heat application as needed, voltaren gel, elevation.      Continue tylenol as needed for pain    Continue to see pain service     Follow up for routine care or sooner as needed 20-Nov-2021 03:20

## 2022-10-14 LAB
CCP IGG SERPL-ACNC: 1 U/ML (ref 0–6.9)
DSDNA IGG SERPL IA-ACNC: 0.6 IU/ML
ENA AB SER QL IA: <0.09 UG/L
ENA AB SER QL IA: NEGATIVE

## 2022-10-18 DIAGNOSIS — M54.16 LUMBAR RADICULOPATHY: ICD-10-CM

## 2022-10-18 DIAGNOSIS — M25.569 KNEE PAIN, UNSPECIFIED CHRONICITY, UNSPECIFIED LATERALITY: Primary | ICD-10-CM

## 2022-10-18 DIAGNOSIS — M48.061 SPINAL STENOSIS OF LUMBAR REGION WITHOUT NEUROGENIC CLAUDICATION: ICD-10-CM

## 2022-10-18 RX ORDER — HYDROCODONE BITARTRATE AND ACETAMINOPHEN 5; 325 MG/1; MG/1
1 TABLET ORAL EVERY 8 HOURS PRN
Qty: 90 TABLET | Refills: 0 | Status: SHIPPED | OUTPATIENT
Start: 2022-10-19 | End: 2022-11-18

## 2022-10-31 ENCOUNTER — MED REC SCAN ONLY (OUTPATIENT)
Dept: PAIN CLINIC | Facility: CLINIC | Age: 67
End: 2022-10-31

## 2022-10-31 ENCOUNTER — OFFICE VISIT (OUTPATIENT)
Dept: PAIN CLINIC | Facility: CLINIC | Age: 67
End: 2022-10-31
Payer: MEDICARE

## 2022-10-31 VITALS — OXYGEN SATURATION: 100 % | HEART RATE: 71 BPM | SYSTOLIC BLOOD PRESSURE: 128 MMHG | DIASTOLIC BLOOD PRESSURE: 70 MMHG

## 2022-10-31 DIAGNOSIS — M54.16 LUMBAR RADICULOPATHY: ICD-10-CM

## 2022-10-31 DIAGNOSIS — K55.9 ISCHEMIC COLITIS (HCC): ICD-10-CM

## 2022-10-31 DIAGNOSIS — F11.20 CHRONIC NARCOTIC DEPENDENCE (HCC): ICD-10-CM

## 2022-10-31 DIAGNOSIS — Z98.1 S/P CERVICAL SPINAL FUSION: Primary | ICD-10-CM

## 2022-10-31 DIAGNOSIS — M48.061 SPINAL STENOSIS OF LUMBAR REGION WITHOUT NEUROGENIC CLAUDICATION: ICD-10-CM

## 2022-10-31 PROCEDURE — 99215 OFFICE O/P EST HI 40 MIN: CPT | Performed by: ANESTHESIOLOGY

## 2022-10-31 RX ORDER — HYDROCODONE BITARTRATE AND ACETAMINOPHEN 5; 325 MG/1; MG/1
1 TABLET ORAL EVERY 8 HOURS PRN
Qty: 90 TABLET | Refills: 0 | Status: SHIPPED | OUTPATIENT
Start: 2022-11-14 | End: 2022-12-14

## 2022-10-31 RX ORDER — CYCLOBENZAPRINE HCL 10 MG
10 TABLET ORAL 3 TIMES DAILY PRN
Qty: 21 TABLET | Refills: 0 | Status: SHIPPED | OUTPATIENT
Start: 2022-10-31

## 2022-10-31 NOTE — PROGRESS NOTES
Patient presents in office today with reported pain in \"everywhere\"    Current pain level reported = 6/10    Last reported dose of norco last night      Narcotic Contract renewal 7/12/21, will update today    Urine Drug screen 6/8/22

## 2022-11-03 ENCOUNTER — TELEPHONE (OUTPATIENT)
Dept: INTERNAL MEDICINE CLINIC | Facility: CLINIC | Age: 67
End: 2022-11-03

## 2022-11-03 NOTE — TELEPHONE ENCOUNTER
Please advise     Future Appointments   Date Time Provider Brandi Baylee   12/22/2022  9:40 AM Karina Ang MD [facility] [address] [place]   1/13/2023  1:00 PM Elaine Krishna MD [facility] [id number]   5/15/2023 10:00 AM Denny Aase, MD [facility] [place]      updated

## 2022-11-03 NOTE — TELEPHONE ENCOUNTER
Incoming (mail or fax):   Fax  Received from: [facility]   Documentation given to:  Results bin     Colonoscopy in care everywhere

## 2022-11-03 NOTE — TELEPHONE ENCOUNTER
Reviewed in care everywhere. Tortuous colon. They do not recommend repeating a screening colonoscopy. She will have to discuss with gi what her screening options are. Admitted briefly for her recurrent syncopal episodes and possible ischemic colitis after the colonoscopy.

## 2022-11-15 ENCOUNTER — TELEPHONE (OUTPATIENT)
Dept: INTERNAL MEDICINE CLINIC | Facility: CLINIC | Age: 67
End: 2022-11-15

## 2022-11-15 NOTE — TELEPHONE ENCOUNTER
[facility] Follow Up Appointment Scheduled For: 11/21/22    Discharge Date: 10/27/22    ARMIDA LESA KRAFT Discharged From:[address], [address] [facility]    If NOT Discharged from [facility] Name: [facility]n  [facility], [address] [facility]    BZWLI:[phone]    Fax # if available:

## 2022-11-21 ENCOUNTER — OFFICE VISIT (OUTPATIENT)
Dept: INTERNAL MEDICINE CLINIC | Facility: CLINIC | Age: 67
End: 2022-11-21
Payer: MEDICARE

## 2022-11-21 VITALS
WEIGHT: 100 LBS | RESPIRATION RATE: 16 BRPM | BODY MASS INDEX: 18.4 KG/M2 | DIASTOLIC BLOOD PRESSURE: 84 MMHG | SYSTOLIC BLOOD PRESSURE: 152 MMHG | HEIGHT: 62 IN | HEART RATE: 72 BPM | OXYGEN SATURATION: 99 % | TEMPERATURE: 98 F

## 2022-11-21 DIAGNOSIS — G25.0 BENIGN ESSENTIAL TREMOR: ICD-10-CM

## 2022-11-21 DIAGNOSIS — Z87.19 HX OF ISCHEMIC BOWEL DISEASE: ICD-10-CM

## 2022-11-21 DIAGNOSIS — D64.9 ANEMIA, UNSPECIFIED TYPE: ICD-10-CM

## 2022-11-21 DIAGNOSIS — Z09 HOSPITAL DISCHARGE FOLLOW-UP: Primary | ICD-10-CM

## 2022-11-21 DIAGNOSIS — I48.0 PAROXYSMAL ATRIAL FIBRILLATION (HCC): ICD-10-CM

## 2022-11-21 DIAGNOSIS — R55 SYNCOPE, UNSPECIFIED SYNCOPE TYPE: ICD-10-CM

## 2022-11-21 DIAGNOSIS — D46.9 MDS (MYELODYSPLASTIC SYNDROME) (HCC): ICD-10-CM

## 2022-11-21 DIAGNOSIS — E87.1 HYPONATREMIA: ICD-10-CM

## 2022-11-21 DIAGNOSIS — K55.9 ISCHEMIC COLITIS (HCC): ICD-10-CM

## 2022-11-21 DIAGNOSIS — M48.061 SPINAL STENOSIS OF LUMBAR REGION WITHOUT NEUROGENIC CLAUDICATION: ICD-10-CM

## 2022-11-21 DIAGNOSIS — K92.2 GASTROINTESTINAL HEMORRHAGE, UNSPECIFIED GASTROINTESTINAL HEMORRHAGE TYPE: ICD-10-CM

## 2022-11-21 DIAGNOSIS — F33.40 RECURRENT MAJOR DEPRESSIVE DISORDER, IN REMISSION (HCC): ICD-10-CM

## 2022-11-21 DIAGNOSIS — R63.4 WEIGHT LOSS: ICD-10-CM

## 2022-11-21 DIAGNOSIS — Z87.891 FORMER SMOKER: ICD-10-CM

## 2022-11-21 RX ORDER — PAROXETINE 10 MG/1
10 TABLET, FILM COATED ORAL EVERY MORNING
Qty: 30 TABLET | Refills: 0 | Status: SHIPPED | OUTPATIENT
Start: 2022-11-21

## 2022-11-21 RX ORDER — PAROXETINE 10 MG/1
10 TABLET, FILM COATED ORAL DAILY
COMMUNITY
Start: 2022-11-17 | End: 2023-11-17

## 2022-11-30 ENCOUNTER — TELEPHONE (OUTPATIENT)
Dept: INTERNAL MEDICINE CLINIC | Facility: CLINIC | Age: 67
End: 2022-11-30

## 2022-11-30 NOTE — TELEPHONE ENCOUNTER
There was a note on this for the patient to stop the duloxetine and start the paroxetine. Please stop the duloxetine and resend the paroxetine and see if it is covered then. Thanks.

## 2022-12-01 RX ORDER — PAROXETINE 10 MG/1
10 TABLET, FILM COATED ORAL DAILY
Qty: 30 TABLET | Refills: 0 | Status: SHIPPED | OUTPATIENT
Start: 2022-12-01 | End: 2023-12-01

## 2022-12-01 NOTE — TELEPHONE ENCOUNTER
Incoming (mail or fax):fax  Received from:  [facility]   Documentation given to:  Triage in basket     PA for Paroxetine

## 2022-12-02 ENCOUNTER — LAB ENCOUNTER (OUTPATIENT)
Dept: LAB | Age: 67
End: 2022-12-02
Attending: ANESTHESIOLOGY
Payer: MEDICARE

## 2022-12-02 DIAGNOSIS — Z98.1 S/P CERVICAL SPINAL FUSION: ICD-10-CM

## 2022-12-02 DIAGNOSIS — F11.20 CHRONIC NARCOTIC DEPENDENCE (HCC): ICD-10-CM

## 2022-12-02 PROCEDURE — 80307 DRUG TEST PRSMV CHEM ANLYZR: CPT

## 2022-12-04 LAB
6-ACETYLMORHINE CUTOFF 20 NG/ML: NOT DETECTED
7-AMINOCLONAZEPAM 40 NG/ML: NOT DETECTED
A-OH-ALPRAZOLM CUTOFF 20 NG/ML: NOT DETECTED
ALPHA-OH-MIDAZOLAM (CUTOFF 20 NG/ML): NOT DETECTED
ALPRAZOLAM CUTOFF 40 NG/ML: NOT DETECTED
AMPHETAMINE CUTOFF 10 NG/ML: NOT DETECTED
BARBITURATES CUTOFF 200 NG/ML: PRESENT
BENZOYLECGONINE  150 NG/ML: NOT DETECTED
BUPRENORPHINE CUTOFF 5 NG/ML: NOT DETECTED
CARISOPRODOL CUTOFF 100 NG/ML: NOT DETECTED
CODINE CUTOFF 40 NG/ML: NOT DETECTED
COLNAZEPAM CUTOFF 20 NG/ML: NOT DETECTED
CREATININE,URINE: <20 MG/DL
DIAZEPAM CUTOFF 50 NG/ML: NOT DETECTED
ETHYL-GLUCURONIDE 500 NG/ML: NOT DETECTED
FENTANYL CUTOFF 2 NG/ML: NOT DETECTED
GABAPENTIN (CUTOFF 100 NG/ML): PRESENT
HYDROCODONE CUTOFF 40 NG/ML: NOT DETECTED
HYDROMORPHONE CUTOFF 40 NG/ML: NOT DETECTED
LORAZEPAM CUTOFF 60 NG/ML: NOT DETECTED
MARIJUANA CUTOFF 20 NG/ML: NOT DETECTED
MDA CUTOFF 200 NG/ML: NOT DETECTED
MDEA EVE CUTOFF 200 NG/ML: NOT DETECTED
MDMA-ECSTASY CUTOFF 200 NG/ML: NOT DETECTED
MEPERIDINE MET CUTOFF 50 NG/ML: NOT DETECTED
METHADONE CUTOFF 150 NG/ML: NOT DETECTED
METHAMPHETMN CUTOFF 400 NG/ML: NOT DETECTED
METHYLPHENIDATE (CUTOFF 100 NG/ML): NOT DETECTED
MIDAZOLAM CUTOFF 20 NG/ML: NOT DETECTED
MORHINE CUTOFF 20 NG/ML: NOT DETECTED
NALOXONE (CUTOFF 100 NG/ML): NOT DETECTED
NORBUPRENORPHINE  20 NG/ML: NOT DETECTED
NORDIAZEPAM CUTOFF 50 NG/ML: NOT DETECTED
NORFENTANYL CUTOFF 2 NG/ML: NOT DETECTED
NORHYDRCODONE CUTOFF 100 NG/ML: PRESENT
NOROXYCODONE CUTOFF 100 NG/ML: NOT DETECTED
NOROXYMORPHNE CUTOFF 100 NG/ML: NOT DETECTED
OXAZEPAM CUTOFF 50 NG/ML: NOT DETECTED
OXYCODONE CUTOFF 40 NG/ML: NOT DETECTED
OXYMORPHONE CUTOFF 40 NG/ML: NOT DETECTED
PCP CUTOFF 25 NG/ML: NOT DETECTED
PHENTERMINE CUTOFF 100 NG/ML: NOT DETECTED
PREGABALIN (CUTOFF 100 NG/ML): NOT DETECTED
TAPENTADOL CUTOFF 100 NG/ML: NOT DETECTED
TAPENTADOL-O SULF 200 NG/ML: NOT DETECTED
TEMAZEPAM CUTOFF 50 NG/ML: NOT DETECTED
TRAMADOL CUTOFF 200 NG/ML: NOT DETECTED
ZOLPIDEM CUTOFF 20 NG/ML: NOT DETECTED
ZOLPIDEM METABOLITE (CUTOFF 100 NG/ML): NOT DETECTED

## 2022-12-05 NOTE — TELEPHONE ENCOUNTER
Incoming (mail or fax):  fax  Received from:  [facility]  Documentation given to:  Triage incoming    Redetermination Notice of Approval  Of Medicare Prescription

## 2022-12-15 ENCOUNTER — MED REC SCAN ONLY (OUTPATIENT)
Dept: INTERNAL MEDICINE CLINIC | Facility: CLINIC | Age: 67
End: 2022-12-15

## 2022-12-16 ENCOUNTER — PATIENT MESSAGE (OUTPATIENT)
Dept: PAIN CLINIC | Facility: CLINIC | Age: 67
End: 2022-12-16

## 2022-12-16 DIAGNOSIS — M48.061 SPINAL STENOSIS OF LUMBAR REGION WITHOUT NEUROGENIC CLAUDICATION: ICD-10-CM

## 2022-12-16 DIAGNOSIS — M54.16 LUMBAR RADICULOPATHY: ICD-10-CM

## 2022-12-16 RX ORDER — PRIMIDONE 50 MG/1
150 TABLET ORAL EVERY EVENING
Refills: 0 | OUTPATIENT
Start: 2022-12-16

## 2022-12-16 NOTE — TELEPHONE ENCOUNTER
Pt rcv'd a script for Tramadol 50 mg, qty #10 tabs  from a Dr. Todd Mayfield on 11/21/22. Pt also reported a 10 tablet discrepancy on her October 19, 2022 [place]co [place], [place]

## 2022-12-16 NOTE — TELEPHONE ENCOUNTER
From: Sheree Albarran  To: Mikhail Nunez MD  Sent: 12/16/2022 9:34 AM CST  Subject: HYDROCODONE REFILL    PLEASE REFILL MY HYDROCODONE

## 2022-12-19 RX ORDER — HYDROCODONE BITARTRATE AND ACETAMINOPHEN 5; 325 MG/1; MG/1
1 TABLET ORAL EVERY 8 HOURS PRN
Qty: 90 TABLET | Refills: 0 | Status: SHIPPED | OUTPATIENT
Start: 2022-12-19

## 2022-12-27 ENCOUNTER — APPOINTMENT (OUTPATIENT)
Dept: GENERAL RADIOLOGY | Facility: HOSPITAL | Age: 67
DRG: 394 | End: 2022-12-27
Attending: EMERGENCY MEDICINE
Payer: MEDICARE

## 2022-12-27 ENCOUNTER — APPOINTMENT (OUTPATIENT)
Dept: CT IMAGING | Facility: HOSPITAL | Age: 67
DRG: 394 | End: 2022-12-27
Attending: INTERNAL MEDICINE
Payer: MEDICARE

## 2022-12-27 ENCOUNTER — ANESTHESIA EVENT (OUTPATIENT)
Dept: ENDOSCOPY | Facility: HOSPITAL | Age: 67
DRG: 394 | End: 2022-12-27
Payer: MEDICARE

## 2022-12-27 ENCOUNTER — HOSPITAL ENCOUNTER (INPATIENT)
Facility: HOSPITAL | Age: 67
LOS: 2 days | Discharge: ANOTHER HOSPITAL | DRG: 394 | End: 2022-12-29
Attending: EMERGENCY MEDICINE | Admitting: HOSPITALIST
Payer: MEDICARE

## 2022-12-27 ENCOUNTER — ANESTHESIA (OUTPATIENT)
Dept: ENDOSCOPY | Facility: HOSPITAL | Age: 67
DRG: 394 | End: 2022-12-27
Payer: MEDICARE

## 2022-12-27 DIAGNOSIS — K92.2 GASTROINTESTINAL HEMORRHAGE, UNSPECIFIED GASTROINTESTINAL HEMORRHAGE TYPE: Primary | ICD-10-CM

## 2022-12-27 DIAGNOSIS — I95.9 HYPOTENSION, UNSPECIFIED HYPOTENSION TYPE: ICD-10-CM

## 2022-12-27 LAB
ALBUMIN SERPL-MCNC: 3.4 G/DL (ref 3.4–5)
ALBUMIN/GLOB SERPL: 1.1 {RATIO} (ref 1–2)
ALP LIVER SERPL-CCNC: 90 U/L
ALT SERPL-CCNC: 22 U/L
ANION GAP SERPL CALC-SCNC: 9 MMOL/L (ref 0–18)
ANTIBODY SCREEN: NEGATIVE
APTT PPP: 26.9 SECONDS (ref 23.3–35.6)
AST SERPL-CCNC: 24 U/L (ref 15–37)
BASOPHILS # BLD AUTO: 0.01 X10(3) UL (ref 0–0.2)
BASOPHILS NFR BLD AUTO: 0.1 %
BILIRUB SERPL-MCNC: 0.2 MG/DL (ref 0.1–2)
BUN BLD-MCNC: 18 MG/DL (ref 7–18)
C DIFF TOX B STL QL: NEGATIVE
CALCIUM BLD-MCNC: 9.1 MG/DL (ref 8.5–10.1)
CHLORIDE SERPL-SCNC: 102 MMOL/L (ref 98–112)
CO2 SERPL-SCNC: 25 MMOL/L (ref 21–32)
CREAT BLD-MCNC: 1.07 MG/DL
EOSINOPHIL # BLD AUTO: 0.03 X10(3) UL (ref 0–0.7)
EOSINOPHIL NFR BLD AUTO: 0.4 %
ERYTHROCYTE [DISTWIDTH] IN BLOOD BY AUTOMATED COUNT: 12.5 %
GFR SERPLBLD BASED ON 1.73 SQ M-ARVRAT: 57 ML/MIN/1.73M2 (ref 60–?)
GLOBULIN PLAS-MCNC: 3 G/DL (ref 2.8–4.4)
GLUCOSE BLD-MCNC: 139 MG/DL (ref 70–99)
HCT VFR BLD AUTO: 33.9 %
HGB BLD-MCNC: 11.5 G/DL
IMM GRANULOCYTES # BLD AUTO: 0.03 X10(3) UL (ref 0–1)
IMM GRANULOCYTES NFR BLD: 0.4 %
INR BLD: 1.03 (ref 0.85–1.16)
LACTATE SERPL-SCNC: 1.6 MMOL/L (ref 0.4–2)
LACTATE SERPL-SCNC: 2.8 MMOL/L (ref 0.4–2)
LYMPHOCYTES # BLD AUTO: 0.95 X10(3) UL (ref 1–4)
LYMPHOCYTES NFR BLD AUTO: 12.9 %
MCH RBC QN AUTO: 35.9 PG (ref 26–34)
MCHC RBC AUTO-ENTMCNC: 33.9 G/DL (ref 31–37)
MCV RBC AUTO: 105.9 FL
MONOCYTES # BLD AUTO: 0.48 X10(3) UL (ref 0.1–1)
MONOCYTES NFR BLD AUTO: 6.5 %
NEUTROPHILS # BLD AUTO: 5.88 X10 (3) UL (ref 1.5–7.7)
NEUTROPHILS # BLD AUTO: 5.88 X10(3) UL (ref 1.5–7.7)
NEUTROPHILS NFR BLD AUTO: 79.7 %
OSMOLALITY SERPL CALC.SUM OF ELEC: 286 MOSM/KG (ref 275–295)
PLATELET # BLD AUTO: 257 10(3)UL (ref 150–450)
POTASSIUM SERPL-SCNC: 4.1 MMOL/L (ref 3.5–5.1)
PROT SERPL-MCNC: 6.4 G/DL (ref 6.4–8.2)
PROTHROMBIN TIME: 13.5 SECONDS (ref 11.6–14.8)
RBC # BLD AUTO: 3.2 X10(6)UL
RH BLOOD TYPE: POSITIVE
SARS-COV-2 RNA RESP QL NAA+PROBE: NOT DETECTED
SODIUM SERPL-SCNC: 136 MMOL/L (ref 136–145)
TROPONIN I HIGH SENSITIVITY: 7 NG/L
WBC # BLD AUTO: 7.4 X10(3) UL (ref 4–11)

## 2022-12-27 PROCEDURE — 99223 1ST HOSP IP/OBS HIGH 75: CPT | Performed by: INTERNAL MEDICINE

## 2022-12-27 PROCEDURE — 0DJ08ZZ INSPECTION OF UPPER INTESTINAL TRACT, VIA NATURAL OR ARTIFICIAL OPENING ENDOSCOPIC: ICD-10-PCS | Performed by: INTERNAL MEDICINE

## 2022-12-27 PROCEDURE — 74174 CTA ABD&PLVS W/CONTRAST: CPT | Performed by: INTERNAL MEDICINE

## 2022-12-27 PROCEDURE — 71045 X-RAY EXAM CHEST 1 VIEW: CPT | Performed by: EMERGENCY MEDICINE

## 2022-12-27 RX ORDER — FLECAINIDE ACETATE 100 MG/1
100 TABLET ORAL 2 TIMES DAILY
Status: DISCONTINUED | OUTPATIENT
Start: 2022-12-27 | End: 2022-12-29

## 2022-12-27 RX ORDER — LIDOCAINE HYDROCHLORIDE 10 MG/ML
INJECTION, SOLUTION EPIDURAL; INFILTRATION; INTRACAUDAL; PERINEURAL AS NEEDED
Status: DISCONTINUED | OUTPATIENT
Start: 2022-12-27 | End: 2022-12-27 | Stop reason: SURG

## 2022-12-27 RX ORDER — HYDROMORPHONE HYDROCHLORIDE 1 MG/ML
0.2 INJECTION, SOLUTION INTRAMUSCULAR; INTRAVENOUS; SUBCUTANEOUS EVERY 5 MIN PRN
Status: DISCONTINUED | OUTPATIENT
Start: 2022-12-27 | End: 2022-12-27 | Stop reason: HOSPADM

## 2022-12-27 RX ORDER — PRIMIDONE 50 MG/1
150 TABLET ORAL NIGHTLY
Status: DISCONTINUED | OUTPATIENT
Start: 2022-12-27 | End: 2022-12-29

## 2022-12-27 RX ORDER — NALOXONE HYDROCHLORIDE 0.4 MG/ML
80 INJECTION, SOLUTION INTRAMUSCULAR; INTRAVENOUS; SUBCUTANEOUS AS NEEDED
Status: DISCONTINUED | OUTPATIENT
Start: 2022-12-27 | End: 2022-12-27 | Stop reason: HOSPADM

## 2022-12-27 RX ORDER — HYDROMORPHONE HYDROCHLORIDE 1 MG/ML
0.6 INJECTION, SOLUTION INTRAMUSCULAR; INTRAVENOUS; SUBCUTANEOUS EVERY 5 MIN PRN
Status: DISCONTINUED | OUTPATIENT
Start: 2022-12-27 | End: 2022-12-27 | Stop reason: HOSPADM

## 2022-12-27 RX ORDER — GABAPENTIN 300 MG/1
600 CAPSULE ORAL 2 TIMES DAILY
Status: DISCONTINUED | OUTPATIENT
Start: 2022-12-27 | End: 2022-12-29

## 2022-12-27 RX ORDER — LEVOFLOXACIN 5 MG/ML
750 INJECTION, SOLUTION INTRAVENOUS
Status: DISCONTINUED | OUTPATIENT
Start: 2022-12-27 | End: 2022-12-29

## 2022-12-27 RX ORDER — SODIUM CHLORIDE 9 MG/ML
INJECTION, SOLUTION INTRAVENOUS CONTINUOUS
Status: ACTIVE | OUTPATIENT
Start: 2022-12-27 | End: 2022-12-27

## 2022-12-27 RX ORDER — SODIUM CHLORIDE, SODIUM LACTATE, POTASSIUM CHLORIDE, CALCIUM CHLORIDE 600; 310; 30; 20 MG/100ML; MG/100ML; MG/100ML; MG/100ML
INJECTION, SOLUTION INTRAVENOUS CONTINUOUS PRN
Status: DISCONTINUED | OUTPATIENT
Start: 2022-12-27 | End: 2022-12-27 | Stop reason: SURG

## 2022-12-27 RX ORDER — DEXTROSE AND SODIUM CHLORIDE 5; .9 G/100ML; G/100ML
INJECTION, SOLUTION INTRAVENOUS CONTINUOUS
Status: DISCONTINUED | OUTPATIENT
Start: 2022-12-27 | End: 2022-12-29

## 2022-12-27 RX ORDER — PRIMIDONE 50 MG/1
100 TABLET ORAL EVERY MORNING
Status: DISCONTINUED | OUTPATIENT
Start: 2022-12-28 | End: 2022-12-29

## 2022-12-27 RX ORDER — HYDROMORPHONE HYDROCHLORIDE 1 MG/ML
0.4 INJECTION, SOLUTION INTRAMUSCULAR; INTRAVENOUS; SUBCUTANEOUS EVERY 5 MIN PRN
Status: DISCONTINUED | OUTPATIENT
Start: 2022-12-27 | End: 2022-12-27 | Stop reason: HOSPADM

## 2022-12-27 RX ORDER — MORPHINE SULFATE 2 MG/ML
1 INJECTION, SOLUTION INTRAMUSCULAR; INTRAVENOUS EVERY 4 HOURS PRN
Status: DISCONTINUED | OUTPATIENT
Start: 2022-12-27 | End: 2022-12-29

## 2022-12-27 RX ORDER — ONDANSETRON 2 MG/ML
4 INJECTION INTRAMUSCULAR; INTRAVENOUS EVERY 6 HOURS PRN
Status: DISCONTINUED | OUTPATIENT
Start: 2022-12-27 | End: 2022-12-29

## 2022-12-27 RX ORDER — SODIUM CHLORIDE, SODIUM LACTATE, POTASSIUM CHLORIDE, CALCIUM CHLORIDE 600; 310; 30; 20 MG/100ML; MG/100ML; MG/100ML; MG/100ML
INJECTION, SOLUTION INTRAVENOUS CONTINUOUS
Status: DISCONTINUED | OUTPATIENT
Start: 2022-12-27 | End: 2022-12-29

## 2022-12-27 RX ORDER — METRONIDAZOLE 500 MG/100ML
500 INJECTION, SOLUTION INTRAVENOUS EVERY 8 HOURS
Status: DISCONTINUED | OUTPATIENT
Start: 2022-12-27 | End: 2022-12-29

## 2022-12-27 RX ORDER — HYDROCODONE BITARTRATE AND ACETAMINOPHEN 5; 325 MG/1; MG/1
1 TABLET ORAL EVERY 8 HOURS PRN
Status: DISCONTINUED | OUTPATIENT
Start: 2022-12-27 | End: 2022-12-29

## 2022-12-27 RX ORDER — ATORVASTATIN CALCIUM 10 MG/1
10 TABLET, FILM COATED ORAL NIGHTLY
Status: DISCONTINUED | OUTPATIENT
Start: 2022-12-27 | End: 2022-12-28

## 2022-12-27 RX ADMIN — SODIUM CHLORIDE, SODIUM LACTATE, POTASSIUM CHLORIDE, CALCIUM CHLORIDE: 600; 310; 30; 20 INJECTION, SOLUTION INTRAVENOUS at 16:51:00

## 2022-12-27 RX ADMIN — LIDOCAINE HYDROCHLORIDE 25 MG: 10 INJECTION, SOLUTION EPIDURAL; INFILTRATION; INTRACAUDAL; PERINEURAL at 16:40:00

## 2022-12-27 RX ADMIN — SODIUM CHLORIDE, SODIUM LACTATE, POTASSIUM CHLORIDE, CALCIUM CHLORIDE: 600; 310; 30; 20 INJECTION, SOLUTION INTRAVENOUS at 16:38:00

## 2022-12-27 NOTE — OPERATIVE REPORT
[facility] Patient Status:  Emergency    1/15/1955 MRN [id number]   Location [address] [address] Attending No att. providers found   Hosp Day # 0 PCP Margarette Rivera MD         PATIENT NAME: Edgar Ricardo  DATE OF OPERATION: 2022    PREOPERATIVE DIAGNOSIS:  1. Abd pain, acute  2. Rectal bleed, hematochezia  3. Hx ischemic colitis  POSTOPERATIVE DIAGNOSIS:  1. Normal EGD    PROCEDURE PERFORMED: Upper endoscopy with MAC sedation  SURGEON: Jacek Krishnamurthy MD   MEDICATIONS: MAC IV in divided doses under the supervision of Anesthesia. PROCEDURE AND FINDINGS: The patient was placed into the left lateral decubitus position after informed consent was obtained. All questions were answered. MAC IV sedation was administered. The Olympus video gastroscope was introduced into the mouth and passed to the third portion of the duodenum. The entire examined esophagus was normal.  The entire examined stomach,  including retroflexion view, was normal.  The entire examined duodenum was normal.   The gastroscope was removed from the patient. The procedure was completed. There were no implants placed nor significant blood loss. The patient tolerated the procedure well. There were no immediate post procedure complications. Anesthesia was present to assist in monitoring the patient during the entire length of the moderate sedation time. RECOMMENDATIONS:  1. CT Abd/pel IV contrast STAT. 2. If colitis start Abx and consider transfer if no improvement    Zacarias Krishnamurthy MD

## 2022-12-28 LAB
ADENOVIRUS F 40/41 PCR: NEGATIVE
ANION GAP SERPL CALC-SCNC: 7 MMOL/L (ref 0–18)
ASTROVIRUS PCR: NEGATIVE
BASOPHILS # BLD AUTO: 0.01 X10(3) UL (ref 0–0.2)
BASOPHILS NFR BLD AUTO: 0.1 %
BUN BLD-MCNC: 14 MG/DL (ref 7–18)
C CAYETANENSIS DNA SPEC QL NAA+PROBE: NEGATIVE
CALCIUM BLD-MCNC: 8.2 MG/DL (ref 8.5–10.1)
CAMPY SP DNA.DIARRHEA STL QL NAA+PROBE: NEGATIVE
CHLORIDE SERPL-SCNC: 109 MMOL/L (ref 98–112)
CO2 SERPL-SCNC: 23 MMOL/L (ref 21–32)
CREAT BLD-MCNC: 0.93 MG/DL
CRYPTOSP DNA SPEC QL NAA+PROBE: NEGATIVE
EAEC PAA PLAS AGGR+AATA ST NAA+NON-PRB: NEGATIVE
EC STX1+STX2 + H7 FLIC SPEC NAA+PROBE: NEGATIVE
ENTAMOEBA HISTOLYTICA PCR: NEGATIVE
EOSINOPHIL # BLD AUTO: 0.02 X10(3) UL (ref 0–0.7)
EOSINOPHIL NFR BLD AUTO: 0.2 %
EPEC EAE GENE STL QL NAA+NON-PROBE: NEGATIVE
ERYTHROCYTE [DISTWIDTH] IN BLOOD BY AUTOMATED COUNT: 13.1 %
ETEC LTA+ST1A+ST1B TOX ST NAA+NON-PROBE: NEGATIVE
GFR SERPLBLD BASED ON 1.73 SQ M-ARVRAT: 67 ML/MIN/1.73M2 (ref 60–?)
GIARDIA LAMBLIA PCR: NEGATIVE
GLUCOSE BLD-MCNC: 120 MG/DL (ref 70–99)
HCT VFR BLD AUTO: 27.6 %
HGB BLD-MCNC: 8.5 G/DL
HGB BLD-MCNC: 8.9 G/DL
HGB BLD-MCNC: 9.2 G/DL
HGB BLD-MCNC: 9.2 G/DL
HGB BLD-MCNC: 9.9 G/DL
IMM GRANULOCYTES # BLD AUTO: 0.02 X10(3) UL (ref 0–1)
IMM GRANULOCYTES NFR BLD: 0.2 %
LYMPHOCYTES # BLD AUTO: 0.45 X10(3) UL (ref 1–4)
LYMPHOCYTES NFR BLD AUTO: 5.5 %
MCH RBC QN AUTO: 35.7 PG (ref 26–34)
MCHC RBC AUTO-ENTMCNC: 33.3 G/DL (ref 31–37)
MCV RBC AUTO: 107 FL
MONOCYTES # BLD AUTO: 0.73 X10(3) UL (ref 0.1–1)
MONOCYTES NFR BLD AUTO: 9 %
NEUTROPHILS # BLD AUTO: 6.92 X10 (3) UL (ref 1.5–7.7)
NEUTROPHILS # BLD AUTO: 6.92 X10(3) UL (ref 1.5–7.7)
NEUTROPHILS NFR BLD AUTO: 85 %
NOROVIRUS GI/GII PCR: NEGATIVE
OSMOLALITY SERPL CALC.SUM OF ELEC: 290 MOSM/KG (ref 275–295)
P SHIGELLOIDES DNA STL QL NAA+PROBE: NEGATIVE
PLATELET # BLD AUTO: 181 10(3)UL (ref 150–450)
POTASSIUM SERPL-SCNC: 4.2 MMOL/L (ref 3.5–5.1)
RBC # BLD AUTO: 2.58 X10(6)UL
ROTAVIRUS A PCR: NEGATIVE
SALMONELLA DNA SPEC QL NAA+PROBE: NEGATIVE
SAPOVIRUS PCR: NEGATIVE
SHIGELLA SP+EIEC IPAH ST NAA+NON-PROBE: NEGATIVE
SODIUM SERPL-SCNC: 139 MMOL/L (ref 136–145)
V CHOLERAE DNA SPEC QL NAA+PROBE: NEGATIVE
VIBRIO DNA SPEC NAA+PROBE: NEGATIVE
WBC # BLD AUTO: 8.2 X10(3) UL (ref 4–11)
YERSINIA DNA SPEC NAA+PROBE: NEGATIVE

## 2022-12-28 PROCEDURE — 99233 SBSQ HOSP IP/OBS HIGH 50: CPT | Performed by: HOSPITALIST

## 2022-12-28 RX ORDER — ACETAMINOPHEN 10 MG/ML
15 INJECTION, SOLUTION INTRAVENOUS EVERY 6 HOURS PRN
Status: DISCONTINUED | OUTPATIENT
Start: 2022-12-28 | End: 2022-12-29

## 2022-12-28 RX ORDER — SODIUM CHLORIDE 9 MG/ML
INJECTION, SOLUTION INTRAVENOUS ONCE
Status: DISCONTINUED | OUTPATIENT
Start: 2022-12-28 | End: 2022-12-29

## 2022-12-28 RX ORDER — LORAZEPAM 2 MG/ML
0.5 INJECTION INTRAMUSCULAR ONCE
Status: COMPLETED | OUTPATIENT
Start: 2022-12-28 | End: 2022-12-28

## 2022-12-28 RX ORDER — ACETAMINOPHEN 160 MG/5ML
650 SOLUTION ORAL EVERY 4 HOURS PRN
Status: DISCONTINUED | OUTPATIENT
Start: 2022-12-28 | End: 2022-12-29

## 2022-12-28 RX ORDER — ACETAMINOPHEN 650 MG/1
650 SUPPOSITORY RECTAL EVERY 4 HOURS PRN
Status: DISCONTINUED | OUTPATIENT
Start: 2022-12-28 | End: 2022-12-29

## 2022-12-28 RX ORDER — ACETAMINOPHEN 325 MG/1
650 TABLET ORAL EVERY 4 HOURS PRN
Status: DISCONTINUED | OUTPATIENT
Start: 2022-12-28 | End: 2022-12-29

## 2022-12-28 NOTE — PHYSICAL THERAPY NOTE
PT order received, chart reviewed. Pt admitted with bloody BMs, +ischemic colitis. Per chart, plan to transfer to tertiary center. Will dc from acute PT as pt is not medically appropriate for skilled intervention. Please reconsult if pt remains in house and has functional decline.

## 2022-12-28 NOTE — PROGRESS NOTES
[facility] Pharmacy Note:  Renal Adjustment for levofloxacin (LEVAQUIN)    Shira Donovan is a 79year old patient who has been prescribed levofloxacin (LEVAQUIN) 500 mg every 24 hrs. The estimated creatinine clearance is 36.6 mL/min (A) (based on SCr of 1.07 mg/dL (H)). The dose has been adjusted to levofloxacin (LEVAQUIN) 750 mg every 48 hrs per hospital renal dose adjustment protocol for treatment of intra-abdominal infection. Pharmacy will follow and adjust dose as warranted for additional renal function changes.     Thank you,    Monica Gandara, PharmD  12/27/2022  9:02 PM

## 2022-12-28 NOTE — OCCUPATIONAL THERAPY NOTE
OT order received, chart reviewed. Pt admitted with bloody BMs, +ischemic colitis. Per chart, plan to transfer to tertiary center. Will dc from acute OT as pt is not medically appropriate for skilled intervention. Please reconsult if pt remains in house and has functional decline.

## 2022-12-28 NOTE — PROGRESS NOTES
NURSING ADMISSION NOTE      Patient admitted via Cart  Oriented to room. Safety precautions initiated. Bed in low position. Call light in reach. Patient arrived from CT in stable condition. Patient is alert and oriented. On room air. Abdomen is soft/ non-distended. Patient c/o nausea. Zofran given. Patient c/o severe abdominal pain. Morphine given. Dr. Cody Ring made aware of CT results. No new orders received. Patient is incontinent of bowel and urine. Patient had a smear of bloody BM upon arrival to unit. Receiving IVF. Patient presents with general weakness. Admission navigator completed with patient and  at bedside.

## 2022-12-28 NOTE — CM/SW NOTE
CM spoke to Transfer center CM at [id number] for follow up regarding transfer request;  Doctor to doctor transfer report is pending. CM paged GI physician for update.      Ruben Avila RN Case Manager [phone]

## 2022-12-28 NOTE — CM/SW NOTE
Received call from charge RN Jennifer Londono regarding transferring patient to [address] [place] [address] with patient surgeon Dr. Debi Espitia. Called [facility] spoke to Ms Marianaiker Wright who took information for request for transfer. Ms Markus Ureña requesting Dr Errol Laird call [facility] transfer center at [phone] to speak with accepting hospitalist. [facility] faxed and Covid result faxed. Messaged out to Dr Errol Laird. Dr. Joanna Kearney received and acknowledged message to call [facility] Hospitalist.  Radiology imaging being copied to be sent with patient. Per Dr Joanna Kearney, waiting for BP to improve prior to transfer.       Please call transfer center @ [phone] when patient is ready to resume transfer request.

## 2022-12-28 NOTE — PROGRESS NOTES
Report called to ICU nurse Jocelin Long.  Patient transferred to room 543 by this RN, charge RN, and tech

## 2022-12-28 NOTE — PROGRESS NOTES
CT shows left sided ischemic colitis. Will start Abx and keep NPO except sips.     Plan transfer to [facility] for further management    DW pt and

## 2022-12-28 NOTE — PLAN OF CARE
Pt is alert and oriented, on ra, tele-paced/st, vss  Bloody stools, incontinent of B&B at this times, frequent diaper changes  Pain being managed per mar  1 time dose of Ativan 0.5 mg as per reports being anxious  Kept npo,ivf infusing  Call light in reach    Problem: Patient/Family Goals  Goal: Patient/Family Long Term Goal  Description: Patient's Long Term Goal: discharge    Interventions:  - IVF  -NPO  -Pain control  - See additional Care Plan goals for specific interventions  Outcome: Progressing  Goal: Patient/Family Short Term Goal  Description: Patient's Short Term Goal: Comfort    Interventions:   - call light in reach  - See additional Care Plan goals for specific interventions  Outcome: Progressing

## 2022-12-29 VITALS
BODY MASS INDEX: 19.76 KG/M2 | OXYGEN SATURATION: 96 % | DIASTOLIC BLOOD PRESSURE: 75 MMHG | WEIGHT: 107.38 LBS | HEIGHT: 62 IN | TEMPERATURE: 99 F | RESPIRATION RATE: 12 BRPM | SYSTOLIC BLOOD PRESSURE: 153 MMHG | HEART RATE: 90 BPM

## 2022-12-29 LAB
ALBUMIN SERPL-MCNC: 2.2 G/DL (ref 3.4–5)
ALBUMIN/GLOB SERPL: 0.8 {RATIO} (ref 1–2)
ALP LIVER SERPL-CCNC: 70 U/L
ALT SERPL-CCNC: 12 U/L
ANION GAP SERPL CALC-SCNC: 5 MMOL/L (ref 0–18)
APTT PPP: 36.6 SECONDS (ref 23.3–35.6)
AST SERPL-CCNC: 14 U/L (ref 15–37)
BASOPHILS # BLD AUTO: 0.02 X10(3) UL (ref 0–0.2)
BASOPHILS NFR BLD AUTO: 0.3 %
BILIRUB SERPL-MCNC: 0.2 MG/DL (ref 0.1–2)
BUN BLD-MCNC: 7 MG/DL (ref 7–18)
CALCIUM BLD-MCNC: 7.6 MG/DL (ref 8.5–10.1)
CHLORIDE SERPL-SCNC: 113 MMOL/L (ref 98–112)
CO2 SERPL-SCNC: 22 MMOL/L (ref 21–32)
CREAT BLD-MCNC: 0.61 MG/DL
EOSINOPHIL # BLD AUTO: 0.07 X10(3) UL (ref 0–0.7)
EOSINOPHIL NFR BLD AUTO: 0.9 %
ERYTHROCYTE [DISTWIDTH] IN BLOOD BY AUTOMATED COUNT: 12.9 %
GFR SERPLBLD BASED ON 1.73 SQ M-ARVRAT: 98 ML/MIN/1.73M2 (ref 60–?)
GLOBULIN PLAS-MCNC: 2.7 G/DL (ref 2.8–4.4)
GLUCOSE BLD-MCNC: 113 MG/DL (ref 70–99)
HCT VFR BLD AUTO: 27.4 %
HGB BLD-MCNC: 8.1 G/DL
HGB BLD-MCNC: 9.1 G/DL
HGB BLD-MCNC: 9.3 G/DL
IMM GRANULOCYTES # BLD AUTO: 0.02 X10(3) UL (ref 0–1)
IMM GRANULOCYTES NFR BLD: 0.3 %
LYMPHOCYTES # BLD AUTO: 0.64 X10(3) UL (ref 1–4)
LYMPHOCYTES NFR BLD AUTO: 8.5 %
MCH RBC QN AUTO: 35.4 PG (ref 26–34)
MCHC RBC AUTO-ENTMCNC: 33.2 G/DL (ref 31–37)
MCV RBC AUTO: 106.6 FL
MONOCYTES # BLD AUTO: 0.72 X10(3) UL (ref 0.1–1)
MONOCYTES NFR BLD AUTO: 9.6 %
NEUTROPHILS # BLD AUTO: 6.04 X10 (3) UL (ref 1.5–7.7)
NEUTROPHILS # BLD AUTO: 6.04 X10(3) UL (ref 1.5–7.7)
NEUTROPHILS NFR BLD AUTO: 80.4 %
OSMOLALITY SERPL CALC.SUM OF ELEC: 289 MOSM/KG (ref 275–295)
PLATELET # BLD AUTO: 143 10(3)UL (ref 150–450)
POTASSIUM SERPL-SCNC: 3.9 MMOL/L (ref 3.5–5.1)
PROT SERPL-MCNC: 4.9 G/DL (ref 6.4–8.2)
RBC # BLD AUTO: 2.57 X10(6)UL
SODIUM SERPL-SCNC: 140 MMOL/L (ref 136–145)
WBC # BLD AUTO: 7.5 X10(3) UL (ref 4–11)

## 2022-12-29 PROCEDURE — 99239 HOSP IP/OBS DSCHRG MGMT >30: CPT | Performed by: HOSPITALIST

## 2022-12-29 RX ORDER — LEVOFLOXACIN 5 MG/ML
750 INJECTION, SOLUTION INTRAVENOUS EVERY 24 HOURS
Status: DISCONTINUED | OUTPATIENT
Start: 2022-12-29 | End: 2022-12-29

## 2022-12-29 RX ORDER — HEPARIN SODIUM AND DEXTROSE 10000; 5 [USP'U]/100ML; G/100ML
12 INJECTION INTRAVENOUS ONCE
Status: COMPLETED | OUTPATIENT
Start: 2022-12-29 | End: 2022-12-29

## 2022-12-29 RX ORDER — HEPARIN SODIUM AND DEXTROSE 10000; 5 [USP'U]/100ML; G/100ML
INJECTION INTRAVENOUS CONTINUOUS
Status: DISCONTINUED | OUTPATIENT
Start: 2022-12-29 | End: 2022-12-29

## 2022-12-29 RX ORDER — ALPRAZOLAM 0.5 MG/1
0.5 TABLET ORAL ONCE AS NEEDED
Status: COMPLETED | OUTPATIENT
Start: 2022-12-29 | End: 2022-12-29

## 2022-12-29 NOTE — PROGRESS NOTES
Pharmacy Note: Renal dose adjustment   Mario Christopher was originally prescribed Levaquin 750mg every 48 hours  which was renally dose adjusted at the time of the original order per P&T approved renal dosing protocol. Renal function has now improved. Estimated Creatinine Clearance: 68.8 mL/min (based on SCr of 0.61 mg/dL). Her calculated creatinine clearance is now > 50 mL/min, therefore, the dose has been changed back to the original order per P&T approved protocol.       Thank you,   Chad Lombardo, PharmD  12/29/2022 12:13 PM

## 2022-12-29 NOTE — PLAN OF CARE
Patient arrived to ICU from surgical unit via bed. Placed on ICU monitor. VSS. Alert and oriented x4. RR even and unlabored. On RA. V paced on monitor. Abdomen soft, tender. Incontinent of bowel and bladder. Small loose/watery stools with red streaks. Skin intact. C/o 8/10 in back and abdomen. See MAR. Transfer orders in, awaiting medical bed at [facility] to transfer for surgical intervention. Pt sees Dr. Javed Mann there. Family updated on POC. See flowsheets for full assessment.

## 2022-12-29 NOTE — PLAN OF CARE
Pt alert and orientated times 4. Follows commands and moves all extremities. Monitor briefly shows pt O2 levels decrease, pt is in no distress. 2L NC. complaint of pain, treated with tylenol PRN and prn Morphine. NPO. Transfering to [facility] for surgery.

## 2022-12-29 NOTE — CM/SW NOTE
Order received for surgical floor bed status    Order faxed to [facility] 103 bed availability    Raji Crisostomo, [address] [facility] , [address] Vaishali Devine Transitions Leader  [phone]

## 2022-12-29 NOTE — CM/SW NOTE
Called [facility] and pt has been accepted by Malika KAUFMAN with Dr. Johan Triana as the GI consultant. Their doc spoke to Dr. Baltazar [place] and she is a priority however there are still no beds. 7:00 PM:  Received a call from Mary at [place]. PT going to [place]. Report to be called to charge nurse at [phone]. Called [facility] and spoke to Jeyn. ETA is 7:45 PM.  Informed Pt's nurse, Kirill Tong. PCS form completed.

## 2022-12-29 NOTE — CM/SW NOTE
PT to be transferred to [facility] under the care of Dr. Jackline Hatchet. Spoke to Mary at [facility]. There are currently no beds. She requested  the order when pt was downgraded to a regular medical care, not icu care. Faxed order to  21 [phone].

## 2022-12-30 ENCOUNTER — PATIENT OUTREACH (OUTPATIENT)
Dept: CASE MANAGEMENT | Age: 67
End: 2022-12-30

## 2022-12-30 NOTE — PLAN OF CARE
Assumed care of pt after RN report. Patient alert and oriented x4. On RA. NSR, ventricular paced with pacemaker. Abdomen soft, tender. Pain control, see JOSE Roldan for incontinence episodes. Urine clear, yellow. Hep gtt started per critical care. Transfer orders. Awaiting tx to [facility]. Radiology contact to initiate imaging to disc.

## 2022-12-30 NOTE — PLAN OF CARE
Report given to [facility], all questions answers. Aware patient is on her way to [facility] from [facility].

## 2023-01-06 ENCOUNTER — OFFICE VISIT (OUTPATIENT)
Dept: INTERNAL MEDICINE CLINIC | Facility: CLINIC | Age: 68
End: 2023-01-06
Payer: MEDICARE

## 2023-01-06 VITALS
BODY MASS INDEX: 17.52 KG/M2 | SYSTOLIC BLOOD PRESSURE: 80 MMHG | RESPIRATION RATE: 16 BRPM | HEART RATE: 92 BPM | HEIGHT: 61.75 IN | TEMPERATURE: 98 F | DIASTOLIC BLOOD PRESSURE: 52 MMHG | WEIGHT: 95.19 LBS

## 2023-01-06 DIAGNOSIS — I74.10 SHAGGY AORTA SYNDROME (HCC): ICD-10-CM

## 2023-01-06 DIAGNOSIS — M79.645 THUMB PAIN, LEFT: ICD-10-CM

## 2023-01-06 DIAGNOSIS — K92.2 GASTROINTESTINAL HEMORRHAGE, UNSPECIFIED GASTROINTESTINAL HEMORRHAGE TYPE: ICD-10-CM

## 2023-01-06 DIAGNOSIS — I95.9 HYPOTENSION, UNSPECIFIED HYPOTENSION TYPE: ICD-10-CM

## 2023-01-06 DIAGNOSIS — D64.9 ANEMIA, UNSPECIFIED TYPE: ICD-10-CM

## 2023-01-06 DIAGNOSIS — Z00.00 ENCOUNTER FOR ANNUAL HEALTH EXAMINATION: Primary | ICD-10-CM

## 2023-01-06 DIAGNOSIS — D46.9 MDS (MYELODYSPLASTIC SYNDROME) (HCC): ICD-10-CM

## 2023-01-06 DIAGNOSIS — K55.9 ISCHEMIC COLITIS (HCC): ICD-10-CM

## 2023-01-06 DIAGNOSIS — F33.40 RECURRENT MAJOR DEPRESSIVE DISORDER, IN REMISSION (HCC): ICD-10-CM

## 2023-01-06 PROCEDURE — G0439 PPPS, SUBSEQ VISIT: HCPCS | Performed by: INTERNAL MEDICINE

## 2023-01-06 PROCEDURE — 1111F DSCHRG MED/CURRENT MED MERGE: CPT | Performed by: INTERNAL MEDICINE

## 2023-01-06 PROCEDURE — 99215 OFFICE O/P EST HI 40 MIN: CPT | Performed by: INTERNAL MEDICINE

## 2023-01-06 RX ORDER — PANTOPRAZOLE SODIUM 40 MG/1
TABLET, DELAYED RELEASE ORAL
COMMUNITY
Start: 2023-01-02

## 2023-01-06 RX ORDER — DULOXETIN HYDROCHLORIDE 30 MG/1
30 CAPSULE, DELAYED RELEASE ORAL DAILY
COMMUNITY
Start: 2023-01-06

## 2023-01-06 RX ORDER — PAROXETINE 10 MG/1
10 TABLET, FILM COATED ORAL EVERY MORNING
COMMUNITY

## 2023-01-07 ENCOUNTER — HOSPITAL ENCOUNTER (OUTPATIENT)
Dept: GENERAL RADIOLOGY | Age: 68
Discharge: HOME | End: 2023-01-07
Attending: INTERNAL MEDICINE
Payer: MEDICARE

## 2023-01-07 DIAGNOSIS — M79.645 THUMB PAIN, LEFT: ICD-10-CM

## 2023-01-07 PROCEDURE — 73140 X-RAY EXAM OF FINGER(S): CPT | Performed by: INTERNAL MEDICINE

## 2023-01-09 ENCOUNTER — LAB ENCOUNTER (OUTPATIENT)
Dept: LAB | Age: 68
End: 2023-01-09
Attending: INTERNAL MEDICINE
Payer: MEDICARE

## 2023-01-09 DIAGNOSIS — M19.90 ARTHRITIS: Primary | ICD-10-CM

## 2023-01-09 DIAGNOSIS — D64.9 ANEMIA, UNSPECIFIED TYPE: ICD-10-CM

## 2023-01-09 LAB
BASOPHILS # BLD AUTO: 0.03 X10(3) UL (ref 0–0.2)
BASOPHILS NFR BLD AUTO: 1 %
EOSINOPHIL # BLD AUTO: 0.05 X10(3) UL (ref 0–0.7)
EOSINOPHIL NFR BLD AUTO: 1.6 %
ERYTHROCYTE [DISTWIDTH] IN BLOOD BY AUTOMATED COUNT: 13.1 %
HCT VFR BLD AUTO: 28.8 %
HGB BLD-MCNC: 9.5 G/DL
IMM GRANULOCYTES # BLD AUTO: 0 X10(3) UL (ref 0–1)
IMM GRANULOCYTES NFR BLD: 0 %
LYMPHOCYTES # BLD AUTO: 1.15 X10(3) UL (ref 1–4)
LYMPHOCYTES NFR BLD AUTO: 37.7 %
MCH RBC QN AUTO: 35.3 PG (ref 26–34)
MCHC RBC AUTO-ENTMCNC: 33 G/DL (ref 31–37)
MCV RBC AUTO: 107.1 FL
MONOCYTES # BLD AUTO: 0.3 X10(3) UL (ref 0.1–1)
MONOCYTES NFR BLD AUTO: 9.8 %
NEUTROPHILS # BLD AUTO: 1.52 X10 (3) UL (ref 1.5–7.7)
NEUTROPHILS # BLD AUTO: 1.52 X10(3) UL (ref 1.5–7.7)
NEUTROPHILS NFR BLD AUTO: 49.9 %
PLATELET # BLD AUTO: 467 10(3)UL (ref 150–450)
RBC # BLD AUTO: 2.69 X10(6)UL
WBC # BLD AUTO: 3.1 X10(3) UL (ref 4–11)

## 2023-01-09 PROCEDURE — 85025 COMPLETE CBC W/AUTO DIFF WBC: CPT

## 2023-01-09 PROCEDURE — 36415 COLL VENOUS BLD VENIPUNCTURE: CPT

## 2023-01-09 NOTE — PROGRESS NOTES
Patient MyChart result sent, will monitor to ensure patient viewed.    Referral for Dr Jyothi Leigh placed and information sent to pt    Tano Mcdonnell, [address]  [address]

## 2023-01-12 ENCOUNTER — TELEPHONE (OUTPATIENT)
Dept: INTERNAL MEDICINE CLINIC | Facility: CLINIC | Age: 68
End: 2023-01-12

## 2023-01-12 NOTE — TELEPHONE ENCOUNTER
Care Everywhere Records Received    Updated Care Everywhere: yes     Date of Service: 1/11/23    From What Facility: [facility]     Speciality: ED     Type of Record: summary of care     Document Placed:Dr Shine office in folder

## 2023-01-13 ENCOUNTER — OFFICE VISIT (OUTPATIENT)
Dept: HEMATOLOGY/ONCOLOGY | Facility: HOSPITAL | Age: 68
End: 2023-01-13
Attending: INTERNAL MEDICINE
Payer: MEDICARE

## 2023-01-13 VITALS
RESPIRATION RATE: 16 BRPM | SYSTOLIC BLOOD PRESSURE: 135 MMHG | WEIGHT: 99 LBS | DIASTOLIC BLOOD PRESSURE: 62 MMHG | TEMPERATURE: 99 F | BODY MASS INDEX: 17.99 KG/M2 | OXYGEN SATURATION: 99 % | HEIGHT: 62.01 IN | HEART RATE: 71 BPM

## 2023-01-13 DIAGNOSIS — D72.819 LEUKOPENIA, UNSPECIFIED TYPE: ICD-10-CM

## 2023-01-13 DIAGNOSIS — D50.0 IRON DEFICIENCY ANEMIA DUE TO CHRONIC BLOOD LOSS: Primary | ICD-10-CM

## 2023-01-13 DIAGNOSIS — D46.9 MDS (MYELODYSPLASTIC SYNDROME) (HCC): ICD-10-CM

## 2023-01-13 DIAGNOSIS — D50.0 IRON DEFICIENCY ANEMIA DUE TO CHRONIC BLOOD LOSS: ICD-10-CM

## 2023-01-13 DIAGNOSIS — Z12.31 VISIT FOR SCREENING MAMMOGRAM: Primary | ICD-10-CM

## 2023-01-13 LAB
ALBUMIN SERPL-MCNC: 3.5 G/DL (ref 3.4–5)
ALBUMIN/GLOB SERPL: 1.1 {RATIO} (ref 1–2)
ALP LIVER SERPL-CCNC: 36 U/L
ALT SERPL-CCNC: 26 U/L
ANION GAP SERPL CALC-SCNC: 6 MMOL/L (ref 0–18)
AST SERPL-CCNC: 18 U/L (ref 15–37)
BASOPHILS # BLD AUTO: 0.03 X10(3) UL (ref 0–0.2)
BASOPHILS NFR BLD AUTO: 0.8 %
BILIRUB SERPL-MCNC: 0.1 MG/DL (ref 0.1–2)
BUN BLD-MCNC: 17 MG/DL (ref 7–18)
CALCIUM BLD-MCNC: 9.1 MG/DL (ref 8.5–10.1)
CHLORIDE SERPL-SCNC: 106 MMOL/L (ref 98–112)
CO2 SERPL-SCNC: 26 MMOL/L (ref 21–32)
CREAT BLD-MCNC: 1.14 MG/DL
DEPRECATED HBV CORE AB SER IA-ACNC: 506.2 NG/ML
EOSINOPHIL # BLD AUTO: 0.07 X10(3) UL (ref 0–0.7)
EOSINOPHIL NFR BLD AUTO: 1.9 %
ERYTHROCYTE [DISTWIDTH] IN BLOOD BY AUTOMATED COUNT: 13.4 %
GFR SERPLBLD BASED ON 1.73 SQ M-ARVRAT: 53 ML/MIN/1.73M2 (ref 60–?)
GLOBULIN PLAS-MCNC: 3.1 G/DL (ref 2.8–4.4)
GLUCOSE BLD-MCNC: 126 MG/DL (ref 70–99)
HCT VFR BLD AUTO: 24.7 %
HGB BLD-MCNC: 8.2 G/DL
IMM GRANULOCYTES # BLD AUTO: 0 X10(3) UL (ref 0–1)
IMM GRANULOCYTES NFR BLD: 0 %
IRON SATN MFR SERPL: 17 %
IRON SERPL-MCNC: 51 UG/DL
LYMPHOCYTES # BLD AUTO: 1.07 X10(3) UL (ref 1–4)
LYMPHOCYTES NFR BLD AUTO: 28.5 %
MCH RBC QN AUTO: 35.8 PG (ref 26–34)
MCHC RBC AUTO-ENTMCNC: 33.2 G/DL (ref 31–37)
MCV RBC AUTO: 107.9 FL
MONOCYTES # BLD AUTO: 0.5 X10(3) UL (ref 0.1–1)
MONOCYTES NFR BLD AUTO: 13.3 %
NEUTROPHILS # BLD AUTO: 2.08 X10 (3) UL (ref 1.5–7.7)
NEUTROPHILS # BLD AUTO: 2.08 X10(3) UL (ref 1.5–7.7)
NEUTROPHILS NFR BLD AUTO: 55.5 %
OSMOLALITY SERPL CALC.SUM OF ELEC: 289 MOSM/KG (ref 275–295)
PLATELET # BLD AUTO: 403 10(3)UL (ref 150–450)
POTASSIUM SERPL-SCNC: 3.8 MMOL/L (ref 3.5–5.1)
PROT SERPL-MCNC: 6.6 G/DL (ref 6.4–8.2)
RBC # BLD AUTO: 2.29 X10(6)UL
SODIUM SERPL-SCNC: 138 MMOL/L (ref 136–145)
TIBC SERPL-MCNC: 305 UG/DL (ref 240–450)
TRANSFERRIN SERPL-MCNC: 205 MG/DL (ref 200–360)
WBC # BLD AUTO: 3.8 X10(3) UL (ref 4–11)

## 2023-01-13 PROCEDURE — 99214 OFFICE O/P EST MOD 30 MIN: CPT | Performed by: INTERNAL MEDICINE

## 2023-01-13 NOTE — PROGRESS NOTES
Education Record    Learner:  Patient    Disease / Diagnosis: anemia    Barriers / Limitations:  None   Comments:    Method:  Discussion   Comments:    General Topics:  Plan of care reviewed   Comments:        Outcome:  Shows understanding   Comments:  Pt with recent hospitlization for ischemic colitis few days at [facility], and transferred to [facility] GI will manage her from now on. Not feeling well, SOB with exertion,   Orthostatic hypotension. Very low energy. Emotional support offered.

## 2023-01-16 ENCOUNTER — TELEPHONE (OUTPATIENT)
Dept: HEMATOLOGY/ONCOLOGY | Facility: HOSPITAL | Age: 68
End: 2023-01-16

## 2023-01-16 NOTE — TELEPHONE ENCOUNTER
----- Message from Humberto Hampton RN sent at 1/13/2023  1:33 PM CST -----  Please call to schedule IV iron,     Thanks  Simon [place]

## 2023-01-17 ENCOUNTER — HOSPITAL ENCOUNTER (OUTPATIENT)
Dept: CT IMAGING | Facility: HOSPITAL | Age: 68
Discharge: HOME | End: 2023-01-17
Attending: NURSE PRACTITIONER
Payer: MEDICARE

## 2023-01-17 DIAGNOSIS — Z87.891 FORMER SMOKER: ICD-10-CM

## 2023-01-17 PROCEDURE — 71271 CT THORAX LUNG CANCER SCR C-: CPT | Performed by: NURSE PRACTITIONER

## 2023-01-18 ENCOUNTER — OFFICE VISIT (OUTPATIENT)
Dept: HEMATOLOGY/ONCOLOGY | Facility: HOSPITAL | Age: 68
End: 2023-01-18
Attending: INTERNAL MEDICINE
Payer: MEDICARE

## 2023-01-18 VITALS
RESPIRATION RATE: 16 BRPM | OXYGEN SATURATION: 100 % | TEMPERATURE: 99 F | HEART RATE: 70 BPM | DIASTOLIC BLOOD PRESSURE: 76 MMHG | SYSTOLIC BLOOD PRESSURE: 133 MMHG

## 2023-01-18 DIAGNOSIS — F33.40 RECURRENT MAJOR DEPRESSIVE DISORDER, IN REMISSION (HCC): ICD-10-CM

## 2023-01-18 DIAGNOSIS — D50.0 IRON DEFICIENCY ANEMIA DUE TO CHRONIC BLOOD LOSS: Primary | ICD-10-CM

## 2023-01-18 PROCEDURE — 96374 THER/PROPH/DIAG INJ IV PUSH: CPT

## 2023-01-18 RX ORDER — ATORVASTATIN CALCIUM 10 MG/1
10 TABLET, FILM COATED ORAL DAILY
Qty: 90 TABLET | Refills: 0 | Status: SHIPPED | OUTPATIENT
Start: 2023-01-18

## 2023-01-18 RX ORDER — DULOXETIN HYDROCHLORIDE 30 MG/1
30 CAPSULE, DELAYED RELEASE ORAL DAILY
Qty: 90 CAPSULE | Refills: 0 | Status: SHIPPED | OUTPATIENT
Start: 2023-01-18

## 2023-01-18 RX ORDER — HYDROCODONE BITARTRATE AND ACETAMINOPHEN 5; 325 MG/1; MG/1
1 TABLET ORAL EVERY 8 HOURS PRN
Qty: 90 TABLET | Refills: 0 | Status: SHIPPED | OUTPATIENT
Start: 2023-01-18

## 2023-01-18 NOTE — PROGRESS NOTES
Education Record    Learner:  Patient and Spouse    Disease / Diagnosis:iron deficiency    Barriers / Limitations:  None   Comments:    Method:  Discussion   Comments:    General Topics:  Medication and Plan of care reviewed   Comments:    Outcome:  Shows understanding   Comments:

## 2023-01-18 NOTE — TELEPHONE ENCOUNTER
Last OV relevant to medication: 1/6/23  Last refill date: 6/13/22 #90/refills: 1  Duloxetine ?- historical med  When pt was asked to return for OV: 2/6/23  Upcoming appt/reason:   Future Appointments   Date Time Provider Brandi Baylee   1/19/2023  3:20 PM [facility] RM1 [facility] [facility]   2/6/2023  2:40 PM Vishal Henley MD EM[facility] [address] [place]   2/10/2023 10:45 AM 140[address] [facility] OOT [address] [place] [address]   5/15/2023 10:00 AM Vadim Jaquez MD [facility] [place]   7/7/2023 10:45 AM Desiree Uriarte MD [address]       Was pt informed of any over due labs: none overdue  Lab Results   Component Value Date    ALKPHO 36 (L) 01/13/2023    AST 18 01/13/2023    ALT 26 01/13/2023    BILT 0.1 01/13/2023    TP 6.6 01/13/2023    ALB 3.5 01/13/2023     Lab Results   Component Value Date    CHOLEST 169 06/29/2021    TRIG 203 (H) 06/29/2021    HDL 87 (H) 06/29/2021    LDL 50 06/29/2021    VLDL 29 06/29/2021    TCHDLRATIO 4.7 (H) 10/12/2012    [place] 82 06/29/2021

## 2023-01-19 ENCOUNTER — HOSPITAL ENCOUNTER (OUTPATIENT)
Dept: MAMMOGRAPHY | Facility: HOSPITAL | Age: 68
Discharge: HOME | End: 2023-01-19
Attending: INTERNAL MEDICINE
Payer: MEDICARE

## 2023-01-19 DIAGNOSIS — Z12.31 VISIT FOR SCREENING MAMMOGRAM: ICD-10-CM

## 2023-01-19 PROCEDURE — 77067 SCR MAMMO BI INCL CAD: CPT | Performed by: INTERNAL MEDICINE

## 2023-01-19 PROCEDURE — 77063 BREAST TOMOSYNTHESIS BI: CPT | Performed by: INTERNAL MEDICINE

## 2023-01-27 ENCOUNTER — HOSPITAL ENCOUNTER (OUTPATIENT)
Dept: MAMMOGRAPHY | Facility: HOSPITAL | Age: 68
Discharge: HOME | End: 2023-01-27
Attending: INTERNAL MEDICINE
Payer: MEDICARE

## 2023-01-27 DIAGNOSIS — R92.2 INCONCLUSIVE MAMMOGRAM: ICD-10-CM

## 2023-01-27 PROCEDURE — 77065 DX MAMMO INCL CAD UNI: CPT | Performed by: INTERNAL MEDICINE

## 2023-01-27 PROCEDURE — 77061 BREAST TOMOSYNTHESIS UNI: CPT | Performed by: INTERNAL MEDICINE

## 2023-02-06 ENCOUNTER — OFFICE VISIT (OUTPATIENT)
Dept: INTERNAL MEDICINE CLINIC | Facility: CLINIC | Age: 68
End: 2023-02-06
Payer: MEDICARE

## 2023-02-06 VITALS
WEIGHT: 102.13 LBS | HEART RATE: 64 BPM | SYSTOLIC BLOOD PRESSURE: 134 MMHG | HEIGHT: 62.01 IN | RESPIRATION RATE: 12 BRPM | TEMPERATURE: 98 F | DIASTOLIC BLOOD PRESSURE: 60 MMHG | BODY MASS INDEX: 18.56 KG/M2

## 2023-02-06 DIAGNOSIS — I95.9 HYPOTENSION, UNSPECIFIED HYPOTENSION TYPE: Primary | ICD-10-CM

## 2023-02-06 DIAGNOSIS — F33.40 RECURRENT MAJOR DEPRESSIVE DISORDER, IN REMISSION (HCC): ICD-10-CM

## 2023-02-06 DIAGNOSIS — I74.10 SHAGGY AORTA SYNDROME (HCC): ICD-10-CM

## 2023-02-06 DIAGNOSIS — D46.9 MDS (MYELODYSPLASTIC SYNDROME) (HCC): ICD-10-CM

## 2023-02-09 RX ORDER — PAROXETINE 10 MG/1
10 TABLET, FILM COATED ORAL EVERY MORNING
Qty: 90 TABLET | Refills: 1 | Status: SHIPPED | OUTPATIENT
Start: 2023-02-09

## 2023-02-09 NOTE — TELEPHONE ENCOUNTER
Talked to pt and pt wants us to fill it and no side effects and it is doing fine with it. Medication pended.

## 2023-02-09 NOTE — TELEPHONE ENCOUNTER
The patient was in today with her  and in passing asked for a refill for paroxetine. I let her know I would send it over. I did not realize that it is one that we are not prescribing and it is coming her her specialist. If she would like us to take this over can you just confirm with her it is working well and no SE. Then I can fill it. Thanks.

## 2023-02-10 ENCOUNTER — NURSE ONLY (OUTPATIENT)
Dept: HEMATOLOGY/ONCOLOGY | Facility: HOSPITAL | Age: 68
End: 2023-02-10
Attending: INTERNAL MEDICINE
Payer: MEDICARE

## 2023-02-10 DIAGNOSIS — D50.0 IRON DEFICIENCY ANEMIA DUE TO CHRONIC BLOOD LOSS: ICD-10-CM

## 2023-02-10 DIAGNOSIS — D46.9 MDS (MYELODYSPLASTIC SYNDROME) (HCC): Primary | ICD-10-CM

## 2023-02-10 DIAGNOSIS — D46.9 MDS (MYELODYSPLASTIC SYNDROME) (HCC): ICD-10-CM

## 2023-02-10 LAB
ALBUMIN SERPL-MCNC: 3.7 G/DL (ref 3.4–5)
ALBUMIN/GLOB SERPL: 1.2 {RATIO} (ref 1–2)
ALP LIVER SERPL-CCNC: 37 U/L
ALT SERPL-CCNC: 21 U/L
ANION GAP SERPL CALC-SCNC: 4 MMOL/L (ref 0–18)
AST SERPL-CCNC: 21 U/L (ref 15–37)
BASOPHILS # BLD AUTO: 0.02 X10(3) UL (ref 0–0.2)
BASOPHILS NFR BLD AUTO: 0.6 %
BILIRUB SERPL-MCNC: 0.2 MG/DL (ref 0.1–2)
BUN BLD-MCNC: 14 MG/DL (ref 7–18)
CALCIUM BLD-MCNC: 8.7 MG/DL (ref 8.5–10.1)
CHLORIDE SERPL-SCNC: 108 MMOL/L (ref 98–112)
CO2 SERPL-SCNC: 27 MMOL/L (ref 21–32)
CREAT BLD-MCNC: 1.04 MG/DL
DEPRECATED HBV CORE AB SER IA-ACNC: 352.9 NG/ML
EOSINOPHIL # BLD AUTO: 0.07 X10(3) UL (ref 0–0.7)
EOSINOPHIL NFR BLD AUTO: 2.1 %
ERYTHROCYTE [DISTWIDTH] IN BLOOD BY AUTOMATED COUNT: 13.7 %
FASTING STATUS PATIENT QL REPORTED: NO
GFR SERPLBLD BASED ON 1.73 SQ M-ARVRAT: 59 ML/MIN/1.73M2 (ref 60–?)
GLOBULIN PLAS-MCNC: 3 G/DL (ref 2.8–4.4)
GLUCOSE BLD-MCNC: 99 MG/DL (ref 70–99)
HCT VFR BLD AUTO: 27.4 %
HGB BLD-MCNC: 8.6 G/DL
IMM GRANULOCYTES # BLD AUTO: 0 X10(3) UL (ref 0–1)
IMM GRANULOCYTES NFR BLD: 0 %
IRON SATN MFR SERPL: 20 %
IRON SERPL-MCNC: 54 UG/DL
LYMPHOCYTES # BLD AUTO: 0.79 X10(3) UL (ref 1–4)
LYMPHOCYTES NFR BLD AUTO: 24.1 %
MCH RBC QN AUTO: 34.5 PG (ref 26–34)
MCHC RBC AUTO-ENTMCNC: 31.4 G/DL (ref 31–37)
MCV RBC AUTO: 110 FL
MONOCYTES # BLD AUTO: 0.35 X10(3) UL (ref 0.1–1)
MONOCYTES NFR BLD AUTO: 10.7 %
NEUTROPHILS # BLD AUTO: 2.05 X10 (3) UL (ref 1.5–7.7)
NEUTROPHILS # BLD AUTO: 2.05 X10(3) UL (ref 1.5–7.7)
NEUTROPHILS NFR BLD AUTO: 62.5 %
OSMOLALITY SERPL CALC.SUM OF ELEC: 289 MOSM/KG (ref 275–295)
PLATELET # BLD AUTO: 239 10(3)UL (ref 150–450)
POTASSIUM SERPL-SCNC: 4.7 MMOL/L (ref 3.5–5.1)
PROT SERPL-MCNC: 6.7 G/DL (ref 6.4–8.2)
RBC # BLD AUTO: 2.49 X10(6)UL
SODIUM SERPL-SCNC: 139 MMOL/L (ref 136–145)
TIBC SERPL-MCNC: 264 UG/DL (ref 240–450)
TRANSFERRIN SERPL-MCNC: 177 MG/DL (ref 200–360)
WBC # BLD AUTO: 3.3 X10(3) UL (ref 4–11)

## 2023-02-10 PROCEDURE — 36415 COLL VENOUS BLD VENIPUNCTURE: CPT

## 2023-02-10 PROCEDURE — 82728 ASSAY OF FERRITIN: CPT

## 2023-02-10 PROCEDURE — 80053 COMPREHEN METABOLIC PANEL: CPT

## 2023-02-10 PROCEDURE — 83550 IRON BINDING TEST: CPT

## 2023-02-10 PROCEDURE — 83540 ASSAY OF IRON: CPT

## 2023-02-10 PROCEDURE — 85025 COMPLETE CBC W/AUTO DIFF WBC: CPT

## 2023-02-16 RX ORDER — HYDROCODONE BITARTRATE AND ACETAMINOPHEN 5; 325 MG/1; MG/1
1 TABLET ORAL EVERY 8 HOURS PRN
Qty: 90 TABLET | Refills: 0 | Status: SHIPPED | OUTPATIENT
Start: 2023-02-17

## 2023-02-21 ENCOUNTER — TELEPHONE (OUTPATIENT)
Dept: ORTHOPEDICS CLINIC | Facility: CLINIC | Age: 68
End: 2023-02-21

## 2023-02-21 DIAGNOSIS — M79.641 BILATERAL HAND PAIN: Primary | ICD-10-CM

## 2023-02-21 DIAGNOSIS — M79.642 BILATERAL HAND PAIN: Primary | ICD-10-CM

## 2023-02-21 NOTE — TELEPHONE ENCOUNTER
Patient is scheduled with Dr. Delia Viramontes for bilateral hand pain. Please advise if imaging is needed.

## 2023-03-17 RX ORDER — HYDROCODONE BITARTRATE AND ACETAMINOPHEN 5; 325 MG/1; MG/1
1 TABLET ORAL EVERY 8 HOURS PRN
Qty: 90 TABLET | Refills: 0 | Status: SHIPPED | OUTPATIENT
Start: 2023-03-19

## 2023-03-17 NOTE — TELEPHONE ENCOUNTER
Have her schedule a follow up with her treating physician. It has been almost 5 months since her last visit. I did send the prescription.

## 2023-03-17 NOTE — TELEPHONE ENCOUNTER
Medication: HYDROcodone-acetaminophen 5-325 MG Oral Tab    Date of last refill: 2/17/23  [place] 2/07/75    Last office visit: 10/31/22  Due back to clinic per last office note:  n/a  Date next office visit scheduled:  none    Last URINE Screen: 12/2/22  Screen Results:  See in chart     Narcotic Contract EXPIRATION date: 10/31/23    Last OV note recommendation:   The patient is a pleasant 40-year-old female with a complex past medical history. Has been managed through this office for chronic pain with hydrocodone. Since stable on hydrocodone 5 mg, 3 times daily. She reported that her last refill was short of 10 tablets. She discovered this after leaving the pharmacy. Has not had any issues regarding loss medications or early refills in the past.  I strongly suggested that she bring this up to the managing pharmacist at [facility]. Perhaps would benefit from having those medications counted at time of being dispensed. Given that she has been very compliant with her medications, I did agree to refill her medications a few days early. Reflect this 10 tablet discrepancy. The patient is also traveling to [facility]. She asked for some medication to help with the drive. I did prescribe cyclobenzaprine to be used on a as needed basis. The patient reports that she is not to be driving at all. Did discuss sedating side effects of cyclobenzaprine and hydrocodone in combination.   She voiced good understanding

## 2023-03-24 RX ORDER — ATORVASTATIN CALCIUM 10 MG/1
10 TABLET, FILM COATED ORAL DAILY
Qty: 90 TABLET | Refills: 1 | Status: SHIPPED | OUTPATIENT
Start: 2023-03-24

## 2023-03-24 NOTE — TELEPHONE ENCOUNTER
Last OV relevant to medication: 2/6/23  Last refill date: 1/18/23 #90/refills: 0  When pt was asked to return for OV: 8/6/23  Upcoming appt/reason: Apt with  8/7/23  Was pt informed of any over due labs: none overdue  Lab Results   Component Value Date    CHOLEST 169 06/29/2021    TRIG 203 (H) 06/29/2021    HDL 87 (H) 06/29/2021    LDL 50 06/29/2021    VLDL 29 06/29/2021    TCHDLRATIO 4.7 (H) 10/12/2012    [place] 82 06/29/2021

## 2023-03-25 ENCOUNTER — TELEPHONE (OUTPATIENT)
Dept: INTERNAL MEDICINE CLINIC | Facility: CLINIC | Age: 68
End: 2023-03-25

## 2023-03-25 NOTE — TELEPHONE ENCOUNTER
Pt  stopped by to ask about follow up care for his wife she had a accident [place] [facility] [facility]. Pt  didn't offer any additional information but would like a call on his mobile phone.

## 2023-03-27 NOTE — TELEPHONE ENCOUNTER
Patient called to follow up on this request.  He said his wife is in in a hospital in [place] and will be coming home soon. She has injured her ankle and will need a hospital bed as well as a recommendation for an orthopedic doctor. PSR explained she would need a HFU once she is discharged and her needs would be addressed at that appt. Please advise. Thank you!

## 2023-03-28 NOTE — TELEPHONE ENCOUNTER
Pt called from [facility] and was a bit upset that she is not able to get her hospital bed before she gets home. It was suggested to Pt to talk to hospitalist and have them order the hospital bed so it will be there when she gets home. Pt stated that they told her that it can only be ordered by PCP.  Pt had to go to therapy and would like to have a CB to discuss this further around 2pm. ([phone])

## 2023-03-31 ENCOUNTER — OFFICE VISIT (OUTPATIENT)
Dept: INTERNAL MEDICINE CLINIC | Facility: CLINIC | Age: 68
End: 2023-03-31
Payer: MEDICARE

## 2023-03-31 VITALS
HEART RATE: 61 BPM | DIASTOLIC BLOOD PRESSURE: 56 MMHG | WEIGHT: 115.94 LBS | OXYGEN SATURATION: 98 % | RESPIRATION RATE: 14 BRPM | SYSTOLIC BLOOD PRESSURE: 120 MMHG | TEMPERATURE: 98 F | HEIGHT: 62.01 IN | BODY MASS INDEX: 21.07 KG/M2

## 2023-03-31 DIAGNOSIS — S82.832D OTHER CLOSED FRACTURE OF DISTAL END OF LEFT FIBULA WITH ROUTINE HEALING, SUBSEQUENT ENCOUNTER: ICD-10-CM

## 2023-03-31 DIAGNOSIS — M21.612 BUNION OF LEFT FOOT: ICD-10-CM

## 2023-03-31 DIAGNOSIS — S93.402D SPRAIN OF LEFT ANKLE, UNSPECIFIED LIGAMENT, SUBSEQUENT ENCOUNTER: ICD-10-CM

## 2023-03-31 DIAGNOSIS — M17.11 PRIMARY OSTEOARTHRITIS OF RIGHT KNEE: ICD-10-CM

## 2023-03-31 DIAGNOSIS — I95.9 HYPOTENSION, UNSPECIFIED HYPOTENSION TYPE: ICD-10-CM

## 2023-03-31 DIAGNOSIS — Z09 HOSPITAL DISCHARGE FOLLOW-UP: Primary | ICD-10-CM

## 2023-03-31 PROCEDURE — 1125F AMNT PAIN NOTED PAIN PRSNT: CPT | Performed by: NURSE PRACTITIONER

## 2023-03-31 PROCEDURE — 99215 OFFICE O/P EST HI 40 MIN: CPT | Performed by: NURSE PRACTITIONER

## 2023-03-31 PROCEDURE — 1111F DSCHRG MED/CURRENT MED MERGE: CPT | Performed by: NURSE PRACTITIONER

## 2023-04-06 ENCOUNTER — HOSPITAL ENCOUNTER (OUTPATIENT)
Dept: GENERAL RADIOLOGY | Age: 68
Discharge: HOME | End: 2023-04-06
Attending: ORTHOPAEDIC SURGERY
Payer: MEDICARE

## 2023-04-06 ENCOUNTER — TELEPHONE (OUTPATIENT)
Dept: ORTHOPEDICS CLINIC | Facility: CLINIC | Age: 68
End: 2023-04-06

## 2023-04-06 ENCOUNTER — OFFICE VISIT (OUTPATIENT)
Dept: ORTHOPEDICS CLINIC | Facility: CLINIC | Age: 68
End: 2023-04-06
Payer: MEDICARE

## 2023-04-06 VITALS — HEIGHT: 62 IN | WEIGHT: 103 LBS | BODY MASS INDEX: 18.95 KG/M2

## 2023-04-06 DIAGNOSIS — M19.042 DEGENERATIVE ARTHRITIS OF METACARPOPHALANGEAL JOINT OF LEFT THUMB: Primary | ICD-10-CM

## 2023-04-06 DIAGNOSIS — R52 PAIN: Primary | ICD-10-CM

## 2023-04-06 DIAGNOSIS — M79.642 BILATERAL HAND PAIN: ICD-10-CM

## 2023-04-06 DIAGNOSIS — M25.572 LEFT ANKLE PAIN, UNSPECIFIED CHRONICITY: Primary | ICD-10-CM

## 2023-04-06 DIAGNOSIS — M79.641 BILATERAL HAND PAIN: ICD-10-CM

## 2023-04-06 PROCEDURE — 73130 X-RAY EXAM OF HAND: CPT | Performed by: ORTHOPAEDIC SURGERY

## 2023-04-06 RX ORDER — BETAMETHASONE SODIUM PHOSPHATE AND BETAMETHASONE ACETATE 3; 3 MG/ML; MG/ML
6 INJECTION, SUSPENSION INTRA-ARTICULAR; INTRALESIONAL; INTRAMUSCULAR; SOFT TISSUE ONCE
Status: COMPLETED | OUTPATIENT
Start: 2023-04-06 | End: 2023-04-06

## 2023-04-06 RX ADMIN — BETAMETHASONE SODIUM PHOSPHATE AND BETAMETHASONE ACETATE 6 MG: 3; 3 INJECTION, SUSPENSION INTRA-ARTICULAR; INTRALESIONAL; INTRAMUSCULAR; SOFT TISSUE at 13:49:00

## 2023-04-06 NOTE — TELEPHONE ENCOUNTER
Patient scheduled with Dr. Oniel Lim for left ankle. No recent imaging in epic.     Future Appointments   Date Time Provider Brandi Baylee   4/26/2023  2:00 PM Rosario Ashford, JUAN M [facility]O [facility]

## 2023-04-10 ENCOUNTER — NURSE ONLY (OUTPATIENT)
Dept: HEMATOLOGY/ONCOLOGY | Facility: HOSPITAL | Age: 68
End: 2023-04-10
Attending: INTERNAL MEDICINE
Payer: MEDICARE

## 2023-04-10 DIAGNOSIS — D50.0 IRON DEFICIENCY ANEMIA DUE TO CHRONIC BLOOD LOSS: ICD-10-CM

## 2023-04-10 LAB
BASOPHILS # BLD AUTO: 0.02 X10(3) UL (ref 0–0.2)
BASOPHILS NFR BLD AUTO: 0.5 %
DEPRECATED HBV CORE AB SER IA-ACNC: 207.6 NG/ML
EOSINOPHIL # BLD AUTO: 0.11 X10(3) UL (ref 0–0.7)
EOSINOPHIL NFR BLD AUTO: 2.5 %
ERYTHROCYTE [DISTWIDTH] IN BLOOD BY AUTOMATED COUNT: 13.5 %
HCT VFR BLD AUTO: 32.5 %
HGB BLD-MCNC: 10.3 G/DL
IMM GRANULOCYTES # BLD AUTO: 0 X10(3) UL (ref 0–1)
IMM GRANULOCYTES NFR BLD: 0 %
IRON SATN MFR SERPL: 9 %
IRON SERPL-MCNC: 28 UG/DL
LYMPHOCYTES # BLD AUTO: 0.96 X10(3) UL (ref 1–4)
LYMPHOCYTES NFR BLD AUTO: 21.9 %
MCH RBC QN AUTO: 32.1 PG (ref 26–34)
MCHC RBC AUTO-ENTMCNC: 31.7 G/DL (ref 31–37)
MCV RBC AUTO: 101.2 FL
MONOCYTES # BLD AUTO: 0.41 X10(3) UL (ref 0.1–1)
MONOCYTES NFR BLD AUTO: 9.3 %
NEUTROPHILS # BLD AUTO: 2.89 X10 (3) UL (ref 1.5–7.7)
NEUTROPHILS # BLD AUTO: 2.89 X10(3) UL (ref 1.5–7.7)
NEUTROPHILS NFR BLD AUTO: 65.8 %
PLATELET # BLD AUTO: 302 10(3)UL (ref 150–450)
RBC # BLD AUTO: 3.21 X10(6)UL
TIBC SERPL-MCNC: 325 UG/DL (ref 240–450)
TRANSFERRIN SERPL-MCNC: 218 MG/DL (ref 200–360)
WBC # BLD AUTO: 4.4 X10(3) UL (ref 4–11)

## 2023-04-10 PROCEDURE — 82728 ASSAY OF FERRITIN: CPT

## 2023-04-10 PROCEDURE — 83550 IRON BINDING TEST: CPT

## 2023-04-10 PROCEDURE — 83540 ASSAY OF IRON: CPT

## 2023-04-10 PROCEDURE — 85025 COMPLETE CBC W/AUTO DIFF WBC: CPT

## 2023-04-10 PROCEDURE — 36415 COLL VENOUS BLD VENIPUNCTURE: CPT

## 2023-04-11 ENCOUNTER — HOSPITAL ENCOUNTER (OUTPATIENT)
Dept: GENERAL RADIOLOGY | Age: 68
Discharge: HOME | End: 2023-04-11
Attending: ORTHOPAEDIC SURGERY
Payer: MEDICARE

## 2023-04-11 ENCOUNTER — HOSPITAL ENCOUNTER (OUTPATIENT)
Dept: GENERAL RADIOLOGY | Age: 68
Discharge: HOME | End: 2023-04-11
Attending: PODIATRIST
Payer: MEDICARE

## 2023-04-11 DIAGNOSIS — R52 PAIN: ICD-10-CM

## 2023-04-11 DIAGNOSIS — M25.572 LEFT ANKLE PAIN, UNSPECIFIED CHRONICITY: ICD-10-CM

## 2023-04-11 PROCEDURE — 73564 X-RAY EXAM KNEE 4 OR MORE: CPT | Performed by: ORTHOPAEDIC SURGERY

## 2023-04-11 PROCEDURE — 73610 X-RAY EXAM OF ANKLE: CPT | Performed by: PODIATRIST

## 2023-04-21 ENCOUNTER — OFFICE VISIT (OUTPATIENT)
Dept: HEMATOLOGY/ONCOLOGY | Facility: HOSPITAL | Age: 68
End: 2023-04-21
Attending: INTERNAL MEDICINE
Payer: MEDICARE

## 2023-04-21 VITALS
RESPIRATION RATE: 16 BRPM | HEART RATE: 61 BPM | OXYGEN SATURATION: 100 % | DIASTOLIC BLOOD PRESSURE: 76 MMHG | TEMPERATURE: 98 F | SYSTOLIC BLOOD PRESSURE: 150 MMHG

## 2023-04-21 DIAGNOSIS — D46.4 REFRACTORY ANEMIA, UNSPECIFIED (HCC): ICD-10-CM

## 2023-04-21 DIAGNOSIS — D50.0 IRON DEFICIENCY ANEMIA DUE TO CHRONIC BLOOD LOSS: Primary | ICD-10-CM

## 2023-04-21 PROCEDURE — 96374 THER/PROPH/DIAG INJ IV PUSH: CPT

## 2023-04-21 NOTE — PROGRESS NOTES
Pt here for iron infusion. Arrives Ambulating independently, accompanied by Other self           Modifications in dose or schedule: No     Frequency of blood return and site check throughout administration: Prior to administration   Discharged to Home, Ambulating independently, accompanied by: Other self    Outpatient Oncology Care Plan  Problem list:  anemia  Problems related to:    disease/disease progression  Interventions:  administered iron  Expected outcomes:  optimal lab values  Progress towards outcome:  making progress    Education Record    Learner:  Patient  Barriers / Limitations:  None  Method:  Brief focused  Outcome:  Shows understanding  Comments:observed pt 30 min post infusion. Pt tolerated infusion. No c/o. VSS. See flowsheet    Pt tolerated feraheme infusion without incident. Left in stable condition after 30 minute observation. Appt requested 1-2 month after iron infusion for repeat labs, per MD orders.

## 2023-04-26 ENCOUNTER — LAB ENCOUNTER (OUTPATIENT)
Dept: LAB | Age: 68
End: 2023-04-26
Attending: NURSE PRACTITIONER
Payer: MEDICARE

## 2023-04-26 ENCOUNTER — OFFICE VISIT (OUTPATIENT)
Dept: ORTHOPEDICS CLINIC | Facility: CLINIC | Age: 68
End: 2023-04-26
Payer: MEDICARE

## 2023-04-26 DIAGNOSIS — G24.3 CERVICAL DYSTONIA: ICD-10-CM

## 2023-04-26 DIAGNOSIS — M21.619 BUNION: ICD-10-CM

## 2023-04-26 DIAGNOSIS — M85.879 OSTEOPENIA OF ANKLE, UNSPECIFIED LATERALITY: ICD-10-CM

## 2023-04-26 DIAGNOSIS — Z91.81 AT RISK FOR FALLS: ICD-10-CM

## 2023-04-26 DIAGNOSIS — M20.42 HAMMER TOE OF LEFT FOOT: ICD-10-CM

## 2023-04-26 DIAGNOSIS — M54.16 LUMBAR RADICULITIS: ICD-10-CM

## 2023-04-26 DIAGNOSIS — M54.16 LUMBAR RADICULOPATHY: ICD-10-CM

## 2023-04-26 DIAGNOSIS — G89.29 OTHER CHRONIC PAIN: ICD-10-CM

## 2023-04-26 DIAGNOSIS — M48.061 SPINAL STENOSIS OF LUMBAR REGION WITHOUT NEUROGENIC CLAUDICATION: ICD-10-CM

## 2023-04-26 DIAGNOSIS — I73.9 PAD (PERIPHERAL ARTERY DISEASE) (HCC): ICD-10-CM

## 2023-04-26 DIAGNOSIS — S82.892D CLOSED FRACTURE OF LEFT ANKLE WITH ROUTINE HEALING, SUBSEQUENT ENCOUNTER: Primary | ICD-10-CM

## 2023-04-26 PROBLEM — S82.892A CLOSED FRACTURE OF LEFT ANKLE: Status: ACTIVE | Noted: 2023-04-26

## 2023-04-26 PROCEDURE — 80364 OPIOID &OPIATE ANALOG 5/MORE: CPT

## 2023-04-26 PROCEDURE — 80359 METHYLENEDIOXYAMPHETAMINES: CPT

## 2023-04-26 PROCEDURE — 80370 SKEL MUSC RELAXANT 3 OR MORE: CPT

## 2023-04-26 PROCEDURE — 80371 STIMULANTS SYNTHETIC: CPT

## 2023-04-26 PROCEDURE — 80353 DRUG SCREENING COCAINE: CPT

## 2023-04-26 PROCEDURE — 80331 ANALGESICS NON-OPIOID 6/MORE: CPT

## 2023-04-26 PROCEDURE — 80307 DRUG TEST PRSMV CHEM ANLYZR: CPT

## 2023-04-26 PROCEDURE — 80326 AMPHETAMINES 5 OR MORE: CPT

## 2023-04-26 PROCEDURE — 99204 OFFICE O/P NEW MOD 45 MIN: CPT | Performed by: PODIATRIST

## 2023-04-26 PROCEDURE — 80377 DRUG/SUBSTANCE NOS 7/MORE: CPT

## 2023-04-26 PROCEDURE — 80361 OPIATES 1 OR MORE: CPT

## 2023-04-26 PROCEDURE — 80366 DRUG SCREENING PREGABALIN: CPT

## 2023-04-26 PROCEDURE — 80347 BENZODIAZEPINES 13 OR MORE: CPT

## 2023-04-26 PROCEDURE — 80341 ANTIEPILEPTICS NOS 7/MORE: CPT

## 2023-04-26 PROCEDURE — 80348 DRUG SCREENING BUPRENORPHINE: CPT

## 2023-04-26 PROCEDURE — 80338 ANTIDEPRESSANT NOT SPECIFIED: CPT

## 2023-04-26 PROCEDURE — 80344 ANTIPSYCHOTICS NOS 7/MORE: CPT

## 2023-04-26 PROCEDURE — 80368 SEDATIVE HYPNOTICS: CPT

## 2023-04-26 PROCEDURE — 83992 ASSAY FOR PHENCYCLIDINE: CPT

## 2023-04-26 PROCEDURE — 80357 KETAMINE AND NORKETAMINE: CPT

## 2023-04-26 PROCEDURE — 80360 METHYLPHENIDATE: CPT

## 2023-04-26 PROCEDURE — 80355 GABAPENTIN NON-BLOOD: CPT

## 2023-04-26 PROCEDURE — 80334 ANTIDEPRESSANTS CLASS 6/MORE: CPT

## 2023-04-26 PROCEDURE — 80367 DRUG SCREENING PROPOXYPHENE: CPT

## 2023-04-26 PROCEDURE — 80358 DRUG SCREENING METHADONE: CPT

## 2023-04-26 PROCEDURE — 80373 DRUG SCREENING TRAMADOL: CPT

## 2023-04-26 PROCEDURE — 80372 DRUG SCREENING TAPENTADOL: CPT

## 2023-04-27 ENCOUNTER — HOSPITAL ENCOUNTER (OUTPATIENT)
Age: 68
Discharge: ANOTHER HOSPITAL | End: 2023-04-27
Payer: MEDICARE

## 2023-04-27 ENCOUNTER — APPOINTMENT (OUTPATIENT)
Dept: CT IMAGING | Facility: HOSPITAL | Age: 68
End: 2023-04-27
Attending: EMERGENCY MEDICINE
Payer: MEDICARE

## 2023-04-27 ENCOUNTER — HOSPITAL ENCOUNTER (OUTPATIENT)
Facility: HOSPITAL | Age: 68
Setting detail: OBSERVATION
Discharge: HOME | End: 2023-04-28
Attending: EMERGENCY MEDICINE | Admitting: HOSPITALIST
Payer: MEDICARE

## 2023-04-27 VITALS
OXYGEN SATURATION: 99 % | DIASTOLIC BLOOD PRESSURE: 96 MMHG | TEMPERATURE: 97 F | BODY MASS INDEX: 18 KG/M2 | SYSTOLIC BLOOD PRESSURE: 167 MMHG | RESPIRATION RATE: 20 BRPM | WEIGHT: 97 LBS | HEART RATE: 61 BPM

## 2023-04-27 DIAGNOSIS — R51.9 ACUTE NONINTRACTABLE HEADACHE, UNSPECIFIED HEADACHE TYPE: ICD-10-CM

## 2023-04-27 DIAGNOSIS — H61.22 IMPACTED CERUMEN OF LEFT EAR: ICD-10-CM

## 2023-04-27 DIAGNOSIS — R42 DIZZINESS: Primary | ICD-10-CM

## 2023-04-27 DIAGNOSIS — R42 DIZZY SPELLS: Primary | ICD-10-CM

## 2023-04-27 LAB
ALBUMIN SERPL-MCNC: 4.1 G/DL (ref 3.4–5)
ALBUMIN/GLOB SERPL: 1.2 {RATIO} (ref 1–2)
ALP LIVER SERPL-CCNC: 50 U/L
ALT SERPL-CCNC: 24 U/L
ANION GAP SERPL CALC-SCNC: 4 MMOL/L (ref 0–18)
AST SERPL-CCNC: 23 U/L (ref 15–37)
BASOPHILS # BLD AUTO: 0.02 X10(3) UL (ref 0–0.2)
BASOPHILS NFR BLD AUTO: 0.5 %
BILIRUB SERPL-MCNC: 0.2 MG/DL (ref 0.1–2)
BUN BLD-MCNC: 11 MG/DL (ref 7–18)
CALCIUM BLD-MCNC: 9.7 MG/DL (ref 8.5–10.1)
CHLORIDE SERPL-SCNC: 103 MMOL/L (ref 98–112)
CO2 SERPL-SCNC: 30 MMOL/L (ref 21–32)
CREAT BLD-MCNC: 0.92 MG/DL
EOSINOPHIL # BLD AUTO: 0.08 X10(3) UL (ref 0–0.7)
EOSINOPHIL NFR BLD AUTO: 2.1 %
ERYTHROCYTE [DISTWIDTH] IN BLOOD BY AUTOMATED COUNT: 15.2 %
GFR SERPLBLD BASED ON 1.73 SQ M-ARVRAT: 68 ML/MIN/1.73M2 (ref 60–?)
GLOBULIN PLAS-MCNC: 3.5 G/DL (ref 2.8–4.4)
GLUCOSE BLD-MCNC: 99 MG/DL (ref 70–99)
HCT VFR BLD AUTO: 32.4 %
HGB BLD-MCNC: 10.6 G/DL
IMM GRANULOCYTES # BLD AUTO: 0.01 X10(3) UL (ref 0–1)
IMM GRANULOCYTES NFR BLD: 0.3 %
LYMPHOCYTES # BLD AUTO: 0.67 X10(3) UL (ref 1–4)
LYMPHOCYTES NFR BLD AUTO: 18 %
MCH RBC QN AUTO: 33.1 PG (ref 26–34)
MCHC RBC AUTO-ENTMCNC: 32.7 G/DL (ref 31–37)
MCV RBC AUTO: 101.3 FL
MONOCYTES # BLD AUTO: 0.46 X10(3) UL (ref 0.1–1)
MONOCYTES NFR BLD AUTO: 12.3 %
NEUTROPHILS # BLD AUTO: 2.49 X10 (3) UL (ref 1.5–7.7)
NEUTROPHILS # BLD AUTO: 2.49 X10(3) UL (ref 1.5–7.7)
NEUTROPHILS NFR BLD AUTO: 66.8 %
OSMOLALITY SERPL CALC.SUM OF ELEC: 283 MOSM/KG (ref 275–295)
PLATELET # BLD AUTO: 218 10(3)UL (ref 150–450)
POTASSIUM SERPL-SCNC: 4.9 MMOL/L (ref 3.5–5.1)
PROT SERPL-MCNC: 7.6 G/DL (ref 6.4–8.2)
RBC # BLD AUTO: 3.2 X10(6)UL
SODIUM SERPL-SCNC: 137 MMOL/L (ref 136–145)
TROPONIN I HIGH SENSITIVITY: 7 NG/L
WBC # BLD AUTO: 3.7 X10(3) UL (ref 4–11)

## 2023-04-27 PROCEDURE — 70496 CT ANGIOGRAPHY HEAD: CPT | Performed by: EMERGENCY MEDICINE

## 2023-04-27 PROCEDURE — 99212 OFFICE O/P EST SF 10 MIN: CPT | Performed by: NURSE PRACTITIONER

## 2023-04-27 PROCEDURE — 70498 CT ANGIOGRAPHY NECK: CPT | Performed by: EMERGENCY MEDICINE

## 2023-04-27 PROCEDURE — 99223 1ST HOSP IP/OBS HIGH 75: CPT | Performed by: INTERNAL MEDICINE

## 2023-04-27 RX ORDER — MECLIZINE HYDROCHLORIDE 25 MG/1
25 TABLET ORAL ONCE
Status: COMPLETED | OUTPATIENT
Start: 2023-04-27 | End: 2023-04-27

## 2023-04-27 RX ORDER — ECHINACEA PURPUREA EXTRACT 125 MG
1 TABLET ORAL
Status: DISCONTINUED | OUTPATIENT
Start: 2023-04-27 | End: 2023-04-28

## 2023-04-27 RX ORDER — HYDRALAZINE HYDROCHLORIDE 20 MG/ML
10 INJECTION INTRAMUSCULAR; INTRAVENOUS EVERY 6 HOURS PRN
Status: DISCONTINUED | OUTPATIENT
Start: 2023-04-27 | End: 2023-04-28

## 2023-04-27 RX ORDER — BUTALBITAL, ACETAMINOPHEN AND CAFFEINE 50; 325; 40 MG/1; MG/1; MG/1
1 TABLET ORAL EVERY 4 HOURS PRN
Status: DISCONTINUED | OUTPATIENT
Start: 2023-04-27 | End: 2023-04-28

## 2023-04-27 RX ORDER — DIAZEPAM 2 MG/1
5 TABLET ORAL EVERY 6 HOURS PRN
Status: DISCONTINUED | OUTPATIENT
Start: 2023-04-27 | End: 2023-04-28

## 2023-04-27 RX ORDER — BISACODYL 10 MG
10 SUPPOSITORY, RECTAL RECTAL
Status: DISCONTINUED | OUTPATIENT
Start: 2023-04-27 | End: 2023-04-28

## 2023-04-27 RX ORDER — SENNOSIDES 8.6 MG
17.2 TABLET ORAL NIGHTLY PRN
Status: DISCONTINUED | OUTPATIENT
Start: 2023-04-27 | End: 2023-04-28

## 2023-04-27 RX ORDER — GABAPENTIN 300 MG/1
600 CAPSULE ORAL 2 TIMES DAILY
Status: DISCONTINUED | OUTPATIENT
Start: 2023-04-27 | End: 2023-04-28

## 2023-04-27 RX ORDER — ONDANSETRON 2 MG/ML
4 INJECTION INTRAMUSCULAR; INTRAVENOUS EVERY 6 HOURS PRN
Status: DISCONTINUED | OUTPATIENT
Start: 2023-04-27 | End: 2023-04-28

## 2023-04-27 RX ORDER — ATORVASTATIN CALCIUM 10 MG/1
10 TABLET, FILM COATED ORAL NIGHTLY
Status: DISCONTINUED | OUTPATIENT
Start: 2023-04-27 | End: 2023-04-28

## 2023-04-27 RX ORDER — PRIMIDONE 50 MG/1
100 TABLET ORAL EVERY MORNING
Status: DISCONTINUED | OUTPATIENT
Start: 2023-04-28 | End: 2023-04-28

## 2023-04-27 RX ORDER — HYDROCODONE BITARTRATE AND ACETAMINOPHEN 10; 325 MG/1; MG/1
1 TABLET ORAL ONCE
Status: COMPLETED | OUTPATIENT
Start: 2023-04-27 | End: 2023-04-27

## 2023-04-27 RX ORDER — HYDROCODONE BITARTRATE AND ACETAMINOPHEN 5; 325 MG/1; MG/1
2 TABLET ORAL EVERY 4 HOURS PRN
Status: DISCONTINUED | OUTPATIENT
Start: 2023-04-27 | End: 2023-04-28

## 2023-04-27 RX ORDER — MECLIZINE HYDROCHLORIDE 25 MG/1
25 TABLET ORAL 3 TIMES DAILY PRN
Status: DISCONTINUED | OUTPATIENT
Start: 2023-04-27 | End: 2023-04-28

## 2023-04-27 RX ORDER — PRIMIDONE 50 MG/1
150 TABLET ORAL NIGHTLY
Status: DISCONTINUED | OUTPATIENT
Start: 2023-04-27 | End: 2023-04-28

## 2023-04-27 RX ORDER — PANTOPRAZOLE SODIUM 40 MG/1
40 TABLET, DELAYED RELEASE ORAL
Status: DISCONTINUED | OUTPATIENT
Start: 2023-04-28 | End: 2023-04-28

## 2023-04-27 RX ORDER — METOCLOPRAMIDE HYDROCHLORIDE 5 MG/ML
10 INJECTION INTRAMUSCULAR; INTRAVENOUS EVERY 8 HOURS PRN
Status: DISCONTINUED | OUTPATIENT
Start: 2023-04-27 | End: 2023-04-28

## 2023-04-27 RX ORDER — PAROXETINE 10 MG/1
10 TABLET, FILM COATED ORAL EVERY MORNING
Status: DISCONTINUED | OUTPATIENT
Start: 2023-04-28 | End: 2023-04-28

## 2023-04-27 RX ORDER — SODIUM PHOSPHATE, DIBASIC AND SODIUM PHOSPHATE, MONOBASIC 7; 19 G/133ML; G/133ML
1 ENEMA RECTAL ONCE AS NEEDED
Status: DISCONTINUED | OUTPATIENT
Start: 2023-04-27 | End: 2023-04-28

## 2023-04-27 RX ORDER — ONDANSETRON 2 MG/ML
4 INJECTION INTRAMUSCULAR; INTRAVENOUS EVERY 4 HOURS PRN
Status: DISCONTINUED | OUTPATIENT
Start: 2023-04-27 | End: 2023-04-27 | Stop reason: SDUPTHER

## 2023-04-27 RX ORDER — POLYETHYLENE GLYCOL 3350 17 G/17G
17 POWDER, FOR SOLUTION ORAL DAILY PRN
Status: DISCONTINUED | OUTPATIENT
Start: 2023-04-27 | End: 2023-04-28

## 2023-04-27 RX ORDER — BENZONATATE 200 MG/1
200 CAPSULE ORAL 3 TIMES DAILY PRN
Status: DISCONTINUED | OUTPATIENT
Start: 2023-04-27 | End: 2023-04-28

## 2023-04-27 RX ORDER — MELATONIN
3 NIGHTLY PRN
Status: DISCONTINUED | OUTPATIENT
Start: 2023-04-27 | End: 2023-04-28

## 2023-04-27 RX ORDER — FLECAINIDE ACETATE 100 MG/1
100 TABLET ORAL 2 TIMES DAILY
Status: DISCONTINUED | OUTPATIENT
Start: 2023-04-27 | End: 2023-04-28

## 2023-04-27 RX ORDER — ACETAMINOPHEN 325 MG/1
650 TABLET ORAL EVERY 4 HOURS PRN
Status: DISCONTINUED | OUTPATIENT
Start: 2023-04-27 | End: 2023-04-28

## 2023-04-27 RX ORDER — KETOROLAC TROMETHAMINE 15 MG/ML
15 INJECTION, SOLUTION INTRAMUSCULAR; INTRAVENOUS EVERY 6 HOURS PRN
Status: DISCONTINUED | OUTPATIENT
Start: 2023-04-27 | End: 2023-04-28

## 2023-04-27 RX ORDER — DIAZEPAM 5 MG/1
5 TABLET ORAL ONCE
Status: COMPLETED | OUTPATIENT
Start: 2023-04-27 | End: 2023-04-27

## 2023-04-27 RX ORDER — HYDROCODONE BITARTRATE AND ACETAMINOPHEN 5; 325 MG/1; MG/1
1 TABLET ORAL EVERY 4 HOURS PRN
Status: DISCONTINUED | OUTPATIENT
Start: 2023-04-27 | End: 2023-04-28

## 2023-04-27 RX ORDER — SODIUM CHLORIDE 9 MG/ML
INJECTION, SOLUTION INTRAVENOUS CONTINUOUS
Status: DISCONTINUED | OUTPATIENT
Start: 2023-04-27 | End: 2023-04-28

## 2023-04-27 RX ORDER — DULOXETIN HYDROCHLORIDE 30 MG/1
30 CAPSULE, DELAYED RELEASE ORAL DAILY
Status: DISCONTINUED | OUTPATIENT
Start: 2023-04-28 | End: 2023-04-28

## 2023-04-27 NOTE — ED QUICK NOTES
Spoke with MRI tech. Due to patients devices, patient would need inpatient admission to perform MRI due to extensive services to be involved in care. Patient would need cardiologist sign off, pacemaker rep to come out and neurology sign off. EDMD notified, MRI cancelled at this time and changed to CT scan.

## 2023-04-27 NOTE — ED QUICK NOTES
Orders for admission, patient is aware of plan and ready to go upstairs. Any questions, please call ED RN Natalia Lal at extension [phone]     Patient Covid vaccination status: Fully vaccinated and immunocompromised     COVID Test Ordered in ED: None    COVID Suspicion at Admission: N/A    Running Infusions:  None    Mental Status/LOC at time of transport: A/O X4    Other pertinent information: steady gait with standby assistance.    CIWA score: N/A   NIH score:  0

## 2023-04-27 NOTE — ED INITIAL ASSESSMENT (HPI)
Pt arrives ambulatory, sates \"I think I may have vertigo. I'm feeling off\". +dizziness when she bends over and nausea. No hx of vertigo. BEFAST negative.  A&O x4

## 2023-04-27 NOTE — ED QUICK NOTES
Patient has micra AV pacemaker that is MRI conditional.     Patient with two neuro stimulators and one battery. All medtronic. Device 1 serial number [id number] Model number [id number]. Placed by Dr Germaine Pyle 1/8/2015    Device 2 serial number [id number] model number [id number]. Placed by Dr Cheryl Balderas 4/26/2016    Battery changed out and both devices now on same battery pack. Medtronic serial number [id number] model number [id number]. Placed 11/5/2021.

## 2023-04-28 VITALS
DIASTOLIC BLOOD PRESSURE: 77 MMHG | RESPIRATION RATE: 18 BRPM | OXYGEN SATURATION: 98 % | SYSTOLIC BLOOD PRESSURE: 148 MMHG | WEIGHT: 99.88 LBS | TEMPERATURE: 98 F | HEART RATE: 60 BPM | BODY MASS INDEX: 18 KG/M2

## 2023-04-28 PROBLEM — I48.92 ATRIAL FLUTTER (HCC): Status: ACTIVE | Noted: 2018-06-06

## 2023-04-28 LAB
ADENOVIRUS PCR:: NOT DETECTED
ANION GAP SERPL CALC-SCNC: 2 MMOL/L (ref 0–18)
ATRIAL RATE: 208 BPM
B PARAPERT DNA SPEC QL NAA+PROBE: NOT DETECTED
B PERT DNA SPEC QL NAA+PROBE: NOT DETECTED
BASOPHILS # BLD AUTO: 0.02 X10(3) UL (ref 0–0.2)
BASOPHILS NFR BLD AUTO: 0.7 %
BUN BLD-MCNC: 13 MG/DL (ref 7–18)
C PNEUM DNA SPEC QL NAA+PROBE: NOT DETECTED
CALCIUM BLD-MCNC: 8.7 MG/DL (ref 8.5–10.1)
CHLORIDE SERPL-SCNC: 108 MMOL/L (ref 98–112)
CO2 SERPL-SCNC: 28 MMOL/L (ref 21–32)
CORONAVIRUS 229E PCR:: NOT DETECTED
CORONAVIRUS HKU1 PCR:: NOT DETECTED
CORONAVIRUS NL63 PCR:: NOT DETECTED
CORONAVIRUS OC43 PCR:: NOT DETECTED
CREAT BLD-MCNC: 0.85 MG/DL
EOSINOPHIL # BLD AUTO: 0.12 X10(3) UL (ref 0–0.7)
EOSINOPHIL NFR BLD AUTO: 4.1 %
ERYTHROCYTE [DISTWIDTH] IN BLOOD BY AUTOMATED COUNT: 15.5 %
FLUAV RNA SPEC QL NAA+PROBE: NOT DETECTED
FLUBV RNA SPEC QL NAA+PROBE: NOT DETECTED
GFR SERPLBLD BASED ON 1.73 SQ M-ARVRAT: 75 ML/MIN/1.73M2 (ref 60–?)
GLUCOSE BLD-MCNC: 87 MG/DL (ref 70–99)
HCT VFR BLD AUTO: 29.4 %
HGB BLD-MCNC: 9.6 G/DL
IMM GRANULOCYTES # BLD AUTO: 0.01 X10(3) UL (ref 0–1)
IMM GRANULOCYTES NFR BLD: 0.3 %
LYMPHOCYTES # BLD AUTO: 0.85 X10(3) UL (ref 1–4)
LYMPHOCYTES NFR BLD AUTO: 29.2 %
MCH RBC QN AUTO: 32.4 PG (ref 26–34)
MCHC RBC AUTO-ENTMCNC: 32.7 G/DL (ref 31–37)
MCV RBC AUTO: 99.3 FL
METAPNEUMOVIRUS PCR:: NOT DETECTED
MONOCYTES # BLD AUTO: 0.38 X10(3) UL (ref 0.1–1)
MONOCYTES NFR BLD AUTO: 13.1 %
MYCOPLASMA PNEUMONIA PCR:: NOT DETECTED
NEUTROPHILS # BLD AUTO: 1.53 X10 (3) UL (ref 1.5–7.7)
NEUTROPHILS # BLD AUTO: 1.53 X10(3) UL (ref 1.5–7.7)
NEUTROPHILS NFR BLD AUTO: 52.6 %
OSMOLALITY SERPL CALC.SUM OF ELEC: 285 MOSM/KG (ref 275–295)
PARAINFLUENZA 1 PCR:: NOT DETECTED
PARAINFLUENZA 2 PCR:: NOT DETECTED
PARAINFLUENZA 3 PCR:: NOT DETECTED
PARAINFLUENZA 4 PCR:: NOT DETECTED
PLATELET # BLD AUTO: 199 10(3)UL (ref 150–450)
POTASSIUM SERPL-SCNC: 4.3 MMOL/L (ref 3.5–5.1)
Q-T INTERVAL: 480 MS
QRS DURATION: 156 MS
QTC CALCULATION (BEZET): 491 MS
R AXIS: 98 DEGREES
RBC # BLD AUTO: 2.96 X10(6)UL
RHINOVIRUS/ENTERO PCR:: DETECTED
RSV RNA SPEC QL NAA+PROBE: NOT DETECTED
SARS-COV-2 RNA NPH QL NAA+NON-PROBE: NOT DETECTED
SODIUM SERPL-SCNC: 138 MMOL/L (ref 136–145)
T AXIS: -56 DEGREES
VENTRICULAR RATE: 63 BPM
WBC # BLD AUTO: 2.9 X10(3) UL (ref 4–11)

## 2023-04-28 PROCEDURE — 99222 1ST HOSP IP/OBS MODERATE 55: CPT | Performed by: OTHER

## 2023-04-28 PROCEDURE — 99239 HOSP IP/OBS DSCHRG MGMT >30: CPT | Performed by: INTERNAL MEDICINE

## 2023-04-28 RX ORDER — SCOLOPAMINE TRANSDERMAL SYSTEM 1 MG/1
1 PATCH, EXTENDED RELEASE TRANSDERMAL
Status: DISCONTINUED | OUTPATIENT
Start: 2023-04-28 | End: 2023-04-28

## 2023-04-28 RX ORDER — MECLIZINE HYDROCHLORIDE 25 MG/1
25 TABLET ORAL 3 TIMES DAILY PRN
Qty: 15 TABLET | Refills: 0 | Status: SHIPPED | OUTPATIENT
Start: 2023-04-28 | End: 2023-05-13

## 2023-04-28 NOTE — PLAN OF CARE
Problem: SAFETY ADULT - FALL  Goal: Free from fall injury  Description: INTERVENTIONS:  - Assess pt frequently for physical needs  - Identify cognitive and physical deficits and behaviors that affect risk of falls.   - [place] fall precautions as indicated by assessment.  - Educate pt/family on patient safety including physical limitations  - Instruct pt to call for assistance with activity based on assessment  - Modify environment to reduce risk of injury  - Provide assistive devices as appropriate  - Consider OT/PT consult to assist with strengthening/mobility  - Encourage toileting schedule  4/28/2023 1242 by Sony Zafar RN  Outcome: Completed  4/28/2023 1017 by Sony Zafar RN  Outcome: Progressing     Problem: Patient/Family Goals  Goal: Patient/Family Long Term Goal  Description: Patient's Long Term Goal: Dc home    Interventions:  - manage HA  - See additional Care Plan goals for specific interventions  4/28/2023 1242 by Sony Zafar RN  Outcome: Completed  4/28/2023 1017 by Sony Zafar RN  Outcome: Progressing  Goal: Patient/Family Short Term Goal  Description: Patient's Short Term Goal: manage HA    Interventions:   - PRN meds  - See additional Care Plan goals for specific interventions  4/28/2023 1242 by Sony Zafar RN  Outcome: Completed  4/28/2023 1017 by Sony Zafar RN  Outcome: Progressing

## 2023-04-28 NOTE — PLAN OF CARE
Patient is A/Ox4. VSS. Afebrile. Patient is on RA. . No Cough/SOB. on Tele V-paced/Pacemaker. Xarelto. SCD on. Electrolyte protocol-non cardiac. Regular Diet. Denies any N/V/Diarrhea. Continent both. Ambulates. Patient and family updated on plan of care. Problem: SAFETY ADULT - FALL  Goal: Free from fall injury  Description: INTERVENTIONS:  - Assess pt frequently for physical needs  - Identify cognitive and physical deficits and behaviors that affect risk of falls.   - [place] fall precautions as indicated by assessment.  - Educate pt/family on patient safety including physical limitations  - Instruct pt to call for assistance with activity based on assessment  - Modify environment to reduce risk of injury  - Provide assistive devices as appropriate  - Consider OT/PT consult to assist with strengthening/mobility  - Encourage toileting schedule  Outcome: Progressing     Problem: Patient/Family Goals  Goal: Patient/Family Long Term Goal  Description: Patient's Long Term Goal: DC to home with adequate resources    Interventions:  - follow plan of care  - See additional Care Plan goals for specific interventions  Outcome: Progressing  Goal: Patient/Family Short Term Goal  Description: Patient's Short Term Goal: manage dizziness    Interventions:   - 4/28: MRI brain  - See additional Care Plan goals for specific interventions  Outcome: Progressing

## 2023-04-28 NOTE — PLAN OF CARE
Pt Aox4. VSS,afebrile. SpO2 >90% on RA. AV paced, xarelto. No N/V/D/C. C/o HA, PRN med given. Up SBA. IVF. Pt updated on POC. Call light within reach,safety precautions in place. No further pt needs at this time. Problem: SAFETY ADULT - FALL  Goal: Free from fall injury  Description: INTERVENTIONS:  - Assess pt frequently for physical needs  - Identify cognitive and physical deficits and behaviors that affect risk of falls.   - [place] fall precautions as indicated by assessment.  - Educate pt/family on patient safety including physical limitations  - Instruct pt to call for assistance with activity based on assessment  - Modify environment to reduce risk of injury  - Provide assistive devices as appropriate  - Consider OT/PT consult to assist with strengthening/mobility  - Encourage toileting schedule  Outcome: Progressing

## 2023-04-28 NOTE — PROGRESS NOTES
NURSING DISCHARGE NOTE    Discharged Home via Wheelchair. Accompanied by Spouse  Belongings Taken by patient/family. Patient and spouse verbalized understanding of discharge instructions. Spouse transported patient for home.

## 2023-04-28 NOTE — PLAN OF CARE
NURSING ADMISSION NOTE      Patient admitted via Cart  Oriented to room. Safety precautions initiated. Bed in low position. Call light in reach. Patient AAOx4. Tele box applied. Ear irrigated per ED RN. Up stand by assist, bed alarm on. Consult called in to neurology, stated to see her tomorrow. IV fluids hooked up. Medicated for headache and nausea.

## 2023-04-30 ENCOUNTER — TELEPHONE (OUTPATIENT)
Dept: INTERNAL MEDICINE CLINIC | Facility: CLINIC | Age: 68
End: 2023-04-30

## 2023-05-01 ENCOUNTER — PATIENT OUTREACH (OUTPATIENT)
Dept: CASE MANAGEMENT | Age: 68
End: 2023-05-01

## 2023-05-01 DIAGNOSIS — Z02.9 ENCOUNTERS FOR ADMINISTRATIVE PURPOSE: ICD-10-CM

## 2023-05-01 PROCEDURE — 1111F DSCHRG MED/CURRENT MED MERGE: CPT

## 2023-05-01 NOTE — TELEPHONE ENCOUNTER
TCM referral placed. Routed to front office to schedule 14 day TCM follow appointment.  Discharged [facility] 4/28/23

## 2023-05-01 NOTE — TELEPHONE ENCOUNTER
[facility] Follow Up Appointment Scheduled For: 05/18/23    Discharge Date: 04/28/23    Hospital Discharged From: [facility]     If NOT Discharged from [facility] Name:    Address:    Phone:    Fax # if available:

## 2023-05-02 ENCOUNTER — TELEPHONE (OUTPATIENT)
Dept: INTERNAL MEDICINE CLINIC | Facility: CLINIC | Age: 68
End: 2023-05-02

## 2023-05-02 NOTE — TELEPHONE ENCOUNTER
PSR contacted pt about missed appt today, pt rescheduled for 5/4  Pt told PSR she missed appt because she had a syncopal episode and forgot all about it. Spoke to pt, pt stated she has episodes of orthostatic hypotension resulting in syncope approx every 3 months. Pt stated she just got up from a nap and does not feel dizzy, states her O2 is 94%    Advised pt if she has another syncopal episode or symptoms worsen, to proceed to ED or UC. Patient verbalized understanding and had no further questions.

## 2023-05-04 ENCOUNTER — OFFICE VISIT (OUTPATIENT)
Dept: INTERNAL MEDICINE CLINIC | Facility: CLINIC | Age: 68
End: 2023-05-04
Payer: MEDICARE

## 2023-05-04 VITALS
DIASTOLIC BLOOD PRESSURE: 76 MMHG | HEART RATE: 62 BPM | HEIGHT: 62 IN | OXYGEN SATURATION: 98 % | RESPIRATION RATE: 18 BRPM | TEMPERATURE: 98 F | BODY MASS INDEX: 17.66 KG/M2 | WEIGHT: 96 LBS | SYSTOLIC BLOOD PRESSURE: 158 MMHG

## 2023-05-04 DIAGNOSIS — B34.8 RHINOVIRUS: ICD-10-CM

## 2023-05-04 DIAGNOSIS — G20 PARKINSON'S DISEASE (HCC): Chronic | ICD-10-CM

## 2023-05-04 DIAGNOSIS — G25.0 ESSENTIAL TREMOR: ICD-10-CM

## 2023-05-04 DIAGNOSIS — H81.10 BENIGN PAROXYSMAL POSITIONAL VERTIGO, UNSPECIFIED LATERALITY: Primary | ICD-10-CM

## 2023-05-04 DIAGNOSIS — I48.92 ATRIAL FLUTTER, UNSPECIFIED TYPE (HCC): ICD-10-CM

## 2023-05-04 DIAGNOSIS — D46.9 MDS (MYELODYSPLASTIC SYNDROME) (HCC): ICD-10-CM

## 2023-05-04 DIAGNOSIS — I48.0 PAROXYSMAL ATRIAL FIBRILLATION (HCC): ICD-10-CM

## 2023-05-04 DIAGNOSIS — H61.22 IMPACTED CERUMEN OF LEFT EAR: ICD-10-CM

## 2023-05-04 DIAGNOSIS — B34.1 ENTEROVIRUS INFECTION: ICD-10-CM

## 2023-05-04 DIAGNOSIS — R55 VASOVAGAL EPISODE: ICD-10-CM

## 2023-05-04 DIAGNOSIS — I10 ESSENTIAL HYPERTENSION: ICD-10-CM

## 2023-05-04 PROCEDURE — 1111F DSCHRG MED/CURRENT MED MERGE: CPT | Performed by: NURSE PRACTITIONER

## 2023-05-04 PROCEDURE — 99495 TRANSJ CARE MGMT MOD F2F 14D: CPT | Performed by: NURSE PRACTITIONER

## 2023-05-04 PROCEDURE — 69210 REMOVE IMPACTED EAR WAX UNI: CPT | Performed by: NURSE PRACTITIONER

## 2023-05-04 RX ORDER — ALBUTEROL SULFATE 90 UG/1
2 AEROSOL, METERED RESPIRATORY (INHALATION) EVERY 6 HOURS PRN
Qty: 1 EACH | Refills: 0 | Status: SHIPPED | OUTPATIENT
Start: 2023-05-04

## 2023-05-04 RX ORDER — FLUTICASONE PROPIONATE 50 MCG
1 SPRAY, SUSPENSION (ML) NASAL 2 TIMES DAILY
Qty: 1 EACH | Refills: 0 | Status: SHIPPED | OUTPATIENT
Start: 2023-05-04

## 2023-05-04 RX ORDER — BENZONATATE 100 MG/1
100 CAPSULE ORAL 3 TIMES DAILY PRN
Qty: 21 CAPSULE | Refills: 0 | Status: SHIPPED | OUTPATIENT
Start: 2023-05-04

## 2023-05-04 RX ORDER — GUAIFENESIN 600 MG/1
600 TABLET, EXTENDED RELEASE ORAL 2 TIMES DAILY
Qty: 20 TABLET | Refills: 0 | Status: SHIPPED | OUTPATIENT
Start: 2023-05-04

## 2023-05-04 NOTE — PATIENT INSTRUCTIONS
PATIENT INSTRUCTIONS:    Take Colace (Docusate Sodium) 100mg caps (stool softener) - 1 capsule once or twice daily  Stop taking Mucinex DM. Start taking Mucinex (plain) twice daily (Prescription sent to pharmacy, but can  over-the-counter)  Take the Benzonatate capsule 30 minutes before bedtime to stop the cough. May use during the day if cough is bothersome. When using Flonase nasal spray, point slightly toward the ear before spraying to keep from going down the throat. If using the Albuterol inhaler, put the spacer on the end of the inhaler to help the spray get into the lungs better. Wait 1 minute inbetween puffs.

## 2023-05-08 ENCOUNTER — HOSPITAL ENCOUNTER (OUTPATIENT)
Age: 68
Discharge: HOME | End: 2023-05-08
Payer: MEDICARE

## 2023-05-08 ENCOUNTER — APPOINTMENT (OUTPATIENT)
Dept: GENERAL RADIOLOGY | Age: 68
End: 2023-05-08
Attending: PHYSICIAN ASSISTANT
Payer: MEDICARE

## 2023-05-08 VITALS
HEART RATE: 64 BPM | WEIGHT: 98 LBS | RESPIRATION RATE: 20 BRPM | SYSTOLIC BLOOD PRESSURE: 158 MMHG | TEMPERATURE: 98 F | BODY MASS INDEX: 18.03 KG/M2 | HEIGHT: 62 IN | OXYGEN SATURATION: 99 % | DIASTOLIC BLOOD PRESSURE: 83 MMHG

## 2023-05-08 DIAGNOSIS — J22 LOWER RESPIRATORY INFECTION: Primary | ICD-10-CM

## 2023-05-08 DIAGNOSIS — R05.1 ACUTE COUGH: ICD-10-CM

## 2023-05-08 DIAGNOSIS — F33.40 RECURRENT MAJOR DEPRESSIVE DISORDER, IN REMISSION (HCC): ICD-10-CM

## 2023-05-08 PROCEDURE — 99213 OFFICE O/P EST LOW 20 MIN: CPT | Performed by: PHYSICIAN ASSISTANT

## 2023-05-08 PROCEDURE — 71046 X-RAY EXAM CHEST 2 VIEWS: CPT | Performed by: PHYSICIAN ASSISTANT

## 2023-05-08 RX ORDER — AMOXICILLIN AND CLAVULANATE POTASSIUM 875; 125 MG/1; MG/1
1 TABLET, FILM COATED ORAL 2 TIMES DAILY
Qty: 20 TABLET | Refills: 0 | Status: SHIPPED | OUTPATIENT
Start: 2023-05-08 | End: 2023-05-18

## 2023-05-08 RX ORDER — AZITHROMYCIN 250 MG/1
TABLET, FILM COATED ORAL
Qty: 6 TABLET | Refills: 0 | Status: SHIPPED | OUTPATIENT
Start: 2023-05-08 | End: 2023-05-13

## 2023-05-08 NOTE — ED INITIAL ASSESSMENT (HPI)
Pt c/o cough and shortness of breath for 5 days. Pt states the cough is productive with yellow and green sputum.

## 2023-05-09 ENCOUNTER — TELEPHONE (OUTPATIENT)
Dept: INTERNAL MEDICINE CLINIC | Facility: CLINIC | Age: 68
End: 2023-05-09

## 2023-05-09 RX ORDER — METHYLPREDNISOLONE 4 MG/1
TABLET ORAL
Qty: 1 EACH | Refills: 0 | Status: SHIPPED | OUTPATIENT
Start: 2023-05-09

## 2023-05-09 RX ORDER — DULOXETIN HYDROCHLORIDE 30 MG/1
30 CAPSULE, DELAYED RELEASE ORAL DAILY
Qty: 90 CAPSULE | Refills: 0 | Status: SHIPPED | OUTPATIENT
Start: 2023-05-09

## 2023-05-09 NOTE — TELEPHONE ENCOUNTER
Last OV relevant to medication: 3/31/23  Last refill date: 1/18/23 #90/refills: 0  When pt was asked to return for OV: if symptoms worsen or fail to improve  Upcoming appt/reason: Apt 5/11 and 8/7  Was pt informed of any over due labs: none overdue

## 2023-05-09 NOTE — TELEPHONE ENCOUNTER
Pt was seen in [facility] 5-9-23 diagnosed with pneumonia, Chest Xray result in Epic. Prescribed Augmentin and Zpack which she started last night. Pt was given the first available follow up appointment for Thursday with Mari. She is planning to travel by car to [place] tomorrow and is asking for a prescription for steroids and to skip appointment because last time she had pneumonia she was given steroids. Today she has some shortness of breath with walking, productive cough and fatigue. Afebrile, eating and drinking normally.

## 2023-05-09 NOTE — TELEPHONE ENCOUNTER
She also had difficulty breathing when seen yesterday in immediate care. Please confirm with her that it is not worse and she is still about the same. Okay to order Medrol Dosepak. Advise her to take it with food and do not lay down for 1 hour after taking it. Monitor for side effects and effectiveness. If she is still not feeling well I would advise to not travel until she is doing better in case she needs emergent care. I would also advise her to see us in the office by next week for follow-up. Thank you.

## 2023-05-10 ENCOUNTER — NURSE ONLY (OUTPATIENT)
Dept: HEMATOLOGY/ONCOLOGY | Facility: HOSPITAL | Age: 68
End: 2023-05-10
Attending: INTERNAL MEDICINE
Payer: MEDICARE

## 2023-05-10 DIAGNOSIS — D46.4 REFRACTORY ANEMIA, UNSPECIFIED (HCC): ICD-10-CM

## 2023-05-10 DIAGNOSIS — D50.0 IRON DEFICIENCY ANEMIA DUE TO CHRONIC BLOOD LOSS: ICD-10-CM

## 2023-05-10 LAB
BASOPHILS # BLD AUTO: 0.02 X10(3) UL (ref 0–0.2)
BASOPHILS NFR BLD AUTO: 0.4 %
DEPRECATED HBV CORE AB SER IA-ACNC: 434.8 NG/ML
EOSINOPHIL # BLD AUTO: 0.12 X10(3) UL (ref 0–0.7)
EOSINOPHIL NFR BLD AUTO: 2.5 %
ERYTHROCYTE [DISTWIDTH] IN BLOOD BY AUTOMATED COUNT: 15.7 %
HCT VFR BLD AUTO: 38 %
HGB BLD-MCNC: 11.9 G/DL
IMM GRANULOCYTES # BLD AUTO: 0.01 X10(3) UL (ref 0–1)
IMM GRANULOCYTES NFR BLD: 0.2 %
IRON SATN MFR SERPL: 24 %
IRON SERPL-MCNC: 77 UG/DL
LYMPHOCYTES # BLD AUTO: 1.19 X10(3) UL (ref 1–4)
LYMPHOCYTES NFR BLD AUTO: 24.4 %
MCH RBC QN AUTO: 32.1 PG (ref 26–34)
MCHC RBC AUTO-ENTMCNC: 31.3 G/DL (ref 31–37)
MCV RBC AUTO: 102.4 FL
MONOCYTES # BLD AUTO: 0.36 X10(3) UL (ref 0.1–1)
MONOCYTES NFR BLD AUTO: 7.4 %
NEUTROPHILS # BLD AUTO: 3.18 X10 (3) UL (ref 1.5–7.7)
NEUTROPHILS # BLD AUTO: 3.18 X10(3) UL (ref 1.5–7.7)
NEUTROPHILS NFR BLD AUTO: 65.1 %
PLATELET # BLD AUTO: 314 10(3)UL (ref 150–450)
RBC # BLD AUTO: 3.71 X10(6)UL
TIBC SERPL-MCNC: 316 UG/DL (ref 240–450)
TRANSFERRIN SERPL-MCNC: 212 MG/DL (ref 200–360)
WBC # BLD AUTO: 4.9 X10(3) UL (ref 4–11)

## 2023-05-10 PROCEDURE — 83540 ASSAY OF IRON: CPT

## 2023-05-10 PROCEDURE — 85025 COMPLETE CBC W/AUTO DIFF WBC: CPT

## 2023-05-10 PROCEDURE — 83550 IRON BINDING TEST: CPT

## 2023-05-10 PROCEDURE — 82728 ASSAY OF FERRITIN: CPT

## 2023-05-10 PROCEDURE — 36415 COLL VENOUS BLD VENIPUNCTURE: CPT

## 2023-05-10 NOTE — TELEPHONE ENCOUNTER
Pt informed of and provider's advice: take Medrol  with food and do not lie down for 1 hour after taking it. Monitor for side effects and effectiveness. Postpone your travel if  you are still not feeling well . Follow up in office next week Appt for this Thursday cancelled because pt will be travelling She will call back to reschedue. Pt verbalizes understanding.

## 2023-05-15 DIAGNOSIS — G24.3 CERVICAL DYSTONIA: ICD-10-CM

## 2023-05-15 DIAGNOSIS — M54.16 LUMBAR RADICULITIS: ICD-10-CM

## 2023-05-15 DIAGNOSIS — M48.061 SPINAL STENOSIS OF LUMBAR REGION WITHOUT NEUROGENIC CLAUDICATION: ICD-10-CM

## 2023-05-15 DIAGNOSIS — M54.16 LUMBAR RADICULOPATHY: ICD-10-CM

## 2023-05-15 RX ORDER — HYDROCODONE BITARTRATE AND ACETAMINOPHEN 5; 325 MG/1; MG/1
1 TABLET ORAL EVERY 8 HOURS PRN
Qty: 90 TABLET | Refills: 0 | Status: SHIPPED | OUTPATIENT
Start: 2023-05-15

## 2023-05-17 ENCOUNTER — OFFICE VISIT (OUTPATIENT)
Dept: ORTHOPEDICS CLINIC | Facility: CLINIC | Age: 68
End: 2023-05-17
Payer: MEDICARE

## 2023-05-17 ENCOUNTER — HOSPITAL ENCOUNTER (OUTPATIENT)
Dept: GENERAL RADIOLOGY | Age: 68
Discharge: HOME | End: 2023-05-17
Attending: PODIATRIST
Payer: MEDICARE

## 2023-05-17 VITALS — OXYGEN SATURATION: 97 % | BODY MASS INDEX: 17.7 KG/M2 | WEIGHT: 98.63 LBS | HEIGHT: 62.5 IN | HEART RATE: 61 BPM

## 2023-05-17 DIAGNOSIS — M17.31 POST-TRAUMATIC OSTEOARTHRITIS OF RIGHT KNEE: Primary | ICD-10-CM

## 2023-05-17 DIAGNOSIS — M20.42 HAMMER TOE OF LEFT FOOT: ICD-10-CM

## 2023-05-17 PROCEDURE — 99213 OFFICE O/P EST LOW 20 MIN: CPT | Performed by: ORTHOPAEDIC SURGERY

## 2023-05-17 PROCEDURE — 73630 X-RAY EXAM OF FOOT: CPT | Performed by: PODIATRIST

## 2023-05-17 PROCEDURE — 20610 DRAIN/INJ JOINT/BURSA W/O US: CPT | Performed by: ORTHOPAEDIC SURGERY

## 2023-05-17 PROCEDURE — 1111F DSCHRG MED/CURRENT MED MERGE: CPT | Performed by: ORTHOPAEDIC SURGERY

## 2023-05-17 RX ORDER — TRIAMCINOLONE ACETONIDE 40 MG/ML
40 INJECTION, SUSPENSION INTRA-ARTICULAR; INTRAMUSCULAR ONCE
Status: COMPLETED | OUTPATIENT
Start: 2023-05-17 | End: 2023-05-17

## 2023-05-17 RX ADMIN — TRIAMCINOLONE ACETONIDE 40 MG: 40 INJECTION, SUSPENSION INTRA-ARTICULAR; INTRAMUSCULAR at 11:27:00

## 2023-05-17 NOTE — PROCEDURES
After informed consent, the patient's right knee was marked, locally anesthetized with skin refrigerant, prepped with topical antiseptic, and injected with a mixture of 1mL 40mg/mL Kenalog, 2mL 1% lidocaine and 2mL 0.5% marcaine through the inferolateral portal.  A band-aid was applied. The patient tolerated the procedure well.     Destiney Santo MD, [address] [facility]  Phone [phone]  Fax [phone]

## 2023-05-18 ENCOUNTER — OFFICE VISIT (OUTPATIENT)
Dept: INTERNAL MEDICINE CLINIC | Facility: CLINIC | Age: 68
End: 2023-05-18
Payer: MEDICARE

## 2023-05-18 VITALS
WEIGHT: 97.63 LBS | BODY MASS INDEX: 17.96 KG/M2 | RESPIRATION RATE: 14 BRPM | HEART RATE: 61 BPM | DIASTOLIC BLOOD PRESSURE: 74 MMHG | SYSTOLIC BLOOD PRESSURE: 134 MMHG | TEMPERATURE: 97 F | OXYGEN SATURATION: 99 % | HEIGHT: 62 IN

## 2023-05-18 DIAGNOSIS — J20.6 ACUTE BRONCHITIS DUE TO RHINOVIRUS: ICD-10-CM

## 2023-05-18 DIAGNOSIS — H81.10 BENIGN PAROXYSMAL POSITIONAL VERTIGO, UNSPECIFIED LATERALITY: ICD-10-CM

## 2023-05-18 DIAGNOSIS — R55 VASOVAGAL SYNCOPE: ICD-10-CM

## 2023-05-18 DIAGNOSIS — I10 ESSENTIAL HYPERTENSION: ICD-10-CM

## 2023-05-18 DIAGNOSIS — I48.92 ATRIAL FLUTTER, UNSPECIFIED TYPE (HCC): ICD-10-CM

## 2023-05-18 DIAGNOSIS — D46.9 MDS (MYELODYSPLASTIC SYNDROME) (HCC): ICD-10-CM

## 2023-05-18 DIAGNOSIS — I48.0 PAROXYSMAL ATRIAL FIBRILLATION (HCC): ICD-10-CM

## 2023-05-18 DIAGNOSIS — Z09 HOSPITAL DISCHARGE FOLLOW-UP: Primary | ICD-10-CM

## 2023-05-18 PROBLEM — H61.22 IMPACTED CERUMEN OF LEFT EAR: Status: RESOLVED | Noted: 2023-04-27 | Resolved: 2023-05-18

## 2023-05-18 PROBLEM — B34.8 RHINOVIRUS: Status: RESOLVED | Noted: 2023-05-04 | Resolved: 2023-05-18

## 2023-05-18 PROCEDURE — 99215 OFFICE O/P EST HI 40 MIN: CPT | Performed by: NURSE PRACTITIONER

## 2023-05-18 PROCEDURE — 1111F DSCHRG MED/CURRENT MED MERGE: CPT | Performed by: NURSE PRACTITIONER

## 2023-05-18 RX ORDER — FLUTICASONE FUROATE AND VILANTEROL TRIFENATATE 100; 25 UG/1; UG/1
1 POWDER RESPIRATORY (INHALATION) DAILY
Qty: 4 EACH | Refills: 0 | COMMUNITY
Start: 2023-05-18

## 2023-05-18 NOTE — PATIENT INSTRUCTIONS
Start Breo inhaler, 1 puff daily. Rinse mouth after each use. Continue albuterol inhaler as needed. Switch to Mucinex DM as needed for cough and chest congestion. Continue benzonatate perles. Use Flonase nasal spray as needed for nasal congestion. Drink adequate fluids. Follow up in office if symptoms worsen or do not improve within the next 7-10 days. Go to ER for worsening shortness of breath or chest pain.

## 2023-05-22 ENCOUNTER — OFFICE VISIT (OUTPATIENT)
Dept: PAIN CLINIC | Facility: CLINIC | Age: 68
End: 2023-05-22
Payer: MEDICARE

## 2023-05-22 VITALS — SYSTOLIC BLOOD PRESSURE: 110 MMHG | OXYGEN SATURATION: 97 % | HEART RATE: 64 BPM | DIASTOLIC BLOOD PRESSURE: 70 MMHG

## 2023-05-22 DIAGNOSIS — Z98.1 S/P CERVICAL SPINAL FUSION: ICD-10-CM

## 2023-05-22 DIAGNOSIS — M48.061 SPINAL STENOSIS OF LUMBAR REGION, UNSPECIFIED WHETHER NEUROGENIC CLAUDICATION PRESENT: Primary | ICD-10-CM

## 2023-05-22 PROBLEM — J44.9 CHRONIC OBSTRUCTIVE PULMONARY DISEASE, UNSPECIFIED (HCC): Status: ACTIVE | Noted: 2023-05-22

## 2023-05-22 PROCEDURE — 1111F DSCHRG MED/CURRENT MED MERGE: CPT | Performed by: ANESTHESIOLOGY

## 2023-05-22 PROCEDURE — 99214 OFFICE O/P EST MOD 30 MIN: CPT | Performed by: ANESTHESIOLOGY

## 2023-05-22 NOTE — PROGRESS NOTES
Patient presents in office today with reported pain in lower back     Current pain level reported = 4/10    Last reported dose of Norco last dose 7 am today      Narcotic Contract renewal 10/31/2022    Urine Drug screen 4/26/2023

## 2023-05-23 ENCOUNTER — OFFICE VISIT (OUTPATIENT)
Dept: ORTHOPEDICS CLINIC | Facility: CLINIC | Age: 68
End: 2023-05-23
Payer: MEDICARE

## 2023-05-23 VITALS — WEIGHT: 97.63 LBS | BODY MASS INDEX: 17.96 KG/M2 | HEIGHT: 62 IN

## 2023-05-23 DIAGNOSIS — G20 PARKINSON'S DISEASE (HCC): Chronic | ICD-10-CM

## 2023-05-23 DIAGNOSIS — S82.892D CLOSED FRACTURE OF LEFT ANKLE WITH ROUTINE HEALING, SUBSEQUENT ENCOUNTER: Primary | ICD-10-CM

## 2023-05-23 DIAGNOSIS — M21.619 BUNION: ICD-10-CM

## 2023-05-23 DIAGNOSIS — Z91.81 AT RISK FOR FALLS: ICD-10-CM

## 2023-05-23 PROCEDURE — 1126F AMNT PAIN NOTED NONE PRSNT: CPT | Performed by: PODIATRIST

## 2023-05-23 PROCEDURE — 1111F DSCHRG MED/CURRENT MED MERGE: CPT | Performed by: PODIATRIST

## 2023-05-23 PROCEDURE — 99214 OFFICE O/P EST MOD 30 MIN: CPT | Performed by: PODIATRIST

## 2023-05-23 NOTE — PROGRESS NOTES
[facility] Progress Note    Subjective:     Delvin Holstein is here for follow-up of her left ankle fracture and also to go ahead and discussed her left foot which has a recurrent bunion from surgery about 5 years prior. Her doctor had stated that the bunion had recurred. With questioning she is not having any instability of the left ankle and is back in regular shoes, she feels the most comfortable in [place] type shoes. The bunion really is not painful but when it is it will be on the side and bottom of the joint. She has a little discomfort with crowding of the toes but otherwise is not having any symptoms        Objective:     Exam no palpable tenderness along the lateral ankle along the fibular fracture region. No swelling full range of motion. Exam of the left foot she has a recurrent bunion deformity with crowding of the first and second digits and recurrent hallux abductus. She has pretty good range of motion with minimal tenderness with range of motion. No pain today. Imaging: X-rays of the left foot show recurrent bunion deformity and retained hardware at the base of the first metatarsal        Assessment/Plan:     Diagnoses and all orders for this visit:    Closed fracture of left ankle with routine healing, subsequent encounter    Bunion    At risk for falls    Parkinson's disease [facility])        Long discussion with Delvin Holstein about the fact that she is not really having pain with the bunion and is tolerating it well. To correct this and to correct a recurrent deformity is a little more involved and I briefly discussed fusion of the first MP joint versus fusion at the first metatarsal tarsal joint. Both would require about 3 weeks nonweightbearing and with her balance and issues with Parkinson's disease I think she should wait.   Basically I would recommend visiting each other every 6 months or even 1 year and x-rays every 1 year to reassess but at this time I think it would be better to wait.  I do not think she needs surgery of the left foot at this time            oRsa Acuna. Tanika Ramos DPM  [facility]    [employer] speech recognition software was used to prepare this note. If a word or phrase is confusing, it is likely do to a failure of recognition. Please contact me with any questions or clarifications.

## 2023-05-24 ENCOUNTER — PATIENT MESSAGE (OUTPATIENT)
Dept: INTERNAL MEDICINE CLINIC | Facility: CLINIC | Age: 68
End: 2023-05-24

## 2023-05-24 NOTE — TELEPHONE ENCOUNTER
Might be heme/onc or cards.  [place] provider has been filling it: Dr. Lavon Chavez.

## 2023-05-24 NOTE — TELEPHONE ENCOUNTER
From: Omar Bring  To: Marina Xiong MD  Sent: 5/24/2023 10:42 AM CDT  Subject: Denial of Carelto    Dr. Harriett Welsh,  The last time I saw Relda Lover, I thought that since my insurance will cover this med that I could have your office now handle refills. Am I wrong? I need an answer today as my med is out. Thank you.

## 2023-05-24 NOTE — TELEPHONE ENCOUNTER
Left vm for pt with provider's advice to contact her cardiologist or hematologist for Xarelto refill, call back if questions.

## 2023-06-01 ENCOUNTER — OFFICE VISIT (OUTPATIENT)
Dept: ORTHOPEDICS CLINIC | Facility: CLINIC | Age: 68
End: 2023-06-01
Payer: MEDICARE

## 2023-06-01 VITALS — BODY MASS INDEX: 17.85 KG/M2 | HEIGHT: 62 IN | WEIGHT: 97 LBS

## 2023-06-01 DIAGNOSIS — M19.042 DEGENERATIVE ARTHRITIS OF METACARPOPHALANGEAL JOINT OF LEFT THUMB: Primary | ICD-10-CM

## 2023-06-01 PROCEDURE — 99214 OFFICE O/P EST MOD 30 MIN: CPT | Performed by: PHYSICIAN ASSISTANT

## 2023-06-01 PROCEDURE — 1125F AMNT PAIN NOTED PAIN PRSNT: CPT | Performed by: PHYSICIAN ASSISTANT

## 2023-06-09 ENCOUNTER — NURSE ONLY (OUTPATIENT)
Dept: HEMATOLOGY/ONCOLOGY | Facility: HOSPITAL | Age: 68
End: 2023-06-09
Attending: INTERNAL MEDICINE
Payer: MEDICARE

## 2023-06-09 DIAGNOSIS — D50.0 IRON DEFICIENCY ANEMIA DUE TO CHRONIC BLOOD LOSS: ICD-10-CM

## 2023-06-09 DIAGNOSIS — D46.4 REFRACTORY ANEMIA, UNSPECIFIED (HCC): ICD-10-CM

## 2023-06-09 LAB
BASOPHILS # BLD AUTO: 0.01 X10(3) UL (ref 0–0.2)
BASOPHILS NFR BLD AUTO: 0.2 %
DEPRECATED HBV CORE AB SER IA-ACNC: 238.3 NG/ML
EOSINOPHIL # BLD AUTO: 0.04 X10(3) UL (ref 0–0.7)
EOSINOPHIL NFR BLD AUTO: 0.8 %
ERYTHROCYTE [DISTWIDTH] IN BLOOD BY AUTOMATED COUNT: 15.9 %
HCT VFR BLD AUTO: 38.2 %
HGB BLD-MCNC: 12.6 G/DL
IMM GRANULOCYTES # BLD AUTO: 0.01 X10(3) UL (ref 0–1)
IMM GRANULOCYTES NFR BLD: 0.2 %
IRON SATN MFR SERPL: 21 %
IRON SERPL-MCNC: 61 UG/DL
LYMPHOCYTES # BLD AUTO: 1.05 X10(3) UL (ref 1–4)
LYMPHOCYTES NFR BLD AUTO: 22.2 %
MCH RBC QN AUTO: 32.9 PG (ref 26–34)
MCHC RBC AUTO-ENTMCNC: 33 G/DL (ref 31–37)
MCV RBC AUTO: 99.7 FL
MONOCYTES # BLD AUTO: 0.43 X10(3) UL (ref 0.1–1)
MONOCYTES NFR BLD AUTO: 9.1 %
NEUTROPHILS # BLD AUTO: 3.2 X10 (3) UL (ref 1.5–7.7)
NEUTROPHILS # BLD AUTO: 3.2 X10(3) UL (ref 1.5–7.7)
NEUTROPHILS NFR BLD AUTO: 67.5 %
PLATELET # BLD AUTO: 224 10(3)UL (ref 150–450)
RBC # BLD AUTO: 3.83 X10(6)UL
TIBC SERPL-MCNC: 289 UG/DL (ref 240–450)
TRANSFERRIN SERPL-MCNC: 194 MG/DL (ref 200–360)
WBC # BLD AUTO: 4.7 X10(3) UL (ref 4–11)

## 2023-06-09 PROCEDURE — 36415 COLL VENOUS BLD VENIPUNCTURE: CPT

## 2023-06-09 PROCEDURE — 85025 COMPLETE CBC W/AUTO DIFF WBC: CPT

## 2023-06-09 PROCEDURE — 82728 ASSAY OF FERRITIN: CPT

## 2023-06-09 PROCEDURE — 83540 ASSAY OF IRON: CPT

## 2023-06-09 PROCEDURE — 83550 IRON BINDING TEST: CPT

## 2023-06-12 ENCOUNTER — TELEPHONE (OUTPATIENT)
Dept: INTERNAL MEDICINE CLINIC | Facility: CLINIC | Age: 68
End: 2023-06-12

## 2023-06-12 ENCOUNTER — TELEPHONE (OUTPATIENT)
Dept: ORTHOPEDICS CLINIC | Facility: CLINIC | Age: 68
End: 2023-06-12

## 2023-06-12 DIAGNOSIS — M19.042 DEGENERATIVE ARTHRITIS OF METACARPOPHALANGEAL JOINT OF LEFT THUMB: Primary | ICD-10-CM

## 2023-06-12 NOTE — TELEPHONE ENCOUNTER
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  (primary encounter diagnosis)    Procedure Codes:    MCP Fusion (94115)   Disposition:    Outpatient   Operative Time:    1 hr   Antibiotics:    Ancef 2g   Anesthesia Type:    Regional   Clearance:     MEDICAL CLEARANCE   Equipment:    Skeletal dynamics headless screw and arthrodesis screw C-arm Full, 3-0 PDS, 4-0 Monocryl, Exofin, Steri-Strips, 5 x 30 plaster, 26g/28g cerclage wire   Positioning:    Supine with arm table on OR table   Assistant:    Assistant: Ryan Denise PA-C   Follow Up:    7-10 days post op with Ayanna Garcia   Pain Medication:    Norco   Therapy:    [facility]

## 2023-06-12 NOTE — TELEPHONE ENCOUNTER
LVM RETURN CALL TO REVIEW PRE OP INSTRUCTIONS AND ADDRESS ALL CONCERNS AND QUESTIONS. SURGERY SCHEDULING DIRECT NUMBER [id number].

## 2023-06-12 NOTE — TELEPHONE ENCOUNTER
Date of pre-op appt: 8/31/23  Provider pre-op scheduled with: Suhail Serna  Surgeon's name:  Lydia Velásquez   Phone #:  [phone]   Fax #:  [phone], [phone] (P.A. T)  Surgery date:  9/20/23  Procedure/diagnosis:  Left thumb mcp joint arthrodesis

## 2023-06-13 DIAGNOSIS — G24.3 CERVICAL DYSTONIA: ICD-10-CM

## 2023-06-13 DIAGNOSIS — M48.061 SPINAL STENOSIS OF LUMBAR REGION WITHOUT NEUROGENIC CLAUDICATION: ICD-10-CM

## 2023-06-13 DIAGNOSIS — M54.16 LUMBAR RADICULITIS: ICD-10-CM

## 2023-06-13 DIAGNOSIS — M54.16 LUMBAR RADICULOPATHY: ICD-10-CM

## 2023-06-13 RX ORDER — HYDROCODONE BITARTRATE AND ACETAMINOPHEN 5; 325 MG/1; MG/1
1 TABLET ORAL EVERY 8 HOURS PRN
Qty: 90 TABLET | Refills: 0 | Status: SHIPPED | OUTPATIENT
Start: 2023-06-14 | End: 2023-07-14

## 2023-06-14 NOTE — TELEPHONE ENCOUNTER
Incoming (mail or fax):  fax  Received from:  Dr Bertrand Breed  Documentation given to:  Lindsey Becker

## 2023-07-07 ENCOUNTER — OFFICE VISIT (OUTPATIENT)
Dept: HEMATOLOGY/ONCOLOGY | Facility: HOSPITAL | Age: 68
End: 2023-07-07
Attending: INTERNAL MEDICINE
Payer: MEDICARE

## 2023-07-07 VITALS
HEIGHT: 62.01 IN | DIASTOLIC BLOOD PRESSURE: 78 MMHG | TEMPERATURE: 98 F | OXYGEN SATURATION: 100 % | RESPIRATION RATE: 16 BRPM | HEART RATE: 63 BPM | WEIGHT: 93 LBS | BODY MASS INDEX: 16.9 KG/M2 | SYSTOLIC BLOOD PRESSURE: 136 MMHG

## 2023-07-07 DIAGNOSIS — D50.0 IRON DEFICIENCY ANEMIA DUE TO CHRONIC BLOOD LOSS: ICD-10-CM

## 2023-07-07 DIAGNOSIS — D46.4 REFRACTORY ANEMIA, UNSPECIFIED (HCC): ICD-10-CM

## 2023-07-07 DIAGNOSIS — D72.819 LEUKOPENIA, UNSPECIFIED TYPE: ICD-10-CM

## 2023-07-07 DIAGNOSIS — Z12.31 ENCOUNTER FOR SCREENING MAMMOGRAM FOR BREAST CANCER: Primary | ICD-10-CM

## 2023-07-07 LAB
BASOPHILS # BLD AUTO: 0.02 X10(3) UL (ref 0–0.2)
BASOPHILS NFR BLD AUTO: 0.4 %
DEPRECATED HBV CORE AB SER IA-ACNC: 327.3 NG/ML
EOSINOPHIL # BLD AUTO: 0.09 X10(3) UL (ref 0–0.7)
EOSINOPHIL NFR BLD AUTO: 1.7 %
ERYTHROCYTE [DISTWIDTH] IN BLOOD BY AUTOMATED COUNT: 14.9 %
HCT VFR BLD AUTO: 36.7 %
HGB BLD-MCNC: 12.3 G/DL
IMM GRANULOCYTES # BLD AUTO: 0.01 X10(3) UL (ref 0–1)
IMM GRANULOCYTES NFR BLD: 0.2 %
IRON SATN MFR SERPL: 29 %
IRON SERPL-MCNC: 88 UG/DL
LYMPHOCYTES # BLD AUTO: 1.22 X10(3) UL (ref 1–4)
LYMPHOCYTES NFR BLD AUTO: 23.4 %
MCH RBC QN AUTO: 32.9 PG (ref 26–34)
MCHC RBC AUTO-ENTMCNC: 33.5 G/DL (ref 31–37)
MCV RBC AUTO: 98.1 FL
MONOCYTES # BLD AUTO: 0.44 X10(3) UL (ref 0.1–1)
MONOCYTES NFR BLD AUTO: 8.4 %
NEUTROPHILS # BLD AUTO: 3.44 X10 (3) UL (ref 1.5–7.7)
NEUTROPHILS # BLD AUTO: 3.44 X10(3) UL (ref 1.5–7.7)
NEUTROPHILS NFR BLD AUTO: 65.9 %
PLATELET # BLD AUTO: 330 10(3)UL (ref 150–450)
RBC # BLD AUTO: 3.74 X10(6)UL
TIBC SERPL-MCNC: 307 UG/DL (ref 240–450)
TRANSFERRIN SERPL-MCNC: 206 MG/DL (ref 200–360)
WBC # BLD AUTO: 5.2 X10(3) UL (ref 4–11)

## 2023-07-07 PROCEDURE — 99214 OFFICE O/P EST MOD 30 MIN: CPT | Performed by: INTERNAL MEDICINE

## 2023-07-07 RX ORDER — OMEPRAZOLE 20 MG/1
CAPSULE, DELAYED RELEASE ORAL
COMMUNITY
Start: 2023-06-12

## 2023-07-07 RX ORDER — CALCIUM CARBONATE/VITAMIN D3 500 MG-10
1 TABLET ORAL AS DIRECTED
COMMUNITY

## 2023-07-12 DIAGNOSIS — G24.3 CERVICAL DYSTONIA: ICD-10-CM

## 2023-07-12 DIAGNOSIS — M54.16 LUMBAR RADICULITIS: ICD-10-CM

## 2023-07-12 DIAGNOSIS — M54.16 LUMBAR RADICULOPATHY: ICD-10-CM

## 2023-07-12 DIAGNOSIS — M48.061 SPINAL STENOSIS OF LUMBAR REGION WITHOUT NEUROGENIC CLAUDICATION: ICD-10-CM

## 2023-07-12 RX ORDER — HYDROCODONE BITARTRATE AND ACETAMINOPHEN 5; 325 MG/1; MG/1
1 TABLET ORAL EVERY 8 HOURS PRN
Qty: 90 TABLET | Refills: 0 | Status: SHIPPED | OUTPATIENT
Start: 2023-07-14 | End: 2023-08-13

## 2023-07-12 NOTE — TELEPHONE ENCOUNTER
Medication:   HYDROcodone-acetaminophen 5-325 MG Oral Tab         Date of last refill: 6/14/23  [place] 8[place]    Last office visit: 5/22/23  Due back to clinic per last office note: FU in 5 months  Date next office visit scheduled:  11/13/23    Last URINE Screen: 5/2/23  Screen Results:  Result Care Coordination  Result Notes     Shayy Shaw, NINA  5/2/2023  1:40 PM CDT Back to Top      As expected  Patient provided education on risks of chronic opioid use including but not limited to the risk for:  Constipation, nausea, vomiting, respiratory depression, sleep disordered breathing, impaired cognition, somnolence, mental cloudiness, sedation especially if used in combination with other sedating medications such as zdrugs, benzodiazepines, muscle relaxants, alcohol, THC products, and neuropathic medications. In addition chronic opioid use can result in urinary retention, pruritis, endocrine alterations including weight gain, poor glycemic control, mood disorders, depression, altered sexual function, and weakened bones, making fractures a greater risk. Also discussed risk for opioid induced neurotoxicity, increased risk for infections such as pneumonia as opioids have been known to reduce immune response. Reference:  AILYN Brown, TRENT Dash (2022) Up-to-date: Prevention and management of side effects in patients receiving opioids for chronic pain         Narcotic Contract EXPIRATION date: 10/31/23    Last OV note recommendation:   ASSESSMENT AND PLAN:   Spinal stenosis of lumbar region, unspecified whether neurogenic claudication present  (primary encounter diagnosis)  S/P cervical spinal fusion     Patient is a 44-year-old female with a history of prior spinal fusion. Has chronic pain as result of this. This is managed with hydrocodone. From medication standpoint states medication still continue to provide function and analgesia improvement.   I reviewed her recent urine drug screen is consistent with hydrocodone usage. [place] Patient asking for referral to acupuncture which is reasonable. Patient can follow-up in 5 months time for medication review. Radiology orders and consultations:[facility]URE [place] - INTERNAL REFERRAL  The patient indicates understanding of these issues and agrees to the plan. No follow-ups on file.      Isamar Hamm MD, 5/22/2023, 11:35 AM

## 2023-07-19 NOTE — PAT NURSING NOTE
Spoke with patient regarding PAT instructions for upcoming procedure with Dr. Leander Suarez. Patient states she was taken off Xarelto by her GI physician due to GI bleed. Message sent to Dr. Vale Knox office.

## 2023-07-25 ENCOUNTER — PATIENT MESSAGE (OUTPATIENT)
Dept: ORTHOPEDICS CLINIC | Facility: CLINIC | Age: 68
End: 2023-07-25

## 2023-07-25 NOTE — TELEPHONE ENCOUNTER
From: Anoop Kim  To: Marium Camarillo MD  Sent: 7/25/2023 12:02 PM CDT  Subject: THUMB SURGERY    My left thumb has stopped hurting and I am wondering if we can cancel the upcoming surgery in September and see what happens. Fingers crossed the thumb will remain pain free. Thank you.

## 2023-07-25 NOTE — TELEPHONE ENCOUNTER
LOV 6/1/23  DOS upcoming 9/20/23    Pt reports resolved thumb pain, would like to cancel 9/20/23 thumb surgery.

## 2023-07-27 ENCOUNTER — HOSPITAL ENCOUNTER (OUTPATIENT)
Dept: INTERVENTIONAL RADIOLOGY/VASCULAR | Facility: HOSPITAL | Age: 68
Discharge: HOME | End: 2023-07-28
Attending: INTERNAL MEDICINE | Admitting: INTERNAL MEDICINE
Payer: MEDICARE

## 2023-07-27 DIAGNOSIS — I48.92 ATRIAL FLUTTER, UNSPECIFIED TYPE (HCC): ICD-10-CM

## 2023-07-27 LAB
ANION GAP SERPL CALC-SCNC: 4 MMOL/L (ref 0–18)
BUN BLD-MCNC: 17 MG/DL (ref 7–18)
CALCIUM BLD-MCNC: 9.4 MG/DL (ref 8.5–10.1)
CHLORIDE SERPL-SCNC: 107 MMOL/L (ref 98–112)
CO2 SERPL-SCNC: 30 MMOL/L (ref 21–32)
CREAT BLD-MCNC: 1.15 MG/DL
EGFRCR SERPLBLD CKD-EPI 2021: 52 ML/MIN/1.73M2 (ref 60–?)
FASTING STATUS PATIENT QL REPORTED: YES
GLUCOSE BLD-MCNC: 118 MG/DL (ref 70–99)
OSMOLALITY SERPL CALC.SUM OF ELEC: 295 MOSM/KG (ref 275–295)
POTASSIUM SERPL-SCNC: 4.3 MMOL/L (ref 3.5–5.1)
SODIUM SERPL-SCNC: 141 MMOL/L (ref 136–145)

## 2023-07-27 PROCEDURE — 99152 MOD SED SAME PHYS/QHP 5/>YRS: CPT | Performed by: INTERNAL MEDICINE

## 2023-07-27 PROCEDURE — 02583ZZ DESTRUCTION OF CONDUCTION MECHANISM, PERCUTANEOUS APPROACH: ICD-10-PCS | Performed by: INTERNAL MEDICINE

## 2023-07-27 PROCEDURE — 93662 INTRACARDIAC ECG (ICE): CPT | Performed by: INTERNAL MEDICINE

## 2023-07-27 PROCEDURE — 4A023FZ MEASUREMENT OF CARDIAC RHYTHM, PERCUTANEOUS APPROACH: ICD-10-PCS | Performed by: INTERNAL MEDICINE

## 2023-07-27 PROCEDURE — 02K83ZZ MAP CONDUCTION MECHANISM, PERCUTANEOUS APPROACH: ICD-10-PCS | Performed by: INTERNAL MEDICINE

## 2023-07-27 PROCEDURE — 99153 MOD SED SAME PHYS/QHP EA: CPT | Performed by: INTERNAL MEDICINE

## 2023-07-27 PROCEDURE — 80048 BASIC METABOLIC PNL TOTAL CA: CPT | Performed by: INTERNAL MEDICINE

## 2023-07-27 PROCEDURE — 93653 COMPRE EP EVAL TX SVT: CPT | Performed by: INTERNAL MEDICINE

## 2023-07-27 PROCEDURE — 4A0234Z MEASUREMENT OF CARDIAC ELECTRICAL ACTIVITY, PERCUTANEOUS APPROACH: ICD-10-PCS | Performed by: INTERNAL MEDICINE

## 2023-07-27 RX ORDER — PRIMIDONE 50 MG/1
100 TABLET ORAL EVERY MORNING
Status: DISCONTINUED | OUTPATIENT
Start: 2023-07-28 | End: 2023-07-28

## 2023-07-27 RX ORDER — HYDROCODONE BITARTRATE AND ACETAMINOPHEN 5; 325 MG/1; MG/1
1 TABLET ORAL ONCE
Status: COMPLETED | OUTPATIENT
Start: 2023-07-27 | End: 2023-07-27

## 2023-07-27 RX ORDER — HEPARIN SODIUM 5000 [USP'U]/ML
INJECTION, SOLUTION INTRAVENOUS; SUBCUTANEOUS
Status: COMPLETED
Start: 2023-07-27 | End: 2023-07-27

## 2023-07-27 RX ORDER — HYDROCODONE BITARTRATE AND ACETAMINOPHEN 5; 325 MG/1; MG/1
TABLET ORAL
Status: COMPLETED
Start: 2023-07-27 | End: 2023-07-27

## 2023-07-27 RX ORDER — HEPARIN SODIUM 1000 [USP'U]/ML
INJECTION, SOLUTION INTRAVENOUS; SUBCUTANEOUS
Status: COMPLETED
Start: 2023-07-27 | End: 2023-07-27

## 2023-07-27 RX ORDER — ASPIRIN 81 MG/1
81 TABLET ORAL DAILY
COMMUNITY
End: 2023-07-28

## 2023-07-27 RX ORDER — SODIUM CHLORIDE 9 MG/ML
INJECTION, SOLUTION INTRAVENOUS
Status: COMPLETED | OUTPATIENT
Start: 2023-07-27 | End: 2023-07-28

## 2023-07-27 RX ORDER — PRIMIDONE 50 MG/1
150 TABLET ORAL NIGHTLY
Status: DISCONTINUED | OUTPATIENT
Start: 2023-07-27 | End: 2023-07-28

## 2023-07-27 RX ORDER — LIDOCAINE HYDROCHLORIDE 10 MG/ML
INJECTION, SOLUTION EPIDURAL; INFILTRATION; INTRACAUDAL; PERINEURAL
Status: COMPLETED
Start: 2023-07-27 | End: 2023-07-27

## 2023-07-27 RX ORDER — MIDAZOLAM HYDROCHLORIDE 1 MG/ML
INJECTION INTRAMUSCULAR; INTRAVENOUS
Status: COMPLETED
Start: 2023-07-27 | End: 2023-07-27

## 2023-07-27 RX ORDER — GABAPENTIN 100 MG/1
100 CAPSULE ORAL NIGHTLY
Status: DISCONTINUED | OUTPATIENT
Start: 2023-07-27 | End: 2023-07-27

## 2023-07-27 RX ORDER — FLECAINIDE ACETATE 100 MG/1
100 TABLET ORAL 2 TIMES DAILY
Status: DISCONTINUED | OUTPATIENT
Start: 2023-07-27 | End: 2023-07-28

## 2023-07-27 RX ORDER — SODIUM CHLORIDE 9 MG/ML
INJECTION, SOLUTION INTRAVENOUS CONTINUOUS
Status: ACTIVE | OUTPATIENT
Start: 2023-07-27 | End: 2023-07-27

## 2023-07-27 RX ORDER — GABAPENTIN 300 MG/1
600 CAPSULE ORAL 2 TIMES DAILY
Status: DISCONTINUED | OUTPATIENT
Start: 2023-07-27 | End: 2023-07-28

## 2023-07-27 RX ORDER — HYDROCODONE BITARTRATE AND ACETAMINOPHEN 5; 325 MG/1; MG/1
2 TABLET ORAL EVERY 4 HOURS PRN
Status: DISCONTINUED | OUTPATIENT
Start: 2023-07-27 | End: 2023-07-28

## 2023-07-27 RX ORDER — PRIMIDONE 50 MG/1
50 TABLET ORAL NIGHTLY
Status: DISCONTINUED | OUTPATIENT
Start: 2023-07-27 | End: 2023-07-27

## 2023-07-27 RX ORDER — HYDROCODONE BITARTRATE AND ACETAMINOPHEN 5; 325 MG/1; MG/1
1 TABLET ORAL EVERY 4 HOURS PRN
Status: DISCONTINUED | OUTPATIENT
Start: 2023-07-27 | End: 2023-07-28

## 2023-07-27 RX ADMIN — HYDROCODONE BITARTRATE AND ACETAMINOPHEN 1 TABLET: 5; 325 TABLET ORAL at 13:35:00

## 2023-07-27 RX ADMIN — SODIUM CHLORIDE: 9 INJECTION, SOLUTION INTRAVENOUS at 20:00:00

## 2023-07-27 RX ADMIN — GABAPENTIN 600 MG: 300 CAPSULE ORAL at 21:19:00

## 2023-07-27 RX ADMIN — SODIUM CHLORIDE: 9 INJECTION, SOLUTION INTRAVENOUS at 13:25:00

## 2023-07-27 RX ADMIN — PRIMIDONE 150 MG: 50 TABLET ORAL at 22:00:00

## 2023-07-27 RX ADMIN — FLECAINIDE ACETATE 100 MG: 100 TABLET ORAL at 22:00:00

## 2023-07-27 RX ADMIN — HYDROCODONE BITARTRATE AND ACETAMINOPHEN 2 TABLET: 5; 325 TABLET ORAL at 21:17:00

## 2023-07-27 NOTE — PROCEDURES
Procedures performed:  1. Comprehensive EP study with 3-D mapping. 2. CS catheter placement to record and pace the left atrium. 3. RFA of Atrial Flutter  4. Intracardiac echo (ICE)    : Pk Bonner MD    Indication: atrial flutter    Complication: none  SEDATION: IV was maintained by RN. Patient was assessed by myself and the nursing staff, IV sedation (versed and fentanyl) were administered during continuous ECG, pulse oximeter, and non-invasive hemodynamic monitoring. I was present from the time of sedation being started to the end of the procedure ([id number]). Methods: The patient was brought to the EP lab in a fasting state and non-sedated state. The right and left groins were prepped and draped in a sterile fashion. 3 sheaths were placed in the right femoral alex via the modified Seldinger technique and US guidance. Catheters were placed in the appropriate position under ICE and 3D mapping guidance. Baseline measurements were recorded and programmed stimulation from the atrium and ventricle was performed. At the conclusion of the procedure, catheters and sheaths were removed and hemostasis was achieved with vascade closure. There were no apparent intra-operative complications. The patient was transported in stable condition to recovery. EP study findings:  Atrial Flutter  ms,MRD877 ms,  ms. HV 62 ms. Arrhythmia observations: Atrial activation was counter clockwise based upon the activation sequence of the duo-decapolar catheter. Burst pacing was performed from cavo-tricuspid isthmus at a CL slightly faster than atrial flutter, upon cessation of pacing the post pacing interval was 20 ms greater than the atrial flutter CL. These findings were consistent with counter clockwise isthmus dependent atrial flutter. There was no clot visualized in the ROGELIO via ICE.      Mapping and ablation: Using a steerable sheath, an open irrigation catheter, RF lesions were delivered within the cavo-tricuspid isthmus. Catheter positions and RF lesions were also recorded with the 3D mapping. RF application was delivered until atrial flutter terminated and bidirectional block across the cavo-tricuspid isthmus was confirmed. Post ablation findings:    ms,  ms, URV094 ms,  ms. AH 72 ms, HV 58 ms. Atrial flutter was not inducible post ablation, bidirectional block was confirmed post ablation based upon the atrial activation sequence of the duo-decapolar catheter when pacing from the proximal CS as well as the low lateral RA. The trans-isthmus conduction time was 164 ms. CONCLUSIONS:  1. Sinus node function normal.   2. AV node fucntion normal.  3. His [place] normal.  4. Status post successful ablation for typical cavo-tricuspid isthmus dependent atrial flutter. Bidirectional block was confirmed as described above. Atrial flutter was no longer inducible following RFA as described above. 5. No clot in ROGELIO via ICE.       Abraham Oneal MD  [address] [place] [address]  [facility]  [phone]

## 2023-07-28 VITALS
DIASTOLIC BLOOD PRESSURE: 90 MMHG | OXYGEN SATURATION: 95 % | RESPIRATION RATE: 16 BRPM | TEMPERATURE: 98 F | HEIGHT: 62 IN | BODY MASS INDEX: 17.23 KG/M2 | WEIGHT: 93.63 LBS | HEART RATE: 86 BPM | SYSTOLIC BLOOD PRESSURE: 148 MMHG

## 2023-07-28 LAB
ATRIAL RATE: 79 BPM
P AXIS: 74 DEGREES
P-R INTERVAL: 222 MS
Q-T INTERVAL: 444 MS
QRS DURATION: 112 MS
QTC CALCULATION (BEZET): 509 MS
R AXIS: 50 DEGREES
T AXIS: 81 DEGREES
VENTRICULAR RATE: 79 BPM

## 2023-07-28 PROCEDURE — 93010 ELECTROCARDIOGRAM REPORT: CPT | Performed by: INTERNAL MEDICINE

## 2023-07-28 PROCEDURE — 93005 ELECTROCARDIOGRAM TRACING: CPT

## 2023-07-28 RX ORDER — HYDRALAZINE HYDROCHLORIDE 20 MG/ML
10 INJECTION INTRAMUSCULAR; INTRAVENOUS ONCE
Status: COMPLETED | OUTPATIENT
Start: 2023-07-28 | End: 2023-07-28

## 2023-07-28 RX ADMIN — FLECAINIDE ACETATE 100 MG: 100 TABLET ORAL at 08:22:00

## 2023-07-28 RX ADMIN — PRIMIDONE 100 MG: 50 TABLET ORAL at 08:22:00

## 2023-07-28 RX ADMIN — HYDROCODONE BITARTRATE AND ACETAMINOPHEN 1 TABLET: 5; 325 TABLET ORAL at 05:12:00

## 2023-07-28 RX ADMIN — HYDRALAZINE HYDROCHLORIDE 10 MG: 20 INJECTION INTRAMUSCULAR; INTRAVENOUS at 09:12:00

## 2023-07-28 RX ADMIN — HYDROCODONE BITARTRATE AND ACETAMINOPHEN 1 TABLET: 5; 325 TABLET ORAL at 12:50:00

## 2023-07-28 RX ADMIN — GABAPENTIN 600 MG: 300 CAPSULE ORAL at 08:23:00

## 2023-07-28 NOTE — PLAN OF CARE
Pt. Alert and oriented times 4. Hx of parkinsons. Slow speech. Pt. Is Occasionally V-paced, sometimes 1st degree AV block on tele. Pt. On RA. Pt. Continent of bowel and bladder. Pt. Had difficulty coughing up mucous doctor aware. Hypertensive this AM. Doctor notified. Hydralazine ordered. Recheck BP better. Pt. Has hx of orthostatic hypotension. Will likely go home today. Pt. Updated on plan of care. Call light within reach. Problem: Patient/Family Goals  Goal: Patient/Family Long Term Goal  Description: Patient's Long Term Goal:DISCHARGE TO HOME    Interventions:  - CARDIOLOGY CONSULT AND FOLLOW ORDERS, PROCEDURES, LABS, V/S, MONITOR CARDIAC RHYTHM, ABLATION AND RHYTHM CONTROL TO SINUS, UPDATE PLAN OF CARE, PAIN MGT   - See additional Care Plan goals for specific interventions  Outcome: Progressing  Goal: Patient/Family Short Term Goal  Description: Patient's Short Term Goal:PAIN MGT , SAFETY     Interventions:   - FREQUENT ASSESS AND ASSIST , SAFETY PROTOCOL, PAIN MGT , MONITOR RT GROIN SITE FOR HEMATOMA/ BLEEDING, DISCHARGE PLAN ,   - See additional Care Plan goals for specific interventions  Outcome: Progressing     Problem: CARDIOVASCULAR - ADULT  Goal: Maintains optimal cardiac output and hemodynamic stability  Description: INTERVENTIONS:  - Monitor vital signs, rhythm, and trends  - Monitor for bleeding, hypotension and signs of decreased cardiac output  - Evaluate effectiveness of vasoactive medications to optimize hemodynamic stability  - Monitor arterial and/or venous puncture sites for bleeding and/or hematoma  - Assess quality of pulses, skin color and temperature  - Assess for signs of decreased coronary artery perfusion - ex.  Angina  - Evaluate fluid balance, assess for edema, trend weights  Outcome: Progressing  Goal: Absence of cardiac arrhythmias or at baseline  Description: INTERVENTIONS:  - Continuous cardiac monitoring, monitor vital signs, obtain 12 lead EKG if indicated  - Evaluate effectiveness of antiarrhythmic and heart rate control medications as ordered  - Initiate emergency measures for life threatening arrhythmias  - Monitor electrolytes and administer replacement therapy as ordered  Outcome: Progressing     Problem: RESPIRATORY - ADULT  Goal: Achieves optimal ventilation and oxygenation  Description: INTERVENTIONS:  - Assess for changes in respiratory status  - Assess for changes in mentation and behavior  - Position to facilitate oxygenation and minimize respiratory effort  - Oxygen supplementation based on oxygen saturation or ABGs  - Provide Smoking Cessation handout, if applicable  - Encourage broncho-pulmonary hygiene including cough, deep breathe, Incentive Spirometry  - Assess the need for suctioning and perform as needed  - Assess and instruct to report SOB or any respiratory difficulty  - Respiratory Therapy support as indicated  - Manage/alleviate anxiety  - Monitor for signs/symptoms of CO2 retention  Outcome: Progressing     Problem: METABOLIC/FLUID AND ELECTROLYTES - ADULT  Goal: Electrolytes maintained within normal limits  Description: INTERVENTIONS:  - Monitor labs and rhythm and assess patient for signs and symptoms of electrolyte imbalances  - Administer electrolyte replacement as ordered  - Monitor response to electrolyte replacements, including rhythm and repeat lab results as appropriate  - Fluid restriction as ordered  - Instruct patient on fluid and nutrition restrictions as appropriate  Outcome: Progressing     Problem: SKIN/TISSUE INTEGRITY - ADULT  Goal: Skin integrity remains intact  Description: INTERVENTIONS  - Assess and document risk factors for pressure ulcer development  - Assess and document skin integrity  - Monitor for areas of redness and/or skin breakdown  - Initiate interventions, skin care algorithm/standards of care as needed  Outcome: Progressing  Goal: Incision(s), wounds(s) or drain site(s) healing without S/S of infection  Description: INTERVENTIONS:  - Assess and document risk factors for pressure ulcer development  - Assess and document skin integrity  - Assess and document dressing/incision, wound bed, drain sites and surrounding tissue  - Implement wound care per orders  - Initiate isolation precautions as appropriate  - Initiate Pressure Ulcer prevention bundle as indicated  Outcome: Progressing  Goal: Oral mucous membranes remain intact  Description: INTERVENTIONS  - Assess oral mucosa and hygiene practices  - Implement preventative oral hygiene regimen  - Implement oral medicated treatments as ordered  Outcome: Progressing     Problem: Impaired Swallowing  Goal: Minimize aspiration risk  Description: Interventions:  - Patient should be alert and upright for all feedings (90 degrees preferred)  - Offer food and liquids at a slow rate  - No straws  - Encourage small bites of food and small sips of liquid  - Offer pills one at a time, crush or deliver with applesauce as needed  - Discontinue feeding and notify MD (or speech pathologist) if coughing or persistent throat clearing or wet/gurgly vocal quality is noted  Outcome: Progressing     Problem: PAIN - ADULT  Goal: Verbalizes/displays adequate comfort level or patient's stated pain goal  Description: INTERVENTIONS:  - Encourage pt to monitor pain and request assistance  - Assess pain using appropriate pain scale  - Administer analgesics based on type and severity of pain and evaluate response  - Implement non-pharmacological measures as appropriate and evaluate response  - Consider cultural and social influences on pain and pain management  - Manage/alleviate anxiety  - Utilize distraction and/or relaxation techniques  - Monitor for opioid side effects  - Notify MD/LIP if interventions unsuccessful or patient reports new pain  - Anticipate increased pain with activity and pre-medicate as appropriate  Outcome: Progressing

## 2023-07-28 NOTE — PROGRESS NOTES
Received pt from cath lab at 1900. R groin incisions clean, dry, intact. Soft w no hematoma. Vitals running per protocol. NSR on tele. Oriented to room. Safety precautions in place. Instructed to call staff for help.

## 2023-07-28 NOTE — DISCHARGE PLANNING
NURSING DISCHARGE NOTE    Discharged Home via wheelchair  Accompanied by Support staff  Belongings Taken by patient/family. IV removed. Discharge instructions completed. All questions answered.

## 2023-07-28 NOTE — PLAN OF CARE
Patient alert and oriented x4 , On room air. S/P Ablation , sinus rhythm with 1st avb on cardiac monitor. Patient denies any chest pain or chest discomfort at this time. Patient voids, last BM 7/26. Incision site rt groin intact , no hematoma/ bleeding . Ambulated to brp , no dizzy , Norco  helped back pain , patient comfortable                      Acute distress noted Plan of care updated, all questions answered. Safety precautions in place. Bed alarm on. Will continue to monitor tele/labs/vital signs closely. Post EKG done    Problem: CARDIOVASCULAR - ADULT  Goal: Maintains optimal cardiac output and hemodynamic stability  Description: INTERVENTIONS:  - Monitor vital signs, rhythm, and trends  - Monitor for bleeding, hypotension and signs of decreased cardiac output  - Evaluate effectiveness of vasoactive medications to optimize hemodynamic stability  - Monitor arterial and/or venous puncture sites for bleeding and/or hematoma  - Assess quality of pulses, skin color and temperature  - Assess for signs of decreased coronary artery perfusion - ex.  Angina  - Evaluate fluid balance, assess for edema, trend weights  Outcome: Progressing  Goal: Absence of cardiac arrhythmias or at baseline  Description: INTERVENTIONS:  - Continuous cardiac monitoring, monitor vital signs, obtain 12 lead EKG if indicated  - Evaluate effectiveness of antiarrhythmic and heart rate control medications as ordered  - Initiate emergency measures for life threatening arrhythmias  - Monitor electrolytes and administer replacement therapy as ordered  Outcome: Progressing     Problem: Patient/Family Goals  Goal: Patient/Family Long Term Goal  Description: Patient's Long Term Goal:DISCHARGE TO HOME    Interventions:  - CARDIOLOGY CONSULT AND FOLLOW ORDERS, PROCEDURES, LABS, V/S, MONITOR CARDIAC RHYTHM, ABLATION AND RHYTHM CONTROL TO SINUS, UPDATE PLAN OF CARE, PAIN MGT   - See additional Care Plan goals for specific interventions  Outcome: Progressing  Goal: Patient/Family Short Term Goal  Description: Patient's Short Term Goal:PAIN MGT , SAFETY     Interventions:   - FREQUENT ASSESS AND ASSIST , SAFETY PROTOCOL, PAIN MGT , MONITOR RT GROIN SITE FOR HEMATOMA/ BLEEDING, DISCHARGE PLAN ,   - See additional Care Plan goals for specific interventions  Outcome: Progressing     Problem: GASTROINTESTINAL - ADULT  Goal: Minimal or absence of nausea and vomiting  Description: INTERVENTIONS:  - Maintain adequate hydration with IV or PO as ordered and tolerated  - Nasogastric tube to low intermittent suction as ordered  - Evaluate effectiveness of ordered antiemetic medications  - Provide nonpharmacologic comfort measures as appropriate  - Advance diet as tolerated, if ordered  - Obtain nutritional consult as needed  - Evaluate fluid balance  Outcome: Progressing  Goal: Maintains or returns to baseline bowel function  Description: INTERVENTIONS:  - Assess bowel function  - Maintain adequate hydration with IV or PO as ordered and tolerated  - Evaluate effectiveness of GI medications  - Encourage mobilization and activity  - Obtain nutritional consult as needed  - Establish a toileting routine/schedule  - Consider collaborating with pharmacy to review patient's medication profile  Outcome: Progressing  Goal: Maintains adequate nutritional intake (undernourished)  Description: INTERVENTIONS:  - Monitor percentage of each meal consumed  - Identify factors contributing to decreased intake, treat as appropriate  - Assist with meals as needed  - Monitor I&O, WT and lab values  - Obtain nutritional consult as needed  - Optimize oral hygiene and moisture  - Encourage food from home; allow for food preferences  - Enhance eating environment  Outcome: Progressing  Goal: Achieves appropriate nutritional intake (bariatric)  Description: INTERVENTIONS:  - Monitor for over-consumption  - Identify factors contributing to increased intake, treat as appropriate  - Monitor I&O, WT and lab values  - Obtain nutritional consult as needed  - Evaluate psychosocial factors contributing to over-consumption  Outcome: Progressing     Problem: RESPIRATORY - ADULT  Goal: Achieves optimal ventilation and oxygenation  Description: INTERVENTIONS:  - Assess for changes in respiratory status  - Assess for changes in mentation and behavior  - Position to facilitate oxygenation and minimize respiratory effort  - Oxygen supplementation based on oxygen saturation or ABGs  - Provide Smoking Cessation handout, if applicable  - Encourage broncho-pulmonary hygiene including cough, deep breathe, Incentive Spirometry  - Assess the need for suctioning and perform as needed  - Assess and instruct to report SOB or any respiratory difficulty  - Respiratory Therapy support as indicated  - Manage/alleviate anxiety  - Monitor for signs/symptoms of CO2 retention  Outcome: Progressing     Problem: METABOLIC/FLUID AND ELECTROLYTES - ADULT  Goal: Electrolytes maintained within normal limits  Description: INTERVENTIONS:  - Monitor labs and rhythm and assess patient for signs and symptoms of electrolyte imbalances  - Administer electrolyte replacement as ordered  - Monitor response to electrolyte replacements, including rhythm and repeat lab results as appropriate  - Fluid restriction as ordered  - Instruct patient on fluid and nutrition restrictions as appropriate  Outcome: Progressing     Problem: SKIN/TISSUE INTEGRITY - ADULT  Goal: Skin integrity remains intact  Description: INTERVENTIONS  - Assess and document risk factors for pressure ulcer development  - Assess and document skin integrity  - Monitor for areas of redness and/or skin breakdown  - Initiate interventions, skin care algorithm/standards of care as needed  Outcome: Progressing  Goal: Incision(s), wounds(s) or drain site(s) healing without S/S of infection  Description: INTERVENTIONS:  - Assess and document risk factors for pressure ulcer development  - Assess and document skin integrity  - Assess and document dressing/incision, wound bed, drain sites and surrounding tissue  - Implement wound care per orders  - Initiate isolation precautions as appropriate  - Initiate Pressure Ulcer prevention bundle as indicated  Outcome: Progressing  Goal: Oral mucous membranes remain intact  Description: INTERVENTIONS  - Assess oral mucosa and hygiene practices  - Implement preventative oral hygiene regimen  - Implement oral medicated treatments as ordered  Outcome: Progressing

## 2023-07-30 DIAGNOSIS — F33.40 RECURRENT MAJOR DEPRESSIVE DISORDER, IN REMISSION (HCC): ICD-10-CM

## 2023-07-31 RX ORDER — DULOXETIN HYDROCHLORIDE 30 MG/1
30 CAPSULE, DELAYED RELEASE ORAL DAILY
Qty: 90 CAPSULE | Refills: 0 | Status: SHIPPED | OUTPATIENT
Start: 2023-07-31

## 2023-07-31 NOTE — TELEPHONE ENCOUNTER
Last OV relevant to medication: 5/18/23  Last refill date: 5/9/23 #90/refills: 0  When pt was asked to return for OV: as needed or routine care  Upcoming appt/reason: med check with  9/7  Was pt informed of any over due labs: becka

## 2023-08-13 DIAGNOSIS — M48.061 SPINAL STENOSIS OF LUMBAR REGION WITHOUT NEUROGENIC CLAUDICATION: ICD-10-CM

## 2023-08-13 DIAGNOSIS — M54.16 LUMBAR RADICULOPATHY: ICD-10-CM

## 2023-08-13 DIAGNOSIS — G24.3 CERVICAL DYSTONIA: ICD-10-CM

## 2023-08-13 DIAGNOSIS — M54.16 LUMBAR RADICULITIS: ICD-10-CM

## 2023-08-14 RX ORDER — HYDROCODONE BITARTRATE AND ACETAMINOPHEN 5; 325 MG/1; MG/1
1 TABLET ORAL EVERY 8 HOURS PRN
Qty: 90 TABLET | Refills: 0 | Status: SHIPPED | OUTPATIENT
Start: 2023-08-14 | End: 2023-09-13

## 2023-08-14 NOTE — TELEPHONE ENCOUNTER
Medication: HYDROcodone-acetaminophen 5-325 MG Oral Tab     Date of last refill: 7/14/23  [place] 7/[place]87    Last office visit: 5/22/23  Due back to clinic per last office note:  5 months  Date next office visit scheduled:  none    Last URINE Screen: 4/26/23  Screen Results:    NINA Gould  5/2/2023  1:40 PM CDT Back to Top      As expected  Patient provided education on risks of chronic opioid use including but not limited to the risk for:  Constipation, nausea, vomiting, respiratory depression, sleep disordered breathing, impaired cognition, somnolence, mental cloudiness, sedation especially if used in combination with other sedating medications such as zdrugs, benzodiazepines, muscle relaxants, alcohol, THC products, and neuropathic medications. In addition chronic opioid use can result in urinary retention, pruritis, endocrine alterations including weight gain, poor glycemic control, mood disorders, depression, altered sexual function, and weakened bones, making fractures a greater risk. Also discussed risk for opioid induced neurotoxicity, increased risk for infections such as pneumonia as opioids have been known to reduce immune response. Reference:  AILYN Lerma, TRENT Arambula (2022) Up-to-date: Prevention and management of side effects in patients receiving opioids for chronic pain         Narcotic Contract EXPIRATION date: 10/31/22    Last OV note recommendation: Patient is a 80-year-old female with a history of prior spinal fusion. Has chronic pain as result of this. This is managed with hydrocodone. From medication standpoint states medication still continue to provide function and analgesia improvement. I reviewed her recent urine drug screen is consistent with hydrocodone usage. [place] . Patient asking for referral to acupuncture which is reasonable. Patient can follow-up in 5 months time for medication review.

## 2023-08-18 RX ORDER — PAROXETINE 10 MG/1
10 TABLET, FILM COATED ORAL EVERY MORNING
Qty: 90 TABLET | Refills: 0 | Status: SHIPPED | OUTPATIENT
Start: 2023-08-18

## 2023-08-31 ENCOUNTER — HOSPITAL ENCOUNTER (OUTPATIENT)
Dept: LAB | Facility: HOSPITAL | Age: 68
Discharge: HOME | End: 2023-08-31
Attending: NURSE PRACTITIONER
Payer: MEDICARE

## 2023-08-31 LAB
ANION GAP SERPL CALC-SCNC: 8 MMOL/L (ref 0–18)
BUN BLD-MCNC: 22 MG/DL (ref 7–18)
CALCIUM BLD-MCNC: 9.8 MG/DL (ref 8.5–10.1)
CHLORIDE SERPL-SCNC: 104 MMOL/L (ref 98–112)
CO2 SERPL-SCNC: 26 MMOL/L (ref 21–32)
CREAT BLD-MCNC: 0.99 MG/DL
EGFRCR SERPLBLD CKD-EPI 2021: 62 ML/MIN/1.73M2 (ref 60–?)
FASTING STATUS PATIENT QL REPORTED: NO
GLUCOSE BLD-MCNC: 105 MG/DL (ref 70–99)
MAGNESIUM SERPL-MCNC: 2.1 MG/DL (ref 1.6–2.6)
OSMOLALITY SERPL CALC.SUM OF ELEC: 290 MOSM/KG (ref 275–295)
POTASSIUM SERPL-SCNC: 4.8 MMOL/L (ref 3.5–5.1)
SODIUM SERPL-SCNC: 138 MMOL/L (ref 136–145)

## 2023-08-31 PROCEDURE — 83735 ASSAY OF MAGNESIUM: CPT | Performed by: NURSE PRACTITIONER

## 2023-08-31 PROCEDURE — 36415 COLL VENOUS BLD VENIPUNCTURE: CPT | Performed by: NURSE PRACTITIONER

## 2023-08-31 PROCEDURE — 80048 BASIC METABOLIC PNL TOTAL CA: CPT | Performed by: NURSE PRACTITIONER

## 2023-09-01 ENCOUNTER — NURSE ONLY (OUTPATIENT)
Dept: HEMATOLOGY/ONCOLOGY | Facility: HOSPITAL | Age: 68
End: 2023-09-01
Attending: INTERNAL MEDICINE
Payer: MEDICARE

## 2023-09-01 DIAGNOSIS — D46.4 REFRACTORY ANEMIA, UNSPECIFIED (HCC): ICD-10-CM

## 2023-09-01 DIAGNOSIS — D50.0 IRON DEFICIENCY ANEMIA DUE TO CHRONIC BLOOD LOSS: ICD-10-CM

## 2023-09-01 LAB
BASOPHILS # BLD AUTO: 0.02 X10(3) UL (ref 0–0.2)
BASOPHILS NFR BLD AUTO: 0.4 %
DEPRECATED HBV CORE AB SER IA-ACNC: 255.1 NG/ML
EOSINOPHIL # BLD AUTO: 0.06 X10(3) UL (ref 0–0.7)
EOSINOPHIL NFR BLD AUTO: 1.3 %
ERYTHROCYTE [DISTWIDTH] IN BLOOD BY AUTOMATED COUNT: 13.8 %
HCT VFR BLD AUTO: 38.9 %
HGB BLD-MCNC: 13 G/DL
IMM GRANULOCYTES # BLD AUTO: 0.01 X10(3) UL (ref 0–1)
IMM GRANULOCYTES NFR BLD: 0.2 %
IRON SATN MFR SERPL: 46 %
IRON SERPL-MCNC: 125 UG/DL
LYMPHOCYTES # BLD AUTO: 1.22 X10(3) UL (ref 1–4)
LYMPHOCYTES NFR BLD AUTO: 26.8 %
MCH RBC QN AUTO: 33.9 PG (ref 26–34)
MCHC RBC AUTO-ENTMCNC: 33.4 G/DL (ref 31–37)
MCV RBC AUTO: 101.3 FL
MONOCYTES # BLD AUTO: 0.55 X10(3) UL (ref 0.1–1)
MONOCYTES NFR BLD AUTO: 12.1 %
NEUTROPHILS # BLD AUTO: 2.7 X10 (3) UL (ref 1.5–7.7)
NEUTROPHILS # BLD AUTO: 2.7 X10(3) UL (ref 1.5–7.7)
NEUTROPHILS NFR BLD AUTO: 59.2 %
PLATELET # BLD AUTO: 227 10(3)UL (ref 150–450)
RBC # BLD AUTO: 3.84 X10(6)UL
TIBC SERPL-MCNC: 270 UG/DL (ref 240–450)
TRANSFERRIN SERPL-MCNC: 181 MG/DL (ref 200–360)
WBC # BLD AUTO: 4.6 X10(3) UL (ref 4–11)

## 2023-09-01 PROCEDURE — 83550 IRON BINDING TEST: CPT

## 2023-09-01 PROCEDURE — 82728 ASSAY OF FERRITIN: CPT

## 2023-09-01 PROCEDURE — 83540 ASSAY OF IRON: CPT

## 2023-09-01 PROCEDURE — 36415 COLL VENOUS BLD VENIPUNCTURE: CPT

## 2023-09-01 PROCEDURE — 85025 COMPLETE CBC W/AUTO DIFF WBC: CPT

## 2023-09-07 ENCOUNTER — OFFICE VISIT (OUTPATIENT)
Dept: INTERNAL MEDICINE CLINIC | Facility: CLINIC | Age: 68
End: 2023-09-07
Payer: MEDICARE

## 2023-09-07 VITALS
TEMPERATURE: 98 F | HEART RATE: 60 BPM | HEIGHT: 62 IN | SYSTOLIC BLOOD PRESSURE: 128 MMHG | BODY MASS INDEX: 17.83 KG/M2 | OXYGEN SATURATION: 98 % | RESPIRATION RATE: 18 BRPM | WEIGHT: 96.88 LBS | DIASTOLIC BLOOD PRESSURE: 72 MMHG

## 2023-09-07 DIAGNOSIS — G20 PARKINSON'S DISEASE (HCC): Chronic | ICD-10-CM

## 2023-09-07 DIAGNOSIS — D46.9 MDS (MYELODYSPLASTIC SYNDROME) (HCC): ICD-10-CM

## 2023-09-07 DIAGNOSIS — I10 ESSENTIAL HYPERTENSION: Primary | ICD-10-CM

## 2023-09-07 DIAGNOSIS — M81.0 AGE-RELATED OSTEOPOROSIS WITHOUT CURRENT PATHOLOGICAL FRACTURE: ICD-10-CM

## 2023-09-07 DIAGNOSIS — E78.00 PURE HYPERCHOLESTEROLEMIA: Chronic | ICD-10-CM

## 2023-09-07 DIAGNOSIS — R55 VASOVAGAL SYNCOPE: ICD-10-CM

## 2023-09-07 DIAGNOSIS — J44.9 CHRONIC OBSTRUCTIVE PULMONARY DISEASE, UNSPECIFIED COPD TYPE (HCC): ICD-10-CM

## 2023-09-07 DIAGNOSIS — I48.0 PAROXYSMAL ATRIAL FIBRILLATION (HCC): ICD-10-CM

## 2023-09-07 DIAGNOSIS — F33.40 RECURRENT MAJOR DEPRESSIVE DISORDER, IN REMISSION (HCC): ICD-10-CM

## 2023-09-07 DIAGNOSIS — Z87.19 HX OF ISCHEMIC BOWEL DISEASE: ICD-10-CM

## 2023-09-07 PROCEDURE — 99214 OFFICE O/P EST MOD 30 MIN: CPT | Performed by: INTERNAL MEDICINE

## 2023-09-07 RX ORDER — CLOPIDOGREL BISULFATE 75 MG/1
TABLET ORAL
COMMUNITY
Start: 2023-08-31

## 2023-09-09 NOTE — PATIENT INSTRUCTIONS
Get your CT chest completed    Continue to see the specialists    Start the paroxetine daily. Monitor closely for side effects. Give a report to the office next week with how it is going.     Continue a high protein diet and ensure drinks for supplement     Follow up as planned with Dr. Haider Donate or sooner as needed Onset: 09/08/2023    Location / description: Left foot 2nd toe procedure, at office visit last instructed to change dressing. Increased pain last night, throbbing, swelling, painful to touch. Blood and drainage today thru sock coming from graft on underside of toe,Toe is bright red and swollen.   Temp 97.6F digital forehead     Precipitating Factors: no factors    Pain Scale (1-10), 6/ 10 highest: 0/10    What  improves / worsens symptoms: elevation helps    Symptom specific medications: tylenol this am.     Recent Care: 08/30/2023 Excision 2nd toe due to melanoma, reconstruction with tissue graft/ office visit 09/05/2023    PAGE:   ECO FROM: ROSEANN MELO RN REQUESTED DR:MARTI BOSTON DR. PATIENT CONCERNED LEFT TOE INFECTION POST EXCISION PROCEDURE LAST WEEK. LAST NIGHT DEVELOPED INCREASED THROBBING PAIN, REDNESS, SWELLING, DRAINAGE FROM UNDERSIDE OF TOE WHERE GRAFT IS. NO FEVER.  Read  00:00:11      [phone] ECO FROM: ROSEANN MELO RN REQUESTED DR:MATHEUS CUI CONCERN FOR INFECTION LEFT TOE POST PROCEDURE, INCREASED PAIN, REDNESS, SWELLING, DRAINAGE FROM UNDERSIDE OF TOE WHERE GRAFT IS. NO FEVER. WILL ADVISE ED.    PLAN:    See Provider/Urgent care in next 4 hours Wife of patient reports Dr. Orozco returned call directly to patient, patient is on way to pharmacy to  newly prescribed medication. No other questions at this time. Encouraged to call back if worsening symptoms.     Patient/Caller agrees to follow recommendations.    Reason for Disposition  • [1] Incision looks infected (spreading redness, pain) AND [2] large red area (> 2 in. or 5 cm)    Protocols used: POST-OP INCISION SYMPTOMS AND [id number]

## 2023-09-13 ENCOUNTER — APPOINTMENT (OUTPATIENT)
Dept: CT IMAGING | Facility: HOSPITAL | Age: 68
End: 2023-09-13
Attending: EMERGENCY MEDICINE
Payer: MEDICARE

## 2023-09-13 ENCOUNTER — HOSPITAL ENCOUNTER (EMERGENCY)
Facility: HOSPITAL | Age: 68
Discharge: HOME | End: 2023-09-13
Attending: EMERGENCY MEDICINE
Payer: MEDICARE

## 2023-09-13 ENCOUNTER — APPOINTMENT (OUTPATIENT)
Dept: GENERAL RADIOLOGY | Facility: HOSPITAL | Age: 68
End: 2023-09-13
Attending: EMERGENCY MEDICINE
Payer: MEDICARE

## 2023-09-13 VITALS
WEIGHT: 94 LBS | HEIGHT: 62 IN | RESPIRATION RATE: 18 BRPM | TEMPERATURE: 97 F | OXYGEN SATURATION: 100 % | HEART RATE: 68 BPM | BODY MASS INDEX: 17.3 KG/M2 | SYSTOLIC BLOOD PRESSURE: 129 MMHG | DIASTOLIC BLOOD PRESSURE: 72 MMHG

## 2023-09-13 DIAGNOSIS — S49.92XA INJURY OF LEFT SHOULDER, INITIAL ENCOUNTER: ICD-10-CM

## 2023-09-13 DIAGNOSIS — G24.3 CERVICAL DYSTONIA: ICD-10-CM

## 2023-09-13 DIAGNOSIS — M54.16 LUMBAR RADICULOPATHY: ICD-10-CM

## 2023-09-13 DIAGNOSIS — M48.061 SPINAL STENOSIS OF LUMBAR REGION WITHOUT NEUROGENIC CLAUDICATION: ICD-10-CM

## 2023-09-13 DIAGNOSIS — M54.16 LUMBAR RADICULITIS: ICD-10-CM

## 2023-09-13 DIAGNOSIS — S00.83XA CONTUSION OF FOREHEAD, INITIAL ENCOUNTER: Primary | ICD-10-CM

## 2023-09-13 PROCEDURE — 73030 X-RAY EXAM OF SHOULDER: CPT | Performed by: EMERGENCY MEDICINE

## 2023-09-13 PROCEDURE — 99284 EMERGENCY DEPT VISIT MOD MDM: CPT

## 2023-09-13 PROCEDURE — 99285 EMERGENCY DEPT VISIT HI MDM: CPT

## 2023-09-13 PROCEDURE — 70450 CT HEAD/BRAIN W/O DYE: CPT | Performed by: EMERGENCY MEDICINE

## 2023-09-13 PROCEDURE — 71111 X-RAY EXAM RIBS/CHEST4/> VWS: CPT | Performed by: EMERGENCY MEDICINE

## 2023-09-13 NOTE — ED INITIAL ASSESSMENT (HPI)
Pt slipped and fell, c/o pain to forehead, left shoulder and left rib pain. Pt wearing holter monitor.

## 2023-09-14 RX ORDER — HYDROCODONE BITARTRATE AND ACETAMINOPHEN 5; 325 MG/1; MG/1
1 TABLET ORAL EVERY 8 HOURS PRN
Qty: 90 TABLET | Refills: 0 | Status: SHIPPED | OUTPATIENT
Start: 2023-09-14 | End: 2023-10-14

## 2023-09-15 ENCOUNTER — OFFICE VISIT (OUTPATIENT)
Dept: INTERNAL MEDICINE CLINIC | Facility: CLINIC | Age: 68
End: 2023-09-15
Payer: MEDICARE

## 2023-09-15 ENCOUNTER — HOSPITAL ENCOUNTER (OUTPATIENT)
Dept: GENERAL RADIOLOGY | Age: 68
Discharge: HOME | End: 2023-09-15
Attending: NURSE PRACTITIONER
Payer: MEDICARE

## 2023-09-15 VITALS
BODY MASS INDEX: 17.78 KG/M2 | SYSTOLIC BLOOD PRESSURE: 132 MMHG | OXYGEN SATURATION: 99 % | HEIGHT: 62 IN | TEMPERATURE: 98 F | HEART RATE: 84 BPM | DIASTOLIC BLOOD PRESSURE: 80 MMHG | WEIGHT: 96.63 LBS | RESPIRATION RATE: 18 BRPM

## 2023-09-15 DIAGNOSIS — M79.642 LEFT HAND PAIN: ICD-10-CM

## 2023-09-15 DIAGNOSIS — R07.81 RIB PAIN: ICD-10-CM
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
## 2023-09-15 DIAGNOSIS — S00.83XD CONTUSION OF FOREHEAD, SUBSEQUENT ENCOUNTER: ICD-10-CM

## 2023-09-15 DIAGNOSIS — M25.512 ACUTE PAIN OF LEFT SHOULDER: ICD-10-CM

## 2023-09-15 PROCEDURE — 99214 OFFICE O/P EST MOD 30 MIN: CPT | Performed by: NURSE PRACTITIONER

## 2023-09-15 PROCEDURE — 1125F AMNT PAIN NOTED PAIN PRSNT: CPT | Performed by: NURSE PRACTITIONER

## 2023-09-15 PROCEDURE — 73130 X-RAY EXAM OF HAND: CPT | Performed by: NURSE PRACTITIONER

## 2023-09-19 ENCOUNTER — TELEPHONE (OUTPATIENT)
Dept: PHYSICAL THERAPY | Facility: HOSPITAL | Age: 68
End: 2023-09-19

## 2023-09-21 RX ORDER — ATORVASTATIN CALCIUM 10 MG/1
10 TABLET, FILM COATED ORAL DAILY
Qty: 90 TABLET | Refills: 1 | Status: SHIPPED | OUTPATIENT
Start: 2023-09-21

## 2023-09-21 NOTE — TELEPHONE ENCOUNTER
Last OV relevant to medication: 9/7/23  Last refill date: 3/24/23 #90/refills: 1  When pt was asked to return for OV: 1/7/24  Upcoming appt/reason: apt with  1/8/24  Was pt informed of any over due labs: is hfp due now or prior to next apt?    Lab Results   Component Value Date    CHOLEST 169 06/29/2021    TRIG 203 (H) 06/29/2021    HDL 87 (H) 06/29/2021    LDL 50 06/29/2021    VLDL 29 06/29/2021    TCHDLRATIO 4.7 (H) 10/12/2012    [place] 82 06/29/2021

## 2023-09-22 ENCOUNTER — LAB ENCOUNTER (OUTPATIENT)
Dept: LAB | Age: 68
End: 2023-09-22
Attending: INTERNAL MEDICINE
Payer: MEDICARE

## 2023-09-22 DIAGNOSIS — E78.00 PURE HYPERCHOLESTEROLEMIA: Chronic | ICD-10-CM

## 2023-09-22 LAB
ALBUMIN SERPL-MCNC: 4 G/DL (ref 3.4–5)
ALP LIVER SERPL-CCNC: 62 U/L
ALT SERPL-CCNC: 28 U/L
AST SERPL-CCNC: 23 U/L (ref 15–37)
BILIRUB DIRECT SERPL-MCNC: 0.1 MG/DL (ref 0–0.2)
BILIRUB SERPL-MCNC: 0.2 MG/DL (ref 0.1–2)
PROT SERPL-MCNC: 7.6 G/DL (ref 6.4–8.2)

## 2023-09-22 PROCEDURE — 36415 COLL VENOUS BLD VENIPUNCTURE: CPT

## 2023-09-22 PROCEDURE — 80076 HEPATIC FUNCTION PANEL: CPT

## 2023-09-25 ENCOUNTER — TELEPHONE (OUTPATIENT)
Dept: INTERNAL MEDICINE CLINIC | Facility: CLINIC | Age: 68
End: 2023-09-25

## 2023-09-25 ENCOUNTER — PATIENT MESSAGE (OUTPATIENT)
Dept: INTERNAL MEDICINE CLINIC | Facility: CLINIC | Age: 68
End: 2023-09-25

## 2023-09-25 DIAGNOSIS — M81.0 AGE-RELATED OSTEOPOROSIS WITHOUT CURRENT PATHOLOGICAL FRACTURE: Primary | ICD-10-CM

## 2023-09-25 NOTE — TELEPHONE ENCOUNTER
From: Elia Mock  To: Cedrick Jurado  Sent: 9/25/2023 1:13 PM CDT  Subject: Kathysamm Cole,  I scheduled a Covid,Flu,Pneumonia and RSV this Friday at [facility]. Is that what I needed? I can't find vaccines in My Chart. I will get Shingles at a later date but wasn't positive on Pneumonia. If you think it's too much at one time, tell me I'm crazy and I'll cancel two of them. Thanks. My upper ribs are still very painful. Am hoping for some reprieve in a week or two.   Tammy Suggs

## 2023-09-25 NOTE — TELEPHONE ENCOUNTER
Patient requested an order for a dexa scan. She has an upcoming appt with her endocrinologist who would like to see the results of the scan. Please advise. Thank  you!

## 2023-09-25 NOTE — TELEPHONE ENCOUNTER
Spoke to pt, order not needed as she had dexa scan at [facility] in 2021. She was able to obtain records for her apt.

## 2023-09-25 NOTE — TELEPHONE ENCOUNTER
No prevnar until 2026    RSV in November - that's when RSV season is    Then flu and covid now is fine. Thanks.

## 2023-09-25 NOTE — TELEPHONE ENCOUNTER
Please see pt message and advise. Pneumovax 23 last given 2021. Should pt space out these vaccinations? Thanks!

## 2023-09-29 ENCOUNTER — OFFICE VISIT (OUTPATIENT)
Dept: PHYSICAL MEDICINE AND REHAB | Facility: CLINIC | Age: 68
End: 2023-09-29
Payer: MEDICARE

## 2023-09-29 VITALS
WEIGHT: 96 LBS | SYSTOLIC BLOOD PRESSURE: 130 MMHG | BODY MASS INDEX: 17.66 KG/M2 | OXYGEN SATURATION: 98 % | HEART RATE: 97 BPM | DIASTOLIC BLOOD PRESSURE: 82 MMHG | HEIGHT: 62 IN

## 2023-09-29 DIAGNOSIS — G20.A1 PARKINSON'S DISEASE: Primary | Chronic | ICD-10-CM

## 2023-09-29 DIAGNOSIS — R26.9 GAIT DISTURBANCE: ICD-10-CM

## 2023-09-29 DIAGNOSIS — F33.40 RECURRENT MAJOR DEPRESSIVE DISORDER, IN REMISSION (HCC): ICD-10-CM

## 2023-09-29 DIAGNOSIS — M54.2 NECK PAIN: ICD-10-CM

## 2023-09-29 PROCEDURE — 1125F AMNT PAIN NOTED PAIN PRSNT: CPT | Performed by: PHYSICAL MEDICINE & REHABILITATION

## 2023-09-29 NOTE — PROGRESS NOTES
NEW PATIENT VISIT    CHIEF COMPLAINT  Gait and balance deficits. Parkinson's disease    HISTORY OF PRESENTING ILLNESS  Anoop Kim is a 76year old female who presents for evaluation of gait disturbance, balance deficits in the setting of Parkinson's disease. Patient was referred to my office for evaluation however she has multiple specialists already on her case including orthopedic surgery, pain management, neurology at [facility] and has a referral in place for physical therapy which is all very reasonable work-up and management of this patient. I discussed with her at length that adding other subspecialist to mix would not provide her with benefit and that she currently has a very appropriate treatment plan. I do appreciate the referral however I do not believe I have more to offer this patient as she is currently being well managed.         Iris Umanzor DO  Physical Medicine and Rehabilitation / [id number] [facility]

## 2023-10-02 ENCOUNTER — TELEPHONE (OUTPATIENT)
Dept: ORTHOPEDICS CLINIC | Facility: CLINIC | Age: 68
End: 2023-10-02

## 2023-10-02 RX ORDER — DULOXETIN HYDROCHLORIDE 30 MG/1
30 CAPSULE, DELAYED RELEASE ORAL DAILY
Qty: 90 CAPSULE | Refills: 0 | Status: SHIPPED | OUTPATIENT
Start: 2023-10-02

## 2023-10-02 NOTE — TELEPHONE ENCOUNTER
Last OV relevant to medication: 9/7/23  Last refill date: 7/31/23 #90/refills: 0  When pt was asked to return for OV: 1/7/24  Upcoming appt/reason: apt with  1/8/24  Was pt informed of any over due labs: utd

## 2023-10-02 NOTE — TELEPHONE ENCOUNTER
Patient scheduled with Rena Cole for a right knee follow up (Dr. March Deep patient). Patient stated she has been having pain on the knee but does not want an injection just yet. Please advise if new imaging needed.      Future Appointments   Date Time Provider Brandi [place]   10/23/2023 10:30 AM Luis Alberto Franco PA-C [facility]

## 2023-10-04 ENCOUNTER — TELEPHONE (OUTPATIENT)
Dept: HEMATOLOGY/ONCOLOGY | Facility: HOSPITAL | Age: 68
End: 2023-10-04

## 2023-10-04 RX ORDER — GABAPENTIN 300 MG/1
CAPSULE ORAL
Refills: 0 | OUTPATIENT
Start: 2023-10-04

## 2023-10-04 RX ORDER — PRIMIDONE 50 MG/1
TABLET ORAL
Refills: 0 | OUTPATIENT
Start: 2023-10-04

## 2023-10-06 ENCOUNTER — APPOINTMENT (OUTPATIENT)
Dept: HEMATOLOGY/ONCOLOGY | Facility: HOSPITAL | Age: 68
End: 2023-10-06
Attending: INTERNAL MEDICINE
Payer: MEDICARE

## 2023-10-09 ENCOUNTER — OFFICE VISIT (OUTPATIENT)
Facility: CLINIC | Age: 68
End: 2023-10-09
Payer: MEDICARE

## 2023-10-09 VITALS
OXYGEN SATURATION: 98 % | HEART RATE: 58 BPM | SYSTOLIC BLOOD PRESSURE: 112 MMHG | WEIGHT: 96 LBS | DIASTOLIC BLOOD PRESSURE: 78 MMHG | BODY MASS INDEX: 18 KG/M2

## 2023-10-09 DIAGNOSIS — M81.0 AGE-RELATED OSTEOPOROSIS WITHOUT CURRENT PATHOLOGICAL FRACTURE: Primary | ICD-10-CM

## 2023-10-09 PROCEDURE — 99204 OFFICE O/P NEW MOD 45 MIN: CPT | Performed by: STUDENT IN AN ORGANIZED HEALTH CARE EDUCATION/TRAINING PROGRAM
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
## 2023-10-09 NOTE — PATIENT INSTRUCTIONS
Return Visit   [ x ] Physician in 3 months (either end of December or 1/2/2023)  [  ] In person or video visit  [  ] In person only    [ x ] After visit summary   [ x ] Placed labs/imaging. Labs are to be drawn at [address] and fasting. [  ] Central scheduling # for ultrasound/nuclear med/CT/MRI/DXA  [  ] Directions to 1st floor lab  [  ] Med rep info for:  [ x ] Dental clearance form  [  ] Will need authorization for outside records  [  ] Give blood sugar log  [  ] Other: It was great meeting you today!     Today we discussed your osteoporosis:  -Please obtain your bone labs (blood work) at [address] whenever you are able   -Based on these labs, I will decide if we need to continue your Reclast infusion vs. Switch to another medication like prolia (because you fractured your ankle from a fall in februrary)   -I will plan on seeing you back around January or end of December to discuss next steps    Take care!  -Dr. Shai Rodney

## 2023-10-10 ENCOUNTER — APPOINTMENT (OUTPATIENT)
Dept: PHYSICAL THERAPY | Age: 68
End: 2023-10-10
Attending: NURSE PRACTITIONER
Payer: MEDICARE

## 2023-10-13 ENCOUNTER — OFFICE VISIT (OUTPATIENT)
Dept: PHYSICAL THERAPY | Age: 68
End: 2023-10-13
Attending: NURSE PRACTITIONER
Payer: MEDICARE
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
## 2023-10-13 DIAGNOSIS — M54.16 LUMBAR RADICULITIS: ICD-10-CM

## 2023-10-13 DIAGNOSIS — M54.16 LUMBAR RADICULOPATHY: ICD-10-CM

## 2023-10-13 DIAGNOSIS — G24.3 CERVICAL DYSTONIA: ICD-10-CM

## 2023-10-13 DIAGNOSIS — M48.061 SPINAL STENOSIS OF LUMBAR REGION WITHOUT NEUROGENIC CLAUDICATION: ICD-10-CM

## 2023-10-13 PROCEDURE — 97112 NEUROMUSCULAR REEDUCATION: CPT

## 2023-10-13 PROCEDURE — 97162 PT EVAL MOD COMPLEX 30 MIN: CPT

## 2023-10-13 RX ORDER — HYDROCODONE BITARTRATE AND ACETAMINOPHEN 5; 325 MG/1; MG/1
1 TABLET ORAL EVERY 8 HOURS PRN
Qty: 90 TABLET | Refills: 0 | Status: SHIPPED | OUTPATIENT
Start: 2023-10-14 | End: 2023-11-13

## 2023-10-13 NOTE — TELEPHONE ENCOUNTER
Medication: HYDROcodone-acetaminophen 5-325 MG     Date of last refill: 09/14/23  [place] 09/26/59    Last office visit: 05/22/23  Due back to clinic per last office note:  6 months  Date next office visit scheduled:  11/13/23    Last URINE Screen: 05/02/23  Screen Results:  please review in chart      Narcotic Contract EXPIRATION date: 10/31/23    Last OV note recommendation:   SSESSMENT AND PLAN:   Spinal stenosis of lumbar region, unspecified whether neurogenic claudication present  (primary encounter diagnosis)  S/P cervical spinal fusion     Patient is a 57-year-old female with a history of prior spinal fusion. Has chronic pain as result of this. This is managed with hydrocodone. From medication standpoint states medication still continue to provide function and analgesia improvement. I reviewed her recent urine drug screen is consistent with hydrocodone usage. [place] . Patient asking for referral to acupuncture which is reasonable. Patient can follow-up in 5 months time for medication review. Radiology orders and consultations:AC[place]UNCTURE [place] - INTERNAL REFERRAL  The patient indicates understanding of these issues and agrees to the plan. No follow-ups on file.      Evangelina Kaba MD, 5/22/2023, 11:35 AM

## 2023-10-16 ENCOUNTER — OFFICE VISIT (OUTPATIENT)
Dept: PHYSICAL THERAPY | Age: 68
End: 2023-10-16
Attending: NURSE PRACTITIONER
Payer: MEDICARE

## 2023-10-16 PROCEDURE — 97112 NEUROMUSCULAR REEDUCATION: CPT

## 2023-10-16 NOTE — PROGRESS NOTES
Diagnosis:   Gait, balance disorder. Ataxia      Referring Provider: NINA Lopez  Date of Evaluation:    10/13/2023    Precautions:   Pacemaker, Drug Allergy, Cancer, Fall Risk  Next MD visit:   January 2024  Date of Surgery: n/a   Insurance Primary/Secondary: MEDICARE / COMMERCIAL     # Auth Visits: 8            Subjective: Sore following therapy evaluation, it takes a lot of effort to control the tremor. Walked 3 miles outside with friends today 1.5 hours    Pain: 5/10 both lower legs      Objective: TUG test time 11 sec, 10 sec  NEVAREZ balance test  SLS 2 sec hold R/L  30 secs sit to stand x 10  DBS currently active to control tremor      Assessment: Improved functional mobility today, pt reports she is having a good day. Less tremor than some days. Goals:   (to be met in 8 visits)  Pt will demonstrate improved SLS to >5 seconds MATHIEU to promote safety and decrease risk of falls on uneven surfaces such as grass  Pt will perform TUG in <12 seconds with least restrictive AD, demonstrating improved gait speed for improved participation in ADL such as community ambulation  Pt will perform 4-Item DGI with score of 10/12 or greater with least restrictive AD to demonstrate ability to ambulate safely in crowded and busy environments  Pt will be able to squat to  light objects around the house without loss of stability  Pt will improve functional hip strength to demonstrate ability to ascend/descend 1 flight of stairs reciprocally without use of handrail    Plan: Continue PT for balance, gait training, functional mobility. Date: 10/16/2023  TX#: 2/8 Date:                 TX#: 3/ Date:                 TX#: 4/ Date:                 TX#: 5/ Date:    Tx#: 6/   Standing tiltboard A/P x 20 with light UE support  Step ups fwd 6in x 10 without UE support R/L  Side stepping in parallel bars without UE support 4 lengths       Standing balance tandem eyes open R 10 sec hold, L 10 sec hold  Feet together eyes closed 30 secs  SLS 2 attempts R, L       In parallel bars march with high knees x 10 R/L  Foot tap to 6in step R/L alt x 8  Step over mini hurdles placed on floor R/L x 6 fwd, x 6 swd  Sit to stand 30 secs without UE       Standing turn 360 deg R/L x 1 each  Standing feet together holding small ball raise overhead x 5, hold out infront and perform trunk rotation x 5  Walking 20 feet step over 4in box x 2  TUG test x 2       HEP: Balance tandem, feet together, sit to stand    Charges: NMRed 2       Total Timed Treatment: 35  min  Total Treatment Time: 35 min

## 2023-10-18 ENCOUNTER — OFFICE VISIT (OUTPATIENT)
Dept: PHYSICAL THERAPY | Age: 68
End: 2023-10-18
Attending: NURSE PRACTITIONER
Payer: MEDICARE

## 2023-10-18 PROCEDURE — 97112 NEUROMUSCULAR REEDUCATION: CPT

## 2023-10-20 ENCOUNTER — TELEPHONE (OUTPATIENT)
Dept: PAIN CLINIC | Facility: CLINIC | Age: 68
End: 2023-10-20

## 2023-10-20 ENCOUNTER — NURSE ONLY (OUTPATIENT)
Dept: HEMATOLOGY/ONCOLOGY | Facility: HOSPITAL | Age: 68
End: 2023-10-20
Attending: INTERNAL MEDICINE
Payer: MEDICARE

## 2023-10-20 DIAGNOSIS — D46.4 REFRACTORY ANEMIA, UNSPECIFIED (HCC): ICD-10-CM

## 2023-10-20 DIAGNOSIS — D50.0 IRON DEFICIENCY ANEMIA DUE TO CHRONIC BLOOD LOSS: ICD-10-CM

## 2023-10-20 LAB
BASOPHILS # BLD AUTO: 0.02 X10(3) UL (ref 0–0.2)
BASOPHILS NFR BLD AUTO: 0.5 %
DEPRECATED HBV CORE AB SER IA-ACNC: 244.5 NG/ML
EOSINOPHIL # BLD AUTO: 0.08 X10(3) UL (ref 0–0.7)
EOSINOPHIL NFR BLD AUTO: 1.9 %
ERYTHROCYTE [DISTWIDTH] IN BLOOD BY AUTOMATED COUNT: 12.7 %
HCT VFR BLD AUTO: 39.5 %
HGB BLD-MCNC: 12.9 G/DL
IMM GRANULOCYTES # BLD AUTO: 0.01 X10(3) UL (ref 0–1)
IMM GRANULOCYTES NFR BLD: 0.2 %
IRON SATN MFR SERPL: 30 %
IRON SERPL-MCNC: 92 UG/DL
LYMPHOCYTES # BLD AUTO: 1.15 X10(3) UL (ref 1–4)
LYMPHOCYTES NFR BLD AUTO: 27.8 %
MCH RBC QN AUTO: 34.2 PG (ref 26–34)
MCHC RBC AUTO-ENTMCNC: 32.7 G/DL (ref 31–37)
MCV RBC AUTO: 104.8 FL
MONOCYTES # BLD AUTO: 0.45 X10(3) UL (ref 0.1–1)
MONOCYTES NFR BLD AUTO: 10.9 %
NEUTROPHILS # BLD AUTO: 2.42 X10 (3) UL (ref 1.5–7.7)
NEUTROPHILS # BLD AUTO: 2.42 X10(3) UL (ref 1.5–7.7)
NEUTROPHILS NFR BLD AUTO: 58.7 %
PLATELET # BLD AUTO: 204 10(3)UL (ref 150–450)
RBC # BLD AUTO: 3.77 X10(6)UL
TIBC SERPL-MCNC: 304 UG/DL (ref 240–450)
TRANSFERRIN SERPL-MCNC: 204 MG/DL (ref 200–360)
WBC # BLD AUTO: 4.1 X10(3) UL (ref 4–11)

## 2023-10-20 PROCEDURE — 36415 COLL VENOUS BLD VENIPUNCTURE: CPT

## 2023-10-20 PROCEDURE — 83550 IRON BINDING TEST: CPT

## 2023-10-20 PROCEDURE — 83540 ASSAY OF IRON: CPT

## 2023-10-20 PROCEDURE — 82728 ASSAY OF FERRITIN: CPT

## 2023-10-20 PROCEDURE — 85025 COMPLETE CBC W/AUTO DIFF WBC: CPT

## 2023-10-20 NOTE — TELEPHONE ENCOUNTER
Patient states at [address] in May Dr. Darlyn Sellers stated to get a UDS completed before the end of the year. Patient went to lab to complete UDS and no orders were in chart. Please place order, patient is currently waiting at lab. Thank you!

## 2023-10-20 NOTE — TELEPHONE ENCOUNTER
UDS was not discussed at [address] [place] [address] and orders were not placed. Pt can complete at her f/u visit if provider feels this is necessary. However, last UDS was completed in May, these are performed randomly and may not be necessary at next visit.

## 2023-10-23 ENCOUNTER — OFFICE VISIT (OUTPATIENT)
Dept: PHYSICAL THERAPY | Age: 68
End: 2023-10-23
Attending: NURSE PRACTITIONER
Payer: MEDICARE

## 2023-10-23 ENCOUNTER — OFFICE VISIT (OUTPATIENT)
Dept: ORTHOPEDICS CLINIC | Facility: CLINIC | Age: 68
End: 2023-10-23
Payer: MEDICARE

## 2023-10-23 VITALS — WEIGHT: 96 LBS | HEIGHT: 62 IN | BODY MASS INDEX: 17.66 KG/M2

## 2023-10-23 DIAGNOSIS — M17.31 POST-TRAUMATIC OSTEOARTHRITIS OF RIGHT KNEE: Primary | ICD-10-CM

## 2023-10-23 PROCEDURE — 97112 NEUROMUSCULAR REEDUCATION: CPT

## 2023-10-23 RX ORDER — TRIAMCINOLONE ACETONIDE 40 MG/ML
40 INJECTION, SUSPENSION INTRA-ARTICULAR; INTRAMUSCULAR ONCE
Status: COMPLETED | OUTPATIENT
Start: 2023-10-23 | End: 2023-10-23

## 2023-10-23 RX ADMIN — TRIAMCINOLONE ACETONIDE 40 MG: 40 INJECTION, SUSPENSION INTRA-ARTICULAR; INTRAMUSCULAR at 10:59:00

## 2023-10-23 NOTE — PROGRESS NOTES
[facility] Progress Note    Subjective: Patient returns to clinic after previously being seen on 5/17/2023 by Dr. Maryan Geogre for severe posttraumatic right knee osteoarthritis. She received an injection on 5/17/2023 which did offer significant relief of her symptoms. She reports however, about 1 month ago she insidiously began developing pain along the lateral aspect with radiation to the posterior knee of the right knee. The pain worsens with prolonged walking or especially with walking on uneven surfaces and ascending stairs. She does feel that the pain now is different than when she initially consulted. Objective: Patient is seated comfortably in the exam chair. Alert and oriented x3. Nonlabored breathing. Gross neurologically intact. Right knee demonstrates extension about 5 degrees short of full and flexion approximately 120 degrees. No significant degree of tenderness along the bilateral joint lines. Right knee without any redness, warmth, or significant effusion. She does have some lateral knee pain with varus stress test at 0 and 30 degrees, although there is no laxity. No laxity with anterior posterior drawer. Assessment/Plan: Did discuss with the patient that given the severe valgus osteoarthritis of her knee, I would anticipate her lateral knee pain is secondary to her osteoarthritis. We again discussed nonsurgical treatment options including medications, injections. Recommended steroid injection into the right knee in clinic today, which the patient agreed and endorsed interest in receiving. She did ask about when the right time to pursue surgical intervention would be and I discussed the combination of the failure of nonsurgical measures to improve pain as well as disruptiveness of quality of life. Injection was administered into the right knee in clinic, please see prior to procedure note for additional details.   She may contact us for follow-up as needed for any repeat injection. Her questions were sought and answered satisfactorily. She is happy with the plan and will follow as advised. Keven Jones PA-C  [facility] Orthopedic Surgery    This note was dictated using Dragon software. While it was briefly proofread prior to completion, some grammatical, spelling, and word choice errors due to dictation may still occur.

## 2023-10-23 NOTE — PROCEDURES
After informed consent, the patient's right knee was marked, locally anesthetized with skin refrigerant, prepped with topical antiseptic, and injected with a mixture of 1mL 40mg/mL Kenalog, 2mL 1% lidocaine and 2mL 0.5% marcaine through the inferolateral portal.  A band-aid was applied. The patient tolerated the procedure well.     Bernadette Miles PA-C  [address] Orthopedic Surgery

## 2023-10-23 NOTE — PROGRESS NOTES
Diagnosis:   Gait, balance disorder. Ataxia      Referring Provider: NINA Soto  Date of Evaluation:    10/13/2023    Precautions:   Pacemaker, Drug Allergy, Cancer, Fall Risk  Next MD visit:   January 2024  Date of Surgery: n/a   Insurance Primary/Secondary: MEDICARE / COMMERCIAL     # Auth Visits: 8            Subjective: No falls, I am a little achy today in general.    Pain: 5/10 both lower legs      Objective:   TUG test time 11 sec, 10 sec, 10 sec  30 secs sit to stand x 12  DBS currently active to control tremor      Assessment: Ipt is steady with functional balance, she is unable to perform higher level single leg balance activities. Goals:   (to be met in 8 visits)  Pt will demonstrate improved SLS to >5 seconds MATHIEU to promote safety and decrease risk of falls on uneven surfaces such as grass  Pt will perform TUG in <12 seconds with least restrictive AD, demonstrating improved gait speed for improved participation in ADL such as community ambulation- met  Pt will perform 4-Item DGI with score of 10/12 or greater with least restrictive AD to demonstrate ability to ambulate safely in crowded and busy environments  Pt will be able to squat to  light objects around the house without loss of stability- met  Pt will improve functional hip strength to demonstrate ability to ascend/descend 1 flight of stairs reciprocally without use of handrail    Plan: Continue PT for balance, gait training, functional mobility. Date: 10/16/2023  TX#: 2/8 Date:10/18/2023                TX#: 3/8 Date:10/23/2023                TX#: 4/8 Date:                 TX#: 5/ Date:    Tx#: 6/   Standing tiltboard A/P x 20 with light UE support  Step ups fwd 6in x 10 without UE support R/L  Side stepping in parallel bars without UE support 4 lengths Standing on round bottom wobbleborad A/P and lateral x 20 each  Balance with board flat 3 x 10 sec hold  Fwd lunge onto airex cusion R/L x 10  Step up fwd and bwd onto/off airex cushion x 10 Nu step level 2 5 mins  Standing tiltboard A/P and lateral x 20  Partial lunge fwd onto airex cushion with UE support x 10 R/L  Side stepping in parallel bars without UE support 6 lengths  Walk with high knees tap hand to knee without UE support 6 lengths     Standing balance tandem eyes open R 10 sec hold, L 10 sec hold  Feet together eyes closed 30 secs  SLS 2 attempts R, L Side stepping in parallel bars without UE support 6 lengths  Walking heel to toe in parallel bars with light touch UE 6 lengths  March high knees 6 lengths with light touch UE Sit to stand x 10  Tandem stance 10 sec hold R/L  Feet together eyes open 30 secs  Feet together eyes closed 10 secs     In parallel bars march with high knees x 10 R/L  Foot tap to 6in step R/L alt x 8  Step over mini hurdles placed on floor R/L x 6 fwd, x 6 swd  Sit to stand 30 secs without UE Foot tap to 6in R/L alt x 8  Turn 360 deg R/L x 3  Sit to stand 30 secs x 12 without UE   TUG test x 2  Walking around cones placed on floor R/L 2 x 20 feet TUG test x 3  Walking around cones placed on floor 20 feet x 4  Foot tap to cones placed on floor R/L x 10     Standing turn 360 deg R/L x 1 each  Standing feet together holding small ball raise overhead x 5, hold out infront and perform trunk rotation x 5  Walking 20 feet step over 4in box x 2  TUG test x 2 Foot tap to cones placed on floor R/L x 10  Standing feet together holding small ball reach overhead x 5, reach out in fron trunk rotation R/L x 5, pass ball behind back x 5      HEP: Balance tandem, feet together, sit to stand    Charges: NMRed 2       Total Timed Treatment: 35  min  Total Treatment Time: 35 min

## 2023-10-25 ENCOUNTER — OFFICE VISIT (OUTPATIENT)
Dept: PHYSICAL THERAPY | Age: 68
End: 2023-10-25
Attending: NURSE PRACTITIONER
Payer: MEDICARE

## 2023-10-25 PROCEDURE — 97112 NEUROMUSCULAR REEDUCATION: CPT

## 2023-10-25 NOTE — PROGRESS NOTES
Diagnosis:   Gait, balance disorder. Ataxia      Referring Provider: NINA Radn  Date of Evaluation:    10/13/2023    Precautions:   Pacemaker, Drug Allergy, Cancer, Fall Risk  Next MD visit:   January 2024  Date of Surgery: n/a   Insurance Primary/Secondary: MEDICARE / COMMERCIAL     # Auth Visits: 8            Subjective: No falls, walked 2.7 miles yesterday at the [facility]    Pain: 3/10 R knee, Low back 4/10    Objective:   TUG test time 11 sec, 10 sec, 10 sec  30 secs sit to stand x 12  DBS currently active to control tremor      Assessment: Pt is steady with functional balance, she is unable to perform higher level single leg balance activities. Goals:   (to be met in 8 visits)  Pt will demonstrate improved SLS to >5 seconds MATHIEU to promote safety and decrease risk of falls on uneven surfaces such as grass  Pt will perform TUG in <12 seconds with least restrictive AD, demonstrating improved gait speed for improved participation in ADL such as community ambulation- met  Pt will perform 4-Item DGI with score of 10/12 or greater with least restrictive AD to demonstrate ability to ambulate safely in crowded and busy environments  Pt will be able to squat to  light objects around the house without loss of stability- met  Pt will improve functional hip strength to demonstrate ability to ascend/descend 1 flight of stairs reciprocally without use of handrail    Plan: Continue PT for balance, gait training, functional mobility. 1 x per week for 3 remaining visits. Date: 10/16/2023  TX#: 2/8 Date:10/18/2023                TX#: 3/8 Date:10/23/2023                TX#: 4/8 Date:10/25/2023                 TX#: 5/8 Date:    Tx#: 6/   Standing tiltboard A/P x 20 with light UE support  Step ups fwd 6in x 10 without UE support R/L  Side stepping in parallel bars without UE support 4 lengths Standing on round bottom wobbleborad A/P and lateral x 20 each  Balance with board flat 3 x 10 sec hold  Fwd lunge onto airex cusion R/L x 10  Step up fwd and bwd onto/off airex cushion x 10 Nu step level 2 5 mins  Standing tiltboard A/P and lateral x 20  Partial lunge fwd onto airex cushion with UE support x 10 R/L  Side stepping in parallel bars without UE support 6 lengths  Walk with high knees tap hand to knee without UE support 6 lengths Nu step level 4 5 mins   X 20  Side stepping in parallel bars 6 lengths without UE support  Step onto/off airex cushion fwd and bwd x 10 R/L  Walk with tap to high knees 6 lengths of parallel bars  Tandem stance 10 sec hold R/L  Balance feet together 30 sec hold    Standing balance tandem eyes open R 10 sec hold, L 10 sec hold  Feet together eyes closed 30 secs  SLS 2 attempts R, L Side stepping in parallel bars without UE support 6 lengths  Walking heel to toe in parallel bars with light touch UE 6 lengths  March high knees 6 lengths with light touch UE Sit to stand x 10  Tandem stance 10 sec hold R/L  Feet together eyes open 30 secs  Feet together eyes closed 10 secs Sit to stand x 10 without UE support  LAQ x 10 R/L  Walk around cones placed on the floor 20 feet x 4  Foot tap to cones placed on the floor x 10 R/L      In parallel bars march with high knees x 10 R/L  Foot tap to 6in step R/L alt x 8  Step over mini hurdles placed on floor R/L x 6 fwd, x 6 swd  Sit to stand 30 secs without UE Foot tap to 6in R/L alt x 8  Turn 360 deg R/L x 3  Sit to stand 30 secs x 12 without UE   TUG test x 2  Walking around cones placed on floor R/L 2 x 20 feet TUG test x 3  Walking around cones placed on floor 20 feet x 4  Foot tap to cones placed on floor R/L x 10     Standing turn 360 deg R/L x 1 each  Standing feet together holding small ball raise overhead x 5, hold out infront and perform trunk rotation x 5  Walking 20 feet step over 4in box x 2  TUG test x 2 Foot tap to cones placed on floor R/L x 10  Standing feet together holding small ball reach overhead x 5, reach out in fron trunk rotation R/L x 5, pass ball behind back x 5      HEP: Balance tandem, feet together, sit to stand    Charges: NMRed 2       Total Timed Treatment: 35  min  Total Treatment Time: 35 min

## 2023-10-30 ENCOUNTER — OFFICE VISIT (OUTPATIENT)
Dept: PHYSICAL THERAPY | Age: 68
End: 2023-10-30
Attending: NURSE PRACTITIONER
Payer: MEDICARE

## 2023-10-30 PROCEDURE — 97112 NEUROMUSCULAR REEDUCATION: CPT

## 2023-10-30 PROCEDURE — 97110 THERAPEUTIC EXERCISES: CPT

## 2023-10-30 NOTE — PROGRESS NOTES
Diagnosis:   Gait, balance disorder. Ataxia      Referring Provider: NINA Jackson  Date of Evaluation:    10/13/2023    Precautions:   Pacemaker, Drug Allergy, Cancer, Fall Risk  Next MD visit:   January 2024  Date of Surgery: n/a   Insurance Primary/Secondary: MEDICARE / COMMERCIAL     # Auth Visits: 8            Subjective: No falls, walked 2.5 miles this morning at the [facility]    Pain: Low back 4/10    Objective:   TUG test time 10 sec  30 secs sit to stand x 12  DBS currently active to control tremor  SLS R 4 sec, 5 sec  SLS L 5 sec, 7 sec      Assessment: Pt is steady with functional balance, she is unable to perform higher level single leg balance activities. 3 goals met. Repeat DGI and FES post next visit. Goals:   (to be met in 8 visits)  Pt will demonstrate improved SLS to >5 seconds MATHIEU to promote safety and decrease risk of falls on uneven surfaces such as grass  Pt will perform TUG in <12 seconds with least restrictive AD, demonstrating improved gait speed for improved participation in ADL such as community ambulation- met  Pt will perform 4-Item DGI with score of 10/12 or greater with least restrictive AD to demonstrate ability to ambulate safely in crowded and busy environments  Pt will be able to squat to  light objects around the house without loss of stability- met  Pt will improve functional hip strength to demonstrate ability to ascend/descend 1 flight of stairs reciprocally without use of handrail- met    Plan: Continue PT for balance, gait training, functional mobility. 1 x per week for 2 remaining visits.   Date: 10/16/2023  TX#: 2/8 Date:10/18/2023                TX#: 3/8 Date:10/23/2023                TX#: 4/8 Date:10/25/2023                 TX#: 5/8 Date: 10/30/2023  Tx#: 6/8   Standing tiltboard A/P x 20 with light UE support  Step ups fwd 6in x 10 without UE support R/L  Side stepping in parallel bars without UE support 4 lengths Standing on round bottom wobbleborad A/P and lateral x 20 each  Balance with board flat 3 x 10 sec hold  Fwd lunge onto airex cusion R/L x 10  Step up fwd and bwd onto/off airex cushion x 10 Nu step level 2 5 mins  Standing tiltboard A/P and lateral x 20  Partial lunge fwd onto airex cushion with UE support x 10 R/L  Side stepping in parallel bars without UE support 6 lengths  Walk with high knees tap hand to knee without UE support 6 lengths Nu step level 4 5 mins   X 20  Side stepping in parallel bars 6 lengths without UE support  Step onto/off airex cushion fwd and bwd x 10 R/L  Walk with tap to high knees 6 lengths of parallel bars  Tandem stance 10 sec hold R/L  Balance feet together 30 sec hold Nu step level 4 5 mins  Supine passive hip flex, abd R/L x 10  Hamstring stretch 30 sec hold R/L  Hook lying LTR x 10  Side stepping in parallel 5 lengths  March with high knees hand tap to knee x 5 lengths  Standing tiltboard A/P and lateral x 20   Standing balance tandem eyes open R 10 sec hold, L 10 sec hold  Feet together eyes closed 30 secs  SLS 2 attempts R, L Side stepping in parallel bars without UE support 6 lengths  Walking heel to toe in parallel bars with light touch UE 6 lengths  March high knees 6 lengths with light touch UE Sit to stand x 10  Tandem stance 10 sec hold R/L  Feet together eyes open 30 secs  Feet together eyes closed 10 secs Sit to stand x 10 without UE support  LAQ x 10 R/L  Walk around cones placed on the floor 20 feet x 4  Foot tap to cones placed on the floor x 10 R/L   Step over fwd and bwd on airex cushion x 10 without UE support  SLS x 2 R/L  Sit to stand without UE support x 10   Walking around cones placed on floor 20 feet x 4   In parallel bars march with high knees x 10 R/L  Foot tap to 6in step R/L alt x 8  Step over mini hurdles placed on floor R/L x 6 fwd, x 6 swd  Sit to stand 30 secs without UE Foot tap to 6in R/L alt x 8  Turn 360 deg R/L x 3  Sit to stand 30 secs x 12 without UE   TUG test x 2  Walking around cones placed on floor R/L 2 x 20 feet TUG test x 3  Walking around cones placed on floor 20 feet x 4  Foot tap to cones placed on floor R/L x 10  Foot tap to cones placed on floor 10 x R/L   cones placed on floor without UE support   Standing turn 360 deg R/L x 1 each  Standing feet together holding small ball raise overhead x 5, hold out infront and perform trunk rotation x 5  Walking 20 feet step over 4in box x 2  TUG test x 2 Foot tap to cones placed on floor R/L x 10  Standing feet together holding small ball reach overhead x 5, reach out in front trunk rotation R/L x 5, pass ball behind back x 5      HEP: Balance tandem, feet together, sit to stand    Charges: NMRed 2, EX 1       Total Timed Treatment: Neuromuscular re-ed, balance training 30  min, There ex 15 min                 Total Treatment Time: 45 min

## 2023-11-01 ENCOUNTER — OFFICE VISIT (OUTPATIENT)
Dept: PHYSICAL THERAPY | Age: 68
End: 2023-11-01
Attending: NURSE PRACTITIONER
Payer: MEDICARE

## 2023-11-01 PROCEDURE — 97110 THERAPEUTIC EXERCISES: CPT

## 2023-11-01 PROCEDURE — 97112 NEUROMUSCULAR REEDUCATION: CPT

## 2023-11-01 NOTE — PROGRESS NOTES
Diagnosis:   Gait, balance disorder. Ataxia      Referring Provider: NINA Cherry  Date of Evaluation:    10/13/2023    Precautions:   Pacemaker, Drug Allergy, Cancer, Fall Risk  Next MD visit:   January 2024  Date of Surgery: n/a   Insurance Primary/Secondary: MEDICARE / COMMERCIAL     # Auth Visits: 8            Subjective: No falls, walked 2.5 miles this morning at the [facility]    Pain: Low back 4/10    Objective:   TUG test time 10 sec  30 secs sit to stand x 12  DBS currently active to control tremor  SLS R 4 sec, 5 sec  SLS L 5 sec, 7 sec      Assessment: Pt is steady with functional balance, she is unable to perform higher level single leg balance activities. 3 goals met. Repeat DGI and FES post next visit. Goals:   (to be met in 8 visits)  Pt will demonstrate improved SLS to >5 seconds MATHIEU to promote safety and decrease risk of falls on uneven surfaces such as grass  Pt will perform TUG in <12 seconds with least restrictive AD, demonstrating improved gait speed for improved participation in ADL such as community ambulation- met  Pt will perform 4-Item DGI with score of 10/12 or greater with least restrictive AD to demonstrate ability to ambulate safely in crowded and busy environments  Pt will be able to squat to  light objects around the house without loss of stability- met  Pt will improve functional hip strength to demonstrate ability to ascend/descend 1 flight of stairs reciprocally without use of handrail- met    Plan: Continue PT x 1 for balance, gait training, functional mobility.    Date: 10/16/2023  TX#: 2/8 Date:10/18/2023                TX#: 3/8 Date:10/23/2023                TX#: 4/8 Date:10/25/2023                 TX#: 5/8 Date: 10/30/2023  Tx#: 6/8 Date: 11/1/2023  Tx#: 7/8   Standing tiltboard A/P x 20 with light UE support  Step ups fwd 6in x 10 without UE support R/L  Side stepping in parallel bars without UE support 4 lengths Standing on round bottom wobbleborad A/P and lateral x 20 each  Balance with board flat 3 x 10 sec hold  Fwd lunge onto airex cusion R/L x 10  Step up fwd and bwd onto/off airex cushion x 10 Nu step level 2 5 mins  Standing tiltboard A/P and lateral x 20  Partial lunge fwd onto airex cushion with UE support x 10 R/L  Side stepping in parallel bars without UE support 6 lengths  Walk with high knees tap hand to knee without UE support 6 lengths Nu step level 4 5 mins   X 20  Side stepping in parallel bars 6 lengths without UE support  Step onto/off airex cushion fwd and bwd x 10 R/L  Walk with tap to high knees 6 lengths of parallel bars  Tandem stance 10 sec hold R/L  Balance feet together 30 sec hold Nu step level 4 5 mins  Supine passive hip flex, abd R/L x 10  Hamstring stretch 30 sec hold R/L  Hook lying LTR x 10  Side stepping in parallel 5 lengths  March with high knees hand tap to knee x 5 lengths  Standing tiltboard A/P and lateral x 20 Nu step level 4 5 mins  Step over fwd and bwd on airex cushion R/L x 10  Side stepping in parallel bars without UE support 6 lengths  Standing tiltboard A/P and lateral x 20  Walk with high knees in parallel bars hand tap to knees 6 lengths   Standing balance tandem eyes open R 10 sec hold, L 10 sec hold  Feet together eyes closed 30 secs  SLS 2 attempts R, L Side stepping in parallel bars without UE support 6 lengths  Walking heel to toe in parallel bars with light touch UE 6 lengths  March high knees 6 lengths with light touch UE Sit to stand x 10  Tandem stance 10 sec hold R/L  Feet together eyes open 30 secs  Feet together eyes closed 10 secs Sit to stand x 10 without UE support  LAQ x 10 R/L  Walk around cones placed on the floor 20 feet x 4  Foot tap to cones placed on the floor x 10 R/L   Step over fwd and bwd on airex cushion x 10 without UE support  SLS x 2 R/L  Sit to stand without UE support x 10   Walking around cones placed on floor 20 feet x 4 Walking around cones placed on floor 20 feet x 4  Foot tap to cones placed on floor  x 10   cones placed on floor    In parallel bars march with high knees x 10 R/L  Foot tap to 6in step R/L alt x 8  Step over mini hurdles placed on floor R/L x 6 fwd, x 6 swd  Sit to stand 30 secs without UE Foot tap to 6in R/L alt x 8  Turn 360 deg R/L x 3  Sit to stand 30 secs x 12 without UE   TUG test x 2  Walking around cones placed on floor R/L 2 x 20 feet TUG test x 3  Walking around cones placed on floor 20 feet x 4  Foot tap to cones placed on floor R/L x 10  Foot tap to cones placed on floor 10 x R/L   cones placed on floor without UE support Hook lying LTR x 10  Manual hamstring stretch 30 sec hold R/L  Sit to stand x 10   Standing turn 360 deg R/L x 1 each  Standing feet together holding small ball raise overhead x 5, hold out infront and perform trunk rotation x 5  Walking 20 feet step over 4in box x 2  TUG test x 2 Foot tap to cones placed on floor R/L x 10  Standing feet together holding small ball reach overhead x 5, reach out in front trunk rotation R/L x 5, pass ball behind back x 5       HEP: Balance tandem, feet together, sit to stand    Charges: NMRed 2, EX 1       Total Timed Treatment: Neuromuscular re-ed, balance training 30  min, There ex 15 min                 Total Treatment Time: 45 min

## 2023-11-03 ENCOUNTER — HOSPITAL ENCOUNTER (OUTPATIENT)
Age: 68
Discharge: HOME | End: 2023-11-03
Payer: MEDICARE

## 2023-11-03 VITALS
WEIGHT: 90 LBS | HEIGHT: 62 IN | SYSTOLIC BLOOD PRESSURE: 136 MMHG | DIASTOLIC BLOOD PRESSURE: 96 MMHG | HEART RATE: 110 BPM | TEMPERATURE: 98 F | RESPIRATION RATE: 20 BRPM | BODY MASS INDEX: 16.56 KG/M2 | OXYGEN SATURATION: 96 %

## 2023-11-03 DIAGNOSIS — R05.1 ACUTE COUGH: Primary | ICD-10-CM

## 2023-11-03 DIAGNOSIS — J06.9 VIRAL URI WITH COUGH: ICD-10-CM

## 2023-11-03 LAB
POCT INFLUENZA A: NEGATIVE
POCT INFLUENZA B: NEGATIVE
S PYO AG THROAT QL: NEGATIVE
SARS-COV-2 RNA RESP QL NAA+PROBE: NOT DETECTED

## 2023-11-03 RX ORDER — BENZONATATE 100 MG/1
100 CAPSULE ORAL 3 TIMES DAILY PRN
Qty: 30 CAPSULE | Refills: 0 | Status: SHIPPED | OUTPATIENT
Start: 2023-11-03 | End: 2023-12-03

## 2023-11-03 RX ORDER — PSEUDOEPHEDRINE HCL 30 MG
30 TABLET ORAL EVERY 4 HOURS PRN
Qty: 36 TABLET | Refills: 0 | Status: SHIPPED | OUTPATIENT
Start: 2023-11-03 | End: 2023-12-03

## 2023-11-03 RX ORDER — OXYMETAZOLINE HYDROCHLORIDE 0.05 G/100ML
1 SPRAY NASAL EVERY 4 HOURS PRN
Qty: 15 ML | Refills: 0 | Status: SHIPPED | OUTPATIENT
Start: 2023-11-03 | End: 2023-12-03

## 2023-11-03 RX ORDER — ALBUTEROL SULFATE 90 UG/1
2 AEROSOL, METERED RESPIRATORY (INHALATION) EVERY 4 HOURS PRN
Qty: 1 EACH | Refills: 0 | Status: SHIPPED | OUTPATIENT
Start: 2023-11-03 | End: 2023-12-03

## 2023-11-06 ENCOUNTER — OFFICE VISIT (OUTPATIENT)
Dept: PHYSICAL THERAPY | Age: 68
End: 2023-11-06
Attending: NURSE PRACTITIONER
Payer: MEDICARE

## 2023-11-06 PROCEDURE — 97110 THERAPEUTIC EXERCISES: CPT

## 2023-11-06 PROCEDURE — 97112 NEUROMUSCULAR REEDUCATION: CPT

## 2023-11-06 NOTE — PROGRESS NOTES
Diagnosis:   Gait, balance disorder. Ataxia      Referring Provider: NINA Smith  Date of Evaluation:    10/13/2023    Precautions:   Pacemaker, Drug Allergy, Cancer, Fall Risk  Next MD visit:   January 2024  Date of Surgery: n/a   Insurance Primary/Secondary: MEDICARE / COMMERCIAL     # Auth Visits: 8          Discharge Note:  Subjective: No falls, I am having an endoscopy tomorrow so the Dr had me stop some medications, joints feel achy, R knee has been locking on me especially at night. Cortisone injection R knee 10 days ago does not seem to have helped much. I am having testing for wt loss, endoscopy scheduled for tomorrow. Pain: Low back 4/10, R knee pain intermittent short duration 10/10 with locking    Objective:   TUG test time 10 sec  30 secs sit to stand x 12  DBS currently active to control tremor  SLS R 4 sec, 5 sec  SLS L 5 sec, 7 sec  2 episodes of locking R knee - DJD  Lateral instability R knee      Assessment: Pt is steady with functional balance, she is unable to perform higher level single leg balance activities. 4 goals met. 2 episodes of painful locking R knee. Shortened tx today due to pt feeling anxious about testing procedures planned for tomorrow. Encouraged to continue with regular walking and strengthening exercises.   FES post score 32.81    Goals:   (to be met in 8 visits)  Pt will demonstrate improved SLS to >5 seconds MATHIEU to promote safety and decrease risk of falls on uneven surfaces such as grass- not met  Pt will perform TUG in <12 seconds with least restrictive AD, demonstrating improved gait speed for improved participation in ADL such as community ambulation- met  Pt will perform 4-Item DGI with score of 10/12 or greater with least restrictive AD to demonstrate ability to ambulate safely in crowded and busy environments- met  Pt will be able to squat to  light objects around the house without loss of stability- met  Pt will improve functional hip strength to demonstrate ability to ascend/descend 1 flight of stairs reciprocally without use of handrail- met    Plan: Discharge for this episode of care. Follow up with MD to discuss R knee pain.   Date: 10/16/2023  TX#: 2/8 Date:10/18/2023                TX#: 3/8 Date:10/23/2023                TX#: 4/8 Date:10/25/2023                 TX#: 5/8 Date: 10/30/2023  Tx#: 6/8 Date: 11/1/2023  Tx#: 7/8 Date: 11/6/2023  Tx#: 8/8   Standing tiltboard A/P x 20 with light UE support  Step ups fwd 6in x 10 without UE support R/L  Side stepping in parallel bars without UE support 4 lengths Standing on round bottom wobbleborad A/P and lateral x 20 each  Balance with board flat 3 x 10 sec hold  Fwd lunge onto airex cusion R/L x 10  Step up fwd and bwd onto/off airex cushion x 10 Nu step level 2 5 mins  Standing tiltboard A/P and lateral x 20  Partial lunge fwd onto airex cushion with UE support x 10 R/L  Side stepping in parallel bars without UE support 6 lengths  Walk with high knees tap hand to knee without UE support 6 lengths Nu step level 4 5 mins   X 20  Side stepping in parallel bars 6 lengths without UE support  Step onto/off airex cushion fwd and bwd x 10 R/L  Walk with tap to high knees 6 lengths of parallel bars  Tandem stance 10 sec hold R/L  Balance feet together 30 sec hold Nu step level 4 5 mins  Supine passive hip flex, abd R/L x 10  Hamstring stretch 30 sec hold R/L  Hook lying LTR x 10  Side stepping in parallel 5 lengths  March with high knees hand tap to knee x 5 lengths  Standing tiltboard A/P and lateral x 20 Nu step level 4 5 mins  Step over fwd and bwd on airex cushion R/L x 10  Side stepping in parallel bars without UE support 6 lengths  Standing tiltboard A/P and lateral x 20  Walk with high knees in parallel bars hand tap to knees 6 lengths Nu step level 4 5 mins   X 10    Standing tiltboard A/P and lateral x 20  Side stepping in parallel bars 6 lengths  Walking with high knee march tap hand to knee 6 lengths  Sit to stand x 10 without use of hands   Standing balance tandem eyes open R 10 sec hold, L 10 sec hold  Feet together eyes closed 30 secs  SLS 2 attempts R, L Side stepping in parallel bars without UE support 6 lengths  Walking heel to toe in parallel bars with light touch UE 6 lengths  March high knees 6 lengths with light touch UE Sit to stand x 10  Tandem stance 10 sec hold R/L  Feet together eyes open 30 secs  Feet together eyes closed 10 secs Sit to stand x 10 without UE support  LAQ x 10 R/L  Walk around cones placed on the floor 20 feet x 4  Foot tap to cones placed on the floor x 10 R/L   Step over fwd and bwd on airex cushion x 10 without UE support  SLS x 2 R/L  Sit to stand without UE support x 10   Walking around cones placed on floor 20 feet x 4 Walking around cones placed on floor 20 feet x 4  Foot tap to cones placed on floor  x 10   cones placed on floor  Foot tap to 6in step R/L x 8  Hook lying LTR x 10  Manual hamstring stretch 30 sec hold R/L  S lying hip abd L x 10, R x 10  Seated knee ext x 10 R/L       In parallel bars march with high knees x 10 R/L  Foot tap to 6in step R/L alt x 8  Step over mini hurdles placed on floor R/L x 6 fwd, x 6 swd  Sit to stand 30 secs without UE Foot tap to 6in R/L alt x 8  Turn 360 deg R/L x 3  Sit to stand 30 secs x 12 without UE   TUG test x 2  Walking around cones placed on floor R/L 2 x 20 feet TUG test x 3  Walking around cones placed on floor 20 feet x 4  Foot tap to cones placed on floor R/L x 10  Foot tap to cones placed on floor 10 x R/L   cones placed on floor without UE support Hook lying LTR x 10  Manual hamstring stretch 30 sec hold R/L  Sit to stand x 10    Standing turn 360 deg R/L x 1 each  Standing feet together holding small ball raise overhead x 5, hold out infront and perform trunk rotation x 5  Walking 20 feet step over 4in box x 2  TUG test x 2 Foot tap to cones placed on floor R/L x 10  Standing feet together holding small ball reach overhead x 5, reach out in front trunk rotation R/L x 5, pass ball behind back x 5        HEP: Balance tandem, feet together, sit to stand    Charges: NMRed 1, EX 1       Total Timed Treatment: Neuromuscular re-ed, balance training 15  min, There ex 15 min                 Total Treatment Time: 30 min

## 2023-11-07 RX ORDER — ATORVASTATIN CALCIUM 10 MG/1
10 TABLET, FILM COATED ORAL DAILY
Qty: 90 TABLET | Refills: 1 | OUTPATIENT
Start: 2023-11-07

## 2023-11-07 NOTE — TELEPHONE ENCOUNTER
Atorvastatin 90/1 sent to [facility] 9/21/23. Rx denied and pt notified to contact pharmacy.

## 2023-11-08 ENCOUNTER — HOSPITAL ENCOUNTER (OUTPATIENT)
Age: 68
Discharge: HOME | End: 2023-11-08
Payer: MEDICARE

## 2023-11-08 ENCOUNTER — APPOINTMENT (OUTPATIENT)
Dept: GENERAL RADIOLOGY | Age: 68
End: 2023-11-08
Attending: NURSE PRACTITIONER
Payer: MEDICARE

## 2023-11-08 VITALS
TEMPERATURE: 98 F | SYSTOLIC BLOOD PRESSURE: 172 MMHG | DIASTOLIC BLOOD PRESSURE: 102 MMHG | HEART RATE: 94 BPM | RESPIRATION RATE: 20 BRPM | OXYGEN SATURATION: 98 %

## 2023-11-08 DIAGNOSIS — R03.0 ELEVATED BLOOD PRESSURE READING: ICD-10-CM

## 2023-11-08 DIAGNOSIS — H61.22 IMPACTED CERUMEN OF LEFT EAR: ICD-10-CM

## 2023-11-08 DIAGNOSIS — H66.92 LEFT OTITIS MEDIA, UNSPECIFIED OTITIS MEDIA TYPE: ICD-10-CM

## 2023-11-08 DIAGNOSIS — R05.9 COUGH, UNSPECIFIED TYPE: ICD-10-CM

## 2023-11-08 DIAGNOSIS — R07.89 CHEST TIGHTNESS: Primary | ICD-10-CM

## 2023-11-08 PROCEDURE — 69210 REMOVE IMPACTED EAR WAX UNI: CPT | Performed by: NURSE PRACTITIONER

## 2023-11-08 PROCEDURE — 99213 OFFICE O/P EST LOW 20 MIN: CPT | Performed by: NURSE PRACTITIONER

## 2023-11-08 PROCEDURE — 71046 X-RAY EXAM CHEST 2 VIEWS: CPT | Performed by: NURSE PRACTITIONER

## 2023-11-08 PROCEDURE — 94640 AIRWAY INHALATION TREATMENT: CPT | Performed by: NURSE PRACTITIONER

## 2023-11-08 RX ORDER — IPRATROPIUM BROMIDE AND ALBUTEROL SULFATE 2.5; .5 MG/3ML; MG/3ML
3 SOLUTION RESPIRATORY (INHALATION) ONCE
Status: COMPLETED | OUTPATIENT
Start: 2023-11-08 | End: 2023-11-08

## 2023-11-08 RX ORDER — AMOXICILLIN AND CLAVULANATE POTASSIUM 875; 125 MG/1; MG/1
1 TABLET, FILM COATED ORAL 2 TIMES DAILY
Qty: 14 TABLET | Refills: 0 | Status: SHIPPED | OUTPATIENT
Start: 2023-11-08 | End: 2023-11-10

## 2023-11-08 NOTE — DISCHARGE INSTRUCTIONS
Take antibiotics as directed and to completion  Ibuprofen as needed for pain  Avoid having water run into or sit in your ear  Do not submerge your ear in water  Do not put anything in your ear farther than you can see     You may benefit from spoonfuls (and/or added to warm drinks) of honey throughout the day for cough  You may benefit from using a humidifier and/or steam showers  Get plenty of rest and sleep with head elevated to help with sinus drainage and throat irritation  Continue using your inhaler as needed for chest tightness / shortness of breath  Avoid having air blow on your face as this can worsen congestion / cough    Wash hands often  Disinfect your environment, linens, electronics, etc.  Drink plenty of fluids (water, Pedialyte, etc.)  Get plenty of rest and sleep with head elevated to help with sinus drainage and throat irritation  Avoid having air blow on your face as this can worsen congestion / cough  Do not share utensils or drinks  Salt water gargles throughout the day for sore throat  Cepacol lozenges as needed for sore throat  Alternate Ibuprofen (adult: 600mg) and Tylenol (adult: 650-1000mg) as needed for pain / body aches / fever  You may benefit from spoonfuls (and/or added to warm drinks) of honey throughout the day for cough  You may benefit from using a humidifier and/or steam showers  You may benefit from Flonase nasal spray daily  You may benefit from taking a daily allergy medication (e.g. Zyrtec, Xyzal, etc.)  You may benefit from taking a daily multivitamin  You may benefit from Zinc daily  You may benefit from Vitamin D daily  Symptoms may take a few weeks to resolve      Follow up with your doctor as needed

## 2023-11-09 ENCOUNTER — TELEPHONE (OUTPATIENT)
Facility: CLINIC | Age: 68
End: 2023-11-09

## 2023-11-09 NOTE — TELEPHONE ENCOUNTER
04/09/19 1243   Patient Belongings   Did you bring any home meds/supplements to the hospital?  Yes   Disposition of meds  Sent to security/pharmacy per site process   Patient Belongings remains with patient;locker   Patient Belongings Remaining with Patient clothing;glasses;shoes   Patient Belongings Put in Hospital Secure Location (Security or Locker, etc.) cell phone/electronics;tote   Belongings Search Yes   Clothing Search Yes   Second Staff Suyapa HUBER     Medications given to nurse    No belongings sent to security    Belongings: coloring book, books, sketch pad, headphones, cell phone, , hat, hooded sweatshirt, hooded sweatshirt with strings, sweats with strings removed, black bag with art markers and permanent markers, toiletries, backpack, jeans, 2 athletic shirts with strings, 2 sports bra, several pairs of boxers, 3 pairs of socks, 5 t-shirts,     Items received 4/12/19:  1 pair green shorts, 1 green sweatshirt, 1 gray t-shirt, 1 red t-shirt    Visitors took head phones and cell phones home on 4/9/19     A               Admission:  I am responsible for any personal items that are not sent to the safe or pharmacy.  [facility] is not responsible for loss, theft or damage of any property in my possession.    Signature:  _________________________________ Date: _______  Time: _____                                              Staff Signature:  ____________________________ Date: ________  Time: _____      2nd Staff person, if patient is unable/unwilling to sign:    Signature: ________________________________ Date: ________  Time: _____     Discharge:  [facility] has returned all of my personal belongings:    Signature: _________________________________ Date: ________  Time: _____                                          Staff Signature:  ____________________________ Date: ________  Time: _____          Patient populated in Over due results for Plan: Alkaline Phosphatase, Bone Specific,         C-Telopeptide ([facility]), Comp         Metabolic Panel (14) [E], VITAMIN D, 25-HYDROXY        [52350][Q], PTH, Intact [E]    Last office visit   Routed to Dr. Theo Hare for review.

## 2023-11-09 NOTE — TELEPHONE ENCOUNTER
Thanks for following up. It looks like I did want her to get these labs done so I could decide what to do with her medication. I am seeing her in January so if she could get these labs done before the second week of December that would be great (assuming it'll take around 2 weeks for labs to result which would be in time for our follow up appointment). Thank you.

## 2023-11-10 ENCOUNTER — TELEMEDICINE (OUTPATIENT)
Dept: INTERNAL MEDICINE CLINIC | Facility: CLINIC | Age: 68
End: 2023-11-10
Payer: MEDICARE

## 2023-11-10 DIAGNOSIS — J40 BRONCHITIS: Primary | ICD-10-CM

## 2023-11-10 DIAGNOSIS — H66.92 LEFT OTITIS MEDIA, UNSPECIFIED OTITIS MEDIA TYPE: ICD-10-CM

## 2023-11-10 PROCEDURE — 99214 OFFICE O/P EST MOD 30 MIN: CPT | Performed by: NURSE PRACTITIONER

## 2023-11-10 RX ORDER — AZITHROMYCIN 250 MG/1
TABLET, FILM COATED ORAL
Qty: 6 TABLET | Refills: 0 | Status: SHIPPED | OUTPATIENT
Start: 2023-11-10 | End: 2023-11-13 | Stop reason: ALTCHOICE

## 2023-11-10 RX ORDER — PREDNISONE 20 MG/1
40 TABLET ORAL DAILY
Qty: 10 TABLET | Refills: 0 | Status: SHIPPED | OUTPATIENT
Start: 2023-11-10 | End: 2023-11-13 | Stop reason: ALTCHOICE

## 2023-11-10 NOTE — PROGRESS NOTES
[facility] [place] [place] [facility]    Virtual Video Check-In    Irina Talley verbally consents to a Virtual/video Check-In visit on 11/10/23. Patient has been referred to the [facility] website at [email or web address] to review the yearly Consent to Treat document. Patient understands and accepts financial responsibility for any deductible, co-insurance and/or co-pays associated with this service. CHIEF COMPLAINT     Chief Complaint   Patient presents with    Urgent Care F/u     11/8/23 Cough/UR ,Body ache and/or chills sore throat  and Fatigue- still having symptoms feels like antibiotics not wking         HPI:   Irina Talley is a 76year old female with continued complaints of cough, chest congestion, nasal congestion, runny nose, fatigue, body aches ongoing from the past 7-8 days. Started to have bouts of vertigo but getting better. Went to  on 11/3 and was given albuterol inhaler and benzonatate perles which she used with no significant relief. She went to  again on 11/8 and was given Augmentin for let otitis media. CXR unremarkable. Reports having vomiting last night since starting the Augmentin. No diarrhea. Denies fevers, chills, SOB, CP, or loss of taste/smell. Denies any ear pain or discharge. Current Outpatient Medications   Medication Sig Dispense Refill    azithromycin 250 MG Oral Tab Take 2 tablets (500 mg total) by mouth daily for 1 day, THEN 1 tablet (250 mg total) daily for 4 days. 6 tablet 0    predniSONE 20 MG Oral Tab Take 2 tablets (40 mg total) by mouth daily for 5 days. 10 tablet 0    HYDROcodone-acetaminophen 5-325 MG Oral Tab Take 1 tablet by mouth every 8 (eight) hours as needed for Pain. 90 tablet 0    DULoxetine 30 MG Oral Cap DR Particles Take 1 capsule (30 mg total) by mouth daily. 90 capsule 0    atorvastatin 10 MG Oral Tab Take 1 tablet (10 mg total) by mouth daily. 90 tablet 1    PARoxetine 10 MG Oral Tab Take 1 tablet (10 mg total) by mouth every morning.  90 tablet 0 rivaroxaban 10 MG Oral Tab Take 1 tablet (10 mg total) by mouth daily with food. 30 tablet 2    Calcium Magnesium Zinc 333-133-5 MG Oral Tab Take 1 tablet by mouth daily. Multiple Vitamins-Minerals (CENTRUM SILVER 50+WOMEN) Oral Tab Take 1 tablet by mouth daily. primidone 50 MG Oral Tab Take 100 mg by mouth every morning and take 150 mg by mouth every evening.      gabapentin 300 MG Oral Cap Take by mouth. Take 600mg in the morning and 600mg at night      Flecainide Acetate 100 MG Oral Tab Take 1 tablet (100 mg total) by mouth 2 (two) times daily. Cyanocobalamin (B-12) 1000 MCG Oral Tab Take 1,000 mcg by mouth daily. albuterol 108 (90 Base) MCG/ACT Inhalation Aero Soln Inhale 2 puffs into the lungs every 4 (four) hours as needed for Wheezing.  1 each 0      Past Medical History:   Diagnosis Date    Age-related osteoporosis without current pathological fracture 06/01/2021    Anemia     Anxiety 1985    Arrhythmia     afib    Arthritis 1980    Atrial flutter, paroxysmal ([address] 06/06/2018    Back problem     Benign essential tremor     Bowel obstruction (HCC)     Breast cancer ([address] 2002    Broken ankle 01/2023    Left Ankle    Cancer ([address]     Right breast    Cervical dystonia 06/17/2021    DBS b/l    Cervical spine degeneration     Colitis     Ischemic Colitis    Depression     Difficult intubation     big bony features in the bottom of mouth     Endometriosis     Essential hypertension     Exposure to medical diagnostic radiation 2002    Gastrointestinal hemorrhage 05/31/2018    GI bleed     High blood pressure     High cholesterol     History of blood transfusion     Jan 2018    History of breast cancer 06/01/2021    XRT/Chemo    History of GI bleed 06/01/2021    Ischemic bowel    Hyperglycemia 09/06/2020    Hyperlipidemia 2005    Hyponatremia 12/04/2020    Hypotension due to hypovolemia 08/21/2022    Impacted cerumen of left ear 04/27/2023    Intestinal infection due to Clostridium difficile 2021    2019    Ischemic colitis ([facility] 2018    Lacunar infarction ([facility] 2013    Neuropathy     Osteoarthritis     Osteoarthrosis, unspecified whether generalized or localized, unspecified site     Panic disorder     Parkinson disease     Personal history of antineoplastic chemotherapy     Personal history of endometriosis     Plaque psoriasis     Psoriatic arthropathy ([facility] 2011    Recurrent major depressive disorder, in remission ([facility] 2021    Rhinovirus 2023    Status post deep brain stimulator placement     Tobacco dependence 2018    Quit 2017    Vaso vagal episode 2022      Social History:  Social History     Socioeconomic History    Marital status:    Tobacco Use    Smoking status: Former     Packs/day: 0.50     Years: 36.00     Additional pack years: 0.00     Total pack years: 18.00     Types: Cigarettes     Quit date: 2018     Years since quittin.8    Smokeless tobacco: Never   Vaping Use    Vaping Use: Never used   Substance and Sexual Activity    Alcohol use: Yes     Alcohol/week: 14.0 standard drinks of alcohol     Types: 14 Cans of beer per week     Comment: moderately    Drug use: No    Sexual activity: Not Currently   Other Topics Concern    Caffeine Concern No    Exercise No    Seat Belt No    Special Diet Yes     Comment: HOW TO PUT ON WEIGHT    Stress Concern No    Weight Concern Yes     Comment: CAN'T PUT ON WEIGHT        REVIEW OF SYSTEMS:   See HPI. EXAM:   Limited due to COVID-19  GENERAL: Appears stated age and in no apparent distress. Alert and oriented x3. LUNGS: Patient speaks clearly in full sentences without dyspnea, no cough while on video visit. PSYCH: Appropriate mood and affect.     LABS:      Lab Results   Component Value Date    WBC 4.1 10/20/2023    RBC 3.77 (L) 10/20/2023    HGB 12.9 10/20/2023    HCT 39.5 10/20/2023    .8 (H) 10/20/2023    MCH 34.2 (H) 10/20/2023    MCHC 32.7 10/20/2023 RDW 12.7 10/20/2023    .0 10/20/2023    MPV 9.8 11/09/2012      Lab Results   Component Value Date     (H) 08/31/2023    BUN 22 (H) 08/31/2023    BUNCREA 14.9 07/14/2021    CREATSERUM 0.99 08/31/2023    ANIONGAP 8 08/31/2023    GFR 53 (L) 01/04/2018    GFRNAA 66 06/03/2022    GFRAA 77 06/03/2022    CA 9.8 08/31/2023    OSMOCALC 290 08/31/2023    ALKPHO 62 09/22/2023    AST 23 09/22/2023    ALT 28 09/22/2023    BILT 0.2 09/22/2023    TP 7.6 09/22/2023    ALB 4.0 09/22/2023    GLOBULIN 3.5 04/27/2023     08/31/2023    K 4.8 08/31/2023     08/31/2023    CO2 26.0 08/31/2023      Lab Results   Component Value Date    CHOLEST 169 06/29/2021    TRIG 203 (H) 06/29/2021    HDL 87 (H) 06/29/2021    LDL 50 06/29/2021    VLDL 29 06/29/2021    TCHDLRATIO 4.7 (H) 10/12/2012    NONHDLC 82 06/29/2021      Lab Results   Component Value Date    T4F 1.1 02/10/2014    TSH 1.020 10/13/2022      No results found for: \"EAG\", \"A1C\"     IMAGING:     XR CHEST PA + LAT CHEST (CPT=71046)    Result Date: 11/8/2023  PROCEDURE:  XR CHEST PA + LAT CHEST (CPT=71046)  INDICATIONS:  Sore throat  COMPARISON:  [facility] , XR, XR RIBS WITH CHEST, BILATERAL   (VOG=58836), 9/13/2023, 10:40 AM.  [facility], XR, XR CHEST PA + LAT CHEST (CPT=71046), 5/08/2023, 11:52 AM.  TECHNIQUE:  PA and lateral chest radiographs were obtained. PATIENT STATED HISTORY: (As transcribed by Technologist)  Congestion for 10 days. Patient was seen last week; viral.    FINDINGS:  No focal consolidation. No pleural effusion. No pneumothorax. No pulmonary edema. The cardiomediastinal silhouette is within normal limits. No acute osseous findings. Loop recorder device is present over the chest.  There is a generator device over  the left chest with leads coursing cranially. Mild degenerative changes of the spine. Partially visualized cervical spine hardware. CONCLUSION:  No acute cardiopulmonary process.    LOCATION:  [facility]   Dictated by (CST): Agueda Mccarthy MD on 11/08/2023 at 2:09 PM     Finalized by (CST): Agueda Mccarthy MD on 11/08/2023 at 2:10 PM          ASSESSMENT AND PLAN:      1. Bronchitis  -Start course of prednisone, SE discussed  -Continue albuterol inhaler PRN  -Supportive care discussed  -Go to ER for SOB or CP  - predniSONE 20 MG Oral Tab; Take 2 tablets (40 mg total) by mouth daily for 5 days. Dispense: 10 tablet; Refill: 0    2. Left otitis media, unspecified otitis media type  -Does not want to resume Augmentin   -Prefers to start z-cosme as that has worked for her in the past  -Start Z-cosme, advised to take with yogurt or probiotic   -RTC in office if symptoms persist/worsen  - azithromycin 250 MG Oral Tab; Take 2 tablets (500 mg total) by mouth daily for 1 day, THEN 1 tablet (250 mg total) daily for 4 days. Dispense: 6 tablet; Refill: 0    The patient indicates understanding of these issues and agrees to the plan. The patient is asked to return as needed or when routine care is due. Elis Ham, APRN  11/10/2023    Patient understands phone/video evaluation is not a substitute for face-to-face examination or emergency care. Patient advised to go to the ER or call 911 for worsening symptoms or acute distress. The patient was also advised of the potential privacy & security concerns related to the telehealth platform. Lastly, the patient confirmed that they were in [place]. Please note that the following visit was completed using two-way, real-time interactive audio and video communication. This has been done in good karen to provide continuity of care in the best interest of the provider-patient relationship, due to the ongoing public health crisis/national emergency and because of restrictions of visitation. There are limitations of this visit as no physical exam could be performed. Every conscious effort was taken to allow for sufficient and adequate time.  This billing was spent on reviewing labs, medications, radiology tests and decision making. Appropriate medical decision-making and tests are ordered as detailed in the plan of care above.

## 2023-11-13 ENCOUNTER — MED REC SCAN ONLY (OUTPATIENT)
Dept: PAIN CLINIC | Facility: CLINIC | Age: 68
End: 2023-11-13

## 2023-11-13 ENCOUNTER — NURSE ONLY (OUTPATIENT)
Dept: HEMATOLOGY/ONCOLOGY | Facility: HOSPITAL | Age: 68
End: 2023-11-13
Attending: INTERNAL MEDICINE
Payer: MEDICARE

## 2023-11-13 ENCOUNTER — TELEPHONE (OUTPATIENT)
Dept: HEMATOLOGY/ONCOLOGY | Facility: HOSPITAL | Age: 68
End: 2023-11-13

## 2023-11-13 DIAGNOSIS — D50.0 IRON DEFICIENCY ANEMIA DUE TO CHRONIC BLOOD LOSS: ICD-10-CM

## 2023-11-13 DIAGNOSIS — D46.4 REFRACTORY ANEMIA, UNSPECIFIED (HCC): ICD-10-CM

## 2023-11-13 LAB
DEPRECATED HBV CORE AB SER IA-ACNC: 245 NG/ML
IRON SATN MFR SERPL: 46 %
IRON SERPL-MCNC: 128 UG/DL
TIBC SERPL-MCNC: 279 UG/DL (ref 240–450)
TRANSFERRIN SERPL-MCNC: 187 MG/DL (ref 200–360)

## 2023-11-13 PROCEDURE — 82728 ASSAY OF FERRITIN: CPT

## 2023-11-13 PROCEDURE — 85025 COMPLETE CBC W/AUTO DIFF WBC: CPT

## 2023-11-13 PROCEDURE — 83540 ASSAY OF IRON: CPT

## 2023-11-13 PROCEDURE — 83550 IRON BINDING TEST: CPT

## 2023-11-13 PROCEDURE — 36415 COLL VENOUS BLD VENIPUNCTURE: CPT

## 2023-11-14 DIAGNOSIS — M54.16 LUMBAR RADICULOPATHY: ICD-10-CM

## 2023-11-14 DIAGNOSIS — M48.061 SPINAL STENOSIS OF LUMBAR REGION WITHOUT NEUROGENIC CLAUDICATION: ICD-10-CM

## 2023-11-14 DIAGNOSIS — M54.16 LUMBAR RADICULITIS: ICD-10-CM

## 2023-11-14 DIAGNOSIS — G24.3 CERVICAL DYSTONIA: ICD-10-CM

## 2023-11-14 RX ORDER — HYDROCODONE BITARTRATE AND ACETAMINOPHEN 5; 325 MG/1; MG/1
1 TABLET ORAL EVERY 8 HOURS PRN
Qty: 90 TABLET | Refills: 0 | Status: SHIPPED | OUTPATIENT
Start: 2023-11-14 | End: 2023-12-14

## 2023-11-16 RX ORDER — PAROXETINE 10 MG/1
10 TABLET, FILM COATED ORAL EVERY MORNING
Qty: 90 TABLET | Refills: 0 | Status: SHIPPED | OUTPATIENT
Start: 2023-11-16

## 2023-11-16 NOTE — TELEPHONE ENCOUNTER
Last OV relevant to medication: 9/7/23  Last refill date: 8/18/23 #90/refills: 0  When pt was asked to return for OV: 1/7/24  Upcoming appt/reason:   Future Appointments   Date Time Provider Brandi Baylee   1/2/2024 10:00 AM Nat White DO [id number]   1/8/2024  2:40 PM Adore Hart MD [facility]   1/10/2024  1:00 PM Александр Colvin MD [id number]   1/22/2024 11:20 AM [facility]   5/13/2024  1:00 PM Miryam Smith MD [facility]   5/28/2024 10:00 AM Nathan Birmingham., DPM [facility]   Was pt informed of any over due labs: utd

## 2023-11-28 ENCOUNTER — LAB ENCOUNTER (OUTPATIENT)
Dept: LAB | Age: 68
End: 2023-11-28
Attending: STUDENT IN AN ORGANIZED HEALTH CARE EDUCATION/TRAINING PROGRAM
Payer: MEDICARE

## 2023-11-28 DIAGNOSIS — M81.0 AGE-RELATED OSTEOPOROSIS WITHOUT CURRENT PATHOLOGICAL FRACTURE: ICD-10-CM

## 2023-11-28 LAB
ALBUMIN SERPL-MCNC: 4.1 G/DL (ref 3.4–5)
ALBUMIN/GLOB SERPL: 1.1 {RATIO} (ref 1–2)
ALP LIVER SERPL-CCNC: 59 U/L
ALT SERPL-CCNC: 30 U/L
ANION GAP SERPL CALC-SCNC: 6 MMOL/L (ref 0–18)
AST SERPL-CCNC: 37 U/L (ref 15–37)
BILIRUB SERPL-MCNC: 0.3 MG/DL (ref 0.1–2)
BUN BLD-MCNC: 22 MG/DL (ref 9–23)
CALCIUM BLD-MCNC: 9.5 MG/DL (ref 8.5–10.1)
CHLORIDE SERPL-SCNC: 107 MMOL/L (ref 98–112)
CO2 SERPL-SCNC: 28 MMOL/L (ref 21–32)
CREAT BLD-MCNC: 0.99 MG/DL
EGFRCR SERPLBLD CKD-EPI 2021: 62 ML/MIN/1.73M2 (ref 60–?)
FASTING STATUS PATIENT QL REPORTED: YES
GLOBULIN PLAS-MCNC: 3.6 G/DL (ref 2.8–4.4)
GLUCOSE BLD-MCNC: 96 MG/DL (ref 70–99)
OSMOLALITY SERPL CALC.SUM OF ELEC: 295 MOSM/KG (ref 275–295)
POTASSIUM SERPL-SCNC: 3.8 MMOL/L (ref 3.5–5.1)
PROT SERPL-MCNC: 7.7 G/DL (ref 6.4–8.2)
PTH-INTACT SERPL-MCNC: 51.6 PG/ML (ref 18.5–88)
SODIUM SERPL-SCNC: 141 MMOL/L (ref 136–145)
VIT D+METAB SERPL-MCNC: 38.1 NG/ML (ref 30–100)

## 2023-11-28 PROCEDURE — 82523 COLLAGEN CROSSLINKS: CPT

## 2023-11-28 PROCEDURE — 80053 COMPREHEN METABOLIC PANEL: CPT

## 2023-11-28 PROCEDURE — 82306 VITAMIN D 25 HYDROXY: CPT

## 2023-11-28 PROCEDURE — 36415 COLL VENOUS BLD VENIPUNCTURE: CPT

## 2023-11-28 PROCEDURE — 83970 ASSAY OF PARATHORMONE: CPT

## 2023-11-28 PROCEDURE — 84080 ASSAY ALKALINE PHOSPHATASES: CPT

## 2023-12-01 LAB
ALKALINE PHOSPHATASE BONE SPECIFIC: 7.1 UG/L
C-TELOPEPTIDE: 364 PG/ML

## 2023-12-11 ENCOUNTER — PATIENT MESSAGE (OUTPATIENT)
Facility: CLINIC | Age: 68
End: 2023-12-11

## 2023-12-11 DIAGNOSIS — G24.3 CERVICAL DYSTONIA: ICD-10-CM

## 2023-12-11 DIAGNOSIS — M54.16 LUMBAR RADICULOPATHY: ICD-10-CM

## 2023-12-11 DIAGNOSIS — M48.061 SPINAL STENOSIS OF LUMBAR REGION WITHOUT NEUROGENIC CLAUDICATION: ICD-10-CM

## 2023-12-11 DIAGNOSIS — M54.16 LUMBAR RADICULITIS: ICD-10-CM

## 2023-12-11 RX ORDER — HYDROCODONE BITARTRATE AND ACETAMINOPHEN 5; 325 MG/1; MG/1
1 TABLET ORAL EVERY 8 HOURS PRN
Qty: 90 TABLET | Refills: 0 | Status: SHIPPED | OUTPATIENT
Start: 2023-12-14 | End: 2024-01-13

## 2023-12-11 NOTE — TELEPHONE ENCOUNTER
From: Alejandro Hair  To: Salma Rajdiony  Sent: 12/11/2023 3:42 PM CST  Subject: Dental Releasee form    Dr. Luis Ledezma lost the form for dental release. I have an appointment tomorrow and will need it. Can you send through my chart? Thank you .

## 2023-12-11 NOTE — TELEPHONE ENCOUNTER
Medication: HYDROcodone-acetaminophen 5-325 MG Oral Tab     Date of last refill: 11/14/23  [place] 74/[place]    Last office visit: 11/13/23  Due back to clinic per last office note:  6 months  Date next office visit scheduled:  5/13/24    Last URINE Screen: 4/26/23  Screen Results:    NINA Aguilar  5/2/2023  1:40 PM CDT Back to Top      As expected  Patient provided education on risks of chronic opioid use including but not limited to the risk for:  Constipation, nausea, vomiting, respiratory depression, sleep disordered breathing, impaired cognition, somnolence, mental cloudiness, sedation especially if used in combination with other sedating medications such as zdrugs, benzodiazepines, muscle relaxants, alcohol, THC products, and neuropathic medications. In addition chronic opioid use can result in urinary retention, pruritis, endocrine alterations including weight gain, poor glycemic control, mood disorders, depression, altered sexual function, and weakened bones, making fractures a greater risk. Also discussed risk for opioid induced neurotoxicity, increased risk for infections such as pneumonia as opioids have been known to reduce immune response. Narcotic Contract EXPIRATION date: 11/13/24    Last OV note recommendation: The patient is a pleasant 28-year-old female with a longstanding history of opiate dependence. This is as result of chronic pain due to spinal stenosis in the lumbar region. She is not interested in surgical correction. States the medications do improve function and provide analgesia. [place] . Reviewed her [place] which is consistent with hydrocodone usage. She is due for repeat UDS which was ordered today. Narcotic contract results reviewed today. All patient related questions addressed. Patient can follow-up in 6 months.

## 2023-12-13 ENCOUNTER — TELEPHONE (OUTPATIENT)
Facility: CLINIC | Age: 68
End: 2023-12-13

## 2023-12-13 NOTE — TELEPHONE ENCOUNTER
Fax Received by [facility]   Dental Clearance Letter Received.   Placed in [address]

## 2023-12-14 NOTE — TELEPHONE ENCOUNTER
Placed dental clearance in Rn purple folder to await next steps in treatment plan. Patient is seeing Dr. Rasheeda Wells 1/02. Closing encounter until after this visit.

## 2023-12-16 DIAGNOSIS — F33.40 RECURRENT MAJOR DEPRESSIVE DISORDER, IN REMISSION (HCC): ICD-10-CM

## 2023-12-18 RX ORDER — DULOXETIN HYDROCHLORIDE 30 MG/1
30 CAPSULE, DELAYED RELEASE ORAL DAILY
Qty: 90 CAPSULE | Refills: 1 | Status: SHIPPED | OUTPATIENT
Start: 2023-12-18

## 2023-12-31 NOTE — TELEPHONE ENCOUNTER
[facility] reminded for labs sent to patient. Pt tolerated orange popsicle without difficulty

## 2024-01-02 ENCOUNTER — OFFICE VISIT (OUTPATIENT)
Facility: CLINIC | Age: 69
End: 2024-01-02
Payer: MEDICARE

## 2024-01-02 VITALS
RESPIRATION RATE: 18 BRPM | SYSTOLIC BLOOD PRESSURE: 110 MMHG | HEART RATE: 97 BPM | DIASTOLIC BLOOD PRESSURE: 70 MMHG | WEIGHT: 90 LBS | BODY MASS INDEX: 16.56 KG/M2 | OXYGEN SATURATION: 100 % | HEIGHT: 62 IN

## 2024-01-02 DIAGNOSIS — M81.0 AGE-RELATED OSTEOPOROSIS WITHOUT CURRENT PATHOLOGICAL FRACTURE: Primary | ICD-10-CM

## 2024-01-02 PROCEDURE — 99214 OFFICE O/P EST MOD 30 MIN: CPT | Performed by: STUDENT IN AN ORGANIZED HEALTH CARE EDUCATION/TRAINING PROGRAM
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
## 2024-01-02 NOTE — PATIENT INSTRUCTIONS
Return Visit   [ x ] Physician in 6 months   [  ] In person or video visit  [  ] In person only     [ x ] After visit summary   [ x ] Placed labs/imaging. Labs are to be drawn at 8A and fasting in 6 months           It was great meeting you today!     Today we discussed your osteoporosis:  -We reviewed your your bone labs (blood work) and we did not find a secondary cause for your bone loss  -Based on these labs, and your fracture in Februrary of 2023 after receiving 3 doses of Reclast, we discussed switching to prolia   -At this time, you would like to continue with Reclast. I will alert my nurse and we will be in touch with next steps   -I will plan on seeing you back around July of 2024      Take care!  -Dr. Dong

## 2024-01-03 ENCOUNTER — TELEPHONE (OUTPATIENT)
Facility: CLINIC | Age: 69
End: 2024-01-03

## 2024-01-03 DIAGNOSIS — M81.0 AGE-RELATED OSTEOPOROSIS WITHOUT CURRENT PATHOLOGICAL FRACTURE: Primary | ICD-10-CM

## 2024-01-03 NOTE — TELEPHONE ENCOUNTER
Patient had office visit with Dr. Dong yesterday, 1/2/24. Per Dr. Dong, have received dental clearance, and to move forward with Reclast order. Last received Reclast 01/20/2023- documented in Care Everywhere with Elvis.    Patient has Medicare with supplemental Medicare plan through [employer].     Reclast order form printed and placed in Dr. Dong inbox for review/sign off.

## 2024-01-04 ENCOUNTER — PATIENT MESSAGE (OUTPATIENT)
Dept: INTERNAL MEDICINE CLINIC | Facility: CLINIC | Age: 69
End: 2024-01-04

## 2024-01-04 ENCOUNTER — PATIENT MESSAGE (OUTPATIENT)
Facility: CLINIC | Age: 69
End: 2024-01-04

## 2024-01-04 NOTE — TELEPHONE ENCOUNTER
From: Tamara Meraz  To: Lilli Kramer  Sent: 1/4/2024 2:41 PM CST  Subject: Duloxetine    Dr. Callaway,  I've just received notice from my insurance company that Duloxetine has been moved to a tier 3 payment schedule. I am seeing you next Monday and would like to discuss an alternative that's less expensive. Also, when I see you; I took a fall on New Year's Day and hit my cheekbone on the floor. It's a bit swollen still and I'm working on a good [employer]. Just want to make certain that you don't want anything done before I see you.  Thanks,  Deya Meraz

## 2024-01-04 NOTE — TELEPHONE ENCOUNTER
If it is only bruising to the face wait to be seen, if she has sig pain she can go to IC sooner. Thanks.

## 2024-01-04 NOTE — TELEPHONE ENCOUNTER
Please see patient update regarding medication to be discussed and fall. Do you think pt should go to icc prior for imaging or eval or see you as planned mon? Please advise, thanks!

## 2024-01-04 NOTE — TELEPHONE ENCOUNTER
From: Tamara Meraz  To: Jaclyn Dong  Sent: 1/4/2024 2:34 PM CST  Subject: CALCIUM    Dr. Dong,  I am currently taking one tablet of Nature Made Calcium Magnesium Zinc with D3. Vitamin D3 is 5mcg. Are you okay with this amount? Thank you.

## 2024-01-04 NOTE — TELEPHONE ENCOUNTER
Confirmation received.     Placed in RN brown folder until patient schedules.     Closing the encounter.

## 2024-01-04 NOTE — TELEPHONE ENCOUNTER
RN phoned patient to discuss Reclast. Patient aware that order has been faxed over to the infusion center. If they don't call with in 7 days, patient is to call the office and let us know.    Patient verbalized understanding.     Discussed with patient letting her know that labs need to be done with in two weeks of infusion per infusion center protocol.     Pended labs for Dr. Dong, Patient will decide on whether she wants to do labs prior to infusion or on day of.    Order from faxed to infusion center at [phone]. Will await confirmation.    Routed to Dr. Dong for review.

## 2024-01-08 ENCOUNTER — HOSPITAL ENCOUNTER (OUTPATIENT)
Dept: GENERAL RADIOLOGY | Age: 69
Discharge: HOME | End: 2024-01-08
Attending: INTERNAL MEDICINE
Payer: MEDICARE

## 2024-01-08 ENCOUNTER — OFFICE VISIT (OUTPATIENT)
Dept: INTERNAL MEDICINE CLINIC | Facility: CLINIC | Age: 69
End: 2024-01-08
Payer: MEDICARE

## 2024-01-08 ENCOUNTER — APPOINTMENT (OUTPATIENT)
Dept: HEMATOLOGY/ONCOLOGY | Facility: HOSPITAL | Age: 69
End: 2024-01-08
Attending: INTERNAL MEDICINE
Payer: MEDICARE

## 2024-01-08 VITALS
HEIGHT: 61.5 IN | WEIGHT: 95.5 LBS | HEART RATE: 64 BPM | RESPIRATION RATE: 12 BRPM | TEMPERATURE: 98 F | BODY MASS INDEX: 17.8 KG/M2 | SYSTOLIC BLOOD PRESSURE: 140 MMHG | DIASTOLIC BLOOD PRESSURE: 70 MMHG

## 2024-01-08 DIAGNOSIS — Z12.2 ENCOUNTER FOR SCREENING FOR LUNG CANCER: ICD-10-CM
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
## 2024-01-08 DIAGNOSIS — Z00.00 ENCOUNTER FOR ANNUAL HEALTH EXAMINATION: Primary | ICD-10-CM

## 2024-01-08 DIAGNOSIS — Z87.891 PERSONAL HISTORY OF TOBACCO USE, PRESENTING HAZARDS TO HEALTH: ICD-10-CM

## 2024-01-08 DIAGNOSIS — D46.9 MDS (MYELODYSPLASTIC SYNDROME) (HCC): ICD-10-CM

## 2024-01-08 DIAGNOSIS — I48.0 PAROXYSMAL ATRIAL FIBRILLATION (HCC): ICD-10-CM

## 2024-01-08 DIAGNOSIS — Z87.19 HX OF ISCHEMIC BOWEL DISEASE: ICD-10-CM

## 2024-01-08 DIAGNOSIS — I10 ESSENTIAL HYPERTENSION: ICD-10-CM

## 2024-01-08 DIAGNOSIS — J44.9 CHRONIC OBSTRUCTIVE PULMONARY DISEASE, UNSPECIFIED COPD TYPE (HCC): ICD-10-CM

## 2024-01-08 DIAGNOSIS — E78.00 PURE HYPERCHOLESTEROLEMIA: ICD-10-CM

## 2024-01-08 DIAGNOSIS — G20.A1 PARKINSON'S DISEASE WITHOUT FLUCTUATING MANIFESTATIONS, UNSPECIFIED WHETHER DYSKINESIA PRESENT: ICD-10-CM

## 2024-01-08 DIAGNOSIS — F33.40 RECURRENT MAJOR DEPRESSIVE DISORDER, IN REMISSION (HCC): ICD-10-CM

## 2024-01-08 PROCEDURE — 70200 X-RAY EXAM OF EYE SOCKETS: CPT | Performed by: INTERNAL MEDICINE

## 2024-01-08 RX ORDER — CLOPIDOGREL BISULFATE 75 MG/1
75 TABLET ORAL DAILY
COMMUNITY
Start: 2023-12-27

## 2024-01-08 RX ORDER — PAROXETINE HYDROCHLORIDE 20 MG/1
20 TABLET, FILM COATED ORAL EVERY MORNING
Qty: 90 TABLET | Refills: 0 | Status: SHIPPED | OUTPATIENT
Start: 2024-01-08

## 2024-01-08 NOTE — PATIENT INSTRUCTIONS
Tamara Meraz's SCREENING SCHEDULE   Tests on this list are recommended by your physician but may not be covered, or covered at this frequency, by your insurer.   Please check with your insurance carrier before scheduling to verify coverage.   PREVENTATIVE SERVICES FREQUENCY &  COVERAGE DETAILS LAST COMPLETION DATE   Diabetes Screening    Fasting Blood Sugar /  Glucose    One screening every 12 months if never tested or if previously tested but not diagnosed with pre-diabetes   One screening every 6 months if diagnosed with pre-diabetes Lab Results   Component Value Date    GLU 96 11/28/2023        Cardiovascular Disease Screening    Lipid Panel  Cholesterol  Lipoprotein (HDL)  Triglycerides Covered every 5 years for all Medicare beneficiaries without apparent signs or symptoms of cardiovascular disease Lab Results   Component Value Date    CHOLEST 169 06/29/2021    HDL 87 (H) 06/29/2021    LDL 50 06/29/2021    TRIG 203 (H) 06/29/2021         Electrocardiogram (EKG)   Covered if needed at Welcome to Medicare, and non-screening if indicated for medical reasons 07/28/2023      Ultrasound Screening for Abdominal Aortic Aneurysm (AAA) Covered once in a lifetime for one of the following risk factors    Men who are 65-75 years old and have ever smoked    Anyone with a family history -     Colorectal Cancer Screening  Covered for ages 50-85; only need ONE of the following:    Colonoscopy   Covered every 10 years    Covered every 2 years if patient is at high risk or previous colonoscopy was abnormal 10/27/2022    Health Maintenance   Topic Date Due    Colorectal Cancer Screening  10/27/2032       Flexible Sigmoidoscopy   Covered every 4 years -    Fecal Occult Blood Test Covered annually -   Bone Density Screening    Bone density screening    Covered every 2 years after age 65 if diagnosed with risk of osteoporosis or estrogen deficiency.    Covered yearly for long-term glucocorticoid medication use (Steroids) No  results found for this or any previous visit.      Health Maintenance Due   Topic Date Due    DEXA Scan  Never done      Pap and Pelvic    Pap   Covered every 2 years for women at normal risk; Annually if at high risk -  No recommendations at this time    Chlamydia Annually if high risk -  No recommendations at this time   Screening Mammogram    Mammogram     Recommend annually for all female patients aged 40 and older    One baseline mammogram covered for patients aged 35-39 01/27/2023    Health Maintenance   Topic Date Due    Mammogram  01/27/2024       Immunizations    Influenza Covered once per flu season  Please get every year 09/27/2023  No recommendations at this time    Pneumococcal Each vaccine ([id number] & [id number]) covered once after 65 Prevnar 13: 11/02/2020    [id number]: 12/20/2021     No recommendations at this time    Hepatitis B One screening covered for patients with certain risk factors   -  No recommendations at this time    Tetanus Toxoid Not covered by Medicare Part B unless medically necessary (cut with metal); may be covered with your pharmacy prescription benefits -    Tetanus, Diptheria and Pertusis TD and TDaP Not covered by Medicare Part B -  No recommendations at this time    Zoster Not covered by Medicare Part B; may be covered with your pharmacy  prescription benefits 12/08/2017  Zoster Vaccines(1 of 2) due on 02/02/2018     Annual Monitoring of Persistent Medications (ACE/ARB, digoxin diuretics, anticonvulsants)    Potassium Annually Lab Results   Component Value Date    K 3.8 11/28/2023         Creatinine   Annually Lab Results   Component Value Date    CREATSERUM 0.99 11/28/2023         BUN Annually Lab Results   Component Value Date    BUN 22 11/28/2023       Drug Serum Conc Annually No results found for: \"DIGOXIN\", \"DIG\", \"VALP\"         Chronic Obstructive Pulmonary Disease (COPD)    Spirometry Annually Spirometry date:      WEAN OFF THE CYMBALTA AND INCREASE THE PAROXETINE TO  MG DAILY PER INSTRUCTIONS BELOW:   FOR ABOUT  DAYS ALTERNATE (CYMBALTA 30MG AND PAROXETINE 10MG DAILY) WITH (PAROXETINE 20MG DAILY AND NO CYMBALTA). AFTER 10 DAYS STOP THE CYMBALTA COMPLETELY AND START TAKING THE PAROXETINE 20MG EVERY DAY.

## 2024-01-08 NOTE — PROGRESS NOTES
Subjective:   Tamara Meraz is a 68 year old female who presents for a Medicare Subsequent Annual Wellness visit (Pt already had Initial Annual Wellness) and med check.     mammo ordered by oncology. history of breast cancer in 2005. Sees dr. Gunter.   Colonoscopy done 10/12/2022, per colonoscopy report no screening colonoscopy as her anatomy is not amenable to routine colonoscopy. In ce, dated 10/12/2022.   dexa done 1/2023 per endocrine. Seeing endocrine for osteoporosis.   Pap not indicated.      Up to date prevnar, pneumovax, tdap, covid booster.   Get shingrix at her local pharmacy.   Get rsv at her local pharmacy.      [facility] flowsheet reviewed.   No falls.   Memory testing normal.   Mood has been stable. No depression.   Seeing eye doctor yearly.      Htn: off meds sec to hypotension and vasovagal syncope. Blood pressure controlled off meds.      Hyperlipidemia: on statin. No muscle aches.      Afib: now off xarelto sec to insurance. Currently just on plavix.   Seeing dr. Roque tomorrow.   Also diagnosed with shaggy aorta syndrome. Seeing cardiology.      Depression: on duloxetine and paroxetine. Now cymbalta will not be on formulary and she is looking for an alternative. No si, no hi. Feels fine on these meds.      Copd: not on inhalers. Breathing is fine.      Osteoporosis: on reclast. Saw dr. Dong.       Parkinson's disease: sees neuro. on primidone and gabapentin.      history of ischemic bowel: with gi bleeds. recurrent episodes. Seeing gi. Currently stable.      Mds: sees dr. Gunter. Also with history of breast cancer in 2005.     Quit smoking in 2018. Had ct lung cancer screening in 1/2023. Repeat due now. Has a 20 pack year smoking history.     Fell last week. Fell on her left cheekbone. Has a black eye but is improving. No vision changes. Has some pain on the cheekbone but improved from a few days ago.     History/Other:   Fall Risk Assessment:   She has been screened for Falls and is High  Risk. Fall Prevention information provided to patient in After Visit Summary.    Do you feel unsteady when standing or walking?: Yes  Do you worry about falling?: Yes  Have you fallen in the past year?: Yes  How many times have you fallen?: (P) 2  Were you injured?: (P) Yes     Cognitive Assessment:   She had a completely normal cognitive assessment - see flowsheet entries       Functional Ability/Status:   Tamara Meraz has some abnormal functions as listed below:  She has Meal Preparation difficulties based on screening of functional status.She has difficulties Affording Meds based on screening of functional status. She has Walking problems based on screening of functional status.       Depression Screening (PHQ-2/PHQ-9): PHQ-2 SCORE: 0, done 1/5/2024   Last [place] Suicide Screening on 1/8/2024 was No Risk.       Advanced Directives:   She does have a Living Will but we do NOT have it on file in Epic.    She does have a POA but we do NOT have it on file in Epic.    Discussed with patient and provided information.        Patient Active Problem List   Diagnosis    S/P cervical spinal fusion    Psoriasis    PAD (peripheral artery disease) ([facility])    Essential tremor    Spinal stenosis    Paroxysmal atrial fibrillation (HCC)    Bunion    Vasovagal syncope    Hx of ischemic bowel disease    Alcohol dependence with unspecified alcohol-induced disorder (HCC)    Atrial flutter (HCC)    CAD in native artery    Essential hypertension    Parkinson's disease    Pure hypercholesterolemia    Iron deficiency anemia due to chronic blood loss    MDS (myelodysplastic syndrome) ([facility])    History of GI bleed    History of breast cancer    Age-related osteoporosis without current pathological fracture    Cervical dystonia    Recurrent major depressive disorder, in remission ([facility])    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[facility])    History of DVT (deep vein thrombosis)    History of CVA (cerebrovascular accident)    History  of atrial fibrillation    Lumbar radiculitis    Ischemic colitis (HCC)    Gastrointestinal hemorrhage associated with angiodysplasia of stomach and duodenum    Gastrointestinal hemorrhage    Gastrointestinal hemorrhage, unspecified gastrointestinal hemorrhage type    Hypotension, unspecified hypotension type    Closed fracture of left ankle    Hammer toe of left foot    Benign paroxysmal positional vertigo    Chronic obstructive pulmonary disease, unspecified (HCC)     Allergies:  She is allergic to sulfa antibiotics and sulfa drugs cross reactors.    Current Medications:  Outpatient Medications Marked as Taking for the 1/8/24 encounter (Office Visit) with Lilli Kramer MD   Medication Sig    clopidogrel 75 MG Oral Tab Take 1 tablet (75 mg total) by mouth daily.    PARoxetine 20 MG Oral Tab Take 1 tablet (20 mg total) by mouth every morning.    HYDROcodone-acetaminophen 5-325 MG Oral Tab Take 1 tablet by mouth every 8 (eight) hours as needed for Pain.    atorvastatin 10 MG Oral Tab Take 1 tablet (10 mg total) by mouth daily.    Calcium Magnesium Zinc 333-133-5 MG Oral Tab Take 1 tablet by mouth daily.    Multiple Vitamins-Minerals (CENTRUM SILVER 50+WOMEN) Oral Tab Take 1 tablet by mouth daily.    primidone 50 MG Oral Tab Take 100 mg by mouth every morning and take 150 mg by mouth every evening.    gabapentin 300 MG Oral Cap Take by mouth. Take 600mg in the morning and 600 mg at night    Flecainide Acetate 100 MG Oral Tab Take 1 tablet (100 mg total) by mouth 2 (two) times daily.    Cyanocobalamin (B-12) 1000 MCG Oral Tab Take 1,000 mcg by mouth daily.       Medical History:  She  has a past medical history of Age-related osteoporosis without current pathological fracture (06/01/2021), Anemia, Anxiety (1985), Arrhythmia, Arthritis (1980), Atrial flutter, paroxysmal (HCC) (06/06/2018), Back problem, Benign essential tremor, Bowel obstruction (HCC), Breast cancer (HCC) (2002), Broken ankle (01/2023), Cancer ([facility]),  Cervical dystonia (2021), Cervical spine degeneration, Colitis, Depression, Difficult intubation, Endometriosis, Essential hypertension, Exposure to medical diagnostic radiation (), Gastrointestinal hemorrhage (2018), GI bleed, High blood pressure, High cholesterol, History of blood transfusion, History of breast cancer (2021), History of GI bleed (2021), Hyperglycemia (2020), Hyperlipidemia (), Hyponatremia (2020), Hypotension due to hypovolemia (2022), Impacted cerumen of left ear (2023), Intestinal infection due to Clostridium difficile (2021), Ischemic colitis (HCC) (2018), Lacunar infarction (HCC) (2013), Neuropathy, Osteoarthritis, Osteoarthrosis, unspecified whether generalized or localized, unspecified site, Panic disorder, Parkinson disease, Personal history of antineoplastic chemotherapy (), Personal history of endometriosis, Plaque psoriasis, Psoriatic arthropathy (HCC) (2011), Recurrent major depressive disorder, in remission (HCC) (2021), Rhinovirus (2023), Status post deep brain stimulator placement, Tobacco dependence (2018), and Vaso vagal episode (2022).  Surgical History:  She  has a past surgical history that includes ; d & c; appendectomy; colonoscopy (2014); tubal ligation; other surgical history; other surgical history; colonoscopy (N/A, 6/3/2018); upper gi endoscopy,biopsy; oophorectomy (); total abdom hysterectomy; Brain Surgery (); pain management (Bilateral, 06/15/2021); cervical spine surgery (2004); lumbar spine surgery (1999); appendectomy; back surgery (); hysterectomy (); lumpectomy right (); chemotherapy (); radiation right (); and other.   Family History:  Her family history includes Breast Cancer in her self; Cancer in her mother; Heart Attack (age of onset: 72) in her brother; Heart Disorder in her mother; Hypertension in her  father; Lung Disorder in her sister; Musculo-skelatal Disorder in her father.  Social History:  She  reports that she quit smoking about 5 years ago. Her smoking use included cigarettes. She has a 20.00 pack-year smoking history. She has never used smokeless tobacco. She reports current alcohol use of about 14.0 standard drinks of alcohol per week. She reports that she does not use drugs.    Tobacco:  She smoked tobacco in the past but quit greater than 12 months ago.  Social History    Tobacco Use      Smoking status: Former        Packs/day: 0.50        Years: 40.00        Additional pack years: 0.00        Total pack years: 20.00        Types: Cigarettes        Quit date: 2018        Years since quittin.0      Smokeless tobacco: Never       CAGE Alcohol Screen:   CAGE screening score of 0 on 2024, showing low risk of alcohol abuse.      Patient Care Team:  Lilli Kramer MD as PCP - General (Internal Medicine)  Aaron Fair MD (GASTROENTEROLOGY)  Marysol Blanchard PT as Physical Therapist  Sharona Gunter MD (ONCOLOGY)  Jasiel Squires (SURGERY, GENERAL)  Melly Hoang APRN (Nurse Practitioner Family)  Mouna Gentile APRN (Nurse Practitioner)    Review of Systems  See hpi    Objective:   Physical Exam  General appearance: alert, appears stated age, and cooperative  Lungs: clear to auscultation bilaterally  Heart: S1, S2 normal, no murmur, click, rub or gallop, regular rate and rhythm  Abdomen: soft, non-tender; bowel sounds normal; no masses,  no organomegaly  Extremities: extremities normal, atraumatic, no cyanosis or edema    /70 (BP Location: Left arm, Patient Position: Sitting, Cuff Size: adult)   Pulse 64   Temp 97.7 °F (36.5 °C) (Temporal)   Resp 12   Ht 5' 1.5\" (1.562 m)   Wt 95 lb 8 oz (43.3 kg)   Breastfeeding No   BMI 17.75 kg/m²  Estimated body mass index is 17.75 kg/m² as calculated from the following:    Height as of this encounter: 5' 1.5\" (1.562 m).    Weight as  of this encounter: 95 lb 8 oz (43.3 kg).    Medicare Hearing Assessment:   Hearing Screening    Screening Method: Finger Rub  Finger Rub Result: Pass                     Assessment & Plan:   Tamara Meraz is a 68 year old female who presents for a Medicare Assessment.     1. Encounter for annual health examination  mammo ordered by oncology. history of breast cancer in 2005. Sees dr. Gunter.   Colonoscopy done 10/12/2022, per colonoscopy report no screening colonoscopy as her anatomy is not amenable to routine colonoscopy. In ce, dated 10/12/2022.   dexa done 1/2023 per endocrine. Seeing endocrine for osteoporosis.   Pap not indicated.      Up to date prevnar, pneumovax, tdap, covid booster.   Get shingrix at her local pharmacy.   Get rsv at her local pharmacy.      [facility] flowsheet reviewed.   No falls.   Memory testing normal.   Mood has been stable. No depression.   Seeing eye doctor yearly.     2. Essential hypertension  Continue meds.     3. Pure hypercholesterolemia  Continue statin.   - Lipid Panel [E]; Future    4. Paroxysmal atrial fibrillation (HCC)  follow up with cardiology.   Only on plavix currently. To discuss anticoagulation with cardiology.     5. Recurrent major depressive disorder, in remission (HCC)  Stop cymbalta. Given weaning instructions.   Increase paroxetine to 20mg daily.   - PARoxetine 20 MG Oral Tab; Take 1 tablet (20 mg total) by mouth every morning.  Dispense: 90 tablet; Refill: 0    6. Chronic obstructive pulmonary disease, unspecified COPD type (HCC)  Stable. Continue current management.      7. Parkinson's disease without fluctuating manifestations, unspecified whether dyskinesia present  follow up with neuro.     8. Hx of ischemic bowel disease  follow up with gi.     9. MDS (myelodysplastic syndrome) (HCC)  follow up with hematology.     10. Fall, initial encounter  - XR EYE ORBITS, COMPLETE (MIN 4 VIEWS) (CPT=70200); Future    11. Encounter for screening for lung cancer  -  CT LUNG LD SCREENING(CPT=71271); Future    12. Personal history of tobacco use, presenting hazards to health  - CT LUNG LD SCREENING(CPT=71271); Future      The patient indicates understanding of these issues and agrees to the plan.  Reinforced healthy diet, lifestyle, and exercise.      Return in about 6 months (around 7/8/2024) for med check.     Lilli Kramer MD, 1/7/2024     Supplementary Documentation:   General Health:  In the past six months, have you lost more than 10 pounds without trying?: 3 - Don't know  Has your appetite been poor?: No  Type of Diet: Balanced  How does the patient maintain a good energy level?: Appropriate Exercise;Daily Walks  How would you describe your daily physical activity?: Moderate  How would you describe your current health state?: Fair  How do you maintain positive mental well-being?: Visiting Friends;Visiting Family  On a scale of 0 to 10, with 0 being no pain and 10 being severe pain, what is your pain level?: 5 - (Moderate) (generalized body pain)  In the past six months, have you experienced urine leakage?: 0-No  At any time do you feel concerned for the safety/well-being of yourself and/or your children, in your home or elsewhere?: No  Have you had any immunizations at another office such as Influenza, Hepatitis B, Tetanus, or Pneumococcal?: Yes       Tamara Meraz's SCREENING SCHEDULE   Tests on this list are recommended by your physician but may not be covered, or covered at this frequency, by your insurer.   Please check with your insurance carrier before scheduling to verify coverage.   PREVENTATIVE SERVICES FREQUENCY &  COVERAGE DETAILS LAST COMPLETION DATE   Diabetes Screening    Fasting Blood Sugar /  Glucose    One screening every 12 months if never tested or if previously tested but not diagnosed with pre-diabetes   One screening every 6 months if diagnosed with pre-diabetes Lab Results   Component Value Date    GLU 96 11/28/2023        Cardiovascular  Disease Screening    Lipid Panel  Cholesterol  Lipoprotein (HDL)  Triglycerides Covered every 5 years for all Medicare beneficiaries without apparent signs or symptoms of cardiovascular disease Lab Results   Component Value Date    CHOLEST 169 06/29/2021    HDL 87 (H) 06/29/2021    LDL 50 06/29/2021    TRIG 203 (H) 06/29/2021         Electrocardiogram (EKG)   Covered if needed at Welcome to Medicare, and non-screening if indicated for medical reasons 07/28/2023      Ultrasound Screening for Abdominal Aortic Aneurysm (AAA) Covered once in a lifetime for one of the following risk factors    Men who are 65-75 years old and have ever smoked    Anyone with a family history -     Colorectal Cancer Screening  Covered for ages 50-85; only need ONE of the following:    Colonoscopy   Covered every 10 years    Covered every 2 years if patient is at high risk or previous colonoscopy was abnormal 10/27/2022    Health Maintenance   Topic Date Due    Colorectal Cancer Screening  10/27/2032       Flexible Sigmoidoscopy   Covered every 4 years -    Fecal Occult Blood Test Covered annually -   Bone Density Screening    Bone density screening    Covered every 2 years after age 65 if diagnosed with risk of osteoporosis or estrogen deficiency.    Covered yearly for long-term glucocorticoid medication use (Steroids) No results found for this or any previous visit.      Health Maintenance Due   Topic Date Due    DEXA Scan  Never done      Pap and Pelvic    Pap   Covered every 2 years for women at normal risk; Annually if at high risk -  No recommendations at this time    Chlamydia Annually if high risk -  No recommendations at this time   Screening Mammogram    Mammogram     Recommend annually for all female patients aged 40 and older    One baseline mammogram covered for patients aged 35-39 01/27/2023    Health Maintenance   Topic Date Due    Mammogram  01/27/2024       Immunizations    Influenza Covered once per flu season  Please get  every year 09/27/2023  No recommendations at this time    Pneumococcal Each vaccine (Ciuwnch24 & [id number]) covered once after 65 Prevnar 13: 11/02/2020    Naktaoonw06: 12/20/2021     No recommendations at this time    Hepatitis B One screening covered for patients with certain risk factors   -  No recommendations at this time    Tetanus Toxoid Not covered by Medicare Part B unless medically necessary (cut with metal); may be covered with your pharmacy prescription benefits -    Tetanus, Diptheria and Pertusis TD and TDaP Not covered by Medicare Part B -  No recommendations at this time    Zoster Not covered by Medicare Part B; may be covered with your pharmacy  prescription benefits 12/08/2017  Zoster Vaccines(1 of 2) due on 02/02/2018     Annual Monitoring of Persistent Medications (ACE/ARB, digoxin diuretics, anticonvulsants)    Potassium Annually Lab Results   Component Value Date    K 3.8 11/28/2023         Creatinine   Annually Lab Results   Component Value Date    CREATSERUM 0.99 11/28/2023         BUN Annually Lab Results   Component Value Date    BUN 22 11/28/2023       Drug Serum Conc Annually No results found for: \"DIGOXIN\", \"DIG\", \"VALP\"           Chronic Obstructive Pulmonary Disease (COPD)    Spirometry Annually Spirometry date:

## 2024-01-09 NOTE — PROGRESS NOTES
[facility] Progress Note      Patient Name:  Tamara Meraz  YOB: 1955  Medical Record Number:  [id number]    Date of visit: 1/10/2024      CHIEF COMPLAINT: Anemia    HPI:     68 year old female that I have followed since 6/18. H/o ischemic colitis/GIB.  Has received blood and IV iron.  BM bx 12/19- active trilineage hematopoiesis without any evidence of leukemia. Repeat BM bx 2/21-mild dysplasia.     Has had ischemic colitis in the past.  Was on rivaroxaban.  Discontinued then started again and now on Plavix.  No rectal bleeding.  Had endoscopy recently.  Fell down and bruised her face otherwise no bleeding.    SOCIAL HISTORY:    , 3 children, 5 grandchildren.    ROS:     Constitutional: fatigue  Neurologic: no headache, seizures, diplopia or weakness  Respiratory: no cough, SOB or hemoptysis  Cardiovascular: MUELLER  GI: no nausea, vomiting, diarrhea or BRBPR  Musculoskeletal: no body-ache or joint pain  Dermatologic: no alopecia, rash, pruritis  : no hematuria, dysuria or frequency  Psych: no confusion or depression   Heme: no easy bruising or bleeding     ALLERGIES:    Allergies   Allergen Reactions    Sulfa Antibiotics UNKNOWN    Sulfa Drugs Cross Reactors RASH     Headache       MEDICATIONS:    Current Outpatient Medications   Medication Sig Dispense Refill    clopidogrel 75 MG Oral Tab Take 1 tablet (75 mg total) by mouth daily.      PARoxetine 20 MG Oral Tab Take 1 tablet (20 mg total) by mouth every morning. 90 tablet 0    HYDROcodone-acetaminophen 5-325 MG Oral Tab Take 1 tablet by mouth every 8 (eight) hours as needed for Pain. 90 tablet 0    atorvastatin 10 MG Oral Tab Take 1 tablet (10 mg total) by mouth daily. 90 tablet 1    Calcium Magnesium Zinc 333-133-5 MG Oral Tab Take 1 tablet by mouth daily.      Multiple Vitamins-Minerals (CENTRUM SILVER 50+WOMEN) Oral Tab Take 1 tablet by mouth daily.      primidone 50 MG Oral Tab Take 100 mg by mouth every morning and take  150 mg by mouth every evening.      gabapentin 300 MG Oral Cap Take by mouth. Take 600mg in the morning and 600 mg at night      Flecainide Acetate 100 MG Oral Tab Take 1 tablet (100 mg total) by mouth 2 (two) times daily.      Cyanocobalamin (B-12) 1000 MCG Oral Tab Take 1,000 mcg by mouth daily.         VITALS:  Height: 156.2 cm (5' 1.5\") (01/10 1309)  Weight: 43.6 kg (96 lb 3.2 oz) (01/10 1309)  BSA (Calculated - sq m): 1.39 sq meters (01/10 1309)  Pulse: 60 (01/10 1309)  BP: 169/73 (01/10 1309)  Temp: 97.3 °F (36.3 °C) (01/10 1309)  Do Not Use - Resp Rate: --  SpO2: 98 % (01/10 1309)    PS:  ECO    PHYSICAL EXAM:    General: alert and oriented x 3, not in acute distress. Tremor noted.   HEENT: KANU, oropharynx  clear.  Neck: supple.  No JVD /adenopathy.  CVS: S1S2, regular  Rhythm, no murmurs.   Lungs: Clear to auscultation and percussion.  Abdomen: Soft, non tender, no hepato-splenomegaly.  Extremities:  No edema.  CNS: no focal deficit    Emotional well being: Patient's emotional well being was assessed.  No issues requiring acute psychosocial intervention were identified.     LABS:     Recent Results (from the past 24 hour(s))   Ferritin    Collection Time: 01/10/24  1:00 PM   Result Value Ref Range    Ferritin 425.1 (H) 18.0 - 340.0 ng/mL   Iron And Tibc    Collection Time: 01/10/24  1:00 PM   Result Value Ref Range    Iron 64 50 - 170 ug/dL    Transferrin 205 200 - 360 mg/dL    Total Iron Binding Capacity 305 240 - 450 ug/dL    % Saturation 21 15 - 50 %   CBC W/ DIFFERENTIAL    Collection Time: 01/10/24  1:00 PM   Result Value Ref Range    WBC 5.4 4.0 - 11.0 x10(3) uL    RBC 3.38 (L) 3.80 - 5.30 x10(6)uL    HGB 11.4 (L) 12.0 - 16.0 g/dL    HCT 34.9 (L) 35.0 - 48.0 %    .0 150.0 - 450.0 10(3)uL    .3 (H) 80.0 - 100.0 fL    MCH 33.7 26.0 - 34.0 pg    MCHC 32.7 31.0 - 37.0 g/dL    RDW 13.4 %    Neutrophil Absolute Prelim 1.35 (L) 1.50 - 7.70 x10 (3) uL   Comp Metabolic Panel (14)     Collection Time: 01/10/24  1:00 PM   Result Value Ref Range    Glucose 120 (H) 70 - 99 mg/dL    Sodium 139 136 - 145 mmol/L    Potassium 4.1 3.5 - 5.1 mmol/L    Chloride 104 98 - 112 mmol/L    CO2 32.0 21.0 - 32.0 mmol/L    Anion Gap 3 0 - 18 mmol/L    BUN 14 9 - 23 mg/dL    Creatinine 0.98 0.55 - 1.02 mg/dL    Calcium, Total 8.9 8.5 - 10.1 mg/dL    Calculated Osmolality 290 275 - 295 mOsm/kg    eGFR-Cr 63 >=60 mL/min/1.73m2    AST 57 (H) 15 - 37 U/L    ALT 72 (H) 13 - 56 U/L    Alkaline Phosphatase 107 55 - 142 U/L    Bilirubin, Total 0.3 0.1 - 2.0 mg/dL    Total Protein 7.5 6.4 - 8.2 g/dL    Albumin 3.5 3.4 - 5.0 g/dL    Globulin  4.0 2.8 - 4.4 g/dL    A/G Ratio 0.9 (L) 1.0 - 2.0           ASSESSMENT AND PLAN:     # Anemia: H/o chronic GIB due to ischemic colitis; AVM.  Bm bx 12/19-no malignancy.  Repeat BM bx 2/21-mild dyspoiesis,  normal cytogenetics.  Was on rivaroxaban for atrial fibrillation which was discontinued in 4/23.  Then resumed again and discontinued.  However her labs have been stable this year including normal iron.  Last IV iron was in 4/23.  Given stability, will check only every 6 months.  She should call if she sees rectal bleeding.      # Recurrent ischemic colitis: Sees cardiology, diffuse atherosclerotic disease with prior mesenteric stenting.   Hypercoag w/u negative.     # Leukopenia: resolved.     # Breast ca: R breast ca 2005, treated at [facility].  T2N0.  2.2 cm grade 3 triple negative IDC.  S/p lumpectomy, chemotherapy with AC, RT. Right mammogram 6/21-new calcifications right side.  Biopsy-benign.  Mammogram 1/23 ok, next due 1/24.    Hb and fe bit lower than last time, repeat labs in 3 months.      ORDERS PLACED:    Return for Labs in 3, 6,12 months. MD 1 yr.    Joanna Gunter M.D.    [facility]    [address] [place], [address]    1/10/2024

## 2024-01-10 ENCOUNTER — OFFICE VISIT (OUTPATIENT)
Dept: HEMATOLOGY/ONCOLOGY | Facility: HOSPITAL | Age: 69
End: 2024-01-10
Attending: INTERNAL MEDICINE
Payer: MEDICARE

## 2024-01-10 VITALS
HEIGHT: 61.5 IN | TEMPERATURE: 97 F | SYSTOLIC BLOOD PRESSURE: 169 MMHG | HEART RATE: 60 BPM | WEIGHT: 96.19 LBS | DIASTOLIC BLOOD PRESSURE: 73 MMHG | RESPIRATION RATE: 20 BRPM | OXYGEN SATURATION: 98 % | BODY MASS INDEX: 17.93 KG/M2

## 2024-01-10 DIAGNOSIS — D46.4 REFRACTORY ANEMIA, UNSPECIFIED (HCC): Primary | ICD-10-CM

## 2024-01-10 DIAGNOSIS — M81.0 AGE-RELATED OSTEOPOROSIS WITHOUT CURRENT PATHOLOGICAL FRACTURE: Primary | ICD-10-CM

## 2024-01-10 DIAGNOSIS — D50.0 IRON DEFICIENCY ANEMIA DUE TO CHRONIC BLOOD LOSS: ICD-10-CM

## 2024-01-10 LAB
ALBUMIN SERPL-MCNC: 3.5 G/DL (ref 3.4–5)
ALBUMIN/GLOB SERPL: 0.9 {RATIO} (ref 1–2)
ALP LIVER SERPL-CCNC: 107 U/L
ALT SERPL-CCNC: 72 U/L
ANION GAP SERPL CALC-SCNC: 3 MMOL/L (ref 0–18)
AST SERPL-CCNC: 57 U/L (ref 15–37)
BASOPHILS # BLD AUTO: 0.04 X10(3) UL (ref 0–0.2)
BASOPHILS NFR BLD AUTO: 0.7 %
BILIRUB SERPL-MCNC: 0.3 MG/DL (ref 0.1–2)
BUN BLD-MCNC: 14 MG/DL (ref 9–23)
CALCIUM BLD-MCNC: 8.9 MG/DL (ref 8.5–10.1)
CHLORIDE SERPL-SCNC: 104 MMOL/L (ref 98–112)
CO2 SERPL-SCNC: 32 MMOL/L (ref 21–32)
CREAT BLD-MCNC: 0.98 MG/DL
DEPRECATED HBV CORE AB SER IA-ACNC: 425.1 NG/ML
EGFRCR SERPLBLD CKD-EPI 2021: 63 ML/MIN/1.73M2 (ref 60–?)
EOSINOPHIL # BLD AUTO: 0.02 X10(3) UL (ref 0–0.7)
EOSINOPHIL NFR BLD AUTO: 0.4 %
ERYTHROCYTE [DISTWIDTH] IN BLOOD BY AUTOMATED COUNT: 13.4 %
GLOBULIN PLAS-MCNC: 4 G/DL (ref 2.8–4.4)
GLUCOSE BLD-MCNC: 120 MG/DL (ref 70–99)
HCT VFR BLD AUTO: 34.9 %
HGB BLD-MCNC: 11.4 G/DL
IMM GRANULOCYTES # BLD AUTO: 0.01 X10(3) UL (ref 0–1)
IMM GRANULOCYTES NFR BLD: 0.2 %
IRON SATN MFR SERPL: 21 %
IRON SERPL-MCNC: 64 UG/DL
LYMPHOCYTES # BLD AUTO: 3.51 X10(3) UL (ref 1–4)
LYMPHOCYTES NFR BLD AUTO: 64.9 %
MCH RBC QN AUTO: 33.7 PG (ref 26–34)
MCHC RBC AUTO-ENTMCNC: 32.7 G/DL (ref 31–37)
MCV RBC AUTO: 103.3 FL
MONOCYTES # BLD AUTO: 0.48 X10(3) UL (ref 0.1–1)
MONOCYTES NFR BLD AUTO: 8.9 %
NEUTROPHILS # BLD AUTO: 1.35 X10 (3) UL (ref 1.5–7.7)
NEUTROPHILS # BLD AUTO: 1.35 X10(3) UL (ref 1.5–7.7)
NEUTROPHILS NFR BLD AUTO: 24.9 %
OSMOLALITY SERPL CALC.SUM OF ELEC: 290 MOSM/KG (ref 275–295)
PLATELET # BLD AUTO: 179 10(3)UL (ref 150–450)
POTASSIUM SERPL-SCNC: 4.1 MMOL/L (ref 3.5–5.1)
PROT SERPL-MCNC: 7.5 G/DL (ref 6.4–8.2)
RBC # BLD AUTO: 3.38 X10(6)UL
SODIUM SERPL-SCNC: 139 MMOL/L (ref 136–145)
TIBC SERPL-MCNC: 305 UG/DL (ref 240–450)
TRANSFERRIN SERPL-MCNC: 205 MG/DL (ref 200–360)
WBC # BLD AUTO: 5.4 X10(3) UL (ref 4–11)

## 2024-01-10 PROCEDURE — 80053 COMPREHEN METABOLIC PANEL: CPT | Performed by: STUDENT IN AN ORGANIZED HEALTH CARE EDUCATION/TRAINING PROGRAM

## 2024-01-10 PROCEDURE — 96365 THER/PROPH/DIAG IV INF INIT: CPT

## 2024-01-10 PROCEDURE — 99214 OFFICE O/P EST MOD 30 MIN: CPT | Performed by: INTERNAL MEDICINE

## 2024-01-10 RX ORDER — ZOLEDRONIC ACID 5 MG/100ML
5 INJECTION, SOLUTION INTRAVENOUS ONCE
Status: COMPLETED | OUTPATIENT
Start: 2024-01-10 | End: 2024-01-10

## 2024-01-10 RX ORDER — ZOLEDRONIC ACID 5 MG/100ML
5 INJECTION, SOLUTION INTRAVENOUS ONCE
Status: CANCELLED | OUTPATIENT
Start: 2024-01-10

## 2024-01-10 RX ADMIN — ZOLEDRONIC ACID 5 MG: 5 INJECTION, SOLUTION INTRAVENOUS at 14:25:00

## 2024-01-10 NOTE — PROGRESS NOTES
Pt here for Reclast . Pt denies any issues or concerns.      Ordering MD: Dr. Dong  Order Exp: Today     Pt tolerated infusion without difficulty or complaint. Reviewed next apt date/time: N/A      Education Record  Learner:  Patient  Disease / Diagnosis: Osteoporosis  Barriers / Limitations:  None  Method:  Brief focused and Reinforcement  General Topics:  Plan of care reviewed  Outcome:  Shows understanding   Adenovirus infection Hypoglycemia

## 2024-01-10 NOTE — PROGRESS NOTES
Education Record    Learner:  Patient    Disease / Diagnosis: anemia    Barriers / Limitations:  None   Comments:    Method:  Discussion   Comments:    General Topics:  Plan of care reviewed   Comments:    Outcome:  Shows understanding   Comments:   Had a fall end of December. Xray done no fracture, still with bruising to her face.   Otherwise feeling well.

## 2024-01-11 DIAGNOSIS — G24.3 CERVICAL DYSTONIA: ICD-10-CM

## 2024-01-11 DIAGNOSIS — M54.16 LUMBAR RADICULITIS: ICD-10-CM

## 2024-01-11 DIAGNOSIS — M54.16 LUMBAR RADICULOPATHY: ICD-10-CM

## 2024-01-11 DIAGNOSIS — M48.061 SPINAL STENOSIS OF LUMBAR REGION WITHOUT NEUROGENIC CLAUDICATION: ICD-10-CM

## 2024-01-12 ENCOUNTER — LAB ENCOUNTER (OUTPATIENT)
Dept: LAB | Age: 69
End: 2024-01-12
Attending: ANESTHESIOLOGY
Payer: MEDICARE

## 2024-01-12 ENCOUNTER — TELEPHONE (OUTPATIENT)
Dept: HEMATOLOGY/ONCOLOGY | Facility: HOSPITAL | Age: 69
End: 2024-01-12

## 2024-01-12 DIAGNOSIS — F11.20 OPIOID DEPENDENCE, UNCOMPLICATED (HCC): ICD-10-CM

## 2024-01-12 DIAGNOSIS — F11.90 CHRONIC NARCOTIC USE: ICD-10-CM

## 2024-01-12 PROCEDURE — 80307 DRUG TEST PRSMV CHEM ANLYZR: CPT

## 2024-01-12 NOTE — TELEPHONE ENCOUNTER
LVM that UDS must be completed first.        Medication: HYDROcodone-acetaminophen 5-325 MG     Date of last refill: 12/14/23  [place]    Last office visit: 11/13/23  Due back to clinic per last office note:  6 months  Date next office visit scheduled:  05/13/24    Last URINE Screen: 11/13/23  Screen Results:  not completed      Narcotic Contract EXPIRATION date: 11/13/24    Last OV note recommendation:   ASSESSMENT AND PLAN:      1. Chronic narcotic use    2. Opioid dependence, uncomplicated (HCC)    3. Spinal stenosis of lumbar region without neurogenic claudication    4. S/P cervical spinal fusion       The patient is a pleasant 60-year-old female with a longstanding history of opiate dependence.  This is as result of chronic pain due to spinal stenosis in the lumbar region.  She is not interested in surgical correction.  States the medications do improve function and provide analgesia.  [place] .  Reviewed her UDS which is consistent with hydrocodone usage.  She is due for repeat UDS which was ordered today.  Narcotic contract results reviewed today.  All patient related questions addressed.  Patient can follow-up in 6 months.

## 2024-01-15 ENCOUNTER — TELEPHONE (OUTPATIENT)
Dept: ORTHOPEDICS CLINIC | Facility: CLINIC | Age: 69
End: 2024-01-15

## 2024-01-15 RX ORDER — HYDROCODONE BITARTRATE AND ACETAMINOPHEN 5; 325 MG/1; MG/1
1 TABLET ORAL EVERY 8 HOURS PRN
Qty: 90 TABLET | Refills: 0 | Status: SHIPPED | OUTPATIENT
Start: 2024-01-15 | End: 2024-02-14

## 2024-01-15 NOTE — TELEPHONE ENCOUNTER
Patient called requesting an appointment sooner rather than later with Dr. Cortez. Patients right knee is locking and in pain. Patient stated at her LOV with Dr. Cortez she was advised that is problem persisted to call our and get in right away. Okay to double book or schedule for next available?

## 2024-01-15 NOTE — TELEPHONE ENCOUNTER
No further imaging needed.     LOV: 10/23/24  Right knee pain and knee giving out.   - patient understanding that she cannot get the steroid injection as it is to early.  She states it didn't really work for her previously.      Future Appointments   Date Time Provider Department Center   1/20/2024 10:30 AM  CT MAIN RM1  [facility]   1/22/2024  3:20 PM Janes Cortez MD [facility]     Called patient to verify she can come to scheduled appointment.  She states she can make it.

## 2024-01-20 ENCOUNTER — HOSPITAL ENCOUNTER (OUTPATIENT)
Dept: CT IMAGING | Facility: HOSPITAL | Age: 69
Discharge: HOME | End: 2024-01-20
Attending: INTERNAL MEDICINE
Payer: MEDICARE

## 2024-01-20 DIAGNOSIS — Z12.2 ENCOUNTER FOR SCREENING FOR LUNG CANCER: ICD-10-CM

## 2024-01-20 DIAGNOSIS — Z87.891 PERSONAL HISTORY OF TOBACCO USE, PRESENTING HAZARDS TO HEALTH: ICD-10-CM

## 2024-01-20 PROCEDURE — 71271 CT THORAX LUNG CANCER SCR C-: CPT | Performed by: INTERNAL MEDICINE

## 2024-01-22 ENCOUNTER — OFFICE VISIT (OUTPATIENT)
Dept: ORTHOPEDICS CLINIC | Facility: CLINIC | Age: 69
End: 2024-01-22
Payer: MEDICARE

## 2024-01-22 VITALS — WEIGHT: 97.81 LBS | HEIGHT: 63 IN | BODY MASS INDEX: 17.33 KG/M2

## 2024-01-22 DIAGNOSIS — M17.31 POST-TRAUMATIC OSTEOARTHRITIS OF RIGHT KNEE: Primary | ICD-10-CM

## 2024-01-22 DIAGNOSIS — M81.0 AGE-RELATED OSTEOPOROSIS WITHOUT CURRENT PATHOLOGICAL FRACTURE: Primary | ICD-10-CM

## 2024-01-22 RX ORDER — TRIAMCINOLONE ACETONIDE 40 MG/ML
40 INJECTION, SUSPENSION INTRA-ARTICULAR; INTRAMUSCULAR ONCE
Status: COMPLETED | OUTPATIENT
Start: 2024-01-22 | End: 2024-01-22

## 2024-01-22 RX ADMIN — TRIAMCINOLONE ACETONIDE 40 MG: 40 INJECTION, SUSPENSION INTRA-ARTICULAR; INTRAMUSCULAR at 15:59:00

## 2024-01-22 NOTE — PROCEDURES
Last injection helped about 2 months.  She does not want to consider surgery.  She is open to continuing injections.    Risks and benefits of knee injection discussed with the patient, with risks including but not limited to pain and swelling at the injection site and/or within the knee joint, infection, elevation in blood pressure and/or glucose levels, facial flushing.  After informed consent, the patient's right knee was marked, locally anesthetized with skin refrigerant, prepped with topical antiseptic, and injected with a mixture of 1mL 40mg/mL Kenalog, 2mL 1% lidocaine and 2mL 0.5% marcaine through the inferolateral portal.  A band-aid was applied.  The patient tolerated the procedure well.    Janes Cortez MD, [facility]  [facility] Orthopedic Surgery  Phone [phone]  Fax [phone]

## 2024-01-25 ENCOUNTER — TELEPHONE (OUTPATIENT)
Dept: CARDIOLOGY CLINIC | Facility: HOSPITAL | Age: 69
End: 2024-01-25

## 2024-01-26 ENCOUNTER — HOSPITAL ENCOUNTER (OUTPATIENT)
Dept: MAMMOGRAPHY | Age: 69
End: 2024-01-26
Attending: INTERNAL MEDICINE
Payer: MEDICARE

## 2024-01-26 ENCOUNTER — HOSPITAL ENCOUNTER (OUTPATIENT)
Dept: MAMMOGRAPHY | Facility: HOSPITAL | Age: 69
Discharge: HOME | End: 2024-01-26
Attending: INTERNAL MEDICINE
Payer: MEDICARE

## 2024-01-26 DIAGNOSIS — Z12.31 ENCOUNTER FOR SCREENING MAMMOGRAM FOR BREAST CANCER: ICD-10-CM

## 2024-01-26 PROCEDURE — 77067 SCR MAMMO BI INCL CAD: CPT | Performed by: INTERNAL MEDICINE

## 2024-01-26 PROCEDURE — 77063 BREAST TOMOSYNTHESIS BI: CPT | Performed by: INTERNAL MEDICINE

## 2024-01-29 ENCOUNTER — TELEPHONE (OUTPATIENT)
Dept: CARDIOLOGY CLINIC | Facility: HOSPITAL | Age: 69
End: 2024-01-29

## 2024-01-29 DIAGNOSIS — Z86.79 HISTORY OF ATRIAL FIBRILLATION: Primary | ICD-10-CM

## 2024-01-30 ENCOUNTER — TELEPHONE (OUTPATIENT)
Dept: CARDIOLOGY CLINIC | Facility: HOSPITAL | Age: 69
End: 2024-01-30

## 2024-01-30 NOTE — TELEPHONE ENCOUNTER
Discussed Watchman procedure, date of 3/1 given. Will need CT scan prior, order in [facility]. Will contact central scheduling to set up.

## 2024-01-31 NOTE — IMAGING NOTE
Pt called and instructed to arrive 60 minutes prior to gated study scheduled on 2/1 1000.  Ordered hydration before scan.  Park in south parking garage, eat a light breakfast/lunch, hydrate, hold caffeine/decaff/chocolate x 12 hours prior, hold long acting nitrates, take all meds especially beta blockers prescribed.  Pt encouraged to call with further questions.

## 2024-02-01 ENCOUNTER — HOSPITAL ENCOUNTER (OUTPATIENT)
Dept: CT IMAGING | Facility: HOSPITAL | Age: 69
Discharge: HOME | End: 2024-02-01
Attending: INTERNAL MEDICINE
Payer: MEDICARE

## 2024-02-01 ENCOUNTER — TELEPHONE (OUTPATIENT)
Dept: CARDIOLOGY CLINIC | Facility: HOSPITAL | Age: 69
End: 2024-02-01

## 2024-02-01 ENCOUNTER — HOSPITAL ENCOUNTER (OUTPATIENT)
Dept: GENERAL RADIOLOGY | Facility: HOSPITAL | Age: 69
Discharge: HOME | End: 2024-02-01
Attending: INTERNAL MEDICINE
Payer: MEDICARE

## 2024-02-01 VITALS
SYSTOLIC BLOOD PRESSURE: 144 MMHG | RESPIRATION RATE: 15 BRPM | DIASTOLIC BLOOD PRESSURE: 81 MMHG | OXYGEN SATURATION: 96 % | HEART RATE: 61 BPM

## 2024-02-01 DIAGNOSIS — Z86.79 HISTORY OF ATRIAL FIBRILLATION: ICD-10-CM

## 2024-02-01 DIAGNOSIS — I48.91 A-FIB (HCC): Primary | ICD-10-CM

## 2024-02-01 PROCEDURE — 75572 CT HRT W/3D IMAGE: CPT | Performed by: INTERNAL MEDICINE

## 2024-02-01 PROCEDURE — 96360 HYDRATION IV INFUSION INIT: CPT

## 2024-02-01 RX ORDER — SODIUM CHLORIDE 9 MG/ML
500 INJECTION, SOLUTION INTRAVENOUS CONTINUOUS
Status: ACTIVE | OUTPATIENT
Start: 2024-02-01 | End: 2024-02-01

## 2024-02-01 RX ADMIN — SODIUM CHLORIDE 500 ML: 9 INJECTION, SOLUTION INTRAVENOUS at 09:32:00

## 2024-02-01 NOTE — IMAGING NOTE
Tamara Meraz to CT Rm 4.     Positioned pt on table. Procedure explained and questions answered. Vital signs monitored and noted in Flowsheet.  GFR = 63  Contrast injected followed by saline flush at 1026  Contrast = 60ml  0.9 NS flush = 67ml  Average HR = 61bpm    Patient tolerated the procedure without complication. Denies any contrast reaction. Discontinued IV saline lock.   Escorted pt to Womens' Dressing Room and discharged in stable condition.

## 2024-02-01 NOTE — TELEPHONE ENCOUNTER
Pre-Procedural Instructions for  Left Atrial Appendage Occlusion Implant (Watchman/Amulet)    Your Watchman/Amulet Procedure is scheduled for: ____March 1st______.   Nothing to eat or drink for 8 hours prior to procedure due to having Anesthesia.   You will receive a phone call the day before your procedure with your arrival time. Please disregard the arrival time given in My Chart.   Check in at the [facility]k on the Ground Floor of the [facility]  A RN from the Pre-Admission Testing Department will call you to review your medication and history.  You will be given instructions on what medications you can take the morning of your procedure. Below is a list of certain medications that may need to be held prior to that phone call, please review to see if any apply to you.   Diabetic medication:   If you have a continuous glucose monitor (CGM): You will need to remove the device/transmitter prior to the procedure. Please plan accordingly for when you need to exchange the device around the time of your procedure.   GLP-1 Agonists: Such as but not limited to   Saxenda, Ozempic, Trulicity, Byetta, Victoza, Mounjaro  If taken daily hold day of procedure   If taken weekly hold 1 week prior to procedure   SGLT2 Inhibitor Hypoglycemics: Such as but not limited to     Canagliflozin, Canagliflozin/Metformin, Dapaglifolzin, Empagliflozin, Ertugliflozin  Will need to hold 4 days prior to procedure. Be sure to carefully monitor blood sugar while holding, contact your prescribing MD with any abnormal blood sugars.   Blood pressure medications (ACE's/ARB's): Such as but not limited to  Lisinopril, Ramipril, Captopril, Enalapril, Losartan, Diovan, Olmesaratan, Entresto:   Hold 24 hours prior to procedure     Anticoagulation (Blood Thinners):  ______ Eliquis, Xarelto, Pradaxa.  Last dose will be: ____n/a_____  (Hold for 24 hours prior to procedure)  ______ Coumadin (Warfarin).  Last dose will be:  _____n/a______  (Hold for 3 days prior to procedure)    Prior to your Watchman implant you will need one of two tests to determine the size of the Left Atrial Appendage and to check for blood clots:   ? Transesophageal ECHO (AYANNA) scheduled:   X CTA of Pulmonary Veins: Done 2/1  This test will be repeated 2-6 months after your implant is done to check the seal around the device and be sure there are no blood clots.  Instructions will be given to you at hospital discharge regarding scheduling the follow-up testing.   You will take 81mg Aspirin and 75mg Plavix for 6 months after your implant. You will receive a call when you can stop the Plavix.     You will need Pre-Admission testing prior to your implant.  Please have these done 1-2 weeks prior to the procedure at an Replaced by [facility] facility:  Schedule an appointment: Replaced by [facility] Central Scheduling [phone]  Blood work: CBC, BMP, Type&Screen   Please notify RN if you had a recent blood transfusion within 90 days (then blood test will need to be within 3 days of the procedure)   EKG    Chest X-ray  (If you had a CT scan you do not need to have this done)   Please call us if you have a fever, cough, cold, or any sign of infection prior to the procedure.  Please contact us if you have any questions or concerns:    Sincerely,  [facility] RN's   [phone]

## 2024-02-06 PROBLEM — S82.302A FRACTURE OF DISTAL END OF TIBIA WITH FIBULA, LEFT, CLOSED, INITIAL ENCOUNTER: Status: ACTIVE | Noted: 2023-03-16

## 2024-02-06 PROBLEM — E78.5 HLD (HYPERLIPIDEMIA): Status: ACTIVE | Noted: 2023-03-16

## 2024-02-06 PROBLEM — S82.832A FRACTURE OF DISTAL END OF TIBIA WITH FIBULA, LEFT, CLOSED, INITIAL ENCOUNTER: Status: ACTIVE | Noted: 2023-03-16

## 2024-02-06 PROBLEM — M25.561 KNEE PAIN, RIGHT: Status: ACTIVE | Noted: 2023-03-16

## 2024-02-06 PROBLEM — R63.4 UNINTENTIONAL WEIGHT LOSS: Status: ACTIVE | Noted: 2020-09-25

## 2024-02-06 PROBLEM — D75.89 MACROCYTOSIS: Status: ACTIVE | Noted: 2023-03-16

## 2024-02-06 PROBLEM — K25.9 GASTRIC ULCER WITHOUT HEMORRHAGE OR PERFORATION: Status: ACTIVE | Noted: 2020-11-02

## 2024-02-06 PROBLEM — K52.831 COLLAGENOUS COLITIS: Status: ACTIVE | Noted: 2024-02-06

## 2024-02-06 PROBLEM — J11.1 INFLUENZA: Status: ACTIVE | Noted: 2024-02-06

## 2024-02-06 PROBLEM — K21.9 GERD (GASTROESOPHAGEAL REFLUX DISEASE): Status: ACTIVE | Noted: 2020-10-05

## 2024-02-06 PROBLEM — K55.21 AVM (ARTERIOVENOUS MALFORMATION) OF COLON WITH HEMORRHAGE: Status: ACTIVE | Noted: 2018-08-17

## 2024-02-06 PROBLEM — F32.A DEPRESSION: Status: ACTIVE | Noted: 2022-10-20

## 2024-02-06 PROBLEM — F41.9 ANXIETY: Status: ACTIVE | Noted: 2022-10-20

## 2024-02-06 PROBLEM — K55.069: Status: ACTIVE | Noted: 2019-02-04

## 2024-02-06 PROBLEM — R68.81 EARLY SATIETY: Status: ACTIVE | Noted: 2020-09-25

## 2024-02-06 PROBLEM — F34.1 DYSTHYMIC DISORDER: Status: ACTIVE | Noted: 2018-08-17

## 2024-02-07 ENCOUNTER — LAB ENCOUNTER (OUTPATIENT)
Dept: LAB | Age: 69
End: 2024-02-07
Attending: INTERNAL MEDICINE
Payer: MEDICARE

## 2024-02-07 DIAGNOSIS — I48.91 A-FIB (HCC): ICD-10-CM

## 2024-02-07 DIAGNOSIS — E78.00 PURE HYPERCHOLESTEROLEMIA: Primary | ICD-10-CM

## 2024-02-07 LAB
ANION GAP SERPL CALC-SCNC: 1 MMOL/L (ref 0–18)
ANTIBODY SCREEN: NEGATIVE
BUN BLD-MCNC: 20 MG/DL (ref 9–23)
CALCIUM BLD-MCNC: 9.3 MG/DL (ref 8.5–10.1)
CHLORIDE SERPL-SCNC: 108 MMOL/L (ref 98–112)
CHOLEST SERPL-MCNC: 202 MG/DL (ref ?–200)
CO2 SERPL-SCNC: 31 MMOL/L (ref 21–32)
CREAT BLD-MCNC: 0.99 MG/DL
EGFRCR SERPLBLD CKD-EPI 2021: 62 ML/MIN/1.73M2 (ref 60–?)
ERYTHROCYTE [DISTWIDTH] IN BLOOD BY AUTOMATED COUNT: 13.2 %
FASTING PATIENT LIPID ANSWER: YES
FASTING STATUS PATIENT QL REPORTED: YES
GLUCOSE BLD-MCNC: 98 MG/DL (ref 70–99)
HCT VFR BLD AUTO: 37.7 %
HDLC SERPL-MCNC: 84 MG/DL (ref 40–59)
HGB BLD-MCNC: 12.3 G/DL
LDLC SERPL CALC-MCNC: 97 MG/DL (ref ?–100)
MCH RBC QN AUTO: 34.4 PG (ref 26–34)
MCHC RBC AUTO-ENTMCNC: 32.6 G/DL (ref 31–37)
MCV RBC AUTO: 105.3 FL
NONHDLC SERPL-MCNC: 118 MG/DL (ref ?–130)
OSMOLALITY SERPL CALC.SUM OF ELEC: 293 MOSM/KG (ref 275–295)
PLATELET # BLD AUTO: 192 10(3)UL (ref 150–450)
POTASSIUM SERPL-SCNC: 4.8 MMOL/L (ref 3.5–5.1)
RBC # BLD AUTO: 3.58 X10(6)UL
RH BLOOD TYPE: POSITIVE
SODIUM SERPL-SCNC: 140 MMOL/L (ref 136–145)
TRIGL SERPL-MCNC: 121 MG/DL (ref 30–149)
VLDLC SERPL CALC-MCNC: 20 MG/DL (ref 0–30)
WBC # BLD AUTO: 3.6 X10(3) UL (ref 4–11)

## 2024-02-07 PROCEDURE — 80061 LIPID PANEL: CPT

## 2024-02-07 PROCEDURE — 85027 COMPLETE CBC AUTOMATED: CPT

## 2024-02-07 PROCEDURE — 80048 BASIC METABOLIC PNL TOTAL CA: CPT

## 2024-02-07 PROCEDURE — 36415 COLL VENOUS BLD VENIPUNCTURE: CPT

## 2024-02-12 DIAGNOSIS — M54.16 LUMBAR RADICULOPATHY: ICD-10-CM

## 2024-02-12 DIAGNOSIS — G24.3 CERVICAL DYSTONIA: ICD-10-CM

## 2024-02-12 DIAGNOSIS — M54.16 LUMBAR RADICULITIS: ICD-10-CM

## 2024-02-12 DIAGNOSIS — M48.061 SPINAL STENOSIS OF LUMBAR REGION WITHOUT NEUROGENIC CLAUDICATION: ICD-10-CM

## 2024-02-13 RX ORDER — HYDROCODONE BITARTRATE AND ACETAMINOPHEN 5; 325 MG/1; MG/1
1 TABLET ORAL EVERY 8 HOURS PRN
Qty: 90 TABLET | Refills: 0 | Status: SHIPPED | OUTPATIENT
Start: 2024-02-13 | End: 2024-03-14

## 2024-02-13 NOTE — TELEPHONE ENCOUNTER
Medication: HYDROcodone-acetaminophen 5-325 MG Oral Tab     Date of last refill: 1/15/24  [place]24    Last office visit: 11/13/23  Due back to clinic per last office note:  6 months  Date next office visit scheduled:  5/13/24    Last URINE Screen: 1/12/24  Screen Results:  see in chart      Narcotic Contract EXPIRATION date: 11/13/24    Last OV note recommendation: The patient is a pleasant 60-year-old female with a longstanding history of opiate dependence. This is as result of chronic pain due to spinal stenosis in the lumbar region. She is not interested in surgical correction. States the medications do improve function and provide analgesia. [place] . Reviewed her UDS which is consistent with hydrocodone usage. She is due for repeat UDS which was ordered today. Narcotic contract results reviewed today. All patient related questions addressed. Patient can follow-up in 6 months.

## 2024-02-14 RX ORDER — PAROXETINE 10 MG/1
10 TABLET, FILM COATED ORAL EVERY MORNING
Qty: 90 TABLET | Refills: 0 | OUTPATIENT
Start: 2024-02-14

## 2024-02-20 ENCOUNTER — TELEPHONE (OUTPATIENT)
Dept: CARDIOLOGY CLINIC | Facility: HOSPITAL | Age: 69
End: 2024-02-20

## 2024-02-22 ENCOUNTER — HOSPITAL ENCOUNTER (OUTPATIENT)
Age: 69
Discharge: HOME | End: 2024-02-22
Payer: MEDICARE

## 2024-02-22 ENCOUNTER — TELEPHONE (OUTPATIENT)
Dept: CARDIOLOGY CLINIC | Facility: HOSPITAL | Age: 69
End: 2024-02-22

## 2024-02-22 VITALS
OXYGEN SATURATION: 97 % | SYSTOLIC BLOOD PRESSURE: 159 MMHG | DIASTOLIC BLOOD PRESSURE: 98 MMHG | HEART RATE: 68 BPM | RESPIRATION RATE: 22 BRPM | TEMPERATURE: 98 F

## 2024-02-22 DIAGNOSIS — B34.9 VIRAL ILLNESS: Primary | ICD-10-CM

## 2024-02-22 PROCEDURE — 87502 INFLUENZA DNA AMP PROBE: CPT | Performed by: NURSE PRACTITIONER

## 2024-02-22 PROCEDURE — 87880 STREP A ASSAY W/OPTIC: CPT | Performed by: NURSE PRACTITIONER

## 2024-02-22 PROCEDURE — U0002 COVID-19 LAB TEST NON-CDC: HCPCS | Performed by: NURSE PRACTITIONER

## 2024-02-22 PROCEDURE — 99213 OFFICE O/P EST LOW 20 MIN: CPT | Performed by: NURSE PRACTITIONER

## 2024-02-22 NOTE — ED INITIAL ASSESSMENT (HPI)
Pt c/o sore throat and stuffy nose starting on Tuesday. Pt denies fever and cough.  Pt also c/o fatigue. Pt states home covid was negative today.

## 2024-02-22 NOTE — ED PROVIDER NOTES
Patient Seen in: [facility]mediate [facility]      History     Chief Complaint   Patient presents with    Sore Throat    Stuffy Nose     Stated Complaint: Cold    Subjective:   This a 69-year-old female with below stated medical history.  Presents to immediate care for 2 days of sore throat and nasal congestion.  Negative COVID testing at home.  Reports generalized fatigue.  Denies any coughing, difficulty breathing, or wheezing.  Denies any chest pain or shortness of breath.  No known sick contacts.  No treatment attempted prior to arrival.    The history is provided by the patient.           Objective:   Past Medical History:   Diagnosis Date    Age-related osteoporosis without current pathological fracture 06/01/2021    Anemia     Anxiety 1985    Anxiety 10/20/2022    Arrhythmia     afib    Arthritis 1980    Atrial flutter, paroxysmal (HCC) 06/06/2018    Back pain     Back problem     Benign essential tremor     Bowel obstruction (HCC)     Breast cancer (HCC) 2002    Broken ankle 01/2023    Left Ankle    Cancer (HCC)     Right breast    Cervical dystonia 06/17/2021    DBS b/l    Cervical spine degeneration     Change in hair NAILS    Colitis     Ischemic Colitis    Constipation     Depression     Difficult intubation     big bony features in the bottom of mouth     Easy bruising     Endometriosis     Essential hypertension     Exposure to medical diagnostic radiation 2002    Fatigue 2018    ANEMIA.  I GET INFUSIONS    Frequent use of laxatives Miralax    Gastrointestinal hemorrhage 05/31/2018    GI bleed     Heart palpitations 2016    AFIB    High blood pressure     High cholesterol     History of blood transfusion     Jan 2018    History of breast cancer 06/01/2021    XRT/Chemo    History of depression     History of GI bleed 06/01/2021    Ischemic bowel    Hyperglycemia 09/06/2020    Hyperlipidemia 2005    Hyponatremia 12/04/2020    Hypotension due to hypovolemia 08/21/2022    Impacted cerumen of left ear  2023    Intestinal infection due to Clostridium difficile 2021    2019    Irregular bowel habits     Ischemic colitis ([facility]) 2018    Lacunar infarction ([facility]) 2013    Nausea with vomiting 2014    Neuropathy     Osteoarthritis     Osteoarthrosis, unspecified whether generalized or localized, unspecified site     Pacemaker     Pain in joints     Pain with bowel movements     ISCHEMIC COLITIS    Panic disorder     Parkinson disease     Personal history of antineoplastic chemotherapy     Personal history of endometriosis     Plaque psoriasis     Presence of other cardiac implants and grafts DBS FOR TREMORS    Psoriatic arthropathy ([facility]) 2011    Recurrent major depressive disorder, in remission ([facility]) 2021    Rhinovirus 2023    Sleep disturbance     Status post deep brain stimulator placement     Stented coronary artery  lower GI    Stress     Tobacco dependence 2018    Quit 2017    Vaso vagal episode 2022    Weight loss               Past Surgical History:   Procedure Laterality Date    ANGIOPLASTY (CORONARY)      APPENDECTOMY      APPENDECTOMY      BACK SURGERY  2005    NECK AT LOWER LUMBAR FUSED    BRAIN SURGERY  2016    Brain stimulator           x3    CARDIAC PACEMAKER PLACEMENT      CERVICAL SPINE SURGERY  2004    cervical spinal fusion C1-6    CHEMOTHERAPY  2002    COLONOSCOPY  2014    Procedure: COLONOSCOPY;  Surgeon: Aaron Fair MD;  Location:  ENDOSCOPY    COLONOSCOPY N/A 2018    Procedure: COLONOSCOPY;  Surgeon: Aaron Fair MD;  Location:  ENDOSCOPY    D & C      HYSTERECTOMY  1992    OVARIES REMOVED AS WELL    LUMBAR SPINE SURGERY  1999    lumbar laminectomy L1-L4    LUMPECTOMY RIGHT  2002    OOPHORECTOMY  1991    OTHER      COLON STENTS    OTHER SURGICAL HISTORY      Lt bunionectomy    OTHER SURGICAL HISTORY      stent in [facility]    PAIN MANAGEMENT Bilateral 06/15/2021    BILATERAL  LUMBAR 5 TRANSFORAMINAL LUMBAR EPIDURAL STEROID INJECTION    RADIATION RIGHT  2002    SIGMOIDOSCOPY,DIAGNOSTIC      SPINE SURGERY PROCEDURE UNLISTED      TOTAL ABDOM HYSTERECTOMY      TUBAL LIGATION      UPPER GI ENDOSCOPY,BIOPSY      ulcer, at juany                Social History     Socioeconomic History    Marital status:    Tobacco Use    Smoking status: Former     Packs/day: 0.50     Years: 36.00     Additional pack years: 0.00     Total pack years: 18.00     Types: Cigarettes     Quit date: 2018     Years since quittin.1    Smokeless tobacco: Never   Vaping Use    Vaping Use: Never used   Substance and Sexual Activity    Alcohol use: Yes     Alcohol/week: 14.0 standard drinks of alcohol     Types: 14 Cans of beer per week     Comment: approx0- 2 beers per day    Drug use: No    Sexual activity: Not Currently   Other Topics Concern    Caffeine Concern No    Exercise No    Seat Belt No    Special Diet Yes     Comment: HOW TO PUT ON WEIGHT    Stress Concern No    Weight Concern Yes     Comment: CAN'T PUT ON WEIGHT              Review of Systems   Constitutional:  Negative for chills and fever.   HENT:  Positive for congestion, rhinorrhea and sore throat. Negative for ear discharge, ear pain, sinus pressure and sinus pain.    Respiratory:  Negative for cough, shortness of breath and wheezing.    Cardiovascular:  Negative for chest pain.   Gastrointestinal:  Negative for abdominal pain, constipation, diarrhea, nausea and vomiting.   Genitourinary:  Negative for dysuria.   Musculoskeletal:  Negative for back pain, neck pain and neck stiffness.   Skin:  Negative for rash.   Allergic/Immunologic: Negative for environmental allergies.   Neurological:  Negative for headaches.   Hematological:  Does not bruise/bleed easily.       Positive for stated complaint: Cold  Other systems are as noted in HPI.  Constitutional and vital signs reviewed.      All other systems reviewed and negative except as noted  above.    Physical Exam     ED Triage Vitals [02/22/24 1323]   BP (!) 159/98   Pulse 68   Resp 22   Temp 98 °F (36.7 °C)   Temp src Oral   SpO2 97 %   O2 Device None (Room air)       Current:BP (!) 159/98   Pulse 68   Temp 98 °F (36.7 °C) (Oral)   Resp 22   SpO2 97%         Physical Exam  Vitals and nursing note reviewed.   Constitutional:       General: She is not in acute distress.     Appearance: Normal appearance. She is well-developed. She is not ill-appearing, toxic-appearing or diaphoretic.   HENT:      Head: Normocephalic and atraumatic.      Right Ear: Tympanic membrane, ear canal and external ear normal. No drainage, swelling or tenderness. No middle ear effusion. Tympanic membrane is not erythematous.      Left Ear: Tympanic membrane, ear canal and external ear normal. No drainage, swelling or tenderness.  No middle ear effusion. Tympanic membrane is not erythematous.      Nose: Congestion and rhinorrhea present.      Mouth/Throat:      Mouth: Mucous membranes are moist. No oral lesions.      Pharynx: Oropharynx is clear. Uvula midline. Posterior oropharyngeal erythema present. No pharyngeal swelling, oropharyngeal exudate or uvula swelling.      Tonsils: No tonsillar exudate or tonsillar abscesses. 0 on the right. 0 on the left.   Eyes:      General:         Right eye: No discharge.         Left eye: No discharge.      Extraocular Movements: Extraocular movements intact.      Conjunctiva/sclera: Conjunctivae normal.   Cardiovascular:      Rate and Rhythm: Normal rate and regular rhythm.      Heart sounds: Normal heart sounds. No murmur heard.  Pulmonary:      Effort: Pulmonary effort is normal. No respiratory distress.      Breath sounds: Normal breath sounds. No stridor. No wheezing, rhonchi or rales.   Musculoskeletal:      Cervical back: Neck supple.      Right lower leg: No edema.      Left lower leg: No edema.   Skin:     General: Skin is warm and dry.      Capillary Refill: Capillary refill  takes less than 2 seconds.      Findings: No rash.   Neurological:      Mental Status: She is alert and oriented to person, place, and time.   Psychiatric:         Mood and Affect: Mood normal.         Behavior: Behavior normal.               ED Course     Labs Reviewed   POCT RAPID STREP - Normal   POCT FLU TEST - Normal    Narrative:     This assay is a rapid molecular in vitro test utilizing nucleic acid amplification of influenza A and B viral RNA.   RAPID SARS-COV-2 BY PCR - Normal             Rapid strep, rapid flu, rapid covid.          MDM        Vital signs stable. Patient is well-appearing and nontoxic looking. Presents to immediate care for upper respiratory symptoms.    Differential diagnosis include but not limited to COVID, viral sinusitis, influenza, strep, other viral URI, pneumonia    Rapid COVID, rapid flu, and rapid strep are all negative.  Lung sounds clear bilaterally. No respiratory distress or hypoxia.  Patient denies any coughing symptoms.  No suspicion for pneumonia.       Clinically all symptoms appear viral.    DC home. Recommended over-the-counter Coricidin HBP and Flonase.  Tylenol for pain or fever. The importance of oral hydration and close follow-up with primary provider in 1 week discussed. Reasons to seek emergent treatment reinforced. Patient verbalized understanding, and agreed with plan of care. All questions answered.                                     Medical Decision Making      Disposition and Plan     Clinical Impression:  1. Viral illness         Disposition:  Discharge  2/22/2024  1:54 pm    Follow-up:  Lilli Kramer MD  [address] [id number] [phone]    In 1 week            Medications Prescribed:  Discharge Medication List as of 2/22/2024  1:56 PM

## 2024-02-22 NOTE — TELEPHONE ENCOUNTER
Ct scan reviewed with [facility], ROGELIO is small and Watchman procedure to be cancelled on 3/1.  Will reschedule with Amulet device for 4/5. PAT/[facility] aware.

## 2024-02-22 NOTE — DISCHARGE INSTRUCTIONS
Over-the-counter Coricidin HBP and Flonase will be helpful for your symptoms.  Continue using throat lozenges.  Follow closely with your primary as discussed.

## 2024-02-26 ENCOUNTER — OFFICE VISIT (OUTPATIENT)
Dept: INTERNAL MEDICINE CLINIC | Facility: CLINIC | Age: 69
End: 2024-02-26
Payer: MEDICARE

## 2024-02-26 VITALS
DIASTOLIC BLOOD PRESSURE: 80 MMHG | HEART RATE: 103 BPM | RESPIRATION RATE: 20 BRPM | HEIGHT: 62 IN | TEMPERATURE: 98 F | OXYGEN SATURATION: 99 % | SYSTOLIC BLOOD PRESSURE: 142 MMHG | WEIGHT: 92.5 LBS | BODY MASS INDEX: 17.02 KG/M2

## 2024-02-26 DIAGNOSIS — J01.00 ACUTE NON-RECURRENT MAXILLARY SINUSITIS: Primary | ICD-10-CM

## 2024-02-26 PROCEDURE — 99213 OFFICE O/P EST LOW 20 MIN: CPT | Performed by: NURSE PRACTITIONER

## 2024-02-26 RX ORDER — AZITHROMYCIN 250 MG/1
TABLET, FILM COATED ORAL
Qty: 6 TABLET | Refills: 1 | Status: SHIPPED | OUTPATIENT
Start: 2024-02-26 | End: 2024-03-02

## 2024-02-26 NOTE — PATIENT INSTRUCTIONS
Mucinex for congestion    Continue flonase    Take antibiotic completely as ordered     Take antibiotic with food    Eat yogurt twice a day while on antibiotic or take an oral probiotic    Monitor for diarrhea, side effects, allergic reaction    If you have a mild allergic reaction take benadryl and call the office. If it is severe and you have lip or throat swelling or trouble breathing go to the ER or call 911    Follow up in one week as needed or when routine care is due

## 2024-02-26 NOTE — PROGRESS NOTES
Tamara Meraz is a 69 year old female.  CHIEF COMPLAINT   Sinus pain    HPI:   Went to immediate care recently.  Was tested for viral syndrome and was negative for test.  Has had worsening symptoms of sinus pain and pressure as well as headache.  Also fatigued.  No other symptoms.  No shortness of breath.    Current Outpatient Medications   Medication Sig Dispense Refill           HYDROcodone-acetaminophen 5-325 MG Oral Tab Take 1 tablet by mouth every 8 (eight) hours as needed for Pain. 90 tablet 0    IRON OR       clopidogrel 75 MG Oral Tab Take 1 tablet (75 mg total) by mouth daily.      PARoxetine 20 MG Oral Tab Take 1 tablet (20 mg total) by mouth every morning. 90 tablet 0    atorvastatin 10 MG Oral Tab Take 1 tablet (10 mg total) by mouth daily. 90 tablet 1    Calcium Magnesium Zinc 333-133-5 MG Oral Tab Take 1 tablet by mouth daily.      Multiple Vitamins-Minerals (CENTRUM SILVER 50+WOMEN) Oral Tab Take 1 tablet by mouth daily.      primidone 50 MG Oral Tab Take 100 mg by mouth every morning and take 150 mg by mouth every evening.      gabapentin 300 MG Oral Cap Take by mouth. Take 600mg in the morning and 600 mg at night      Cyanocobalamin (B-12) 1000 MCG Oral Tab Take 1,000 mcg by mouth daily.      Flecainide Acetate 100 MG Oral Tab Take 1 tablet (100 mg total) by mouth 2 (two) times daily.        Past Medical History:   Diagnosis Date    Age-related osteoporosis without current pathological fracture 06/01/2021    Anemia     Anxiety 1985    Anxiety 10/20/2022    Arrhythmia     afib    Arthritis 1980    Atrial flutter, paroxysmal (HCC) 06/06/2018    Back pain     Back problem     Benign essential tremor     Bowel obstruction (HCC)     Breast cancer (HCC) 2002    Broken ankle 01/2023    Left Ankle    Cancer (HCC)     Right breast    Cervical dystonia 06/17/2021    DBS b/l    Cervical spine degeneration     Change in hair NAILS    Colitis     Ischemic Colitis    Constipation     Depression      Difficult intubation     big bony features in the bottom of mouth     Easy bruising     Endometriosis     Essential hypertension     Exposure to medical diagnostic radiation 2002    Fatigue 2018    ANEMIA.  I GET INFUSIONS    Frequent use of laxatives Miralax    Gastrointestinal hemorrhage 05/31/2018    GI bleed     Heart palpitations 2016    AFIB    High blood pressure     High cholesterol     History of blood transfusion     Jan 2018    History of breast cancer 06/01/2021    XRT/Chemo    History of depression     History of GI bleed 06/01/2021    Ischemic bowel    Hyperglycemia 09/06/2020    Hyperlipidemia 2005    Hyponatremia 12/04/2020    Hypotension due to hypovolemia 08/21/2022    Impacted cerumen of left ear 04/27/2023    Intestinal infection due to Clostridium difficile 06/17/2021    2019    Irregular bowel habits     Ischemic colitis ([facility]) 06/02/2018 12/22    Lacunar infarction ([facility]) 09/09/2013    Nausea with vomiting 02/23/2014    Neuropathy     Osteoarthritis     Osteoarthrosis, unspecified whether generalized or localized, unspecified site     Pacemaker     Pain in joints 2021    Pain with bowel movements 2016    ISCHEMIC COLITIS    Panic disorder     Parkinson disease     Personal history of antineoplastic chemotherapy 2002    Personal history of endometriosis     Plaque psoriasis     Presence of other cardiac implants and grafts DBS FOR TREMORS    Psoriatic arthropathy ([facility]) 11/01/2011    Recurrent major depressive disorder, in remission ([facility]) 06/17/2021    Rhinovirus 05/04/2023    Sleep disturbance     Status post deep brain stimulator placement     Stented coronary artery 2017 lower GI    Stress     Tobacco dependence 06/21/2018    Quit 2017    Vaso vagal episode 08/21/2022    Weight loss 2020      Social History:  Social History     Socioeconomic History    Marital status:    Tobacco Use    Smoking status: Former     Packs/day: 0.50     Years: 36.00     Additional pack years: 0.00      Total pack years: 18.00     Types: Cigarettes     Quit date: 2018     Years since quittin.1    Smokeless tobacco: Never   Vaping Use    Vaping Use: Never used   Substance and Sexual Activity    Alcohol use: Yes     Alcohol/week: 14.0 standard drinks of alcohol     Types: 14 Cans of beer per week     Comment: approx0- 2 beers per day    Drug use: No    Sexual activity: Not Currently   Other Topics Concern    Caffeine Concern No    Exercise No    Seat Belt No    Special Diet Yes     Comment: HOW TO PUT ON WEIGHT    Stress Concern No    Weight Concern Yes     Comment: CAN'T PUT ON WEIGHT        REVIEW OF SYSTEMS:   See HPI     EXAM:     /80 (BP Location: Right arm, Patient Position: Sitting, Cuff Size: adult)   Pulse 103   Temp 97.7 °F (36.5 °C) (Temporal)   Resp 20   Ht 5' 2\" (1.575 m)   Wt 92 lb 8 oz (42 kg)   SpO2 99%   BMI 16.92 kg/m²   Body mass index is 16.92 kg/m².   GENERAL: well developed, well nourished,in no apparent distress  HEENT: atraumatic, normocephalic,ears are clear, sinus pain, some mastoid tenderness that is mild.   EYES:  conjunctiva are clear  NECK: supple,no adenopathy    LUNGS: clear to auscultation; no rhonchi, rales, or wheezing  CARDIO: RRR without murmur  GI: no tenderness  MUSCULOSKELETAL:  Normal gait.  EXTREMITIES: no edema  NEURO: Oriented times three       LABS:      Lab Results   Component Value Date    WBC 3.6 (L) 2024    RBC 3.58 (L) 2024    HGB 12.3 2024    HCT 37.7 2024    .3 (H) 2024    MCH 34.4 (H) 2024    MCHC 32.6 2024    RDW 13.2 2024    .0 2024    MPV 9.8 2012      Lab Results   Component Value Date    GLU 98 2024    BUN 20 2024    BUNCREA 14.9 2021    CREATSERUM 0.99 2024    ANIONGAP 1 2024    GFR 53 (L) 2018    GFRNAA 66 2022    GFRAA 77 2022    CA 9.3 2024    OSMOCALC 293 2024    ALKPHO 107 01/10/2024    AST 57 (H)  01/10/2024    ALT 72 (H) 01/10/2024    BILT 0.3 01/10/2024    TP 7.5 01/10/2024    ALB 3.5 01/10/2024    GLOBULIN 4.0 01/10/2024     02/07/2024    K 4.8 02/07/2024     02/07/2024    CO2 31.0 02/07/2024      Lab Results   Component Value Date    CHOLEST 202 (H) 02/07/2024    TRIG 121 02/07/2024    HDL 84 (H) 02/07/2024    LDL 97 02/07/2024    VLDL 20 02/07/2024    TCHDLRATIO 4.7 (H) 10/12/2012    NONHDLC 118 02/07/2024      Lab Results   Component Value Date    T4F 1.1 02/10/2014    TSH 1.020 10/13/2022       ASSESSMENT AND PLAN:   1. Acute non-recurrent maxillary sinusitis  -Patient has likely sinusitis after an initial viral infection.    -Antibiotic ordered.  -Also advised to do supportive care with OTC meds  - was advised to watch sinus and mastoid tenderness for resolution.   -To ER for emergent symptoms  - azithromycin (ZITHROMAX Z-RENU) 250 MG Oral Tab; Take 2 tablets (500 mg total) by mouth daily for 1 day, THEN 1 tablet (250 mg total) daily for 4 days.  Dispense: 6 tablet; Refill: 1      The patient indicates understanding of these issues and agrees to the plan.  The patient is asked to return as needed or when routine care is due.

## 2024-03-01 ENCOUNTER — HOSPITAL ENCOUNTER (INPATIENT)
Dept: INTERVENTIONAL RADIOLOGY/VASCULAR | Facility: HOSPITAL | Age: 69
Discharge: HOME | End: 2024-03-01
Attending: INTERNAL MEDICINE
Payer: MEDICARE

## 2024-03-04 DIAGNOSIS — I48.91 A-FIB (HCC): Primary | ICD-10-CM

## 2024-03-07 ENCOUNTER — TELEPHONE (OUTPATIENT)
Dept: CARDIOLOGY CLINIC | Facility: HOSPITAL | Age: 69
End: 2024-03-07

## 2024-03-07 DIAGNOSIS — I48.91 A-FIB (HCC): Primary | ICD-10-CM

## 2024-03-07 NOTE — TELEPHONE ENCOUNTER
Pre-Procedural Instructions for  Left Atrial Appendage Occlusion Implant (Watchman/Amulet)    Your Watchman/Amulet Procedure is scheduled for: ___April 5th ________.   Nothing to eat or drink for 8 hours prior to procedure due to having Anesthesia.   You will receive a phone call the day before your procedure with your arrival time. Please disregard the arrival time given in My Chart.   Check in at the [facility] Desk on the Ground Floor of the [facility]  A RN from the Pre-Admission Testing Department will call you to review your medication and history.  You will be given instructions on what medications you can take the morning of your procedure. Below is a list of certain medications that may need to be held prior to that phone call, please review to see if any apply to you.   Diabetic medication:   If you have a continuous glucose monitor (CGM): You will need to remove the device/transmitter prior to the procedure. Please plan accordingly for when you need to exchange the device around the time of your procedure.   GLP-1 Agonists: Such as but not limited to   Saxenda, Ozempic, Trulicity, Byetta, Victoza, Mounjaro  If taken daily hold day of procedure   If taken weekly hold 1 week prior to procedure   SGLT2 Inhibitor Hypoglycemics: Such as but not limited to     Canagliflozin, Canagliflozin/Metformin, Dapaglifolzin, Empagliflozin, Ertugliflozin  Will need to hold 4 days prior to procedure. Be sure to carefully monitor blood sugar while holding, contact your prescribing MD with any abnormal blood sugars.   Blood pressure medications (ACE's/ARB's): Such as but not limited to  Lisinopril, Ramipril, Captopril, Enalapril, Losartan, Diovan, Olmesaratan, Entresto:   Hold 24 hours prior to procedure     Anticoagulation (Blood Thinners):  ______ Eliquis, Xarelto, Pradaxa.  Last dose will be: ____N/A__________  (Hold for 24 hours prior to procedure)  ______ Coumadin (Warfarin).  Last dose will be:  ______N/A____________  (Hold for 3 days prior to procedure)    Prior to your Watchman implant you will need one of two tests to determine the size of the Left Atrial Appendage and to check for blood clots:   ? CTA of Pulmonary Veins: Done 2/1  This test will be repeated 45 days after your implant. This is done to check the seal around the device and be sure there are no blood clots. For CT scans, central scheduling will call you to schedule. For AYANNA's a date will be provided for the follow up. Post CT 5/28  You will take 81mg Aspirin and 75mg Plavix for 6 months after your implant. You will receive a call when you can stop the Plavix.         You will need Pre-Admission testing prior to your implant.  Please have these tests done 1-2 weeks prior to the procedure at an [facility] facility:  Schedule an appointment: [facility] Central Scheduling [phone]  Blood work: CBC, BMP, Type&Screen   Please notify RN if you had a recent blood transfusion within 90 days (then blood test will need to be within 3 days of the procedure)   EKG    Chest X-ray  (If you had a CT scan you do not need to have this done)   Please call us if you have a fever, cough, cold, or any sign of infection prior to the procedure.  Please contact us if you have any questions or concerns:    Sincerely,  [facility] RN's   [phone]

## 2024-03-11 DIAGNOSIS — M48.061 SPINAL STENOSIS OF LUMBAR REGION WITHOUT NEUROGENIC CLAUDICATION: ICD-10-CM

## 2024-03-11 DIAGNOSIS — M54.16 LUMBAR RADICULITIS: ICD-10-CM

## 2024-03-11 DIAGNOSIS — G24.3 CERVICAL DYSTONIA: ICD-10-CM

## 2024-03-11 DIAGNOSIS — M54.16 LUMBAR RADICULOPATHY: ICD-10-CM

## 2024-03-11 RX ORDER — ATORVASTATIN CALCIUM 10 MG/1
10 TABLET, FILM COATED ORAL DAILY
Qty: 90 TABLET | Refills: 1 | Status: SHIPPED | OUTPATIENT
Start: 2024-03-11

## 2024-03-11 RX ORDER — HYDROCODONE BITARTRATE AND ACETAMINOPHEN 5; 325 MG/1; MG/1
1 TABLET ORAL EVERY 8 HOURS PRN
Qty: 90 TABLET | Refills: 0 | Status: SHIPPED | OUTPATIENT
Start: 2024-03-13 | End: 2024-04-12

## 2024-03-11 NOTE — TELEPHONE ENCOUNTER
Medication: HYDROcodone-acetaminophen 5-325 MG Oral Tab     Date of last refill: 2/13/24  [place] 2[place]13/24    Last office visit: 11/13/23  Due back to clinic per last office note:  6 months  Date next office visit scheduled:  5/13/24    Last URINE Screen: 1/12/24  Screen Results:  see in chart      Narcotic Contract EXPIRATION date: 11/13/24    Last OV note recommendation: The patient is a pleasant 60-year-old female with a longstanding history of opiate dependence. This is as result of chronic pain due to spinal stenosis in the lumbar region. She is not interested in surgical correction. States the medications do improve function and provide analgesia. [place] . Reviewed her UDS which is consistent with hydrocodone usage. She is due for repeat UDS which was ordered today. Narcotic contract results reviewed today. All patient related questions addressed. Patient can follow-up in 6 months.

## 2024-03-11 NOTE — TELEPHONE ENCOUNTER
Cholesterol Medication Protocol [id number]/11/2024 01:37 PM   Protocol Details ALT < 80    ALT resulted within past year    Lipid panel within past 12 months    In person appointment or virtual visit in the past 12 mos or appointment in next 3 mos   3. Pure hypercholesterolemia  Continue statin.   Future Appointments   Date Time Provider Department Center   4/5/2024  8:30 AM EH IVS CATH EH IVS [facility]   4/12/2024 10:00 AM EH OOT EH CHEMO [facility]   5/7/2024 11:00 AM Rojas Nguyen MD SGINP [facility] SUB GI   5/13/2024  1:00 PM Gustavo Zhao MD ENIPain [facility]   5/28/2024  8:00 AM EH CT MAIN RM4 EH CT [facility]   5/28/2024 10:00 AM Kenia Brown, DPM [facility] ORTHO 75 [facility]   7/8/2024  2:00 PM Lilli Kramer MD EMG 29 [facility] [place]   7/10/2024  1:00 PM EH OOT EH CHEMO [facility]   7/11/2024 10:00 AM Jaclyn Dong DO [id number] [facility] [place]   1/8/2025  1:00 PM Sharona Gunter MD EH HEM ONC [facility]

## 2024-03-14 ENCOUNTER — OFFICE VISIT (OUTPATIENT)
Dept: INTERNAL MEDICINE CLINIC | Facility: CLINIC | Age: 69
End: 2024-03-14
Payer: MEDICARE

## 2024-03-14 VITALS
WEIGHT: 94.81 LBS | HEART RATE: 87 BPM | SYSTOLIC BLOOD PRESSURE: 112 MMHG | DIASTOLIC BLOOD PRESSURE: 68 MMHG | RESPIRATION RATE: 16 BRPM | OXYGEN SATURATION: 97 % | BODY MASS INDEX: 17.45 KG/M2 | HEIGHT: 62 IN

## 2024-03-14 DIAGNOSIS — I48.0 PAROXYSMAL ATRIAL FIBRILLATION (HCC): ICD-10-CM

## 2024-03-14 DIAGNOSIS — S00.03XD HEMATOMA OF SCALP, SUBSEQUENT ENCOUNTER: ICD-10-CM

## 2024-03-14 DIAGNOSIS — S52.134S CLOSED NONDISPLACED FRACTURE OF NECK OF RIGHT RADIUS, SEQUELA: ICD-10-CM

## 2024-03-14 DIAGNOSIS — R55 SYNCOPE AND COLLAPSE: Primary | ICD-10-CM

## 2024-03-14 DIAGNOSIS — M25.522 LEFT ELBOW PAIN: ICD-10-CM

## 2024-03-14 DIAGNOSIS — G25.0 ESSENTIAL TREMOR: ICD-10-CM

## 2024-03-14 DIAGNOSIS — M53.3 PAIN IN THE COCCYX: ICD-10-CM

## 2024-03-14 DIAGNOSIS — Z96.89 S/P DEEP BRAIN STIMULATOR PLACEMENT: ICD-10-CM

## 2024-03-14 DIAGNOSIS — S09.90XD CLOSED HEAD INJURY, SUBSEQUENT ENCOUNTER: ICD-10-CM

## 2024-03-14 PROCEDURE — 99214 OFFICE O/P EST MOD 30 MIN: CPT | Performed by: NURSE PRACTITIONER

## 2024-03-14 PROCEDURE — G2211 COMPLEX E/M VISIT ADD ON: HCPCS | Performed by: NURSE PRACTITIONER

## 2024-03-14 RX ORDER — AZITHROMYCIN 250 MG/1
TABLET, FILM COATED ORAL
COMMUNITY
Start: 2024-03-02 | End: 2024-03-14 | Stop reason: ALTCHOICE

## 2024-03-14 NOTE — PROGRESS NOTES
CHIEF COMPLAINT:     Chief Complaint   Patient presents with    ER F/U     Fell Backwards in bathroom and landed on bottom first and then proceeded to fall back to floor and hit her head    Pt complains today of Full Body Pain and Discomfort, Swelling/Bruising of Elbows, Head Pain and Feels a \"Squishy - Liquid Filled\" area on top/back of head       HPI:   Tamara Meraz is a 69 year old female coming in for ER f/u. [facility] ER records reviewed.     Patient went to ER on 3/12 after a fall with near syncope. She was going to the bathroom to turn off her deep brain stimulator. She felt lightheaded and fell onto the floor. She did hit her head. Denies LOC. CT brain with hematoma, otherwise negative for acute findings. EKG with no acute ST changes. CXR, x-ray of sacrum/coccyx, x-ray of left elbow unremarkable. X-ray of right elbow showed likely old impacted fracture of the neck of right radius. All other lab work was unremarkable. No definitive cause was found for her symptoms. Her pacemaker does not have arrhythmia detection capabilities so unsure if arrhythmia was cause of her symptoms. She saw her cardiologist today and she will be getting watchman procedure next month for her AFIB.     Complains of pain today since the fall, specifically to the left elbow, coccyx, and head. Denies any significant pain to the right elbow. Unaware of the old fracture. She has had frequent falls in the past but never imaging of the right elbow. Does see pain management and currently taking Norco 5-325 mg every 8 hours. Wondering what else she can take for pain. She did reach out to them, awaiting response.     Denies any unilateral weakness, speech difficulties, SOB, CP, recurrent dizziness, or syncope.    Past Medical History:   Diagnosis Date    Age-related osteoporosis without current pathological fracture 06/01/2021    Anemia     Anxiety 1985    Anxiety 10/20/2022    Arrhythmia     afib    Arthritis 1980    Atrial flutter,  paroxysmal (HCC) 06/06/2018    Back pain     Back problem     Benign essential tremor     Bowel obstruction (HCC)     Breast cancer (HCC) 2002    Broken ankle 01/2023    Left Ankle    Cancer (HCC)     Right breast    Cervical dystonia 06/17/2021    DBS b/l    Cervical spine degeneration     Change in hair NAILS    Colitis     Ischemic Colitis    Constipation     Depression     Difficult intubation     big bony features in the bottom of mouth     Easy bruising     Endometriosis     Essential hypertension     Exposure to medical diagnostic radiation 2002    Fatigue 2018    ANEMIA.  I GET INFUSIONS    Frequent use of laxatives Miralax    Gastrointestinal hemorrhage 05/31/2018    GI bleed     Heart palpitations 2016    AFIB    High blood pressure     High cholesterol     History of blood transfusion     Jan 2018    History of breast cancer 06/01/2021    XRT/Chemo    History of depression     History of GI bleed 06/01/2021    Ischemic bowel    Hyperglycemia 09/06/2020    Hyperlipidemia 2005    Hyponatremia 12/04/2020    Hypotension due to hypovolemia 08/21/2022    Impacted cerumen of left ear 04/27/2023    Intestinal infection due to Clostridium difficile 06/17/2021 2019    Irregular bowel habits     Ischemic colitis (HCC) 06/02/2018 12/22    Lacunar infarction ([facility]) 09/09/2013    Nausea with vomiting 02/23/2014    Neuropathy     Osteoarthritis     Osteoarthrosis, unspecified whether generalized or localized, unspecified site     Pacemaker     Pain in joints 2021    Pain with bowel movements 2016    ISCHEMIC COLITIS    Panic disorder     Parkinson disease     Personal history of antineoplastic chemotherapy 2002    Personal history of endometriosis     Plaque psoriasis     Presence of other cardiac implants and grafts DBS FOR TREMORS    Psoriatic arthropathy ([facility]) 11/01/2011    Recurrent major depressive disorder, in remission ([facility]) 06/17/2021    Rhinovirus 05/04/2023    Sleep disturbance     Status post deep brain  stimulator placement     Stented coronary artery 2017 lower GI    Stress     Tobacco dependence 2018    Quit 2017    Vaso vagal episode 2022    Weight loss       Past Surgical History:   Procedure Laterality Date    ANGIOPLASTY (CORONARY)      APPENDECTOMY      APPENDECTOMY      BACK SURGERY  2005    NECK AT LOWER LUMBAR FUSED    BRAIN SURGERY  2016    Brain stimulator           x3    CARDIAC PACEMAKER PLACEMENT      CERVICAL SPINE SURGERY  2004    cervical spinal fusion C1-6    CHEMOTHERAPY  2002    COLONOSCOPY  2014    Procedure: COLONOSCOPY;  Surgeon: Aaron Fair MD;  Location:  ENDOSCOPY    COLONOSCOPY N/A 2018    Procedure: COLONOSCOPY;  Surgeon: Aaron Fair MD;  Location:  ENDOSCOPY    D & C      HYSTERECTOMY  1992    OVARIES REMOVED AS WELL    LUMBAR SPINE SURGERY  1999    lumbar laminectomy L1-L4    LUMPECTOMY RIGHT      OOPHORECTOMY      OTHER      COLON STENTS    OTHER SURGICAL HISTORY      Lt bunionectomy    OTHER SURGICAL HISTORY      stent in [facility]    PAIN MANAGEMENT Bilateral 06/15/2021    BILATERAL LUMBAR 5 TRANSFORAMINAL LUMBAR EPIDURAL STEROID INJECTION    RADIATION RIGHT  2002    SIGMOIDOSCOPY,DIAGNOSTIC      SPINE SURGERY PROCEDURE UNLISTED      TOTAL ABDOM HYSTERECTOMY      TUBAL LIGATION      UPPER GI ENDOSCOPY,BIOPSY      ulcer, at [facility]      Social History:  Social History     Socioeconomic History    Marital status:    Tobacco Use    Smoking status: Former     Packs/day: 0.50     Years: 36.00     Additional pack years: 0.00     Total pack years: 18.00     Types: Cigarettes     Quit date: 2018     Years since quittin.2     Passive exposure: Past    Smokeless tobacco: Never   Vaping Use    Vaping Use: Never used   Substance and Sexual Activity    Alcohol use: Yes     Alcohol/week: 14.0 standard drinks of alcohol     Types: 14 Cans of beer per week     Comment: approx0- 2 beers per day    Drug use: No    Sexual  activity: Not Currently   Other Topics Concern    Caffeine Concern No    Exercise No    Seat Belt No    Special Diet Yes     Comment: HOW TO PUT ON WEIGHT    Stress Concern No    Weight Concern Yes     Comment: CAN'T PUT ON WEIGHT      Family History:  Family History   Problem Relation Age of Onset    Hypertension Father     Musculo-skelatal Disorder Father         TREMOR    Heart Disorder Mother     Cancer Mother         pharyngeal    Breast Cancer Mother         MYSELF 2001    Breast Cancer Self     Lung Disorder Sister         COPD    Heart Attack Brother 72    Ulcerative Colitis Son       Allergies:  Allergies   Allergen Reactions    Sulfa Antibiotics UNKNOWN    Sulfa Drugs Cross Reactors RASH     Headache      Current Meds:  Current Outpatient Medications   Medication Sig Dispense Refill    HYDROcodone-acetaminophen 5-325 MG Oral Tab Take 1 tablet by mouth every 8 (eight) hours as needed for Pain. 90 tablet 0    atorvastatin 10 MG Oral Tab Take 1 tablet (10 mg total) by mouth daily. 90 tablet 1    IRON OR Take 1 tablet by mouth daily.      PARoxetine 20 MG Oral Tab Take 1 tablet (20 mg total) by mouth every morning. 90 tablet 0    Calcium Magnesium Zinc 333-133-5 MG Oral Tab Take 1 tablet by mouth daily.      Multiple Vitamins-Minerals (CENTRUM SILVER 50+WOMEN) Oral Tab Take 1 tablet by mouth daily.      primidone 50 MG Oral Tab Take 100 mg by mouth every morning and take 150 mg by mouth every evening.      gabapentin 300 MG Oral Cap Take by mouth. Take 600mg in the morning and 600 mg at night      Cyanocobalamin (B-12) 1000 MCG Oral Tab Take 1,000 mcg by mouth daily.         Counseling given: Not Answered       REVIEW OF SYSTEMS:   See HPI.    EXAM:     /68 (BP Location: Left arm, Patient Position: Sitting, Cuff Size: adult)   Pulse 87   Resp 16   Ht 5' 2\" (1.575 m)   Wt 94 lb 12.8 oz (43 kg)   SpO2 97%   BMI 17.34 kg/m²   Body mass index is 17.34 kg/m².   Vital signs reviewed. Appears stated age,  well groomed, in no acute distress.  Physical Exam:  GENERAL: Patient is alert, awake and oriented, well developed, well nourished.  HEENT: Head: Normocephalic, atraumatic. Left parietal scalp hematoma present. Eyes: EOMI, PERRLA, conjunctivae clear bilaterally, no eye discharge.   NECK: Supple, no CLAD  SKIN: Bruising to bilateral elbows.   HEART: RRR without murmur.  LUNGS: Clear to auscultation bilaterally, no rales/rhonchi/wheezing.  ABDOMEN: good BS's, no masses, HSM or tenderness  MUSCULOSKELETAL: Mild TTP to the sacrum. No obvious joint deformity or swelling.   EXTREMITIES: Mild TTP to the left lateral elbow with mild swelling noted and bruising. FROM of bilateral elbows.  NEURO: Oriented time three. Normal gait, strength and tone, sensory intact, normal reflexes.    LABS:      Lab Results   Component Value Date    WBC 3.6 (L) 02/07/2024    RBC 3.58 (L) 02/07/2024    HGB 12.3 02/07/2024    HCT 37.7 02/07/2024    .3 (H) 02/07/2024    MCH 34.4 (H) 02/07/2024    MCHC 32.6 02/07/2024    RDW 13.2 02/07/2024    .0 02/07/2024    MPV 9.8 11/09/2012      Lab Results   Component Value Date    GLU 98 02/07/2024    BUN 20 02/07/2024    BUNCREA 14.9 07/14/2021    CREATSERUM 0.99 02/07/2024    ANIONGAP 1 02/07/2024    GFR 53 (L) 01/04/2018    GFRNAA 66 06/03/2022    GFRAA 77 06/03/2022    CA 9.3 02/07/2024    OSMOCALC 293 02/07/2024    ALKPHO 107 01/10/2024    AST 57 (H) 01/10/2024    ALT 72 (H) 01/10/2024    BILT 0.3 01/10/2024    TP 7.5 01/10/2024    ALB 3.5 01/10/2024    GLOBULIN 4.0 01/10/2024     02/07/2024    K 4.8 02/07/2024     02/07/2024    CO2 31.0 02/07/2024      Lab Results   Component Value Date    CHOLEST 202 (H) 02/07/2024    TRIG 121 02/07/2024    HDL 84 (H) 02/07/2024    LDL 97 02/07/2024    VLDL 20 02/07/2024    TCHDLRATIO 4.7 (H) 10/12/2012    NONHDLC 118 02/07/2024      Lab Results   Component Value Date    T4F 1.1 02/10/2014    TSH 1.020 10/13/2022      No results found for:  \"EAG\", \"A1C\"     IMAGING:     No results found.     ASSESSMENT AND PLAN:   1. Syncope and collapse  -No definitive cause was found  -Her pacemaker does not have recording capabilities  -Seeing cardiology   -All workup unremarkable  -Go to ER for recurrent symptoms     2. Closed head injury, subsequent encounter  3. Hematoma of scalp, subsequent encounter  -Neuro exam normal  -Continue to monitor hematoma. Use ice prn     4. Closed nondisplaced fracture of neck of right radius, sequela  -Asymptomatic  -Continue to monitor    5. Left elbow pain  6. Pain in the coccyx  -Continue Norco, managed by pain service  -Can take extra Tylenol, advised not exceed 3 g total in 24 hours  -Unable to take NSAIDs due to hx of ischemic colitis     7. Paroxysmal atrial fibrillation (HCC)  -Continue to see cards  -Scheduled for watchman procedure next month    8. Essential tremor  9. S/P deep brain stimulator placement  -Continue to see neurology    The patient indicates understanding of these issues and agrees to the plan.  Return if symptoms worsen or fail to improve.    Melly Hoang, APRN  3/14/2024

## 2024-03-28 NOTE — DISCHARGE INSTRUCTIONS
Home Care Instructions following  Left Atrial Appendage Closure (LAAC) Device    These discharge instructions are provided for you as a guide until you are seen for a follow-up appointment with your cardiologist.  You may shower the day after the procedure.  Remove the band aid if there is still one applied to groin site.   No bath, hot tubs, or swimming for 72 hours.  Your groin will generally have one access point.  Some minor bruising is common at each site with minor soreness.  If larger swelling or more significant pain occurs at the area, please contact your physician's office.   In the first few days after the procedure, you should take it easy.  No heavy lifting (no more than 10 pounds) or heavy exertion.   You will take Plavix 75mg and aspirin 81 mg for 6 months after the implant. You will be notified at that time regarding further medication instructions.   Your follow-up appointment with your cardiologist has been scheduled.  Please see your hospital discharge instructions. Call the physician's office if you need to reschedule.    You will need a CT scan or AYANNA 45 days after your procedure to check the seal and verify there are no clots.   CT- Scheduled 5/28/24  You may resume your present diet, unless otherwise specified by your physician.  You may resume all of your medications as prescribed, unless otherwise directed by your physician.  A list of your medications will be provided to you at discharge.  You should call your physician if you experience any of the following:  If you have chest pain (angina).  If you have persistent pain at the procedure site.  If you experience signs of infection: a fever with temperature greater than 101°F; chills; or any redness, swelling, thick yellow drainage, or foul odor coming from the procedure site  General feeling of being unwell    You should go to the Emergency Room at the hospital if you experience any of the following:   Increasing chest pain  Rapid  heartbeats  Shortness of breath  High fever  Any symptom such as numbness, tingling, dizziness or double vision

## 2024-04-02 ENCOUNTER — LAB ENCOUNTER (OUTPATIENT)
Dept: LAB | Age: 69
End: 2024-04-02
Attending: INTERNAL MEDICINE
Payer: MEDICARE

## 2024-04-02 DIAGNOSIS — I48.91 A-FIB (HCC): ICD-10-CM

## 2024-04-02 LAB
ANTIBODY SCREEN: NEGATIVE
RH BLOOD TYPE: POSITIVE

## 2024-04-02 PROCEDURE — 86920 COMPATIBILITY TEST SPIN: CPT

## 2024-04-02 NOTE — PAT NURSING NOTE
Per PAT encounter/MyChart message sent to pt:    PreOp Instructions     You are scheduled for: a Cardiac Procedure     Date of Procedure: 04/05/24 Friday     Diet Instructions: Do not eat or drink anything after midnight     Medications: Medications you are allowed to take can be taken with a sip of water the morning of your procedure     Medications to Stop: Hold herbal supplements and vitamins morning of the procedure 4/5.     Skin Prep: Shower with antibacterial soap using a clean washcloth, prior to procedure     Arrival Time: The day prior to your procedure you will receive a phone call before 6:00 pm with your arrival time. If you haven't received a phone call, please check your voicemail messages., If you did not receive a voice mail and it is after 6:00 pm, please call the nursing supervisor at [phone].    Driving After Procedure: If sedation is given, you WILL NOT be able to drive home. You will need a responsible adult  to drive you home., Cannot take uber or cab unless approved by physician     Discharge Teaching: Your nurse will give you specific instructions before discharge, Most people can resume normal activities in 2-3 days, Any questions, please call the physician's office      parking is available starting at 6 am or park in the [place] parking garage at [facility]. Check in at the [facility] reception desk. Our  will be there to check you in for your procedure. Please bring your insurance cards and ID with you.                                                                                                                                      Please DO NOT respond to this message, the inbasket is not monitored for messages. For any questions, please call the physician's office.    Of note: pt agreeable to have T&S drawn. Stated other labwork not needed since just completed at [facility] on 3/12/24. Pt verbalized understanding.

## 2024-04-05 ENCOUNTER — APPOINTMENT (OUTPATIENT)
Dept: CV DIAGNOSTICS | Facility: HOSPITAL | Age: 69
End: 2024-04-05
Attending: INTERNAL MEDICINE
Payer: MEDICARE

## 2024-04-05 ENCOUNTER — ANESTHESIA EVENT (OUTPATIENT)
Dept: INTERVENTIONAL RADIOLOGY/VASCULAR | Facility: HOSPITAL | Age: 69
End: 2024-04-05
Payer: MEDICARE

## 2024-04-05 ENCOUNTER — HOSPITAL ENCOUNTER (INPATIENT)
Dept: INTERVENTIONAL RADIOLOGY/VASCULAR | Facility: HOSPITAL | Age: 69
LOS: 1 days | Discharge: HOME | End: 2024-04-05
Attending: INTERNAL MEDICINE | Admitting: INTERNAL MEDICINE
Payer: MEDICARE

## 2024-04-05 VITALS
TEMPERATURE: 98 F | HEIGHT: 62 IN | OXYGEN SATURATION: 97 % | SYSTOLIC BLOOD PRESSURE: 104 MMHG | DIASTOLIC BLOOD PRESSURE: 61 MMHG | HEART RATE: 75 BPM | RESPIRATION RATE: 16 BRPM | BODY MASS INDEX: 17.48 KG/M2 | WEIGHT: 95 LBS

## 2024-04-05 DIAGNOSIS — I48.91 A-FIB (HCC): ICD-10-CM

## 2024-04-05 LAB — ISTAT ACTIVATED CLOTTING TIME: 298 SECONDS (ref 74–137)

## 2024-04-05 PROCEDURE — B215YZZ FLUOROSCOPY OF LEFT HEART USING OTHER CONTRAST: ICD-10-PCS | Performed by: INTERNAL MEDICINE

## 2024-04-05 PROCEDURE — B245ZZ4 ULTRASONOGRAPHY OF LEFT HEART, TRANSESOPHAGEAL: ICD-10-PCS | Performed by: INTERNAL MEDICINE

## 2024-04-05 PROCEDURE — 33340 PERQ CLSR TCAT L ATR APNDGE: CPT | Performed by: INTERNAL MEDICINE

## 2024-04-05 PROCEDURE — 85347 COAGULATION TIME ACTIVATED: CPT

## 2024-04-05 PROCEDURE — 93312 ECHO TRANSESOPHAGEAL: CPT | Performed by: ANESTHESIOLOGY

## 2024-04-05 PROCEDURE — 02L73DK OCCLUSION OF LEFT ATRIAL APPENDAGE WITH INTRALUMINAL DEVICE, PERCUTANEOUS APPROACH: ICD-10-PCS | Performed by: INTERNAL MEDICINE

## 2024-04-05 PROCEDURE — 93355 ECHO TRANSESOPHAGEAL (TEE): CPT | Performed by: INTERNAL MEDICINE

## 2024-04-05 RX ORDER — ACETAMINOPHEN 325 MG/1
650 TABLET ORAL EVERY 4 HOURS PRN
Status: DISCONTINUED | OUTPATIENT
Start: 2024-04-05 | End: 2024-04-05

## 2024-04-05 RX ORDER — HEPARIN SODIUM 5000 [USP'U]/ML
INJECTION, SOLUTION INTRAVENOUS; SUBCUTANEOUS AS NEEDED
Status: DISCONTINUED | OUTPATIENT
Start: 2024-04-05 | End: 2024-04-05 | Stop reason: SURG

## 2024-04-05 RX ORDER — METOCLOPRAMIDE HYDROCHLORIDE 5 MG/ML
10 INJECTION INTRAMUSCULAR; INTRAVENOUS EVERY 8 HOURS PRN
Status: DISCONTINUED | OUTPATIENT
Start: 2024-04-05 | End: 2024-04-05 | Stop reason: HOSPADM

## 2024-04-05 RX ORDER — HYDROCODONE BITARTRATE AND ACETAMINOPHEN 5; 325 MG/1; MG/1
1 TABLET ORAL ONCE AS NEEDED
Status: DISCONTINUED | OUTPATIENT
Start: 2024-04-05 | End: 2024-04-05 | Stop reason: HOSPADM

## 2024-04-05 RX ORDER — CEFAZOLIN SODIUM/WATER 2 G/20 ML
SYRINGE (ML) INTRAVENOUS AS NEEDED
Status: DISCONTINUED | OUTPATIENT
Start: 2024-04-05 | End: 2024-04-05 | Stop reason: SURG

## 2024-04-05 RX ORDER — ASPIRIN 81 MG/1
81 TABLET ORAL DAILY
Qty: 90 TABLET | Refills: 1 | Status: SHIPPED | OUTPATIENT
Start: 2024-04-06

## 2024-04-05 RX ORDER — HYDROMORPHONE HYDROCHLORIDE 1 MG/ML
0.6 INJECTION, SOLUTION INTRAMUSCULAR; INTRAVENOUS; SUBCUTANEOUS EVERY 5 MIN PRN
Status: DISCONTINUED | OUTPATIENT
Start: 2024-04-05 | End: 2024-04-05 | Stop reason: HOSPADM

## 2024-04-05 RX ORDER — IODIXANOL 320 MG/ML
150 INJECTION, SOLUTION INTRAVASCULAR
Status: COMPLETED | OUTPATIENT
Start: 2024-04-05 | End: 2024-04-05

## 2024-04-05 RX ORDER — HYDROCODONE BITARTRATE AND ACETAMINOPHEN 5; 325 MG/1; MG/1
2 TABLET ORAL ONCE AS NEEDED
Status: DISCONTINUED | OUTPATIENT
Start: 2024-04-05 | End: 2024-04-05 | Stop reason: HOSPADM

## 2024-04-05 RX ORDER — CLOPIDOGREL BISULFATE 75 MG/1
75 TABLET ORAL DAILY
Qty: 90 TABLET | Refills: 1 | Status: SHIPPED | OUTPATIENT
Start: 2024-04-06

## 2024-04-05 RX ORDER — ACETAMINOPHEN AND CODEINE PHOSPHATE 300; 30 MG/1; MG/1
2 TABLET ORAL EVERY 4 HOURS PRN
Status: DISCONTINUED | OUTPATIENT
Start: 2024-04-05 | End: 2024-04-05

## 2024-04-05 RX ORDER — ROCURONIUM BROMIDE 10 MG/ML
INJECTION, SOLUTION INTRAVENOUS AS NEEDED
Status: DISCONTINUED | OUTPATIENT
Start: 2024-04-05 | End: 2024-04-05

## 2024-04-05 RX ORDER — ONDANSETRON 2 MG/ML
4 INJECTION INTRAMUSCULAR; INTRAVENOUS EVERY 6 HOURS PRN
Status: DISCONTINUED | OUTPATIENT
Start: 2024-04-05 | End: 2024-04-05 | Stop reason: HOSPADM

## 2024-04-05 RX ORDER — SODIUM CHLORIDE, SODIUM LACTATE, POTASSIUM CHLORIDE, CALCIUM CHLORIDE 600; 310; 30; 20 MG/100ML; MG/100ML; MG/100ML; MG/100ML
INJECTION, SOLUTION INTRAVENOUS CONTINUOUS
Status: DISCONTINUED | OUTPATIENT
Start: 2024-04-05 | End: 2024-04-05 | Stop reason: HOSPADM

## 2024-04-05 RX ORDER — CEFAZOLIN SODIUM/WATER 2 G/20 ML
2 SYRINGE (ML) INTRAVENOUS
Status: DISCONTINUED | OUTPATIENT
Start: 2024-04-05 | End: 2024-04-05 | Stop reason: HOSPADM

## 2024-04-05 RX ORDER — CLOPIDOGREL BISULFATE 75 MG/1
75 TABLET ORAL DAILY
Status: DISCONTINUED | OUTPATIENT
Start: 2024-04-06 | End: 2024-04-05

## 2024-04-05 RX ORDER — ONDANSETRON 2 MG/ML
INJECTION INTRAMUSCULAR; INTRAVENOUS AS NEEDED
Status: DISCONTINUED | OUTPATIENT
Start: 2024-04-05 | End: 2024-04-05 | Stop reason: SURG

## 2024-04-05 RX ORDER — LABETALOL HYDROCHLORIDE 5 MG/ML
10 INJECTION, SOLUTION INTRAVENOUS
Status: DISCONTINUED | OUTPATIENT
Start: 2024-04-05 | End: 2024-04-05

## 2024-04-05 RX ORDER — HYDROMORPHONE HYDROCHLORIDE 1 MG/ML
0.4 INJECTION, SOLUTION INTRAMUSCULAR; INTRAVENOUS; SUBCUTANEOUS EVERY 5 MIN PRN
Status: DISCONTINUED | OUTPATIENT
Start: 2024-04-05 | End: 2024-04-05 | Stop reason: HOSPADM

## 2024-04-05 RX ORDER — LABETALOL HYDROCHLORIDE 5 MG/ML
INJECTION, SOLUTION INTRAVENOUS
Status: COMPLETED
Start: 2024-04-05 | End: 2024-04-05

## 2024-04-05 RX ORDER — LIDOCAINE HYDROCHLORIDE 10 MG/ML
INJECTION, SOLUTION EPIDURAL; INFILTRATION; INTRACAUDAL; PERINEURAL
Status: COMPLETED
Start: 2024-04-05 | End: 2024-04-05

## 2024-04-05 RX ORDER — DEXAMETHASONE SODIUM PHOSPHATE 4 MG/ML
VIAL (ML) INJECTION AS NEEDED
Status: DISCONTINUED | OUTPATIENT
Start: 2024-04-05 | End: 2024-04-05 | Stop reason: SURG

## 2024-04-05 RX ORDER — ASPIRIN 81 MG/1
81 TABLET ORAL DAILY
Status: DISCONTINUED | OUTPATIENT
Start: 2024-04-06 | End: 2024-04-05

## 2024-04-05 RX ORDER — DIPHENHYDRAMINE HYDROCHLORIDE 50 MG/ML
12.5 INJECTION INTRAMUSCULAR; INTRAVENOUS AS NEEDED
Status: DISCONTINUED | OUTPATIENT
Start: 2024-04-05 | End: 2024-04-05 | Stop reason: HOSPADM

## 2024-04-05 RX ORDER — ACETAMINOPHEN AND CODEINE PHOSPHATE 300; 30 MG/1; MG/1
1 TABLET ORAL EVERY 4 HOURS PRN
Status: DISCONTINUED | OUTPATIENT
Start: 2024-04-05 | End: 2024-04-05

## 2024-04-05 RX ORDER — MIDAZOLAM HYDROCHLORIDE 1 MG/ML
1 INJECTION INTRAMUSCULAR; INTRAVENOUS EVERY 5 MIN PRN
Status: DISCONTINUED | OUTPATIENT
Start: 2024-04-05 | End: 2024-04-05 | Stop reason: HOSPADM

## 2024-04-05 RX ORDER — PROTAMINE SULFATE 10 MG/ML
INJECTION, SOLUTION INTRAVENOUS AS NEEDED
Status: DISCONTINUED | OUTPATIENT
Start: 2024-04-05 | End: 2024-04-05 | Stop reason: SURG

## 2024-04-05 RX ORDER — HYDROMORPHONE HYDROCHLORIDE 1 MG/ML
0.2 INJECTION, SOLUTION INTRAMUSCULAR; INTRAVENOUS; SUBCUTANEOUS EVERY 5 MIN PRN
Status: DISCONTINUED | OUTPATIENT
Start: 2024-04-05 | End: 2024-04-05 | Stop reason: HOSPADM

## 2024-04-05 RX ORDER — ACETAMINOPHEN 500 MG
1000 TABLET ORAL ONCE AS NEEDED
Status: DISCONTINUED | OUTPATIENT
Start: 2024-04-05 | End: 2024-04-05 | Stop reason: HOSPADM

## 2024-04-05 RX ORDER — NALOXONE HYDROCHLORIDE 0.4 MG/ML
0.08 INJECTION, SOLUTION INTRAMUSCULAR; INTRAVENOUS; SUBCUTANEOUS AS NEEDED
Status: DISCONTINUED | OUTPATIENT
Start: 2024-04-05 | End: 2024-04-05 | Stop reason: HOSPADM

## 2024-04-05 RX ORDER — ROCURONIUM BROMIDE 10 MG/ML
INJECTION, SOLUTION INTRAVENOUS AS NEEDED
Status: DISCONTINUED | OUTPATIENT
Start: 2024-04-05 | End: 2024-04-05 | Stop reason: SURG

## 2024-04-05 RX ORDER — HEPARIN SODIUM 5000 [USP'U]/ML
INJECTION, SOLUTION INTRAVENOUS; SUBCUTANEOUS
Status: COMPLETED
Start: 2024-04-05 | End: 2024-04-05

## 2024-04-05 RX ORDER — SODIUM CHLORIDE 9 MG/ML
INJECTION, SOLUTION INTRAVENOUS CONTINUOUS
Status: DISCONTINUED | OUTPATIENT
Start: 2024-04-05 | End: 2024-04-05

## 2024-04-05 RX ORDER — LIDOCAINE HYDROCHLORIDE 10 MG/ML
INJECTION, SOLUTION EPIDURAL; INFILTRATION; INTRACAUDAL; PERINEURAL AS NEEDED
Status: DISCONTINUED | OUTPATIENT
Start: 2024-04-05 | End: 2024-04-05 | Stop reason: SURG

## 2024-04-05 RX ADMIN — SODIUM CHLORIDE: 9 INJECTION, SOLUTION INTRAVENOUS at 12:36:00

## 2024-04-05 RX ADMIN — LABETALOL HYDROCHLORIDE 10 MG: 5 INJECTION, SOLUTION INTRAVENOUS at 12:51:00

## 2024-04-05 RX ADMIN — ROCURONIUM BROMIDE 30 MG: 10 INJECTION, SOLUTION INTRAVENOUS at 10:49:00

## 2024-04-05 RX ADMIN — IODIXANOL 20 ML: 320 INJECTION, SOLUTION INTRAVASCULAR at 12:15:00

## 2024-04-05 RX ADMIN — SODIUM CHLORIDE: 9 INJECTION, SOLUTION INTRAVENOUS at 10:43:00

## 2024-04-05 RX ADMIN — PROTAMINE SULFATE 50 MG: 10 INJECTION, SOLUTION INTRAVENOUS at 12:14:00

## 2024-04-05 RX ADMIN — CEFAZOLIN SODIUM/WATER 2 G: 2 G/20 ML SYRINGE (ML) INTRAVENOUS at 11:05:00

## 2024-04-05 RX ADMIN — SODIUM CHLORIDE: 9 INJECTION, SOLUTION INTRAVENOUS at 11:42:00

## 2024-04-05 RX ADMIN — SODIUM CHLORIDE, SODIUM LACTATE, POTASSIUM CHLORIDE, CALCIUM CHLORIDE: 600; 310; 30; 20 INJECTION, SOLUTION INTRAVENOUS at 12:45:00

## 2024-04-05 RX ADMIN — DEXAMETHASONE SODIUM PHOSPHATE 4 MG: 4 MG/ML VIAL (ML) INJECTION at 11:24:00

## 2024-04-05 RX ADMIN — HEPARIN SODIUM 5000 UNITS: 5000 INJECTION, SOLUTION INTRAVENOUS; SUBCUTANEOUS at 11:23:00

## 2024-04-05 RX ADMIN — LIDOCAINE HYDROCHLORIDE 40 MG: 10 INJECTION, SOLUTION EPIDURAL; INFILTRATION; INTRACAUDAL; PERINEURAL at 10:48:00

## 2024-04-05 RX ADMIN — HEPARIN SODIUM 3000 UNITS: 5000 INJECTION, SOLUTION INTRAVENOUS; SUBCUTANEOUS at 11:32:00

## 2024-04-05 RX ADMIN — ONDANSETRON 4 MG: 2 INJECTION INTRAMUSCULAR; INTRAVENOUS at 11:23:00

## 2024-04-05 NOTE — PROCEDURES
JOSE ALFREDOEDLUDWIG PERFORMED:   1. Amulet device implant.  2. Transseptal catheterization with left atrial pressure recording.      INDICATIONS FOR PROCEDURE: History of  persistent NVAF and bleeding and falling OAC. CHADSVasc score 4 (age, gender, HTN, Vasc).   PRE-OP: Persistent atrial fibrillation  POST-OP: Same    OPERATORS:   Abimael Carrizales MD , Liseth Dalal MD    DESCRIPTION OF PROCEDURE: The patient was brought to the endovascular suite in a fasting and nonsedated state after providing informed consent. Sedation was administered by the general anesthesia service.     The right and left groins were prepped and draped in a sterile fashion. After administering 1% lidocaine for local anesthesia,  a 14-Niuean short sheath was placed in the right femoral vein.  Using a long Versacross guidewire, an SL1 sheath was advanced to the SVC/RA junction. The guidewire was removed, and the sheath and dilator were flushed. A Ralph Versacross was inserted. The sheath, dilator, and needle were then withdrawn until tenting was clearly visualized in the atrial septum. Transseptal access using a Ralph Versacross system. Prior to transseptal access, a heparin bolus was given. Following transseptal access, a second heparin bolus was given followed by intermittent doses. The needle and dilator were removed, and the SL1 sheath was aspirated and flushed. Left atrial pressure was recorded. The SL1 sheath was exchanged for the Amulet delivery sheath The short 14-Niuean sheath was also removed.     An Amulet access sheath was then advanced over the long guidewire into the ostium of the left upper pulmonary vein. See separate operative report for device deployment.     CONCLUSIONS:   1. Successful deployment of a 18 mm Amulet device. Deployment was confirmed by demonstrating the appropriate CLOSE criteria of position, anchor, size, and seal (Color flow leak was 0 mm). This is outlined in the procedure note.  2. No pericardial  effusion by AYANNA at the conclusion of the procedure.  3. The patient was transported to postanesthesia recovery in stable condition.   4. CHADSVasc score 4.      _______________________________________  Liseth Dalal MD  Cardiac Electrophysiololgy  [facility]  4/5/2024

## 2024-04-05 NOTE — IVS NOTE
Patient discharged home post ablation. VSS. Right groin clean, dry and intact. PT able to ambulate in hallway without difficulty. Patient c/o right eye irritation. Dr. David notified. Order received and antibiotic ointment given. Patient reported relief. AVS reviewed. New prescription for Plavix sent to [facility]. PIV removed. Discharged via wheelchair with all belongings.

## 2024-04-05 NOTE — ANESTHESIA PROCEDURE NOTES
Arterial Line    Date/Time: 4/5/2024 10:55 AM    Performed by: Franklyn Fuller MD  Authorized by: Franklyn Fuller MD    General Information and Staff    Procedure Start:  4/5/2024 10:55 AM  Procedure End:  4/5/2024 11:00 AM  Anesthesiologist:  Franklyn Fuller MD  Performed By:  Anesthesiologist  Patient Location:  OR  Indication: continuous blood pressure monitoring and blood sampling needed    Site Identification: real time ultrasound guided and image stored and retrievable    Preanesthetic Checklist: 2 patient identifiers, IV checked, risks and benefits discussed, monitors and equipment checked, pre-op evaluation, timeout performed, anesthesia consent and sterile technique used    Procedure Details    Catheter Size:  20 G  Catheter Length:  1 and 3/4 inch  Catheter Type:  Arrow  Seldinger Technique?: Yes    Laterality:  Left  Site:  Radial artery  Site Prep: alcohol swabs and chlorhexidine    Line Secured:  Wrist Brace, tape and Tegaderm    Assessment    Events: patient tolerated procedure well with no complications      Medications  4/5/2024 10:55 AM      Additional Comments

## 2024-04-05 NOTE — H&P
[facility]  H&P    Tamara Miladis Bullocky Patient Status:  Inpatient    1/15/1955 MRN [id number]   Location [facility] INTERVENTIONAL SUITES Attending Vince Cordon MD   Hosp Day # 0 PCP Lilli Kramer MD     Reason for Admission:  Left atrial appendage closure.     Assessment/Plan:  Patient Active Problem List   Diagnosis    S/P cervical spinal fusion    Psoriasis    PAD (peripheral artery disease) (HCC)    Essential tremor    Spinal stenosis    Paroxysmal atrial fibrillation (HCC)    Bunion    Vasovagal syncope    Hx of ischemic bowel disease    Alcohol dependence with unspecified alcohol-induced disorder (HCC)    Atrial flutter (HCC)    CAD in native artery    Essential hypertension    Parkinson's disease (HCC)    Pure hypercholesterolemia    Iron deficiency anemia due to chronic blood loss    MDS (myelodysplastic syndrome) (HCC)    History of GI bleed    History of breast cancer    Age-related osteoporosis without current pathological fracture    Cervical dystonia    Recurrent major depressive disorder, in remission (HCC)    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HCC)    History of DVT (deep vein thrombosis)    History of CVA (cerebrovascular accident)    History of atrial fibrillation    Lumbar radiculitis    Ischemic colitis (HCC)    Gastrointestinal hemorrhage associated with angiodysplasia of stomach and duodenum    Gastrointestinal hemorrhage    Gastrointestinal hemorrhage, unspecified gastrointestinal hemorrhage type    Hypotension, unspecified hypotension type    Closed fracture of left ankle    Hammer toe of left foot    Benign paroxysmal positional vertigo    Chronic obstructive pulmonary disease, unspecified (HCC)    Collagenous colitis    Anxiety    Atherosclerosis of abdominal aorta (HCC)    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    Bacteremia associated with intravascular line (HCC)    Depression    Dysthymic disorder    Early  satiety    Fracture of distal end of tibia with fibula, left, closed, initial encounter    Gastric ulcer without hemorrhage or perforation    GERD (gastroesophageal reflux disease)    HLD (hyperlipidemia)    Influenza    Knee pain, right    Macrocytosis    Malignant neoplasm of female breast (HCC)    Nausea with vomiting    Osteopenia    Other specified noninfective gastroenteritis and colitis    Thrombotic mesenteric infarction (HCC)    Unintentional weight loss    Vocal tremor       Discussed risks, benefits and alternatives including bleeding, infection, tamponade, stroke, device embolization, the need for open heart surgery, and rare chance of life threatening complication.     History of Present Illness:  Tamara Meraz is a a(n) 69 year old female. Presents with persistent NVAF, CHADSVasc score 4 (age, gender, HTN, Vasc) to undergo ROGELIO occlusion as an alternative to long term anticoagulation.     History:  Past Medical History:   Diagnosis Date    Age-related osteoporosis without current pathological fracture 06/01/2021    Anemia     Anxiety 1985    Anxiety 10/20/2022    Arrhythmia     afib    Arthritis 1980    Atrial flutter, paroxysmal (HCC) 06/06/2018    Back pain     Back problem     Benign essential tremor     Bowel obstruction (HCC)     Breast cancer (HCC) 2002    Broken ankle 01/2023    Left Ankle    Cancer (HCC)     Right breast    Cervical dystonia 06/17/2021    DBS b/l    Cervical spine degeneration     Change in hair NAILS    Colitis     Ischemic Colitis    Constipation     Depression     Difficult intubation     big bony features in the bottom of mouth     Easy bruising     Endometriosis     Essential hypertension     Exposure to medical diagnostic radiation 2002    Fatigue 2018    ANEMIA.  I GET INFUSIONS    Frequent use of laxatives Miralax    Gastrointestinal hemorrhage 05/31/2018    GI bleed     Heart palpitations 2016    AFIB    High blood pressure     High cholesterol     History of  blood transfusion     2018    History of breast cancer 2021    XRT/Chemo    History of depression     History of GI bleed 2021    Ischemic bowel    Hyperglycemia 2020    Hyperlipidemia 2005    Hyponatremia 2020    Hypotension due to hypovolemia 2022    Impacted cerumen of left ear 2023    Intestinal infection due to Clostridium difficile 2021    2019    Irregular bowel habits     Ischemic colitis ([facility]) 2018    Lacunar infarction ([facility]) 2013    Nausea with vomiting 2014    Neuropathy     Osteoarthritis     Osteoarthrosis, unspecified whether generalized or localized, unspecified site     Pacemaker     Pain in joints     Pain with bowel movements     ISCHEMIC COLITIS    Panic disorder     Parkinson disease ([facility])     Personal history of antineoplastic chemotherapy     Personal history of endometriosis     Plaque psoriasis     Presence of other cardiac implants and grafts DBS FOR TREMORS    Psoriatic arthropathy ([facility]) 2011    Recurrent major depressive disorder, in remission ([facility]) 2021    Rhinovirus 2023    Sleep disturbance     Status post deep brain stimulator placement     Stented coronary artery 2017 lower GI    Stress     Tobacco dependence 2018    Quit 2017    Vaso vagal episode 2022    Weight loss      Past Surgical History:   Procedure Laterality Date    ANGIOPLASTY (CORONARY)      APPENDECTOMY      APPENDECTOMY      BACK SURGERY  2005    NECK AT LOWER LUMBAR FUSED    BRAIN SURGERY  2016    Brain stimulator           x3    CARDIAC PACEMAKER PLACEMENT      CERVICAL SPINE SURGERY  2004    cervical spinal fusion C1-6    CHEMOTHERAPY      COLONOSCOPY  2014    Procedure: COLONOSCOPY;  Surgeon: Aaron Fair MD;  Location:  ENDOSCOPY    COLONOSCOPY N/A 2018    Procedure: COLONOSCOPY;  Surgeon: Aaron Fair MD;  Location:  ENDOSCOPY    D & C      HYSTERECTOMY       OVARIES REMOVED AS WELL    LUMBAR SPINE SURGERY  1999    lumbar laminectomy L1-L4    LUMPECTOMY RIGHT  2002    OOPHORECTOMY      OTHER      COLON STENTS    OTHER SURGICAL HISTORY      Lt bunionectomy    OTHER SURGICAL HISTORY      stent in CMA    PAIN MANAGEMENT Bilateral 06/15/2021    BILATERAL LUMBAR 5 TRANSFORAMINAL LUMBAR EPIDURAL STEROID INJECTION    RADIATION RIGHT      SIGMOIDOSCOPY,DIAGNOSTIC      SPINE SURGERY PROCEDURE UNLISTED      TOTAL ABDOM HYSTERECTOMY      TUBAL LIGATION      UPPER GI ENDOSCOPY,BIOPSY      ulcer, at juany     Family History   Problem Relation Age of Onset    Hypertension Father     Musculo-skelatal Disorder Father         TREMOR    Heart Disorder Mother     Cancer Mother         pharyngeal    Breast Cancer Mother         MYSELF     Breast Cancer Self     Lung Disorder Sister         COPD    Heart Attack Brother 72    Ulcerative Colitis Son       reports that she quit smoking about 5 years ago. Her smoking use included cigarettes. She has a 18 pack-year smoking history. She has been exposed to tobacco smoke. She has never used smokeless tobacco. She reports current alcohol use of about 14.0 standard drinks of alcohol per week. She reports that she does not use drugs.    Allergies:  Allergies   Allergen Reactions    Sulfa Antibiotics UNKNOWN    Sulfa Drugs Cross Reactors RASH     Headache       Medications:    Current Facility-Administered Medications:     sodium chloride 0.9% infusion, , Intravenous, Continuous    ceFAZolin (Ancef) 2 g in 20mL IV syringe premix, 2 g, Intravenous, 30 Min Pre-Op    Review of Systems:  All systems were reviewed and are negative except as described above in HPI.    Physical Exam:  Blood pressure 151/89, pulse 86, temperature 98 °F (36.7 °C), resp. rate 18, height 62\", weight 95 lb (43.1 kg), SpO2 98%, not currently breastfeeding.  Temp (24hrs), Av °F (36.7 °C), Min:98 °F (36.7 °C), Max:98 °F (36.7 °C)    Wt Readings from Last 3  Encounters:   04/02/24 95 lb (43.1 kg)   03/14/24 94 lb 12.8 oz (43 kg)   02/26/24 92 lb 8 oz (42 kg)       Telemetry: SR  General: Alert and oriented in no apparent distress.  HEENT: No focal deficits.  Neck: No JVD, carotids 2+ no bruits.  Cardiac: Regular rate and rhythm, S1, S2 normal, no murmur, rub or gallop.  Lungs: Clear without wheezes, rales, rhonchi or dullness.  Normal excursions and effort.  Abdomen: Soft, non-tender.   Extremities: Without clubbing, cyanosis or edema.  Peripheral pulses are 2+.  Neurologic: Alert and oriented, normal affect.  Skin: Warm and dry.     Laboratories and Data:  Diagnostics:    Labs:        Lab Results   Component Value Date    PT 15.2 (H) 01/29/2014    PT 13.1 09/07/2013    INR 1.03 12/27/2022    INR 0.97 08/21/2022    INR 0.96 06/01/2022         Vince Cordon MD  4/5/2024  10:06 AM    Vince Cordon MD  [facility]  Cardiac Electrophysiolgy

## 2024-04-05 NOTE — ANESTHESIA PROCEDURE NOTES
Airway  Date/Time: 4/5/2024 10:50 AM  Urgency: elective      General Information and Staff    Patient location during procedure: OR  Anesthesiologist: Franklyn Fuller MD  Performed: anesthesiologist   Performed by: Franklyn Fuller MD  Authorized by: Franklyn Fuller MD      Indications and Patient Condition  Indications for airway management: anesthesia  Spontaneous ventilation: present  Sedation level: no sedation  Preoxygenated: yes  Patient position: sniffing  Mask difficulty assessment: 1 - vent by mask    Final Airway Details  Final airway type: endotracheal airway      Successful airway: ETT  Cuffed: yes   Successful intubation technique: Video laryngoscopy  Facilitating devices/methods: intubating stylet  Endotracheal tube insertion site: oral  Blade: GlideScope  Blade size: #3  ETT size (mm): 7.0    Cormack-Lehane Classification: grade I - full view of glottis  Placement verified by: capnometry   Measured from: lips  ETT to lips (cm): 20  Number of attempts at approach: 1  Number of other approaches attempted: 0    Additional Comments  Very easy mask ventilation - Glidescope #3 x 1 easily - Patient is not a difficult intubation

## 2024-04-05 NOTE — ANESTHESIA PROCEDURE NOTES
Procedure Performed: AYANNA       Start Time:        End Time:      Preanesthesia Checklist:  Patient identified, IV assessed, risks and benefits discussed, monitors and equipment assessed, procedure being performed at surgeon's request and anesthesia consent obtained.    General Procedure Information  Diagnostic Indications for Echo:  assessment of ascending aorta  Physician Requesting Echo: Vince Cordon MD  Location performed:  OR  Intubated  Bite block placed  Heart visualized  Probe Insertion:  Easy  Probe Type:  Multiplane  Modalities:  2D only, color flow mapping, pulse wave Doppler and continuous wave Doppler        Anesthesia Information  Performed Personally  Anesthesiologist:  Franklyn Fuller MD      Echocardiogram Comments:       AYANNA probe placement only - interpretation by cardiologist

## 2024-04-05 NOTE — ANESTHESIA POSTPROCEDURE EVALUATION
[facility]    Tamara Meraz Patient Status:  Inpatient   Age/Gender 69 year old female MRN [id number]   Location [facility] POST ANESTHESIA CARE UNIT Attending Vince Cordon MD   Hosp Day # 0 PCP Lilli Kramer MD       Anesthesia Post-op Note        Procedure Summary       Date: 04/05/24 Room / Location: [facility] Interventional Suites    Anesthesia Start: 1043 Anesthesia Stop:     Procedure: CATH WATCHMAN Diagnosis:       A-fib (HCC)      A-fib (HCC)    Scheduled Providers: Franklyn Fuller MD Anesthesiologist: Franklyn Fuller MD    Anesthesia Type: general ASA Status: 3            Anesthesia Type: general    Vitals Value Taken Time   /1 04/05/24 1237   Temp 97.8 04/05/24 1237   Pulse 87 04/05/24 1237   Resp 12 04/05/24 1237   SpO2 96 04/05/24 1237       Patient Location: PACU    Anesthesia Type: general    Airway Patency: patent and extubated    Postop Pain Control: adequate    Mental Status: mildly sedated but able to meaningfully participate in the post-anesthesia evaluation    Nausea/Vomiting: none    Cardiopulmonary/Hydration status: stable euvolemic    Complications: no apparent anesthesia related complications    Postop vital signs: stable    Dental Exam: Unchanged from Preop    Patient to be discharged from PACU when criteria met.

## 2024-04-05 NOTE — PROCEDURES
PROCEDURE PERFORMED:   1. Amulet device implant.  2. Transseptal catheterization with left atrial pressure recording.      INDICATIONS FOR PROCEDURE: History of  persistent NVAF and bleeding and falling OAC. CHADSVasc score 4 (age, gender, HTN, Vasc).   PRE-OP: Persistent atrial fibrillation  POST-OP: Same    OPERATORS:   Abimael Carrizales MD    DESCRIPTION OF PROCEDURE: The patient was brought to the endovascular suite in a fasting and nonsedated state after providing informed consent. Sedation was administered by the general anesthesia service.     See separate operative report for groin access and transeptal catheterization.     A LAAO delivery sheath was advanced to the ROGELIO where an angiogram was taken.      Based upon measurements in multiple views, a size of 18 mm was selected for device placement.  Once the delivery sheath was positioned in the left atrial appendage, the device was delployed. Following deployment of the Amulet device, device stability was evaluated with the CLOSE criteria.  Also, a seal of the device was measured by color flow Doppler, the size of the residual leak was 0.    The device was then released from the delivery sheath, and the delivery sheath and access sheath were gently withdrawn into the right atrium. There was no pericardial effusion visualized. The right femoral vein was closed using a Vascade closure device.       CONCLUSIONS:   1. Successful deployment of a 18 mm Watchman Flx / Amulet device. Deployment was confirmed by demonstrating the appropriate PASS criteria of position, anchor, size, and seal (Color flow leak was 0 mm). This is outlined in the procedure note.  2. No pericardial effusion by AYANNA at the conclusion of the procedure.  3. The patient was transported to postanesthesia recovery in stable condition.   4. CHADSVasc score 4.      Vince Cordon MD  [facility]  Interventional Cardiology

## 2024-04-05 NOTE — PROGRESS NOTES
[facility] CARDIOLOGY  AYANNA Procedure Note    Tamara Meraz Location:      MRN [id number]   Admission Date 4/5/2024 Operation Date 4/5/2024   Attending Physician Vince Cordon MD Operating Physician Yvon Anderson MD     Pre-Operative Diagnosis: Paroxysmal atrial fibrillation.  [id number].  History of colonic AVMs with bleeding.    Post-Operative Diagnosis: Same as above.  Successful AYANNA guided 18 mm Amulet ROGELIO occluder device placement.    Procedure Performed: Intra-operative AYANNA with 2 and three-dimensional imaging, Doppler and color flow mapping.    Indications: Pre-procedure anatomic assessment.  Intra-procedure guidance for ROGELIO O device placement.  Post-deployment assessment of anatomy and function.    Procedural comments: Complete Omniplane transesophageal echocardiography with 2 and three-dimensional imaging, Doppler and color-flow mapping was performed.  The AYANNA was done in conjunction with a AYANNA guided transseptal heart catheterization and Amulet ROGELIO occluder device placement performed by Dr. Cordon.  The study was done under general endotracheal tube anesthesia, the AYANNA probe placed by Dr. Fuller.  Images were obtained from the standard windows and were of excellent quality.    Findings: The rhythm is normal sinus.  The left atrium is enlarged.  The atrial septum is thin and highly mobile in the primum portion.  Septal motion meets criteria for diagnosis of an atrial septal aneurysm.  There is no demonstrated PFO.  Right and left ventricular systolic function appear normal.  The visually estimated LVEF is greater than 50%.  There are no intracardiac masses or clots.    The left atrial appendage has so-called chicken-wing type anatomy.  Interrogation is performed in the standard views.  Ostial diameters range from 1.3-1.6 cm.  Landing zone diameters are 1-1.6 cm.  Depth is 1.5 cm.  Based on the above derived AYANNA measurements and preprocedural testing, the decision was made to proceed with attempted  placement of a 16 mm closure device.    With AYANNA imaging guidance, the transseptal catheter was positioned in the appropriate position in the atrial septum, with tenting from right-to-left.  Transseptal catheterization was performed under direct AYANNA visualization.  The guidewire was advanced into the left atrial appendage.  A delivery catheter was placed over the guidewire into the mouth of the left atrial appendage.  A 16 mm device was advanced and deployed under direct AYANNA visualization.  Positioning appeared excellent.  However, there appeared to be minimal if any compression of the anchor lobe.  CLOSE criteria were not fulfilled.  The decision was made to upsized to an 18 mm device.    The 16mm device was withdrawn and an 18 mm device positioned and deployed under direct AYANNA visualization.  The device centered beautifully, with over two thirds of the lobe distal to the circumflex coronary artery.  The disc was deployed and appeared positioned nicely.  Complete interrogation including a stability test was performed.  CLOSE criteria were fulfilled.  The device was released under direct 3D visualization.  Final 2 and three-dimensional imaging showed excellent positioning with complete occlusion of the left atrial appendage.    The delivery catheter was then withdrawn back into the right atrium.  Color flow and Doppler interrogation demonstrated trivial left-right shunting through the septal puncture site.  There was no evidence for pericardial effusion or other early mechanical complication.    Summary of Case: Successful AYANNA guided trans-septal 18 mm Amulet ROGELIO occluder device placement as described above.    Yvon Anderson MD  4/5/2024  12:30 PM

## 2024-04-05 NOTE — ANESTHESIA PREPROCEDURE EVALUATION
PRE-OP EVALUATION    Patient Name: Tamara Meraz    Admit Diagnosis: A-fib (HCC) [I48.91]    Pre-op Diagnosis: * No pre-op diagnosis entered *        Anesthesia Procedure: CATH WATCHMAN    * No surgeons found in log *    Pre-op vitals reviewed.  Temp: 98 °F (36.7 °C)  Pulse: 86  Resp: 18  BP: 151/89  SpO2: 98 %  Body mass index is 17.38 kg/m².    Current medications reviewed.  Hospital Medications:   sodium chloride 0.9% infusion   Intravenous Continuous    ceFAZolin (Ancef) 2 g in 20mL IV syringe premix  2 g Intravenous 30 Min Pre-Op       Outpatient Medications:     Facility-Administered Medications Prior to Admission   Medication Dose Route Frequency Provider Last Rate Last Admin    [COMPLETED] triamcinolone acetonide (Kenalog-40) 40 MG/ML injection 40 mg  40 mg Intra-articular Once Janes Cortez MD   40 mg at 01/22/24 1559     Medications Prior to Admission   Medication Sig Dispense Refill Last Dose    HYDROcodone-acetaminophen 5-325 MG Oral Tab Take 1 tablet by mouth every 8 (eight) hours as needed for Pain. 90 tablet 0 4/4/2024    atorvastatin 10 MG Oral Tab Take 1 tablet (10 mg total) by mouth daily. 90 tablet 1 4/4/2024    IRON OR Take 1 tablet by mouth daily.   4/4/2024    PARoxetine 20 MG Oral Tab Take 1 tablet (20 mg total) by mouth every morning. 90 tablet 0 4/5/2024    Calcium Magnesium Zinc 333-133-5 MG Oral Tab Take 1 tablet by mouth daily.   4/4/2024    Multiple Vitamins-Minerals (CENTRUM SILVER 50+WOMEN) Oral Tab Take 1 tablet by mouth daily.   4/4/2024    primidone 50 MG Oral Tab Take 100 mg by mouth every morning and take 50 mg by mouth every evening.   4/5/2024    gabapentin 300 MG Oral Cap Take by mouth. Take 600mg in the morning and 600 mg at night   4/5/2024    Cyanocobalamin (B-12) 1000 MCG Oral Tab Take 1,000 mcg by mouth daily.   4/4/2024       Allergies: Sulfa antibiotics and Sulfa drugs cross reactors      Anesthesia Evaluation    Patient summary reviewed.    Anesthetic  Complications           GI/Hepatic/Renal      (+) GERD                           Cardiovascular                  (+) hypertension     (+) CAD                                Endo/Other    Negative endo/other ROS.                              Pulmonary        (+) COPD                   Neuro/Psych      (+) depression           (+) neuromuscular disease             Deep brain stimulator  Chart has banner listing difficult intubation - pt has no knowledge of this        Past Surgical History:   Procedure Laterality Date    ANGIOPLASTY (CORONARY)      APPENDECTOMY      APPENDECTOMY      BACK SURGERY  2005    NECK AT LOWER LUMBAR FUSED    BRAIN SURGERY  2016    Brain stimulator           x3    CARDIAC PACEMAKER PLACEMENT      CERVICAL SPINE SURGERY  2004    cervical spinal fusion C1-6    CHEMOTHERAPY      COLONOSCOPY  2014    Procedure: COLONOSCOPY;  Surgeon: Aaron Fair MD;  Location:  ENDOSCOPY    COLONOSCOPY N/A 2018    Procedure: COLONOSCOPY;  Surgeon: Aaron Fair MD;  Location:  ENDOSCOPY    D & C      HYSTERECTOMY  1992    OVARIES REMOVED AS WELL    LUMBAR SPINE SURGERY  1999    lumbar laminectomy L1-L4    LUMPECTOMY RIGHT      OOPHORECTOMY      OTHER      COLON STENTS    OTHER SURGICAL HISTORY      Lt bunionectomy    OTHER SURGICAL HISTORY      stent in [facility]    PAIN MANAGEMENT Bilateral 06/15/2021    BILATERAL LUMBAR 5 TRANSFORAMINAL LUMBAR EPIDURAL STEROID INJECTION    RADIATION RIGHT      SIGMOIDOSCOPY,DIAGNOSTIC      SPINE SURGERY PROCEDURE UNLISTED      TOTAL ABDOM HYSTERECTOMY      TUBAL LIGATION      UPPER GI ENDOSCOPY,BIOPSY      ulcer, at [facility]     Social History     Socioeconomic History    Marital status:    Tobacco Use    Smoking status: Former     Packs/day: 0.50     Years: 36.00     Additional pack years: 0.00     Total pack years: 18.00     Types: Cigarettes     Quit date: 2018     Years since quittin.2     Passive exposure:  Past    Smokeless tobacco: Never   Vaping Use    Vaping Use: Never used   Substance and Sexual Activity    Alcohol use: Yes     Alcohol/week: 14.0 standard drinks of alcohol     Types: 14 Cans of beer per week     Comment: approx0- 2 beers per day    Drug use: No    Sexual activity: Not Currently   Other Topics Concern    Caffeine Concern No    Exercise No    Seat Belt No    Special Diet Yes     Comment: HOW TO PUT ON WEIGHT    Stress Concern No    Weight Concern Yes     Comment: CAN'T PUT ON WEIGHT     History   Drug Use No     Available pre-op labs reviewed.  Lab Results   Component Value Date    WBC 3.6 (L) 02/07/2024    RBC 3.58 (L) 02/07/2024    HGB 12.3 02/07/2024    HCT 37.7 02/07/2024    .3 (H) 02/07/2024    MCH 34.4 (H) 02/07/2024    MCHC 32.6 02/07/2024    RDW 13.2 02/07/2024    .0 02/07/2024     Lab Results   Component Value Date     02/07/2024    K 4.8 02/07/2024     02/07/2024    CO2 31.0 02/07/2024    BUN 20 02/07/2024    CREATSERUM 0.99 02/07/2024    GLU 98 02/07/2024    CA 9.3 02/07/2024            Airway      Mallampati: III  Mouth opening: <3 FB  TM distance: 4 - 6 cm  Neck ROM: full Cardiovascular    Cardiovascular exam normal.         Dental             Pulmonary    Pulmonary exam normal.                 Other findings  No visibly abnormal or weakened appearing teeth            ASA: 3   Plan: general  NPO status verified and patient meets guidelines.        Comment: GA with ETT, art line, blood line. Risks discussed including sore throat, hoarse voice, dental trauma, nausea/vomiting. Patient has DBS control and will turn it off for the procedure. Cardiology team made aware of this  Plan/risks discussed with: patient and spouse                Present on Admission:  **None**

## 2024-04-05 NOTE — ANESTHESIA PROCEDURE NOTES
Peripheral IV  Date/Time: 4/5/2024 11:05 AM  Inserted by: Franklyn Fuller MD    Placement  Needle size: 18 G  Laterality: right  Location: wrist  Local anesthetic: none  Site prep: alcohol  Technique: anatomical landmarks  Attempts: 1       [de-identified] : Left ankle AP/lateral/oblique Xrays: fracture is currently healing well in acceptable alignment with callus formation. The alignment is unchanged when compared to previous x-rays. The fracture line is still present.

## 2024-04-06 DIAGNOSIS — F33.40 RECURRENT MAJOR DEPRESSIVE DISORDER, IN REMISSION (HCC): ICD-10-CM

## 2024-04-07 LAB
BLOOD TYPE BARCODE: 6200
UNIT VOLUME: 350 ML

## 2024-04-08 ENCOUNTER — PATIENT OUTREACH (OUTPATIENT)
Dept: CASE MANAGEMENT | Age: 69
End: 2024-04-08

## 2024-04-09 RX ORDER — PAROXETINE HYDROCHLORIDE 20 MG/1
20 TABLET, FILM COATED ORAL EVERY MORNING
Qty: 90 TABLET | Refills: 0 | Status: SHIPPED | OUTPATIENT
Start: 2024-04-09

## 2024-04-09 NOTE — TELEPHONE ENCOUNTER
Psychiatric Non-Scheduled (Anti-Anxiety) [id number]/06/2024 12:15 PM   Protocol Details In person appointment or virtual visit in the past 6 mos or appointment in next 3 mos    Depression Screening completed within the past 12 months      5. Recurrent major depressive disorder, in remission (HCC)  Stop cymbalta. Given weaning instructions.   Increase paroxetine to 20mg daily.   - PARoxetine 20 MG Oral Tab; Take 1 tablet (20 mg total) by mouth every morning.  Dispense: 90 tablet; Refill: 0  Future Appointments   Date Time Provider Department Center   4/12/2024 10:00 AM  OOT  CHEMO [facility]   5/7/2024 11:00 AM Rojas Nguyen MD SGINP [facility] SUB GI   5/13/2024  1:00 PM Gustavo Zhao MD ENIPain [facility]   5/28/2024  8:00 AM  CT MAIN RM4  CT [facility]   5/28/2024 10:00 AM Kenia Brown, DPMITRA [facility] ORTHO 75 [facility]   7/8/2024  2:00 PM Lilli Kramer MD [facility] [place]   7/10/2024  1:00 PM  OOT  CHEMO [facility]   7/11/2024 10:00 AM Jaclyn Dong DO [id number] [facility]   1/8/2025  1:00 PM Sharona Gunter MD  HEM ONC [facility]

## 2024-04-11 DIAGNOSIS — M48.061 SPINAL STENOSIS OF LUMBAR REGION WITHOUT NEUROGENIC CLAUDICATION: ICD-10-CM

## 2024-04-11 DIAGNOSIS — G24.3 CERVICAL DYSTONIA: ICD-10-CM

## 2024-04-11 DIAGNOSIS — M54.16 LUMBAR RADICULITIS: ICD-10-CM

## 2024-04-11 DIAGNOSIS — M54.16 LUMBAR RADICULOPATHY: ICD-10-CM

## 2024-04-11 RX ORDER — HYDROCODONE BITARTRATE AND ACETAMINOPHEN 5; 325 MG/1; MG/1
1 TABLET ORAL EVERY 8 HOURS PRN
Qty: 90 TABLET | Refills: 0 | Status: SHIPPED | OUTPATIENT
Start: 2024-04-12 | End: 2024-05-12

## 2024-04-11 NOTE — TELEPHONE ENCOUNTER
Medication: HYDROcodone-acetaminophen 5-325 MG Oral     Date of last refill: 3/13/2024  [place]24    Last office visit: 11/13/2023  Due back to clinic per last office note:  6 months  Date next office visit scheduled:  5/13/2024    Last URINE Screen: 1/12/2024  Screen Results:  Patient Communication     Edit Comments   Add Notifications  Back to Top    As expected based on prescribed meds/d/w patient to avoid benadryl with opioids         Narcotic Contract EXPIRATION date: 11/13/2024    Last OV note recommendation: The patient is a pleasant 60-year-old female with a longstanding history of opiate dependence. This is as result of chronic pain due to spinal stenosis in the lumbar region. She is not interested in surgical correction. States the medications do improve function and provide analgesia. [place] . Reviewed her [place] which is consistent with hydrocodone usage. She is due for repeat UDS which was ordered today. Narcotic contract results reviewed today. All patient related questions addressed. Patient can follow-up in 6 months.

## 2024-04-12 ENCOUNTER — NURSE ONLY (OUTPATIENT)
Dept: HEMATOLOGY/ONCOLOGY | Facility: HOSPITAL | Age: 69
End: 2024-04-12
Attending: INTERNAL MEDICINE
Payer: MEDICARE

## 2024-04-12 DIAGNOSIS — D50.0 IRON DEFICIENCY ANEMIA DUE TO CHRONIC BLOOD LOSS: ICD-10-CM

## 2024-04-12 DIAGNOSIS — D46.4 REFRACTORY ANEMIA, UNSPECIFIED (HCC): ICD-10-CM

## 2024-04-12 LAB
BASOPHILS # BLD AUTO: 0.01 X10(3) UL (ref 0–0.2)
BASOPHILS NFR BLD AUTO: 0.4 %
DEPRECATED HBV CORE AB SER IA-ACNC: 188.5 NG/ML
EOSINOPHIL # BLD AUTO: 0.04 X10(3) UL (ref 0–0.7)
EOSINOPHIL NFR BLD AUTO: 1.4 %
ERYTHROCYTE [DISTWIDTH] IN BLOOD BY AUTOMATED COUNT: 12.7 %
HCT VFR BLD AUTO: 34.3 %
HGB BLD-MCNC: 11.9 G/DL
IMM GRANULOCYTES # BLD AUTO: 0 X10(3) UL (ref 0–1)
IMM GRANULOCYTES NFR BLD: 0 %
IRON SATN MFR SERPL: 42 %
IRON SERPL-MCNC: 131 UG/DL
LYMPHOCYTES # BLD AUTO: 1.4 X10(3) UL (ref 1–4)
LYMPHOCYTES NFR BLD AUTO: 49.5 %
MCH RBC QN AUTO: 34.5 PG (ref 26–34)
MCHC RBC AUTO-ENTMCNC: 34.7 G/DL (ref 31–37)
MCV RBC AUTO: 99.4 FL
MONOCYTES # BLD AUTO: 0.37 X10(3) UL (ref 0.1–1)
MONOCYTES NFR BLD AUTO: 13.1 %
NEUTROPHILS # BLD AUTO: 1.01 X10 (3) UL (ref 1.5–7.7)
NEUTROPHILS # BLD AUTO: 1.01 X10(3) UL (ref 1.5–7.7)
NEUTROPHILS NFR BLD AUTO: 35.6 %
PLATELET # BLD AUTO: 203 10(3)UL (ref 150–450)
RBC # BLD AUTO: 3.45 X10(6)UL
TIBC SERPL-MCNC: 313 UG/DL (ref 240–450)
TRANSFERRIN SERPL-MCNC: 210 MG/DL (ref 200–360)
WBC # BLD AUTO: 2.8 X10(3) UL (ref 4–11)

## 2024-04-12 PROCEDURE — 85025 COMPLETE CBC W/AUTO DIFF WBC: CPT

## 2024-04-12 PROCEDURE — 36415 COLL VENOUS BLD VENIPUNCTURE: CPT

## 2024-04-12 PROCEDURE — 82728 ASSAY OF FERRITIN: CPT

## 2024-04-12 PROCEDURE — 83550 IRON BINDING TEST: CPT

## 2024-04-12 PROCEDURE — 83540 ASSAY OF IRON: CPT

## 2024-04-17 ENCOUNTER — MED REC SCAN ONLY (OUTPATIENT)
Facility: CLINIC | Age: 69
End: 2024-04-17

## 2024-04-30 ENCOUNTER — TELEPHONE (OUTPATIENT)
Dept: ORTHOPEDICS CLINIC | Facility: CLINIC | Age: 69
End: 2024-04-30

## 2024-04-30 DIAGNOSIS — M25.561 RIGHT KNEE PAIN, UNSPECIFIED CHRONICITY: Primary | ICD-10-CM

## 2024-04-30 DIAGNOSIS — Z01.89 ENCOUNTER FOR LOWER EXTREMITY COMPARISON IMAGING STUDY: ICD-10-CM

## 2024-05-07 PROCEDURE — 82656 EL-1 FECAL QUAL/SEMIQ: CPT

## 2024-05-07 PROCEDURE — 83993 ASSAY FOR CALPROTECTIN FECAL: CPT

## 2024-05-07 PROCEDURE — 82705 FATS/LIPIDS FECES QUAL: CPT

## 2024-05-08 ENCOUNTER — LAB ENCOUNTER (OUTPATIENT)
Dept: LAB | Age: 69
End: 2024-05-08
Attending: STUDENT IN AN ORGANIZED HEALTH CARE EDUCATION/TRAINING PROGRAM
Payer: MEDICARE

## 2024-05-08 DIAGNOSIS — R19.5 CHANGE IN STOOL: ICD-10-CM

## 2024-05-08 DIAGNOSIS — R63.4 WEIGHT LOSS, NON-INTENTIONAL: ICD-10-CM

## 2024-05-10 DIAGNOSIS — M54.16 LUMBAR RADICULOPATHY: ICD-10-CM

## 2024-05-10 DIAGNOSIS — M54.16 LUMBAR RADICULITIS: ICD-10-CM

## 2024-05-10 DIAGNOSIS — G24.3 CERVICAL DYSTONIA: ICD-10-CM

## 2024-05-10 DIAGNOSIS — M48.061 SPINAL STENOSIS OF LUMBAR REGION WITHOUT NEUROGENIC CLAUDICATION: ICD-10-CM

## 2024-05-10 RX ORDER — HYDROCODONE BITARTRATE AND ACETAMINOPHEN 5; 325 MG/1; MG/1
1 TABLET ORAL EVERY 8 HOURS PRN
Qty: 90 TABLET | Refills: 0 | Status: SHIPPED | OUTPATIENT
Start: 2024-05-12 | End: 2024-06-11

## 2024-05-10 NOTE — TELEPHONE ENCOUNTER
Medication:   HYDROcodone-acetaminophen 5-325 MG Oral Tab     Date of last refill: 4/12/24  [place]12/24 per dispense report    Last office visit: 11/13/23  Due back to clinic per last office note:  Patient can follow-up in 6 months.  Date next office visit scheduled:  5/13/24    Last URINE Screen: 1/12/24  Screen Results:  As expected based on prescribed meds/d/w patient to avoid benadryl with opioids       Narcotic Contract EXPIRATION date: 11/13/24    Last OV note recommendation:   ASSESSMENT AND PLAN:      1. Chronic narcotic use    2. Opioid dependence, uncomplicated (HCC)    3. Spinal stenosis of lumbar region without neurogenic claudication    4. S/P cervical spinal fusion       The patient is a pleasant 60-year-old female with a longstanding history of opiate dependence.  This is as result of chronic pain due to spinal stenosis in the lumbar region.  She is not interested in surgical correction.  States the medications do improve function and provide analgesia.  [place] .  Reviewed her UDS which is consistent with hydrocodone usage.  She is due for repeat UDS which was ordered today.  Narcotic contract results reviewed today.  All patient related questions addressed.  Patient can follow-up in 6 months.           Orders:      Orders Placed This Encounter   Procedures    Pain Management Drug Panel, Urine            Radiology orders and consultations:None  The patient indicates understanding of these issues and agrees to the plan.  No follow-ups on file.     Gustavo Zhao MD, 11/13/2023, 1:47 PM

## 2024-05-11 LAB
FATS NEUTRAL: NORMAL
FATS TOTAL: NORMAL

## 2024-05-13 ENCOUNTER — HOSPITAL ENCOUNTER (OUTPATIENT)
Dept: GENERAL RADIOLOGY | Age: 69
Discharge: HOME | End: 2024-05-13
Attending: FAMILY MEDICINE

## 2024-05-13 ENCOUNTER — OFFICE VISIT (OUTPATIENT)
Facility: CLINIC | Age: 69
End: 2024-05-13

## 2024-05-13 VITALS — WEIGHT: 94 LBS | HEIGHT: 62 IN | BODY MASS INDEX: 17.3 KG/M2

## 2024-05-13 DIAGNOSIS — Z01.89 ENCOUNTER FOR LOWER EXTREMITY COMPARISON IMAGING STUDY: ICD-10-CM

## 2024-05-13 DIAGNOSIS — M25.561 RIGHT KNEE PAIN, UNSPECIFIED CHRONICITY: ICD-10-CM

## 2024-05-13 DIAGNOSIS — M17.31 POST-TRAUMATIC OSTEOARTHRITIS OF RIGHT KNEE: Primary | ICD-10-CM

## 2024-05-13 DIAGNOSIS — M17.12 PRIMARY OSTEOARTHRITIS OF LEFT KNEE: ICD-10-CM

## 2024-05-13 PROCEDURE — 73564 X-RAY EXAM KNEE 4 OR MORE: CPT | Performed by: FAMILY MEDICINE

## 2024-05-13 PROCEDURE — 99204 OFFICE O/P NEW MOD 45 MIN: CPT | Performed by: FAMILY MEDICINE
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
## 2024-05-14 ENCOUNTER — LAB ENCOUNTER (OUTPATIENT)
Dept: LAB | Facility: HOSPITAL | Age: 69
End: 2024-05-14
Attending: ANESTHESIOLOGY

## 2024-05-14 DIAGNOSIS — I48.91 A-FIB (HCC): ICD-10-CM

## 2024-05-14 DIAGNOSIS — F11.20 OPIOID DEPENDENCE, UNCOMPLICATED (HCC): ICD-10-CM

## 2024-05-14 DIAGNOSIS — F11.90 CHRONIC NARCOTIC USE: ICD-10-CM

## 2024-05-14 LAB
ANION GAP SERPL CALC-SCNC: 7 MMOL/L (ref 0–18)
BUN BLD-MCNC: 13 MG/DL (ref 9–23)
CALCIUM BLD-MCNC: 9.1 MG/DL (ref 8.5–10.1)
CALPROTECTIN STL-MCNT: 50.5 ΜG/G (ref ?–50)
CHLORIDE SERPL-SCNC: 107 MMOL/L (ref 98–112)
CO2 SERPL-SCNC: 26 MMOL/L (ref 21–32)
CREAT BLD-MCNC: 0.96 MG/DL
EGFRCR SERPLBLD CKD-EPI 2021: 64 ML/MIN/1.73M2 (ref 60–?)
ERYTHROCYTE [DISTWIDTH] IN BLOOD BY AUTOMATED COUNT: 12.5 %
FASTING STATUS PATIENT QL REPORTED: NO
GLUCOSE BLD-MCNC: 133 MG/DL (ref 70–99)
HCT VFR BLD AUTO: 36.7 %
HGB BLD-MCNC: 12.3 G/DL
MCH RBC QN AUTO: 34.4 PG (ref 26–34)
MCHC RBC AUTO-ENTMCNC: 33.5 G/DL (ref 31–37)
MCV RBC AUTO: 102.5 FL
OSMOLALITY SERPL CALC.SUM OF ELEC: 292 MOSM/KG (ref 275–295)
PLATELET # BLD AUTO: 211 10(3)UL (ref 150–450)
POTASSIUM SERPL-SCNC: 3.9 MMOL/L (ref 3.5–5.1)
RBC # BLD AUTO: 3.58 X10(6)UL
SODIUM SERPL-SCNC: 140 MMOL/L (ref 136–145)
WBC # BLD AUTO: 3.4 X10(3) UL (ref 4–11)

## 2024-05-14 PROCEDURE — 85027 COMPLETE CBC AUTOMATED: CPT

## 2024-05-14 PROCEDURE — 36415 COLL VENOUS BLD VENIPUNCTURE: CPT

## 2024-05-14 PROCEDURE — 80307 DRUG TEST PRSMV CHEM ANLYZR: CPT

## 2024-05-14 PROCEDURE — 80048 BASIC METABOLIC PNL TOTAL CA: CPT

## 2024-05-16 DIAGNOSIS — E78.00 PURE HYPERCHOLESTEROLEMIA: Primary | ICD-10-CM

## 2024-05-16 LAB — PANCREATIC ELAST FECAL: 203 UG ELAST./G

## 2024-05-17 RX ORDER — ATORVASTATIN CALCIUM 10 MG/1
10 TABLET, FILM COATED ORAL DAILY
Qty: 90 TABLET | Refills: 1 | Status: SHIPPED | OUTPATIENT
Start: 2024-05-17

## 2024-05-17 NOTE — TELEPHONE ENCOUNTER
Last office visit: 3/14/2024   Protocol: pass  Requested medication(s) are due for refill today: yes  Requested medication(s) are on the active medication list same strength, form, dose/ sig: yes  Requested medication(s) are managed by provider: yes  Patient has already received a courtsey refill: no    NOV: 7/8/2024

## 2024-05-24 ENCOUNTER — TELEPHONE (OUTPATIENT)
Dept: ORTHOPEDICS CLINIC | Facility: CLINIC | Age: 69
End: 2024-05-24

## 2024-05-24 DIAGNOSIS — M79.672 LEFT FOOT PAIN: Primary | ICD-10-CM

## 2024-05-28 ENCOUNTER — HOSPITAL ENCOUNTER (OUTPATIENT)
Dept: GENERAL RADIOLOGY | Age: 69
Discharge: HOME | End: 2024-05-28
Attending: PODIATRIST
Payer: MEDICARE

## 2024-05-28 ENCOUNTER — HOSPITAL ENCOUNTER (OUTPATIENT)
Dept: CT IMAGING | Facility: HOSPITAL | Age: 69
Discharge: HOME | End: 2024-05-28
Attending: INTERNAL MEDICINE
Payer: MEDICARE

## 2024-05-28 ENCOUNTER — OFFICE VISIT (OUTPATIENT)
Dept: ORTHOPEDICS CLINIC | Facility: CLINIC | Age: 69
End: 2024-05-28

## 2024-05-28 VITALS — HEART RATE: 78 BPM | SYSTOLIC BLOOD PRESSURE: 148 MMHG | DIASTOLIC BLOOD PRESSURE: 77 MMHG

## 2024-05-28 VITALS — WEIGHT: 94 LBS | BODY MASS INDEX: 17.3 KG/M2 | HEIGHT: 62 IN

## 2024-05-28 DIAGNOSIS — I48.91 A-FIB (HCC): ICD-10-CM

## 2024-05-28 DIAGNOSIS — M25.572 CHRONIC PAIN OF LEFT ANKLE: ICD-10-CM

## 2024-05-28 DIAGNOSIS — G20.A1 PARKINSON'S DISEASE, UNSPECIFIED WHETHER DYSKINESIA PRESENT, UNSPECIFIED WHETHER MANIFESTATIONS FLUCTUATE (HCC): Chronic | ICD-10-CM

## 2024-05-28 DIAGNOSIS — G89.29 CHRONIC PAIN OF LEFT ANKLE: ICD-10-CM

## 2024-05-28 DIAGNOSIS — M79.672 LEFT FOOT PAIN: ICD-10-CM

## 2024-05-28 DIAGNOSIS — M21.619 BUNION: Primary | ICD-10-CM

## 2024-05-28 PROCEDURE — 73630 X-RAY EXAM OF FOOT: CPT | Performed by: PODIATRIST

## 2024-05-28 PROCEDURE — 75572 CT HRT W/3D IMAGE: CPT | Performed by: INTERNAL MEDICINE

## 2024-05-28 PROCEDURE — 99214 OFFICE O/P EST MOD 30 MIN: CPT | Performed by: PODIATRIST

## 2024-05-28 RX ORDER — CLOPIDOGREL BISULFATE 75 MG/1
TABLET ORAL
COMMUNITY
Start: 2024-04-12

## 2024-05-28 NOTE — TELEPHONE ENCOUNTER
XR ORDERED PER PROTOCOL.   XR SCHEDULED.   TOO LATE TO NOTIFY PATIENT BUT DID SEND A MESSAGE ON [employer].

## 2024-05-28 NOTE — IMAGING NOTE
Pt scheduled for CT gated pulmonary vein  Denies any allergies to contrast.  Scanned in CT room # 4  HR 78 during scan at 0827  0.9 NS 50 ml given  IV contrast 80 ml given  Tolerated exam well. Instructed to drink water.

## 2024-05-28 NOTE — PROGRESS NOTES
[facility] Progress Note    Subjective:     Pt is here for yearly visit regarding bunion pain  She enjoys walking and can get to about three quarters of a mile and will get ankle pain and pain at the bunion, describes it as throbbing right at the bump    Otherwise bunion or ankle is not a problem there is no instability with the ankle    Objective:     Left foot bunion deformity with limited plantarflexion and full dorsiflexion of the first MP joint.  Enlarged medial eminence with no discomfort with palpation mild swelling.  Crowding of the second digit but no interdigital corn noted  No pain about the ankle the ankle is stable but she points to the ATF region when she gets discomfort          Imaging: X-rays of the left foot show the bunion to be unchanged from 1 year ago.  We compare the films.  Hardware intact at the base of the first metatarsal but recurrent bunion noted.  There is no change.        Assessment/Plan:     Diagnoses and all orders for this visit:    Bunion    Parkinson's disease, unspecified whether dyskinesia present, unspecified whether manifestations fluctuate (HCC)    Chronic pain of left ankle    We discussed the bunion deformity and possible correction involving fusion of the first MP joint.  Unfortunately this would be about 3 weeks nonweightbearing with either use of a wheelchair or knee scooter.  We may be able to modify with partial weight.    Her discomfort and her x-rays are the same as far as the bunion.  Again we talked about the fact that this is an elective procedure and she does not need to have it recorrected, but she is limited in the amount of walking she can do which she enjoys.  She is also dealing with her right knee and the left ankle as well as back pain  Recommend using an ankle sleeve for little support during her walks.  As we talked, we find that the right knee takes precedence so again we are going to see each other yearly, x-rays yearly weightbearing of the  left foot for follow-up          Kenia Brown, DPMPodiatric Surgery  [facility]  [address], [address] [address]   [address] [place] [address]   [address] [place] [address]  [facility]  [email or web address]  t: [phone]   f: [phone]            Dragon speech recognition software was used to prepare this note. If a word or phrase is confusing, it is likely do to a failure of recognition. Please contact me with any questions or clarifications.

## 2024-05-30 ENCOUNTER — TELEPHONE (OUTPATIENT)
Dept: CARDIOLOGY CLINIC | Facility: HOSPITAL | Age: 69
End: 2024-05-30

## 2024-05-30 NOTE — TELEPHONE ENCOUNTER
Post Amulet CT complete. Will need to remain on Plavix for 6 months post implant, Can stop 10/6. Call with any questions

## 2024-06-10 DIAGNOSIS — M54.16 LUMBAR RADICULITIS: ICD-10-CM

## 2024-06-10 DIAGNOSIS — M54.16 LUMBAR RADICULOPATHY: ICD-10-CM

## 2024-06-10 DIAGNOSIS — M48.061 SPINAL STENOSIS OF LUMBAR REGION WITHOUT NEUROGENIC CLAUDICATION: ICD-10-CM

## 2024-06-10 DIAGNOSIS — G24.3 CERVICAL DYSTONIA: ICD-10-CM

## 2024-06-10 RX ORDER — HYDROCODONE BITARTRATE AND ACETAMINOPHEN 5; 325 MG/1; MG/1
1 TABLET ORAL EVERY 8 HOURS PRN
Qty: 90 TABLET | Refills: 0 | Status: SHIPPED | OUTPATIENT
Start: 2024-06-10 | End: 2024-07-10

## 2024-06-10 NOTE — TELEPHONE ENCOUNTER
Medication: HYDROcodone-acetaminophen 5-325 MG     Date of last refill: 05/12/24  [place]    Last office visit: 05/13/24  Due back to clinic per last office note:  6 months  Date next office visit scheduled:  12/09/24    Last URINE Screen: 05/20/24  Screen Results:  please review in chart      Narcotic Contract EXPIRATION date: 11/13/24    Last OV note recommendation:   ASSESSMENT AND PLAN:      1. Chronic narcotic use    2. Opioid dependence, uncomplicated (HCC)    3. S/P cervical spinal fusion    4. Lumbar radiculitis          The patient is a pleasant 69-year-old female with longstanding history of chronic back pain.  This is managed with hydrocodone through office.  Patient states medications continue to provide improved function.  Denies any side effects associated with the medication.  [place] which is consistent with hydrocodone usage.  Patient due for repeat UDS which was ordered today.  Patient can follow-up in 6 months time.

## 2024-06-11 ENCOUNTER — LAB ENCOUNTER (OUTPATIENT)
Dept: LAB | Age: 69
End: 2024-06-11
Attending: STUDENT IN AN ORGANIZED HEALTH CARE EDUCATION/TRAINING PROGRAM
Payer: MEDICARE

## 2024-06-11 DIAGNOSIS — M81.0 AGE-RELATED OSTEOPOROSIS WITHOUT CURRENT PATHOLOGICAL FRACTURE: ICD-10-CM

## 2024-06-11 LAB
ALBUMIN SERPL-MCNC: 4 G/DL (ref 3.4–5)
ALBUMIN/GLOB SERPL: 1.2 {RATIO} (ref 1–2)
ALP LIVER SERPL-CCNC: 57 U/L
ALT SERPL-CCNC: 21 U/L
ANION GAP SERPL CALC-SCNC: 7 MMOL/L (ref 0–18)
AST SERPL-CCNC: 19 U/L (ref 15–37)
BILIRUB SERPL-MCNC: 0.2 MG/DL (ref 0.1–2)
BUN BLD-MCNC: 19 MG/DL (ref 9–23)
CALCIUM BLD-MCNC: 9.1 MG/DL (ref 8.5–10.1)
CHLORIDE SERPL-SCNC: 105 MMOL/L (ref 98–112)
CO2 SERPL-SCNC: 27 MMOL/L (ref 21–32)
CREAT BLD-MCNC: 1 MG/DL
EGFRCR SERPLBLD CKD-EPI 2021: 61 ML/MIN/1.73M2 (ref 60–?)
FASTING STATUS PATIENT QL REPORTED: NO
GLOBULIN PLAS-MCNC: 3.4 G/DL (ref 2.8–4.4)
GLUCOSE BLD-MCNC: 181 MG/DL (ref 70–99)
OSMOLALITY SERPL CALC.SUM OF ELEC: 295 MOSM/KG (ref 275–295)
POTASSIUM SERPL-SCNC: 3.9 MMOL/L (ref 3.5–5.1)
PROT SERPL-MCNC: 7.4 G/DL (ref 6.4–8.2)
SODIUM SERPL-SCNC: 139 MMOL/L (ref 136–145)
VIT D+METAB SERPL-MCNC: 38.9 NG/ML (ref 30–100)

## 2024-06-11 PROCEDURE — 36415 COLL VENOUS BLD VENIPUNCTURE: CPT

## 2024-06-11 PROCEDURE — 82306 VITAMIN D 25 HYDROXY: CPT

## 2024-06-11 PROCEDURE — 80053 COMPREHEN METABOLIC PANEL: CPT

## 2024-06-11 PROCEDURE — 82523 COLLAGEN CROSSLINKS: CPT

## 2024-06-11 PROCEDURE — 84080 ASSAY ALKALINE PHOSPHATASES: CPT

## 2024-06-14 LAB — ALKALINE PHOSPHATASE BONE SPECIFIC: 6.5 UG/L

## 2024-06-16 LAB — C-TELOPEPTIDE: 172 PG/ML

## 2024-06-23 ENCOUNTER — HOSPITAL ENCOUNTER (EMERGENCY)
Facility: HOSPITAL | Age: 69
Discharge: HOME | End: 2024-06-23
Attending: STUDENT IN AN ORGANIZED HEALTH CARE EDUCATION/TRAINING PROGRAM

## 2024-06-23 VITALS
TEMPERATURE: 98 F | HEART RATE: 90 BPM | RESPIRATION RATE: 17 BRPM | DIASTOLIC BLOOD PRESSURE: 76 MMHG | WEIGHT: 93.94 LBS | BODY MASS INDEX: 17 KG/M2 | OXYGEN SATURATION: 97 % | SYSTOLIC BLOOD PRESSURE: 103 MMHG

## 2024-06-23 DIAGNOSIS — I48.92 ATRIAL FIBRILLATION AND FLUTTER (HCC): Primary | ICD-10-CM

## 2024-06-23 DIAGNOSIS — I48.91 ATRIAL FIBRILLATION AND FLUTTER (HCC): Primary | ICD-10-CM

## 2024-06-23 LAB
ANION GAP SERPL CALC-SCNC: 3 MMOL/L (ref 0–18)
BASOPHILS # BLD AUTO: 0.01 X10(3) UL (ref 0–0.2)
BASOPHILS NFR BLD AUTO: 0.2 %
BUN BLD-MCNC: 22 MG/DL (ref 9–23)
CALCIUM BLD-MCNC: 9.3 MG/DL (ref 8.5–10.1)
CHLORIDE SERPL-SCNC: 112 MMOL/L (ref 98–112)
CO2 SERPL-SCNC: 28 MMOL/L (ref 21–32)
CREAT BLD-MCNC: 0.99 MG/DL
EGFRCR SERPLBLD CKD-EPI 2021: 62 ML/MIN/1.73M2 (ref 60–?)
EOSINOPHIL # BLD AUTO: 0.02 X10(3) UL (ref 0–0.7)
EOSINOPHIL NFR BLD AUTO: 0.4 %
ERYTHROCYTE [DISTWIDTH] IN BLOOD BY AUTOMATED COUNT: 12.6 %
GLUCOSE BLD-MCNC: 71 MG/DL (ref 70–99)
HCT VFR BLD AUTO: 38.6 %
HGB BLD-MCNC: 13.5 G/DL
IMM GRANULOCYTES # BLD AUTO: 0.01 X10(3) UL (ref 0–1)
IMM GRANULOCYTES NFR BLD: 0.2 %
LYMPHOCYTES # BLD AUTO: 1.87 X10(3) UL (ref 1–4)
LYMPHOCYTES NFR BLD AUTO: 39.4 %
MAGNESIUM SERPL-MCNC: 2.1 MG/DL (ref 1.6–2.6)
MCH RBC QN AUTO: 34.8 PG (ref 26–34)
MCHC RBC AUTO-ENTMCNC: 35 G/DL (ref 31–37)
MCV RBC AUTO: 99.5 FL
MONOCYTES # BLD AUTO: 0.75 X10(3) UL (ref 0.1–1)
MONOCYTES NFR BLD AUTO: 15.8 %
NEUTROPHILS # BLD AUTO: 2.09 X10 (3) UL (ref 1.5–7.7)
NEUTROPHILS # BLD AUTO: 2.09 X10(3) UL (ref 1.5–7.7)
NEUTROPHILS NFR BLD AUTO: 44 %
OSMOLALITY SERPL CALC.SUM OF ELEC: 298 MOSM/KG (ref 275–295)
PLATELET # BLD AUTO: 217 10(3)UL (ref 150–450)
POTASSIUM SERPL-SCNC: 5.2 MMOL/L (ref 3.5–5.1)
Q-T INTERVAL: 302 MS
QRS DURATION: 82 MS
QTC CALCULATION (BEZET): 461 MS
R AXIS: 54 DEGREES
RBC # BLD AUTO: 3.88 X10(6)UL
SODIUM SERPL-SCNC: 143 MMOL/L (ref 136–145)
T AXIS: 75 DEGREES
TROPONIN I SERPL HS-MCNC: 16 NG/L
VENTRICULAR RATE: 140 BPM
WBC # BLD AUTO: 4.8 X10(3) UL (ref 4–11)

## 2024-06-23 PROCEDURE — 84484 ASSAY OF TROPONIN QUANT: CPT | Performed by: STUDENT IN AN ORGANIZED HEALTH CARE EDUCATION/TRAINING PROGRAM

## 2024-06-23 PROCEDURE — 83735 ASSAY OF MAGNESIUM: CPT | Performed by: STUDENT IN AN ORGANIZED HEALTH CARE EDUCATION/TRAINING PROGRAM

## 2024-06-23 PROCEDURE — 80048 BASIC METABOLIC PNL TOTAL CA: CPT | Performed by: STUDENT IN AN ORGANIZED HEALTH CARE EDUCATION/TRAINING PROGRAM

## 2024-06-23 PROCEDURE — 93010 ELECTROCARDIOGRAM REPORT: CPT

## 2024-06-23 PROCEDURE — 99284 EMERGENCY DEPT VISIT MOD MDM: CPT

## 2024-06-23 PROCEDURE — 93005 ELECTROCARDIOGRAM TRACING: CPT

## 2024-06-23 PROCEDURE — 96374 THER/PROPH/DIAG INJ IV PUSH: CPT

## 2024-06-23 PROCEDURE — 85025 COMPLETE CBC W/AUTO DIFF WBC: CPT | Performed by: STUDENT IN AN ORGANIZED HEALTH CARE EDUCATION/TRAINING PROGRAM

## 2024-06-23 RX ORDER — DILTIAZEM HYDROCHLORIDE 5 MG/ML
10 INJECTION INTRAVENOUS ONCE
Status: COMPLETED | OUTPATIENT
Start: 2024-06-23 | End: 2024-06-23

## 2024-06-23 NOTE — ED INITIAL ASSESSMENT (HPI)
Patient arrives to the ED via walk-in with c/o two hours of palpitations. Patient has hx of afib, but states it has never been this fast before. Patient has pace maker, takes antiplatelet per Dr. Cordon. Dr. Roque is patient's cardiologist. 150 HR in triage.

## 2024-06-23 NOTE — ED PROVIDER NOTES
History     Chief Complaint   Patient presents with    Arrythmia/Palpitations       HPI    69 year old female with history of paroxysmal atrial fibrillation/flutter status post amulet not on anticoagulation, sick sinus syndrome status post pacemaker, autonomic dysfunction/syncope, hyperlipidemia, Parkinson's with deep brain stimulator presents with palpitations.  Around noon she felt herself go into atrial fibrillation, watery chest with rapid heart rate noted by her Apple Watch.  She has no associated chest pain, shortness of breath or lightheadedness.  She has been in a state of good health recently otherwise.          Past Medical History:    Age-related osteoporosis without current pathological fracture    Anemia    Anxiety    Anxiety    Arrhythmia    afib    Arthritis    Atrial flutter, paroxysmal (HCC)    Back pain    Back problem    Benign essential tremor    Bowel obstruction (HCC)    Breast cancer (HCC)    Broken ankle    Left Ankle    Cancer (HCC)    Right breast    Cervical dystonia    DBS b/l    Cervical spine degeneration    Change in hair    Colitis    Ischemic Colitis    Constipation    Depression    Difficult intubation    big bony features in the bottom of mouth     Easy bruising    Endometriosis    Essential hypertension    Exposure to medical diagnostic radiation    Fatigue    ANEMIA.  I GET INFUSIONS    Frequent use of laxatives    Gastrointestinal hemorrhage    GI bleed    Heart palpitations    AFIB    High blood pressure    High cholesterol    History of blood transfusion    Jan 2018    History of breast cancer    XRT/Chemo    History of depression    History of GI bleed    Ischemic bowel    Hyperglycemia    Hyperlipidemia    Hyponatremia    Hypotension due to hypovolemia    Impacted cerumen of left ear    Intestinal infection due to Clostridium difficile    2019    Irregular bowel habits    Ischemic colitis (HCC)    12/22    Lacunar infarction (HCC)    Nausea with vomiting    Neuropathy     Osteoarthritis    Osteoarthrosis, unspecified whether generalized or localized, unspecified site    Pacemaker    Pain in joints    Pain with bowel movements    ISCHEMIC COLITIS    Panic disorder    Parkinson disease (HCC)    Personal history of antineoplastic chemotherapy    Personal history of endometriosis    Plaque psoriasis    Presence of other cardiac implants and grafts    Psoriatic arthropathy (HCC)    Recurrent major depressive disorder, in remission (HCC)    Rhinovirus    Sleep disturbance    Status post deep brain stimulator placement    Stented coronary artery    Stress    Tobacco dependence    Quit 2017    Vaso vagal episode    Weight loss       Past Surgical History:   Procedure Laterality Date    Angioplasty (coronary)      Appendectomy      Appendectomy      Back surgery      NECK AT LOWER LUMBAR FUSED    Brain surgery  2016    Brain stimulator           x3    Cardiac pacemaker placement      Cervical spine surgery  2004    cervical spinal fusion C1-6    Chemotherapy      Colonoscopy  2014    Procedure: COLONOSCOPY;  Surgeon: Aaron Fair MD;  Location:  ENDOSCOPY    Colonoscopy N/A 2018    Procedure: COLONOSCOPY;  Surgeon: Aaron Fair MD;  Location:  ENDOSCOPY    D & c      Hysterectomy  1992    OVARIES REMOVED AS WELL    Lumbar spine surgery  1999    lumbar laminectomy L1-L4    Lumpectomy right  2002    Oophorectomy      Other      COLON STENTS    Other surgical history      Lt bunionectomy    Other surgical history      stent in [facility]    Pain management Bilateral 06/15/2021    BILATERAL LUMBAR 5 TRANSFORAMINAL LUMBAR EPIDURAL STEROID INJECTION    Radiation right  2002    Sigmoidoscopy,diagnostic      Spine surgery procedure unlisted      Total abdom hysterectomy      Tubal ligation      Upper gi endoscopy,biopsy      ulcer, at [facility]       Social History     Socioeconomic History    Marital status:    Tobacco Use    Smoking status: Former      Current packs/day: 0.00     Average packs/day: 0.5 packs/day for 36.0 years (18.0 ttl pk-yrs)     Types: Cigarettes     Start date: 1982     Quit date: 2018     Years since quittin.5     Passive exposure: Past    Smokeless tobacco: Never   Vaping Use    Vaping status: Never Used   Substance and Sexual Activity    Alcohol use: Yes     Alcohol/week: 14.0 standard drinks of alcohol     Types: 14 Cans of beer per week     Comment: approx0- 2 beers per day    Drug use: No    Sexual activity: Not Currently   Other Topics Concern    Caffeine Concern No    Exercise No    Seat Belt No    Special Diet Yes     Comment: HOW TO PUT ON WEIGHT    Stress Concern No    Weight Concern Yes     Comment: CAN'T PUT ON WEIGHT     Social Determinants of Health     Financial Resource Strain: Low Risk  (2024)    Received from [facility]    Overall Financial Resource Strain (CARDIA)     Difficulty of Paying Living Expenses: Not hard at all   Food Insecurity: No Food Insecurity (2024)    Received from [facility]    Hunger Vital Sign     Worried About Running Out of Food in the Last Year: Never true     Ran Out of Food in the Last Year: Never true   Transportation Needs: No Transportation Needs (2024)    Received from [facility]    PRAPARE - Transportation     Lack of Transportation (Medical): No     Lack of Transportation (Non-Medical): No    [facility]    Social Connections   Housing Stability: High Risk (2024)    Received from [facility]    Housing Stability Vital Sign     Unable to Pay for Housing in the Last Year: No     Number of Places Lived in the Last Year: 1     In the last 12 months, was there a time when you did not have a steady place to sleep or slept in a shelter (including now)?: Yes                   Physical Exam     ED Triage Vitals  [06/23/24 1439]   /90   Pulse (!) 150   Resp 22   Temp 98.2 °F (36.8 °C)   Temp src Temporal   SpO2 96 %   O2 Device None (Room air)       Physical Exam  Constitutional:       General: She is not in acute distress.  Eyes:      Extraocular Movements: Extraocular movements intact.   Cardiovascular:      Rate and Rhythm: Tachycardia present. Rhythm irregular.      Pulses: Normal pulses.   Pulmonary:      Effort: Pulmonary effort is normal. No respiratory distress.      Breath sounds: Normal breath sounds.   Abdominal:      General: Abdomen is flat. There is no distension.      Tenderness: There is no abdominal tenderness.   Musculoskeletal:         General: No swelling or deformity.      Cervical back: Normal range of motion.   Skin:     General: Skin is warm.   Neurological:      General: No focal deficit present.      Mental Status: She is alert.              ED Course     Labs Reviewed   BASIC METABOLIC PANEL (8) - Abnormal; Notable for the following components:       Result Value    Potassium 5.2 (*)     Calculated Osmolality 298 (*)     All other components within normal limits   CBC W/ DIFFERENTIAL - Abnormal; Notable for the following components:    MCH 34.8 (*)     All other components within normal limits   TROPONIN I HIGH SENSITIVITY - Normal   MAGNESIUM - Normal   CBC WITH DIFFERENTIAL WITH PLATELET    Narrative:     The following orders were created for panel order CBC With Differential With Platelet.  Procedure                               Abnormality         Status                     ---------                               -----------         ------                     CBC W/ DIFFERENTIAL[[id number]]          Abnormal            Final result                 Please view results for these tests on the individual orders.   RAINBOW DRAW LAVENDER   RAINBOW DRAW LIGHT GREEN   RAINBOW DRAW BLUE     No results found.        [facility]     Vitals:    06/23/24 1439 06/23/24 1530 06/23/24 1600   BP: 155/90 103/69  103/76   Pulse: (!) 150 74 90   Resp: 22 17 17   Temp: 98.2 °F (36.8 °C)     TempSrc: Temporal     SpO2: 96% 99% 97%   Weight: 42.6 kg         Atrial fibrillation.  Patient's heart rate appears to be in the 150s here, reassuring blood pressure.  She is mentating and perfusing appropriately.  Given her issues with autonomic dysfunction and hypotension, pharmacotherapy is not the best option however patient is not amenable to cardioversion with her stimulator.  Will trial a dose of diltiazem check labs to evaluate for dehydration or electrolyte abnormalities and ischemia    ED Course as of 06/23/24 2127  ------------------------------------------------------------  Time: 06/23 1452  Comment: EKG interpretation by me: EKG afib rhythm at a rate of 140, axis nomal, no concerning acute ischemic ST changes  ------------------------------------------------------------  Time: 06/23 1539  Comment: Labs with reassuring renal function, potassium is mildly elevated, magnesium within normal limits.  Troponin negative.  ------------------------------------------------------------  Time: 06/23 1546  Comment: My interpretation of repeat EKG in atrial fibrillation at a rate of 91 bpm.  No evolving ST changes.  ------------------------------------------------------------  Time: 06/23 1547  Comment: Good response to diltiazem, heart rate is now down to the 90s, blood pressure did drop about 50 points.  ------------------------------------------------------------  Time: 06/23 7613  Comment: On reevaluation she feels comfortable.  She is able to ambulate here without any symptoms.  I consulted with [facility], recommends no further rate or rhythm medicines, patient is not amenable to cardioversion given her deep brain stimulator.  She is stable for discharge home with clinic follow-up tomorrow where they will discuss further treatment options with the patient.  We discussed return precautions, patient discharged in stable  condition.         Disposition and Plan     Clinical Impression:  1. Atrial fibrillation and flutter (HCC)        Disposition:  Discharge    Follow-up:  Vince Cordon MD  [address] [address]  [phone]    Follow up in 1 day(s)        Medications Prescribed:  Discharge Medication List as of 6/23/2024  4:54 PM

## 2024-06-23 NOTE — HISTORICAL OFFICE NOTE
[facility]  [address] [place] [address], 4[address] [place] [address]  [phone]      Tamara Meraz  Progress Note  Demographics:  Name: Tamara Meraz YOB: 1955  Age: 69, Female Medical Record No: [id number]  Visited Date/Time: 05/17/2024 03:30 PM    Chief Complaints  4/5 Amulet implant  History of Present Illness  Tamara Meraz is a a(n) 69 year old female with PMH significant for: Ischemic colitis of SMA stent, AFL, Hx PAF - last episode 10 years ago, SSS/Micra PPM: 9/29/22, autonomic dysfunction, syncope, HTN, dyslipidemia, Parkinsonian DBS with deep brain stimulator, Hx breast Ca. 7/27/23: CTI Ablation.   4/5/24:  Amulet implantation.     CHA2D VASc Score: 3 points.   4/12/24:  EKG: SR 88 BPM, QRS;  94 ms, QTc: 408 ms, CT: 156 ms.   3/14/24:  EKG:  SR 81 BPM, QRS:  94 ms, QTc: 414 m,s CT: 156 ms.   12/5/20:  Echo:  LVEF:  60-65 %, NRWMA.   6/30/22:  Lexiscan:  No evidence of ischemia.  EF: 76 %.   6/28/22:  6 Day MCT:  SR Min HR: 46 BPM, Ave HR: 78 BPM, Max HR: 121 BPM, no AF.   4/18/22: Carotid Doppler:   R ICA:  60-79 %, L 1-39 %.   3/19/24:  Micra interrogation:  : 38 %.     Clinically she has a longstanding history of intermittent near syncope thought to be due to fluctuating blood pressure, orthostatic changes.  Previous MCT that showed episodes of type 1 AV block, no clinical symptoms with intermittent 2:1 block. 9/29/22-status post MICRA AV implant (limited vascular access due to breast Ca, and Medtronic brain stimulator).     Current visit:   Recent Amulet implantation on 4/5/24. Hx falls with Parkinson's. No further syncope or falls.   Continue Plavix. Wants off Plavix D/t her Hx ischemic colitis.   She is feeling well.       Cardiac risk factors Hypertension, Dyslipidemia and Former smoker  Past Medical History  1.Presence of left atrial appendage closure device  2.Chest pressure  3.Carotid artery stenosis, bilateral  4.MDS (myelodysplastic  syndrome)  5.Orthostatic hypotension  6.CAD native (coronary artery disease)  7.Atherosclerosis of abdominal aorta  8.AF (paroxysmal atrial fibrillation)  9.Pure hypercholesterolemia  10.Essential hypertension  11.Mesenteric ischemia, chronic  Past Surgical History  1.Presence of left atrial appendage closure device  2.S/P deep brain stimulator placement  Family History  1. Father - Hypertension (HTN), primary  2. Mother - Cancer; Disorder of heart  3. Brother - Old MI (myocardial infarction)  4. Sister - Lung disorder  Social History  Smoking status Former smoker  Tobacco usage - No (Non-smoker for personal reasons (finding))  Review of systems  Constitutional No history of Weight Loss and Anorexia  Eyes No history of Blurry vision, Visual changes and Double vision  ENT No history of Bloody nose (epistaxis) and Gingival bleeding  Gastrointestinal No history of Abdominal pain, Nausea, Vomiting, Diarrhea, BRBPR, Black tarry stools, GERD, Anorexia and Constipation  Cardiovascular No history of Chest pain, MUELLER, Palpitations, Syncope, PND, Orthopnea, Edema and Claudication  Genitourinary No history of Dysuria, Hematuria, Frequency and ED  Musculoskeletal No history of Myalgias and Arthritis  Respiratory No history of SOB, Wheezing and Sputum  Neurological No history of Migraines, Numbness and Limb weakness  Endocrine No history of Polyuria and Polydyspia  Hem/Lymphatic No history of Easy bruising, Blood clots, Hx of blood transfusion, Anemia and Bleeding problems  Physical Examination  Vitals Right Arm Sitting  / 72 mmHg, Pulse rate 76 bpm, Regular, Height in 5' 2\", BMI: 17.3, Weight in 94.8 lbs (or) 43 kgs and BSA : 1.36 cc/m²  General Appearance No Acute Distress, Well groomed and Normal Body habitus  Head/Eyes/Ears/Nose/Mouth/Throat Conjunctiva pink, Sclera Clear, EOMI and No icterus  Neck Normal carotid pulsations, No carotid bruits, Normal thyroid palpation without goiter and No JVD  Respiratory Unlabored,  Lungs clear with normal breath sounds and Equal bilaterally  Cardiovascular Regular rhythm. Normal and normal S1 and S2  Carotid pulses are 2+ on the right side.    Gastrointestinal Abdomen soft, Non-tender and Normoactive bowel sounds  Musculoskeletal Normal spine  Gait Normal gait  Strength and tone Normal muscle strength and Normal muscle tone  Upper Extremities No clubbing, No cyanosis and No edema  Lower Extremities Pulses 2+ and equal bilaterally and No edema  Skin Warm and dry and Intact  Neurologic / Psychiatric Alert and Oriented  Speech Normal speech  Allergies  1.Sulfa (Sulfonamide Antibiotics) - Allergen(Reaction:RASH, HEADACHE, Severity:Mild)  Medications  1.aspirin 81 mg tablet,delayed release, Take 1 tablet orally once a day.  2.atorvastatin (LIPITOR) 10 MG tablet  3.calcium (OYST-RIOS) 500 MG tablet, one tablet every morning  4.Cyanocobalamin (B-12) 500 MCG Tab, Take 500 mcg by mouth.  5.gabapentin 300 mg capsule, Take 2 capsules orally 2 times a day.  6.HYDROcodone-acetaminophen (NORCO) 5-325 MG per tablet, take one tablet daily as needed  7.Multiple Vitamin (MULTI-VITAMINS) Tab, one tablet daily  8.PARoxetine 20 mg tablet, Take 1 tablet orally once a day.  9.Plavix 75 mg tablet, Take 1 tablet orally once a day.  10.primidone (MYSOLINE) 50 MG tablet, 2 tabs q am and 3 tabs q pm  Impression  1.Encounter for preoperative assessment  2.Syncope and collapse  3.MDS (myelodysplastic syndrome)  4.Orthostatic hypotension  5.CAD native (coronary artery disease)  6.Atherosclerosis of abdominal aorta  7.AF (paroxysmal atrial fibrillation)  8.Pure hypercholesterolemia  9.Essential hypertension  10.Mesenteric ischemia, chronic  Assessment & Plan  Tamara Meraz is a a(n) 69 year old female with PMH significant for: Ischemic colitis of SMA stent, AFL, Hx PAF - last episode 10 years ago, SSS/Micra PPM: 9/29/22, autonomic dysfunction, syncope, HTN, dyslipidemia, Parkinsonian DBS with deep brain stimulator, Hx  breast Ca. 7/27/23: CTI Ablation.   4/5/24:  Amulet implantation.     CHA2D VASc Score: 3 points.   4/12/24:  EKG: SR 88 BPM, QRS;  94 ms, QTc: 408 ms, MT: 156 ms.   3/14/24:  EKG:  SR 81 BPM, QRS:  94 ms, QTc: 414 m,s MT: 156 ms.   12/5/20:  Echo:  LVEF:  60-65 %, NRWMA.   6/30/22:  Lexiscan:  No evidence of ischemia.  EF: 76 %.   6/28/22:  6 Day MCT:  SR Min HR: 46 BPM, Ave HR: 78 BPM, Max HR: 121 BPM, no AF.   4/18/22: Carotid Doppler:   R ICA:  60-79 %, L 1-39 %.   3/19/24:  Micra interrogation:  : 38 %.     Clinically she has a longstanding history of intermittent near syncope thought to be due to fluctuating blood pressure, orthostatic changes.  Previous MCT that showed episodes of type 1 AV block, no clinical symptoms with intermittent 2:1 block. 9/29/22-status post MICRA AV implant (limited vascular access due to breast Ca, and Medtronic brain stimulator).     Plan:   Recent Amulet implantation on 4/5/24. Hx falls with Parkinson's. No further syncope or falls.   Continue Plavix. Wants off Plavix D/t her Hx ischemic colitis.   CT Chest scheduled for 5/28/24.    Will discuss with Dr. Cordon.   Order carotid duplex this summer.   Follow up with Dr. Cordon on 7/15/24.       Labs and Diagnostics ordered  1.EKG (electrocardiogram) (Today)  2.Carotid Ultrasound (bilateral) (2 Months)  Miscellaneous  1.Reviewed glucose, sodium, potassium, chloride, co2, anion gap, bun, creatinine, calcium, osmolality calculated, e gfr cr, fasting patient bmp answer, wbc, red blood count, hgb, hct, platelets, mean cell volume, mean corpuscular hemoglobin, mean corpuscular hgb conc, red cell distribution width cv, neutrophil abs prelim, neutrophil absolute, lymphocyte absolute, monocyte absolute, eosinophil absolute, basophil absolute, immature granulocyte count, neutrophil %, lymphocyte %, monocyte %, eosinophil %, basophil % and immature granulocyte ratio % with the patient.  2.Reviewed ambulatory telemetry monitor, carotid  ultrasound, exercise treadmill testing, myocardial perfusion imaging with the patient.  3.Weight monitoring (regime/therapy)  Nurses documentation  Triage - Nursing Doc:  Upcoming surgeries: no  Use of assistive devices(s): no  Triage & medication list reviewed by: JORDEN  EKG: yes  Refills:  no  Patient instructions  Order carotid duplex this summer.   Follow up with Dr. Cordon on 7/15/24.   Lab Details  BASIC METABOLIC PANEL (8)  05/14/2024 12:33:10 PM  GLUCOSE 133 70-99 mg/dL H F  SODIUM 140 136-145 mmol/L  F  POTASSIUM 3.9 3.5-5.1 mmol/L  F  CHLORIDE 107  mmol/L  F  CO2 26.0 21.0-32.0 mmol/L  F  ANION GAP 7 0-18 mmol/L  F  BUN 13 9-23 mg/dL  F  CREATININE 0.96 0.55-1.02 mg/dL  F  CALCIUM 9.1 8.5-10.1 mg/dL  F  OSMOLALITY CALCULATED 292 275-295 mOsm/kg  F  E GFR CR 64 >=60 mL/min/1.73m2  F  FASTING PATIENT BMP ANSWER No   F  CBC, PLATELET; NO DIFFERENTIAL  05/14/2024 12:26:52 PM  WBC 3.4 4.0-11.0 x10(3) uL L F  RED BLOOD COUNT 3.58 3.80-5.30 x10(6)uL L F  HGB 12.3 12.0-16.0 g/dL  F  HCT 36.7 35.0-48.0 %  F  PLATELETS 211.0 150.0-450.0 10(3)uL  F  MEAN CELL VOLUME 102.5 80.0-100.0 fL H F  MEAN CORPUSCULAR HEMOGLOBIN 34.4 26.0-34.0 pg H F  MEAN CORPUSCULAR HGB CONC 33.5 31.0-37.0 g/dL  F  RED CELL DISTRIBUTION WIDTH CV 12.5 %  F  ABORH (BLOOD TYPE)  04/02/2024 06:36:33 PM  ABO BLOOD TYPE A   F  RH BLOOD TYPE Positive   F  ANTIBODY SCREEN  04/02/2024 06:36:33 PM  ANTIBODY SCREEN Negative   F  MAGNESIUM  08/31/2023 02:24:06 PM  MAGNESIUM 2.1 1.6-2.6 mg/dL  F  CBC W/ DIFFERENTIAL  09/27/2022 10:24:33 AM  WBC 3.8 4.0-11.0 x10(3) uL L F  RED BLOOD COUNT 3.44 3.80-5.30 x10(6)uL L F  HGB 11.7 12.0-16.0 g/dL L F  HCT 35.0 35.0-48.0 %  F  PLATELETS 217.0 150.0-450.0 10(3)uL  F  MEAN CELL VOLUME 101.7 80.0-100.0 fL H F  MEAN CORPUSCULAR HEMOGLOBIN 34.0 26.0-34.0 pg  F  MEAN CORPUSCULAR HGB CONC 33.4 31.0-37.0 g/dL  F  RED CELL DISTRIBUTION WIDTH CV 13.6 %  F  NEUTROPHIL ABS PRELIM 2.34 1.50-7.70 x10 (3) uL  F  NEUTROPHIL  ABSOLUTE 2.34 1.50-7.70 x10(3) uL  F  LYMPHOCYTE ABSOLUTE 0.85 1.00-4.00 x10(3) uL L F  MONOCYTE ABSOLUTE 0.49 0.10-1.00 x10(3) uL  F  EOSINOPHIL ABSOLUTE 0.06 0.00-0.70 x10(3) uL  F  BASOPHIL ABSOLUTE 0.01 0.00-0.20 x10(3) uL  F  IMMATURE GRANULOCYTE COUNT 0.01 0.00-1.00 x10(3) uL  F  NEUTROPHIL % 62.2 %  F  LYMPHOCYTE % 22.6 %  F  MONOCYTE % 13.0 %  F  EOSINOPHIL % 1.6 %  F  BASOPHIL % 0.3 %  F  IMMATURE GRANULOCYTE RATIO % 0.3 %  F  Diagnostics Details  ACTMonitoring 09/08/2023  1.This is an excellent quality study.    2.Predominant rhythm is normal sinus rhythm.    3.The minimum heart rate recorded was 57 beats / minute. The maximum heart rate is 116 beats / minute. The mean heart rate is 74 beats / minute.    4.Second degree AV block noted.    5.Afib [place] 0%    Carotid Ultrasound 09/01/2023  1.The study quality is good.    2.60-79% stenosis in the proximal right internal carotid artery based on Bluth Criteria.    3.1-39% stenosis in the proximal left internal carotid artery based on Bluth Criteria.    4.Antegrade right vertebral artery flow.    5.Antegrade left vertebral artery flow.    Exercise Treadmill Testing 09/23/2022  1.Stress ECG negative for ischemia yet the patient demonstrated marked chronotropic incompetence in addition to intermittent 2:1 AV block with exercise. She demonstrated Wenckebach AV block in recovery.    2.Significant baseline artifact on ECG due to patient's tremors and deep brain stimulator - cannot exclude paroxysmal atrial flutter.    Exercise Myocardial Perfusion Imaging 06/30/2022  1.Stress EKG is normal.    1.This is a normal perfusion study, no perfusion defects noted.    2.The left ventricular cavity is noted to be normal on the stress study. The left ventricular ejection fraction was calculated to be 76% and left ventricular global function is normal.    CPOE Orders carried out by: Darline Ryan  Care Providers: Elva WOOD and Darline Badillo Holdt  Electronically  Authenticated by  Elva WOOD  05/17/2024 05:19:01 PM  Disclaimer: Components of this note were documented using voice recognition system and are subject to errors not corrected at proofreading. Contact the author of this note for any clarifications.      ___________________________________________________________       [facility]  [address] [place] [address], 4[place] [address]  [phone]      Tamara Meraz  Progress Note  Demographics:  Name: Tamara Meraz YOB: 1955  Age: 69, Female Medical Record No: [id number]  Visited Date/Time: 01/22/2024 10:50 AM    Chief Complaints  Discuss Watchman  History of Present Illness  Tamara Meraz is a a(n) 68 year old female, admitted with ischemic colitis.  Prior history of SMA stent.     While on telemetry monitoring, has had intermittent episodes of bradycardia with what appears to be a pattern of Wenckebach conduction.  This is happening in the setting of abdominal pain.     Clinically she has a longstanding history of intermittent near syncope thought to be due to fluctuating blood pressure, orthostatic changes.    Recently, post discharge, had an MCT that showed episodes of type 1 AV block, no clinical symptoms. She has had intermittent 2:1 block. She is status post MICRA AV implant (limited vascular access due to breast Ca, and Medtronic brain stimulator). Thresholds are stable.     She continues to have recurrent syncope despite recent ppm and current medications.  She had another episode of a mechanical fall recently while on DOAC.  An outside physician recommended she stopping DOAC due to concern for risk of injury.    She had been in atrial flutter, status post ablation, in SR.   Cardiac risk factors Hypertension, Dyslipidemia and Former smoker  Past Medical History  1.Presence of left atrial appendage closure device  2.Chest pressure  3.Carotid artery stenosis, bilateral  4.MDS (myelodysplastic  syndrome)  5.Orthostatic hypotension  6.CAD native (coronary artery disease)  7.Atherosclerosis of abdominal aorta  8.AF (paroxysmal atrial fibrillation)  9.Pure hypercholesterolemia  10.Essential hypertension  11.Mesenteric ischemia, chronic  Past Surgical History  1.Presence of left atrial appendage closure device  2.S/P deep brain stimulator placement  Family History  1. Father - Hypertension (HTN), primary  2. Mother - Cancer; Disorder of heart  3. Brother - Old MI (myocardial infarction)  4. Sister - Lung disorder  Social History  Smoking status Former smoker  Tobacco usage - No (Non-smoker for personal reasons (finding))  Review of systems  Cardiovascular No history of Chest pain, MUELLER, Palpitations, Syncope, PND, Orthopnea, Edema and Claudication  Physical Examination  Vitals Right Arm Sitting  / 62 mmHg, Pulse rate 89 bpm, Regular, Height in 5' 2\", BMI: 17.4, Weight in 95 lbs (or) 43 kgs and BSA : 1.36 cc/m²  General Appearance No Acute Distress  Head/Eyes/Ears/Nose/Mouth/Throat Conjunctiva pink, Sclera Clear and Mucous membranes Moist  Neck Normal carotid pulsations, No carotid bruits and No JVD  Respiratory Unlabored, Lungs clear with normal breath sounds and Equal bilaterally  Cardiovascular Intact distal pulses and Regular rhythm. Normal rate present. Normal and normal S1 and S2    Allergies  1.Sulfa (Sulfonamide Antibiotics) - Allergen(Reaction:RASH, HEADACHE, Severity:Mild)  Medications  1.atorvastatin (LIPITOR) 10 MG tablet  2.calcium (OYST-RIOS) 500 MG tablet, one tablet every morning  3.Cyanocobalamin (B-12) 500 MCG Tab, Take 500 mcg by mouth.  4.flecainide 100 mg tablet, Take 1 tablet orally 2 times a day.  5.gabapentin 300 mg capsule, Take 2 capsules orally 2 times a day.  6.HYDROcodone-acetaminophen (NORCO) 5-325 MG per tablet, take one tablet daily as needed  7.Multiple Vitamin (MULTI-VITAMINS) Tab, one tablet daily  8.PARoxetine 20 mg tablet, Take 1 tablet orally once a day.  9.PaxiL 20 mg  tablet, Take 1 tablet orally once a day.  10.Plavix 75 mg tablet, Take 1 tablet orally once a day.  11.primidone (MYSOLINE) 50 MG tablet, 2 tabs q am and 3 tabs q pm  Impression  1.Encounter for preoperative assessment  2.Syncope and collapse  3.MDS (myelodysplastic syndrome)  4.Orthostatic hypotension  5.CAD native (coronary artery disease)  6.Atherosclerosis of abdominal aorta  7.AF (paroxysmal atrial fibrillation)  8.Pure hypercholesterolemia  9.Essential hypertension  10.Mesenteric ischemia, chronic  Assessment & Plan  68 y/o female with orthostatic BP changes, history of mesenteric ischemia, AF, in SR, intermittent type 1 second degree block with otherwise intact 1:1 conduction at higher heart rates, intermittent 2:1 AV conduction .  Recent atrial flutter, status post ablation, now in sinus rhythm.    Overall, she has complex autonomic dysfunction with a tendency for orthostatic BP changes, intermittent bradcyardia, now with 2:1 av conduction and limited vascular access. Status post MICRA AV, V pacing ~50%.     -Device function stable, continue routine follow up in device clinic.    - She will be stopping flecainide as well due to cost (tier 3). Could consider an alternative.   - ECG shows V pacing  - She has labile HTN, will allow for permissive HTN given history of orthostatic changes  - Recommend LAAO with CHADSVasc score 4 (age, HTN, gender, Vasc), HASBLED score 4  - Follow up post LAAO      Labs and Diagnostics ordered  1.EKG (electrocardiogram) (Today)  Nurses documentation  Refills: none  Upcoming surgeries: none  Use of assistive device: none  Verbal order for EKG:yes  (MB, OZZIE)       Patient instructions  Irasema Will Contact you to schedule Watchman  Follow up post procedure  Lab Details  BASIC METABOLIC PANEL (8)  08/31/2023 02:24:06 PM  GLUCOSE 105 70-99 mg/dL H F  SODIUM 138 136-145 mmol/L  F  POTASSIUM 4.8 3.5-5.1 mmol/L  F  CHLORIDE 104  mmol/L  F  CO2 26.0 21.0-32.0 mmol/L  F  ANION  GAP 8 0-18 mmol/L  F  BUN 22 7-18 mg/dL H F  CREATININE 0.99 0.55-1.02 mg/dL  F  CALCIUM 9.8 8.5-10.1 mg/dL  F  OSMOLALITY CALCULATED 290 275-295 mOsm/kg  F  E GFR CR 62 >=60 mL/min/1.73m2  F  FASTING PATIENT BMP ANSWER No   F  MAGNESIUM  08/31/2023 02:24:06 PM  MAGNESIUM 2.1 1.6-2.6 mg/dL  F  Diagnostics Details  ACTMonitoring 09/08/2023  1.This is an excellent quality study.    2.Predominant rhythm is normal sinus rhythm.    3.The minimum heart rate recorded was 57 beats / minute. The maximum heart rate is 116 beats / minute. The mean heart rate is 74 beats / minute.    4.Second degree AV block noted.    5.Afib [place] 0%    Carotid Ultrasound 09/01/2023  1.The study quality is good.    2.60-79% stenosis in the proximal right internal carotid artery based on Bluth Criteria.    3.1-39% stenosis in the proximal left internal carotid artery based on Bluth Criteria.    4.Antegrade right vertebral artery flow.    5.Antegrade left vertebral artery flow.    Exercise Treadmill Testing 09/23/2022  1.Stress ECG negative for ischemia yet the patient demonstrated marked chronotropic incompetence in addition to intermittent 2:1 AV block with exercise. She demonstrated Wenckebach AV block in recovery.    2.Significant baseline artifact on ECG due to patient's tremors and deep brain stimulator - cannot exclude paroxysmal atrial flutter.    Exercise Myocardial Perfusion Imaging 06/30/2022  1.Stress EKG is normal.    1.This is a normal perfusion study, no perfusion defects noted.    2.The left ventricular cavity is noted to be normal on the stress study. The left ventricular ejection fraction was calculated to be 76% and left ventricular global function is normal.    CPOE Orders carried out by: Nicole Manzanares  Care Providers: Vince Cordon MD and Nicole Manzanares  Electronically Authenticated by  Vince Cordon MD  01/22/2024 04:54:49 PM  Disclaimer: Components of this note were documented using voice recognition system and are subject  to errors not corrected at proofreading. Contact the author of this note for any clarifications.

## 2024-06-24 LAB
Q-T INTERVAL: 358 MS
QRS DURATION: 82 MS
QTC CALCULATION (BEZET): 440 MS
R AXIS: 46 DEGREES
T AXIS: 58 DEGREES
VENTRICULAR RATE: 91 BPM

## 2024-06-25 ENCOUNTER — PATIENT OUTREACH (OUTPATIENT)
Dept: CASE MANAGEMENT | Age: 69
End: 2024-06-25

## 2024-06-25 NOTE — PROGRESS NOTES
1st attempt ER f/up apt request  No answer, LVMTCB to schedule apt  PCP -existing apt (7/8), unable to contact  [facility] -unable to contact  Closing encounter

## 2024-06-27 ENCOUNTER — MED REC SCAN ONLY (OUTPATIENT)
Dept: INTERNAL MEDICINE CLINIC | Facility: CLINIC | Age: 69
End: 2024-06-27

## 2024-07-07 DIAGNOSIS — F33.40 RECURRENT MAJOR DEPRESSIVE DISORDER, IN REMISSION (HCC): ICD-10-CM

## 2024-07-08 ENCOUNTER — OFFICE VISIT (OUTPATIENT)
Dept: INTERNAL MEDICINE CLINIC | Facility: CLINIC | Age: 69
End: 2024-07-08
Payer: MEDICARE

## 2024-07-08 VITALS
BODY MASS INDEX: 17.32 KG/M2 | HEART RATE: 74 BPM | HEIGHT: 62 IN | RESPIRATION RATE: 12 BRPM | SYSTOLIC BLOOD PRESSURE: 130 MMHG | DIASTOLIC BLOOD PRESSURE: 72 MMHG | WEIGHT: 94.13 LBS | TEMPERATURE: 97 F

## 2024-07-08 DIAGNOSIS — M81.0 AGE-RELATED OSTEOPOROSIS WITHOUT CURRENT PATHOLOGICAL FRACTURE: ICD-10-CM

## 2024-07-08 DIAGNOSIS — F33.40 RECURRENT MAJOR DEPRESSIVE DISORDER, IN REMISSION (HCC): ICD-10-CM

## 2024-07-08 DIAGNOSIS — I10 ESSENTIAL HYPERTENSION: Primary | ICD-10-CM

## 2024-07-08 DIAGNOSIS — G20.A1 PARKINSON'S DISEASE WITHOUT FLUCTUATING MANIFESTATIONS, UNSPECIFIED WHETHER DYSKINESIA PRESENT (HCC): ICD-10-CM

## 2024-07-08 DIAGNOSIS — E78.00 PURE HYPERCHOLESTEROLEMIA: ICD-10-CM

## 2024-07-08 DIAGNOSIS — M54.16 LUMBAR RADICULITIS: ICD-10-CM

## 2024-07-08 DIAGNOSIS — G24.3 CERVICAL DYSTONIA: ICD-10-CM

## 2024-07-08 DIAGNOSIS — J44.9 CHRONIC OBSTRUCTIVE PULMONARY DISEASE, UNSPECIFIED COPD TYPE (HCC): ICD-10-CM

## 2024-07-08 DIAGNOSIS — M48.061 SPINAL STENOSIS OF LUMBAR REGION WITHOUT NEUROGENIC CLAUDICATION: ICD-10-CM

## 2024-07-08 DIAGNOSIS — M54.16 LUMBAR RADICULOPATHY: ICD-10-CM

## 2024-07-08 DIAGNOSIS — D46.9 MDS (MYELODYSPLASTIC SYNDROME) (HCC): ICD-10-CM

## 2024-07-08 DIAGNOSIS — I48.0 PAROXYSMAL ATRIAL FIBRILLATION (HCC): ICD-10-CM

## 2024-07-08 DIAGNOSIS — Z87.19 HX OF ISCHEMIC BOWEL DISEASE: ICD-10-CM

## 2024-07-08 PROCEDURE — 99214 OFFICE O/P EST MOD 30 MIN: CPT | Performed by: INTERNAL MEDICINE

## 2024-07-08 PROCEDURE — G2211 COMPLEX E/M VISIT ADD ON: HCPCS | Performed by: INTERNAL MEDICINE

## 2024-07-08 RX ORDER — PNV NO.95/FERROUS FUM/FOLIC AC 28MG-0.8MG
TABLET ORAL DAILY
COMMUNITY
Start: 2024-07-02

## 2024-07-08 RX ORDER — HYDROCODONE BITARTRATE AND ACETAMINOPHEN 5; 325 MG/1; MG/1
1 TABLET ORAL EVERY 8 HOURS PRN
Qty: 90 TABLET | Refills: 0 | Status: SHIPPED | OUTPATIENT
Start: 2024-07-10 | End: 2024-08-09

## 2024-07-08 NOTE — PROGRESS NOTES
[facility]    CHIEF COMPLAINT:    Chief Complaint   Patient presents with    Follow - Up     Hyst. 1/26/24-mammo. 10/27/22-colon. No repeat due to anatomy is not amenable.          HISTORY OF PRESENT ILLNESS:  here for med check.   Htn: off meds sec to hypotension and vasovagal syncope. Blood pressure controlled off meds.      Hyperlipidemia: on statin. No muscle aches.      Afib: she had a syncopal episode earlier this year, thought to be sec to arrhythmia. now s/p amulet procedure. Off anticoagulation per dr. Roque. Seeing dr. Cordon for the afib next week.   Also diagnosed with shaggy aorta syndrome. Seeing cardiology.      Depression: on paroxetine. Off cymbalta. Symptoms controlled.      Copd: not on inhalers. Breathing is fine.      Osteoporosis: on reclast. Seeing dr. Dong.       Parkinson's disease: sees neuro. on primidone and gabapentin.      history of ischemic bowel: with gi bleeds. recurrent episodes. Seeing gi. Currently stable.      Mds: sees dr. Gunter. Also with history of breast cancer in 2005.      Quit smoking in 2018. Had ct lung cancer screening in 1/2024.     REVIEW OF SYSTEMS:  See HPI    Current Medications:    Current Outpatient Medications   Medication Sig Dispense Refill    Ferrous Sulfate (IRON) 325 (65 Fe) MG Oral Tab Take by mouth daily.      Probiotic Product (PROBIOTIC-10 OR) Take by mouth 2 (two) times a day.      HYDROcodone-acetaminophen 5-325 MG Oral Tab Take 1 tablet by mouth every 8 (eight) hours as needed for Pain. 90 tablet 0    atorvastatin 10 MG Oral Tab Take 1 tablet (10 mg total) by mouth daily. 90 tablet 1    PAROXETINE 20 MG Oral Tab Take 1 tablet (20 mg total) by mouth every morning. 90 tablet 0    aspirin 81 MG Oral Tab EC Take 1 tablet (81 mg total) by mouth daily. 90 tablet 1    Calcium Magnesium Zinc 333-133-5 MG Oral Tab Take 1 tablet by mouth daily.      Multiple Vitamins-Minerals (CENTRUM SILVER 50+WOMEN) Oral Tab Take 1 tablet by mouth daily.       primidone 50 MG Oral Tab Take 100 mg by mouth every morning and take 50 mg by mouth every evening.      gabapentin 300 MG Oral Cap Take by mouth. Take 600mg in the morning and 600 mg at night      Cyanocobalamin (B-12) 1000 MCG Oral Tab Take 1,000 mcg by mouth daily.         PAST MEDICAL, SOCIAL, AND FAMILY HISTORY:  Tobacco:    History   Smoking Status    Former    Types: Cigarettes   Smokeless Tobacco    Never       PHYSICAL EXAM:  /72 (BP Location: Left arm, Patient Position: Sitting, Cuff Size: adult)   Pulse 74   Temp 97 °F (36.1 °C) (Temporal)   Resp 12   Ht 5' 2\" (1.575 m)   Wt 94 lb 1.6 oz (42.7 kg)   Breastfeeding No   BMI 17.21 kg/m²    GENERAL: well developed, well nourished,in no apparent distress  LUNGS: clear to auscultation  CARDIO: RRR without murmur  GI: good BS's,no masses, HSM or tenderness  MUSCULOSKELETAL:  no edema, muscle strength normal.     DATA:  Results for orders placed or performed during the hospital encounter of 06/23/24   Basic Metabolic Panel (8)   Result Value Ref Range    Glucose 71 70 - 99 mg/dL    Sodium 143 136 - 145 mmol/L    Potassium 5.2 (H) 3.5 - 5.1 mmol/L    Chloride 112 98 - 112 mmol/L    CO2 28.0 21.0 - 32.0 mmol/L    Anion Gap 3 0 - 18 mmol/L    BUN 22 9 - 23 mg/dL    Creatinine 0.99 0.55 - 1.02 mg/dL    Calcium, Total 9.3 8.5 - 10.1 mg/dL    Calculated Osmolality 298 (H) 275 - 295 mOsm/kg    eGFR-Cr 62 >=60 mL/min/1.73m2   Troponin I (High Sensitivity)   Result Value Ref Range    Troponin I (High Sensitivity) 16 <=54 ng/L   Magnesium   Result Value Ref Range    Magnesium 2.1 1.6 - 2.6 mg/dL   EKG 12 Lead   Result Value Ref Range    Ventricular rate 140 BPM    Atrial rate  BPM    P-R Interval  ms    QRS Duration 82 ms    Q-T Interval 302 ms    QTC Calculation (Bezet) 461 ms    P Axis  degrees    R Axis 54 degrees    T Axis 75 degrees   EKG 12 Lead   Result Value Ref Range    Ventricular rate 91 BPM    Atrial rate  BPM    P-R Interval  ms    QRS Duration  82 ms    Q-T Interval 358 ms    QTC Calculation (Bezet) 440 ms    P Axis  degrees    R Axis 46 degrees    T Axis 58 degrees   RAINBOW DRAW LAVENDER   Result Value Ref Range    Hold Lavender Auto Resulted    RAINBOW DRAW LIGHT GREEN   Result Value Ref Range    Hold Lt Green Auto Resulted    RAINBOW DRAW BLUE   Result Value Ref Range    Hold Blue Auto Resulted    CBC W/ DIFFERENTIAL   Result Value Ref Range    WBC 4.8 4.0 - 11.0 x10(3) uL    RBC 3.88 3.80 - 5.30 x10(6)uL    HGB 13.5 12.0 - 16.0 g/dL    HCT 38.6 35.0 - 48.0 %    .0 150.0 - 450.0 10(3)uL    MCV 99.5 80.0 - 100.0 fL    MCH 34.8 (H) 26.0 - 34.0 pg    MCHC 35.0 31.0 - 37.0 g/dL    RDW 12.6 %    Neutrophil Absolute Prelim 2.09 1.50 - 7.70 x10 (3) uL    Neutrophil Absolute 2.09 1.50 - 7.70 x10(3) uL    Lymphocyte Absolute 1.87 1.00 - 4.00 x10(3) uL    Monocyte Absolute 0.75 0.10 - 1.00 x10(3) uL    Eosinophil Absolute 0.02 0.00 - 0.70 x10(3) uL    Basophil Absolute 0.01 0.00 - 0.20 x10(3) uL    Immature Granulocyte Absolute 0.01 0.00 - 1.00 x10(3) uL    Neutrophil % 44.0 %    Lymphocyte % 39.4 %    Monocyte % 15.8 %    Eosinophil % 0.4 %    Basophil % 0.2 %    Immature Granulocyte % 0.2 %            ASSESSMENT AND PLAN:  1. Essential hypertension  Continue meds.     2. Pure hypercholesterolemia  Continue statin.   Labs in 6 months.   - Comp Metabolic Panel (14); Future  - Lipid Panel; Future  - TSH W Reflex To Free T4; Future    3. Paroxysmal atrial fibrillation (HCC)  follow up with cardiology.   Only on aspirin for anticoagulation now.   S/p left atrial appendage closure (amulet procedure).     4. Recurrent major depressive disorder, in remission (HCC)  Continue paroxetine.     5. Chronic obstructive pulmonary disease, unspecified COPD type (HCC)  Stable. Continue current management.      6. Age-related osteoporosis without current pathological fracture  Reclast per endocrine.     7. Parkinson's disease without fluctuating manifestations,  unspecified whether dyskinesia present (HCC)  follow up with neuro as planned.    8. Hx of ischemic bowel disease  follow up with gi as planned.     9. MDS (myelodysplastic syndrome) (HCC)  follow up with hematology as planned.         Return in about 6 months (around 1/8/2025) for annual wellness visit, med check.      Lilli Kramer MD

## 2024-07-08 NOTE — TELEPHONE ENCOUNTER
Medication: HYDROcodone-acetaminophen 5-325 MG     Date of last refill: 06/10/24  [place]10/24    Last office visit: 05/13/24  Due back to clinic per last office note:  6 months  Date next office visit scheduled:  12/09/24    Last URINE Screen: 05/20/24  Screen Results:  please review in chart      Narcotic Contract EXPIRATION date: 11/13/24    Last OV note recommendation:   ASSESSMENT AND PLAN:      1. Chronic narcotic use    2. Opioid dependence, uncomplicated (HCC)    3. S/P cervical spinal fusion    4. Lumbar radiculitis          The patient is a pleasant 69-year-old female with longstanding history of chronic back pain.  This is managed with hydrocodone through office.  Patient states medications continue to provide improved function.  Denies any side effects associated with the medication.  [place] which is consistent with hydrocodone usage.  Patient due for repeat UDS which was ordered today.  Patient can follow-up in 6 months time.

## 2024-07-09 RX ORDER — PAROXETINE HYDROCHLORIDE 20 MG/1
20 TABLET, FILM COATED ORAL EVERY MORNING
Qty: 90 TABLET | Refills: 1 | Status: SHIPPED | OUTPATIENT
Start: 2024-07-09

## 2024-07-09 NOTE — TELEPHONE ENCOUNTER
Psychiatric Non-Scheduled (Anti-Anxiety) [id number]/07/2024 10:13 AM   Protocol Details In person appointment or virtual visit in the past 6 mos or appointment in next 3 mos    Depression Screening completed within the past 12 months      4. Recurrent major depressive disorder, in remission (HCC)  Continue paroxetine.   Return in about 6 months (around 1/8/2025) for annual wellness visit, med check.     Future Appointments   Date Time Provider Department Center   7/10/2024  1:00 PM  OOT [facility]   7/11/2024 10:00 AM Jaclyn Dong DO [id number] [facility]   7/20/2024  1:00 PM  CT MAIN RM1  CT [facility]   11/11/2024  1:00 PM Carol Del Angel APRN SGINP [facility]   12/9/2024  1:00 PM Gustavo Zhao MD ENIPain [facility]   1/8/2025  1:00 PM Sharona Gunter MD [facility]

## 2024-07-10 ENCOUNTER — NURSE ONLY (OUTPATIENT)
Dept: HEMATOLOGY/ONCOLOGY | Facility: HOSPITAL | Age: 69
End: 2024-07-10
Attending: INTERNAL MEDICINE
Payer: MEDICARE

## 2024-07-10 DIAGNOSIS — D50.0 IRON DEFICIENCY ANEMIA DUE TO CHRONIC BLOOD LOSS: ICD-10-CM

## 2024-07-10 LAB
BASOPHILS # BLD AUTO: 0.02 X10(3) UL (ref 0–0.2)
BASOPHILS NFR BLD AUTO: 0.5 %
DEPRECATED HBV CORE AB SER IA-ACNC: 154.3 NG/ML
EOSINOPHIL # BLD AUTO: 0.02 X10(3) UL (ref 0–0.7)
EOSINOPHIL NFR BLD AUTO: 0.5 %
ERYTHROCYTE [DISTWIDTH] IN BLOOD BY AUTOMATED COUNT: 12.8 %
HCT VFR BLD AUTO: 39.5 %
HGB BLD-MCNC: 13.5 G/DL
IMM GRANULOCYTES # BLD AUTO: 0.01 X10(3) UL (ref 0–1)
IMM GRANULOCYTES NFR BLD: 0.2 %
IRON SATN MFR SERPL: 41 %
IRON SERPL-MCNC: 126 UG/DL
LYMPHOCYTES # BLD AUTO: 1.44 X10(3) UL (ref 1–4)
LYMPHOCYTES NFR BLD AUTO: 33.6 %
MCH RBC QN AUTO: 34.8 PG (ref 26–34)
MCHC RBC AUTO-ENTMCNC: 34.2 G/DL (ref 31–37)
MCV RBC AUTO: 101.8 FL
MONOCYTES # BLD AUTO: 0.52 X10(3) UL (ref 0.1–1)
MONOCYTES NFR BLD AUTO: 12.1 %
NEUTROPHILS # BLD AUTO: 2.28 X10 (3) UL (ref 1.5–7.7)
NEUTROPHILS # BLD AUTO: 2.28 X10(3) UL (ref 1.5–7.7)
NEUTROPHILS NFR BLD AUTO: 53.1 %
PLATELET # BLD AUTO: 193 10(3)UL (ref 150–450)
RBC # BLD AUTO: 3.88 X10(6)UL
TIBC SERPL-MCNC: 307 UG/DL (ref 240–450)
TRANSFERRIN SERPL-MCNC: 206 MG/DL (ref 200–360)
WBC # BLD AUTO: 4.3 X10(3) UL (ref 4–11)

## 2024-07-10 PROCEDURE — 36415 COLL VENOUS BLD VENIPUNCTURE: CPT

## 2024-07-10 PROCEDURE — 85025 COMPLETE CBC W/AUTO DIFF WBC: CPT

## 2024-07-10 PROCEDURE — 83550 IRON BINDING TEST: CPT

## 2024-07-10 PROCEDURE — 83540 ASSAY OF IRON: CPT

## 2024-07-10 PROCEDURE — 82728 ASSAY OF FERRITIN: CPT

## 2024-07-11 ENCOUNTER — OFFICE VISIT (OUTPATIENT)
Facility: CLINIC | Age: 69
End: 2024-07-11
Payer: MEDICARE

## 2024-07-11 VITALS
BODY MASS INDEX: 16.93 KG/M2 | SYSTOLIC BLOOD PRESSURE: 112 MMHG | DIASTOLIC BLOOD PRESSURE: 68 MMHG | WEIGHT: 92 LBS | HEIGHT: 62 IN

## 2024-07-11 DIAGNOSIS — M81.0 AGE-RELATED OSTEOPOROSIS WITHOUT CURRENT PATHOLOGICAL FRACTURE: Primary | ICD-10-CM

## 2024-07-11 PROCEDURE — 99213 OFFICE O/P EST LOW 20 MIN: CPT | Performed by: STUDENT IN AN ORGANIZED HEALTH CARE EDUCATION/TRAINING PROGRAM
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
## 2024-07-11 NOTE — PATIENT INSTRUCTIONS
Return Visit   [ x ] Physician in 6 months   [  ] In person or video visit  [  ] In person only    [ x ] After visit summary   [ x ] Placed labs/imaging. Labs are to be drawn at 8A and fasting.   [ x ] Central scheduling # for ultrasound/nuclear med/CT/MRI/DXA      -Please complete your bone density after 1/12/2025 and your labs prior to our follow up visit in the middle of January   -Please bring your dose of calcium and vitamin D to your follow up appointment     Take care!  -Dr. Dong

## 2024-07-20 ENCOUNTER — HOSPITAL ENCOUNTER (OUTPATIENT)
Dept: CT IMAGING | Facility: HOSPITAL | Age: 69
Discharge: HOME | End: 2024-07-20
Attending: STUDENT IN AN ORGANIZED HEALTH CARE EDUCATION/TRAINING PROGRAM
Payer: MEDICARE

## 2024-07-20 DIAGNOSIS — R63.4 WEIGHT LOSS: ICD-10-CM

## 2024-07-20 DIAGNOSIS — R10.32 LLQ PAIN: ICD-10-CM

## 2024-07-20 DIAGNOSIS — K55.1 CHRONIC MESENTERIC ISCHEMIA (HCC): ICD-10-CM

## 2024-07-20 PROCEDURE — 74177 CT ABD & PELVIS W/CONTRAST: CPT | Performed by: STUDENT IN AN ORGANIZED HEALTH CARE EDUCATION/TRAINING PROGRAM

## 2024-07-22 DIAGNOSIS — Z87.891 PERSONAL HISTORY OF NICOTINE DEPENDENCE: ICD-10-CM

## 2024-07-22 DIAGNOSIS — Z12.2 ENCOUNTER FOR SCREENING FOR LUNG CANCER: Primary | ICD-10-CM

## 2024-08-07 DIAGNOSIS — G24.3 CERVICAL DYSTONIA: ICD-10-CM

## 2024-08-07 DIAGNOSIS — M48.061 SPINAL STENOSIS OF LUMBAR REGION WITHOUT NEUROGENIC CLAUDICATION: ICD-10-CM

## 2024-08-07 DIAGNOSIS — M54.16 LUMBAR RADICULITIS: ICD-10-CM

## 2024-08-07 DIAGNOSIS — M54.16 LUMBAR RADICULOPATHY: ICD-10-CM

## 2024-08-07 RX ORDER — HYDROCODONE BITARTRATE AND ACETAMINOPHEN 5; 325 MG/1; MG/1
1 TABLET ORAL EVERY 8 HOURS PRN
Qty: 90 TABLET | Refills: 0 | Status: SHIPPED | OUTPATIENT
Start: 2024-08-09 | End: 2024-09-08

## 2024-08-07 NOTE — TELEPHONE ENCOUNTER
Medication: Hydrocodone-acetaminophen 5-325mg oral tab    Date of last refill: 7/10/24  [place]10/24    Last office visit: 5/13/24  Due back to clinic per last office note:  11/13/24  Date next office visit scheduled:  12/9/24    Last URINE Screen: 5/14/24  Screen Results:  No comments provided within results      Narcotic Contract EXPIRATION date: 11/13/24    Last OV note recommendation:   ASSESSMENT AND PLAN:      1. Chronic narcotic use    2. Opioid dependence, uncomplicated (HCC)    3. S/P cervical spinal fusion    4. Lumbar radiculitis          The patient is a pleasant 69-year-old female with longstanding history of chronic back pain.  This is managed with hydrocodone through office.  Patient states medications continue to provide improved function.  Denies any side effects associated with the medication.  [place] which is consistent with hydrocodone usage.  Patient due for repeat UDS which was ordered today.  Patient can follow-up in 6 months time.

## 2024-09-05 DIAGNOSIS — M54.16 LUMBAR RADICULITIS: ICD-10-CM

## 2024-09-05 DIAGNOSIS — G24.3 CERVICAL DYSTONIA: ICD-10-CM

## 2024-09-05 DIAGNOSIS — M48.061 SPINAL STENOSIS OF LUMBAR REGION WITHOUT NEUROGENIC CLAUDICATION: ICD-10-CM

## 2024-09-05 DIAGNOSIS — M54.16 LUMBAR RADICULOPATHY: ICD-10-CM

## 2024-09-05 RX ORDER — HYDROCODONE BITARTRATE AND ACETAMINOPHEN 5; 325 MG/1; MG/1
1 TABLET ORAL EVERY 8 HOURS PRN
Qty: 90 TABLET | Refills: 0 | Status: SHIPPED | OUTPATIENT
Start: 2024-09-08 | End: 2024-10-08

## 2024-09-05 NOTE — TELEPHONE ENCOUNTER
Medication:   HYDROcodone-acetaminophen 5-325 MG Oral Tab     Date of last refill: 8/9/24  [place]10/24    Last office visit: 5/13/24  Due back to clinic per last office note:  6 month  Date next office visit scheduled:  12/9/24    Last URINE Screen: 5/14/24  Screen Results:  SEEN      Narcotic Contract EXPIRATION date: 11/13/24    Last OV note recommendation:   ASSESSMENT AND PLAN:      1. Chronic narcotic use    2. Opioid dependence, uncomplicated (HCC)    3. S/P cervical spinal fusion    4. Lumbar radiculitis          The patient is a pleasant 69-year-old female with longstanding history of chronic back pain.  This is managed with hydrocodone through office.  Patient states medications continue to provide improved function.  Denies any side effects associated with the medication.  [place] which is consistent with hydrocodone usage.  Patient due for repeat UDS which was ordered today.  Patient can follow-up in 6 months time.     Orders:      Orders Placed This Encounter   Procedures    Pain Management Drug Panel, Urine            Radiology orders and consultations:None  The patient indicates understanding of these issues and agrees to the plan.  No follow-ups on file.     Gustavo Zhao MD, 5/13/2024, 1:51 PM

## 2024-09-24 ENCOUNTER — HOSPITAL ENCOUNTER (EMERGENCY)
Facility: HOSPITAL | Age: 69
Discharge: HOME | End: 2024-09-24
Attending: EMERGENCY MEDICINE
Payer: MEDICARE

## 2024-09-24 VITALS
DIASTOLIC BLOOD PRESSURE: 98 MMHG | SYSTOLIC BLOOD PRESSURE: 145 MMHG | WEIGHT: 98 LBS | BODY MASS INDEX: 18.03 KG/M2 | HEIGHT: 62 IN | HEART RATE: 97 BPM | RESPIRATION RATE: 14 BRPM | OXYGEN SATURATION: 97 % | TEMPERATURE: 97 F

## 2024-09-24 DIAGNOSIS — U07.1 COVID-19: Primary | ICD-10-CM

## 2024-09-24 DIAGNOSIS — G43.809 OTHER MIGRAINE WITHOUT STATUS MIGRAINOSUS, NOT INTRACTABLE: ICD-10-CM

## 2024-09-24 LAB
ALBUMIN SERPL-MCNC: 4.9 G/DL (ref 3.2–4.8)
ALBUMIN/GLOB SERPL: 1.6 {RATIO} (ref 1–2)
ALP LIVER SERPL-CCNC: 63 U/L
ALT SERPL-CCNC: 20 U/L
ANION GAP SERPL CALC-SCNC: 8 MMOL/L (ref 0–18)
AST SERPL-CCNC: 22 U/L (ref ?–34)
BASOPHILS # BLD AUTO: 0.01 X10(3) UL (ref 0–0.2)
BASOPHILS NFR BLD AUTO: 0.2 %
BILIRUB SERPL-MCNC: 0.4 MG/DL (ref 0.2–1.1)
BUN BLD-MCNC: 13 MG/DL (ref 9–23)
CALCIUM BLD-MCNC: 10.6 MG/DL (ref 8.7–10.4)
CHLORIDE SERPL-SCNC: 103 MMOL/L (ref 98–112)
CO2 SERPL-SCNC: 29 MMOL/L (ref 21–32)
CREAT BLD-MCNC: 0.94 MG/DL
EGFRCR SERPLBLD CKD-EPI 2021: 66 ML/MIN/1.73M2 (ref 60–?)
EOSINOPHIL # BLD AUTO: 0.03 X10(3) UL (ref 0–0.7)
EOSINOPHIL NFR BLD AUTO: 0.5 %
ERYTHROCYTE [DISTWIDTH] IN BLOOD BY AUTOMATED COUNT: 12.3 %
FLUAV + FLUBV RNA SPEC NAA+PROBE: NEGATIVE
FLUAV + FLUBV RNA SPEC NAA+PROBE: NEGATIVE
GLOBULIN PLAS-MCNC: 3 G/DL (ref 2–3.5)
GLUCOSE BLD-MCNC: 209 MG/DL (ref 70–99)
HCT VFR BLD AUTO: 40.1 %
HGB BLD-MCNC: 14 G/DL
IMM GRANULOCYTES # BLD AUTO: 0.01 X10(3) UL (ref 0–1)
IMM GRANULOCYTES NFR BLD: 0.2 %
LYMPHOCYTES # BLD AUTO: 0.74 X10(3) UL (ref 1–4)
LYMPHOCYTES NFR BLD AUTO: 13.5 %
MCH RBC QN AUTO: 35.4 PG (ref 26–34)
MCHC RBC AUTO-ENTMCNC: 34.9 G/DL (ref 31–37)
MCV RBC AUTO: 101.3 FL
MONOCYTES # BLD AUTO: 0.57 X10(3) UL (ref 0.1–1)
MONOCYTES NFR BLD AUTO: 10.4 %
NEUTROPHILS # BLD AUTO: 4.12 X10 (3) UL (ref 1.5–7.7)
NEUTROPHILS # BLD AUTO: 4.12 X10(3) UL (ref 1.5–7.7)
NEUTROPHILS NFR BLD AUTO: 75.2 %
OSMOLALITY SERPL CALC.SUM OF ELEC: 296 MOSM/KG (ref 275–295)
PLATELET # BLD AUTO: 188 10(3)UL (ref 150–450)
POTASSIUM SERPL-SCNC: 4.2 MMOL/L (ref 3.5–5.1)
PROT SERPL-MCNC: 7.9 G/DL (ref 5.7–8.2)
RBC # BLD AUTO: 3.96 X10(6)UL
RSV RNA SPEC NAA+PROBE: NEGATIVE
SARS-COV-2 RNA RESP QL NAA+PROBE: DETECTED
SODIUM SERPL-SCNC: 140 MMOL/L (ref 136–145)
WBC # BLD AUTO: 5.5 X10(3) UL (ref 4–11)

## 2024-09-24 PROCEDURE — 0241U SARS-COV-2/FLU A AND B/RSV BY PCR (GENEXPERT): CPT | Performed by: EMERGENCY MEDICINE

## 2024-09-24 PROCEDURE — 85025 COMPLETE CBC W/AUTO DIFF WBC: CPT

## 2024-09-24 PROCEDURE — 96375 TX/PRO/DX INJ NEW DRUG ADDON: CPT

## 2024-09-24 PROCEDURE — 85025 COMPLETE CBC W/AUTO DIFF WBC: CPT | Performed by: EMERGENCY MEDICINE

## 2024-09-24 PROCEDURE — 99284 EMERGENCY DEPT VISIT MOD MDM: CPT

## 2024-09-24 PROCEDURE — 80053 COMPREHEN METABOLIC PANEL: CPT | Performed by: EMERGENCY MEDICINE

## 2024-09-24 PROCEDURE — 0241U SARS-COV-2/FLU A AND B/RSV BY PCR (GENEXPERT): CPT

## 2024-09-24 PROCEDURE — 80053 COMPREHEN METABOLIC PANEL: CPT

## 2024-09-24 PROCEDURE — 96361 HYDRATE IV INFUSION ADD-ON: CPT

## 2024-09-24 PROCEDURE — 96376 TX/PRO/DX INJ SAME DRUG ADON: CPT

## 2024-09-24 PROCEDURE — 96374 THER/PROPH/DIAG INJ IV PUSH: CPT

## 2024-09-24 RX ORDER — ONDANSETRON 4 MG/1
4 TABLET, ORALLY DISINTEGRATING ORAL EVERY 4 HOURS PRN
Qty: 10 TABLET | Refills: 0 | Status: SHIPPED | OUTPATIENT
Start: 2024-09-24 | End: 2024-10-01

## 2024-09-24 RX ORDER — DIPHENHYDRAMINE HYDROCHLORIDE 50 MG/ML
25 INJECTION INTRAMUSCULAR; INTRAVENOUS ONCE
Status: COMPLETED | OUTPATIENT
Start: 2024-09-24 | End: 2024-09-24

## 2024-09-24 RX ORDER — HYDROMORPHONE HYDROCHLORIDE 1 MG/ML
0.5 INJECTION, SOLUTION INTRAMUSCULAR; INTRAVENOUS; SUBCUTANEOUS ONCE
Status: COMPLETED | OUTPATIENT
Start: 2024-09-24 | End: 2024-09-24

## 2024-09-24 RX ORDER — METOCLOPRAMIDE HYDROCHLORIDE 5 MG/ML
5 INJECTION INTRAMUSCULAR; INTRAVENOUS ONCE
Status: COMPLETED | OUTPATIENT
Start: 2024-09-24 | End: 2024-09-24

## 2024-09-24 RX ORDER — KETOROLAC TROMETHAMINE 15 MG/ML
15 INJECTION, SOLUTION INTRAMUSCULAR; INTRAVENOUS ONCE
Status: COMPLETED | OUTPATIENT
Start: 2024-09-24 | End: 2024-09-24

## 2024-09-24 NOTE — DISCHARGE INSTRUCTIONS
Please start taking vitamin C, vitamin D, and zinc.  Start taking an enteric-coated aspirin 325 mg daily to prevent clots which have unfortunately been common with coronavirus  Please get an incentive spirometer 1 can be obtained from your local [employer] supply store or even [employer].  Use the incentive spirometer at least 4 times daily following the instructions by breathing in the air as forcefully as possible to expand the lung fields and working to breathe and more air each time.  If you feel short of breath please come to the emergency department.  Please understand that coronavirus is a virus that lasts an exceptionally long period of time, most people begin to improve before day 10 however, people can become very ill even after that.  Please be vigilant with your symptoms, alternate Tylenol and ibuprofen, drink plenty of fluids, give yourself time to rest.    Wearing a mask will protect you and others please continue to do so even if you tested negative for COVID-19.  There is a high false-negative rate associated with most tests so please do not go out if you are feeling ill

## 2024-09-24 NOTE — ED INITIAL ASSESSMENT (HPI)
Pt ambulatory to ED with HA and nausea that started at 0200. Denies blurry/double vision. Denies fevers. Denies abd pain. Pt reports taking norco 5 at 1000.  Pt reports last time with these symptoms pt tested +covid. Pt a/ox4.

## 2024-09-24 NOTE — ED PROVIDER NOTES
Patient Seen in: [facility] Emergency Department      History     Chief Complaint   Patient presents with    Nausea/Vomiting/Diarrhea    Headache     Stated Complaint: nausea, headache since last night    Subjective:   HPI    68 yo awoke overnight at 2 am with a headache- states this headache feels just like the one she had when she had OCVID last and it improved with a migraine cocktail- patietn and  flew home from [place] for 10 days recently   She has a Deep brain stimulator in place and hx of breast cancer and cervical dystonia and hx of atrial flutter that is paroxysmal- also with Parkinson's disease  - needs all the medications she currently takes including primidone- no fevers yet    The patient presents to the emergency department with all of these issues but states that her headache feels just like it did when she had COVID and she is afraid she may have it again.    Objective:   Past Medical History:    Abdominal pain    left sided intermittent pain    Age-related osteoporosis without current pathological fracture    Anemia    Anxiety    Anxiety    Arrhythmia    afib    Arthritis    Atrial flutter, paroxysmal (HCC)    Back pain    Back problem    Benign essential tremor    Bowel obstruction (HCC)    Breast cancer (HCC)    Broken ankle    Left Ankle    Cancer (HCC)    Right breast    Cervical dystonia    DBS b/l    Cervical spine degeneration    Change in hair    Colitis    Ischemic Colitis    Constipation    Depression    Difficult intubation    big bony features in the bottom of mouth     Easy bruising    Endometriosis    Essential hypertension    Exposure to medical diagnostic radiation    Fatigue    ANEMIA.  I GET INFUSIONS    Frequent use of laxatives    Gastrointestinal hemorrhage    GI bleed    Heart palpitations    AFIB    High blood pressure    High cholesterol    History of blood transfusion    Jan 2018    History of breast cancer    XRT/Chemo    History of  depression    History of GI bleed    Ischemic bowel    Hoarseness, chronic    Hyperglycemia    Hyperlipidemia    Hyponatremia    Hypotension due to hypovolemia    Impacted cerumen of left ear    Intestinal infection due to Clostridium difficile        Irregular bowel habits    Ischemic colitis (HCC)        Lacunar infarction (HCC)    Nausea with vomiting    Neuropathy    Osteoarthritis    Osteoarthrosis, unspecified whether generalized or localized, unspecified site    Pacemaker    Pain in joints    Pain with bowel movements    ISCHEMIC COLITIS    Panic disorder    Parkinson disease (HCC)    Personal history of antineoplastic chemotherapy    Personal history of endometriosis    Plaque psoriasis    Presence of other cardiac implants and grafts    Psoriatic arthropathy (HCC)    Recurrent major depressive disorder, in remission (HCC)    Rhinovirus    Sleep disturbance    Status post deep brain stimulator placement    Stented coronary artery    Stress    Tobacco dependence    Quit     Uncomfortable fullness after meals    Vaso vagal episode    Weight loss              Past Surgical History:   Procedure Laterality Date    Angioplasty (coronary)      Appendectomy      Appendectomy      Back surgery      NECK AT LOWER LUMBAR FUSED    Brain surgery  2016    Brain stimulator           x3    Cardiac pacemaker placement      Cervical spine surgery  2004    cervical spinal fusion C1-6    Chemotherapy      Colonoscopy  2014    Procedure: COLONOSCOPY;  Surgeon: Aaron Fair MD;  Location:  ENDOSCOPY    Colonoscopy N/A 2018    Procedure: COLONOSCOPY;  Surgeon: Aaron Fair MD;  Location:  ENDOSCOPY    D & c      Hysterectomy  1992    OVARIES REMOVED AS WELL    Lumbar spine surgery  1999    lumbar laminectomy L1-L4    Lumpectomy right  2002    Oophorectomy      Other      COLON STENTS    Other surgical history      Lt bunionectomy    Other surgical history      stent  in [facility]    Pain management Bilateral 06/15/2021    BILATERAL LUMBAR 5 TRANSFORAMINAL LUMBAR EPIDURAL STEROID INJECTION    Radiation right  2002    Sigmoidoscopy,diagnostic      Spine surgery procedure unlisted      Total abdom hysterectomy      Tubal ligation      Upper gi endoscopy,biopsy      ulcer, at [facility]                Social History     Socioeconomic History    Marital status:    Tobacco Use    Smoking status: Former     Current packs/day: 0.00     Average packs/day: 0.5 packs/day for 36.0 years (18.0 ttl pk-yrs)     Types: Cigarettes     Start date: 1982     Quit date: 2018     Years since quittin.7     Passive exposure: Past    Smokeless tobacco: Never   Vaping Use    Vaping status: Never Used   Substance and Sexual Activity    Alcohol use: Yes     Alcohol/week: 14.0 standard drinks of alcohol     Types: 14 Cans of beer per week     Comment: approx0- 2 beers per day    Drug use: No    Sexual activity: Not Currently   Other Topics Concern    Caffeine Concern No    Exercise No    Seat Belt No    Special Diet Yes     Comment: HOW TO PUT ON WEIGHT    Stress Concern No    Weight Concern Yes     Comment: CAN'T PUT ON WEIGHT     Social Determinants of Health     Financial Resource Strain: Low Risk  (2024)    Received from [facility]    Overall Financial Resource Strain (CARDIA)     Difficulty of Paying Living Expenses: Not hard at all   Food Insecurity: No Food Insecurity (2024)    Received from [facility]    Hunger Vital Sign     Worried About Running Out of Food in the Last Year: Never true     Ran Out of Food in the Last Year: Never true   Transportation Needs: No Transportation Needs (2024)    Received from [facility]    PRAPARE - Transportation     Lack of Transportation (Medical): No     Lack of Transportation (Non-Medical): No    [facility], [facility]     Social Connections   Housing Stability: High Risk (6/11/2024)    Received from [facility]    Housing Stability Vital Sign     Unable to Pay for Housing in the Last Year: No     Number of Places Lived in the Last Year: 1     Unstable Housing in the Last Year: Yes              Review of Systems    Positive for stated Chief Complaint: Nausea/Vomiting/Diarrhea and Headache    Other systems are as noted in HPI.  Constitutional and vital signs reviewed.      All other systems reviewed and negative except as noted above.    Physical Exam     ED Triage Vitals [09/24/24 1357]   /88   Pulse 109   Resp 19   Temp 97.4 °F (36.3 °C)   Temp src Temporal   SpO2 95 %   O2 Device None (Room air)       Current Vitals:   No data recorded          Physical Exam  Vitals and nursing note reviewed.   Constitutional:       General: She is not in acute distress.     Appearance: She is well-developed. She is not diaphoretic.   HENT:      Head: Atraumatic.      Right Ear: External ear normal.      Left Ear: External ear normal.      Nose: Nose normal.   Eyes:      General: No scleral icterus.        Right eye: No discharge.         Left eye: No discharge.      Conjunctiva/sclera: Conjunctivae normal.      Pupils: Pupils are equal, round, and reactive to light.   Neck:      Vascular: No JVD.   Cardiovascular:      Rate and Rhythm: Normal rate and regular rhythm.      Heart sounds: Normal heart sounds. No murmur heard.     No friction rub. No gallop.   Pulmonary:      Effort: Pulmonary effort is normal. No respiratory distress.      Breath sounds: Normal breath sounds. No stridor. No wheezing.   Chest:      Chest wall: No tenderness.   Abdominal:      General: Bowel sounds are normal. There is no distension.      Palpations: Abdomen is soft.      Tenderness: There is no abdominal tenderness. There is no guarding or rebound.   Musculoskeletal:         General: No tenderness or deformity. Normal range of motion.       Cervical back: Normal range of motion and neck supple.   Lymphadenopathy:      Cervical: No cervical adenopathy.   Skin:     General: Skin is warm and dry.      Coloration: Skin is not pale.      Findings: No erythema or rash.   Neurological:      Mental Status: She is alert and oriented to person, place, and time.      Cranial Nerves: No cranial nerve deficit.      Coordination: Coordination normal.   Psychiatric:         Behavior: Behavior normal.         Judgment: Judgment normal.         Very thin pleasant 69-year-old woman lying on an emergency department bed noted to have    ED Course     Labs Reviewed   CBC WITH DIFFERENTIAL WITH PLATELET - Abnormal; Notable for the following components:       Result Value    .3 (*)     MCH 35.4 (*)     Lymphocyte Absolute 0.74 (*)     All other components within normal limits   COMP METABOLIC PANEL (14) - Abnormal; Notable for the following components:    Glucose 209 (*)     Calcium, Total 10.6 (*)     Calculated Osmolality 296 (*)     Albumin 4.9 (*)     All other components within normal limits   SARS-COV-2/FLU A AND B/RSV BY PCR (GENEXPERT) - Abnormal; Notable for the following components:    SARS-CoV-2 (COVID-19) - (GeneXpert) Detected (*)     All other components within normal limits    Narrative:     This test is intended for the qualitative detection and differentiation of SARS-CoV-2, influenza A, influenza B, and respiratory syncytial virus (RSV) viral RNA in nasopharyngeal or nares swabs from individuals suspected of respiratory viral infection consistent with COVID-19 by their healthcare provider. Signs and symptoms of respiratory viral infection due to SARS-CoV-2, influenza, and RSV can be similar.    Test performed using the Xpert Xpress SARS-CoV-2/FLU/RSV (real time RT-PCR)  assay on the GeneXpert instrument, [employer], [place] [address].   This test is being used under the Food and Drug Administration's Emergency Use Authorization.    The authorized Fact  Sheet for Healthcare Providers for this assay is available upon request from the laboratory.   RAINBOW DRAW LAVENDER   RAINBOW DRAW LIGHT GREEN             Consider getting a CT of the brain to rule out acute intracranial hemorrhage or mass occupying lesion but patient quite insistent that this headache feels exactly like it did when she had COVID in the past.  She is awake alert and oriented she does not take significant blood thinners other than a baby aspirin, she has been significantly stabilized with significant medical problems over time on primidone she does have a deep brain stimulator for Parkinson's, she is accompanied to the emergency department by her  denies any significant trauma.         MDM      Nasal swab and immediate blood work does reveal the patient is in fact correct she does have COVID-19.  On physical exam she does not seem to have any symptoms of an intracranial hemorrhage, recent stroke, or severe illness.  She was given a migraine cocktail which has been helpful in the past.  Following the migraine cocktail she did feel improved.  She is going to continue at home with pain medication.  I discussed with pharmacy as I do want this patient treated with antiretroviral medications but she cannot take Paxlovid so we called around to some pharmacies to find molnupiravir and I do think we are able to find it.  She and her  will be discharged to  the new Provera and Zofran at the pharmacy she was invited to return should her symptoms worsen.  She expressed understanding.  She states that other than the headache she really does not have any significant issues until the very end of the emergency department stay and she states that she does feel she is suddenly began to feel a bit congested.                                   Medical Decision Making      Disposition and Plan     Clinical Impression:  1. COVID-19    2. Other migraine without status migrainosus, not intractable          Disposition:  Discharge  9/24/2024  6:04 pm    Follow-up:  No follow-up provider specified.        Medications Prescribed:  Discharge Medication List as of 9/24/2024  6:07 PM        START taking these medications    Details   molnupiravir 200 MG Oral capsule Take 800 mg by mouth every 12 (twelve) hours for 5 days., Normal, Disp-40 capsule, R-0      ondansetron 4 MG Oral Tablet Dispersible Take 1 tablet (4 mg total) by mouth every 4 (four) hours as needed for Nausea., Normal, Disp-10 tablet, R-0

## 2024-09-26 ENCOUNTER — TELEPHONE (OUTPATIENT)
Dept: PAIN CLINIC | Facility: CLINIC | Age: 69
End: 2024-09-26

## 2024-09-26 DIAGNOSIS — G24.3 CERVICAL DYSTONIA: ICD-10-CM

## 2024-09-26 DIAGNOSIS — M54.16 LUMBAR RADICULITIS: ICD-10-CM

## 2024-09-26 DIAGNOSIS — M48.061 SPINAL STENOSIS OF LUMBAR REGION WITHOUT NEUROGENIC CLAUDICATION: ICD-10-CM

## 2024-09-26 DIAGNOSIS — M54.16 LUMBAR RADICULOPATHY: ICD-10-CM

## 2024-09-26 RX ORDER — HYDROCODONE BITARTRATE AND ACETAMINOPHEN 5; 325 MG/1; MG/1
1 TABLET ORAL EVERY 8 HOURS PRN
Qty: 90 TABLET | Refills: 0 | Status: SHIPPED | OUTPATIENT
Start: 2024-09-30 | End: 2024-10-30

## 2024-09-26 RX ORDER — HYDROCODONE BITARTRATE AND ACETAMINOPHEN 5; 325 MG/1; MG/1
1 TABLET ORAL EVERY 8 HOURS PRN
Qty: 90 TABLET | Refills: 0 | Status: SHIPPED | OUTPATIENT
Start: 2024-10-08 | End: 2024-09-26

## 2024-09-26 NOTE — ADDENDUM NOTE
Addended by: JEANNA CHAIREZ on: 9/26/2024 03:38 PM     Modules accepted: Orders     pt seen and examined w Dr Brock  booked for OR tomorrow, FB, tracheostomy exchange  d/w pulmonologist and NP  care per primary team    Anna ACOSTA [id number]

## 2024-09-26 NOTE — TELEPHONE ENCOUNTER
Ander Hong  Signed 3:17 PM     Copy     Patient is calling stating that Doctor told her to not refill her medication before October 8th for  HYDROcodone-acetaminophen 5-325 MG Oral Tab will. The patient is stating that she will be out of town starting the 1st of October and will need a refill before she departs for vacation. She is asking for the Doctors permission to submit an early refill. Patient stated its ok to call back if there are any questions at telephone number [phone].        Please see  msg dated 8/19/2024. Pt asked for an early refill at that time and Dr. Zhao agreed.     Contacted [place] to cxl script. Spoke to pharmacist who has cxl'd script.     Pended a new script w/ 9/30/24 as the dispense date w/ a note to pharmacy \"ok to dispense early d/t travel\" and included the Nov due date in pt sig.

## 2024-09-26 NOTE — TELEPHONE ENCOUNTER
Patient is calling stating that Doctor told her to not refill her medication before October 8th for  HYDROcodone-acetaminophen 5-325 MG Oral Tab will. The patient is stating that she will be out of town starting the 1st of October and will need a refill before she departs for vacation. She is asking for the Doctors permission to submit an early refill. Patient stated its ok to call back if there are any questions at telephone number [phone].

## 2024-09-26 NOTE — TELEPHONE ENCOUNTER
Medication: HYDROcodone-acetaminophen 5-325 MG     Date of last refill: 09/08/24  [place]08[place]24    Last office visit: 05/13/24  Due back to clinic per last office note:  6 months  Date next office visit scheduled:  12/09/24    Last URINE Screen: 05/20/24  Screen Results:  please review in chart      Narcotic Contract EXPIRATION date: 11/13/24    Last OV note recommendation:   ASSESSMENT AND PLAN:      1. Chronic narcotic use    2. Opioid dependence, uncomplicated (HCC)    3. S/P cervical spinal fusion    4. Lumbar radiculitis          The patient is a pleasant 69-year-old female with longstanding history of chronic back pain.  This is managed with hydrocodone through office.  Patient states medications continue to provide improved function.  Denies any side effects associated with the medication.  [place] which is consistent with hydrocodone usage.  Patient due for repeat UDS which was ordered today.  Patient can follow-up in 6 months time.

## 2024-10-01 ENCOUNTER — TELEPHONE (OUTPATIENT)
Dept: INTERNAL MEDICINE CLINIC | Facility: CLINIC | Age: 69
End: 2024-10-01

## 2024-10-01 NOTE — TELEPHONE ENCOUNTER
Patient's  drop off parking placard renewal for Dr. Kramer to sign.     Given to Dr. Kramer to sign

## 2024-10-01 NOTE — TELEPHONE ENCOUNTER
Pt had already pre filled the form but incorrectly. New form filled for pt. Please stamp and complete the front as applicable. Okay to mail or they can pick it up. Please give the original that  brought back to them as well.

## 2024-10-02 NOTE — TELEPHONE ENCOUNTER
Rest of form completed. Pt will need to sign front of form. Pt and  on [employer] informed and verbalized understanding. They are on their way to a trip and wont be back until later in October, they requested form be mailed to their home address. Copy made and sent for scanning. Originals mailed to pt.

## 2024-10-07 ENCOUNTER — TELEPHONE (OUTPATIENT)
Dept: CARDIOLOGY CLINIC | Facility: HOSPITAL | Age: 69
End: 2024-10-07

## 2024-11-08 DIAGNOSIS — E78.00 PURE HYPERCHOLESTEROLEMIA: ICD-10-CM

## 2024-11-11 RX ORDER — ATORVASTATIN CALCIUM 10 MG/1
10 TABLET, FILM COATED ORAL DAILY
Qty: 90 TABLET | Refills: 0 | Status: SHIPPED | OUTPATIENT
Start: 2024-11-11 | End: 2025-01-09 | Stop reason: DRUGHIGH

## 2024-11-11 NOTE — TELEPHONE ENCOUNTER
Cholesterol Medication Protocol [id number]/08/2024 03:57 AM   Protocol Details ALT < 80    ALT resulted within past year    Lipid panel within past 12 months    In person appointment or virtual visit in the past 12 mos or appointment in next 3 mos        2. Pure hypercholesterolemia  Continue statin.   Labs in 6 months.   - Comp Metabolic Panel (14); Future  - Lipid Panel; Future  - TSH W Reflex To Free T4; Future    Lov7/8     Return in about 6 months (around 1/8/2025) for annual wellness visit, med check.    Future Appointments   Date Time Provider Department Center   11/11/2024  1:00 PM Carol Del Angel APRN [facility]   11/14/2024 10:40 AM Kenny Sunshine,  [facility]   12/9/2024  1:00 PM Gustavo Zhao MD [facility]   1/15/2025  1:00 PM Sharona Gunter MD  [facility]

## 2024-11-14 ENCOUNTER — TELEPHONE (OUTPATIENT)
Dept: ORTHOPEDICS CLINIC | Facility: CLINIC | Age: 69
End: 2024-11-14

## 2024-11-14 ENCOUNTER — OFFICE VISIT (OUTPATIENT)
Dept: ORTHOPEDICS CLINIC | Facility: CLINIC | Age: 69
End: 2024-11-14
Payer: MEDICARE

## 2024-11-14 VITALS — HEIGHT: 62 IN | BODY MASS INDEX: 18.03 KG/M2 | WEIGHT: 98 LBS

## 2024-11-14 DIAGNOSIS — M17.12 PRIMARY OSTEOARTHRITIS OF LEFT KNEE: ICD-10-CM

## 2024-11-14 DIAGNOSIS — M17.31 POST-TRAUMATIC OSTEOARTHRITIS OF RIGHT KNEE: Primary | ICD-10-CM

## 2024-11-14 PROBLEM — A04.72 CLOSTRIDIUM DIFFICILE DIARRHEA: Status: ACTIVE | Noted: 2024-11-14

## 2024-11-14 PROCEDURE — 20610 DRAIN/INJ JOINT/BURSA W/O US: CPT | Performed by: FAMILY MEDICINE

## 2024-11-14 PROCEDURE — 99214 OFFICE O/P EST MOD 30 MIN: CPT | Performed by: FAMILY MEDICINE

## 2024-11-14 RX ORDER — KETOROLAC TROMETHAMINE 30 MG/ML
30 INJECTION, SOLUTION INTRAMUSCULAR; INTRAVENOUS ONCE
Status: COMPLETED | OUTPATIENT
Start: 2024-11-14 | End: 2024-11-15

## 2024-11-14 RX ORDER — TRIAMCINOLONE ACETONIDE 40 MG/ML
40 INJECTION, SUSPENSION INTRA-ARTICULAR; INTRAMUSCULAR ONCE
Status: COMPLETED | OUTPATIENT
Start: 2024-11-14 | End: 2024-11-15

## 2024-11-15 RX ADMIN — TRIAMCINOLONE ACETONIDE 40 MG: 40 INJECTION, SUSPENSION INTRA-ARTICULAR; INTRAMUSCULAR at 10:44:00

## 2024-11-15 RX ADMIN — KETOROLAC TROMETHAMINE 30 MG: 30 INJECTION, SOLUTION INTRAMUSCULAR; INTRAVENOUS at 10:44:00

## 2024-11-15 NOTE — PROGRESS NOTES
Sports Medicine Clinic Note    Subjective:    Chief Complaint: Right knee pain.    History: The patient is a very pleasant 69-year-old female returning for follow-up regarding her right knee osteoarthritis with post-traumatic degenerative changes. She reports ongoing right knee pain with a baseline score of 5 out of 10, exacerbating to 9 out of 10 during increased activity. She previously discussed a preference for nonoperative management but now expresses interest in a total knee arthroplasty consultation with Dr. Garcia or Dr. Cortez. Additionally, she requests a prior authorization submission for a viscosupplementation injection. The patient understands that total knee arthroplasty cannot occur within 90 days of a knee injection.    Objective:    Right Knee Examination:    Inspection: Well-healed lateral incision from prior surgeries. Valgus alignment. No visible inflammation or effusion  Palpation: Tenderness over large lateral osteophytes. Non-tender medial joint line  Range of Motion: Extension to 0 degrees, flexion to 120 degrees. Pain noted at end ranges of motion  Neurovascular: Intact pulses distally. Capillary refill time is normal    Diagnostic Tests:    Previous radiographs of both knees, reviewed in-office, reveal severe valgus osteoarthritis with joint space narrowing and significant osteophyte formation in the right knee. The left knee shows only mild medial compartment narrowing with no acute findings.    Assessment:    Bilateral knee osteoarthritis, right greater than left with post-traumatic changes in the right knee.    Plan:    Additional Workup: Patient referred for consultation with Dr. Garcia or Dr. Cortez to discuss total knee arthroplasty.  Therapy: Continue with home-based strengthening exercises. Physical therapy remains an option if symptoms escalate prior to arthroplasty.  Medications: Recommend Tylenol as needed for pain management. Hydrocodone prescribed for breakthrough pain, with  instruction to observe the daily recommended Tylenol dosage limit.  Procedures: Prior authorization submitted for viscosupplementation injection. Patient informed of 90-day wait period for any surgical intervention post-injection.   Activity Recommendations: Encourage low-impact activities such as cycling or swimming to maintain joint mobility without excessive strain.    Follow-Up: Tentative follow-up scheduled pending completion of TKA consultation with Dr. Garcia or Dr. Cortez. Return to clinic earlier if significant symptom escalation occurs.    Procedure Note:    After discussion of the risks and benefits, the patient elected to proceed with a therapeutic injection into the tibiofemoral space, right side. Confirmed that the patient does not have history of prior adverse reactions, active infections, or relevant allergies. There was no erythema or warmth, and the skin was clear.    The skin was sterilized with ChloraPrep. A 22 gauge needle was inserted via inferolateral approach. The site was injected with a mixture of 1 mL of triamcinolone 40 mg/mL, 1 mL of Toradol 30 mg/mL and 1 mL of 1% Lidocaine without Epinephrine. The injection was completed without complication, and a bandage was applied.    The patient tolerated the procedure well and was instructed to avoid strenuous activity for the next 24-48 hours and to use ice or Tylenol for pain as needed. The patient will call immediately with any signs of infection or allergic reaction.    Post-Injection Care: The patient tolerated the procedure well. An occlusive bandage was placed over the injection site. Post-injection care instructions provided to the patient. The patient was asked to avoid strenuous activity and continue to rest the area for 2-3 days before resuming regular activities. Patient advised that the area may be more painful for the first 1-2 days. They can use ice or Tylenol for pain as needed.  Patient was instructed to watch for fever,  increased swelling, or persistent pain. The patient will call immediately with any signs of infection or allergic reaction.    Complications: The patient tolerated the procedure well without any complications.      Kenny Sunshine DO, CAQSM   Primary Care Sports Medicine

## 2024-11-19 NOTE — TELEPHONE ENCOUNTER
Patient called back and scheduled a gel injection for her right knee on 12/5 at [facility].

## 2024-11-20 ENCOUNTER — LAB ENCOUNTER (OUTPATIENT)
Dept: LAB | Age: 69
End: 2024-11-20
Payer: MEDICARE

## 2024-11-20 DIAGNOSIS — K86.81 EXOCRINE PANCREATIC INSUFFICIENCY (HCC): ICD-10-CM

## 2024-11-20 DIAGNOSIS — R63.4 UNINTENTIONAL WEIGHT LOSS: ICD-10-CM

## 2024-11-20 PROCEDURE — 84446 ASSAY OF VITAMIN E: CPT

## 2024-11-20 PROCEDURE — 84590 ASSAY OF VITAMIN A: CPT

## 2024-11-20 PROCEDURE — 36415 COLL VENOUS BLD VENIPUNCTURE: CPT

## 2024-11-20 PROCEDURE — 84597 ASSAY OF VITAMIN K: CPT

## 2024-11-22 ENCOUNTER — LAB ENCOUNTER (OUTPATIENT)
Dept: LAB | Age: 69
End: 2024-11-22
Attending: NURSE PRACTITIONER
Payer: MEDICARE

## 2024-11-22 DIAGNOSIS — R19.4 ALTERED BOWEL HABITS: ICD-10-CM

## 2024-11-22 PROCEDURE — 87493 C DIFF AMPLIFIED PROBE: CPT

## 2024-11-23 LAB — C DIFF TOX B STL QL: NEGATIVE

## 2024-11-25 LAB — VITAMIN K1: 2.37 NG/ML

## 2024-11-26 DIAGNOSIS — M54.16 LUMBAR RADICULITIS: ICD-10-CM

## 2024-11-26 DIAGNOSIS — M48.061 SPINAL STENOSIS OF LUMBAR REGION WITHOUT NEUROGENIC CLAUDICATION: ICD-10-CM

## 2024-11-26 DIAGNOSIS — F11.20 OPIOID DEPENDENCE, UNCOMPLICATED (HCC): ICD-10-CM

## 2024-11-26 DIAGNOSIS — G24.3 CERVICAL DYSTONIA: ICD-10-CM

## 2024-11-26 DIAGNOSIS — M54.16 LUMBAR RADICULOPATHY: ICD-10-CM

## 2024-11-26 DIAGNOSIS — F11.90 CHRONIC NARCOTIC USE: ICD-10-CM

## 2024-11-26 RX ORDER — HYDROCODONE BITARTRATE AND ACETAMINOPHEN 5; 325 MG/1; MG/1
1 TABLET ORAL EVERY 8 HOURS PRN
Qty: 90 TABLET | Refills: 0 | Status: SHIPPED | OUTPATIENT
Start: 2024-11-27 | End: 2024-12-27

## 2024-11-26 NOTE — TELEPHONE ENCOUNTER
Medication: HYDROcodone-acetaminophen 5-325 MG     Date of last refill: 10/28/24  [place]24    Last office visit: 05/13/24  Due back to clinic per last office note:  6 months  Date next office visit scheduled:  12/09/24    Last URINE Screen: 05/20/24  Screen Results:  please review in chart      Narcotic Contract EXPIRATION date: 11/13/24    Last OV note recommendation:   ASSESSMENT AND PLAN:      1. Chronic narcotic use    2. Opioid dependence, uncomplicated (HCC)    3. S/P cervical spinal fusion    4. Lumbar radiculitis          The patient is a pleasant 69-year-old female with longstanding history of chronic back pain.  This is managed with hydrocodone through office.  Patient states medications continue to provide improved function.  Denies any side effects associated with the medication.  [place] is [place] with hydrocodone usage.  Patient due for repeat UDS which was ordered today.  Patient can follow-up in 6 months time.

## 2024-11-27 NOTE — TELEPHONE ENCOUNTER
Patient authorized visco to be scheduled:    DOS:12/5/24  PROVIDER:elieser  MEDICATION: synvisc-one   OFFICE LOCATION: [place]  PULLED:yes  LABELED:yes  PLACED: yes

## 2024-11-30 LAB
VIT E ALPHA TOCO: 17.7 MG/L
VIT E GAMMA TOCO: 0.5 MG/L

## 2024-12-02 LAB — VITAMIN A: 90.1 UG/DL

## 2024-12-05 ENCOUNTER — OFFICE VISIT (OUTPATIENT)
Dept: ORTHOPEDICS CLINIC | Facility: CLINIC | Age: 69
End: 2024-12-05
Payer: MEDICARE

## 2024-12-05 VITALS — HEIGHT: 62 IN | BODY MASS INDEX: 17.66 KG/M2 | WEIGHT: 96 LBS

## 2024-12-05 DIAGNOSIS — M17.12 PRIMARY OSTEOARTHRITIS OF LEFT KNEE: ICD-10-CM

## 2024-12-05 DIAGNOSIS — M17.31 POST-TRAUMATIC OSTEOARTHRITIS OF RIGHT KNEE: Primary | ICD-10-CM

## 2024-12-06 NOTE — PROGRESS NOTES
Sports Medicine Clinic Note    Subjective:    Chief Complaint: Right knee pain.    History: The patient returns for follow-up regarding her right knee osteoarthritis with post-traumatic degenerative changes. She reports mild improvement in baseline symptoms since the previous injection but continues to experience significant discomfort with activity. She has decided to proceed with viscosupplementation using Synvisc-One injection as previously discussed. The patient reiterates interest in pursuing a total knee arthroplasty consultation but understands the 90-day waiting period following the injection.    Objective:    Right Knee Examination:    Inspection: Well-healed lateral incision from prior surgeries. Valgus alignment. No visible inflammation or significant effusion.  Palpation: Tenderness over lateral osteophytes persists. Medial joint line remains non-tender.  Range of Motion: Extension to 0 degrees, flexion to 115 degrees with discomfort at end ranges.  Neurovascular: Intact sensation and motor function in the lower extremity. Pulses palpable with brisk capillary refill.    Diagnostic Tests:    Radiographs reviewed from the prior visit confirm severe valgus osteoarthritis in the right knee with extensive lateral compartment degenerative changes.    Assessment:    Severe valgus osteoarthritis of the right knee with post-traumatic changes. Persistent symptoms despite prior injection, with the patient opting for viscosupplementation.    Plan:    Procedure: Synvisc-One injection performed today for viscosupplementation of the right knee. Details of the procedure are outlined below.  Therapy: Continue home-based strengthening and low-impact exercises. Formal physical therapy remains an option if symptoms worsen.  Medications: Continue Tylenol as needed for pain management, with caution to remain within recommended dosages. NSAIDs as an alternative for short-term use.  Activity Recommendations: Encourage  low-impact activities such as swimming or cycling. Avoid high-impact activities or heavy weight-bearing exercises for the next 48 hours post-injection.    Follow-Up: Tentative follow-up planned in 4-6 weeks to assess response to Synvisc-One injection. Patient instructed to return earlier if symptoms escalate or new issues arise.    Procedure Note:  After reviewing the risks, benefits, and alternatives, the patient elected to proceed with a Synvisc-One injection for the right knee. The procedure was performed under sterile conditions:    The knee was prepped with ChloraPrep and draped in a sterile manner.  A 22-gauge needle was inserted via the inferolateral approach into the tibiofemoral joint. Aspiration confirmed no significant effusion.  A total of 6 mL of Synvisc-One was injected into the joint space without difficulty.  The patient tolerated the procedure well. A sterile bandage was applied to the injection site.    Procedure Note:    After discussion of the risks and benefits, the patient elected to proceed with approved knee viscosupplementation, right side. Confirmed that the patient does not have history of prior adverse reactions, active infections, or relevant allergies. There was no erythema or warmth, and the skin was clear.    The skin was sterilized with ChloraPrep. A 22 gauge needle was inserted via inferolateral approach. The sites were each injected with pre-drawn 2 mL hyaluronic acid solution. The injection was completed without complication, and a bandage was applied.    The patient tolerated the procedure well and was instructed to avoid strenuous activity for the next 24-48 hours and to use ice or Tylenol for pain as needed. The patient will call immediately with any signs of infection or allergic reaction.    Post-Injection Care: The patient tolerated the procedure well. An occlusive bandage was placed over the injection site. The patient was asked to avoid strenuous activity and continue to  rest the area for 2-3 days before resuming regular activities. Patient advised that the area may be more painful for the first 1-2 days. They can use ice or Tylenol for pain as needed.  Patient was instructed to watch for fever, increased swelling, or persistent pain. The patient will call immediately with any signs of infection or allergic reaction.    Complications: The patient tolerated the procedure well without any complications.      Kenny Sunshine DO, CAQSM   Primary Care Sports Medicine

## 2024-12-09 ENCOUNTER — HOSPITAL ENCOUNTER (OUTPATIENT)
Age: 69
Discharge: HOME | End: 2024-12-09
Payer: MEDICARE

## 2024-12-09 ENCOUNTER — APPOINTMENT (OUTPATIENT)
Dept: GENERAL RADIOLOGY | Age: 69
End: 2024-12-09
Attending: PHYSICIAN ASSISTANT
Payer: MEDICARE

## 2024-12-09 ENCOUNTER — MED REC SCAN ONLY (OUTPATIENT)
Dept: PAIN CLINIC | Facility: CLINIC | Age: 69
End: 2024-12-09

## 2024-12-09 VITALS
HEIGHT: 62 IN | SYSTOLIC BLOOD PRESSURE: 134 MMHG | TEMPERATURE: 99 F | BODY MASS INDEX: 17.64 KG/M2 | WEIGHT: 95.88 LBS | RESPIRATION RATE: 18 BRPM | OXYGEN SATURATION: 97 % | HEART RATE: 72 BPM | DIASTOLIC BLOOD PRESSURE: 85 MMHG

## 2024-12-09 DIAGNOSIS — J20.9 ACUTE BRONCHITIS, UNSPECIFIED ORGANISM: Primary | ICD-10-CM

## 2024-12-09 DIAGNOSIS — R05.1 ACUTE COUGH: ICD-10-CM

## 2024-12-09 PROCEDURE — 99213 OFFICE O/P EST LOW 20 MIN: CPT | Performed by: PHYSICIAN ASSISTANT

## 2024-12-09 PROCEDURE — 71046 X-RAY EXAM CHEST 2 VIEWS: CPT | Performed by: PHYSICIAN ASSISTANT

## 2024-12-09 RX ORDER — PREDNISONE 20 MG/1
40 TABLET ORAL DAILY
Qty: 10 TABLET | Refills: 0 | Status: SHIPPED | OUTPATIENT
Start: 2024-12-09 | End: 2024-12-14

## 2024-12-09 RX ORDER — BENZONATATE 100 MG/1
100 CAPSULE ORAL 3 TIMES DAILY PRN
Qty: 30 CAPSULE | Refills: 0 | Status: SHIPPED | OUTPATIENT
Start: 2024-12-09 | End: 2025-01-08

## 2024-12-09 NOTE — ED PROVIDER NOTES
Patient Seen in: [facility]      History     Chief Complaint   Patient presents with    Cough/URI     Stated Complaint: Cough    Subjective:   HPI  69-year-old female with known history of essential tremor, hypertension, Parkinson's disease, psoriatic arthropathy, hyperlipidemia who comes in today with worsening and persistent cough over the past 8 days with now tightness on deep inspiration.  Patient denies fever or chills.    Objective:     Past Medical History:    Abdominal pain    left sided intermittent pain    Age-related osteoporosis without current pathological fracture    Anemia    Anxiety    Anxiety    Arrhythmia    afib    Arthritis    Atrial flutter, paroxysmal (HCC)    Back pain    Back problem    Benign essential tremor    Bowel obstruction (HCC)    Breast cancer (HCC)    Broken ankle    Left Ankle    Cancer (HCC)    Right breast    Cervical dystonia    DBS b/l    Cervical spine degeneration    Change in hair    Colitis    Ischemic Colitis    Constipation    Depression    Difficult intubation    big bony features in the bottom of mouth     Easy bruising    Endometriosis    Essential hypertension    Exposure to medical diagnostic radiation    Fatigue    ANEMIA.  I GET INFUSIONS    Frequent use of laxatives    Gastrointestinal hemorrhage    GI bleed    Heart palpitations    AFIB    High blood pressure    High cholesterol    History of blood transfusion    Jan 2018    History of breast cancer    XRT/Chemo    History of depression    History of GI bleed    Ischemic bowel    Hoarseness, chronic    Hyperglycemia    Hyperlipidemia    Hyponatremia    Hypotension due to hypovolemia    Impacted cerumen of left ear    Intestinal infection due to Clostridium difficile    2019    Irregular bowel habits    Ischemic colitis (HCC)    12/22    Lacunar infarction (HCC)    Nausea with vomiting    Neuropathy    Osteoarthritis    Osteoarthrosis, unspecified whether generalized or localized,  unspecified site    Pacemaker    Pain in joints    Pain with bowel movements    ISCHEMIC COLITIS    Panic disorder    Parkinson disease (HCC)    Personal history of antineoplastic chemotherapy    Personal history of endometriosis    Plaque psoriasis    Presence of other cardiac implants and grafts    Psoriatic arthropathy (HCC)    Recurrent major depressive disorder, in remission (HCC)    Rhinovirus    Sleep disturbance    Status post deep brain stimulator placement    Stented coronary artery    Stress    Tobacco dependence    Quit     Uncomfortable fullness after meals    Vaso vagal episode    Weight loss              Past Surgical History:   Procedure Laterality Date    Angioplasty (coronary)      Appendectomy      Appendectomy      Back surgery      NECK AT LOWER LUMBAR FUSED    Brain surgery  2016    Brain stimulator           x3    Cardiac pacemaker placement      Cervical spine surgery  2004    cervical spinal fusion C1-6    Chemotherapy      Colonoscopy  2014    Procedure: COLONOSCOPY;  Surgeon: Aaron Fair MD;  Location:  ENDOSCOPY    Colonoscopy N/A 2018    Procedure: COLONOSCOPY;  Surgeon: Aaron Fair MD;  Location:  ENDOSCOPY    D & c      Hysterectomy  1992    OVARIES REMOVED AS WELL    Lumbar spine surgery  1999    lumbar laminectomy L1-L4    Lumpectomy right  2002    Oophorectomy      Other      COLON STENTS    Other surgical history      Lt bunionectomy    Other surgical history      stent in [facility]    Pain management Bilateral 06/15/2021    BILATERAL LUMBAR 5 TRANSFORAMINAL LUMBAR EPIDURAL STEROID INJECTION    Radiation right      Sigmoidoscopy,diagnostic      Spine surgery procedure unlisted      Total abdom hysterectomy      Tubal ligation      Upper gi endoscopy,biopsy      ulcer, at [facility]                Social History     Socioeconomic History    Marital status:    Tobacco Use    Smoking status: Former     Current packs/day: 0.00      Average packs/day: 0.5 packs/day for 36.0 years (18.0 ttl pk-yrs)     Types: Cigarettes     Start date: 1982     Quit date: 2018     Years since quittin.9     Passive exposure: Past    Smokeless tobacco: Never   Vaping Use    Vaping status: Never Used   Substance and Sexual Activity    Alcohol use: Yes     Alcohol/week: 14.0 standard drinks of alcohol     Types: 14 Cans of beer per week     Comment: approx0- 2 beers per day    Drug use: No    Sexual activity: Not Currently   Other Topics Concern    Caffeine Concern No    Exercise No    Seat Belt No    Special Diet Yes     Comment: HOW TO PUT ON WEIGHT    Stress Concern No    Weight Concern Yes     Comment: CAN'T PUT ON WEIGHT     Social Drivers of Health     Financial Resource Strain: Low Risk  (2024)    Received from [facility] [place] [facility]    Overall Financial Resource Strain (CARDIA)     Difficulty of Paying Living Expenses: Not hard at all   Food Insecurity: No Food Insecurity (2024)    Received from [facility] [place] [facility]    Hunger Vital Sign     Worried About Running Out of Food in the Last Year: Never true     Ran Out of Food in the Last Year: Never true   Transportation Needs: No Transportation Needs (2024)    Received from [facility] [place] [facility]    PRAPARE - Transportation     Lack of Transportation (Medical): No     Lack of Transportation (Non-Medical): No    [facility]    Social Connections   Housing Stability: High Risk (2024)    Received from [facility]    Housing Stability Vital Sign     Unable to Pay for Housing in the Last Year: No     Number of Places Lived in the Last Year: 1     Unstable Housing in the Last Year: Yes              Review of Systems    Positive for stated complaint: Cough  Other systems are as noted in HPI.  Constitutional and vital signs reviewed.      All other systems  reviewed and negative except as noted above.    Physical Exam     ED Triage Vitals [12/09/24 1527]   /85   Pulse 72   Resp 18   Temp 98.6 °F (37 °C)   Temp src Oral   SpO2 97 %   O2 Device None (Room air)       Current Vitals:   Vital Signs  BP: 134/85  Pulse: 72  Resp: 18  Temp: 98.6 °F (37 °C)  Temp src: Oral    Oxygen Therapy  SpO2: 97 %  O2 Device: None (Room air)        Physical Exam  General Appearance: Alert, cooperative, no distress, appropriate for age   Head: Normocephalic, without obvious abnormality   Eyes: PERRL,  conjunctiva and cornea clear, both eyes   Ears: TM pearly gray color and semitransparent, external ear canals normal, both ears   Nose: Nares symmetrical, septum midline, mucosa normal, clear watery discharge; no sinus tenderness   Throat: Lips, tongue, and mucosa are moist, pink, and intact; teeth intact. No erythema, no exudates or tonsillar hypertrophy, uvula midline, no trismus or drooling no phonation changes, patient handling secretions well   Neck: Supple; no anterior or posterior cervical adenopathy, no neck rigidity or meningeal signs  Lungs: Clear to auscultation bilaterally, respirations unlabored. No wheezing, rales or rhonchi. + bronchospastic cough   Heart: NSR, S1, S2 present. No murmurs, rubs or gallops.  Skin: no rash       ED Course   Labs Reviewed - No data to display  XR CHEST PA + LAT CHEST (CPT=71046)    Result Date: 12/9/2024  PROCEDURE:  XR CHEST PA + LAT CHEST (CPT=71046)  INDICATIONS:  Cough  COMPARISON:  [place], XR, XR CHEST PA + LAT CHEST (CPT=71046), 11/08/2023, 1:38 PM.  TECHNIQUE:  PA and lateral chest radiographs were obtained.  PATIENT STATED HISTORY: (As transcribed by Technologist)  Cough, congestion, and hoarseness for 8 days. Hx of Parkinsons disease.    FINDINGS:  No focal consolidation.  No pleural effusion.  No pneumothorax.  No pulmonary edema.  The cardiomediastinal silhouette is within normal limits.  No acute osseous findings.  Loop  recorder device is present over the chest.  There is a generator device over  the left chest with leads coursing cranially.  There is suggestion of interval placement of an occlusive device within the left atrial appendage.  Mild degenerative changes of the spine.  Partially visualized cervical spine hardware.              CONCLUSION:  No acute cardiopulmonary disease.  Please see further details above.   LOCATION:  [facility]   Dictated by (CST): Jeevan Squires DO on 12/09/2024 at 4:06 PM     Finalized by (CST): Jeevan Squires DO on 12/09/2024 at 4:08 PM           [facility]            Medical Decision Making  C 9-year-old female who comes in today with upper respiratory symptoms and persistent cough over the past 8 to 10 days denies fever or chills.    Problems Addressed:  Acute bronchitis, unspecified organism: acute illness or injury    Amount and/or Complexity of Data Reviewed  Radiology: ordered and independent interpretation performed. Decision-making details documented in ED Course.     Details: I personally reviewed the patients CXR no evidence of pleural effusion or consolidation     Risk  OTC drugs.  Prescription drug management.  Risk Details: Clinical Impression: Bronchitis       The differential diagnosis before testing included Pneumonia, COVID-19, Bronchitis , which is a medical condition that poses a threat to life/function.    Prednisone, Tessalon        Disposition and Plan     Clinical Impression:  1. Acute bronchitis, unspecified organism    2. Acute cough         Disposition:  Discharge  12/9/2024  4:16 pm    Follow-up:  Lilli Kramer MD  [address] [id number] [phone]    Schedule an appointment as soon as possible for a visit   If symptoms worsen          Medications Prescribed:  Discharge Medication List as of 12/9/2024  4:16 PM        START taking these medications    Details   predniSONE 20 MG Oral Tab Take 2 tablets (40 mg total) by mouth daily for 5 days., Normal,  Disp-10 tablet, R-0      benzonatate 100 MG Oral Cap Take 1 capsule (100 mg total) by mouth 3 (three) times daily as needed for cough., Normal, Disp-30 capsule, R-0                 Supplementary Documentation:

## 2024-12-09 NOTE — DISCHARGE INSTRUCTIONS
The inhaler interacts with your medication solatol     Cough suppressant as needed    Elevate head of bed at night with extra pillow if you have any worsening symptoms be reevaluated Prednsione

## 2024-12-16 NOTE — TELEPHONE ENCOUNTER
Rec refill request faxed by [facility] 8/18/23    Last OV relevant to medication: 5/18/23  Last refill date: 2/9/23 #90/refills: 1  When pt was asked to return for OV: 8/6/23 for med check  Upcoming appt/reason: apt with  9/7/23  Was pt informed of any over due labs: utd
Detail Level: Zone

## 2024-12-19 ENCOUNTER — LAB ENCOUNTER (OUTPATIENT)
Dept: LAB | Age: 69
End: 2024-12-19
Attending: ANESTHESIOLOGY
Payer: MEDICARE

## 2024-12-19 DIAGNOSIS — F11.20 CHRONIC NARCOTIC DEPENDENCE (HCC): ICD-10-CM

## 2024-12-19 PROCEDURE — 80307 DRUG TEST PRSMV CHEM ANLYZR: CPT

## 2024-12-30 ENCOUNTER — TELEPHONE (OUTPATIENT)
Dept: ORTHOPEDICS CLINIC | Facility: CLINIC | Age: 69
End: 2024-12-30

## 2024-12-30 ENCOUNTER — LAB ENCOUNTER (OUTPATIENT)
Dept: LAB | Age: 69
End: 2024-12-30
Attending: INTERNAL MEDICINE
Payer: MEDICARE

## 2024-12-30 DIAGNOSIS — Z12.31 ENCOUNTER FOR SCREENING MAMMOGRAM FOR BREAST CANCER: Primary | ICD-10-CM

## 2024-12-30 NOTE — TELEPHONE ENCOUNTER
12/30/24 7:41 am  Kenny Sunshine DO [facility] Clinical [facility]  Hi team attached patient is scheduled for a an aspiration/injection 1/9/25 with me, can we reschedule and tell her she needs to ne scheduled sooner?  reached out that her family is coming in to surprise her from out of town pretty close to her current appointment time and he is hoping she can be moved up, ok to use catch up or double book sooner for procedure.

## 2024-12-31 ENCOUNTER — LAB ENCOUNTER (OUTPATIENT)
Dept: LAB | Age: 69
End: 2024-12-31
Attending: INTERNAL MEDICINE
Payer: MEDICARE

## 2024-12-31 DIAGNOSIS — M81.0 AGE-RELATED OSTEOPOROSIS WITHOUT CURRENT PATHOLOGICAL FRACTURE: ICD-10-CM

## 2024-12-31 DIAGNOSIS — E78.00 PURE HYPERCHOLESTEROLEMIA: ICD-10-CM

## 2024-12-31 LAB
ALBUMIN SERPL-MCNC: 4.7 G/DL (ref 3.2–4.8)
ALBUMIN/GLOB SERPL: 1.6 {RATIO} (ref 1–2)
ALP LIVER SERPL-CCNC: 56 U/L
ALT SERPL-CCNC: 17 U/L
ANION GAP SERPL CALC-SCNC: 6 MMOL/L (ref 0–18)
AST SERPL-CCNC: 23 U/L (ref ?–34)
BILIRUB SERPL-MCNC: 0.3 MG/DL (ref 0.2–1.1)
BUN BLD-MCNC: 16 MG/DL (ref 9–23)
CALCIUM BLD-MCNC: 10.1 MG/DL (ref 8.7–10.4)
CHLORIDE SERPL-SCNC: 103 MMOL/L (ref 98–112)
CHOLEST SERPL-MCNC: 204 MG/DL (ref ?–200)
CO2 SERPL-SCNC: 29 MMOL/L (ref 21–32)
CREAT BLD-MCNC: 0.82 MG/DL
EGFRCR SERPLBLD CKD-EPI 2021: 77 ML/MIN/1.73M2 (ref 60–?)
FASTING PATIENT LIPID ANSWER: YES
FASTING STATUS PATIENT QL REPORTED: YES
GLOBULIN PLAS-MCNC: 3 G/DL (ref 2–3.5)
GLUCOSE BLD-MCNC: 94 MG/DL (ref 70–99)
HDLC SERPL-MCNC: 60 MG/DL (ref 40–59)
LDLC SERPL CALC-MCNC: 109 MG/DL (ref ?–100)
NONHDLC SERPL-MCNC: 144 MG/DL (ref ?–130)
OSMOLALITY SERPL CALC.SUM OF ELEC: 287 MOSM/KG (ref 275–295)
PHOSPHATE SERPL-MCNC: 4.7 MG/DL (ref 2.4–5.1)
POTASSIUM SERPL-SCNC: 4.2 MMOL/L (ref 3.5–5.1)
PROT SERPL-MCNC: 7.7 G/DL (ref 5.7–8.2)
SODIUM SERPL-SCNC: 138 MMOL/L (ref 136–145)
TRIGL SERPL-MCNC: 204 MG/DL (ref 30–149)
TSI SER-ACNC: 2.68 UIU/ML (ref 0.55–4.78)
VIT D+METAB SERPL-MCNC: 41.6 NG/ML (ref 30–100)
VLDLC SERPL CALC-MCNC: 35 MG/DL (ref 0–30)

## 2024-12-31 PROCEDURE — 82306 VITAMIN D 25 HYDROXY: CPT

## 2024-12-31 PROCEDURE — 80061 LIPID PANEL: CPT

## 2024-12-31 PROCEDURE — 80053 COMPREHEN METABOLIC PANEL: CPT

## 2024-12-31 PROCEDURE — 84100 ASSAY OF PHOSPHORUS: CPT

## 2024-12-31 PROCEDURE — 36415 COLL VENOUS BLD VENIPUNCTURE: CPT

## 2024-12-31 PROCEDURE — 84443 ASSAY THYROID STIM HORMONE: CPT

## 2024-12-31 PROCEDURE — 82523 COLLAGEN CROSSLINKS: CPT

## 2025-01-02 ENCOUNTER — OFFICE VISIT (OUTPATIENT)
Dept: ORTHOPEDICS CLINIC | Facility: CLINIC | Age: 70
End: 2025-01-02
Payer: MEDICARE

## 2025-01-02 VITALS — HEIGHT: 62 IN | BODY MASS INDEX: 17.48 KG/M2 | WEIGHT: 95 LBS

## 2025-01-02 DIAGNOSIS — M17.12 PRIMARY OSTEOARTHRITIS OF LEFT KNEE: ICD-10-CM

## 2025-01-02 DIAGNOSIS — G89.4 CHRONIC PAIN SYNDROME: ICD-10-CM

## 2025-01-02 DIAGNOSIS — M17.31 POST-TRAUMATIC OSTEOARTHRITIS OF RIGHT KNEE: Primary | ICD-10-CM

## 2025-01-02 RX ORDER — KETOROLAC TROMETHAMINE 30 MG/ML
30 INJECTION, SOLUTION INTRAMUSCULAR; INTRAVENOUS ONCE
Status: COMPLETED | OUTPATIENT
Start: 2025-01-02 | End: 2025-01-02

## 2025-01-02 RX ADMIN — KETOROLAC TROMETHAMINE 30 MG: 30 INJECTION, SOLUTION INTRAMUSCULAR; INTRAVENOUS at 09:12:00

## 2025-01-02 NOTE — PROGRESS NOTES
Sports Medicine Clinic Note    Subjective:    Chief Complaint: Persistent right knee pain.    History: The patient returns for follow-up regarding right knee osteoarthritis with post-traumatic degenerative changes. Despite prior injections, including an articular corticosteroid injection two months ago and a Synvisc-One injection one month ago, she reports no significant improvement in symptoms. Pain persists with activity, and she feels unable to wean effectively from [place], currently taking it twice daily, down from three times daily. The patient recalls prior discussion of aspiration and requests this procedure today. She remains interested in pursuing total knee arthroplasty but understands the need for a 90-day waiting period following the most recent injection or aspiration.    Objective:    Right Knee Examination:    Inspection: Well-healed lateral incision from prior surgeries. Valgus alignment. Mild effusion noted today.  Palpation: Persistent tenderness over lateral osteophytes. Medial joint line remains non-tender.  Range of Motion: Extension to 0 degrees, flexion to 110 degrees with discomfort throughout the arc of motion.  Neurovascular: Sensation intact to light touch. Motor function and pulses remain intact with brisk capillary refill.    Diagnostic Tests:    No new testing. Prior radiographs confirmed severe valgus osteoarthritis of the right knee with extensive lateral compartment degenerative changes.    Assessment:    Severe valgus osteoarthritis of the right knee with post-traumatic degenerative changes.  Persistent symptoms despite prior corticosteroid and Synvisc-One injections.    Plan:    Procedures: Right knee aspiration performed today via the suprapatellar approach under ultrasound guidance. Details of the procedure are outlined below. Patient educated that no further injections or surgical procedures can be performed within 90 days of this aspiration.  Therapy: Continue low-impact home  exercises focused on quadriceps and hamstring strengthening. Formal physical therapy remains an option if symptoms escalate.  Medications: Patient to continue Tylenol and NSAIDs as needed for pain. Norco dosing managed by pain management.  Activity Recommendations: Limit activities that exacerbate pain. Avoid prolonged weight-bearing or high-impact activities.  Additional Workup: Discuss with the patient potential timelines for surgical consultation regarding total knee arthroplasty.    Follow-Up: Tentatively scheduled in 3-4 weeks to reassess response to aspiration and pain management strategies.    Ultrasound Guided Procedure Note:    After discussion of the risks and benefits, the patient elected to proceed with an aspiration/injection of the right suprapatellar space under US guidance. Confirmed that the patient does not have history of prior adverse reactions, active infections, or relevant allergies. There was no erythema or warmth, and the skin was clear.    The skin was sterilized with ChloraPrep. An 18 gauge needle was inserted via inferolateral approach utilizing US for needle guidance and placement after local anesthesia was achieved with 2 mL of 1% Lidocaine without Epinephrine. The aspiration was completed without complication, yielding 10 mL of blood tinged synovial fluid. Following aspiration the syringe was exchanged and the site was injected with a mixture of 1 mL of ketorolac 30 mg/mL and 2 mL of 1% Lidocaine without Epinephrine. The injection was completed without complication, and a bandage was applied.     Post-Injection Care: The patient tolerated the procedure well. An occlusive bandage was placed over the injection site. Post-injection care instructions provided to the patient. The patient was asked to avoid strenuous activity and continue to rest the area for 24 to 48 hours before resuming regular activities. Patient advised that the area may be more painful for the first 1-2 days. They can  use ice or Tylenol for pain as needed.  Patient was instructed to watch for fever, increased swelling, or persistent pain. The patient will call immediately with any signs of infection or allergic reaction.      Kenny Sunshine DO, CAM  Primary Care Sports Medicine

## 2025-01-06 LAB — C-TELOPEPTIDE: 206 PG/ML

## 2025-01-06 NOTE — PROGRESS NOTES
Please remind her to complete her bone density after 1/12/2025.  She should schedule a follow-up with me in January after bone density is completed.  Thank you

## 2025-01-08 ENCOUNTER — APPOINTMENT (OUTPATIENT)
Dept: HEMATOLOGY/ONCOLOGY | Facility: HOSPITAL | Age: 70
End: 2025-01-08
Attending: INTERNAL MEDICINE
Payer: MEDICARE

## 2025-01-09 ENCOUNTER — OFFICE VISIT (OUTPATIENT)
Dept: INTERNAL MEDICINE CLINIC | Facility: CLINIC | Age: 70
End: 2025-01-09
Payer: MEDICARE

## 2025-01-09 VITALS
HEART RATE: 70 BPM | TEMPERATURE: 98 F | BODY MASS INDEX: 17.33 KG/M2 | DIASTOLIC BLOOD PRESSURE: 72 MMHG | RESPIRATION RATE: 16 BRPM | HEIGHT: 62 IN | WEIGHT: 94.19 LBS | SYSTOLIC BLOOD PRESSURE: 122 MMHG | OXYGEN SATURATION: 98 %

## 2025-01-09 DIAGNOSIS — G20.A1 PARKINSON'S DISEASE, UNSPECIFIED WHETHER DYSKINESIA PRESENT, UNSPECIFIED WHETHER MANIFESTATIONS FLUCTUATE (HCC): Chronic | ICD-10-CM

## 2025-01-09 DIAGNOSIS — I70.0 AORTO-ILIAC ATHEROSCLEROSIS (HCC): ICD-10-CM

## 2025-01-09 DIAGNOSIS — M20.42 HAMMER TOE OF LEFT FOOT: ICD-10-CM

## 2025-01-09 DIAGNOSIS — Z00.00 ENCOUNTER FOR ANNUAL HEALTH EXAMINATION: Primary | ICD-10-CM

## 2025-01-09 DIAGNOSIS — I48.92 ATRIAL FLUTTER, UNSPECIFIED TYPE (HCC): ICD-10-CM

## 2025-01-09 DIAGNOSIS — Z87.19 HISTORY OF MESENTERIC INFARCTION: ICD-10-CM

## 2025-01-09 DIAGNOSIS — I70.8 AORTO-ILIAC ATHEROSCLEROSIS (HCC): ICD-10-CM

## 2025-01-09 DIAGNOSIS — D75.89 MACROCYTOSIS: ICD-10-CM

## 2025-01-09 DIAGNOSIS — R49.8 VOCAL TREMOR: ICD-10-CM

## 2025-01-09 DIAGNOSIS — R68.81 EARLY SATIETY: ICD-10-CM

## 2025-01-09 DIAGNOSIS — Z86.73 HISTORY OF CVA (CEREBROVASCULAR ACCIDENT): ICD-10-CM

## 2025-01-09 DIAGNOSIS — J44.9 CHRONIC OBSTRUCTIVE PULMONARY DISEASE, UNSPECIFIED COPD TYPE (HCC): ICD-10-CM

## 2025-01-09 DIAGNOSIS — Z95.0 PACEMAKER: ICD-10-CM

## 2025-01-09 DIAGNOSIS — M81.0 AGE-RELATED OSTEOPOROSIS WITHOUT CURRENT PATHOLOGICAL FRACTURE: ICD-10-CM

## 2025-01-09 DIAGNOSIS — I25.10 CAD IN NATIVE ARTERY: ICD-10-CM

## 2025-01-09 DIAGNOSIS — F10.29 ALCOHOL DEPENDENCE WITH UNSPECIFIED ALCOHOL-INDUCED DISORDER (HCC): ICD-10-CM

## 2025-01-09 DIAGNOSIS — G24.3 CERVICAL DYSTONIA: ICD-10-CM

## 2025-01-09 DIAGNOSIS — M21.619 BUNION: ICD-10-CM

## 2025-01-09 DIAGNOSIS — K42.9 UMBILICAL HERNIA WITHOUT OBSTRUCTION AND WITHOUT GANGRENE: ICD-10-CM

## 2025-01-09 DIAGNOSIS — Z85.3 HISTORY OF BREAST CANCER: ICD-10-CM

## 2025-01-09 DIAGNOSIS — K55.9 ISCHEMIC COLITIS (HCC): ICD-10-CM

## 2025-01-09 DIAGNOSIS — I77.9 BILATERAL CAROTID ARTERY DISEASE, UNSPECIFIED TYPE (HCC): ICD-10-CM

## 2025-01-09 DIAGNOSIS — Z87.19 HX OF ISCHEMIC BOWEL DISEASE: ICD-10-CM

## 2025-01-09 DIAGNOSIS — F39 MOOD DISORDER (HCC): ICD-10-CM

## 2025-01-09 DIAGNOSIS — L40.9 PSORIASIS: ICD-10-CM

## 2025-01-09 DIAGNOSIS — R79.89 HIGH SERUM VITAMIN A: ICD-10-CM

## 2025-01-09 DIAGNOSIS — Z96.89 S/P DEEP BRAIN STIMULATOR PLACEMENT: ICD-10-CM

## 2025-01-09 DIAGNOSIS — K55.1 CHRONIC MESENTERIC ISCHEMIA (HCC): ICD-10-CM

## 2025-01-09 DIAGNOSIS — E78.2 MIXED HYPERLIPIDEMIA: ICD-10-CM

## 2025-01-09 DIAGNOSIS — K55.1 SUPERIOR MESENTERIC ARTERY ATHEROSCLEROSIS (HCC): ICD-10-CM

## 2025-01-09 DIAGNOSIS — Z95.818 PRESENCE OF WATCHMAN LEFT ATRIAL APPENDAGE CLOSURE DEVICE: ICD-10-CM

## 2025-01-09 DIAGNOSIS — Z86.718 HISTORY OF DVT (DEEP VEIN THROMBOSIS): ICD-10-CM

## 2025-01-09 DIAGNOSIS — D50.0 IRON DEFICIENCY ANEMIA DUE TO CHRONIC BLOOD LOSS: ICD-10-CM

## 2025-01-09 DIAGNOSIS — D46.9 MDS (MYELODYSPLASTIC SYNDROME) (HCC): ICD-10-CM

## 2025-01-09 DIAGNOSIS — G25.0 ESSENTIAL TREMOR: ICD-10-CM

## 2025-01-09 DIAGNOSIS — M54.16 LUMBAR RADICULITIS: ICD-10-CM

## 2025-01-09 DIAGNOSIS — I10 ESSENTIAL HYPERTENSION: ICD-10-CM

## 2025-01-09 DIAGNOSIS — N28.1 BILATERAL RENAL CYSTS: ICD-10-CM

## 2025-01-09 DIAGNOSIS — I73.9 PAD (PERIPHERAL ARTERY DISEASE) (HCC): ICD-10-CM

## 2025-01-09 DIAGNOSIS — R74.8: ICD-10-CM

## 2025-01-09 DIAGNOSIS — Z98.1 S/P CERVICAL SPINAL FUSION: ICD-10-CM

## 2025-01-09 DIAGNOSIS — I48.0 PAROXYSMAL ATRIAL FIBRILLATION (HCC): ICD-10-CM

## 2025-01-09 PROBLEM — R63.4 UNINTENTIONAL WEIGHT LOSS: Status: RESOLVED | Noted: 2020-09-25 | Resolved: 2025-01-09

## 2025-01-09 PROBLEM — A04.72 CLOSTRIDIUM DIFFICILE DIARRHEA: Status: RESOLVED | Noted: 2024-11-14 | Resolved: 2025-01-09

## 2025-01-09 PROBLEM — J11.1 INFLUENZA: Status: RESOLVED | Noted: 2024-02-06 | Resolved: 2025-01-09

## 2025-01-09 PROBLEM — Z86.79 HISTORY OF ATRIAL FIBRILLATION: Status: RESOLVED | Noted: 2021-12-20 | Resolved: 2025-01-09

## 2025-01-09 PROBLEM — S82.832A FRACTURE OF DISTAL END OF TIBIA WITH FIBULA, LEFT, CLOSED, INITIAL ENCOUNTER: Status: RESOLVED | Noted: 2023-03-16 | Resolved: 2025-01-09

## 2025-01-09 PROBLEM — K92.2 GASTROINTESTINAL HEMORRHAGE: Status: RESOLVED | Noted: 2022-12-27 | Resolved: 2025-01-09

## 2025-01-09 PROBLEM — I95.9 HYPOTENSION, UNSPECIFIED HYPOTENSION TYPE: Status: RESOLVED | Noted: 2022-12-27 | Resolved: 2025-01-09

## 2025-01-09 PROBLEM — K31.811 GASTROINTESTINAL HEMORRHAGE ASSOCIATED WITH ANGIODYSPLASIA OF STOMACH AND DUODENUM: Status: RESOLVED | Noted: 2022-08-21 | Resolved: 2025-01-09

## 2025-01-09 PROBLEM — E78.00 PURE HYPERCHOLESTEROLEMIA: Chronic | Status: RESOLVED | Noted: 2018-06-21 | Resolved: 2025-01-09

## 2025-01-09 PROBLEM — K92.2 GASTROINTESTINAL HEMORRHAGE, UNSPECIFIED GASTROINTESTINAL HEMORRHAGE TYPE: Status: RESOLVED | Noted: 2022-12-27 | Resolved: 2025-01-09

## 2025-01-09 PROBLEM — F34.1 DYSTHYMIC DISORDER: Status: RESOLVED | Noted: 2018-08-17 | Resolved: 2025-01-09

## 2025-01-09 PROBLEM — S82.892A CLOSED FRACTURE OF LEFT ANKLE: Status: RESOLVED | Noted: 2023-04-26 | Resolved: 2025-01-09

## 2025-01-09 PROBLEM — K25.9 GASTRIC ULCER WITHOUT HEMORRHAGE OR PERFORATION: Status: RESOLVED | Noted: 2020-11-02 | Resolved: 2025-01-09

## 2025-01-09 PROBLEM — S82.302A FRACTURE OF DISTAL END OF TIBIA WITH FIBULA, LEFT, CLOSED, INITIAL ENCOUNTER: Status: RESOLVED | Noted: 2023-03-16 | Resolved: 2025-01-09

## 2025-01-09 PROBLEM — K21.9 GERD (GASTROESOPHAGEAL REFLUX DISEASE): Status: RESOLVED | Noted: 2020-10-05 | Resolved: 2025-01-09

## 2025-01-09 RX ORDER — ATORVASTATIN CALCIUM 20 MG/1
20 TABLET, FILM COATED ORAL NIGHTLY
Qty: 90 TABLET | Refills: 0 | Status: SHIPPED | OUTPATIENT
Start: 2025-01-09

## 2025-01-09 NOTE — PATIENT INSTRUCTIONS
Check with your insurance to verify coverage for the Shingrix vaccine for shingles. Also check to see where you can go to get the vaccine. You can also get a price check at the pharmacy instead.      Call cardiology and ask if there is a need to do an EKG now or not.    Continue to see all the specialists    Get your lung screening done in May    Take Vitamin C with iron tablet     Make sure you are no longer taking duloxetine and paroxetine. If you are on them let the office know.     Increase the atorvastatin to 20 mg. You may take 2 tablets until they are gone.    Get your labs done in 3 months after April 9th. You should be fasting for at least 10 hours. If you take a multivitamin with Biotin or any biotin product it should be held for 3 days prior to getting your labs done.     Follow up in 1 year or sooner as needed

## 2025-01-09 NOTE — PROGRESS NOTES
Subjective:   Tamara Meraz is a 69 year old female who presents for a Medicare Subsequent Annual Wellness visit (Pt already had Initial Annual Wellness) and scheduled follow up of multiple significant but stable problems.     Diet is good and exercise is good. Vaccines reviewed. Wearing seat belt and no texting and driving. Feels safe at home. Mammo per oncology and is scheduled. Dexa per endocrinology and is scheduled. Colon cancer screening UTD. Former smoking. Needs lung screening in May. Is drinking 14 standards drinks of alcohol per week. No skin concerns. Does not want to see dermatology at this time. Labs to be reviewed.     Patient has a past medical history of A-fib, a flutter, pacemaker, watchman's device, CAD, carotid disease, aorto-iliac atherosclerosis, superior mesenteric artery atherosclerosis, chronic mesenteric ischemia, peripheral arterial disease, hypertension, hyperlipidemia-patient is following EP and cardiology.  She has no symptoms of chest pain, shortness of breath at rest or with exertion.  She has no abdominal pain or pain to her legs with exertion or at rest.  Her LDL is above the 100 and PCP is prescribing her statin.  She has no side effects to the statin.  She is not restricting her diet for low-fat due to trying to maintain weight sufficiently.  She has been active and exercises with swimming.     She has a history of CVA, Parkinson's, History of CVA (cerebrovascular accident), cervical dystonia, essential tremor, vocal tremor and is followed by neurology at [facility].  She denies headaches, weakness. She has not had any further syncope episodes. Is doing Botox for the cervical dystonia. It helps.     Hx of DVT, Hx of breast CA, MDS, CINTIA- Follows closely with heme/onc. Taking iron. On baby asa. No other AC. No symptoms of DVT. Mammo is scheduled.    COPD- stable. No sob. Former smoker. Lung screening ordered. Due in May.     Renal cysts- on imaging in [facility]  since 2022. Likely benign simple cysts, is getting imaging of abd often for other things and can be monitored on over reads.     Hx of ischemic bowl disease, colitis, early satiety, umbilical hernia- follows with GI. No abdominal pain. Weight is stable. No umbilical pain.     Osteoporosis- on reclast with endo.    S/p cervical fusion, lumbar radiculitis- pain is managed by pain service. No weakness to arms or legs. No loss of B&B.    Mood- stable currently. Paroxetine and duloxetine are no longer on med list and she is not sure if she is taking them. No SE to them when she was taking them. She will check her meds at home and see if she is still taking them or not.     ETOH- is aware her intake is more then moderation. Does not want to lower it at this time.    Psoriasisi- stable. Does not want to see derm    Bunion, hammer toe- sees podiatry    Had high K and vit A on last labs. Wondering what to do about it. Takes a centrum silver.        History/Other:   Fall Risk Assessment:   She has been screened for Falls and is High Risk. Fall Prevention information provided to patient in After Visit Summary.    Do you feel unsteady when standing or walking?: (Patient-Rptd) Yes  Do you worry about falling?: (Patient-Rptd) Yes  Have you fallen in the past year?: (Patient-Rptd) No     Cognitive Assessment:   She had a completely normal cognitive assessment - see flowsheet entries     Functional Ability/Status:   Tamara Meraz has some abnormal functions as listed below:  She has Dressing and/or Bathing issues based on screening of functional status.  Difficulty dressing or bathing?: (Patient-Rptd) No  Bathing or Showering: (Patient-Rptd) Able without help  Dressing: (Patient-Rptd) Need some help  She has Meal Preparation difficulties based on screening of functional status.She has Vision problems based on screening of functional status. She has Walking problems based on screening of functional status.       Depression  Screening (PHQ):  PHQ-2 SCORE: 0  , done 1/9/2025       Advanced Directives:   She does have a Living Will but we do NOT have it on file in Epic.    She does have a POA but we do NOT have it on file in Epic.    Discussed Advance Care Planning with patient (and family/surrogate if present). Standard forms made available to patient in After Visit Summary.      Patient Active Problem List   Diagnosis    S/P cervical spinal fusion    Psoriasis    PAD (peripheral artery disease) (HCC)    Essential tremor    Spinal stenosis    Paroxysmal atrial fibrillation (HCC)    Bunion    Vasovagal syncope    Hx of ischemic bowel disease    Alcohol dependence with unspecified alcohol-induced disorder (HCC)    Atrial flutter (HCC)    CAD in native artery    Essential hypertension    Parkinson's disease (HCC)    Pure hypercholesterolemia    Iron deficiency anemia due to chronic blood loss    MDS (myelodysplastic syndrome) (HCC)    History of GI bleed    History of breast cancer    Age-related osteoporosis without current pathological fracture    Cervical dystonia    Recurrent major depressive disorder, in remission (HCC)    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HCC)    History of DVT (deep vein thrombosis)    History of CVA (cerebrovascular accident)    History of atrial fibrillation    Lumbar radiculitis    Ischemic colitis (HCC)    Gastrointestinal hemorrhage associated with angiodysplasia of stomach and duodenum    Gastrointestinal hemorrhage    Gastrointestinal hemorrhage, unspecified gastrointestinal hemorrhage type    Hypotension, unspecified hypotension type    Closed fracture of left ankle    Hammer toe of left foot    Benign paroxysmal positional vertigo    Chronic obstructive pulmonary disease, unspecified (HCC)    Collagenous colitis    Anxiety    Atherosclerosis of abdominal aorta (HCC)    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    Bacteremia associated with intravascular line (HCC)    Depression     Dysthymic disorder    Early satiety    Fracture of distal end of tibia with fibula, left, closed, initial encounter    Gastric ulcer without hemorrhage or perforation    GERD (gastroesophageal reflux disease)    HLD (hyperlipidemia)    Influenza    Knee pain, right    Macrocytosis    Malignant neoplasm of female breast (HCC)    Nausea with vomiting    Osteopenia    Other specified noninfective gastroenteritis and colitis    Thrombotic mesenteric infarction (HCC)    Unintentional weight loss    Vocal tremor    Clostridium difficile diarrhea     Allergies:  She is allergic to sulfa antibiotics and sulfa drugs cross reactors.    Current Medications:  Outpatient Medications Marked as Taking for the 1/9/25 encounter (Office Visit) with Mouna Gentile APRN   Medication Sig    HYDROcodone-acetaminophen 5-325 MG Oral Tab Take 1 tablet by mouth 2 (two) times daily as needed for Pain (3/day max).    sotalol 80 MG Oral Tab Take 1 tablet (80 mg total) by mouth 2 (two) times daily.    ATORVASTATIN 10 MG Oral Tab Take 1 tablet (10 mg total) by mouth daily.    Ferrous Sulfate (IRON) 325 (65 Fe) MG Oral Tab Take by mouth daily.    Probiotic Product (PROBIOTIC-10 OR) Take by mouth 2 (two) times a day.    aspirin 81 MG Oral Tab EC Take 1 tablet (81 mg total) by mouth daily.    Calcium Magnesium Zinc 333-133-5 MG Oral Tab Take 1 tablet by mouth daily.    Multiple Vitamins-Minerals (CENTRUM SILVER 50+WOMEN) Oral Tab Take 1 tablet by mouth daily.    primidone 50 MG Oral Tab Take 100 mg by mouth every morning and take 50 mg by mouth every evening.    gabapentin 300 MG Oral Cap Take by mouth. Take 600mg in the morning and 600 mg at night    Cyanocobalamin (B-12) 1000 MCG Oral Tab Take 1,000 mcg by mouth daily.       Medical History:  She  has a past medical history of Abdominal pain (2020), Age-related osteoporosis without current pathological fracture (06/01/2021), Anemia, Anxiety (1985), Anxiety (10/20/2022), Arrhythmia, Arthritis  (1980), Atrial flutter, paroxysmal (HCC) (06/06/2018), Back pain, Back problem, Benign essential tremor, Bowel obstruction (HCC), Breast cancer (HCC) (2002), Broken ankle (01/2023), Cancer (HCC), Cervical dystonia (06/17/2021), Cervical spine degeneration, Change in hair (NAILS), Colitis, Constipation, Depression, Difficult intubation, Easy bruising, Endometriosis, Essential hypertension, Exposure to medical diagnostic radiation (2002), Fatigue (2018), Frequent use of laxatives (Miralax), Gastrointestinal hemorrhage (05/31/2018), GI bleed, Heart palpitations (2016), High blood pressure, High cholesterol, History of blood transfusion, History of breast cancer (06/01/2021), History of depression, History of GI bleed (06/01/2021), Hoarseness, chronic, Hyperglycemia (09/06/2020), Hyperlipidemia (2005), Hyponatremia (12/04/2020), Hypotension due to hypovolemia (08/21/2022), Impacted cerumen of left ear (04/27/2023), Intestinal infection due to Clostridium difficile (06/17/2021), Irregular bowel habits, Ischemic colitis ([facility]) (06/02/2018), Lacunar infarction ([facility]) (09/09/2013), Nausea with vomiting (02/23/2014), Neuropathy, Osteoarthritis, Osteoarthrosis, unspecified whether generalized or localized, unspecified site, Pacemaker, Pain in joints (2021), Pain with bowel movements (2016), Panic disorder, Parkinson disease (HCC), Personal history of antineoplastic chemotherapy (2002), Personal history of endometriosis, Plaque psoriasis, Presence of other cardiac implants and grafts (DBS FOR TREMORS), Psoriatic arthropathy ([facility]) (11/01/2011), Recurrent major depressive disorder, in remission ([facility]) (06/17/2021), Rhinovirus (05/04/2023), Sleep disturbance, Status post deep brain stimulator placement, Stented coronary artery (2017 lower GI), Stress, Tobacco dependence (06/21/2018), Uncomfortable fullness after meals, Vaso vagal episode (08/21/2022), and Weight loss (2020).  Surgical History:  She  has a past surgical history that  includes ; d & c; appendectomy; colonoscopy (2014); tubal ligation; other surgical history; other surgical history; colonoscopy (N/A, 2018); upper gi endoscopy,biopsy; oophorectomy (); total abdom hysterectomy; Brain Surgery (); pain management (Bilateral, 06/15/2021); cervical spine surgery (2004); lumbar spine surgery (1999); appendectomy; back surgery (); hysterectomy (); lumpectomy right (); chemotherapy (); radiation right (); other; angioplasty (coronary) (); sigmoidoscopy,diagnostic; Cardiac pacemaker placement; and spine surgery procedure unlisted.   Family History:  Her family history includes Breast Cancer in her mother and self; Cancer in her mother; Heart Attack (age of onset: 72) in her brother; Heart Disorder in her mother; Hypertension in her father; Lung Disorder in her sister; Musculo-skelatal Disorder in her father; Ulcerative Colitis in her son.  Social History:  She  reports that she quit smoking about 6 years ago. Her smoking use included cigarettes. She started smoking about 42 years ago. She has a 18 pack-year smoking history. She has been exposed to tobacco smoke. She has never used smokeless tobacco. She reports current alcohol use of about 14.0 standard drinks of alcohol per week. She reports that she does not use drugs.    Tobacco:  She smoked tobacco in the past but quit greater than 12 months ago.  Social History     Tobacco Use   Smoking Status Former    Current packs/day: 0.00    Average packs/day: 0.5 packs/day for 36.0 years (18.0 ttl pk-yrs)    Types: Cigarettes    Start date: 1982    Quit date: 2018    Years since quittin.0    Passive exposure: Past   Smokeless Tobacco Never          CAGE Alcohol Screen:   CAGE screening score of 0 on 2025, showing low risk of alcohol abuse.      Patient Care Team:  Lilli Kramer MD as PCP - General (Internal Medicine)  Aaron Fair MD (GASTROENTEROLOGY)  Santo  Mary[place], PT as Physical Therapist  Sharona Gunter MD (ONCOLOGY)  Jasiel Squires (SURGERY, GENERAL)  Melly Hoang APRN (Nurse Practitioner Family)  Mouna Gentile APRN (Nurse Practitioner)  Rojas Nguyen MD (GASTROENTEROLOGY)  Carol Del Angel APRN (Nurse Practitioner)    Review of Systems  GENERAL: feels well otherwise  SKIN: denies any unusual skin lesions  EYES: denies blurred vision or double vision  HEENT: denies nasal congestion, sinus pain or ST  LUNGS: denies shortness of breath with exertion  CARDIOVASCULAR: denies chest pain on exertion  GI: denies abdominal pain, denies heartburn  : denies dysuria, vaginal discharge or itching, no complaint of urinary incontinence   MUSCULOSKELETAL: see HPI  NEURO: denies headaches  PSYCHE: denies depression or anxiety  HEMATOLOGIC: denies symptoms of anemia  ENDOCRINE: denies thyroid history  ALL/ASTHMA: denies hx of allergy or asthma    Objective:   Physical Exam  /72 (BP Location: Left arm, Patient Position: Sitting, Cuff Size: adult)   Pulse 70   Temp 97.6 °F (36.4 °C) (Temporal)   Resp 16   Ht 5' 2\" (1.575 m)   Wt 94 lb 3.2 oz (42.7 kg)   SpO2 98%   BMI 17.23 kg/m²   General appearance: alert, appears stated age, and cooperative  Head: Normocephalic, without obvious abnormality, atraumatic  Eyes: negative  Ears: normal TM's and external ear canals both ears  Back:  no spinal tenderness   Lungs: clear to auscultation bilaterally  Heart: S1, S2 normal, regular rate and rhythm  Abdomen:  no tenderness or masses   Extremities:  no edema or calve tenderness   Neurologic: Grossly normal, has vocal tremor    /72 (BP Location: Left arm, Patient Position: Sitting, Cuff Size: adult)   Pulse 70   Temp 97.6 °F (36.4 °C) (Temporal)   Resp 16   Ht 5' 2\" (1.575 m)   Wt 94 lb 3.2 oz (42.7 kg)   SpO2 98%   BMI 17.23 kg/m²  Estimated body mass index is 17.23 kg/m² as calculated from the following:    Height as of this encounter: 5' 2\" (1.575  m).    Weight as of this encounter: 94 lb 3.2 oz (42.7 kg).    Medicare Hearing Assessment:   Hearing Screening    Time taken: 1/9/2025 10:13 AM  Entry User: Nicole Vanegas CMA  Screening Method: Finger Rub  Finger Rub Result: Pass               Assessment & Plan:   Tamara Meraz is a 69 year old female who presents for a Medicare Assessment.     1. Encounter for annual health examination (Primary)  - labs reviewed and ordered  - mammogram per oncology- scheduled  - dexa per endo- scheduled  - does not want to see derm  - vaccines reviewed.   - former smoker- lung screening due in May  - etoh- moderation encouraged   - C scope UTD    2. Paroxysmal atrial fibrillation (HCC)  3. Atrial flutter, unspecified type (HCC)  4. Pacemaker  5. Presence of Watchman left atrial appendage closure device  6. CAD in native artery  7. Bilateral carotid artery disease, unspecified type (HCC)  8. Aorto-iliac atherosclerosis (HCC)  9. 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HCC)  10. Chronic mesenteric ischemia (HCC)  11. History of mesenteric infarction  12. PAD (peripheral artery disease) (HCC)  13. Essential hypertension  - Managed per cardiology, EP.   - on sotolol has watchmens, pacer  - s/p SMA stent  - no chest pain or sob, no swelling  - BP controlled     14. Mixed hyperlipidemia  - LDL > 100. On Lipitor 10 mg. Can't restrict diet because she will lose weight. Is active/swims.   - increase statin to 20mg.   - Repeat labs. Goal LDL < 70       - Lipid Panel; Future; Expected date: 04/09/2025  -     Hepatic Function Panel (7); Future; Expected date: 04/09/2025  -     Atorvastatin Calcium; Take 1 tablet (20 mg total) by mouth nightly.  Dispense: 90 tablet; Refill: 0       15. History of CVA (cerebrovascular accident)  16. Parkinson's disease, unspecified whether dyskinesia present, unspecified whether manifestations fluctuate ([facility])  17. S/P deep brain stimulator placement  18. Cervical dystonia  19. Essential  tremor  20. Vocal tremor  Overview:  Lacunar , on CT  - continue statin and asa  - continue to see neurology    21. History of DVT (deep vein thrombosis)  Overview:  IMV, 2019  - see above.     22. History of breast cancer  Overview:  2001 XRT/Chemo and lumpectomy  TNBC   R breast ca 2005, treated at [facility].  T2N0.  2.2 cm grade 3 triple negative IDC.  S/p lumpectomy, chemotherapy with AC, RT. Right mammogram 6/21-new calcifications right side.  Biopsy-benign.     - mammogram per oncology    23. MDS (myelodysplastic syndrome) (HCC)   -stable. Continue to see oncology    24. Iron deficiency anemia due to chronic blood loss  -stable. Continue supplement    25. Macrocytosis   -on oral B12      26. Chronic obstructive pulmonary disease, unspecified COPD type (HCC)  - stable. No sob. CTM    27. Bilateral renal cysts  - likely benign. Stable since 2022  - CTM on other imaging done by GI and over reads from cards    28. Hx of ischemic bowel disease  Overview:  SMA stent  - see above    29. Ischemic colitis (HCC)  - continue to see GI    30. Early satiety   - weight is stable. CTM    31. Umbilical hernia without obstruction and without gangrene  - no abd pain. CTM    32. Age-related osteoporosis without current pathological fracture   - continue reclast per endocrinology    33. S/P cervical spinal fusion  34. Lumbar radiculitis  - ongoing and chronic. Continue to see pain service.     35. Mood disorder (HCC)  - not sure if she is on paroxetine or duloxetine anymore. She will check her meds at home. If she is still taking them they will be added back to the med list and refilled. If no longer taking them she will continue to monitor her mood.     36. Alcohol dependence with unspecified alcohol-induced disorder (HCC)  - moderation discussed    37. Psoriasis  Overview:  W/psoriatic arthritis  - does not want to see derm. Ctm    38. Bunion  - continue to see podiatry    39. Hammer toe of left foot  - continue to see  podiatry    40. High vitamin K1 level  -     Vitamin K1, Serum; Future; Expected date: 04/09/2025  - lower oral intake and repeat labs    41. High serum vitamin A  -     Vitamin A, Serum; Future; Expected date: 04/09/2025  - lower intake and repeat lab      The patient indicates understanding of these issues and agrees to the plan.  Reinforced healthy diet, lifestyle, and exercise.      No follow-ups on file.     Follow up in 1 year or sooner as needed    NINA Saenz, 1/9/2025     Supplementary Documentation:   General Health:  In the past six months, have you lost more than 10 pounds without trying?: (Patient-Rptd) 2 - No  Has your appetite been poor?: (Patient-Rptd) No  Type of Diet: (Patient-Rptd) Balanced  How does the patient maintain a good energy level?: (Patient-Rptd) Other  How would you describe your daily physical activity?: (Patient-Rptd) Moderate  How would you describe your current health state?: (Patient-Rptd) Good  How do you maintain positive mental well-being?: (Patient-Rptd) Social Interaction;Visiting Friends;Visiting Family  On a scale of 0 to 10, with 0 being no pain and 10 being severe pain, what is your pain level?: (Patient-Rptd) 6 - (Moderate)  In the past six months, have you experienced urine leakage?: (Patient-Rptd) 0-No  At any time do you feel concerned for the safety/well-being of yourself and/or your children, in your home or elsewhere?: (Patient-Rptd) No  Have you had any immunizations at another office such as Influenza, Hepatitis B, Tetanus, or Pneumococcal?: (Patient-Rptd) Yes    Health Maintenance   Topic Date Due    Zoster Vaccines (1 of 2) 02/02/2018    DEXA Scan  Never done    Mammogram  01/26/2025    Annual Physical  01/09/2026    Colorectal Cancer Screening  10/27/2032    Influenza Vaccine  Completed    Annual Depression Screening  Completed    Fall Risk Screening (Annual)  Completed    Pneumococcal Vaccine: 50+ Years  Completed    COVID-19 Vaccine  Completed     Meningococcal B Vaccine  Aged Out

## 2025-01-10 ENCOUNTER — MED REC SCAN ONLY (OUTPATIENT)
Dept: INTERNAL MEDICINE CLINIC | Facility: CLINIC | Age: 70
End: 2025-01-10

## 2025-01-10 PROBLEM — F32.A DEPRESSION: Status: RESOLVED | Noted: 2022-10-20 | Resolved: 2025-01-10

## 2025-01-10 PROBLEM — M25.561 KNEE PAIN, RIGHT: Status: RESOLVED | Noted: 2023-03-16 | Resolved: 2025-01-10

## 2025-01-10 PROBLEM — F39 MOOD DISORDER (HCC): Status: ACTIVE | Noted: 2022-10-20

## 2025-01-10 PROBLEM — Z95.0 PACEMAKER: Status: ACTIVE | Noted: 2025-01-10

## 2025-01-10 PROBLEM — F39 MOOD DISORDER: Status: ACTIVE | Noted: 2022-10-20

## 2025-01-12 ENCOUNTER — TELEPHONE (OUTPATIENT)
Dept: INTERNAL MEDICINE CLINIC | Facility: CLINIC | Age: 70
End: 2025-01-12

## 2025-01-12 PROBLEM — Z00.00 ENCOUNTER FOR ANNUAL HEALTH EXAMINATION: Status: ACTIVE | Noted: 2025-01-12

## 2025-01-12 PROBLEM — N28.1 BILATERAL RENAL CYSTS: Status: ACTIVE | Noted: 2025-01-12

## 2025-01-12 PROBLEM — G24.3 SPASMODIC TORTICOLLIS: Status: RESOLVED | Noted: 2021-06-17 | Resolved: 2025-01-12

## 2025-01-12 PROBLEM — Z96.89 S/P DEEP BRAIN STIMULATOR PLACEMENT: Status: ACTIVE | Noted: 2025-01-12

## 2025-01-12 PROBLEM — Z95.818 PRESENCE OF WATCHMAN LEFT ATRIAL APPENDAGE CLOSURE DEVICE: Status: ACTIVE | Noted: 2025-01-12

## 2025-01-12 PROBLEM — G24.3 SPASMODIC TORTICOLLIS: Status: ACTIVE | Noted: 2021-06-17

## 2025-01-12 PROBLEM — K42.9 UMBILICAL HERNIA WITHOUT OBSTRUCTION AND WITHOUT GANGRENE: Status: ACTIVE | Noted: 2025-01-12

## 2025-01-12 PROBLEM — K55.069: Status: RESOLVED | Noted: 2019-02-04 | Resolved: 2025-01-12

## 2025-01-12 PROBLEM — I77.9 BILATERAL CAROTID ARTERY DISEASE: Status: ACTIVE | Noted: 2025-01-12

## 2025-01-12 PROBLEM — F33.40 RECURRENT MAJOR DEPRESSIVE DISORDER, IN REMISSION (HCC): Status: RESOLVED | Noted: 2021-06-17 | Resolved: 2025-01-12

## 2025-01-12 PROBLEM — F33.40 RECURRENT MAJOR DEPRESSIVE DISORDER, IN REMISSION: Status: RESOLVED | Noted: 2021-06-17 | Resolved: 2025-01-12

## 2025-01-12 PROBLEM — Z12.31 ENCOUNTER FOR SCREENING MAMMOGRAM FOR BREAST CANCER: Status: ACTIVE | Noted: 2025-01-12

## 2025-01-12 PROBLEM — I77.9 BILATERAL CAROTID ARTERY DISEASE (HCC): Status: ACTIVE | Noted: 2025-01-12

## 2025-01-12 PROBLEM — K52.831 COLLAGENOUS COLITIS: Status: RESOLVED | Noted: 2024-02-06 | Resolved: 2025-01-12

## 2025-01-12 NOTE — TELEPHONE ENCOUNTER
Pt had an OV for medicare annual there were some questions that needed additional research.    Please let her know there is a history of stroke many years ago and around 2014 are still mentioned by neurology in their note.  This was found on imaging and thought to be a small chronic stroke.  I did leave history of CVA on her problem list.    I did update her problem list with other diagnoses including pacemaker, watchman's and other things that should be included.  It is now complete and updated and she can review it if she would like.  If there is anything that needs to be added or removed she can let me know.    She was getting get back to us on the paroxetine, duloxetine question.  She was on these previously but they were not on her current med list so she was going to check with time and get back to me.  Please ask her if she has more information about this.    The vitamin K and vitamin a questions that we had were researched.  These are both fat-soluble vitamins and should not be elevated.  They can both cause symptoms or future issues if they are not corrected.  She should look at all the vitamins she is taking to make sure they do not include vitamin A and K.  We can also mail her copies of dietary information of foods that carry these vitamins so she can cut back on them.  I placed repeat vitamin A and K levels to be done with her next set of labs in 3 months.      Thank you.

## 2025-01-13 ENCOUNTER — HOSPITAL ENCOUNTER (OUTPATIENT)
Dept: BONE DENSITY | Age: 70
Discharge: HOME | End: 2025-01-13
Attending: STUDENT IN AN ORGANIZED HEALTH CARE EDUCATION/TRAINING PROGRAM
Payer: MEDICARE

## 2025-01-13 DIAGNOSIS — M81.0 AGE-RELATED OSTEOPOROSIS WITHOUT CURRENT PATHOLOGICAL FRACTURE: ICD-10-CM

## 2025-01-13 PROCEDURE — 77080 DXA BONE DENSITY AXIAL: CPT | Performed by: STUDENT IN AN ORGANIZED HEALTH CARE EDUCATION/TRAINING PROGRAM

## 2025-01-15 ENCOUNTER — APPOINTMENT (OUTPATIENT)
Age: 70
End: 2025-01-15
Attending: INTERNAL MEDICINE
Payer: MEDICARE

## 2025-01-17 DIAGNOSIS — M48.061 SPINAL STENOSIS OF LUMBAR REGION WITHOUT NEUROGENIC CLAUDICATION: ICD-10-CM

## 2025-01-17 DIAGNOSIS — M54.16 LUMBAR RADICULOPATHY: ICD-10-CM

## 2025-01-17 DIAGNOSIS — M54.16 LUMBAR RADICULITIS: ICD-10-CM

## 2025-01-17 DIAGNOSIS — G24.3 CERVICAL DYSTONIA: ICD-10-CM

## 2025-01-17 DIAGNOSIS — F11.90 CHRONIC NARCOTIC USE: ICD-10-CM

## 2025-01-17 DIAGNOSIS — F11.20 OPIOID DEPENDENCE, UNCOMPLICATED (HCC): ICD-10-CM

## 2025-01-17 RX ORDER — HYDROCODONE BITARTRATE AND ACETAMINOPHEN 5; 325 MG/1; MG/1
1 TABLET ORAL DAILY PRN
Qty: 30 TABLET | Refills: 0 | Status: SHIPPED | OUTPATIENT
Start: 2025-01-25 | End: 2025-01-20

## 2025-01-17 NOTE — TELEPHONE ENCOUNTER
Medication: HYDROcodone-acetaminophen 5-325 MG     Date of last refill: 12/26/24  [place]24    Last office visit: 12/09/24  Due back to clinic per last office note:  na  Date next office visit scheduled:  none    Last URINE Screen: 12/23/24  Screen Results:  please review in chart      Narcotic Contract EXPIRATION date: 12/09/25    Last OV note recommendation:   ASSESSMENT AND PLAN:      1. Chronic narcotic dependence (HCC)    Patient with a history of anterior posterior cervical fusion and chronic pain.  She has not noticed much difference 1 using hydrocodone in terms of her overall pain perception.  Patient interested in weaning from the medications.  Will wean by 1 tablet/month to provide very gentle weaning.  Will send a prescription to reflect twice daily dosing for December through January.  Will then transition to 1 tablet a day for most of February and the patient can be off in March.     Orders:      Orders Placed This Encounter   Procedures    Pain Management Drug Panel, Urine

## 2025-01-17 NOTE — TELEPHONE ENCOUNTER
Looks like she is weaning, and, per plan, did reduce her to 1 a day for her next refill which she can  on 125.  Please remind her that this is a reduction, as I do not want her to overlook this.

## 2025-01-20 RX ORDER — PAROXETINE 20 MG/1
20 TABLET, FILM COATED ORAL EVERY MORNING
Qty: 90 TABLET | Refills: 0 | Status: SHIPPED | OUTPATIENT
Start: 2025-01-20

## 2025-01-20 RX ORDER — HYDROCODONE BITARTRATE AND ACETAMINOPHEN 5; 325 MG/1; MG/1
1 TABLET ORAL EVERY 8 HOURS PRN
Qty: 90 TABLET | Refills: 0 | Status: SHIPPED | OUTPATIENT
Start: 2025-01-20 | End: 2025-02-19

## 2025-01-20 RX ORDER — HYDROCODONE BITARTRATE AND ACETAMINOPHEN 5; 325 MG/1; MG/1
1 TABLET ORAL EVERY 8 HOURS PRN
Qty: 30 TABLET | Refills: 0 | Status: SHIPPED | OUTPATIENT
Start: 2025-01-20 | End: 2025-01-20 | Stop reason: CLARIF

## 2025-01-20 NOTE — TELEPHONE ENCOUNTER
Spoke to pt to relay info below. Pt states she rcv'd a msg on 1/3/25 indicating TID dosing was ok'd by Dr. Zhao. However, last month's script was sent for only 60 tabs. Therefore, pt is now due for refill and would like this sent ASAP. Advised pt will process this as this information was found in her  msg and was added to the blue note visible to staff.     Pt states she also indicated to Dr. Zhao that she would be going to [place] on 2/11/25 through 2/28/25. Pt states Dr. Zhao said it would be ok to release her medication early d/t vacation. Encouraged pt to call her ins as well to make them aware she would be going on vacation as they allow once per coverage for vacation. Pt VU.     Contacted the pharmacy to cxl script. Pharmacy tech (Tracie) deactivated the script.

## 2025-01-20 NOTE — TELEPHONE ENCOUNTER
See mcm encounter from 1/10/25. Patient requesting refill. Rx externally reported.    Last OV relevant to medication: 1/9/25   Last refill date:      #/refills:   Externally reported  Last refill sent but discontinued in patients chart:  7/9/24  90/1     When pt was asked to return for OV:   Follow up in 1 year or sooner as needed    Upcoming appt/reason:   Future Appointments   Date Time Provider Department Center   1/24/2025 10:00 AM Sharona Gunter MD NP [facility] C   1/28/2025  3:20 PM [facility] NE RM1 [facility]   1/29/2025 11:20 AM Janes Cortez MD [facility] ORTHO 75 [facility]   2/6/2025 11:00 AM Jaclyn Dong DO [id number] [facility]   3/13/2025 11:00 AM Jaclyn Dong DO [id number] [facility]   1/13/2026 10:40 AM Mouna Gentile APRN [facility] 29 [facility]       Was pt informed of any over due labs: due in april

## 2025-01-23 NOTE — PROGRESS NOTES
Cancer Center Progress Note      Patient Name:  Tamara Meraz  YOB: 1955  Medical Record Number:  [id number]    Date of visit: 1/24/2025    CHIEF COMPLAINT: Anemia    HPI:     70 year old female that I have followed since 6/18. H/o ischemic colitis/GIB.  Has received blood and IV iron.  BM bx 12/19- active trilineage hematopoiesis without any evidence of leukemia. Repeat BM bx 2/21-mild dysplasia.     Has had ischemic colitis in the past.  Was on rivaroxaban.  Discontinued then started again and now on Plavix.  No rectal bleeding.  Had endoscopy recently.  Fell down and bruised her face otherwise no bleeding.    SOCIAL HISTORY:    , 3 children, 5 grandchildren.    ROS:     Constitutional: fatigue  Neurologic: no headache, seizures, diplopia or weakness  Respiratory: no cough, SOB or hemoptysis  Cardiovascular: MUELLER  GI: no nausea, vomiting, diarrhea or BRBPR  Musculoskeletal: no body-ache or joint pain  Dermatologic: no alopecia, rash, pruritis  : no hematuria, dysuria or frequency  Psych: no confusion or depression   Heme: no easy bruising or bleeding     ALLERGIES:    Allergies   Allergen Reactions    Sulfa Antibiotics UNKNOWN    Sulfa Drugs Cross Reactors RASH     Headache       MEDICATIONS:    Current Outpatient Medications   Medication Sig Dispense Refill    PAROXETINE 20 MG Oral Tab Take 1 tablet (20 mg total) by mouth every morning. 90 tablet 0    HYDROcodone-acetaminophen 5-325 MG Oral Tab Take 1 tablet by mouth every 8 (eight) hours as needed for Pain. 90 tablet 0    atorvastatin 20 MG Oral Tab Take 1 tablet (20 mg total) by mouth nightly. 90 tablet 0    sotalol 80 MG Oral Tab Take 1 tablet (80 mg total) by mouth 2 (two) times daily.      Ferrous Sulfate (IRON) 325 (65 Fe) MG Oral Tab Take by mouth daily.      Probiotic Product (PROBIOTIC-10 OR) Take by mouth 2 (two) times a day.      aspirin 81 MG Oral Tab EC Take 1 tablet (81 mg total) by mouth daily. 90 tablet 1    Calcium  Magnesium Zinc 333-133-5 MG Oral Tab Take 1 tablet by mouth daily.      primidone 50 MG Oral Tab Take 100 mg by mouth every morning and take 50 mg by mouth every evening.      gabapentin 300 MG Oral Cap Take by mouth. Take 600mg in the morning and 600 mg at night      Cyanocobalamin (B-12) 1000 MCG Oral Tab Take 1,000 mcg by mouth daily.         VITALS:  Height: 156.2 cm (5' 1.5\") (1006)  Weight: 43.1 kg (95 lb) (1006)  BSA (Calculated - sq m): 1.38 sq meters (1006)  Pulse: 66 (1006)  BP: 136/75 (1006)  Temp: 97.5 °F (36.4 °C) (1006)  Do Not Use - Resp Rate: --  SpO2: 96 % (1006)    PS:  ECO    PHYSICAL EXAM:    General: alert and oriented x 3, not in acute distress. Tremor noted.   HEENT: KANU, oropharynx  clear.  Neck: supple.  No JVD /adenopathy.  CVS: S1S2, regular  Rhythm, no murmurs.   Lungs: Clear to auscultation and percussion.  Abdomen: Soft, non tender, no hepato-splenomegaly.  Extremities:  No edema.  CNS: no focal deficit    Emotional well being: Patient's emotional well being was assessed.  No issues requiring acute psychosocial intervention were identified.     LABS:     No results found for this or any previous visit (from the past 24 hours).      ASSESSMENT AND PLAN:     # Anemia: H/o chronic GIB due to ischemic colitis; AVM.  Bm bx -no malignancy.  Repeat BM bx -mild dyspoiesis,  normal cytogenetics.  Was on rivaroxaban for atrial fibrillation which was discontinued in .  Then resumed again and discontinued.  She last received IV iron .  Counts have been normal since then.    # Recurrent ischemic colitis: Sees cardiology, diffuse atherosclerotic disease with prior mesenteric stenting.   Hypercoag w/u negative.     # Leukopenia: resolved.     # Breast ca: R breast ca , treated at [facility].  T2N0.  2.2 cm grade 3 triple negative IDC.  S/p lumpectomy, chemotherapy with AC, RT. Right mammogram -new calcifications right side.   Biopsy-benign.  Upcoming mammogram 1/25.      ORDERS PLACED:    Return for Labs 6 months, labs and MD 12 months.    Joanna Gunter M.D.    [facility]    [facility]  [address] [place], [address]    1/24/2025

## 2025-01-24 ENCOUNTER — OFFICE VISIT (OUTPATIENT)
Age: 70
End: 2025-01-24
Attending: INTERNAL MEDICINE
Payer: MEDICARE

## 2025-01-24 VITALS
TEMPERATURE: 98 F | SYSTOLIC BLOOD PRESSURE: 136 MMHG | OXYGEN SATURATION: 96 % | BODY MASS INDEX: 17.71 KG/M2 | RESPIRATION RATE: 18 BRPM | HEART RATE: 66 BPM | DIASTOLIC BLOOD PRESSURE: 75 MMHG | WEIGHT: 95 LBS | HEIGHT: 61.5 IN

## 2025-01-24 DIAGNOSIS — D50.0 IRON DEFICIENCY ANEMIA DUE TO CHRONIC BLOOD LOSS: ICD-10-CM

## 2025-01-24 DIAGNOSIS — D46.4 REFRACTORY ANEMIA, UNSPECIFIED (HCC): Primary | ICD-10-CM

## 2025-01-24 DIAGNOSIS — D50.0 IRON DEFICIENCY ANEMIA DUE TO CHRONIC BLOOD LOSS: Primary | ICD-10-CM

## 2025-01-24 LAB
BASOPHILS # BLD AUTO: 0.02 X10(3) UL (ref 0–0.2)
BASOPHILS NFR BLD AUTO: 0.6 %
DEPRECATED HBV CORE AB SER IA-ACNC: 181 NG/ML
EOSINOPHIL # BLD AUTO: 0.03 X10(3) UL (ref 0–0.7)
EOSINOPHIL NFR BLD AUTO: 1 %
ERYTHROCYTE [DISTWIDTH] IN BLOOD BY AUTOMATED COUNT: 12.8 %
HCT VFR BLD AUTO: 36 %
HGB BLD-MCNC: 12.1 G/DL
IMM GRANULOCYTES # BLD AUTO: 0.01 X10(3) UL (ref 0–1)
IMM GRANULOCYTES NFR BLD: 0.3 %
IRON SATN MFR SERPL: 49 %
IRON SERPL-MCNC: 119 UG/DL
LYMPHOCYTES # BLD AUTO: 1.33 X10(3) UL (ref 1–4)
LYMPHOCYTES NFR BLD AUTO: 43.2 %
MCH RBC QN AUTO: 34.6 PG (ref 26–34)
MCHC RBC AUTO-ENTMCNC: 33.6 G/DL (ref 31–37)
MCV RBC AUTO: 102.9 FL
MONOCYTES # BLD AUTO: 0.41 X10(3) UL (ref 0.1–1)
MONOCYTES NFR BLD AUTO: 13.3 %
NEUTROPHILS # BLD AUTO: 1.28 X10 (3) UL (ref 1.5–7.7)
NEUTROPHILS # BLD AUTO: 1.28 X10(3) UL (ref 1.5–7.7)
NEUTROPHILS NFR BLD AUTO: 41.6 %
PLATELET # BLD AUTO: 179 10(3)UL (ref 150–450)
RBC # BLD AUTO: 3.5 X10(6)UL
TOTAL IRON BINDING CAPACITY: 245 UG/DL (ref 250–425)
TRANSFERRIN SERPL-MCNC: 186 MG/DL (ref 250–380)
WBC # BLD AUTO: 3.1 X10(3) UL (ref 4–11)

## 2025-01-24 NOTE — PROGRESS NOTES
Education Record    Learner:  Patient    Disease / Diagnosis:anemia       Barriers / Limitations:  None   Comments:    Method:  Discussion   Comments:    General Topics:  Plan of care reviewed   Comments:    Outcome:  Shows understanding   Comments:   Pt feeling well.   Denies bleeding.   Energy level is good.

## 2025-01-27 ENCOUNTER — TELEPHONE (OUTPATIENT)
Dept: PAIN CLINIC | Facility: CLINIC | Age: 70
End: 2025-01-27

## 2025-01-27 DIAGNOSIS — G24.3 CERVICAL DYSTONIA: ICD-10-CM

## 2025-01-27 DIAGNOSIS — M48.061 SPINAL STENOSIS OF LUMBAR REGION WITHOUT NEUROGENIC CLAUDICATION: ICD-10-CM

## 2025-01-27 DIAGNOSIS — F11.20 OPIOID DEPENDENCE, UNCOMPLICATED (HCC): ICD-10-CM

## 2025-01-27 DIAGNOSIS — F11.90 CHRONIC NARCOTIC USE: ICD-10-CM

## 2025-01-27 DIAGNOSIS — M54.16 LUMBAR RADICULOPATHY: ICD-10-CM

## 2025-01-27 DIAGNOSIS — M54.16 LUMBAR RADICULITIS: ICD-10-CM

## 2025-01-27 NOTE — OPERATIVE REPORT
-Continue physical therapy.  Patient did not qualify for cardiac rehab.  -Will continue to monitor for GDMT compliance.  -Continue Jardiance 10 mg daily.  -Continue Entresto 49-51 mg twice daily.  -Continue Aldactone 25 mg daily.  -Notable comorbidities: s/p TAVR, s/p cardiac defibrillator/, s/p stent placement.   GI update:    Small bowel capsule endoscopy performed for anemia and melena. Findings:    First gastric image 00:00:37. First duodenal image 00:50:38. First cecal image 03:35:16. Red spots from gastric biopsy sites  No avm, bleeding, mass,large polyp, stricture in small bowel. Normal exam of small bowel. Plan:  Monitor hgb outpatient      Thank you for allowing me to help care for your patient.     Sara Mireles MD

## 2025-01-27 NOTE — TELEPHONE ENCOUNTER
Patient is requesting early refill due to travelling to [place] on 2/11/25, Dr. Zhao has Ok'd per Pain Management Comments    Medication:   HYDROcodone-acetaminophen 5-325 MG Oral Tab     Date of last refill: 1/20/25  [place] 1/20/25    Last office visit: 12/9/24  Due back to clinic per last office note:  NA  Date next office visit scheduled:  None    Last URINE Screen: 12/19/24  Screen Results:  See chart      Narcotic Contract EXPIRATION date: 12/9/25    Last OV note recommendation: Patient with a history of anterior posterior cervical fusion and chronic pain.  She has not noticed much difference 1 using hydrocodone in terms of her overall pain perception.  Patient interested in weaning from the medications.  Will wean by 1 tablet/month to provide very gentle weaning.  Will send a prescription to reflect twice daily dosing for December through January.  Will then transition to 1 tablet a day for most of February and the patient can be off in March.

## 2025-01-28 ENCOUNTER — HOSPITAL ENCOUNTER (OUTPATIENT)
Dept: MAMMOGRAPHY | Facility: HOSPITAL | Age: 70
Discharge: HOME | End: 2025-01-28
Attending: INTERNAL MEDICINE
Payer: MEDICARE

## 2025-01-28 DIAGNOSIS — Z12.31 ENCOUNTER FOR SCREENING MAMMOGRAM FOR BREAST CANCER: ICD-10-CM

## 2025-01-28 PROCEDURE — 77063 BREAST TOMOSYNTHESIS BI: CPT | Performed by: INTERNAL MEDICINE

## 2025-01-28 PROCEDURE — 77067 SCR MAMMO BI INCL CAD: CPT | Performed by: INTERNAL MEDICINE

## 2025-01-28 RX ORDER — HYDROCODONE BITARTRATE AND ACETAMINOPHEN 5; 325 MG/1; MG/1
1 TABLET ORAL EVERY 8 HOURS PRN
Qty: 90 TABLET | Refills: 0 | OUTPATIENT
Start: 2025-01-28 | End: 2025-02-27

## 2025-01-28 RX ORDER — HYDROCODONE BITARTRATE AND ACETAMINOPHEN 5; 325 MG/1; MG/1
1 TABLET ORAL EVERY 8 HOURS PRN
Qty: 90 TABLET | Refills: 0 | Status: SHIPPED | OUTPATIENT
Start: 2025-01-28 | End: 2025-02-27

## 2025-01-28 NOTE — TELEPHONE ENCOUNTER
[place]HYDROCODONE BITARTRATE-ACETAMINOPHE  325 MG-5 MG  30 tablets  10 days    1/3/25: Dr. Zhao agreed to allow pt to resume TID dosing.

## 2025-01-28 NOTE — TELEPHONE ENCOUNTER
Per pt the she received a message from our clinic that her medication has been denied. Advised pt that we did not send any message stating that the medication has been denied, advised pt that script for 90 tablet has been sent to her pharmacy. Pt vu and no further questions at this time.

## 2025-01-28 NOTE — TELEPHONE ENCOUNTER
Contacted pharmacy at this time, spoke to pharmacy staff, Cherry asked if there's an issue dispensing  script for Norco; per Cherry, it is due to be dispensed tomorrow.   Contacted pt at this time and relayed this info,katherine. Pt vu and no further questions at this time.

## 2025-01-29 ENCOUNTER — OFFICE VISIT (OUTPATIENT)
Dept: ORTHOPEDICS CLINIC | Facility: CLINIC | Age: 70
End: 2025-01-29
Payer: MEDICARE

## 2025-01-29 ENCOUNTER — TELEPHONE (OUTPATIENT)
Dept: ORTHOPEDICS CLINIC | Facility: CLINIC | Age: 70
End: 2025-01-29

## 2025-01-29 VITALS — BODY MASS INDEX: 17.34 KG/M2 | HEIGHT: 62.5 IN | WEIGHT: 96.63 LBS

## 2025-01-29 DIAGNOSIS — M17.11 PRIMARY OSTEOARTHRITIS OF RIGHT KNEE: ICD-10-CM

## 2025-01-29 DIAGNOSIS — M17.31 POST-TRAUMATIC OSTEOARTHRITIS OF RIGHT KNEE: Primary | ICD-10-CM

## 2025-01-29 DIAGNOSIS — M17.12 PRIMARY OSTEOARTHRITIS OF LEFT KNEE: Primary | ICD-10-CM

## 2025-01-29 PROCEDURE — 99214 OFFICE O/P EST MOD 30 MIN: CPT | Performed by: ORTHOPAEDIC SURGERY

## 2025-01-29 NOTE — TELEPHONE ENCOUNTER
Date of Surgery:   4/22/2025     Post Op Appt:  5/7/2025 2    Case ID: [id number]     Notes:           SURGERY SCHEDULING SHEET     Tamara Meraz  1/15/1955  [id number]     Procedure: Right total knee arthroplasty     CPT: 27924     Diagnosis: Right knee osteoarthritis     Anesthesia: Choice     Length of Surgery: 2 hours     Disposition: Inpatient     Instruments: Medacta TKA     Assist: If case scheduled on Thurs/Fri, then Alec Guthrie first assist. If case scheduled on Tues, then Mj Ortiz ([phone]) first assist. If Mj unavailable, then please communicate to Alec Guthrie/Roxanne/Nik to cancel Alec's clinic for that day and have Alec first assist. If Alec unavailable, contact Keven Camacho for first assist. If Mj and Keven unavailable, then contact [facility] for first assist.     Pre-op Testing: CBC, CMP, PT/INR, PTT, TYPE AND SCREEN, and MRSA/MSSA THROUGH EDW PAT     Clearance: MEDICAL/PCP and CARDIAC     Post op: 2 weeks postop appointment with Alec Guthrie     Surgery date specifics: Patient is looking to proceed with surgery in April 2025.  Please look to see if we can schedule her into an open slot (currently looks like April 17 may have an opening), or can look to book her as one of the fourth cases on a Tuesday.  Patient also needs updated x-rays, these have been ordered.     Janes Cortez MD, FAAOS  [facility]  Phone: [phone]  Fax: [phone]

## 2025-01-29 NOTE — PROGRESS NOTES
SURGERY SCHEDULING SHEET    Tamara Meraz  1/15/1955  [id number]    Procedure: Right total knee arthroplasty    CPT: 23615    Diagnosis: Right knee osteoarthritis    Anesthesia: Choice    Length of Surgery: 2 hours    Disposition: Inpatient    Instruments: Medacta TKA    Assist: If case scheduled on Thurs/Fri, then Alec Guthrie first assist. If case scheduled on Tues, then Mj Diana ([phone]) first assist. If Mj unavailable, then please communicate to Alec Guthrie/Roxanne/Nik to cancel Alec's clinic for that day and have Alec first assist. If Alec unavailable, contact Keven Camacho for first assist. If Mj and Keven unavailable, then contact [facility] for first assist.    Pre-op Testing: CBC, CMP, PT/INR, PTT, TYPE AND SCREEN, and MRSA/MSSA THROUGH EDW PAT    Clearance: MEDICAL/PCP and CARDIAC    Post op: 2 weeks postop appointment with Alec Guthrie    Surgery date specifics: Patient is looking to proceed with surgery in April 2025.  Please look to see if we can schedule her into an open slot (currently looks like April 17 may have an opening), or can look to book her as one of the fourth cases on a Tuesday.  Patient also needs updated x-rays, these have been ordered.    Janes Cortez MD, FAAOS  [facility]  Phone: [phone]  Fax: [phone]

## 2025-01-29 NOTE — PROGRESS NOTES
[facility] Progress Note    Subjective: Very pleasant 70-year-old female returns to clinic today to discuss her right knee.  She was last seen by Dr. Sunshine, has undergone injections and notes that these are no longer providing relief.  She does take hydrocodone for pain issues, nonsurgical treatments including medications and injections are no longer alleviating her symptoms, and she feels significant limitations due to pain in the knee.    Objective: Patient is very pleasant, comfortable, present with son today.  Right knee with slight valgus alignment, demonstrates near full extension and flexion around 110.  Stable to varus valgus stress.      Assessment/Plan: 70-year-old female with symptomatic radiographically severe posttraumatic right knee osteoarthritis.  The patient has reasonably attempted nonsurgical treatments as mentioned above and remains limited in activities of daily living secondary to pain from knee arthritis.  I discussed risks and benefits of surgery, with risks including but not limited to infection, blood clots, fracture, bleeding/need for blood transfusion, injury to blood vessels and nerves, loosening or failure of components, stiffness, continued pain, need for further intervention or revision surgery.  Additionally I discussed expectations with regards to the postoperative recovery timeframe, the possibility of mechanical clicking with the knee, numbness on the lateral side of the knee/leg from sacrifice of the infrapatellar branch of the saphenous nerve, and potential for difficulty/pain with kneeling and performing deep flexion activities.  The patient understands this discussion and elects to proceed with knee replacement surgery.  Binder was provided today.  She will need primary care evaluation and clearance within 30 days of surgery date.  Additionally she does follow with cardiology and will obtain clearance.  X-rays have been ordered including full-length films as well as  dedicated right knee films.  She has trips upcoming including [place], would like to try to do the surgery in April.    Janes Cortez MD, FAAOS  [facility]  Phone [phone]  Fax [phone]

## 2025-01-30 ENCOUNTER — TELEPHONE (OUTPATIENT)
Dept: INTERNAL MEDICINE CLINIC | Facility: CLINIC | Age: 70
End: 2025-01-30

## 2025-01-30 NOTE — TELEPHONE ENCOUNTER
Date of pre-op appt:  4/2/25  Provider pre-op scheduled with: Mari   Surgeon's name:  Dr Cortez    Phone #:  [phone]   Fax #:  [phone] and [phone]  Surgery date:  4/22/25  Procedure/diagnosis:  Right total knee arthroplasty  Does patient routinely see Cardiology or Pulmonology?     If yes, please inform Pt that they may need clearance from them also    Pre op form given to: Sue  Pre op form faxed to Dr Cortez   Patient did not attend group despite staff encouragement.

## 2025-01-30 NOTE — TELEPHONE ENCOUNTER
Spoke with patient and we scheduled surgery and post op.    Patient will call back to go over preoperative procedures.    PCP CLEARANCE SENT    Cardiac Clearance Sent    Patient also needs updated x-rays, these have been ordered.

## 2025-01-31 ENCOUNTER — HOSPITAL ENCOUNTER (OUTPATIENT)
Dept: GENERAL RADIOLOGY | Facility: HOSPITAL | Age: 70
Discharge: HOME | End: 2025-01-31
Attending: ORTHOPAEDIC SURGERY
Payer: MEDICARE

## 2025-01-31 DIAGNOSIS — R92.30 DENSE BREAST TISSUE ON MAMMOGRAM: Primary | ICD-10-CM

## 2025-01-31 DIAGNOSIS — M17.31 POST-TRAUMATIC OSTEOARTHRITIS OF RIGHT KNEE: ICD-10-CM

## 2025-01-31 DIAGNOSIS — Z85.3 HISTORY OF BREAST CANCER: ICD-10-CM

## 2025-01-31 PROCEDURE — 77073 BONE LENGTH STUDIES: CPT | Performed by: ORTHOPAEDIC SURGERY

## 2025-02-04 ENCOUNTER — PATIENT MESSAGE (OUTPATIENT)
Dept: ORTHOPEDICS CLINIC | Facility: CLINIC | Age: 70
End: 2025-02-04

## 2025-02-04 ENCOUNTER — TELEPHONE (OUTPATIENT)
Dept: ORTHOPEDICS CLINIC | Facility: CLINIC | Age: 70
End: 2025-02-04

## 2025-02-04 ENCOUNTER — HOSPITAL ENCOUNTER (OUTPATIENT)
Dept: GENERAL RADIOLOGY | Age: 70
Discharge: HOME | End: 2025-02-04
Attending: ORTHOPAEDIC SURGERY
Payer: MEDICARE

## 2025-02-04 DIAGNOSIS — M17.31 POST-TRAUMATIC OSTEOARTHRITIS OF RIGHT KNEE: ICD-10-CM

## 2025-02-04 PROCEDURE — 73562 X-RAY EXAM OF KNEE 3: CPT | Performed by: ORTHOPAEDIC SURGERY

## 2025-02-04 NOTE — TELEPHONE ENCOUNTER
Patient states she has been trying to reach Francisco for a few days and when she calls option 3 it is giving patient an error. Please call patient back when able to

## 2025-02-05 NOTE — TELEPHONE ENCOUNTER
Called and spoke with Deya in regards to her message she states that she has been trying to reach Francisco to go over the surgical folder. I informed her that he is out of the office this week and offered to assist her. She states that she will be going on vacation on tues and would like for Francisco to give her a call on Mon. I did inform her that I would give Francisco the message to have him give her a call once he is back in the office. She states understanding.

## 2025-02-06 ENCOUNTER — OFFICE VISIT (OUTPATIENT)
Facility: CLINIC | Age: 70
End: 2025-02-06
Payer: MEDICARE

## 2025-02-06 VITALS
HEART RATE: 69 BPM | WEIGHT: 96 LBS | DIASTOLIC BLOOD PRESSURE: 62 MMHG | OXYGEN SATURATION: 97 % | HEIGHT: 62.5 IN | BODY MASS INDEX: 17.22 KG/M2 | SYSTOLIC BLOOD PRESSURE: 112 MMHG

## 2025-02-06 DIAGNOSIS — M81.0 AGE-RELATED OSTEOPOROSIS WITHOUT CURRENT PATHOLOGICAL FRACTURE: Primary | ICD-10-CM

## 2025-02-06 PROCEDURE — 99214 OFFICE O/P EST MOD 30 MIN: CPT | Performed by: STUDENT IN AN ORGANIZED HEALTH CARE EDUCATION/TRAINING PROGRAM

## 2025-02-06 NOTE — PATIENT INSTRUCTIONS
Visit Summary  We discussed switching from reclast to prolia. You will keep me updated regarding your thoughts  Continue vitamin D and calcium    General follow up information:  Please let us know if you require any refills at least 1 week prior to your medication running out. If you do run out of medication, please call our office ASAP to request refills (do not wait until your follow up).  Please call us if you experience any problems with insurance coverage of medication, lab work, or imaging.   Lab results and imaging will typically be reviewed at follow up appointments, or within 3-5 business days of ALL results being in if you do not have an appointment scheduled in the near future. Our office will contact you for any abnormal results requiring more urgent follow up or action.  The on-call pager is for urgent matters only. If you are a type 1 diabetic and run out of insulin after business hours 8AM-4PM, you may call the on-call pager for a refill to a 24 hour pharmacy. If you have adrenal insufficiency and run out of steroids, you may call the on-call pager for a refill to a 24 hours pharmacy. All other refill requests should be requested during business hours.    Return Visit   [x]  Dr. Dong in 7 months   [] Virtual visit  [] No follow up appointment needed  [] Follow up to be scheduled pending      []  Fasting/8AM labs  []  Central scheduling # for ultrasound/nuclear med/CT/MRI/DXA/IR  []  Provide flyer for:  [] ENT   [] Weight Management  [] Infertility/Reproductive Endocrinology  [] Transgender care  []  Directions to 1st floor lab  [] Collect urine collection jug  [] Collect salivary cortisol tubes  [x]  Dental clearance form  []  Will need authorization for outside records
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
## 2025-02-07 DIAGNOSIS — E78.00 PURE HYPERCHOLESTEROLEMIA: ICD-10-CM

## 2025-02-10 ENCOUNTER — PATIENT MESSAGE (OUTPATIENT)
Facility: CLINIC | Age: 70
End: 2025-02-10

## 2025-02-10 DIAGNOSIS — M81.0 AGE-RELATED OSTEOPOROSIS WITHOUT CURRENT PATHOLOGICAL FRACTURE: Primary | ICD-10-CM

## 2025-02-10 RX ORDER — ATORVASTATIN CALCIUM 10 MG/1
10 TABLET, FILM COATED ORAL DAILY
Qty: 90 TABLET | Refills: 0 | OUTPATIENT
Start: 2025-02-10

## 2025-02-10 NOTE — TELEPHONE ENCOUNTER
Last office note: Patient understands the risks and benefits, and will keep me updated regarding her final decision to switch to Prolia. Dental clearance received 1/2024 visit and patient given another dental clearance form   Due for bone density 1/2027     Pended pre prolia labs and routed for review.     Entered patient to [employer]- awaiting benefits packet.     Routed for review.

## 2025-02-11 NOTE — TELEPHONE ENCOUNTER
Per [employer] Summary of Benefits, \"Prolia and administration will be subject to a $257.00 deductible ($257.00 met) and 20.0% coinsurance... Medicare Supplement Plan G... covers the Medicare Part B co-insurance and 100% of the excess charges.\"    No prior authorization required.     Placed in RN file folder.     Will await signed labs, then update patient.

## 2025-02-25 DIAGNOSIS — F11.20 OPIOID DEPENDENCE, UNCOMPLICATED (HCC): ICD-10-CM

## 2025-02-25 DIAGNOSIS — M54.16 LUMBAR RADICULOPATHY: ICD-10-CM

## 2025-02-25 DIAGNOSIS — M48.061 SPINAL STENOSIS OF LUMBAR REGION WITHOUT NEUROGENIC CLAUDICATION: ICD-10-CM

## 2025-02-25 DIAGNOSIS — M54.16 LUMBAR RADICULITIS: ICD-10-CM

## 2025-02-25 DIAGNOSIS — F11.90 CHRONIC NARCOTIC USE: ICD-10-CM

## 2025-02-25 DIAGNOSIS — G24.3 CERVICAL DYSTONIA: ICD-10-CM

## 2025-02-26 RX ORDER — HYDROCODONE BITARTRATE AND ACETAMINOPHEN 5; 325 MG/1; MG/1
1 TABLET ORAL EVERY 8 HOURS PRN
Qty: 90 TABLET | Refills: 0 | Status: SHIPPED | OUTPATIENT
Start: 2025-02-28 | End: 2025-03-30

## 2025-02-26 NOTE — TELEPHONE ENCOUNTER
Medication: HYDROcodone-acetaminophen 5-325 MG Oral Tab     Date of last refill: 1/28/25  [place]25    Last office visit: 12/09/24  Due back to clinic per last office note:  No f/u noted  Date next office visit scheduled:  None    Last URINE Screen: 12/23/24  Screen Results:  No note      Narcotic Contract EXPIRATION date: 12/9/25    Last OV note recommendation:   ASSESSMENT AND PLAN:      1. Chronic narcotic dependence (HCC)    Patient with a history of anterior posterior cervical fusion and chronic pain.  She has not noticed much difference 1 using hydrocodone in terms of her overall pain perception.  Patient interested in weaning from the medications.  Will wean by 1 tablet/month to provide very gentle weaning.  Will send a prescription to reflect twice daily dosing for December through January.  Will then transition to 1 tablet a day for most of February and the patient can be off in March.     Orders:      Orders Placed This Encounter   Procedures    Pain Management Drug Panel, Urine            Radiology orders and consultations:None  The patient indicates understanding of these issues and agrees to the plan.  No follow-ups on file.     Gustavo Zhao MD, 12/9/2024, 1:35 PM

## 2025-03-14 ENCOUNTER — PATIENT MESSAGE (OUTPATIENT)
Facility: CLINIC | Age: 70
End: 2025-03-14

## 2025-03-16 ENCOUNTER — HOSPITAL ENCOUNTER (OUTPATIENT)
Age: 70
Discharge: HOME | End: 2025-03-16
Payer: MEDICARE

## 2025-03-16 VITALS
HEIGHT: 61 IN | DIASTOLIC BLOOD PRESSURE: 80 MMHG | RESPIRATION RATE: 18 BRPM | SYSTOLIC BLOOD PRESSURE: 107 MMHG | BODY MASS INDEX: 18.5 KG/M2 | OXYGEN SATURATION: 96 % | HEART RATE: 72 BPM | WEIGHT: 98 LBS | TEMPERATURE: 98 F

## 2025-03-16 DIAGNOSIS — J06.9 VIRAL UPPER RESPIRATORY ILLNESS: ICD-10-CM

## 2025-03-16 DIAGNOSIS — J02.9 SORE THROAT: Primary | ICD-10-CM

## 2025-03-16 NOTE — DISCHARGE INSTRUCTIONS
COVID, flu, strep are negative.  Symptoms appear viral.  Take Tylenol as needed for pain.  Use over-the-counter cough medicine like Delsym.  Cough drops.  Drink plenty of fluids, water, hot tea with honey.  Recheck with your primary doctor if the cough is worsening.

## 2025-03-16 NOTE — ED PROVIDER NOTES
Patient Seen in: [facility]      History     Chief Complaint   Patient presents with    Sore Throat     Stated Complaint: Sore throat;  Subjective:   70-year-old female with Parkinson's, anxiety, high cholesterol, depression, hypertension, pacemaker, atrial fibrillation presents from home.  Patient states she is here for sore throat and cold symptoms for the last 3 days.  Mild cough with runny nose.  No fever.  No COVID testing done at home.  No home remedies attempted.  States she quit smoking in 2018.  Patient states that she has a baby shower today and wants COVID and flu testing to make sure that she is not contagious.    The history is provided by the patient. No  was used.     Objective:   Past Medical History:    Abdominal pain    left sided intermittent pain    Age-related osteoporosis without current pathological fracture    Anemia    Anxiety    Anxiety    Arrhythmia    afib    Arthritis    Atrial flutter, paroxysmal (HCC)    Back pain    Back problem    Benign essential tremor    Bowel obstruction (HCC)    Breast cancer (HCC)    Broken ankle    Left Ankle    Cancer (HCC)    Right breast    Cervical dystonia    DBS b/l    Cervical spine degeneration    Change in hair    Closed fracture of left ankle    Colitis    Ischemic Colitis    Constipation    Depression    Difficult intubation    big bony features in the bottom of mouth     Easy bruising    Endometriosis    Essential hypertension    Exposure to medical diagnostic radiation    Fatigue    ANEMIA.  I GET INFUSIONS    Fracture of distal end of tibia with fibula, left, closed, initial encounter    Last Assessment & Plan:    Formatting of this note might be different from the original.   Following follow-up, potentially secondary to hypotension/muscle weakness.  X-rays show a left nondisplaced distal fibula fracture.  Exam with soft tissue swelling and bruising.   -Admit inpatient   -telemetry   -prn analgesics    -SCDs   -PT/OT   -Ortho consult      Frequent use of laxatives    Gastrointestinal hemorrhage    GI bleed    Heart palpitations    AFIB    High blood pressure    High cholesterol    History of blood transfusion    2018    History of breast cancer    XRT/Chemo    History of depression    History of GI bleed    Ischemic bowel    History of GI bleed    ischmic bowel and AVM.  likely due to vascular dis, smoking. Hypercoag w/u negative. Last admitted .  Has seen GI recently and colonoscopy has been deferred.      Hoarseness, chronic    Hyperglycemia    Hyperlipidemia    Hyponatremia    Hypotension due to hypovolemia    Impacted cerumen of left ear    Intestinal infection due to Clostridium difficile        Irregular bowel habits    Ischemic colitis (HCC)        Lacunar infarction (HCC)    Nausea with vomiting    Neuropathy    Osteoarthritis    Osteoarthrosis, unspecified whether generalized or localized, unspecified site    Pacemaker    Pain in joints    Pain with bowel movements    ISCHEMIC COLITIS    Panic disorder    Parkinson disease (HCC)    Personal history of antineoplastic chemotherapy    Personal history of endometriosis    Plaque psoriasis    Presence of other cardiac implants and grafts    Psoriatic arthropathy (HCC)    Recurrent major depressive disorder, in remission    Rhinovirus    Sleep disturbance    Status post deep brain stimulator placement    Stented coronary artery    Stress    Tobacco dependence    Quit     Uncomfortable fullness after meals    Vaso vagal episode    Vasovagal syncope    Weight loss            Past Surgical History:   Procedure Laterality Date    Angioplasty (coronary)      Appendectomy      Appendectomy      Back surgery  2005    NECK AT LOWER LUMBAR FUSED    Brain surgery  2016    Brain stimulator           x3    Cardiac pacemaker placement      Cervical spine surgery  2004    cervical spinal fusion C1-6    Chemotherapy      Colonoscopy   2014    Procedure: COLONOSCOPY;  Surgeon: Aaron Fair MD;  Location:  ENDOSCOPY    Colonoscopy N/A 2018    Procedure: COLONOSCOPY;  Surgeon: Aaron Fair MD;  Location:  ENDOSCOPY    D & c      Hysterectomy  1992    OVARIES REMOVED AS WELL    Lumbar spine surgery  1999    lumbar laminectomy L1-L4    Lumpectomy right      Oophorectomy      Other      COLON STENTS    Other surgical history      Lt bunionectomy    Other surgical history      stent in CMA    Pain management Bilateral 06/15/2021    BILATERAL LUMBAR 5 TRANSFORAMINAL LUMBAR EPIDURAL STEROID INJECTION    Radiation right      Sigmoidoscopy,diagnostic      Spine surgery procedure unlisted      Total abdom hysterectomy      Tubal ligation      Upper gi endoscopy,biopsy      ulcer, at [facility]              Social History     Socioeconomic History    Marital status:    Tobacco Use    Smoking status: Former     Current packs/day: 0.00     Average packs/day: 0.5 packs/day for 36.0 years (18.0 ttl pk-yrs)     Types: Cigarettes     Start date: 1982     Quit date: 2018     Years since quittin.2     Passive exposure: Past    Smokeless tobacco: Never    Tobacco comments:     Updated 25   Vaping Use    Vaping status: Never Used   Substance and Sexual Activity    Alcohol use: Yes     Alcohol/week: 14.0 standard drinks of alcohol     Types: 14 Cans of beer per week     Comment: approx 0- 2 beers per day    Drug use: No    Sexual activity: Not Currently   Other Topics Concern    Caffeine Concern No    Exercise No    Seat Belt No    Special Diet Yes     Comment: HOW TO PUT ON WEIGHT    Stress Concern No    Weight Concern Yes     Comment: CAN'T PUT ON WEIGHT     Social Drivers of Health     Food Insecurity: No Food Insecurity (2024)    Received from [facility]    Hunger Vital Sign     Worried About Running Out of Food in the Last Year: Never true     Ran Out of Food in the Last Year:  Never true   Transportation Needs: No Transportation Needs (6/11/2024)    Received from [facility]    PRAPARE - Transportation     Lack of Transportation (Medical): No     Lack of Transportation (Non-Medical): No   Housing Stability: High Risk (6/11/2024)    Received from [facility]    Housing Stability Vital Sign     Unable to Pay for Housing in the Last Year: No     Number of Places Lived in the Last Year: 1     Unstable Housing in the Last Year: Yes            Review of Systems    Positive for stated complaint: Sore Throat    Other systems are as noted in HPI.  Constitutional and vital signs reviewed.      All other systems reviewed and negative except as noted above.    Physical Exam     ED Triage Vitals [03/16/25 1125]   /80   Pulse 72   Resp 18   Temp 98.2 °F (36.8 °C)   Temp src Oral   SpO2 96 %   O2 Device None (Room air)     Current:/80   Pulse 72   Temp 98.2 °F (36.8 °C) (Oral)   Resp 18   Ht 154.9 cm (5' 1\")   Wt 44.5 kg   SpO2 96%   BMI 18.52 kg/m²     Physical Exam  Vitals and nursing note reviewed.   Constitutional:       General: She is not in acute distress.     Appearance: Normal appearance. She is not ill-appearing or toxic-appearing.   HENT:      Head: Normocephalic and atraumatic.      Right Ear: Tympanic membrane, ear canal and external ear normal.      Left Ear: Tympanic membrane, ear canal and external ear normal.      Nose: Nose normal.      Mouth/Throat:      Mouth: Mucous membranes are moist.      Pharynx: Oropharynx is clear. No pharyngeal swelling or posterior oropharyngeal erythema.      Tonsils: No tonsillar exudate.   Eyes:      Pupils: Pupils are equal, round, and reactive to light.   Cardiovascular:      Rate and Rhythm: Normal rate and regular rhythm.      Pulses: Normal pulses.   Pulmonary:      Effort: Pulmonary effort is normal. No respiratory distress.      Breath sounds: Normal breath sounds.      Comments:  Inspiration triggers cough. lungs clear.  No adventitious lung sounds.  No distress.  No hypoxia.  Pulse ox 96% ra. Which is normal    Abdominal:      General: Abdomen is flat.      Palpations: Abdomen is soft.   Musculoskeletal:         General: No signs of injury. Normal range of motion.      Cervical back: Normal range of motion and neck supple.   Lymphadenopathy:      Cervical: No cervical adenopathy.   Skin:     General: Skin is warm and dry.      Capillary Refill: Capillary refill takes less than 2 seconds.   Neurological:      General: No focal deficit present.      Mental Status: She is alert and oriented to person, place, and time.      GCS: GCS eye subscore is 4. GCS verbal subscore is 5. GCS motor subscore is 6.   Psychiatric:         Mood and Affect: Mood normal.         Behavior: Behavior normal.         Thought Content: Thought content normal.         Judgment: Judgment normal.         ED Course   Radiology:  No results found.  Labs Reviewed   POCT RAPID STREP - Normal   POCT FLU TEST - Normal    Narrative:     This assay is a rapid molecular in vitro test utilizing nucleic acid amplification of influenza A and B viral RNA.   RAPID SARS-COV-2 BY PCR - Normal     Recent Results (from the past 24 hours)   POCT Flu Test    Collection Time: 03/16/25 11:34 AM    Specimen: Nares; Other   Result Value Ref Range    POCT INFLUENZA A Negative Negative    POCT INFLUENZA B Negative Negative   Rapid SARS-CoV-2 by PCR    Collection Time: 03/16/25 11:34 AM    Specimen: Nares; Other   Result Value Ref Range    Rapid SARS-CoV-2 by PCR Not Detected Not Detected   POCT Rapid Strep    Collection Time: 03/16/25 11:42 AM   Result Value Ref Range    POCT Rapid Strep Negative Negative       MDM     Medical Decision Making  Differential diagnoses reflecting the complexity of care include: COVID, flu, viral URI, strep, pneumonia  Patient well-appearing on exam.  She does have a dry cough that is triggered by inspiration but no  respiratory distress.  No hypoxia.  Pulse ox 96% room air which is normal.  Lungs are clear.  COVID is negative  Flu is negative  Strep is negative  I did discuss the option of chest x-ray with patient but states she does not have time   symptoms do appear viral      Plan of Care: Discussed OTC treatment of symptoms including Tylenol, Delsym, oral fluids.  Recommend follow-up with primary doctor    Results and plan of care discussed with the patient/family. They are in agreement with discharge. They understand to follow up with their primary doctor or the referral physician for further evaluation, especially if no improvement.  Also discussed the limitations of immediate care, patient is aware that if symptoms are worse they should go to the emergency room. Verbal and written discharge instructions were given.     My independent interpretation of studies of: COVID flu strep negative  Diagnostic tests and medications considered but not ordered were: No indication for antibiotics  Shared decision making was done by: Patient declined chest x-ray  Comorbidities that add complexity to management include: Parkinson's, high cholesterol, hypertension, pacemaker  External chart review was done and was noted: Reviewed, scheduled for knee surgery next month  History obtained by an independent source was from: N/A   Discussions and management was done with: N/A  Social determinants of health that affect care: Age, leaving for [place] in 3 days               Problems Addressed:  Sore throat: acute illness or injury  Viral upper respiratory illness: acute illness or injury    Amount and/or Complexity of Data Reviewed  Labs: ordered. Decision-making details documented in ED Course.    Risk  OTC drugs.        Disposition and Plan     Clinical Impression:  1. Sore throat    2. Viral upper respiratory illness         Disposition:  Discharge  3/16/2025 12:05 pm    Follow-up:  Lilli Kramer MD  [address]  [facility] [address]  [phone]                Medications Prescribed:  Discharge Medication List as of 3/16/2025 12:07 PM

## 2025-03-17 ENCOUNTER — HOSPITAL ENCOUNTER (OUTPATIENT)
Dept: GENERAL RADIOLOGY | Age: 70
Discharge: HOME | End: 2025-03-17
Attending: NURSE PRACTITIONER
Payer: MEDICARE

## 2025-03-17 ENCOUNTER — OFFICE VISIT (OUTPATIENT)
Dept: INTERNAL MEDICINE CLINIC | Facility: CLINIC | Age: 70
End: 2025-03-17
Payer: MEDICARE

## 2025-03-17 VITALS
HEIGHT: 61 IN | WEIGHT: 95.88 LBS | BODY MASS INDEX: 18.1 KG/M2 | DIASTOLIC BLOOD PRESSURE: 80 MMHG | OXYGEN SATURATION: 96 % | SYSTOLIC BLOOD PRESSURE: 126 MMHG | TEMPERATURE: 97 F | HEART RATE: 82 BPM | RESPIRATION RATE: 16 BRPM

## 2025-03-17 DIAGNOSIS — J06.9 VIRAL URI WITH COUGH: Primary | ICD-10-CM

## 2025-03-17 DIAGNOSIS — J06.9 VIRAL URI WITH COUGH: ICD-10-CM

## 2025-03-17 PROCEDURE — G2211 COMPLEX E/M VISIT ADD ON: HCPCS | Performed by: NURSE PRACTITIONER

## 2025-03-17 PROCEDURE — 99214 OFFICE O/P EST MOD 30 MIN: CPT | Performed by: NURSE PRACTITIONER

## 2025-03-17 PROCEDURE — 71046 X-RAY EXAM CHEST 2 VIEWS: CPT | Performed by: NURSE PRACTITIONER

## 2025-03-17 RX ORDER — BENZONATATE 200 MG/1
200 CAPSULE ORAL 3 TIMES DAILY PRN
Qty: 30 CAPSULE | Refills: 0 | Status: SHIPPED | OUTPATIENT
Start: 2025-03-17

## 2025-03-17 RX ORDER — METHYLPREDNISOLONE 4 MG/1
TABLET ORAL
Qty: 1 EACH | Refills: 0 | Status: SHIPPED | OUTPATIENT
Start: 2025-03-17

## 2025-03-17 NOTE — PROGRESS NOTES
The following individual(s) verbally consented to be recorded using ambient AI listening technology and understand that they can each withdraw their consent to this listening technology at any point by asking the clinician to turn off or pause the recording:    Patient name: Tamara Meraz    CHIEF COMPLAINT:     Chief Complaint   Patient presents with    Urgent Care F/u     Viral Infection - All Swabs Done 3/16/25 at  were Negative       HPI:     History of Present Illness  Deya Meraz is a 70 year old female who presents with respiratory symptoms including cough and sore throat.     She began experiencing respiratory symptoms on Friday, including a sore throat, cough, and nasal congestion, fatigue, and body aches. No fever, chills, shortness of breath, or chest pain, but she does experience fatigue and body aches, which have persisted since the onset of her symptoms. The cough is particularly disruptive to her sleep.    She visited urgent care the day before this appointment, where she was tested for strep throat, COVID-19, and influenza, all of which returned negative results. An x-ray was suggested but not completed due to a family event.    She is not currently taking any medications for her symptoms, including over-the-counter options.    Past Medical History:    Abdominal pain    left sided intermittent pain    Age-related osteoporosis without current pathological fracture    Anemia    Anxiety    Anxiety    Arrhythmia    afib    Arthritis    Atrial flutter, paroxysmal (HCC)    Back pain    Back problem    Benign essential tremor    Bowel obstruction (HCC)    Breast cancer (HCC)    Broken ankle    Left Ankle    Cancer (HCC)    Right breast    Cervical dystonia    DBS b/l    Cervical spine degeneration    Change in hair    Closed fracture of left ankle    Colitis    Ischemic Colitis    Constipation    Depression    Difficult intubation    big bony features in the bottom of mouth     Easy  bruising    Endometriosis    Essential hypertension    Exposure to medical diagnostic radiation    Fatigue    ANEMIA.  I GET INFUSIONS    Fracture of distal end of tibia with fibula, left, closed, initial encounter    Last Assessment & Plan:    Formatting of this note might be different from the original.   Following follow-up, potentially secondary to hypotension/muscle weakness.  X-rays show a left nondisplaced distal fibula fracture.  Exam with soft tissue swelling and bruising.   -Admit inpatient   -telemetry   -prn analgesics   -SCDs   -PT/OT   -Ortho consult      Frequent use of laxatives    Gastrointestinal hemorrhage    GI bleed    Heart palpitations    AFIB    High blood pressure    High cholesterol    History of blood transfusion    Jan 2018    History of breast cancer    XRT/Chemo    History of depression    History of GI bleed    Ischemic bowel    History of GI bleed    ischmic bowel and AVM.  likely due to vascular dis, smoking. Hypercoag w/u negative. Last admitted 12/20.  Has seen GI recently and colonoscopy has been deferred.      Hoarseness, chronic    Hyperglycemia    Hyperlipidemia    Hyponatremia    Hypotension due to hypovolemia    Impacted cerumen of left ear    Intestinal infection due to Clostridium difficile    2019    Irregular bowel habits    Ischemic colitis (HCC)    12/22    Lacunar infarction (HCC)    Nausea with vomiting    Neuropathy    Osteoarthritis    Osteoarthrosis, unspecified whether generalized or localized, unspecified site    Pacemaker    Pain in joints    Pain with bowel movements    ISCHEMIC COLITIS    Panic disorder    Parkinson disease (HCC)    Personal history of antineoplastic chemotherapy    Personal history of endometriosis    Plaque psoriasis    Presence of other cardiac implants and grafts    Psoriatic arthropathy (HCC)    Recurrent major depressive disorder, in remission    Rhinovirus    Sleep disturbance    Status post deep brain stimulator placement    Stented  coronary artery    Stress    Tobacco dependence    Quit     Uncomfortable fullness after meals    Vaso vagal episode    Vasovagal syncope    Weight loss      Past Surgical History:   Procedure Laterality Date    Angioplasty (coronary)      Appendectomy      Appendectomy      Back surgery  2005    NECK AT LOWER LUMBAR FUSED    Brain surgery  2016    Brain stimulator           x3    Cardiac pacemaker placement      Cervical spine surgery  2004    cervical spinal fusion C1-6    Chemotherapy      Colonoscopy  2014    Procedure: COLONOSCOPY;  Surgeon: Aaron Fair MD;  Location:  ENDOSCOPY    Colonoscopy N/A 2018    Procedure: COLONOSCOPY;  Surgeon: Aaron Fair MD;  Location:  ENDOSCOPY    D & c      Hysterectomy  1992    OVARIES REMOVED AS WELL    Lumbar spine surgery  1999    lumbar laminectomy L1-L4    Lumpectomy right  2002    Oophorectomy  1991    Other      COLON STENTS    Other surgical history      Lt bunionectomy    Other surgical history      stent in [facility]    Pain management Bilateral 06/15/2021    BILATERAL LUMBAR 5 TRANSFORAMINAL LUMBAR EPIDURAL STEROID INJECTION    Radiation right      Sigmoidoscopy,diagnostic      Spine surgery procedure unlisted      Total abdom hysterectomy      Tubal ligation      Upper gi endoscopy,biopsy      ulcer, at [facility]      Social History:  Social History     Socioeconomic History    Marital status:    Tobacco Use    Smoking status: Former     Current packs/day: 0.00     Average packs/day: 0.5 packs/day for 36.0 years (18.0 ttl pk-yrs)     Types: Cigarettes     Start date: 1982     Quit date: 2018     Years since quittin.2     Passive exposure: Past    Smokeless tobacco: Never    Tobacco comments:     Updated 25   Vaping Use    Vaping status: Never Used   Substance and Sexual Activity    Alcohol use: Yes     Alcohol/week: 14.0 standard drinks of alcohol     Types: 14 Cans of beer per week      Comment: approx 0- 2 beers per day    Drug use: No    Sexual activity: Not Currently   Other Topics Concern    Caffeine Concern No    Exercise No    Seat Belt No    Special Diet Yes     Comment: HOW TO PUT ON WEIGHT    Stress Concern No    Weight Concern Yes     Comment: CAN'T PUT ON WEIGHT     Social Drivers of Health     Food Insecurity: No Food Insecurity (6/11/2024)    Received from [facility]    Hunger Vital Sign     Worried About Running Out of Food in the Last Year: Never true     Ran Out of Food in the Last Year: Never true   Transportation Needs: No Transportation Needs (6/11/2024)    Received from [facility]    PRAPARE - Transportation     Lack of Transportation (Medical): No     Lack of Transportation (Non-Medical): No   Housing Stability: High Risk (6/11/2024)    Received from [facility]    Housing Stability Vital Sign     Unable to Pay for Housing in the Last Year: No     Number of Places Lived in the Last Year: 1     Unstable Housing in the Last Year: Yes      Family History:  Family History   Problem Relation Age of Onset    Hypertension Father     Musculo-skelatal Disorder Father         TREMOR    Heart Disorder Mother     Cancer Mother         pharyngeal    Breast Cancer Mother         MYSELF 2001    Breast Cancer Self     Lung Disorder Sister         COPD    Heart Attack Brother 72    Ulcerative Colitis Son       Allergies:  Allergies[1]   Current Meds:  Current Outpatient Medications   Medication Sig Dispense Refill    benzonatate 200 MG Oral Cap Take 1 capsule (200 mg total) by mouth 3 (three) times daily as needed. 30 capsule 0    methylPREDNISolone (MEDROL) 4 MG Oral Tablet Therapy Pack As directed. 1 each 0    HYDROcodone-acetaminophen 5-325 MG Oral Tab Take 1 tablet by mouth every 8 (eight) hours as needed for Pain. 90 tablet 0    PAROXETINE 20 MG Oral Tab Take 1 tablet (20 mg total) by mouth every morning. 90 tablet 0     atorvastatin 20 MG Oral Tab Take 1 tablet (20 mg total) by mouth nightly. 90 tablet 0    sotalol 80 MG Oral Tab Take 1 tablet (80 mg total) by mouth 2 (two) times daily.      Ferrous Sulfate (IRON) 325 (65 Fe) MG Oral Tab Take by mouth daily.      Probiotic Product (PROBIOTIC-10 OR) Take by mouth 2 (two) times a day.      aspirin 81 MG Oral Tab EC Take 1 tablet (81 mg total) by mouth daily. 90 tablet 1    Calcium Magnesium Zinc 333-133-5 MG Oral Tab Take 1 tablet by mouth daily.      primidone 50 MG Oral Tab Take 100 mg by mouth every morning and take 50 mg by mouth every evening.      gabapentin 300 MG Oral Cap Take by mouth. Take 600mg in the morning and 600 mg at night      Cyanocobalamin (B-12) 1000 MCG Oral Tab Take 1,000 mcg by mouth daily.         Counseling given: Not Answered  Tobacco comments: Updated 1/29/25       REVIEW OF SYSTEMS:   See HPI.    EXAM:     /80 (BP Location: Left arm, Patient Position: Sitting, Cuff Size: child)   Pulse 82   Temp 97.2 °F (36.2 °C) (Temporal)   Resp 16   Ht 5' 1\" (1.549 m)   Wt 95 lb 14.4 oz (43.5 kg)   SpO2 96%   BMI 18.12 kg/m²   Body mass index is 18.12 kg/m².   Vital signs reviewed. Appears stated age, well groomed, in no acute distress.  Physical Exam:  GENERAL: Patient is alert, awake and oriented, well developed, well nourished.  HEENT: Head: Normocephalic, atraumatic. Eyes: EOMI, PERRLA, conjunctivae clear bilaterally, no eye discharge. Ears: External normal, TM clear bilaterally. No maxillary or frontal sinus tenderness with palpation.   NECK: Supple, no CLAD  HEART: RRR without murmur.  LUNGS: Clear to auscultation bilaterally, no rales/rhonchi/wheezing.  ABDOMEN: good BS's, no masses, HSM or tenderness  NEURO: Oriented time three.    LABS:      Lab Results   Component Value Date    WBC 3.1 (L) 01/24/2025    RBC 3.50 (L) 01/24/2025    HGB 12.1 01/24/2025    HCT 36.0 01/24/2025    .9 (H) 01/24/2025    MCH 34.6 (H) 01/24/2025    MCHC 33.6  01/24/2025    RDW 12.8 01/24/2025    .0 01/24/2025    MPV 9.8 11/09/2012      Lab Results   Component Value Date    GLU 94 12/31/2024    BUN 16 12/31/2024    BUNCREA 14.9 07/14/2021    CREATSERUM 0.82 12/31/2024    ANIONGAP 6 12/31/2024    GFR 53 (L) 01/04/2018    GFRNAA 66 06/03/2022    GFRAA 77 06/03/2022    CA 10.1 12/31/2024    OSMOCALC 287 12/31/2024    ALKPHO 56 12/31/2024    AST 23 12/31/2024    ALT 17 12/31/2024    BILT 0.3 12/31/2024    TP 7.7 12/31/2024    ALB 4.7 12/31/2024    GLOBULIN 3.0 12/31/2024     12/31/2024    K 4.2 12/31/2024     12/31/2024    CO2 29.0 12/31/2024      Lab Results   Component Value Date    CHOLEST 204 (H) 12/31/2024    TRIG 204 (H) 12/31/2024    HDL 60 (H) 12/31/2024     (H) 12/31/2024    VLDL 35 (H) 12/31/2024    TCHDLRATIO 4.7 (H) 10/12/2012    NONHDLC 144 (H) 12/31/2024      Lab Results   Component Value Date    T4F 1.1 02/10/2014    TSH 2.680 12/31/2024      No results found for: \"EAG\", \"A1C\"     IMAGING:     No results found.     ASSESSMENT AND PLAN:     Assessment & Plan  Viral upper respiratory infection  Symptoms consistent with viral URI. No fever or chest pain. Lungs clear. Bacterial pneumonia less likely. Symptom management primary approach. Further evaluation if symptoms persist beyond 7-10 days or worsen. Discussed antibiotics if chest x-ray indicates pneumonia. Steroid pack considered if symptoms worsen.  - Order chest x-ray to rule out bacterial pneumonia.  - Prescribe benzonatate pearls for cough, up to three times daily as needed.  - Advise warm water gargles, lozenges, and cool items like popsicles for sore throat relief.  - Recommend over-the-counter options like Robitussin or Mucinex for additional cough relief.  - Prescribe steroid pack if symptoms worsen, especially before travel.  - Advise taking steroid pack with food to avoid stomach upset.  - Discuss potential need for antibiotics if chest x-ray shows pneumonia.  - XR CHEST PA +  LAT CHEST (CPT=71046); Future  - benzonatate 200 MG Oral Cap; Take 1 capsule (200 mg total) by mouth 3 (three) times daily as needed.  Dispense: 30 capsule; Refill: 0  - methylPREDNISolone (MEDROL) 4 MG Oral Tablet Therapy Pack; As directed.  Dispense: 1 each; Refill: 0    Travel considerations  Planning travel to [facility] for ten days. Discussed symptom management during travel. Emphasized starting steroid pack if symptoms worsen and completing full course if initiated.  - Advise starting steroid pack if symptoms worsen before or during travel.  - Ensure understanding to complete full course of steroid pack if started.  - Provide guidance on managing symptoms while traveling, including use of prescribed medications and over-the-counter options.     The patient indicates understanding of these issues and agrees to the plan.  Return if symptoms worsen or fail to improve.    Melly Hoang, APRN  3/17/2025         [1]   Allergies  Allergen Reactions    Sulfa Antibiotics UNKNOWN    Sulfa Drugs Cross Reactors RASH     Headache

## 2025-03-27 ENCOUNTER — ORDER TRANSCRIPTION (OUTPATIENT)
Dept: PHYSICAL THERAPY | Facility: HOSPITAL | Age: 70
End: 2025-03-27

## 2025-03-27 DIAGNOSIS — Z96.651 S/P TOTAL KNEE ARTHROPLASTY, RIGHT: Primary | ICD-10-CM

## 2025-03-28 DIAGNOSIS — M54.16 LUMBAR RADICULITIS: ICD-10-CM

## 2025-03-28 DIAGNOSIS — F11.20 OPIOID DEPENDENCE, UNCOMPLICATED (HCC): ICD-10-CM

## 2025-03-28 DIAGNOSIS — M54.16 LUMBAR RADICULOPATHY: ICD-10-CM

## 2025-03-28 DIAGNOSIS — F11.90 CHRONIC NARCOTIC USE: ICD-10-CM

## 2025-03-28 DIAGNOSIS — M48.061 SPINAL STENOSIS OF LUMBAR REGION WITHOUT NEUROGENIC CLAUDICATION: ICD-10-CM

## 2025-03-28 DIAGNOSIS — G24.3 CERVICAL DYSTONIA: ICD-10-CM

## 2025-03-28 RX ORDER — HYDROCODONE BITARTRATE AND ACETAMINOPHEN 5; 325 MG/1; MG/1
1 TABLET ORAL EVERY 8 HOURS PRN
Qty: 90 TABLET | Refills: 0 | Status: SHIPPED | OUTPATIENT
Start: 2025-03-29 | End: 2025-03-31

## 2025-03-28 NOTE — TELEPHONE ENCOUNTER
She had been told to follow-up in April.  While I am happy to refill this medication, please have her schedule appointment for next month with Dr. Zhao.

## 2025-03-28 NOTE — TELEPHONE ENCOUNTER
Medication:   HYDROcodone-acetaminophen 5-325 MG Oral Tab     Date of last refill: 2/28/25  [place]/25    Last office visit: 12/9/24  Due back to clinic per last office note:  NA  Date next office visit scheduled:  None    Last URINE Screen: 12/19/24  Screen Results:  See chart      Narcotic Contract EXPIRATION date: 12/9/25    Last OV note recommendation: Patient with a history of anterior posterior cervical fusion and chronic pain.  She has not noticed much difference 1 using hydrocodone in terms of her overall pain perception.  Patient interested in weaning from the medications.  Will wean by 1 tablet/month to provide very gentle weaning.  Will send a prescription to reflect twice daily dosing for December through January.  Will then transition to 1 tablet a day for most of February and the patient can be off in March.

## 2025-03-30 DIAGNOSIS — M25.512 ACUTE PAIN OF LEFT SHOULDER: Primary | ICD-10-CM

## 2025-03-31 ENCOUNTER — TELEPHONE (OUTPATIENT)
Facility: CLINIC | Age: 70
End: 2025-03-31

## 2025-03-31 ENCOUNTER — OFFICE VISIT (OUTPATIENT)
Facility: CLINIC | Age: 70
End: 2025-03-31

## 2025-03-31 ENCOUNTER — HOSPITAL ENCOUNTER (OUTPATIENT)
Dept: GENERAL RADIOLOGY | Age: 70
Discharge: HOME | End: 2025-03-31
Attending: FAMILY MEDICINE
Payer: MEDICARE

## 2025-03-31 VITALS — WEIGHT: 95 LBS | HEIGHT: 61 IN | BODY MASS INDEX: 17.94 KG/M2

## 2025-03-31 DIAGNOSIS — M79.602 LEFT ARM PAIN: Primary | ICD-10-CM

## 2025-03-31 DIAGNOSIS — S46.212A STRAIN OF LEFT BICEPS, INITIAL ENCOUNTER: Primary | ICD-10-CM

## 2025-03-31 DIAGNOSIS — M19.012 PRIMARY OSTEOARTHRITIS OF LEFT SHOULDER: ICD-10-CM

## 2025-03-31 DIAGNOSIS — M79.602 LEFT ARM PAIN: ICD-10-CM

## 2025-03-31 PROCEDURE — 99214 OFFICE O/P EST MOD 30 MIN: CPT | Performed by: FAMILY MEDICINE

## 2025-03-31 PROCEDURE — 73060 X-RAY EXAM OF HUMERUS: CPT | Performed by: FAMILY MEDICINE

## 2025-03-31 RX ORDER — HYDROCODONE BITARTRATE AND ACETAMINOPHEN 5; 325 MG/1; MG/1
1 TABLET ORAL EVERY 8 HOURS PRN
Qty: 90 TABLET | Refills: 0 | Status: SHIPPED | OUTPATIENT
Start: 2025-03-31 | End: 2025-04-30

## 2025-03-31 NOTE — TELEPHONE ENCOUNTER
Patient called to make follow up appt with Dr. Zhao for 4/16. Patient is asking that her medication be refilled.

## 2025-04-01 ENCOUNTER — LAB ENCOUNTER (OUTPATIENT)
Dept: LAB | Age: 70
End: 2025-04-01
Attending: NURSE PRACTITIONER
Payer: MEDICARE

## 2025-04-01 DIAGNOSIS — R79.89 HIGH SERUM VITAMIN A: ICD-10-CM

## 2025-04-01 DIAGNOSIS — E78.2 MIXED HYPERLIPIDEMIA: ICD-10-CM

## 2025-04-01 DIAGNOSIS — R74.8: ICD-10-CM

## 2025-04-01 LAB
ALBUMIN SERPL-MCNC: 4.4 G/DL (ref 3.2–4.8)
ALP LIVER SERPL-CCNC: 74 U/L
ALT SERPL-CCNC: 17 U/L
AST SERPL-CCNC: 25 U/L (ref ?–34)
BILIRUB DIRECT SERPL-MCNC: <0.1 MG/DL (ref ?–0.3)
BILIRUB SERPL-MCNC: 0.3 MG/DL (ref 0.2–1.1)
CHOLEST SERPL-MCNC: 172 MG/DL (ref ?–200)
FASTING PATIENT LIPID ANSWER: YES
HDLC SERPL-MCNC: 62 MG/DL (ref 40–59)
LDLC SERPL CALC-MCNC: 87 MG/DL (ref ?–100)
NONHDLC SERPL-MCNC: 110 MG/DL (ref ?–130)
PROT SERPL-MCNC: 7.2 G/DL (ref 5.7–8.2)
TRIGL SERPL-MCNC: 129 MG/DL (ref 30–149)
VLDLC SERPL CALC-MCNC: 21 MG/DL (ref 0–30)

## 2025-04-01 PROCEDURE — 84590 ASSAY OF VITAMIN A: CPT

## 2025-04-01 PROCEDURE — 80076 HEPATIC FUNCTION PANEL: CPT

## 2025-04-01 PROCEDURE — 84597 ASSAY OF VITAMIN K: CPT

## 2025-04-01 PROCEDURE — 36415 COLL VENOUS BLD VENIPUNCTURE: CPT

## 2025-04-01 PROCEDURE — 80061 LIPID PANEL: CPT

## 2025-04-01 NOTE — H&P
Sports Medicine Clinic Note    Subjective:    Chief Complaint: Left upper arm pain and swelling    Date of Injury: 3/29/25    History: 70-year-old right-handed female with known osteoporosis presents with left elbow pain and swelling after a fall on 3/29/25. She reports tripping on a step and falling onto the left arm, with immediate swelling and pain in the bicep region. Pain is worsened with elbow flexion/extension and forearm rotation. Denies prior elbow injuries. She is currently using her left arm minimally due to discomfort. No numbness, tingling, or systemic symptoms. She has a history of over fifty surgeries and is notably anxious about her upcoming left knee replacement scheduled for 4/22/25. Maintains an active low impact lifestyle including swimming three times a week.    Objective:    Left Elbow Examination:    Inspection: Moderate bruising and swelling localized to the distal bicep region. No gross deformity or ecchymosis extending beyond elbow.  Palpation: Tenderness over distal biceps tendon. No tenderness over lateral or medial epicondyles. No palpable defect suggesting full rupture.  Range of Motion: Pain with both active and passive flexion/extension and pronation/supination. Range limited by pain.  Neurovascular: Sensation intact in radial, median, and ulnar distributions. Radial and ulnar pulses palpable with brisk capillary refill.  Special Tests: Hook test intact. No milka deformity noted.    Diagnostic Tests:    Radiographs of the left humerus and elbow were personally reviewed. No evidence of acute fracture or dislocation. Normal alignment and bone mineralization. Mild osteoarthritic changes noted at the left shoulder and AC joint.    Assessment:    Suspected partial distal biceps tendon tear, left arm  Left knee osteoarthritis - scheduled for total knee arthroplasty on 4/22/25  Osteoporosis, stable - no acute findings on humerus x-ray    Plan:    Additional Workup: None at this  time.  Therapy: Refer to physical therapy for progressive ROM and strengthening exercises once acute pain subsides.  Medications: NSAIDs and/or acetaminophen for pain control.  Bracing/Casting: Provide arm sling for short-term comfort and support.  Procedures: None at this time.  Activity Recommendations: Avoid heavy lifting, overhead activities, and resistance training with the left arm until pain-free range of motion is restored. Swimming permitted without overhead strokes; wading is acceptable.    Follow-Up: Tentatively scheduled during the week of April 14th to assess progress.      Kenny Sunshine DO, CAQSM   Primary Care Sports Medicine

## 2025-04-02 ENCOUNTER — OFFICE VISIT (OUTPATIENT)
Dept: INTERNAL MEDICINE CLINIC | Facility: CLINIC | Age: 70
End: 2025-04-02
Payer: MEDICARE

## 2025-04-02 ENCOUNTER — LAB ENCOUNTER (OUTPATIENT)
Dept: LAB | Age: 70
End: 2025-04-02
Attending: ORTHOPAEDIC SURGERY
Payer: MEDICARE

## 2025-04-02 VITALS
DIASTOLIC BLOOD PRESSURE: 82 MMHG | HEIGHT: 61.81 IN | OXYGEN SATURATION: 98 % | WEIGHT: 97.31 LBS | BODY MASS INDEX: 17.91 KG/M2 | HEART RATE: 67 BPM | SYSTOLIC BLOOD PRESSURE: 122 MMHG | TEMPERATURE: 97 F | RESPIRATION RATE: 16 BRPM

## 2025-04-02 DIAGNOSIS — K42.9 UMBILICAL HERNIA WITHOUT OBSTRUCTION AND WITHOUT GANGRENE: ICD-10-CM

## 2025-04-02 DIAGNOSIS — I77.9 BILATERAL CAROTID ARTERY DISEASE, UNSPECIFIED TYPE: ICD-10-CM

## 2025-04-02 DIAGNOSIS — M54.16 LUMBAR RADICULITIS: ICD-10-CM

## 2025-04-02 DIAGNOSIS — D46.9 MDS (MYELODYSPLASTIC SYNDROME) (HCC): ICD-10-CM

## 2025-04-02 DIAGNOSIS — Z95.0 PACEMAKER: ICD-10-CM

## 2025-04-02 DIAGNOSIS — I48.0 PAROXYSMAL ATRIAL FIBRILLATION (HCC): ICD-10-CM

## 2025-04-02 DIAGNOSIS — Z86.73 HISTORY OF CVA (CEREBROVASCULAR ACCIDENT): ICD-10-CM

## 2025-04-02 DIAGNOSIS — K55.1 SUPERIOR MESENTERIC ARTERY ATHEROSCLEROSIS (HCC): ICD-10-CM

## 2025-04-02 DIAGNOSIS — Z96.89 S/P DEEP BRAIN STIMULATOR PLACEMENT: ICD-10-CM

## 2025-04-02 DIAGNOSIS — K55.9 ISCHEMIC COLITIS (HCC): ICD-10-CM

## 2025-04-02 DIAGNOSIS — I73.9 PAD (PERIPHERAL ARTERY DISEASE): ICD-10-CM

## 2025-04-02 DIAGNOSIS — M81.0 AGE-RELATED OSTEOPOROSIS WITHOUT CURRENT PATHOLOGICAL FRACTURE: ICD-10-CM

## 2025-04-02 DIAGNOSIS — M17.11 PRIMARY OSTEOARTHRITIS OF RIGHT KNEE: ICD-10-CM

## 2025-04-02 DIAGNOSIS — G25.0 ESSENTIAL TREMOR: ICD-10-CM

## 2025-04-02 DIAGNOSIS — J44.9 CHRONIC OBSTRUCTIVE PULMONARY DISEASE, UNSPECIFIED COPD TYPE (HCC): ICD-10-CM

## 2025-04-02 DIAGNOSIS — Z86.718 HISTORY OF DVT (DEEP VEIN THROMBOSIS): ICD-10-CM

## 2025-04-02 DIAGNOSIS — I10 ESSENTIAL HYPERTENSION: ICD-10-CM

## 2025-04-02 DIAGNOSIS — Z98.1 S/P CERVICAL SPINAL FUSION: ICD-10-CM

## 2025-04-02 DIAGNOSIS — I25.10 CAD IN NATIVE ARTERY: ICD-10-CM

## 2025-04-02 DIAGNOSIS — Z95.818 PRESENCE OF WATCHMAN LEFT ATRIAL APPENDAGE CLOSURE DEVICE: ICD-10-CM

## 2025-04-02 DIAGNOSIS — Z01.818 PREOP EXAM FOR INTERNAL MEDICINE: Primary | ICD-10-CM

## 2025-04-02 DIAGNOSIS — E78.2 MIXED HYPERLIPIDEMIA: ICD-10-CM

## 2025-04-02 DIAGNOSIS — I70.8 AORTO-ILIAC ATHEROSCLEROSIS: ICD-10-CM

## 2025-04-02 DIAGNOSIS — G20.A1 PARKINSON'S DISEASE, UNSPECIFIED WHETHER DYSKINESIA PRESENT, UNSPECIFIED WHETHER MANIFESTATIONS FLUCTUATE (HCC): Chronic | ICD-10-CM

## 2025-04-02 DIAGNOSIS — Z85.3 HISTORY OF BREAST CANCER: ICD-10-CM

## 2025-04-02 DIAGNOSIS — I70.0 AORTO-ILIAC ATHEROSCLEROSIS: ICD-10-CM

## 2025-04-02 DIAGNOSIS — D50.0 IRON DEFICIENCY ANEMIA DUE TO CHRONIC BLOOD LOSS: ICD-10-CM

## 2025-04-02 PROBLEM — R68.81 EARLY SATIETY: Status: RESOLVED | Noted: 2020-09-25 | Resolved: 2025-04-02

## 2025-04-02 PROBLEM — Z00.00 ENCOUNTER FOR ANNUAL HEALTH EXAMINATION: Status: RESOLVED | Noted: 2025-01-12 | Resolved: 2025-04-02

## 2025-04-02 PROBLEM — D75.89 MACROCYTOSIS: Status: RESOLVED | Noted: 2023-03-16 | Resolved: 2025-04-02

## 2025-04-02 PROBLEM — Z12.31 ENCOUNTER FOR SCREENING MAMMOGRAM FOR BREAST CANCER: Status: RESOLVED | Noted: 2025-01-12 | Resolved: 2025-04-02

## 2025-04-02 PROCEDURE — 99215 OFFICE O/P EST HI 40 MIN: CPT | Performed by: NURSE PRACTITIONER

## 2025-04-02 PROCEDURE — 87081 CULTURE SCREEN ONLY: CPT

## 2025-04-02 PROCEDURE — 99499 UNLISTED E&M SERVICE: CPT | Performed by: NURSE PRACTITIONER

## 2025-04-02 PROCEDURE — G2211 COMPLEX E/M VISIT ADD ON: HCPCS | Performed by: NURSE PRACTITIONER

## 2025-04-02 NOTE — H&P (VIEW-ONLY)
[facility]  PRE OP RISK ASSESSMENT    REASON FOR CONSULT: Pre-op risk assessment for surgical procedure right knee total arthroplasty planned for 4/22/25 with general anesthesia.    REQUESTING PHYSICIAN: Dr. Cortez     CHIEF COMPLAINT:   Chief Complaint   Patient presents with    Pre-Op Exam     Right total knee arthroplasty surgery with Dr Cortez on  4/22/25       HISTORY OF PRESENT ILLNESS:   The patient is a 70 year old  female  presents with right knee pain scheduled for the above procedure. She has a history of  Primary osteoarthritis of right knee, CAD in native artery,  Paroxysmal atrial fibrillation ([facility]), Presence of Watchman left atrial appendage closure device, Bilateral carotid artery disease, unspecified type, PAD (peripheral artery disease), 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[facility]), Aorto-iliac atherosclerosis, Mixed hyperlipidemia, Essential hypertension, Chronic obstructive pulmonary disease, unspecified COPD type ([facility]), Ischemic colitis ([facility]), MDS (myelodysplastic syndrome) ([facility]), Parkinson's disease, unspecified whether dyskinesia present, unspecified whether manifestations fluctuate ([facility]), Essential tremor, History of CVA (cerebrovascular accident)Iron deficiency anemia due to chronic blood loss, History of breast cancer, History of DVT (deep vein thrombosis), Umbilical hernia without obstruction and without gangrene, S/P deep brain stimulator placement, S/P cervical spinal fusion, Lumbar radiculitis, Age-related osteoporosis without current pathological fracture. She follows with cardiology closely.  She denies chest pain or sob at rest or with exertion. She is completely independent with ADLs.     Past Medical History:    Past Medical History:    Abdominal pain    left sided intermittent pain    Age-related osteoporosis without current pathological fracture    Anemia    Anxiety    Anxiety    Arrhythmia    afib    Arthritis    Atrial flutter, paroxysmal (HCC)    Back pain     Back problem    severe spinal stenosis    Benign essential tremor    Bowel obstruction (HCC)    Breast cancer (HCC)    Broken ankle    Left Ankle    Cancer (HCC)    Right breast    Cataract    Cervical dystonia    DBS b/l    Cervical spine degeneration    Change in hair    Closed fracture of left ankle    Colitis    Ischemic Colitis    Constipation    Depression    Difficult intubation    big bony features in the bottom of mouth     Easy bruising    Endometriosis    Essential hypertension    Exposure to medical diagnostic radiation    Fatigue    ANEMIA.  I GET INFUSIONS    Fracture of distal end of tibia with fibula, left, closed, initial encounter    Last Assessment & Plan:    Formatting of this note might be different from the original.   Following follow-up, potentially secondary to hypotension/muscle weakness.  X-rays show a left nondisplaced distal fibula fracture.  Exam with soft tissue swelling and bruising.   -Admit inpatient   -telemetry   -prn analgesics   -SCDs   -PT/OT   -Ortho consult      Frequent use of laxatives    Gastrointestinal hemorrhage    GI bleed    Heart palpitations    AFIB    High blood pressure    High cholesterol    History of blood transfusion    Jan 2018    History of breast cancer    XRT/Chemo    History of depression    History of GI bleed    Ischemic bowel    History of GI bleed    ischmic bowel and AVM.  likely due to vascular dis, smoking. Hypercoag w/u negative. Last admitted 12/20.  Has seen GI recently and colonoscopy has been deferred.      Hoarseness, chronic    Hyperglycemia    Hyperlipidemia    Hyponatremia    Hypotension due to hypovolemia    Impacted cerumen of left ear    Intestinal infection due to Clostridium difficile    2019    Irregular bowel habits    Ischemic colitis (HCC)    12/22    Lacunar infarction (HCC)    Muscle weakness    left arm biceps torn    Nausea with vomiting    Neuropathy    Osteoarthritis    Osteoarthrosis, unspecified whether generalized or  localized, unspecified site    Pacemaker    Pain in joints    Pain with bowel movements    ISCHEMIC COLITIS    Panic disorder    Parkinson disease (HCC)    Personal history of antineoplastic chemotherapy    Personal history of endometriosis    Plaque psoriasis    Presence of other cardiac implants and grafts    Problems with swallowing    Psoriatic arthropathy (HCC)    Recurrent major depressive disorder, in remission    Rhinovirus    Sleep disturbance    Status post deep brain stimulator placement    Stented coronary artery    Stress    Tobacco dependence    Quit     Uncomfortable fullness after meals    Vaso vagal episode    Vasovagal syncope    Weight loss        Past Surgical History:    Past Surgical History:   Procedure Laterality Date    Angioplasty (coronary)      Appendectomy      Appendectomy      Back surgery  2005    NECK AT LOWER LUMBAR FUSED    Brain surgery  2016    Brain stimulator           x3    Cardiac pacemaker placement      Cervical spine surgery  2004    cervical spinal fusion C1-6    Chemotherapy      Colonoscopy  2014    Procedure: COLONOSCOPY;  Surgeon: Aaron Fair MD;  Location:  ENDOSCOPY    Colonoscopy N/A 2018    Procedure: COLONOSCOPY;  Surgeon: Aaron Fair MD;  Location:  ENDOSCOPY    D & c      Hysterectomy  1992    OVARIES REMOVED AS WELL    Lumbar spine surgery  1999    lumbar laminectomy L1-L4    Lumpectomy right  2002    Oophorectomy      Other      COLON STENTS    Other surgical history      Lt bunionectomy    Other surgical history      stent in [facility]    Pain management Bilateral 06/15/2021    BILATERAL LUMBAR 5 TRANSFORAMINAL LUMBAR EPIDURAL STEROID INJECTION    Radiation right      Sigmoidoscopy,diagnostic      Spine surgery procedure unlisted      Total abdom hysterectomy      Tubal ligation      Upper gi endoscopy,biopsy      ulcer, at juany       Current Medications:    Current Outpatient Medications   Medication  Sig Dispense Refill    HYDROcodone-acetaminophen 5-325 MG Oral Tab Take 1 tablet by mouth every 8 (eight) hours as needed for Pain. 90 tablet 0    PAROXETINE 20 MG Oral Tab Take 1 tablet (20 mg total) by mouth every morning. 90 tablet 0    atorvastatin 20 MG Oral Tab Take 1 tablet (20 mg total) by mouth nightly. 90 tablet 0    sotalol 80 MG Oral Tab Take 1 tablet (80 mg total) by mouth 2 (two) times daily.      Ferrous Sulfate (IRON) 325 (65 Fe) MG Oral Tab Take by mouth every other day.      Probiotic Product (PROBIOTIC-10 OR) Take by mouth 2 (two) times a day.      aspirin 81 MG Oral Tab EC Take 1 tablet (81 mg total) by mouth daily. 90 tablet 1    Calcium Magnesium Zinc 333-133-5 MG Oral Tab Take 1 tablet by mouth daily.      primidone 50 MG Oral Tab Take 100 mg by mouth every morning and take 150 mg by mouth every evening.      gabapentin 300 MG Oral Cap Take by mouth. Take 600mg in the morning and 600 mg at night      Cyanocobalamin (B-12) 1000 MCG Oral Tab Take 1,000 mcg by mouth daily.           Allergies:    Allergies as of 2025 - Review Complete 2025   Allergen Reaction Noted    Sulfa antibiotics UNKNOWN 2022    Sulfa drugs cross reactors RASH 10/24/2011       SOCIAL HISTORY:   Social History     Socioeconomic History    Marital status:      Spouse name: Not on file    Number of children: Not on file    Years of education: Not on file    Highest education level: Not on file   Occupational History    Not on file   Tobacco Use    Smoking status: Former     Current packs/day: 0.00     Average packs/day: 0.5 packs/day for 36.0 years (18.0 ttl pk-yrs)     Types: Cigarettes     Start date: 1982     Quit date: 2018     Years since quittin.2     Passive exposure: Past    Smokeless tobacco: Never    Tobacco comments:     Updated 25   Vaping Use    Vaping status: Never Used   Substance and Sexual Activity    Alcohol use: Yes     Alcohol/week: 14.0 standard drinks of  alcohol     Types: 14 Cans of beer per week     Comment: approx 0- 2 beers per day    Drug use: No    Sexual activity: Not Currently   Other Topics Concern    Caffeine Concern No    Exercise No    Seat Belt No    Special Diet Yes     Comment: HOW TO PUT ON WEIGHT    Stress Concern No    Weight Concern Yes     Comment: CAN'T PUT ON WEIGHT   Social History Narrative    Not on file     Social Drivers of Health     Food Insecurity: No Food Insecurity (6/11/2024)    Received from [facility]    Hunger Vital Sign     Worried About Running Out of Food in the Last Year: Never true     Ran Out of Food in the Last Year: Never true   Transportation Needs: No Transportation Needs (6/11/2024)    Received from [facility]    PRAPARE - Transportation     Lack of Transportation (Medical): No     Lack of Transportation (Non-Medical): No   Stress: Not on file   Housing Stability: High Risk (6/11/2024)    Received from [facility]    Housing Stability Vital Sign     Unable to Pay for Housing in the Last Year: No     Number of Places Lived in the Last Year: 1     Unstable Housing in the Last Year: Yes        FAMILY HISTORY:   Family History   Problem Relation Age of Onset    Hypertension Father     Musculo-skelatal Disorder Father         TREMOR    Heart Disorder Mother     Cancer Mother         pharyngeal    Breast Cancer Mother         MYSELF 2001    Breast Cancer Self     Lung Disorder Sister         COPD    Heart Attack Brother 72    Ulcerative Colitis Son         REVIEW OF SYSTEMS:  PRE-OP ROS  NSAIDS/PLATELET INH: None  DIABETIC MEDICATIONS: None  AL;  None  Hx of VTE: Hx of DVT  BLEEDING DISORDER: None  LOOSE DENTITION OR DENTAL APPLIANCES: None  URI, COUGH, CP, FEVER:  None  CERVICAL SPINE RESTRICTION: stiff neck, no pain    PHYSICAL EXAM:  /82 (BP Location: Right arm, Patient Position: Sitting, Cuff Size: adult)   Pulse 67   Temp 97.1 °F (36.2 °C)  (Temporal)   Resp 16   Ht 5' 1.81\" (1.57 m)   Wt 97 lb 4.8 oz (44.1 kg)   SpO2 98%   BMI 17.91 kg/m²   Body mass index is 17.91 kg/m².   GENERAL: well developed, well nourished,in no apparent distress  SKIN: no rashes, no suspicious lesions. Bruising to the left arm.   HEENT: atraumatic, normocephalic,ears are clear, no sinus pain  EYES: conjunctiva are clear  NECK: supple,no adenopathy  LUNGS: clear to auscultation; no rhonchi, rales, or wheezing  CARDIO: RRR without murmur  GI:  no masses, organomegaly or tenderness  MUSCULOSKELETAL: Normal gait.  EXTREMITIES: no edema  NEURO: Oriented times three, tremor present    LABS:  Lab Results   Component Value Date    WBC 5.4 04/04/2025    RBC 2.90 (L) 04/04/2025    HGB 10.1 (L) 04/04/2025    HCT 29.9 (L) 04/04/2025    .1 (H) 04/04/2025    MCH 34.8 (H) 04/04/2025    MCHC 33.8 04/04/2025    RDW 12.4 04/04/2025    .0 04/04/2025    MPV 9.8 11/09/2012      Lab Results   Component Value Date    GLU 85 04/04/2025    BUN 19 04/04/2025    BUNCREA 14.9 07/14/2021    CREATSERUM 0.96 04/04/2025    ANIONGAP 12 04/04/2025    GFR 53 (L) 01/04/2018    GFRNAA 66 06/03/2022    GFRAA 77 06/03/2022    CA 10.2 04/04/2025    OSMOCALC 296 (H) 04/04/2025    ALKPHO 56 04/04/2025    AST 24 04/04/2025    ALT 14 04/04/2025    BILT 0.3 04/04/2025    TP 7.2 04/04/2025    ALB 4.5 04/04/2025    GLOBULIN 2.7 04/04/2025     04/04/2025    K 4.5 04/04/2025     04/04/2025    CO2 26.0 04/04/2025      Lab Results   Component Value Date    CHOLEST 172 04/01/2025    TRIG 129 04/01/2025    HDL 62 (H) 04/01/2025    LDL 87 04/01/2025    VLDL 21 04/01/2025    TCHDLRATIO 4.7 (H) 10/12/2012    NONHDLC 110 04/01/2025      Lab Results   Component Value Date    T4F 1.1 02/10/2014    TSH 2.680 12/31/2024      No results found for: \"EAG\", \"A1C\"        ASSESSMENT AND PLAN:    1. Preop exam for internal medicine  2. Primary osteoarthritis of right knee  -  The patient has right knee pain and  is scheduled for right knee total arthroplasty planned for 4/22/25 with general anesthesia.    The patient has a significant cardiac history and is followed closely by cardiology.     She therefore can proceed with the surgery with acceptable cardiac risk as long as she is cleared by cardiology as well. She understands this does not mean no risk.       3. CAD in native artery  4. Atrial flutter, unspecified type ([facility])  5. Paroxysmal atrial fibrillation (HCC)  6. Presence of Watchman left atrial appendage closure device  7. Bilateral carotid artery disease, unspecified type  8. PAD (peripheral artery disease)  9. 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HCC)  10. Aorto-iliac atherosclerosis  11. Pacemaker  12. Mixed hyperlipidemia  13. Essential hypertension  - continue current management  - pt will check with cardiology to see if sotalol should be held or taken the day of surgery    14. Chronic obstructive pulmonary disease, unspecified COPD type ([facility])  -- stable. CTM    15. Ischemic colitis ([facility])  - stable. CTM    16. MDS (myelodysplastic syndrome) ([facility])  - stable. CTM    17. Parkinson's disease, unspecified whether dyskinesia present, unspecified whether manifestations fluctuate ([facility])  - stable. CTM    18. Essential tremor  - stable. CTM    19. History of CVA (cerebrovascular accident)  - previous hx in 2014. No residual  - CTM closely    20. Iron deficiency anemia due to chronic blood loss  -  Continue to see hematology     21. History of breast cancer  - s/p treatment     22. History of DVT (deep vein thrombosis)  - at risk for DVT after surgery     23. Umbilical hernia without obstruction and without gangrene  - stable. CTM    24. S/P deep brain stimulator placement  - stable. CTM    25. S/P cervical spinal fusion  - stable. CTM    26. Lumbar radiculitis  - stable. CTM    27. Age-related osteoporosis without current pathological fracture  - stable. CTM       This consult was sent back to the referring  physician       NINA Saenz     Total face to face time was 40 mins, more than 50% of the time was spent in counseling and/or coordination of care related to preop.

## 2025-04-02 NOTE — PROGRESS NOTES
[facility]  PRE OP RISK ASSESSMENT    REASON FOR CONSULT: Pre-op risk assessment for surgical procedure right knee total arthroplasty planned for 4/22/25 with general anesthesia.    REQUESTING PHYSICIAN: Dr. Cortez     CHIEF COMPLAINT:   Chief Complaint   Patient presents with    Pre-Op Exam     Right total knee arthroplasty surgery with Dr Cortez on  4/22/25       HISTORY OF PRESENT ILLNESS:   The patient is a 70 year old  female  presents with right knee pain scheduled for the above procedure. She has a history of  Primary osteoarthritis of right knee, CAD in native artery,  Paroxysmal atrial fibrillation ([facility]), Presence of Watchman left atrial appendage closure device, Bilateral carotid artery disease, unspecified type, PAD (peripheral artery disease), 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[facility]), Aorto-iliac atherosclerosis, Mixed hyperlipidemia, Essential hypertension, Chronic obstructive pulmonary disease, unspecified COPD type ([facility]), Ischemic colitis ([facility]), MDS (myelodysplastic syndrome) ([facility]), Parkinson's disease, unspecified whether dyskinesia present, unspecified whether manifestations fluctuate ([facility]), Essential tremor, History of CVA (cerebrovascular accident)Iron deficiency anemia due to chronic blood loss, History of breast cancer, History of DVT (deep vein thrombosis), Umbilical hernia without obstruction and without gangrene, S/P deep brain stimulator placement, S/P cervical spinal fusion, Lumbar radiculitis, Age-related osteoporosis without current pathological fracture. She follows with cardiology closely.  She denies chest pain or sob at rest or with exertion. She is completely independent with ADLs.     Past Medical History:    Past Medical History:    Abdominal pain    left sided intermittent pain    Age-related osteoporosis without current pathological fracture    Anemia    Anxiety    Anxiety    Arrhythmia    afib    Arthritis    Atrial flutter, paroxysmal (HCC)    Back pain     Back problem    severe spinal stenosis    Benign essential tremor    Bowel obstruction (HCC)    Breast cancer (HCC)    Broken ankle    Left Ankle    Cancer (HCC)    Right breast    Cataract    Cervical dystonia    DBS b/l    Cervical spine degeneration    Change in hair    Closed fracture of left ankle    Colitis    Ischemic Colitis    Constipation    Depression    Difficult intubation    big bony features in the bottom of mouth     Easy bruising    Endometriosis    Essential hypertension    Exposure to medical diagnostic radiation    Fatigue    ANEMIA.  I GET INFUSIONS    Fracture of distal end of tibia with fibula, left, closed, initial encounter    Last Assessment & Plan:    Formatting of this note might be different from the original.   Following follow-up, potentially secondary to hypotension/muscle weakness.  X-rays show a left nondisplaced distal fibula fracture.  Exam with soft tissue swelling and bruising.   -Admit inpatient   -telemetry   -prn analgesics   -SCDs   -PT/OT   -Ortho consult      Frequent use of laxatives    Gastrointestinal hemorrhage    GI bleed    Heart palpitations    AFIB    High blood pressure    High cholesterol    History of blood transfusion    Jan 2018    History of breast cancer    XRT/Chemo    History of depression    History of GI bleed    Ischemic bowel    History of GI bleed    ischmic bowel and AVM.  likely due to vascular dis, smoking. Hypercoag w/u negative. Last admitted 12/20.  Has seen GI recently and colonoscopy has been deferred.      Hoarseness, chronic    Hyperglycemia    Hyperlipidemia    Hyponatremia    Hypotension due to hypovolemia    Impacted cerumen of left ear    Intestinal infection due to Clostridium difficile    2019    Irregular bowel habits    Ischemic colitis (HCC)    12/22    Lacunar infarction (HCC)    Muscle weakness    left arm biceps torn    Nausea with vomiting    Neuropathy    Osteoarthritis    Osteoarthrosis, unspecified whether generalized or  localized, unspecified site    Pacemaker    Pain in joints    Pain with bowel movements    ISCHEMIC COLITIS    Panic disorder    Parkinson disease (HCC)    Personal history of antineoplastic chemotherapy    Personal history of endometriosis    Plaque psoriasis    Presence of other cardiac implants and grafts    Problems with swallowing    Psoriatic arthropathy (HCC)    Recurrent major depressive disorder, in remission    Rhinovirus    Sleep disturbance    Status post deep brain stimulator placement    Stented coronary artery    Stress    Tobacco dependence    Quit     Uncomfortable fullness after meals    Vaso vagal episode    Vasovagal syncope    Weight loss        Past Surgical History:    Past Surgical History:   Procedure Laterality Date    Angioplasty (coronary)      Appendectomy      Appendectomy      Back surgery  2005    NECK AT LOWER LUMBAR FUSED    Brain surgery  2016    Brain stimulator           x3    Cardiac pacemaker placement      Cervical spine surgery  2004    cervical spinal fusion C1-6    Chemotherapy      Colonoscopy  2014    Procedure: COLONOSCOPY;  Surgeon: Aaron Fair MD;  Location:  ENDOSCOPY    Colonoscopy N/A 2018    Procedure: COLONOSCOPY;  Surgeon: Aaron Fair MD;  Location:  ENDOSCOPY    D & c      Hysterectomy  1992    OVARIES REMOVED AS WELL    Lumbar spine surgery  1999    lumbar laminectomy L1-L4    Lumpectomy right  2002    Oophorectomy      Other      COLON STENTS    Other surgical history      Lt bunionectomy    Other surgical history      stent in CMA    Pain management Bilateral 06/15/2021    BILATERAL LUMBAR 5 TRANSFORAMINAL LUMBAR EPIDURAL STEROID INJECTION    Radiation right      Sigmoidoscopy,diagnostic      Spine surgery procedure unlisted      Total abdom hysterectomy      Tubal ligation      Upper gi endoscopy,biopsy      ulcer, at juany       Current Medications:    Current Outpatient Medications   Medication  Sig Dispense Refill    HYDROcodone-acetaminophen 5-325 MG Oral Tab Take 1 tablet by mouth every 8 (eight) hours as needed for Pain. 90 tablet 0    PAROXETINE 20 MG Oral Tab Take 1 tablet (20 mg total) by mouth every morning. 90 tablet 0    atorvastatin 20 MG Oral Tab Take 1 tablet (20 mg total) by mouth nightly. 90 tablet 0    sotalol 80 MG Oral Tab Take 1 tablet (80 mg total) by mouth 2 (two) times daily.      Ferrous Sulfate (IRON) 325 (65 Fe) MG Oral Tab Take by mouth every other day.      Probiotic Product (PROBIOTIC-10 OR) Take by mouth 2 (two) times a day.      aspirin 81 MG Oral Tab EC Take 1 tablet (81 mg total) by mouth daily. 90 tablet 1    Calcium Magnesium Zinc 333-133-5 MG Oral Tab Take 1 tablet by mouth daily.      primidone 50 MG Oral Tab Take 100 mg by mouth every morning and take 150 mg by mouth every evening.      gabapentin 300 MG Oral Cap Take by mouth. Take 600mg in the morning and 600 mg at night      Cyanocobalamin (B-12) 1000 MCG Oral Tab Take 1,000 mcg by mouth daily.           Allergies:    Allergies as of 2025 - Review Complete 2025   Allergen Reaction Noted    Sulfa antibiotics UNKNOWN 2022    Sulfa drugs cross reactors RASH 10/24/2011       SOCIAL HISTORY:   Social History     Socioeconomic History    Marital status:      Spouse name: Not on file    Number of children: Not on file    Years of education: Not on file    Highest education level: Not on file   Occupational History    Not on file   Tobacco Use    Smoking status: Former     Current packs/day: 0.00     Average packs/day: 0.5 packs/day for 36.0 years (18.0 ttl pk-yrs)     Types: Cigarettes     Start date: 1982     Quit date: 2018     Years since quittin.2     Passive exposure: Past    Smokeless tobacco: Never    Tobacco comments:     Updated 25   Vaping Use    Vaping status: Never Used   Substance and Sexual Activity    Alcohol use: Yes     Alcohol/week: 14.0 standard drinks of  alcohol     Types: 14 Cans of beer per week     Comment: approx 0- 2 beers per day    Drug use: No    Sexual activity: Not Currently   Other Topics Concern    Caffeine Concern No    Exercise No    Seat Belt No    Special Diet Yes     Comment: HOW TO PUT ON WEIGHT    Stress Concern No    Weight Concern Yes     Comment: CAN'T PUT ON WEIGHT   Social History Narrative    Not on file     Social Drivers of Health     Food Insecurity: No Food Insecurity (6/11/2024)    Received from [facility]    Hunger Vital Sign     Worried About Running Out of Food in the Last Year: Never true     Ran Out of Food in the Last Year: Never true   Transportation Needs: No Transportation Needs (6/11/2024)    Received from [facility]    PRAPARE - Transportation     Lack of Transportation (Medical): No     Lack of Transportation (Non-Medical): No   Stress: Not on file   Housing Stability: High Risk (6/11/2024)    Received from [facility]    Housing Stability Vital Sign     Unable to Pay for Housing in the Last Year: No     Number of Places Lived in the Last Year: 1     Unstable Housing in the Last Year: Yes        FAMILY HISTORY:   Family History   Problem Relation Age of Onset    Hypertension Father     Musculo-skelatal Disorder Father         TREMOR    Heart Disorder Mother     Cancer Mother         pharyngeal    Breast Cancer Mother         MYSELF 2001    Breast Cancer Self     Lung Disorder Sister         COPD    Heart Attack Brother 72    Ulcerative Colitis Son         REVIEW OF SYSTEMS:  PRE-OP ROS  NSAIDS/PLATELET INH: None  DIABETIC MEDICATIONS: None  AL;  None  Hx of VTE: Hx of DVT  BLEEDING DISORDER: None  LOOSE DENTITION OR DENTAL APPLIANCES: None  URI, COUGH, CP, FEVER:  None  CERVICAL SPINE RESTRICTION: stiff neck, no pain    PHYSICAL EXAM:  /82 (BP Location: Right arm, Patient Position: Sitting, Cuff Size: adult)   Pulse 67   Temp 97.1 °F (36.2 °C)  (Temporal)   Resp 16   Ht 5' 1.81\" (1.57 m)   Wt 97 lb 4.8 oz (44.1 kg)   SpO2 98%   BMI 17.91 kg/m²   Body mass index is 17.91 kg/m².   GENERAL: well developed, well nourished,in no apparent distress  SKIN: no rashes, no suspicious lesions. Bruising to the left arm.   HEENT: atraumatic, normocephalic,ears are clear, no sinus pain  EYES: conjunctiva are clear  NECK: supple,no adenopathy  LUNGS: clear to auscultation; no rhonchi, rales, or wheezing  CARDIO: RRR without murmur  GI:  no masses, organomegaly or tenderness  MUSCULOSKELETAL: Normal gait.  EXTREMITIES: no edema  NEURO: Oriented times three, tremor present    LABS:  Lab Results   Component Value Date    WBC 3.1 (L) 01/24/2025    RBC 3.50 (L) 01/24/2025    HGB 12.1 01/24/2025    HCT 36.0 01/24/2025    .9 (H) 01/24/2025    MCH 34.6 (H) 01/24/2025    MCHC 33.6 01/24/2025    RDW 12.8 01/24/2025    .0 01/24/2025    MPV 9.8 11/09/2012      Lab Results   Component Value Date    GLU 94 12/31/2024    BUN 16 12/31/2024    BUNCREA 14.9 07/14/2021    CREATSERUM 0.82 12/31/2024    ANIONGAP 6 12/31/2024    GFR 53 (L) 01/04/2018    GFRNAA 66 06/03/2022    GFRAA 77 06/03/2022    CA 10.1 12/31/2024    OSMOCALC 287 12/31/2024    ALKPHO 74 04/01/2025    AST 25 04/01/2025    ALT 17 04/01/2025    BILT 0.3 04/01/2025    TP 7.2 04/01/2025    ALB 4.4 04/01/2025    GLOBULIN 3.0 12/31/2024     12/31/2024    K 4.2 12/31/2024     12/31/2024    CO2 29.0 12/31/2024      Lab Results   Component Value Date    CHOLEST 172 04/01/2025    TRIG 129 04/01/2025    HDL 62 (H) 04/01/2025    LDL 87 04/01/2025    VLDL 21 04/01/2025    TCHDLRATIO 4.7 (H) 10/12/2012    NONHDLC 110 04/01/2025      Lab Results   Component Value Date    T4F 1.1 02/10/2014    TSH 2.680 12/31/2024      No results found for: \"EAG\", \"A1C\"        ASSESSMENT AND PLAN:    1. Preop exam for internal medicine  2. Primary osteoarthritis of right knee  -  The patient has right knee pain and is  scheduled for right knee total arthroplasty planned for 4/22/25 with general anesthesia.    The patient has a significant cardiac history and is followed closely by cardiology.     She therefore can proceed with the surgery with acceptable cardiac risk as long as she is cleared by cardiology as well. She understands this does not mean no risk.       3. CAD in native artery  4. Atrial flutter, unspecified type ([facility])  5. Paroxysmal atrial fibrillation (HCC)  6. Presence of Watchman left atrial appendage closure device  7. Bilateral carotid artery disease, unspecified type  8. PAD (peripheral artery disease)  9. 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HCC)  10. Aorto-iliac atherosclerosis  11. Pacemaker  12. Mixed hyperlipidemia  13. Essential hypertension  - continue current management  - pt will check with cardiology to see if sotalol should be held or taken the day of surgery    14. Chronic obstructive pulmonary disease, unspecified COPD type ([facility])  -- stable. CTM    15. Ischemic colitis ([facility])  - stable. CTM    16. MDS (myelodysplastic syndrome) ([facility])  - stable. CTM    17. Parkinson's disease, unspecified whether dyskinesia present, unspecified whether manifestations fluctuate ([facility])  - stable. CTM    18. Essential tremor  - stable. CTM    19. History of CVA (cerebrovascular accident)  - previous hx in 2014. No residual  - CTM closely    20. Iron deficiency anemia due to chronic blood loss  - stable. Continue iron.    21. History of breast cancer  - s/p treatment     22. History of DVT (deep vein thrombosis)  - at risk for DVT after surgery     23. Umbilical hernia without obstruction and without gangrene  - stable. CTM    24. S/P deep brain stimulator placement  - stable. CTM    25. S/P cervical spinal fusion  - stable. CTM    26. Lumbar radiculitis  - stable. CTM    27. Age-related osteoporosis without current pathological fracture  - stable. CTM       This consult was sent back to the referring physician        NINA Saenz     Total face to face time was 40 mins, more than 50% of the time was spent in counseling and/or coordination of care related to preop.

## 2025-04-02 NOTE — PATIENT INSTRUCTIONS
Get your labs done. You should be fasting for at least 10 hours. If you take a multivitamin with Biotin or any biotin product it should be held for 3 days prior to getting your labs done.     Stop vitamins and aspirin 2 weeks before surgery    Check with cardiology about the sotalol     Stop NSAIDs one week before surgery    Montior for signs of infection (redness, warmth, swelling, drainage, pain) after sugery    Monitor for signs of pneumonia after surgery (shortness of breath, productive cough, fever, chills)    Monitor for signs of blood clot after surgery ( leg pain, swelling, warmth, redness)    Monitor for constipation and use stool softeners/laxatives as needed. You may do prune punch daily as needed (4oz of prune juice, 4 oz of orange juice or apple juice heated in the microwave and add one dose of milk of magnesia over the counter).      Follow up as needed or when routine care is due

## 2025-04-03 ENCOUNTER — PATIENT MESSAGE (OUTPATIENT)
Dept: ORTHOPEDICS CLINIC | Facility: CLINIC | Age: 70
End: 2025-04-03

## 2025-04-04 ENCOUNTER — LAB ENCOUNTER (OUTPATIENT)
Dept: LAB | Age: 70
End: 2025-04-04
Attending: SURGERY
Payer: MEDICARE

## 2025-04-04 DIAGNOSIS — M17.11 PRIMARY OSTEOARTHRITIS OF RIGHT KNEE: ICD-10-CM

## 2025-04-04 DIAGNOSIS — M81.0 AGE-RELATED OSTEOPOROSIS WITHOUT CURRENT PATHOLOGICAL FRACTURE: ICD-10-CM

## 2025-04-04 LAB
ALBUMIN SERPL-MCNC: 4.5 G/DL (ref 3.2–4.8)
ALBUMIN/GLOB SERPL: 1.7 {RATIO} (ref 1–2)
ALP LIVER SERPL-CCNC: 56 U/L
ALT SERPL-CCNC: 14 U/L
ANION GAP SERPL CALC-SCNC: 12 MMOL/L (ref 0–18)
ANTIBODY SCREEN: NEGATIVE
APTT PPP: 30.5 SECONDS (ref 23–36)
AST SERPL-CCNC: 24 U/L (ref ?–34)
BASOPHILS # BLD AUTO: 0.02 X10(3) UL (ref 0–0.2)
BASOPHILS NFR BLD AUTO: 0.4 %
BILIRUB SERPL-MCNC: 0.3 MG/DL (ref 0.2–1.1)
BUN BLD-MCNC: 19 MG/DL (ref 9–23)
CALCIUM BLD-MCNC: 10.2 MG/DL (ref 8.7–10.6)
CHLORIDE SERPL-SCNC: 104 MMOL/L (ref 98–112)
CO2 SERPL-SCNC: 26 MMOL/L (ref 21–32)
CREAT BLD-MCNC: 0.96 MG/DL
EGFRCR SERPLBLD CKD-EPI 2021: 64 ML/MIN/1.73M2 (ref 60–?)
EOSINOPHIL # BLD AUTO: 0.05 X10(3) UL (ref 0–0.7)
EOSINOPHIL NFR BLD AUTO: 0.9 %
ERYTHROCYTE [DISTWIDTH] IN BLOOD BY AUTOMATED COUNT: 12.4 %
FASTING STATUS PATIENT QL REPORTED: YES
GLOBULIN PLAS-MCNC: 2.7 G/DL (ref 2–3.5)
GLUCOSE BLD-MCNC: 85 MG/DL (ref 70–99)
HCT VFR BLD AUTO: 29.9 %
HGB BLD-MCNC: 10.1 G/DL
IMM GRANULOCYTES # BLD AUTO: 0.01 X10(3) UL (ref 0–1)
IMM GRANULOCYTES NFR BLD: 0.2 %
INR BLD: 0.98 (ref 0.8–1.2)
LYMPHOCYTES # BLD AUTO: 1.23 X10(3) UL (ref 1–4)
LYMPHOCYTES NFR BLD AUTO: 22.9 %
MCH RBC QN AUTO: 34.8 PG (ref 26–34)
MCHC RBC AUTO-ENTMCNC: 33.8 G/DL (ref 31–37)
MCV RBC AUTO: 103.1 FL
MONOCYTES # BLD AUTO: 0.69 X10(3) UL (ref 0.1–1)
MONOCYTES NFR BLD AUTO: 12.9 %
NEUTROPHILS # BLD AUTO: 3.36 X10 (3) UL (ref 1.5–7.7)
NEUTROPHILS # BLD AUTO: 3.36 X10(3) UL (ref 1.5–7.7)
NEUTROPHILS NFR BLD AUTO: 62.7 %
OSMOLALITY SERPL CALC.SUM OF ELEC: 296 MOSM/KG (ref 275–295)
PLATELET # BLD AUTO: 225 10(3)UL (ref 150–450)
POTASSIUM SERPL-SCNC: 4.5 MMOL/L (ref 3.5–5.1)
PROT SERPL-MCNC: 7.2 G/DL (ref 5.7–8.2)
PROTHROMBIN TIME: 13.1 SECONDS (ref 11.6–14.8)
RBC # BLD AUTO: 2.9 X10(6)UL
RH BLOOD TYPE: POSITIVE
SODIUM SERPL-SCNC: 142 MMOL/L (ref 136–145)
VIT D+METAB SERPL-MCNC: 44.2 NG/ML (ref 30–100)
WBC # BLD AUTO: 5.4 X10(3) UL (ref 4–11)

## 2025-04-04 PROCEDURE — 85730 THROMBOPLASTIN TIME PARTIAL: CPT

## 2025-04-04 PROCEDURE — 86901 BLOOD TYPING SEROLOGIC RH(D): CPT

## 2025-04-04 PROCEDURE — 80053 COMPREHEN METABOLIC PANEL: CPT

## 2025-04-04 PROCEDURE — 86900 BLOOD TYPING SEROLOGIC ABO: CPT

## 2025-04-04 PROCEDURE — 82306 VITAMIN D 25 HYDROXY: CPT

## 2025-04-04 PROCEDURE — 85025 COMPLETE CBC W/AUTO DIFF WBC: CPT

## 2025-04-04 PROCEDURE — 36415 COLL VENOUS BLD VENIPUNCTURE: CPT

## 2025-04-04 PROCEDURE — 86850 RBC ANTIBODY SCREEN: CPT

## 2025-04-04 PROCEDURE — 85610 PROTHROMBIN TIME: CPT

## 2025-04-07 DIAGNOSIS — E78.2 MIXED HYPERLIPIDEMIA: ICD-10-CM

## 2025-04-07 RX ORDER — ATORVASTATIN CALCIUM 20 MG/1
20 TABLET, FILM COATED ORAL NIGHTLY
Qty: 90 TABLET | Refills: 0 | Status: SHIPPED | OUTPATIENT
Start: 2025-04-07

## 2025-04-07 NOTE — TELEPHONE ENCOUNTER
Cholesterol Medication Protocol Stscqd2004/07/2025 09:27 AM   Protocol Details ALT < 80    ALT resulted within past year    Lipid panel within past 12 months    In person appointment or virtual visit in the past 12 mos or appointment in next 3 mos    Medication is active on med list        Future Appointments   Date Time Provider Department Center   4/10/2025  3:00 PM Iván Arias, PT WDR [facility] [place]   4/15/2025 11:45 AM Iván Arias, PT [facility] [place]   4/16/2025  3:45 PM Gustavo Zhao MD [facility]   4/17/2025 11:45 AM Iván Arias, PT [facility] [place]   4/21/2025  1:00 PM Iván Arias, PT W[facility] [place]   5/7/2025  2:00 PM Alec Guthrie PA-C [facility]   5/8/2025 11:45 AM Iván Arias, PT [facility] [place]   5/13/2025 11:00 AM Iván Arias, PT [facility] [place]   5/15/2025 11:00 AM Iván Arias, PT [facility] [place]   5/20/2025 11:00 AM Iván Arias, PT [facility] [place]   5/22/2025 11:00 AM Iván Arias, PT [facility] [place]   5/27/2025 11:00 AM Iván Arias, PT [facility] [place]   5/29/2025 11:00 AM Iván Arias, PT W[facility] [place]   6/3/2025 11:00 AM Iván Arias, PT [facility] [place]   6/4/2025  2:00 PM Janes Cortez MD [facility]   6/5/2025 11:00 AM Iván Arias, PT [facility] [place]   6/10/2025 11:00 AM Iván Arias, PT [facility] [place]   6/12/2025 11:00 AM Iván Arias C, PT [facility]DR Phys [facility]p [facility]   7/24/2025 10:00 AM NP OOT NP [facility]   8/7/2025 11:00 AM Jaclyn Dong DO [id number] [facility] [place]   1/13/2026 10:40 AM Mouna Gentile APRN [facility]

## 2025-04-08 ENCOUNTER — TELEPHONE (OUTPATIENT)
Dept: INTERNAL MEDICINE CLINIC | Facility: CLINIC | Age: 70
End: 2025-04-08

## 2025-04-08 LAB
VITAMIN A: 75 UG/DL
VITAMIN K1: 0.16 NG/ML

## 2025-04-09 ENCOUNTER — TELEPHONE (OUTPATIENT)
Age: 70
End: 2025-04-09

## 2025-04-10 ENCOUNTER — OFFICE VISIT (OUTPATIENT)
Dept: PHYSICAL THERAPY | Age: 70
End: 2025-04-10
Attending: FAMILY MEDICINE
Payer: MEDICARE

## 2025-04-10 ENCOUNTER — PATIENT MESSAGE (OUTPATIENT)
Facility: CLINIC | Age: 70
End: 2025-04-10

## 2025-04-10 DIAGNOSIS — S46.212A STRAIN OF LEFT BICEPS, INITIAL ENCOUNTER: Primary | ICD-10-CM

## 2025-04-10 PROCEDURE — 97110 THERAPEUTIC EXERCISES: CPT | Performed by: PHYSICAL THERAPIST

## 2025-04-10 PROCEDURE — 97161 PT EVAL LOW COMPLEX 20 MIN: CPT | Performed by: PHYSICAL THERAPIST

## 2025-04-10 NOTE — PROGRESS NOTES
UPPER EXTREMITY EVALUATION:     Diagnosis:   Strain of left biceps, initial encounter (S46.212A) Patient:  Tamara Meraz (70 year old, female)        Referring Provider: Kenny Sunshine  Today's Date   4/10/2025    Precautions:  Other (use comment) (Patient has tremors which can lead to decreased balance)   Date of Evaluation: 04/10/25  Next MD visit: No data recorded  Date of Surgery: No data recorded     PATIENT SUMMARY   Summary of chief complaints: pain and ache in the L FA, elbow and bicep  History of current condition: Patient states she tripped and fell going up steps while on vacation on 3/29 landing on her L arm.  Patient developed pain throughout the L arm with significnat bruising. X-rays unremarkable. Patient is scheduled for a R TKA in 2 weeks and needs to be able to use a walker.   Pain level: current 4 /10, at best 4 /10, at worst    Description of symptoms: Pain and ache in the L FA, elbow and bicep   Occupation:     Leisure activities/Hobbies: Swimming, playing with grandkids   Prior level of function: Active but limited with h/o tremors and decreased balance.  Current limitations: Carrying, lifting, reaching, pushing, pulling, house work. sleeping  Pt goals: Prepare her L arm for using the walker for after knee surgery in 2 weeks.  Hand Dominance: right   Past medical history was reviewed with Tamara.  Significant findings include:    Imaging/Tests: X-ray of L arm unremarkable   Tamara  has a past medical history of Abdominal pain (2020), Age-related osteoporosis without current pathological fracture (06/01/2021), Anemia, Anxiety (1985), Anxiety (10/20/2022), Arrhythmia, Arthritis (1980), Atrial flutter, paroxysmal (HCC) (06/06/2018), Back pain, Back problem, Benign essential tremor, Bowel obstruction (HCC), Breast cancer (HCC) (2002), Broken ankle (01/2023), Cancer (HCC), Cataract, Cervical dystonia (06/17/2021), Cervical spine degeneration, Change in hair (NAILS), Closed fracture of  left ankle (2023), Colitis, Constipation, Depression (), Difficult intubation, Easy bruising, Endometriosis, Essential hypertension, Exposure to medical diagnostic radiation (), Fatigue (), Fracture of distal end of tibia with fibula, left, closed, initial encounter (2023), Frequent use of laxatives (Miralax), Gastrointestinal hemorrhage (2018), GI bleed, Heart palpitations (), High blood pressure, High cholesterol, History of blood transfusion, History of breast cancer (2021), History of depression, History of GI bleed (2021), History of GI bleed (2021), Hoarseness, chronic, Hyperglycemia (2020), Hyperlipidemia (), Hyponatremia (2020), Hypotension due to hypovolemia (2022), Impacted cerumen of left ear (2023), Intestinal infection due to Clostridium difficile (2021), Irregular bowel habits, Ischemic colitis (HCC) (2018), Lacunar infarction (HCC) (2013), Muscle weakness, Nausea with vomiting (2014), Neuropathy, Osteoarthritis, Osteoarthrosis, unspecified whether generalized or localized, unspecified site, Pacemaker, Pain in joints (), Pain with bowel movements (), Panic disorder, Parkinson disease (), Personal history of antineoplastic chemotherapy (), Personal history of endometriosis, Plaque psoriasis, Presence of other cardiac implants and grafts (DBS FOR TREMORS), Problems with swallowing, Psoriatic arthropathy (HCC) (2011), Recurrent major depressive disorder, in remission (2021), Rhinovirus (2023), Sleep disturbance, Status post deep brain stimulator placement, Stented coronary artery ( lower GI), Stress, Tobacco dependence (2018), Uncomfortable fullness after meals, Vaso vagal episode (2022), Vasovagal syncope, and Weight loss ().  She  has a past surgical history that includes ; d & c; appendectomy; colonoscopy (2014); tubal ligation;  other surgical history; other surgical history; colonoscopy (N/A, 06/03/2018); upper gi endoscopy,biopsy; oophorectomy (1991); total abdom hysterectomy; Brain Surgery (2016); pain management (Bilateral, 06/15/2021); cervical spine surgery (06/2004); lumbar spine surgery (01/1999); appendectomy; back surgery (2005); hysterectomy (1992); lumpectomy right (2002); chemotherapy (2002); radiation right (2002); other; angioplasty (coronary) (2023); sigmoidoscopy,diagnostic; Cardiac pacemaker placement; and spine surgery procedure unlisted.    ASSESSMENT  Tamara presents to physical therapy evaluation with primary c/o pain and ache in the L FA, elbow and bicep. The results of the objective tests and measures show Decreased strength, decreased soft tissue mobility, postural deficits. Functional deficits include but are not limited to Carrying, lifting, reaching, pushing, pulling, house work. sleeping. Signs and symptoms are consistent with diagnosis of Strain of left biceps, initial encounter (S46.212A). Pt and PT discussed evaluation findings, pathology, POC and HEP.  Pt voiced understanding and performs HEP correctly without reported pain. Skilled Physical Therapy is medically necessary to address the above impairments and reach functional goals.  OBJECTIVE:    Musculoskeletal  Observation/Posture: other (Significant bruising throughout the L bicep, elbow and FA)  Palpation: Tenderness in the L bicep   Accessory motion:         ROM and Strength (* denotes performed with pain)  Shoulder   ROM MMT (-/5)    R L R L     Flex   140   4+     Abd (C5)       4+     IR       5     ER       4+     ,   Elbow   ROM MMT (-/5)    R L R L     Flex (C6)   145   4 (Not fully assessed due to recent injury)     Ext (C7)   0   5     Pronation   80    4+     Supination   90    4+         Neurological:  Sensation: Appears intact throughout the L UE to LT      Today's Treatment and Response:   Pt education was provided on exam findings,  treatment diagnosis, treatment plan, expectations, and prognosis.  Today's Treatment       4/10/2025   UE Treatment   Therapeutic Exercise L elbow AROM flexion, extension, supination, pronation 2x10 ea  Standing low rows YTB 2x10  Tricep extensions YTB 2x10  B shoulder ER with elbows at side YTB 2x10   Therapeutic Exercise Minutes 20   Total Time Of Timed Procedures 20   Total Time Of Service-Based Procedures 0   Total Treatment Time 40   HEP Access Code: [email or web address]  URL: [email or web address]  Date: 04/10/2025  Prepared by: Iván Arias    Exercises  - Seated Elbow Flexion and Extension AROM  - 1 x daily - 7 x weekly - 2 sets - 10 reps  - Seated Forearm Pronation and Supination AROM  - 1 x daily - 7 x weekly - 2 sets - 10 reps  - Standing Bilateral Low Shoulder Row with Anchored Resistance  - 1 x daily - 7 x weekly - 2 sets - 10 reps  - Standing Elbow Extension with Self-Anchored Resistance  - 1 x daily - 7 x weekly - 2 sets - 10 reps  - Shoulder External Rotation and Scapular Retraction with Resistance  - 1 x daily - 7 x weekly - 2 sets - 10 reps        Patient was instructed in and issued a HEP for: Access Code: [email or web address]  URL: [email or web address]  Date: 04/10/2025  Prepared by: Iván Arias    Exercises  - Seated Elbow Flexion and Extension AROM  - 1 x daily - 7 x weekly - 2 sets - 10 reps  - Seated Forearm Pronation and Supination AROM  - 1 x daily - 7 x weekly - 2 sets - 10 reps  - Standing Bilateral Low Shoulder Row with Anchored Resistance  - 1 x daily - 7 x weekly - 2 sets - 10 reps  - Standing Elbow Extension with Self-Anchored Resistance  - 1 x daily - 7 x weekly - 2 sets - 10 reps  - Shoulder External Rotation and Scapular Retraction with Resistance  - 1 x daily - 7 x weekly - 2 sets - 10 reps    Charges:  PT EVAL: Low Complexity, 1 Ther ex  In agreement with evaluation findings and clinical rationale, this evaluation involved LOW COMPLEXITY decision making due  to no personal factors/comorbidities, 1-2 body structures involved/activity limitations, and stable symptoms as documented in the evaluation.                                                          PLAN OF CARE:    Goals: (to be met in 4 visits)    Not Met Progress Toward Partially Met Met   Pt will be have to use the L arm to assist with sit to stand transfers and using a FWW in preparation for her R TKA in 2 weeks. [] [] [] []   Pt will increase L bicep strength to 4+/5 to assist with carrying and performing light house work. [] [] [] []   Pt will be independent and compliant with comprehensive HEP to maintain progress achieved in PT. [] [] [] []         Frequency / Duration: Patient will be seen 1-2x/week or a total of 4  visits over a 90 day period. Treatment will include: Manual Therapy; Neuromuscular Re-education; Therapeutic Activities; Therapeutic Exercise; Home Exercise Program instruction    Education or treatment limitation: None   Rehab Potential: good     QuickDASH Outcome Score  Score: (Patient-Rptd) 61.36 % (4/3/2025  6:22 PM)      Patient/Family/Caregiver was advised of these findings, precautions, and treatment options and has agreed to actively participate in planning and for this course of care.    Thank you for your referral. Please co-sign or sign and return this letter via fax as soon as possible to [phone]. If you have any questions, please contact me at Dept: [phone]    Sincerely,  Electronically signed by therapist: Iván Arias, PT  Physician's certification required: Yes  I certify the need for these services furnished under this plan of treatment and while under my care.    X___________________________________________________ Date____________________    Certification From: 4/10/2025  To:7/9/2025

## 2025-04-10 NOTE — TELEPHONE ENCOUNTER
Labs: stable per provider.    Insurance: Per [employer] Summary of Benefits, \"Prolia and administration will be subject to a $257.00 deductible ($257.00 met) and 20.0% coinsurance... Medicare Supplement Plan G... covers the Medicare Part B co-insurance and 100% of the excess charges.\"     No prior authorization required.     Dental Clearance: patient to have faxed to office.    She has her knee replacement on 4/22- ok to get prolia once we receive dental clearance before her surgery?

## 2025-04-15 ENCOUNTER — OFFICE VISIT (OUTPATIENT)
Dept: PHYSICAL THERAPY | Age: 70
End: 2025-04-15
Attending: FAMILY MEDICINE
Payer: MEDICARE

## 2025-04-15 PROCEDURE — 97110 THERAPEUTIC EXERCISES: CPT | Performed by: PHYSICAL THERAPIST

## 2025-04-15 NOTE — PROGRESS NOTES
Patient: Tamara Meraz (70 year old, female) Referring Provider:  Insurance:   Diagnosis: Strain of left biceps, initial encounter (S46.564A) Shaheen Jadidi MEDICARE   Date of Surgery: No data recorded Next MD visit:  COMMERCIAL   Precautions:  Other (use comment) (Patient has tremors which can lead to decreased balance) No data recorded Referral Information:    Date of Evaluation: Req: 0, Auth: 0, Exp:     04/10/25 POC Auth Visits:          Today's Date   4/15/2025    Subjective  Patient reports minimal pain in the L bicep at this time with current activity levels. Performing HEP as instructed with good tolerance. Continue to avoid any heavy lifting or carrying with the L arm. Doing water classes 3x/wk with good tolerance.       Pain: 0/10     Objective  L elbow AROM WFL, pain free. Minimal palpable tenderness over mid bicep.            Assessment  Patient demonstrates improved L elbow AROM to WFL all planes of motion, pain free. Mild palpable tenderness persists over the mid bicep. Bruising persists throughout the L UE but slowly resolving. Progressed UE strengthening activities today with good tolerance. Appears to be ready to use the FWW following her knee surgery next week.    Goals (to be met in 4 visits)      Not Met Progress Toward Partially Met Met   Pt will be have to use the L arm to assist with sit to stand transfers and using a FWW in preparation for her R TKA in 2 weeks. [] [] [] [x]   Pt will increase L bicep strength to 4+/5 to assist with carrying and performing light house work. [] [] [] [x]   Pt will be independent and compliant with comprehensive HEP to maintain progress achieved in PT. [] [] [] [x]             Plan  Patient will DC from PT at this time and continue independently with her HEP. Patient is schedulded for a TKA on 4/22 and will return for post op knee rehab when  advised by MD.    Treatment Last 4 Visits  Treatment Day: 2       4/10/2025 4/15/2025   UE Treatment    Therapeutic Exercise L elbow AROM flexion, extension, supination, pronation 2x10 ea  Standing low rows YTB 2x10  Tricep extensions YTB 2x10  B shoulder ER with elbows at side YTB 2x10 UBE 5' Lv 1  PROM L shoulder and elbow  Supine chest press with cane 2# 2x10  Seated elbow flexion with cane 2# 2X10  Yellow flexbar Sup/pron, Wrist flex/ext x10 ea  Standing rows YTB 2x10  Standing tricep ext YTB 2x10  Standing B shoulder extension YTB 2x10  Wall push ups 1x10   Manual Therapy  Gentle STM L bicep   Therapeutic Exercise Minutes 20 40   Manual Therapy Minutes  5   Total Time Of Timed Procedures 20 45   Total Time Of Service-Based Procedures 0 0   Total Treatment Time 40 45   HEP Access Code: [email or web address]  URL: [email or web address]  Date: 04/10/2025  Prepared by: Iván Arias    Exercises  - Seated Elbow Flexion and Extension AROM  - 1 x daily - 7 x weekly - 2 sets - 10 reps  - Seated Forearm Pronation and Supination AROM  - 1 x daily - 7 x weekly - 2 sets - 10 reps  - Standing Bilateral Low Shoulder Row with Anchored Resistance  - 1 x daily - 7 x weekly - 2 sets - 10 reps  - Standing Elbow Extension with Self-Anchored Resistance  - 1 x daily - 7 x weekly - 2 sets - 10 reps  - Shoulder External Rotation and Scapular Retraction with Resistance  - 1 x daily - 7 x weekly - 2 sets - 10 reps         HEP  Access Code: [email or web address]  URL: [email or web address]  Date: 04/10/2025  Prepared by: Iván Arias    Exercises  - Seated Elbow Flexion and Extension AROM  - 1 x daily - 7 x weekly - 2 sets - 10 reps  - Seated Forearm Pronation and Supination AROM  - 1 x daily - 7 x weekly - 2 sets - 10 reps  - Standing Bilateral Low Shoulder Row with Anchored Resistance  - 1 x daily - 7 x weekly - 2 sets - 10 reps  - Standing Elbow Extension with Self-Anchored Resistance  - 1 x daily - 7 x weekly - 2 sets - 10 reps  - Shoulder External Rotation and Scapular Retraction with Resistance  - 1 x daily - 7 x  weekly - 2 sets - 10 reps    Charges  3 Ther Ex

## 2025-04-17 ENCOUNTER — LAB ENCOUNTER (OUTPATIENT)
Dept: LAB | Age: 70
End: 2025-04-17
Attending: ANESTHESIOLOGY
Payer: MEDICARE

## 2025-04-17 ENCOUNTER — APPOINTMENT (OUTPATIENT)
Dept: PHYSICAL THERAPY | Age: 70
End: 2025-04-17
Attending: FAMILY MEDICINE
Payer: MEDICARE

## 2025-04-17 DIAGNOSIS — F11.20 CHRONIC NARCOTIC DEPENDENCE (HCC): ICD-10-CM

## 2025-04-17 PROCEDURE — 80307 DRUG TEST PRSMV CHEM ANLYZR: CPT

## 2025-04-18 ENCOUNTER — NURSE ONLY (OUTPATIENT)
Age: 70
End: 2025-04-18
Attending: INTERNAL MEDICINE
Payer: MEDICARE

## 2025-04-18 ENCOUNTER — APPOINTMENT (OUTPATIENT)
Age: 70
End: 2025-04-18
Attending: INTERNAL MEDICINE
Payer: MEDICARE

## 2025-04-18 ENCOUNTER — OFFICE VISIT (OUTPATIENT)
Age: 70
End: 2025-04-18
Attending: INTERNAL MEDICINE
Payer: MEDICARE

## 2025-04-18 VITALS
OXYGEN SATURATION: 96 % | BODY MASS INDEX: 18.15 KG/M2 | WEIGHT: 97.38 LBS | RESPIRATION RATE: 18 BRPM | HEIGHT: 61.5 IN | HEART RATE: 65 BPM | SYSTOLIC BLOOD PRESSURE: 113 MMHG | TEMPERATURE: 98 F | DIASTOLIC BLOOD PRESSURE: 70 MMHG

## 2025-04-18 DIAGNOSIS — D46.4 REFRACTORY ANEMIA, UNSPECIFIED (HCC): ICD-10-CM

## 2025-04-18 DIAGNOSIS — Z85.3 HISTORY OF BREAST CANCER: ICD-10-CM

## 2025-04-18 DIAGNOSIS — D50.0 IRON DEFICIENCY ANEMIA DUE TO CHRONIC BLOOD LOSS: ICD-10-CM

## 2025-04-18 DIAGNOSIS — D50.0 IRON DEFICIENCY ANEMIA DUE TO CHRONIC BLOOD LOSS: Primary | ICD-10-CM

## 2025-04-18 LAB
BASOPHILS # BLD AUTO: 0.01 X10(3) UL (ref 0–0.2)
BASOPHILS NFR BLD AUTO: 0.3 %
DEPRECATED HBV CORE AB SER IA-ACNC: 214 NG/ML (ref 50–306)
EOSINOPHIL # BLD AUTO: 0.03 X10(3) UL (ref 0–0.7)
EOSINOPHIL NFR BLD AUTO: 0.8 %
ERYTHROCYTE [DISTWIDTH] IN BLOOD BY AUTOMATED COUNT: 13.5 %
HCT VFR BLD AUTO: 34.2 % (ref 35–48)
HGB BLD-MCNC: 11.4 G/DL (ref 12–16)
IMM GRANULOCYTES # BLD AUTO: 0.01 X10(3) UL (ref 0–1)
IMM GRANULOCYTES NFR BLD: 0.3 %
IRON SATN MFR SERPL: 42 % (ref 15–50)
IRON SERPL-MCNC: 101 UG/DL (ref 50–170)
LYMPHOCYTES # BLD AUTO: 1.71 X10(3) UL (ref 1–4)
LYMPHOCYTES NFR BLD AUTO: 44.5 %
MCH RBC QN AUTO: 35.2 PG (ref 26–34)
MCHC RBC AUTO-ENTMCNC: 33.3 G/DL (ref 31–37)
MCV RBC AUTO: 105.6 FL (ref 80–100)
MONOCYTES # BLD AUTO: 0.5 X10(3) UL (ref 0.1–1)
MONOCYTES NFR BLD AUTO: 13 %
NEUTROPHILS # BLD AUTO: 1.58 X10 (3) UL (ref 1.5–7.7)
NEUTROPHILS # BLD AUTO: 1.58 X10(3) UL (ref 1.5–7.7)
NEUTROPHILS NFR BLD AUTO: 41.1 %
PLATELET # BLD AUTO: 195 10(3)UL (ref 150–450)
RBC # BLD AUTO: 3.24 X10(6)UL (ref 3.8–5.3)
TOTAL IRON BINDING CAPACITY: 241 UG/DL (ref 250–425)
TRANSFERRIN SERPL-MCNC: 180 MG/DL (ref 250–380)
WBC # BLD AUTO: 3.8 X10(3) UL (ref 4–11)

## 2025-04-18 RX ORDER — PAROXETINE 20 MG/1
20 TABLET, FILM COATED ORAL EVERY MORNING
Qty: 90 TABLET | Refills: 0 | Status: SHIPPED | OUTPATIENT
Start: 2025-04-18

## 2025-04-18 NOTE — TELEPHONE ENCOUNTER
Psychiatric Non-Scheduled (Anti-Anxiety) [place]1904/17/2025 09:39 AM   Protocol Details In person appointment or virtual visit in the past 6 mos or appointment in next 3 mos    Depression Screening completed within the past 12 months    Medication is active on med list        Future Appointments   Date Time Provider Department Center   4/18/2025  1:30 PM NP OOT NP  [facility]   4/18/2025  1:45 PM Kenia Livingston APRN NP  [facility]   4/18/2025  2:00 PM NP TX RN8 NP  [facility]   5/7/2025  2:00 PM Alec Guthrie PA-C [facility]   5/8/2025 11:45 AM Iván Arias, PT [facility] [place]   5/13/2025 11:00 AM Iván Arias, PT [facility] [place]   5/15/2025 11:00 AM Iván Arias, PT [facility] [place]   5/20/2025 11:00 AM Iván Arias, PT [facility] [place]   5/22/2025 11:00 AM Iván Arias, PT [facility] [place]   5/27/2025 11:00 AM Iván Arias, PT [facility] [place]   5/29/2025 11:00 AM Iván Arias, PT [facility] [place]   6/3/2025 11:00 AM Iván Arias, PT [facility] Th[facility] [place]   6/4/2025  2:00 PM Janes Cortez MD [facility]   6/5/2025 11:00 AM Iván Arias, PT [facility] [place]   6/10/2025 11:00 AM Iván Arias, PT [facility] [place]   6/12/2025 11:00 AM Iván Arias, PT [facility] [place]   7/16/2025 10:00 AM Gustavo Zhao MD ENIPain [facility]   7/24/2025 10:00 AM NP OOT NP [facility]   8/7/2025 11:00 AM Jaclyn Dong DO [id number] [facility]   1/13/2026 10:40 AM Mouna Gentile APRN [facility] [place]

## 2025-04-18 NOTE — PROGRESS NOTES
[facility]cer Center Progress Note    Patient Name: Tamara Meraz   YOB: 1955   Medical Record Number: [id number]   CSN: [id number]   Date of visit: 2025       Chief Complaint/Reason for Visit:  Chief Complaint   Patient presents with    Follow - Up    Infusion        History of Present Illness:   Tamara Meraz is a 70 year old patient of Dr. Gunter's with history of ischemic colitis/GIB.  BM bx - active trilineage hematopoiesis without any evidence of leukemia. Repeat BM bx -mild dysplasia. She has previously received IV iron. Taking oral iron every other day.   She presents for follow up and repeat labs.   Fatigue is at baseline, not worsening. Staying active, taking breaks when needed.   Stopped oral iron for now per surgery request about a week ago. Scheduled for a right knee arthroplasty 25.  No overt signs of bleeding.  Notes left upper extremity partial bicep tear - bruising noted.   Taking baby aspirin. Off plavix.       Problem List:  Problem List[1]     Medical History:  Past Medical History[2]    Surgical History:  Past Surgical History[3]    Allergies:  Allergies[4]    Family History:  Family History[5]    Social History:  Social History     Socioeconomic History    Marital status:      Spouse name: Not on file    Number of children: Not on file    Years of education: Not on file    Highest education level: Not on file   Occupational History    Not on file   Tobacco Use    Smoking status: Former     Current packs/day: 0.00     Average packs/day: 0.5 packs/day for 36.0 years (18.0 ttl pk-yrs)     Types: Cigarettes     Start date: 1982     Quit date: 2018     Years since quittin.3     Passive exposure: Past    Smokeless tobacco: Never    Tobacco comments:     Updated 25   Vaping Use    Vaping status: Never Used   Substance and Sexual Activity    Alcohol use: Yes     Alcohol/week: 14.0 standard drinks of alcohol     Types: 14 Cans of beer  per week     Comment: approx 0- 2 beers per day    Drug use: No    Sexual activity: Not Currently   Other Topics Concern    Caffeine Concern No    Exercise No    Seat Belt No    Special Diet Yes     Comment: HOW TO PUT ON WEIGHT    Stress Concern No    Weight Concern Yes     Comment: CAN'T PUT ON WEIGHT   Social History Narrative    Not on file     Social Drivers of Health     Food Insecurity: No Food Insecurity (6/11/2024)    Received from [facility]    Hunger Vital Sign     Worried About Running Out of Food in the Last Year: Never true     Ran Out of Food in the Last Year: Never true   Transportation Needs: No Transportation Needs (6/11/2024)    Received from [facility]    PRAPARE - Transportation     Lack of Transportation (Medical): No     Lack of Transportation (Non-Medical): No   Housing Stability: High Risk (6/11/2024)    Received from [facility]    Housing Stability Vital Sign     Unable to Pay for Housing in the Last Year: No     Number of Places Lived in the Last Year: 1     Unstable Housing in the Last Year: Yes       Medications:  Medications - Current[6]    Review of Systems:  A comprehensive 14 point review of systems was completed.  Pertinent positives and negatives noted in the HPI.    Performance Status: ECOG 0 - Fully active, able to carry on all predisease activities without restrictions.    Physical Examination:  Vital Signs: Height: 156.2 cm (5' 1.5\") (04/18 1334)  Weight: 44.2 kg (97 lb 6.4 oz) (04/18 1334)  BSA (Calculated - sq m): 1.4 sq meters (04/18 1334)  Pulse: 65 (04/18 1334)  BP: 113/70 (04/18 1334)  Temp: 97.5 °F (36.4 °C) (04/18 1334)  Do Not Use - Resp Rate: --  SpO2: 96 % (04/18 1334)    General: Patient is alert and oriented x 3, not in acute distress.  HEENT: Anicteric, conjunctivae and sclerae clear, no oropharyngeal lesion/thrush, mucous membranes are moist   Chest: Clear to auscultation. Respirations unlabored.    Heart: Regular rate and rhythm.   Abdomen: Soft, non-distended, non-tender with present bowel sounds.  Extremities: No edema.  Neurological: Grossly intact.   Skin: warm, dry, no erythema or rash   Psych/Depression: mood and affect are appropriate.     Labs:     Recent Results (from the past 72 hours)   CBC With Differential With Platelet    Collection Time: 04/18/25  1:24 PM   Result Value Ref Range    WBC 3.8 (L) 4.0 - 11.0 x10(3) uL    RBC 3.24 (L) 3.80 - 5.30 x10(6)uL    HGB 11.4 (L) 12.0 - 16.0 g/dL    HCT 34.2 (L) 35.0 - 48.0 %    .0 150.0 - 450.0 10(3)uL    .6 (H) 80.0 - 100.0 fL    MCH 35.2 (H) 26.0 - 34.0 pg    MCHC 33.3 31.0 - 37.0 g/dL    RDW 13.5 %    Neutrophil Absolute Prelim 1.58 1.50 - 7.70 x10 (3) uL    Neutrophil Absolute 1.58 1.50 - 7.70 x10(3) uL    Lymphocyte Absolute 1.71 1.00 - 4.00 x10(3) uL    Monocyte Absolute 0.50 0.10 - 1.00 x10(3) uL    Eosinophil Absolute 0.03 0.00 - 0.70 x10(3) uL    Basophil Absolute 0.01 0.00 - 0.20 x10(3) uL    Immature Granulocyte Absolute 0.01 0.00 - 1.00 x10(3) uL    Neutrophil % 41.1 %    Lymphocyte % 44.5 %    Monocyte % 13.0 %    Eosinophil % 0.8 %    Basophil % 0.3 %    Immature Granulocyte % 0.3 %   Ferritin    Collection Time: 04/18/25  1:24 PM   Result Value Ref Range    Ferritin 214 50 - 306 ng/mL   Iron And Tibc    Collection Time: 04/18/25  1:24 PM   Result Value Ref Range    Iron 101 50 - 170 ug/dL    Transferrin 180 (L) 250 - 380 mg/dL    Total Iron Binding Capacity 241 (L) 250 - 425 ug/dL    % Saturation 42 15 - 50 %       Impression/Plan    Anemia:  History of chronic GIB due to ischemic colitis, AVM. S/p bone marrow biopsy 12/2019 - negative for malignancy. Repeat BM biopsy 2/2021.   She has received IV iron 4/2023. Counts have remained stable since that time. Taking oral iron every other day. (On hold currently due to upcoming surgery)  Labs reviewed, today, hgb remains stable. Iron is stable. No indication for IV iron at this  time.  Continue to monitor labs.    Right breast cancer:  Dx 2005, treated at [facility]. T2N0. Grade 3 triple negative IDC. S/p lumpectomy, chemotherapy with AC and RT. Repeat mammogram completed 1/2025. Repeat in 12 months.    Planned Follow Up: follow up with Dr. Gunter in January 2026. Repeat labs scheduled in July. Call with any new or alarming symptoms    The 21st Century Cures Act makes medical notes like these available to patients in the interest of transparency. Please be advised this is a medical document. Medical documents are intended to carry relevant information, facts as evident, and the clinical opinion of the practitioner. The medical note is intended as peer to peer communication and may appear blunt or direct. It is written in medical language and may contain abbreviations or verbiage that are unfamiliar.     Electronically Signed by:    CASTILLO Valdez-BC  Nurse Practitioner  [facility]         [1]   Patient Active Problem List  Diagnosis    S/P cervical spinal fusion    Psoriasis    PAD (peripheral artery disease)    Essential tremor    Paroxysmal atrial fibrillation (HCC)    Bunion    History of mesenteric infarction    Alcohol dependence with unspecified alcohol-induced disorder (HCC)    Atrial flutter (HCC)    CAD in native artery    Essential hypertension    Parkinson's disease (HCC)    Iron deficiency anemia due to chronic blood loss    MDS (myelodysplastic syndrome) (HCC)    History of breast cancer    Age-related osteoporosis without current pathological fracture    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HCC)    History of DVT (deep vein thrombosis)    History of CVA (cerebrovascular accident)    Lumbar radiculitis    Ischemic colitis (HCC)    Hammer toe of left foot    Chronic obstructive pulmonary disease, unspecified (HCC)    Mood disorder    Aorto-iliac atherosclerosis    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    HLD (hyperlipidemia)     Vocal tremor    Pacemaker    Bilateral renal cysts    S/P deep brain stimulator placement    Umbilical hernia without obstruction and without gangrene    Bilateral carotid artery disease    Presence of Watchman left atrial appendage closure device   [2]   Past Medical History:   Abdominal pain    left sided intermittent pain    Age-related osteoporosis without current pathological fracture    Anemia    Anxiety    Anxiety    Arrhythmia    afib    Arthritis    Atrial flutter, paroxysmal (HCC)    Back pain    Back problem    severe spinal stenosis    Benign essential tremor    Bowel obstruction (HCC)    Breast cancer (HCC)    Broken ankle    Left Ankle    Cancer (HCC)    Right breast    Cataract    Cervical dystonia    DBS b/l    Cervical spine degeneration    Change in hair    Closed fracture of left ankle    Colitis    Ischemic Colitis    Constipation    Depression    Difficult intubation    big bony features in the bottom of mouth     Easy bruising    Endometriosis    Essential hypertension    Exposure to medical diagnostic radiation    Fatigue    ANEMIA.  I GET INFUSIONS    Fracture of distal end of tibia with fibula, left, closed, initial encounter    Last Assessment & Plan:    Formatting of this note might be different from the original.   Following follow-up, potentially secondary to hypotension/muscle weakness.  X-rays show a left nondisplaced distal fibula fracture.  Exam with soft tissue swelling and bruising.   -Admit inpatient   -telemetry   -prn analgesics   -SCDs   -PT/OT   -Ortho consult      Frequent use of laxatives    Gastrointestinal hemorrhage    GI bleed    Heart palpitations    AFIB    High blood pressure    High cholesterol    History of blood transfusion    Jan 2018    History of breast cancer    XRT/Chemo    History of depression    History of GI bleed    Ischemic bowel    History of GI bleed    ischmic bowel and AVM.  likely due to vascular dis, smoking. Hypercoag w/u negative. Last  admitted .  Has seen GI recently and colonoscopy has been deferred.      Hoarseness, chronic    Hyperglycemia    Hyperlipidemia    Hyponatremia    Hypotension due to hypovolemia    Impacted cerumen of left ear    Intestinal infection due to Clostridium difficile        Irregular bowel habits    Ischemic colitis (HCC)        Lacunar infarction (HCC)    Muscle weakness    left arm biceps torn    Nausea with vomiting    Neuropathy    Osteoarthritis    Osteoarthrosis, unspecified whether generalized or localized, unspecified site    Pacemaker    Pain in joints    Pain with bowel movements    ISCHEMIC COLITIS    Panic disorder    Parkinson disease (HCC)    Personal history of antineoplastic chemotherapy    Personal history of endometriosis    Plaque psoriasis    Presence of other cardiac implants and grafts    Problems with swallowing    Psoriatic arthropathy (HCC)    Recurrent major depressive disorder, in remission    Rhinovirus    Sleep disturbance    Status post deep brain stimulator placement    Stented coronary artery    Stress    Tobacco dependence    Quit     Uncomfortable fullness after meals    Vaso vagal episode    Vasovagal syncope    Weight loss   [3]   Past Surgical History:  Procedure Laterality Date    Angioplasty (coronary)      Appendectomy      Appendectomy      Back surgery  2005    NECK AT LOWER LUMBAR FUSED    Brain surgery  2016    Brain stimulator           x3    Cardiac pacemaker placement      Cervical spine surgery  2004    cervical spinal fusion C1-6    Chemotherapy  2002    Colonoscopy  2014    Procedure: COLONOSCOPY;  Surgeon: Aaron Fair MD;  Location:  ENDOSCOPY    Colonoscopy N/A 2018    Procedure: COLONOSCOPY;  Surgeon: Aaron Fair MD;  Location:  ENDOSCOPY    D & c      Hysterectomy  1992    OVARIES REMOVED AS WELL    Lumbar spine surgery  1999    lumbar laminectomy L1-L4    Lumpectomy right  2002    Oophorectomy       Other      COLON STENTS    Other surgical history      Lt bunionectomy    Other surgical history      stent in CMA    Pain management Bilateral 06/15/2021    BILATERAL LUMBAR 5 TRANSFORAMINAL LUMBAR EPIDURAL STEROID INJECTION    Radiation right  2002    Sigmoidoscopy,diagnostic      Spine surgery procedure unlisted      Total abdom hysterectomy      Tubal ligation      Upper gi endoscopy,biopsy      ulcer, at [facility]   [4]   Allergies  Allergen Reactions    Sulfa Antibiotics RASH and OTHER (SEE COMMENTS)     headache   [5]   Family History  Problem Relation Age of Onset    Hypertension Father     Musculo-skelatal Disorder Father         TREMOR    Heart Disorder Mother     Cancer Mother         pharyngeal    Breast Cancer Mother         MYSELF 2001    Breast Cancer Self     Lung Disorder Sister         COPD    Heart Attack Brother 72    Ulcerative Colitis Son    [6]   Current Outpatient Medications:     PAROXETINE 20 MG Oral Tab, Take 1 tablet (20 mg total) by mouth every morning., Disp: 90 tablet, Rfl: 0    FIBER OR, Take 1,250 mg by mouth., Disp: , Rfl:     ATORVASTATIN 20 MG Oral Tab, Take 1 tablet (20 mg total) by mouth nightly., Disp: 90 tablet, Rfl: 0    HYDROcodone-acetaminophen 5-325 MG Oral Tab, Take 1 tablet by mouth every 8 (eight) hours as needed for Pain., Disp: 90 tablet, Rfl: 0    sotalol 80 MG Oral Tab, Take 1 tablet (80 mg total) by mouth 2 (two) times daily., Disp: , Rfl:     Ferrous Sulfate (IRON) 325 (65 Fe) MG Oral Tab, Take by mouth every other day. (Patient not taking: Reported on 4/18/2025), Disp: , Rfl:     Probiotic Product (PROBIOTIC-10 OR), Take by mouth 2 (two) times a day., Disp: , Rfl:     aspirin 81 MG Oral Tab EC, Take 1 tablet (81 mg total) by mouth daily., Disp: 90 tablet, Rfl: 1    Calcium Magnesium Zinc 333-133-5 MG Oral Tab, Take 1 tablet by mouth daily. (Patient not taking: Reported on 4/18/2025), Disp: , Rfl:     primidone 50 MG Oral Tab, Take 100 mg by mouth every morning and  take 150 mg by mouth every evening., Disp: , Rfl:     gabapentin 300 MG Oral Cap, Take by mouth. Take 600mg in the morning and 600 mg at night, Disp: , Rfl:     Cyanocobalamin (B-12) 1000 MCG Oral Tab, Take 1,000 mcg by mouth daily. (Patient not taking: Reported on 4/18/2025), Disp: , Rfl:

## 2025-04-18 NOTE — TELEPHONE ENCOUNTER
Attempt to phone patient, no answer, left message per TRINA asking for call back and MyChart message sent.

## 2025-04-21 ENCOUNTER — NURSE ONLY (OUTPATIENT)
Facility: CLINIC | Age: 70
End: 2025-04-21
Payer: MEDICARE

## 2025-04-21 ENCOUNTER — APPOINTMENT (OUTPATIENT)
Dept: PHYSICAL THERAPY | Age: 70
End: 2025-04-21
Attending: FAMILY MEDICINE
Payer: MEDICARE

## 2025-04-21 DIAGNOSIS — M81.0 AGE-RELATED OSTEOPOROSIS WITHOUT CURRENT PATHOLOGICAL FRACTURE: Primary | ICD-10-CM

## 2025-04-21 NOTE — TELEPHONE ENCOUNTER
Patient received Prolia injection today.    Reviewed due to first time administration- usually would like follow up Calcium draw in 7-10 days. That order was entered.    Patient have knee replacement tomorrow, 4/22. Reviewed if any trouble getting that lab drawn due to recovery from procedure to please follow up with our office. Did review signs and symptoms of Hypocalcemia and patient verbalized understanding.    Patient also noted that her dentist and her have discussed that she has a large mandibular suzette- and Reclast/Prolia can cause these to grow. Patient states she was told by dentist if these ever touch, would need surgical intervention- wondering if Dr. Dong has a specific guidance on that/has seen with other patients.

## 2025-04-21 NOTE — DISCHARGE INSTRUCTIONS
Sometimes managing your health at home requires assistance.  The [facility] team has recognized your preference to use [facility].  They can be reached by phone at [phone].  The fax number for your reference is [phone].  A representative from the home health agency will contact you or your family to schedule your first visit.       Joint Replacement Surgery: Patient Discharge Instructions   The best way to contact your surgeon or their team is by phone [phone] (preferred for urgent/acute matters) or through [employer].    Dear Patient,     [facility] cares about your progress with recovery following your joint replacement surgery.     300 days from your scheduled surgery, [facility] will send you a follow-up survey to help us understand how your surgery impacted your mobility, pain, and overall quality of life. Please make every effort to complete this survey. The information collected from this survey will be used by your physician to track your recovery.     Sincerely,     [facility]    What to Expect:  A certain amount of pain, swelling, and bruising is expected for several weeks after surgery.    Pain Medications:   Below is a list of commonly prescribed pain medications. You may have received prescriptions for some, all, or even additional pain medications not listed.  If you are taking the following medications for pain, these are some general guidelines for using and discontinuing them.  You may also want to preemptively take your pain medication before physical therapy (at least 30 minutes prior to the session).  If you are concerned you are suffering side effects from any of these medications please contact your surgeon’s office:    Ultram (Tramadol): Take this as prescribed 3 times per day with meals until your first postoperative appointment. If your pain levels are low, you can stop taking this on a scheduled basis  and start taking it as needed.  Norco: This is your “rescue” drug. Take this only as needed for pain that is not controlled (rated more than 4 out of 10) by other medications.  You may wean yourself off prescription pain medication to a non-prescription pain reliever by substituting two 500mg extra-strength Tylenol (Acetaminophen) tablets in place of your prescription pain medications up to four times per day. Do NOT exceed 4g (4000mg) of acetaminophen daily.  *We will discuss pain management and weaning off pain medicine at your 1st postoperative appointment. To avoid delays in getting your narcotic pain medicine refilled, please contact the office prior to running out of medication either by phone [phone] or through [employer]. Please allow 24-72 hours for prescriptions to be filled. Your doctor may need to physically sign a hand-written prescription -- some medicines cannot be re-ordered electronically/or via phone. If you need to  a hand-written prescription, you can do so at the office located at [address] [place] [address], [address].  Pain Medications can be constipating. Below is a guideline for preventing or managing postoperative constipation:  Drink plenty of fluids. Aim to drink at least of 8oz of water 8 times per day (total of 64oz).  Eat a high-fiber diet (whatever helps you stay regular).  Make sure you are taking Senekot S (Docusate Sodium + Sennosides) or Colace (Docusate Sodium), 1-2 tablets twice daily, while on narcotics. You may decrease to once daily if you develop diarrhea. You will be sent home with a prescription.  If you have not had normal bowel movements the following over the counter medications can be helpful:  Add daily Miralax™ or Metamucil™ as directed on bottle.  If still no results, add a dose of Milk of Magnesia™ as directed on bottle.  If still no results, take one Dulcolax™ (Bisacodyl) suppository as directed on package.  If you still have no results and it has  been more than 3 days since you had a bowel movement, be sure to contact your surgeon's office.    Swelling:  You may apply a cloth covered ice pack for up to 20 minutes each hour, or as needed, to your incision to help control pain and swelling. Do not apply directly to your skin.  For the first 7 days after you leave the hospital, it is critical that you elevate your leg above the level of your heart 4 times per day for 1 hour each time. After a week, elevate your leg as needed if swelling is noted.   Call your Surgeons office if you have:  A temperature greater than 100.4 F.  Increasing pain or numbness at the incision or on your operated leg.  Increasing redness, drainage, or odor from the wound.  You WILL be swollen the first week or two after surgery; however, swelling that continues to get worse to your surgical leg or the opposite leg and is accompanied by discomfort or redness should be reported.  **Go to the Emergency Room if you have chest pain or shortness of breath**  Activities:  Your activity order is:   Weight bearing as tolerated  If you have home health care, a physical therapist (PT) will come to your home 2-3 times per week. The number of PT visits will depend on your needs and your insurance coverage.   At your first postoperative appointment with your surgeon, you will be given a prescription for outpatient physical therapy if you have not already started outpatient therapy.  Rules of the road: No driving until it is approved by your care team, and you are no longer taking narcotic pain medicine.    Wound Care/ Bathing:   Aquacel (waterproof brown rubberized dressing) should remain in place for 7 days and may then be removed. While this waterproof dressing is in place, you may shower and let water run over the dressing (ensure first that the dressing seal is intact and none of the edges have rolled up exposing the wound - if the dressing has become open to the environment, do not allow water to  enter into this area)  Once the original postoperative dressing is removed, you do not need to keep your incision covered. If you would like to keep it covered for comfort you may - use a clean new dressing each day, or more frequently if needed (if the dressing is soiled or it is not staying fixed to your skin). Use the dressings suggested by the nurse at discharge.  After the original postoperative dressing is removed, you should continue to keep the incision covered when showering to prevent it from getting wet prior to your first postoperative appointment. Before showering, be sure to cover the incision with saran wrap or a plastic bag to keep dry. After showering, pat the incision with a clean towel to ensure it is dry. Do this until you are cleared to get the incision wet (usually after the first postoperative appointment).  Once cleared to get the incision wet, you may gently allow soap and water to rinse over the area. Be sure to rinse the area well and gently pat dry.  Do not apply soap, lotion, cream, ointments, or powders to your incision. Keep the incision clean and dry.  Do not soak your incision in water. No tub baths, pools or hot tubs for at least 6 weeks after surgery and not until the wound is completely healed.  If you have stitches or staples, they will be removed at your first postoperative appointment or the week after. Do not remove steri-strips, if you have any. These will come off on their own and do not need to be replaced.  If you have a home care nurse they should:  Examine the incision during visits and call your surgeon if there are any signs of infection or other cause for concern.  Change your dressing and may remove staples (when indicated).  Take your vitals and draw your labs.    Remember, good hand washing with soap at all times helps prevent infections. Wash your hands with soap and water prior to performing any dressing changes or wound evaluation.    Medications/Special  Instructions:  Do not restart your blood pressure medication until cleared by your physician, or until your systolic blood pressure (top number) is above 130 on 2 consecutive checks and then continue your medication as prescribed.    Blood Thinners (you will be on blood thinners for a total of six weeks):  Aspirin: some patients will be prescribed Aspirin to prevent blood clots after surgery. If you are prescribed aspirin, take one 81mg tablet by mouth twice per day for six weeks.  DO NOT restart kejgi1a/fish oils, glucosamine, nonsteroidal antiinflammatories [NSAIDs, such as such as Ibuprofen (Advil, Motrin), Naproxen (Naprosyn, Aleve), Diclofenac (Voltaren), Meloxicam (Mobic)], tumeric, cinnamon, progesterone, estrogen, or Aspirin (unless told differently) until you have completed your blood thinner. These will increase your risk of bleeding.   Protonix (Omeprazole/Nexium, etc.) may be ordered if Aspirin is to be continued while on your blood thinner and should be taken daily during that time. This will help to protect your stomach.    X-Ray  X-rays needed: none    If you do not have a follow-up appointment with your surgeon, or you need to change your follow-up appointment date/time, please call today to schedule or change this appointment: [phone]. Our phones are open to schedule appointments from 8:30am to 4:30pm, Monday through Friday.  If you have any questions or concerns during the postoperative period (for example: wound issues, pain, swelling, redness or drainage) call our office FIRST at [phone] so that we may appropriately direct you or set up a clinic appointment.  Spanda- knee replacement (single layer compression sleeve):  All patients should wear the compression sleeves (SPANDA-) until first follow up visit to surgeon’s office ( about 2-3 weeks) and then check with surgeon if need to continue use.  Take off to shower. Best to keep on as much as possible, even at night.  Wash  with mild soap and water; DRIP dry overnight.    Directions to put on and off:  IT TAKES 2 PIECES OF SPANDA- (compression sleeves), (USUALLY DIFFERENT SIZES because a calf is usually smaller than a knee.)   Use the longer tube (spanda-/compression sleeve) pulled up over the calf.   This 1st piece (spanda-/compression sleeve) should be on the calf part of the leg, from just below the knee to the ball of the foot to expose the toes.   The 2nd piece (shorter compression sleeve) is pulled over and past the first sleeve onto the knee. The lower edge of the knee portion should overlap the top of the calf spanda- by a few inches. This is the only place where the 2 different pieces overlap.  The top edge of the second spanda- should be about a few inches above the knee but it should NOT be rolling down. The spanda- should be flat so that it does not roll and become too tight.  Makes sure it is NOT bunched up anywhere.   IF the spanda- feels TOO tight, then REMOVE it and call your surgeon to let them know.

## 2025-04-21 NOTE — PROGRESS NOTES
Patient in office for first time Prolia administration.    Reviewed Prolia administration, possible side effects. Reviewed to call office if any side effects post administration.     Reviewed patient should inform Dentist if any dental work to be done. No dental work currently scheduled.     Administered Prolia 60mg in right upper arm subcutaneously at 1330. Patient tolerated well with no complaints.    Patient stayed in office for 15 minutes post administration. No complaints, no side effects noted.    Patient instructed to have Calcium level checked in 7-10 days- order entered. Patient having knee replacement tomorrow- she states will try to have lab done in 7-10 days, but if any problems getting done, will follow up with office. Reviewed signs/symptoms of Hypocalcemia: irregular heart beat, numbness, tingling, muscle spasms, fatigue. Asked to follow up with our office if any of these occur. Verbalized understanding.    Scheduled next follow up visit with Dr. Dong for 10/23/25- for next scheduled Prolia injection.    Reminder sent to pool to begin benefits verification 1 month prior.

## 2025-04-21 NOTE — TELEPHONE ENCOUNTER
Patient phoned back, she would like to come for Prolia injection today.    Scheduled for 1330 today.    Closing this encounter.

## 2025-04-22 ENCOUNTER — HOSPITAL ENCOUNTER (INPATIENT)
Facility: HOSPITAL | Age: 70
LOS: 1 days | Discharge: HOME HEALTH | DRG: 470 | End: 2025-04-23
Attending: ORTHOPAEDIC SURGERY | Admitting: ORTHOPAEDIC SURGERY
Payer: MEDICARE

## 2025-04-22 ENCOUNTER — ANESTHESIA EVENT (OUTPATIENT)
Dept: SURGERY | Facility: HOSPITAL | Age: 70
End: 2025-04-22
Payer: MEDICARE

## 2025-04-22 ENCOUNTER — ANESTHESIA (OUTPATIENT)
Dept: SURGERY | Facility: HOSPITAL | Age: 70
End: 2025-04-22
Payer: MEDICARE

## 2025-04-22 ENCOUNTER — HOSPITAL ENCOUNTER (INPATIENT)
Facility: HOSPITAL | Age: 70
LOS: 1 days | Discharge: HOME HEALTH | End: 2025-04-23
Attending: ORTHOPAEDIC SURGERY | Admitting: ORTHOPAEDIC SURGERY
Payer: MEDICARE

## 2025-04-22 ENCOUNTER — APPOINTMENT (OUTPATIENT)
Dept: GENERAL RADIOLOGY | Facility: HOSPITAL | Age: 70
End: 2025-04-22
Attending: ORTHOPAEDIC SURGERY
Payer: MEDICARE

## 2025-04-22 ENCOUNTER — APPOINTMENT (OUTPATIENT)
Dept: GENERAL RADIOLOGY | Facility: HOSPITAL | Age: 70
DRG: 470 | End: 2025-04-22
Attending: ORTHOPAEDIC SURGERY
Payer: MEDICARE

## 2025-04-22 DIAGNOSIS — M54.16 LUMBAR RADICULOPATHY: ICD-10-CM

## 2025-04-22 DIAGNOSIS — F11.20 OPIOID DEPENDENCE, UNCOMPLICATED (HCC): ICD-10-CM

## 2025-04-22 DIAGNOSIS — M17.12 PRIMARY OSTEOARTHRITIS OF LEFT KNEE: ICD-10-CM

## 2025-04-22 DIAGNOSIS — G24.3 CERVICAL DYSTONIA: ICD-10-CM

## 2025-04-22 DIAGNOSIS — M54.16 LUMBAR RADICULITIS: ICD-10-CM

## 2025-04-22 DIAGNOSIS — F11.90 CHRONIC NARCOTIC USE: ICD-10-CM

## 2025-04-22 DIAGNOSIS — M48.061 SPINAL STENOSIS OF LUMBAR REGION WITHOUT NEUROGENIC CLAUDICATION: ICD-10-CM

## 2025-04-22 DIAGNOSIS — M17.11 PRIMARY OSTEOARTHRITIS OF RIGHT KNEE: Primary | ICD-10-CM

## 2025-04-22 PROBLEM — E78.5 DYSLIPIDEMIA: Status: ACTIVE | Noted: 2023-03-16

## 2025-04-22 PROBLEM — I48.92 FLUTTER-FIBRILLATION (HCC): Status: ACTIVE | Noted: 2025-04-22

## 2025-04-22 PROBLEM — I48.91 FLUTTER-FIBRILLATION (HCC): Status: ACTIVE | Noted: 2025-04-22

## 2025-04-22 PROCEDURE — 73560 X-RAY EXAM OF KNEE 1 OR 2: CPT | Performed by: ORTHOPAEDIC SURGERY

## 2025-04-22 PROCEDURE — 27447 TOTAL KNEE ARTHROPLASTY: CPT | Performed by: ORTHOPAEDIC SURGERY

## 2025-04-22 PROCEDURE — 76942 ECHO GUIDE FOR BIOPSY: CPT | Performed by: ANESTHESIOLOGY

## 2025-04-22 PROCEDURE — 99223 1ST HOSP IP/OBS HIGH 75: CPT | Performed by: INTERNAL MEDICINE

## 2025-04-22 PROCEDURE — 3E0T3BZ INTRODUCTION OF ANESTHETIC AGENT INTO PERIPHERAL NERVES AND PLEXI, PERCUTANEOUS APPROACH: ICD-10-PCS | Performed by: ANESTHESIOLOGY

## 2025-04-22 PROCEDURE — 0SRC0J9 REPLACEMENT OF RIGHT KNEE JOINT WITH SYNTHETIC SUBSTITUTE, CEMENTED, OPEN APPROACH: ICD-10-PCS | Performed by: ORTHOPAEDIC SURGERY

## 2025-04-22 DEVICE — PATELLA RESURFACING # 2 E-CROSS
Type: IMPLANTABLE DEVICE | Site: KNEE | Status: FUNCTIONAL
Brand: GMK

## 2025-04-22 DEVICE — FEMUR SPHERE CEMENTED RIGHT, SIZE 2
Type: IMPLANTABLE DEVICE | Site: KNEE | Status: FUNCTIONAL
Brand: GMK SPHERE TOTAL KNEE SYSTEM

## 2025-04-22 DEVICE — FIXED TIBIAL TRAY CEMENTED RIGHT, SIZE 2
Type: IMPLANTABLE DEVICE | Site: KNEE | Status: FUNCTIONAL
Brand: GMK PRIMARY TOTAL KNEE SYSTEM

## 2025-04-22 DEVICE — TIBIAL INSERT FIXED SPHERE FLEX   #2/10 MM R E-CROSS
Type: IMPLANTABLE DEVICE | Site: KNEE | Status: FUNCTIONAL
Brand: GMK SPHERE TOTAL KNEE SYSTEM

## 2025-04-22 DEVICE — GMK SPHERE KNEE: Type: IMPLANTABLE DEVICE | Site: KNEE

## 2025-04-22 RX ORDER — HYDROMORPHONE HYDROCHLORIDE 1 MG/ML
0.4 INJECTION, SOLUTION INTRAMUSCULAR; INTRAVENOUS; SUBCUTANEOUS EVERY 2 HOUR PRN
Status: DISCONTINUED | OUTPATIENT
Start: 2025-04-22 | End: 2025-04-23

## 2025-04-22 RX ORDER — NALOXONE HYDROCHLORIDE 0.4 MG/ML
80 INJECTION, SOLUTION INTRAMUSCULAR; INTRAVENOUS; SUBCUTANEOUS AS NEEDED
Status: DISCONTINUED | OUTPATIENT
Start: 2025-04-22 | End: 2025-04-22 | Stop reason: HOSPADM

## 2025-04-22 RX ORDER — TRAMADOL HYDROCHLORIDE 50 MG/1
50 TABLET ORAL EVERY 6 HOURS SCHEDULED
Status: DISCONTINUED | OUTPATIENT
Start: 2025-04-22 | End: 2025-04-23

## 2025-04-22 RX ORDER — SENNOSIDES 8.6 MG
17.2 TABLET ORAL NIGHTLY
Status: DISCONTINUED | OUTPATIENT
Start: 2025-04-22 | End: 2025-04-23

## 2025-04-22 RX ORDER — FERROUS SULFATE 325(65) MG
325 TABLET, DELAYED RELEASE (ENTERIC COATED) ORAL
Status: DISCONTINUED | OUTPATIENT
Start: 2025-04-23 | End: 2025-04-23

## 2025-04-22 RX ORDER — PRIMIDONE 50 MG/1
150 TABLET ORAL NIGHTLY
Status: DISCONTINUED | OUTPATIENT
Start: 2025-04-22 | End: 2025-04-23

## 2025-04-22 RX ORDER — HYDROMORPHONE HYDROCHLORIDE 1 MG/ML
INJECTION, SOLUTION INTRAMUSCULAR; INTRAVENOUS; SUBCUTANEOUS
Status: COMPLETED
Start: 2025-04-22 | End: 2025-04-22

## 2025-04-22 RX ORDER — ACETAMINOPHEN 325 MG/1
TABLET ORAL
Status: COMPLETED
Start: 2025-04-22 | End: 2025-04-22

## 2025-04-22 RX ORDER — ROCURONIUM BROMIDE 10 MG/ML
INJECTION, SOLUTION INTRAVENOUS AS NEEDED
Status: DISCONTINUED | OUTPATIENT
Start: 2025-04-22 | End: 2025-04-22 | Stop reason: SURG

## 2025-04-22 RX ORDER — METOCLOPRAMIDE HYDROCHLORIDE 5 MG/ML
10 INJECTION INTRAMUSCULAR; INTRAVENOUS EVERY 8 HOURS PRN
Status: DISCONTINUED | OUTPATIENT
Start: 2025-04-22 | End: 2025-04-22 | Stop reason: HOSPADM

## 2025-04-22 RX ORDER — ACETAMINOPHEN 500 MG
1000 TABLET ORAL ONCE
Status: DISCONTINUED | OUTPATIENT
Start: 2025-04-22 | End: 2025-04-22 | Stop reason: HOSPADM

## 2025-04-22 RX ORDER — LABETALOL HYDROCHLORIDE 5 MG/ML
5 INJECTION, SOLUTION INTRAVENOUS EVERY 5 MIN PRN
Status: DISCONTINUED | OUTPATIENT
Start: 2025-04-22 | End: 2025-04-22 | Stop reason: HOSPADM

## 2025-04-22 RX ORDER — HYDROMORPHONE HYDROCHLORIDE 1 MG/ML
0.2 INJECTION, SOLUTION INTRAMUSCULAR; INTRAVENOUS; SUBCUTANEOUS EVERY 5 MIN PRN
Status: DISCONTINUED | OUTPATIENT
Start: 2025-04-22 | End: 2025-04-22 | Stop reason: HOSPADM

## 2025-04-22 RX ORDER — PAROXETINE 20 MG/1
20 TABLET, FILM COATED ORAL EVERY MORNING
Status: DISCONTINUED | OUTPATIENT
Start: 2025-04-23 | End: 2025-04-23

## 2025-04-22 RX ORDER — DEXAMETHASONE SODIUM PHOSPHATE 4 MG/ML
VIAL (ML) INJECTION AS NEEDED
Status: DISCONTINUED | OUTPATIENT
Start: 2025-04-22 | End: 2025-04-22 | Stop reason: SURG

## 2025-04-22 RX ORDER — PRIMIDONE 50 MG/1
100 TABLET ORAL DAILY
Status: DISCONTINUED | OUTPATIENT
Start: 2025-04-23 | End: 2025-04-23

## 2025-04-22 RX ORDER — SODIUM CHLORIDE, SODIUM LACTATE, POTASSIUM CHLORIDE, CALCIUM CHLORIDE 600; 310; 30; 20 MG/100ML; MG/100ML; MG/100ML; MG/100ML
INJECTION, SOLUTION INTRAVENOUS CONTINUOUS
Status: DISCONTINUED | OUTPATIENT
Start: 2025-04-22 | End: 2025-04-22 | Stop reason: HOSPADM

## 2025-04-22 RX ORDER — KETOROLAC TROMETHAMINE 30 MG/ML
15 INJECTION, SOLUTION INTRAMUSCULAR; INTRAVENOUS EVERY 6 HOURS
Status: DISCONTINUED | OUTPATIENT
Start: 2025-04-22 | End: 2025-04-23

## 2025-04-22 RX ORDER — MEPERIDINE HYDROCHLORIDE 25 MG/ML
12.5 INJECTION INTRAMUSCULAR; INTRAVENOUS; SUBCUTANEOUS AS NEEDED
Status: DISCONTINUED | OUTPATIENT
Start: 2025-04-22 | End: 2025-04-22 | Stop reason: HOSPADM

## 2025-04-22 RX ORDER — DIPHENHYDRAMINE HCL 25 MG
25 CAPSULE ORAL EVERY 4 HOURS PRN
Status: DISCONTINUED | OUTPATIENT
Start: 2025-04-22 | End: 2025-04-23

## 2025-04-22 RX ORDER — DIPHENHYDRAMINE HYDROCHLORIDE 50 MG/ML
12.5 INJECTION, SOLUTION INTRAMUSCULAR; INTRAVENOUS EVERY 4 HOURS PRN
Status: DISCONTINUED | OUTPATIENT
Start: 2025-04-22 | End: 2025-04-23

## 2025-04-22 RX ORDER — ACETAMINOPHEN 500 MG
1000 TABLET ORAL EVERY 8 HOURS SCHEDULED
Status: DISCONTINUED | OUTPATIENT
Start: 2025-04-22 | End: 2025-04-23

## 2025-04-22 RX ORDER — KETOROLAC TROMETHAMINE 30 MG/ML
INJECTION, SOLUTION INTRAMUSCULAR; INTRAVENOUS AS NEEDED
Status: DISCONTINUED | OUTPATIENT
Start: 2025-04-22 | End: 2025-04-22 | Stop reason: SURG

## 2025-04-22 RX ORDER — ATORVASTATIN CALCIUM 20 MG/1
20 TABLET, FILM COATED ORAL NIGHTLY
Status: DISCONTINUED | OUTPATIENT
Start: 2025-04-23 | End: 2025-04-23

## 2025-04-22 RX ORDER — DIPHENHYDRAMINE HYDROCHLORIDE 50 MG/ML
25 INJECTION, SOLUTION INTRAMUSCULAR; INTRAVENOUS ONCE AS NEEDED
Status: ACTIVE | OUTPATIENT
Start: 2025-04-22 | End: 2025-04-22

## 2025-04-22 RX ORDER — KETAMINE HYDROCHLORIDE 50 MG/ML
INJECTION, SOLUTION INTRAMUSCULAR; INTRAVENOUS AS NEEDED
Status: DISCONTINUED | OUTPATIENT
Start: 2025-04-22 | End: 2025-04-22 | Stop reason: SURG

## 2025-04-22 RX ORDER — HYDROMORPHONE HYDROCHLORIDE 1 MG/ML
0.8 INJECTION, SOLUTION INTRAMUSCULAR; INTRAVENOUS; SUBCUTANEOUS EVERY 2 HOUR PRN
Status: DISCONTINUED | OUTPATIENT
Start: 2025-04-22 | End: 2025-04-23

## 2025-04-22 RX ORDER — SOTALOL HYDROCHLORIDE 80 MG/1
80 TABLET ORAL 2 TIMES DAILY
Status: DISCONTINUED | OUTPATIENT
Start: 2025-04-22 | End: 2025-04-23

## 2025-04-22 RX ORDER — DEXAMETHASONE SODIUM PHOSPHATE 10 MG/ML
8 INJECTION, SOLUTION INTRAMUSCULAR; INTRAVENOUS ONCE
Status: COMPLETED | OUTPATIENT
Start: 2025-04-23 | End: 2025-04-23

## 2025-04-22 RX ORDER — DOCUSATE SODIUM 100 MG/1
100 CAPSULE, LIQUID FILLED ORAL 2 TIMES DAILY
Status: DISCONTINUED | OUTPATIENT
Start: 2025-04-22 | End: 2025-04-23

## 2025-04-22 RX ORDER — SODIUM PHOSPHATE, DIBASIC AND SODIUM PHOSPHATE, MONOBASIC 7; 19 G/230ML; G/230ML
1 ENEMA RECTAL ONCE AS NEEDED
Status: DISCONTINUED | OUTPATIENT
Start: 2025-04-22 | End: 2025-04-23

## 2025-04-22 RX ORDER — LABETALOL HYDROCHLORIDE 5 MG/ML
INJECTION, SOLUTION INTRAVENOUS AS NEEDED
Status: DISCONTINUED | OUTPATIENT
Start: 2025-04-22 | End: 2025-04-22 | Stop reason: SURG

## 2025-04-22 RX ORDER — POLYETHYLENE GLYCOL 3350 17 G/17G
17 POWDER, FOR SOLUTION ORAL DAILY PRN
Status: DISCONTINUED | OUTPATIENT
Start: 2025-04-22 | End: 2025-04-23

## 2025-04-22 RX ORDER — DEXAMETHASONE SODIUM PHOSPHATE 10 MG/ML
INJECTION, SOLUTION INTRAMUSCULAR; INTRAVENOUS AS NEEDED
Status: DISCONTINUED | OUTPATIENT
Start: 2025-04-22 | End: 2025-04-22 | Stop reason: SURG

## 2025-04-22 RX ORDER — LIDOCAINE HYDROCHLORIDE 10 MG/ML
INJECTION, SOLUTION EPIDURAL; INFILTRATION; INTRACAUDAL; PERINEURAL AS NEEDED
Status: DISCONTINUED | OUTPATIENT
Start: 2025-04-22 | End: 2025-04-22 | Stop reason: SURG

## 2025-04-22 RX ORDER — ACETAMINOPHEN 325 MG/1
650 TABLET ORAL ONCE
Status: COMPLETED | OUTPATIENT
Start: 2025-04-22 | End: 2025-04-22

## 2025-04-22 RX ORDER — OXYCODONE HYDROCHLORIDE 10 MG/1
10 TABLET ORAL EVERY 4 HOURS PRN
Status: DISCONTINUED | OUTPATIENT
Start: 2025-04-22 | End: 2025-04-23

## 2025-04-22 RX ORDER — ONDANSETRON 2 MG/ML
INJECTION INTRAMUSCULAR; INTRAVENOUS AS NEEDED
Status: DISCONTINUED | OUTPATIENT
Start: 2025-04-22 | End: 2025-04-22 | Stop reason: SURG

## 2025-04-22 RX ORDER — ONDANSETRON 2 MG/ML
4 INJECTION INTRAMUSCULAR; INTRAVENOUS EVERY 6 HOURS PRN
Status: DISCONTINUED | OUTPATIENT
Start: 2025-04-22 | End: 2025-04-23

## 2025-04-22 RX ORDER — HYDROMORPHONE HYDROCHLORIDE 1 MG/ML
0.4 INJECTION, SOLUTION INTRAMUSCULAR; INTRAVENOUS; SUBCUTANEOUS EVERY 5 MIN PRN
Status: DISCONTINUED | OUTPATIENT
Start: 2025-04-22 | End: 2025-04-22 | Stop reason: HOSPADM

## 2025-04-22 RX ORDER — BISACODYL 10 MG
10 SUPPOSITORY, RECTAL RECTAL
Status: DISCONTINUED | OUTPATIENT
Start: 2025-04-22 | End: 2025-04-23

## 2025-04-22 RX ORDER — HYDROMORPHONE HYDROCHLORIDE 1 MG/ML
0.6 INJECTION, SOLUTION INTRAMUSCULAR; INTRAVENOUS; SUBCUTANEOUS EVERY 5 MIN PRN
Status: DISCONTINUED | OUTPATIENT
Start: 2025-04-22 | End: 2025-04-22 | Stop reason: HOSPADM

## 2025-04-22 RX ORDER — OXYCODONE HYDROCHLORIDE 5 MG/1
5 TABLET ORAL EVERY 4 HOURS PRN
Status: DISCONTINUED | OUTPATIENT
Start: 2025-04-22 | End: 2025-04-23

## 2025-04-22 RX ORDER — SODIUM CHLORIDE, SODIUM LACTATE, POTASSIUM CHLORIDE, CALCIUM CHLORIDE 600; 310; 30; 20 MG/100ML; MG/100ML; MG/100ML; MG/100ML
INJECTION, SOLUTION INTRAVENOUS CONTINUOUS
Status: DISCONTINUED | OUTPATIENT
Start: 2025-04-22 | End: 2025-04-23

## 2025-04-22 RX ORDER — DIPHENHYDRAMINE HYDROCHLORIDE 50 MG/ML
12.5 INJECTION, SOLUTION INTRAMUSCULAR; INTRAVENOUS AS NEEDED
Status: DISCONTINUED | OUTPATIENT
Start: 2025-04-22 | End: 2025-04-22 | Stop reason: HOSPADM

## 2025-04-22 RX ORDER — ONDANSETRON 2 MG/ML
4 INJECTION INTRAMUSCULAR; INTRAVENOUS EVERY 6 HOURS PRN
Status: DISCONTINUED | OUTPATIENT
Start: 2025-04-22 | End: 2025-04-22 | Stop reason: HOSPADM

## 2025-04-22 RX ORDER — GLYCOPYRROLATE 0.2 MG/ML
INJECTION, SOLUTION INTRAMUSCULAR; INTRAVENOUS AS NEEDED
Status: DISCONTINUED | OUTPATIENT
Start: 2025-04-22 | End: 2025-04-22 | Stop reason: SURG

## 2025-04-22 RX ORDER — ONDANSETRON 2 MG/ML
INJECTION INTRAMUSCULAR; INTRAVENOUS
Status: COMPLETED
Start: 2025-04-22 | End: 2025-04-22

## 2025-04-22 RX ORDER — TRANEXAMIC ACID 10 MG/ML
1000 INJECTION, SOLUTION INTRAVENOUS ONCE
Status: DISCONTINUED | OUTPATIENT
Start: 2025-04-22 | End: 2025-04-22 | Stop reason: HOSPADM

## 2025-04-22 RX ORDER — ASPIRIN 81 MG/1
81 TABLET ORAL 2 TIMES DAILY
Status: DISCONTINUED | OUTPATIENT
Start: 2025-04-22 | End: 2025-04-23

## 2025-04-22 RX ORDER — GABAPENTIN 300 MG/1
600 CAPSULE ORAL 2 TIMES DAILY
Status: DISCONTINUED | OUTPATIENT
Start: 2025-04-22 | End: 2025-04-23

## 2025-04-22 RX ORDER — PRIMIDONE 50 MG/1
150 TABLET ORAL NIGHTLY
Status: DISCONTINUED | OUTPATIENT
Start: 2025-04-23 | End: 2025-04-22

## 2025-04-22 RX ORDER — METOCLOPRAMIDE HYDROCHLORIDE 5 MG/ML
10 INJECTION INTRAMUSCULAR; INTRAVENOUS EVERY 8 HOURS PRN
Status: DISCONTINUED | OUTPATIENT
Start: 2025-04-22 | End: 2025-04-23

## 2025-04-22 RX ADMIN — DEXAMETHASONE SODIUM PHOSPHATE 2 MG: 10 INJECTION, SOLUTION INTRAMUSCULAR; INTRAVENOUS at 16:05:00

## 2025-04-22 RX ADMIN — SODIUM CHLORIDE, SODIUM LACTATE, POTASSIUM CHLORIDE, CALCIUM CHLORIDE: 600; 310; 30; 20 INJECTION, SOLUTION INTRAVENOUS at 15:53:00

## 2025-04-22 RX ADMIN — LIDOCAINE HYDROCHLORIDE 50 MG: 10 INJECTION, SOLUTION EPIDURAL; INFILTRATION; INTRACAUDAL; PERINEURAL at 16:00:00

## 2025-04-22 RX ADMIN — ROCURONIUM BROMIDE 40 MG: 10 INJECTION, SOLUTION INTRAVENOUS at 16:00:00

## 2025-04-22 RX ADMIN — SODIUM CHLORIDE, SODIUM LACTATE, POTASSIUM CHLORIDE, CALCIUM CHLORIDE: 600; 310; 30; 20 INJECTION, SOLUTION INTRAVENOUS at 16:38:00

## 2025-04-22 RX ADMIN — GLYCOPYRROLATE 0.3 MG: 0.2 INJECTION, SOLUTION INTRAMUSCULAR; INTRAVENOUS at 16:15:00

## 2025-04-22 RX ADMIN — ONDANSETRON 4 MG: 2 INJECTION INTRAMUSCULAR; INTRAVENOUS at 17:35:00

## 2025-04-22 RX ADMIN — SODIUM CHLORIDE, SODIUM LACTATE, POTASSIUM CHLORIDE, CALCIUM CHLORIDE: 600; 310; 30; 20 INJECTION, SOLUTION INTRAVENOUS at 17:30:00

## 2025-04-22 RX ADMIN — LABETALOL HYDROCHLORIDE 5 MG: 5 INJECTION, SOLUTION INTRAVENOUS at 17:45:00

## 2025-04-22 RX ADMIN — KETAMINE HYDROCHLORIDE 25 MG: 50 INJECTION, SOLUTION INTRAMUSCULAR; INTRAVENOUS at 16:29:00

## 2025-04-22 RX ADMIN — KETOROLAC TROMETHAMINE 15 MG: 30 INJECTION, SOLUTION INTRAMUSCULAR; INTRAVENOUS at 17:33:00

## 2025-04-22 RX ADMIN — DEXAMETHASONE SODIUM PHOSPHATE 4 MG: 4 MG/ML VIAL (ML) INJECTION at 17:19:00

## 2025-04-22 NOTE — ANESTHESIA PROCEDURE NOTES
Regional Block    Date/Time: 4/22/2025 4:03 PM    Performed by: Bunny Noble MD  Authorized by: Bunny Noble MD      General Information and Staff    Start Time:  4/22/2025 4:03 PM  End Time:  4/22/2025 4:05 PM  Anesthesiologist:  Bunny Noble MD  Performed by:  Anesthesiologist  Patient Location:  OR    Block Placement: Post Induction  Site Identification: real time ultrasound guided and image stored and retrievable    Block site/laterality marked before start: site marked  Reason for Block: at surgeon's request and post-op pain management    Preanesthetic Checklist: 2 patient identifers, IV checked, site marked, risks and benefits discussed, monitors and equipment checked, pre-op evaluation, timeout performed, anesthesia consent, sterile technique used, no prohibitive neurological deficits and no local skin infection at insertion site      Procedure Details    Patient Position:  Supine  Prep: ChloraPrep    Monitoring:  Cardiac monitor, continuous pulse ox, blood pressure cuff and heart rate  Block Type:  Adductor canal  Laterality:  Right  Injection Technique:  Single-shot    Needle    Needle Type:  Short-bevel and echogenic  Needle Gauge:  20 G  Needle Length:  110 mm  Needle Localization:  Ultrasound guidance  Reason for Ultrasound Use: appropriate spread of the medication was noted in real time and no ultrasound evidence of intravascular and/or intraneural injection            Assessment    Injection Assessment:  Good spread noted, negative resistance, negative aspiration for heme, incremental injection and low pressure  Heart Rate Change: No    - Patient tolerated block procedure well without evidence of immediate block related complications.     Medications  4/22/2025 4:03 PM      Additional Comments    Medication:  Bupivacaine 0.25% 20mL w/PF decadron 2mg

## 2025-04-22 NOTE — ANESTHESIA PROCEDURE NOTES
Airway  Date/Time: 4/22/2025 4:00 PM  Reason: elective    Airway not difficult    General Information and Staff   Patient location during procedure: OR  Anesthesiologist: Bunny Noble MD  Performed: anesthesiologist   Performed by: Bunny Noble MD  Authorized by: Bunny Noble MD        Indications and Patient Condition  Indications for airway management: anesthesia  Sedation level: deep      Preoxygenated: yesPatient position: sniffing    Mask difficulty assessment: 1 - vent by mask    Final Airway Details    Final airway type: endotracheal airway    Successful airway: ETT  Cuffed: yes   Successful intubation technique: Video laryngoscopy  Facilitating devices/methods: intubating stylet  Endotracheal tube insertion site: oral  Blade: GlideScope  Blade size: #3  ETT size (mm): 6.5    Cormack-Lehane Classification: grade I - full view of glottis  Placement verified by: capnometry   Cuff volume (mL): 7  Measured from: lips  ETT to lips (cm): 22  Number of attempts at approach: 1  Number of other approaches attempted: 0

## 2025-04-22 NOTE — OPERATIVE REPORT
Operative Note    Patient Name/: Tamara Meraz 1/15/1955  Date: 2025  Location: [facility]  Preoperative Diagnosis: Right knee posttraumatic osteoarthritis  Postoperative Diagnosis: Same  Operation: Right total knee arthroplasty mechanical alignment  Primary Surgeon: Janes Cortez  Assistant:  Mj Hickman surgical assist. Please note a skilled surgical assistant was necessary for this case in order to assist with patient positioning, prepping, draping, incision, exposure, implant placement, wound closure.  Indications: 70-year-old female end-stage right knee osteoarthritis secondary to posttraumatic status, valgus, indicated for surgery  Surgical Findings: Valgus osteoarthritis  Operative Report:  After informed consent, the right lower extremity was marked.  Patient was brought to the operating room where general anesthesia was induced.  Preoperative exam demonstrated range of motion from few degrees short of full extension, flexion around 120 Gravity. The right lower extremity was prepped and draped in sterile fashion.  Timeout was performed to verify correct surgical site and procedure.  2 g of Ancef was given within 1 hour of incision.  Additionally, 1 g tranexamic acid was given intravenously.  Next the limb was gravity exsanguinated and tourniquet inflated.  Incision was made anteriorly from 2 fingerbreadths proximal to the patella down along the medial aspect of the tibial tubercle.  This was carried down to the extensor mechanism.  Medial and lateral flaps were developed.  The VMO muscle belly was identified.  Full-thickness medial parapatellar arthrotomy was made from 2 fingerbreadths proximal to the patella along the VMO muscle belly, around the medial patella and patellar tendon.  The fat pad was dissected free from the patellar tendon and reflected medially.  Subperiosteal dissection was carried out posteriorly about the proximal medial aspect of the tibia.  Osteophytes  were removed from the proximal medial tibia.  The lateral patellofemoral plica was incised, and the patella was everted and denervated.  The synovium over the anterior distal femur was teed open.  Next the knee was flexed up, and remnant of the ACL and lateral meniscus were excised.  I used an intramedullary reamer to open up the femur for our intramedullary alignment guide.  The distal femoral cutting block was placed on the distal femur to take off 11.5 mm from 2 mm of intact medial femoral condylar cartilage.  It sat down on the distal medial femur, measuring to take off more medial bone than lateral.  This was pinned in place.  A olivier's wing was used to check the cut, and the cut was performed.  The distal medial femur fragment measured 10 mm.  Next the knee was flexed and the tibial crest was marked.  An extra medullary tibial cutting guide was placed above the ankle, measuring to take off a few millimeters off the medial side, pinned in line with the tibial crest in the coronal plane and with a few degrees of posterior slope in the sagittal plane.  The proximal tibia was cut, using a Z retractor to protect MCL, patellar tendon and lateral soft tissues.  The cut measured 5 lateral.  After this portion of bone was removed, the knee was brought into extension and a lamina  was used to open up the extension space.  What remained of the medial and lateral menisci were removed.  Spacer block was placed with a T-handle drop-down cherelle to verify appropriate cut angle.  Next the knee was flexed up, and the femoral sizing block with stylus was used.  This sized for a size 2 femur.  Pins were placed, and the sizing block removed.  Pins were noted to be parallel to the epicondylar axis.  The 4-in-1 cutting block was placed and screwed down.  Anterior distal femur was cut, demonstrating a positive grand piano sign.  Posterior condyles were cut, measuring 7 on the posterior medial cut.  Chamfers were then cut.  The  cutting block was removed, and the knee was flexed with a lamina  to open up the posterior knee joint.  Notch contents and PCL were removed.  Posterior condylar recesses were opened, and posterior osteophytes chiseled out.  The knee was a touch tight on the lateral side in extension, therefore was brought into extension with a lamina  to open up the extension gap.  A 15 blade was used to incise the capsule from just anterior to the popliteus up to the IT band.  This did adequately open up the lateral space in extension.  The knee was brought into extension, and sized for a tibial baseplate.  Trials were then placed.  With the 2 femur, the 2 tibia, and a 10 polyethylene liner, the knee came to full extension with excellent varus stability, 1-2 valgus laxity.  In mid flexion, 1 mm valgus, 3-4 varus, Lachman stable rotating medially.  At 90 degrees anterior drawer was less than 5 mm rotating around the medial femoral condyle.  Gravity flexion 130-140 with the patella tracking centrally.  The tibial component rotation was marked, and the tibial trial removed.  The femoral lugs were drilled, and the anterior chamfer finishing guide was placed to cut the finishing anterior chamfer cut.  The knee was then flexed, and the tibia brought forward to place the tibial baseplate trial.  This was pinned in place, and the drill and keel punch were used to prepare the proximal tibia.  The knee was then brought into extension, the patella everted, and it was freehand cut from a thickness of 21 mm and cut to 12 mm.  Next final components were opened, bony surfaces were washed and dried, and periarticular pain cocktail was injected with 30 mL in the posterior capsule.  Cement was then mixed.  The tibial component was cemented along with the proximal tibial bone surface, the component was impacted into place followed by removal of excess cement.  The femur was reduced onto the tibia, and cement was placed on the cut  surface of the femur.  The cemented femoral component was then impacted down, with removal of excess cement.  The femur was left up 2 mm on the lateral side the knee was brought into full extension and an axial load was held across the joint.  The patella was cemented along with the button, and this was compressed and held in place.  Once cement had hardened, the knee was trialed again.  Optimal stability was achieved with a 10 mm polyethylene liner, with exam demonstrating excellent varus valgus in full extension; mid flexion less than half a millimeter valgus, 3 varus, Lachman stable; 90 degrees anterior drawer less than 5 mm rotating around the medial side; Gravity flexion 1/31/1940 with the patella tracking centrally.  The trial poly was removed, the knee was washed out, any loose bodies removed, and final poly was placed.  Betadine lavage was performed.  Remaining pain cocktail was injected with 10 mL into remaining fat pad, 10 mL into medial femoral and tibial periosteum, and 10 mL into extensor mechanism.  Closure was performed with 5 Ethibond for the arthrotomy, 2-0 Vicryl for skin, followed by staples.  A sterile dressing was applied, and the patient was brought to PACU in good condition.  Anesthesia: General Plus adductor canal block  Complications: None  Specimens: Bone  Blood loss: 25 mL  Fluids: See anesthesia report  Implants: Medacta GM K sphere size 2 femur, 2 tibia, 2 patella, 10 poly-  Drains: None  Condition: Good  Disposition: Floor    - Weightbearing as tolerated, PT OT  - DVT prophylaxis mechanical plus aspirin twice daily  - Pain control with oral medications, patient at elevated risk of postoperative pain control issues given long-term use of opioid pain medications prior to surgery  - Perioperative Ancef  - Patient at elevated risk perioperatively and requires inpatient stay due to risk of pain control issues acutely after surgery given the chronic use of opioid pain medications  preoperatively, preoperative anemia with hemoglobin 10 and risk for symptomatic anemia need for transfusion, as well as preoperative cardiology evaluation stating \"Given the complexity of her cardiovascular and neurologic history she should be kept overnight at least 24 hours on telemetry\"    Janes Cortez MD, FAAOS  [facility]  Phone [phone]  Fax [phone]

## 2025-04-22 NOTE — INTERVAL H&P NOTE
Pre-op Diagnosis: Right knee osteoarthritis    The above referenced H&P was reviewed by Janes Cortez MD on 4/22/2025, the patient was examined and no significant changes have occurred in the patient's condition since the H&P was performed.  I discussed with the patient and/or legal representative the potential benefits, risks and side effects of this procedure; the likelihood of the patient achieving goals; and potential problems that might occur during recuperation.  I discussed reasonable alternatives to the procedure, including risks, benefits and side effects related to the alternatives and risks related to not receiving this procedure.  We will proceed with procedure as planned.

## 2025-04-23 VITALS
HEART RATE: 65 BPM | RESPIRATION RATE: 18 BRPM | BODY MASS INDEX: 18.28 KG/M2 | HEIGHT: 61 IN | WEIGHT: 96.81 LBS | TEMPERATURE: 98 F | DIASTOLIC BLOOD PRESSURE: 58 MMHG | SYSTOLIC BLOOD PRESSURE: 101 MMHG | OXYGEN SATURATION: 96 %

## 2025-04-23 PROCEDURE — 99232 SBSQ HOSP IP/OBS MODERATE 35: CPT | Performed by: INTERNAL MEDICINE

## 2025-04-23 PROCEDURE — 99024 POSTOP FOLLOW-UP VISIT: CPT | Performed by: ORTHOPAEDIC SURGERY

## 2025-04-23 RX ORDER — SENNA AND DOCUSATE SODIUM 50; 8.6 MG/1; MG/1
1 TABLET, FILM COATED ORAL DAILY PRN
Qty: 30 TABLET | Refills: 0 | Status: SHIPPED | OUTPATIENT
Start: 2025-04-23

## 2025-04-23 RX ORDER — CELECOXIB 200 MG/1
200 CAPSULE ORAL DAILY
Qty: 30 CAPSULE | Refills: 0 | Status: SHIPPED | OUTPATIENT
Start: 2025-04-23 | End: 2025-04-23

## 2025-04-23 RX ORDER — ACETAMINOPHEN 500 MG
1000 TABLET ORAL EVERY 8 HOURS SCHEDULED
Qty: 90 TABLET | Refills: 0 | Status: SHIPPED | OUTPATIENT
Start: 2025-04-23 | End: 2025-04-23

## 2025-04-23 RX ORDER — ASPIRIN 81 MG/1
81 TABLET ORAL 2 TIMES DAILY
Qty: 80 TABLET | Refills: 0 | Status: SHIPPED | OUTPATIENT
Start: 2025-04-23 | End: 2025-06-02

## 2025-04-23 RX ORDER — OXYCODONE HYDROCHLORIDE 5 MG/1
5 TABLET ORAL EVERY 4 HOURS PRN
Qty: 30 TABLET | Refills: 0 | Status: SHIPPED | OUTPATIENT
Start: 2025-04-23 | End: 2025-04-23

## 2025-04-23 RX ORDER — TRAMADOL HYDROCHLORIDE 50 MG/1
50 TABLET ORAL EVERY 8 HOURS
Qty: 45 TABLET | Refills: 0 | Status: SHIPPED | OUTPATIENT
Start: 2025-04-23 | End: 2025-05-08

## 2025-04-23 RX ORDER — HYDROCODONE BITARTRATE AND ACETAMINOPHEN 5; 325 MG/1; MG/1
1-2 TABLET ORAL EVERY 4 HOURS PRN
Qty: 60 TABLET | Refills: 0 | Status: SHIPPED | OUTPATIENT
Start: 2025-04-23 | End: 2025-05-02

## 2025-04-23 NOTE — ANESTHESIA POSTPROCEDURE EVALUATION
[facility]    Tamara Meraz Patient Status:  Inpatient   Age/Gender 70 year old female MRN [id number]   Location [facility] POST ANESTHESIA CARE UNIT Attending Janes Cortez MD   Hosp Day # 0 PCP Lilli Kramer MD       Anesthesia Post-op Note    Right total knee arthroplasty    Procedure Summary       Date: 04/22/25 Room / Location:  MAIN OR 19 / [facility] MAIN OR    Anesthesia Start: 1553 Anesthesia Stop: 1758    Procedure: Right total knee arthroplasty (Right) Diagnosis:       Primary osteoarthritis of left knee      (Right knee osteoarthritis)    Surgeons: Janes Cortez MD Anesthesiologist: Bunny Noble MD    Anesthesia Type: Not recorded ASA Status: Not recorded            Anesthesia Type: No value filed.    Vitals Value Taken Time   /81 04/22/25 19:13   Temp 98.7 °F (37.1 °C) 04/22/25 18:30   Pulse 75 04/22/25 19:13   Resp 13 04/22/25 19:15   SpO2 100 % 04/22/25 19:15   Vitals shown include unfiled device data.        Patient Location: PACU    Anesthesia Type: regional and general    Airway Patency: patent and extubated    Postop Pain Control: inadequate, being treated    Mental Status: mildly sedated but able to meaningfully participate in the post-anesthesia evaluation    Nausea/Vomiting: none    Cardiopulmonary/Hydration status: stable euvolemic    Complications: no apparent anesthesia related complications    Postop vital signs: stable    Dental Exam: Unchanged from Preop    Patient to be discharged from PACU when criteria met.

## 2025-04-23 NOTE — CM/SW NOTE
04/23/25 0900   CM/SW Referral Data   Referral Source Physician   Reason for Referral Discharge planning   Informant EMR;Clinical Staff Member     Pt  s/p TKR- pre-op plan [facility].  PT=C.    Tomeka Copeland LCSW  /Discharge Planner

## 2025-04-23 NOTE — CONSULTS
[facility] HOSPITALIST  CONSULT     Tamara Meraz Patient Status:  Inpatient    1/15/1955 MRN [id number]   Location [facility] 3SW-A Attending Janes Cortez MD   Hosp Day # 0 PCP Lilli Kramer MD     Reason for consult: med management    Requested by: Dr. Cortez    Subjective:   History of Present Illness:     Tamara Meraz is a 70 year old female with PMhx of Aflutter, HTN, DL, Depression, breast cancer, R knee OA presents for R knee arthoplasty. Pt udnerwent procedure this evening and tolerated well. Pt states pain is controlled. She denies any CP or SOB. No N/V/D/C or abd pain. No F/C. She has not worked with therapy yet.     History/Other:    Past Medical History:  Past Medical History[1]  Past Surgical History:   Past Surgical History[2]   Family History:   Family History[3]  Social History:    reports that she quit smoking about 6 years ago. Her smoking use included cigarettes. She started smoking about 42 years ago. She has a 18 pack-year smoking history. She has been exposed to tobacco smoke. She has never used smokeless tobacco. She reports current alcohol use of about 14.0 standard drinks of alcohol per week. She reports that she does not use drugs.     Allergies: Allergies[4]    Medications:  Medications Ordered Prior to Encounter[5]    Review of Systems:   A comprehensive review of systems was completed.    Pertinent positives and negatives noted in the HPI.    Objective:   Physical Exam:    /70 (BP Location: Right arm)   Pulse 71   Temp 98 °F (36.7 °C) (Oral)   Resp 16   Ht 154.9 cm (5' 1\")   Wt 96 lb 12.5 oz (43.9 kg)   SpO2 99%   BMI 18.29 kg/m²   General: No acute distress, Alert  Respiratory: No rhonchi, no wheezes  Cardiovascular: S1, S2. Regular rate and rhythm  Abdomen: Soft, NT/ND, +BS  Neuro: No new focal deficits  Extremities: No edema, R knee in cooling device    Results:    Labs:      Labs Last 24 Hours:  Recent Labs   Lab 25  1324   WBC 3.8*   HGB  11.4*   .6*   .0       No results for input(s): \"GLU\", \"BUN\", \"CREATSERUM\", \"GFRAA\", \"GFRNAA\", \"CA\", \"ALB\", \"NA\", \"K\", \"CL\", \"CO2\", \"ALKPHO\", \"AST\", \"ALT\", \"BILT\", \"TP\" in the last 168 hours.    No results for input(s): \"PTP\", \"INR\" in the last 168 hours.    No results for input(s): \"TROP\", \"CK\" in the last 168 hours.      Imaging: Imaging data reviewed in Epic.    Assessment & Plan:      #R Knee OA  S/p R total knee arthoplasty  Pain control  Per Ortho  PT/OT  DVT PPX    #Aflutter  Rate controlled on sotalol     #Dyslipidemia  Statin    #Depression/Anxiety  Continue home meds        Plan of care discussed with patient, Rn    Asim Blackwell MD  4/22/2025    The 21st Century Cures Act makes medical notes like these available to patients in the interest of transparency. Please be advised this is a medical document. Medical documents are intended to carry relevant information, facts as evident, and the clinical opinion of the practitioner. The medical note is intended as peer to peer communication and may appear blunt or direct. It is written in medical language and may contain abbreviations or verbiage that are unfamiliar.            [1]   Past Medical History:   Abdominal pain    left sided intermittent pain    Age-related osteoporosis without current pathological fracture    Anemia    Anxiety    Arthritis    Atrial flutter, paroxysmal (HCC)    Back pain    Back problem    severe spinal stenosis    Benign essential tremor    Bowel obstruction (HCC)    Breast cancer (HCC)    Broken ankle    Left Ankle    Cataract    Cervical dystonia    DBS b/l    Cervical spine degeneration    Change in hair    Closed fracture of left ankle    Colitis    Ischemic Colitis    Constipation    Depression    Easy bruising    Endometriosis    Essential hypertension    Exposure to medical diagnostic radiation    Fatigue    ANEMIA.  I GET INFUSIONS    Fracture of distal end of tibia with fibula, left, closed, initial  encounter    Last Assessment & Plan:    Formatting of this note might be different from the original.   Following follow-up, potentially secondary to hypotension/muscle weakness.  X-rays show a left nondisplaced distal fibula fracture.  Exam with soft tissue swelling and bruising.   -Admit inpatient   -telemetry   -prn analgesics   -SCDs   -PT/OT   -Ortho consult      Frequent use of laxatives    Gastrointestinal hemorrhage    High blood pressure    High cholesterol    History of blood transfusion    2018    History of breast cancer    XRT/Chemo    History of GI bleed    ischmic bowel and AVM.  likely due to vascular dis, smoking. Hypercoag w/u negative. Last admitted .  Has seen GI recently and colonoscopy has been deferred.      Hoarseness, chronic    Hyperglycemia    Hyperlipidemia    Hyponatremia    Hypotension due to hypovolemia    Intestinal infection due to Clostridium difficile        Irregular bowel habits    Ischemic colitis (HCC)        Lacunar infarction (HCC)    Muscle weakness    left arm biceps torn    Nausea with vomiting    Neuropathy    Osteoarthritis    Osteoarthrosis, unspecified whether generalized or localized, unspecified site    Pacemaker    Pain in joints    Pain with bowel movements    ISCHEMIC COLITIS    Panic disorder    Parkinson disease (HCC)    Personal history of antineoplastic chemotherapy    Plaque psoriasis    Presence of other cardiac implants and grafts    Problems with swallowing    Psoriatic arthropathy (HCC)    Recurrent major depressive disorder, in remission    Rhinovirus    Sleep disturbance    Status post deep brain stimulator placement    Stented coronary artery    Stress    Uncomfortable fullness after meals    Vaso vagal episode    Weight loss   [2]   Past Surgical History:  Procedure Laterality Date    Angioplasty (coronary)      Appendectomy      Brain surgery  2016    Brain stimulator           x3    Cardiac pacemaker placement       Cervical spine surgery  06/2004    cervical spinal fusion C1-6    Colonoscopy  02/07/2014    Procedure: COLONOSCOPY;  Surgeon: Aaron Fair MD;  Location:  ENDOSCOPY    Colonoscopy N/A 06/03/2018    Procedure: COLONOSCOPY;  Surgeon: Aaron Fair MD;  Location:  ENDOSCOPY    D & c      Hysterectomy  1992    OVARIES REMOVED AS WELL    Lumbar spine surgery  01/1999    lumbar laminectomy L1-L4    Lumpectomy right  2002    Oophorectomy  1991    Other      COLON STENTS    Other surgical history      Lt bunionectomy    Other surgical history      stent in [facility]    Pain management Bilateral 06/15/2021    BILATERAL LUMBAR 5 TRANSFORAMINAL LUMBAR EPIDURAL STEROID INJECTION    Radiation right  2002    Sigmoidoscopy,diagnostic      Spine surgery procedure unlisted      Total abdom hysterectomy      Tubal ligation      Upper gi endoscopy,biopsy      ulcer, at [facility]   [3]   Family History  Problem Relation Age of Onset    Hypertension Father     Musculo-skelatal Disorder Father         TREMOR    Heart Disorder Mother     Cancer Mother         pharyngeal    Breast Cancer Mother         MYSELF 2001    Breast Cancer Self     Lung Disorder Sister         COPD    Heart Attack Brother 72    Ulcerative Colitis Son    [4]   Allergies  Allergen Reactions    Sulfa Antibiotics RASH and OTHER (SEE COMMENTS)     headache   [5]   No current facility-administered medications on file prior to encounter.     Current Outpatient Medications on File Prior to Encounter   Medication Sig Dispense Refill    sotalol 80 MG Oral Tab Take 1 tablet (80 mg total) by mouth in the morning and 1 tablet (80 mg total) before bedtime.      Ferrous Sulfate (IRON) 325 (65 Fe) MG Oral Tab Take by mouth every other day.      Probiotic Product (PROBIOTIC-10 OR) Take by mouth in the morning and before bedtime.      aspirin 81 MG Oral Tab EC Take 1 tablet (81 mg total) by mouth daily. 90 tablet 1    Calcium Magnesium Zinc 333-133-5 MG Oral Tab Take 1 tablet by  mouth daily.      primidone 50 MG Oral Tab Take 100 mg by mouth every morning and take 150 mg by mouth every evening.      gabapentin 300 MG Oral Cap Take by mouth. Take 600mg in the morning and 600 mg at night      Cyanocobalamin (B-12) 1000 MCG Oral Tab Take 1,000 mcg by mouth daily.

## 2025-04-23 NOTE — PROGRESS NOTES
[facility] Ortho Progress Note    Subjective: had some hypotension overnight requiring fluid bolus. Pain is controlled today. Denies dizziness this afternoon.    Objective: no distress  RLE dressing CDI  NVI RLE      Assessment/Plan: 70 year old female postop day #1 status post R TKA.  - Weightbearing as tolerated, PT OT  - DVT prophylaxis mechanical plus aspirin twice daily. History of ischemic colitis but uses 81 ASA daily preop. Will add PPI to script for home  - Pain control with oral medications, patient at elevated risk of postoperative pain control issues given long-term use of opioid pain medications prior to surgery - discussed with patient and will plan to use norco postop (as per preop) but increase dose/frequency allowed  - Perioperative Ancef  - Patient at elevated risk perioperatively and requires inpatient stay due to risk of pain control issues acutely after surgery given the chronic use of opioid pain medications preoperatively, preoperative anemia with hemoglobin 10 and risk for symptomatic anemia need for transfusion, as well as preoperative cardiology evaluation stating \"Given the complexity of her cardiovascular and neurologic history she should be kept overnight at least 24 hours on telemetry\". Hypotension is now resolved  Disposition: floor, likely dc home    Janes Cortez MD, FAAOS  [facility] Orthopaedic Surgery  Phone [phone]  Fax [phone]

## 2025-04-23 NOTE — PROGRESS NOTES
NURSING ADMISSION NOTE      Patient admitted via Cart  Oriented to room.  Safety precautions initiated.  Bed in low position.  Call light in reach.    Patient admitted for Right TKA. 2 person skin assessment completed with PITO Rodriguez. [facility] notified of patient arrival. Pt A&Ox4, room air, Tele and .

## 2025-04-23 NOTE — PLAN OF CARE
Aox4. IV  bolus given x2 at 0000 and 0100 for low BP. Vital signs now stable. On room air. . IS encouraged. Telemetry monitoring. SCDs on BLE. Voiding to BSC. Tolerating diet. Pain controlled with prn medications.. Dressing to right knee: aquacell C/D/I. Ambulating with x1 assist with GB & Walker. Plan is to monitor VSS and pain control. PT/OT to see.

## 2025-04-23 NOTE — OCCUPATIONAL THERAPY NOTE
OCCUPATIONAL THERAPY EVALUATION - INPATIENT    Room Number: 351/351-A  Evaluation Date: 4/23/2025     Type of Evaluation: Initial  Presenting Problem: s/p Right total knee arthroplasty 4/22 Dr. Cortez    Physician Order: IP Consult to Occupational Therapy  Reason for Therapy:  ADL/IADL Dysfunction and Discharge Planning    OCCUPATIONAL THERAPY ASSESSMENT   Patient is a 70 year old female admitted on 4/22/2025 with Presenting Problem: s/p Right total knee arthroplasty 4/22 Dr. Cortez. Co-Morbidities : Paroxysmal Atrial fibrillation, Watchman L , BL carotid artery disease, PAD, HDL, HTN, COPD, Hx of CVA, parkinson's  Patient is currently functioning near baseline with toileting, upper body dressing, lower body dressing, bed mobility, transfers, static sitting balance, dynamic sitting balance, static standing balance, dynamic standing balance, maintaining seated position, functional standing tolerance, energy conservation strategies, and aerobic capacity.  Prior to admission, patient's baseline is IND with ADLs/IADLs.  Patient met all OT goals at supervision level.  Patient reports no further questions/concerns at this time.     Patient will be discharged from acute inpatient OT services at this time.    Recommendations for nursing staff:   Transfers: up x 1 assist, supervision, RW  Toileting location: Toilet    EVALUATION SESSION:  Patient at start of session: semi supine in bed, spouse present at bedside    FUNCTIONAL TRANSFER ASSESSMENT  Sit to Stand: Edge of Bed  Edge of Bed: Supervision  Toilet Transfer: Supervision    BED MOBILITY  Supine to Sit : Supervision  Sit to Supine (OT): Supervision  Scooting: Supervision    BALANCE ASSESSMENT  Static Sitting: Supervision  Static Standing: Supervision    FUNCTIONAL ADL ASSESSMENT  UB Dressing Seated: Modified Independent (donned bra and sweater)  LB Dressing Seated: Supervision (donned brief and pants)  LB Dressing Standing: Supervision  Toileting Seated:  Supervision    ACTIVITY TOLERANCE: WFL                         O2 SATURATIONS       COGNITION  Overall Cognitive Status:  WFL - within functional limits    COGNITION ASSESSMENTS     Upper Extremity:   ROM: within functional limits  Strength: is within functional limits    EDUCATION PROVIDED  Patient Education : Role of Occupational Therapy; Plan of Care; Discharge Recommendations; DME Recommendations; Functional Transfer Techniques; Fall Prevention; Weight Bear Status; Surgical Precautions; Posture/Positioning; Energy Conservation; Proper Body Mechanics  Patient's Response to Education: Verbalized Understanding; Returned Demonstration    Equipment used: hospital bed functions  Demonstrates functional use    Therapist comments: RN cleared pt for session, received semi supine in bed. Pt educated on proper positioning of knee during recovery, specifically no pivoting/twisting to knee, no pillows directly under knee, pt verbalized understanding and in agreement. Pt spouse present during session, reports he can assist pt as needed with recovery. Pt also educated on use of AE/DME to utilize during recovery to promote safety during ADLs, pt verbalized understanding and in agreement.    Patient End of Session: In bed, Needs met, Call light within reach, RN aware of session/findings, All patient questions and concerns addressed, Hospital anti-slip socks, Family present    OCCUPATIONAL PROFILE    HOME SITUATION  Type of Home: House  Home Layout: Two level  Lives With: Spouse    Toilet and Equipment: Standard height toilet  Shower/Tub and Equipment: Walk-in shower (built in bench)  Other Equipment: Reacher, Long-handled shoehorn    Occupation/Status: Retired  Hand Dominance: Right  Drives: Yes  Patient Regularly Uses: Reading glasses    Prior Level of Function: IND with Adls/IADLs, lives at home with supportive spouse.    SUBJECTIVE  \"I should've brought a looser shirt!\"    PAIN ASSESSMENT  Ratin  Location: R knee  area  Management Techniques: Ice, Repositioning, Relaxation    OBJECTIVE  Precautions: None  Fall Risk: Standard fall risk    WEIGHT BEARING RESTRICTION       AM-PAC ‘6-Clicks’ Inpatient Daily Activity Short Form  -   Putting on and taking off regular lower body clothing?: None  -   Bathing (including washing, rinsing, drying)?: None  -   Toileting, which includes using toilet, bedpan or urinal? : None  -   Putting on and taking off regular upper body clothing?: None  -   Taking care of personal grooming such as brushing teeth?: None  -   Eating meals?: None    AM-PAC Score:  Score: 24  Approx Degree of Impairment: 0%  Standardized Score (AM-PAC Scale): 57.54    ADDITIONAL TESTS     NEUROLOGICAL FINDINGS      PLAN   Patient has been evaluated and presents with no skilled Occupational Therapy needs at this time.  Patient discharged from Occupational Therapy services.  Please re-order if a new functional limitation presents during this admission.    OT Device Recommendations: Long-handled sponge, Shower chair    Patient Evaluation Complexity Level:   Occupational Profile/Medical History LOW - Brief history including review of medical or therapy records    Specific performance deficits impacting engagement in ADL/IADL LOW  1 - 3 performance deficits    Client Assessment/Performance Deficits LOW - No comorbidities nor modifications of tasks    Clinical Decision Making LOW - Analysis of occupational profile, problem-focused assessments, limited treatment options    Overall Complexity LOW     OT Session Time: 25 minutes  Self-Care Home Management: 15 minutes

## 2025-04-23 NOTE — ANESTHESIA PREPROCEDURE EVALUATION
PRE-OP EVALUATION    Patient Name: Tamara Meraz    Admit Diagnosis: Right knee osteoarthritis    Pre-op Diagnosis: Right knee osteoarthritis    Right total knee arthroplasty    Anesthesia Procedure: Right total knee arthroplasty (Right)    Surgeons and Role:     * Janes Cortez MD - Primary    Pre-op vitals reviewed.  Temp: 98.7 °F (37.1 °C)  Pulse: 82  Resp: 16  BP: 139/77  SpO2: 99 %  Body mass index is 18.29 kg/m².    Current medications reviewed.  Hospital Medications:  Current Medications[1]    Outpatient Medications:   Prescriptions Prior to Admission[2]    Allergies: Sulfa antibiotics      Anesthesia Evaluation    Patient summary reviewed.    Anesthetic Complications  (-) history of anesthetic complications         GI/Hepatic/Renal      (+) GERD                           Cardiovascular      ECG reviewed.  Exercise tolerance: good     MET: >4      (+) hypertension     (+) CAD                  (-) angina     (-) MUELLER  (-) orthopnea  (-) PND     Endo/Other               (+) anemia            (+) arthritis       Pulmonary        (+) COPD and mild  COPD not requiring home oxygen.                Neuro/Psych      (+) depression           (+) neuromuscular disease             Deep brain stimulator  Chart has banner listing difficult intubation - pt has no knowledge of this        Past Surgical History[3]  Social Hx on file[4]  History   Drug Use No     Available pre-op labs reviewed.  Lab Results   Component Value Date    WBC 3.8 (L) 04/18/2025    RBC 3.24 (L) 04/18/2025    HGB 11.4 (L) 04/18/2025    HCT 34.2 (L) 04/18/2025    .6 (H) 04/18/2025    MCH 35.2 (H) 04/18/2025    MCHC 33.3 04/18/2025    RDW 13.5 04/18/2025    .0 04/18/2025     Lab Results   Component Value Date     04/04/2025    K 4.5 04/04/2025     04/04/2025    CO2 26.0 04/04/2025    BUN 19 04/04/2025    CREATSERUM 0.96 04/04/2025    GLU 85 04/04/2025    CA 10.2 04/04/2025     Lab Results   Component Value Date    INR  0.98 04/04/2025         Airway      Mallampati: II  Mouth opening: 3 FB  TM distance: < 4 cm  Neck ROM: limited Cardiovascular    Cardiovascular exam normal.  Rhythm: regular  Rate: normal  (-) murmur   Dental    Dentition appears grossly intact         Pulmonary    Pulmonary exam normal.  Breath sounds clear to auscultation bilaterally.        (-) wheezes       Other findings              ASA: 3   Plan: general and regional  NPO status verified and patient meets guidelines.  Patient has taken beta blockers in last 24 hours.  Post-procedure pain management plan discussed with surgeon and patient.  Surgeon requests: regional block  Comment: Adductor canal block requested per TJP  Plan/risks discussed with: patient  Use of blood product(s) discussed with: patient    Consented to blood products.          Present on Admission:  **None**    We discussed risks and benefits of spinal and GA and she desires GA.  We discussed PONV prophylaxis and analgesic plan including TJP and right adductor canal block.  The patient's questions were answered and they agree to proceed.          [1]    lactated ringers infusion   Intravenous Continuous    [COMPLETED] ceFAZolin (Ancef) 2g in 10mL IV syringe premix  2 g Intravenous Once    lactated ringers infusion   Intravenous Continuous    lactated ringers IV bolus 500 mL  500 mL Intravenous Once PRN    atropine 0.1 MG/ML injection 0.5 mg  0.5 mg Intravenous PRN    labetalol (Trandate) 5 mg/mL injection 5 mg  5 mg Intravenous Q5 Min PRN    ondansetron (Zofran) 4 MG/2ML injection 4 mg  4 mg Intravenous Q6H PRN    metoclopramide (Reglan) 5 mg/mL injection 10 mg  10 mg Intravenous Q8H PRN    naloxone (Narcan) 0.4 MG/ML injection 80 mcg  80 mcg Intravenous PRN    diphenhydrAMINE (Benadryl) 50 mg/mL  injection 12.5 mg  12.5 mg Intravenous PRN    meperidine (Demerol) 25 MG/ML injection 12.5 mg  12.5 mg Intravenous PRN    [COMPLETED] acetaminophen (Tylenol) tab 650 mg  650 mg Oral Once     HYDROmorphone (Dilaudid) 1 MG/ML injection 0.2 mg  0.2 mg Intravenous Q5 Min PRN    Or    HYDROmorphone (Dilaudid) 1 MG/ML injection 0.4 mg  0.4 mg Intravenous Q5 Min PRN    Or    HYDROmorphone (Dilaudid) 1 MG/ML injection 0.6 mg  0.6 mg Intravenous Q5 Min PRN    fentaNYL (Sublimaze) 50 mcg/mL injection 25 mcg  25 mcg Intravenous Q5 Min PRN    Or    fentaNYL (Sublimaze) 50 mcg/mL injection 50 mcg  50 mcg Intravenous Q5 Min PRN    [COMPLETED] ondansetron (Zofran) 4 MG/2ML injection       [2]   Facility-Administered Medications Prior to Admission   Medication Dose Route Frequency Provider Last Rate Last Admin    [COMPLETED] denosumab (Prolia) 60 MG/ML SUBQ injection 60 mg  60 mg Subcutaneous Once Jaclyn Dong DO   60 mg at 04/21/25 1330     Medications Prior to Admission   Medication Sig Dispense Refill Last Dose/Taking    PAROXETINE 20 MG Oral Tab Take 1 tablet (20 mg total) by mouth every morning. 90 tablet 0 4/22/2025 Morning    FIBER OR Take 1,250 mg by mouth.   Past Week    HYDROcodone-acetaminophen 5-325 MG Oral Tab Take 1 tablet by mouth every 8 (eight) hours as needed for Pain. 90 tablet 0 4/22/2025 at 11:00 AM    sotalol 80 MG Oral Tab Take 1 tablet (80 mg total) by mouth in the morning and 1 tablet (80 mg total) before bedtime.   4/22/2025 Morning    Ferrous Sulfate (IRON) 325 (65 Fe) MG Oral Tab Take by mouth every other day.   Past Week    Probiotic Product (PROBIOTIC-10 OR) Take by mouth in the morning and before bedtime.   Past Week    aspirin 81 MG Oral Tab EC Take 1 tablet (81 mg total) by mouth daily. 90 tablet 1 4/15/2025    Calcium Magnesium Zinc 333-133-5 MG Oral Tab Take 1 tablet by mouth daily.   Past Week    primidone 50 MG Oral Tab Take 100 mg by mouth every morning and take 150 mg by mouth every evening.   4/22/2025 Morning    gabapentin 300 MG Oral Cap Take by mouth. Take 600mg in the morning and 600 mg at night   4/21/2025 Morning    Cyanocobalamin (B-12) 1000 MCG Oral Tab Take 1,000  mcg by mouth daily.   Past Week    ATORVASTATIN 20 MG Oral Tab Take 1 tablet (20 mg total) by mouth nightly. 90 tablet 0 2025 Morning   [3]   Past Surgical History:  Procedure Laterality Date    Angioplasty (coronary)      Appendectomy      Brain surgery  2016    Brain stimulator           x3    Cardiac pacemaker placement      Cervical spine surgery  2004    cervical spinal fusion C1-6    Colonoscopy  2014    Procedure: COLONOSCOPY;  Surgeon: Aaron Fair MD;  Location:  ENDOSCOPY    Colonoscopy N/A 2018    Procedure: COLONOSCOPY;  Surgeon: Aaron Fair MD;  Location:  ENDOSCOPY    D & c      Hysterectomy  1992    OVARIES REMOVED AS WELL    Lumbar spine surgery  1999    lumbar laminectomy L1-L4    Lumpectomy right      Oophorectomy      Other      COLON STENTS    Other surgical history      Lt bunionectomy    Other surgical history      stent in [facility]    Pain management Bilateral 06/15/2021    BILATERAL LUMBAR 5 TRANSFORAMINAL LUMBAR EPIDURAL STEROID INJECTION    Radiation right      Sigmoidoscopy,diagnostic      Spine surgery procedure unlisted      Total abdom hysterectomy      Tubal ligation      Upper gi endoscopy,biopsy      ulcer, at [facility]   [4]   Social History  Socioeconomic History    Marital status:    Tobacco Use    Smoking status: Former     Current packs/day: 0.00     Average packs/day: 0.5 packs/day for 36.0 years (18.0 ttl pk-yrs)     Types: Cigarettes     Start date: 1982     Quit date: 2018     Years since quittin.3     Passive exposure: Past    Smokeless tobacco: Never    Tobacco comments:     Updated 25   Vaping Use    Vaping status: Never Used   Substance and Sexual Activity    Alcohol use: Yes     Alcohol/week: 14.0 standard drinks of alcohol     Types: 14 Cans of beer per week     Comment: approx 0- 2 beers per day    Drug use: No    Sexual activity: Not Currently   Other Topics Concern    Caffeine Concern No     Exercise No    Seat Belt No    Special Diet Yes     Comment: HOW TO PUT ON WEIGHT    Stress Concern No    Weight Concern Yes     Comment: CAN'T PUT ON WEIGHT

## 2025-04-23 NOTE — PLAN OF CARE
Dressing to right knee clean, dry, intact.  States pain controlled on oral pain medication.  Has ambulated in hallway and sitting up in chair.   Post void residual 295 ml after voiding 700 ml., denies discomfort.   at bedside.  Instructed on plan of care, call for all asst needed, discomfort felt.  Plan home with home health.  Verbalized understanding and in agreement w/discharge plan.  Viewed discharge instruction video, await ing medication to be delivered.

## 2025-04-23 NOTE — PHYSICAL THERAPY NOTE
PHYSICAL THERAPY EVALUATION - INPATIENT     Room Number: 351/351-A  Evaluation Date: 4/23/2025  Type of Evaluation: Initial  Physician Order: PT Eval and Treat    Presenting Problem: S/p 4/23 R TKA  Co-Morbidities : Paroxysmal Atrial fibrillation, Watchman L , BL carotid artery disease, PAD, HDL, HTN, COPD, Hx of CVA  Reason for Therapy: Mobility Dysfunction and Discharge Planning    PHYSICAL THERAPY ASSESSMENT   Patient is a 70 year old female admitted 4/22/2025 for  S/p 4/23 R TKA.  Prior to admission, patient's baseline is IND.  Patient is currently functioning near baseline with bed mobility, transfers, and gait.  At this time pt is near baseline and does not have IP PT goals.    Patient will benefit from continued skilled PT Services at discharge to promote prior level of function.  Anticipate patient will return home with home health PT.    PLAN DURING HOSPITALIZATION  Nursing Mobility Recommendation : 1 Assist  PT Device Recommendation: Rolling walker  PT Treatment Plan: Bed mobility, Body mechanics, Gait training, Strengthening, Stair training, Transfer training, Balance training  Rehab Potential : Good  Frequency (Obs): 3-5x/week     CURRENT GOALS    Patient has met all skilled IPPT goals at this time. Patient will be discharged from Physical Therapy services.  Please re-order if a new functional limitation presents during this admission.        PHYSICAL THERAPY MEDICAL/SOCIAL HISTORY  History related to current admission: Patient is a 70 year old female admitted on 4/22/2025 from Home for  S/p 4/23 R TKA.    HOME SITUATION  Type of Home: House  Home Layout: Two level  Stairs to Enter : 3        Stairs to Bedroom: 14    Railing: Yes    Lives With: Spouse        Patient Regularly Uses: None      Prior Level of [place]: Pt reports that she lives at home with spouse. Pt states that she is IND with all ADLs. Pt does not use an assisted device for gait.     SUBJECTIVE  \"I feel  better\"    OBJECTIVE  Precautions: None  Fall Risk: Standard fall risk    WEIGHT BEARING RESTRICTION     PAIN ASSESSMENT  Ratin  Location: Pt reports no pain  Management Techniques: Activity promotion, Body mechanics    COGNITION  Overall Cognitive Status:  WFL - within functional limits    RANGE OF MOTION AND STRENGTH ASSESSMENT  Upper extremity ROM and strength are within functional limits     Lower extremity ROM is within functional limits     Lower extremity strength is within functional limits     BALANCE  Static Sitting: Fair  Dynamic Sitting: Fair  Static Standing: Fair -  Dynamic Standing: Fair -    ADDITIONAL TESTS                                    ACTIVITY TOLERANCE                         O2 WALK       NEUROLOGICAL FINDINGS                        AM-PAC '6-Clicks' INPATIENT SHORT FORM - BASIC MOBILITY  How much difficulty does the patient currently have...  Patient Difficulty: Turning over in bed (including adjusting bedclothes, sheets and blankets)?: A Little   Patient Difficulty: Sitting down on and standing up from a chair with arms (e.g., wheelchair, bedside commode, etc.): A Little   Patient Difficulty: Moving from lying on back to sitting on the side of the bed?: A Little   How much help from another person does the patient currently need...   Help from Another: Moving to and from a bed to a chair (including a wheelchair)?: A Little   Help from Another: Need to walk in hospital room?: A Little   Help from Another: Climbing 3-5 steps with a railing?: A Little     AM-PAC Score:  Raw Score: 18   Approx Degree of Impairment: 46.58%   Standardized Score (AM-PAC Scale): 43.63   CMS Modifier (G-Code): CK    FUNCTIONAL ABILITY STATUS  Gait Assessment   Functional Mobility/Gait Assessment  Gait Assistance: Supervision  Distance (ft): 120  Assistive Device: Rolling walker  Pattern: R Decreased stance time  Stairs: Stairs  How Many Stairs: 4  Device: 2 Rails  Assist: Contact guard assist  Pattern: Ascend  and Descend  Ascend and Descend : Step to    Skilled Therapy Provided     Bed Mobility:  Rolling: NT  Supine to sit: CGA   Sit to supine: SBA     Transfer Mobility:  Sit to stand: Sup   Stand to sit: Sup  Gait = Sup 120ft x 2 using RW     Therapist's Comments: Educated pt on proper sequencing for the stairs. Pt was educated on car transfer. Pt was educated on the use of the RW to ambulate at home.     Exercise/Education Provided:  Bed mobility  Body mechanics  Gait training  Transfer training    Patient End of Session: Needs met, Call light within reach, RN aware of session/findings, All patient questions and concerns addressed, Family present, In bed      Patient Evaluation Complexity Level:  History Moderate - 1 or 2 personal factors and/or co-morbidities   Examination of body systems Low -  addressing 1-2 elements   Clinical Presentation Low- Stable   Clinical Decision Making Low Complexity       PT Session Time: 23 minutes  Therapeutic Activity: 15 minutes

## 2025-04-23 NOTE — PROGRESS NOTES
[facility]   part of [facility]     Hospitalist Progress Note     Tamara Meraz Patient Status:  Inpatient    1/15/1955 MRN [id number]   Location [facility] 3SW-A Attending Janes Cortez MD   Hosp Day # 1 PCP Lilli Kramer MD     Chief Complaint: Medical management    Subjective:     Patient without acute complaints. Low normal Bps. Patient reports chronic orthostatic symptoms without acute change.     Objective:    Review of Systems:   A comprehensive review of systems was completed; pertinent positive and negatives stated in subjective.    Vital signs:  Temp:  [97.4 °F (36.3 °C)-98.7 °F (37.1 °C)] 98 °F (36.7 °C)  Pulse:  [65-85] 65  Resp:  [11-18] 18  BP: ()/(41-95) 101/58  SpO2:  [93 %-100 %] 96 %    Physical Exam:    General: No acute distress  Respiratory: No wheezes, no rhonchi  Cardiovascular: S1, S2, regular rate and rhythm  Abdomen: Soft, Non-tender, non-distended, positive bowel sounds  Neuro: No new focal deficits.   Extremities: No edema    Diagnostic Data:    Labs:  Recent Labs   Lab 25  1324   WBC 3.8*   HGB 11.4*   .6*   .0       No results for input(s): \"GLU\", \"BUN\", \"CREATSERUM\", \"GFRAA\", \"GFRNAA\", \"CA\", \"ALB\", \"NA\", \"K\", \"CL\", \"CO2\", \"ALKPHO\", \"AST\", \"ALT\", \"BILT\", \"TP\" in the last 168 hours.    eGFR cannot be calculated (Patient's most recent lab result is older than the maximum 7 days allowed.).    No results for input(s): \"TROP\", \"TROPHS\", \"CK\" in the last 168 hours.    No results for input(s): \"PTP\", \"INR\" in the last 168 hours.               Microbiology    No results found for this visit on 25.      Imaging: Reviewed in Epic.    Medications: Scheduled Medications[1]    Assessment & Plan:      #R Knee OA  S/p R total knee arthoplasty  Pain control  Per Ortho  PT/OT  DVT PPX  Check PVR and if < 300 ml, ok to discharge home     #Aflutter  Rate controlled on sotalol  S/p watchman      #Dyslipidemia  Statin      #Depression/Anxiety  Continue home meds    #Parkinson's disease     Cata Haskins MD    Supplementary Documentation:     Quality:  DVT Mechanical Prophylaxis:   SCDs, Early ambuation  DVT Pharmacologic Prophylaxis   Medication   None         DVT Pharmacologic prophylaxis: Aspirin 81 mg      Code Status: Full Code  Alfaro: No urinary catheter in place  Alfaro Duration (in days):   Central line:    RICKEY: 4/23/2025    Discharge is dependent on: course  At this point Ms. Meraz is expected to be discharge to: home today     The 21st Century Cures Act makes medical notes like these available to patients in the interest of transparency. Please be advised this is a medical document. Medical documents are intended to carry relevant information, facts as evident, and the clinical opinion of the practitioner. The medical note is intended as peer to peer communication and may appear blunt or direct. It is written in medical language and may contain abbreviations or verbiage that are unfamiliar.                         [1]    atorvastatin  20 mg Oral Nightly    gabapentin  600 mg Oral BID    PARoxetine  20 mg Oral QAM    primidone  100 mg Oral Daily    sotalol  80 mg Oral BID    acetaminophen  1,000 mg Oral Q8H JACKIE    ketorolac  15 mg Intravenous Q6H    traMADol  50 mg Oral 4 times per day    sennosides  17.2 mg Oral Nightly    docusate sodium  100 mg Oral BID    ferrous sulfate  325 mg Oral Daily with breakfast    aspirin  81 mg Oral BID    primidone  150 mg Oral Nightly

## 2025-04-24 ENCOUNTER — PATIENT OUTREACH (OUTPATIENT)
Dept: CASE MANAGEMENT | Age: 70
End: 2025-04-24

## 2025-04-24 NOTE — PROGRESS NOTES
Transitional Care Management   Discharge Date: 25  Contact Date: 2025    Assessment:  TCM Initial Assessment    General:  Assessment completed with: Spouse or significant other  Patient Subjective: Pt doing well considering has been up and doing some walking.  Chief Complaint: Right total knee arthroplasty  Verify patient name and  with patient/ caregiver: Yes    Hospital Stay/Discharge:  Prior to leaving the hospital were your Discharge Instructions reviewed with you?: Yes  Did you receive a copy of your written Discharge Instructions?: Yes  Do you feel better or worse since you left the hospital or emergency department?: Better    Follow - Up Appointment:  Do you have a follow-up appointment?: No  Are there any barriers to getting to your follow-up appointment?: No    Home Health/DME:  Prior to leaving the hospital was Home Health (HH) arranged for you?: Yes  Has HH been out or set up a visit to see you?: Visit Scheduled  Date: 25           Medications/Diet:       Were you given a different diet per your Discharge Instructions?: No     Questions/Concerns:  Do you have any questions or concerns that have not been discussed?: No         Follow-up Appointments:  Your appointments       Date & Time Appointment Department (Center)    May 07, 2025 2:00 PM CDT Post Op Visit with Alec Guthrie PA-C [facility], [address] [place] [address] ([facility])        May 08, 2025 11:45 AM CDT PT Ortho Post Op Evaluation with Iván Arias, PT [facility] ([facility])        May 13, 2025 11:00 AM CDT Physical Therapy Ortho Treatment with Iván Arias PT [facility] ([facility])        May 15, 2025 11:00 AM CDT Physical Therapy Ortho Treatment with Iván Arias PT [facility] ([facility])        May 20, 2025 11:00 AM CDT Physical Therapy Ortho Treatment with Iván Arias  KEIKO, PT [facility] ([facility] [place])        May 22, 2025 11:00 AM CDT Physical Therapy Ortho Treatment with Iván Arias, PT [facility] ([facility] [place])        May 27, 2025 11:00 AM CDT Physical Therapy Ortho Treatment with Iván Arias, PT [facility] ([facility] [place])        May 29, 2025 11:00 AM CDT Physical Therapy Ortho Treatment with Iván Arias, PT [facility] ([facility] [place])        Jun 03, 2025 11:00 AM CDT Physical Therapy Ortho Treatment with Iván Arias, PT [facility] ([facility] [place])        Jun 04, 2025 2:00 PM CDT Post Op Visit with Janes Cortez MD [address] ([facility])        Jun 05, 2025 11:00 AM CDT Physical Therapy Ortho Treatment with Iván Arias, PT [facility] ([facility] [place])        Ivan 10, 2025 11:00 AM CDT Physical Therapy Ortho Treatment with Iván Arias, PT [facility] ([facility] [place])        Jun 12, 2025 11:00 AM CDT Physical Therapy Ortho Treatment with Iván Arias, PT [facility] ([facility] [place])        Jul 16, 2025 10:00 AM CDT Follow Up Visit with Gustavo Zhao MD [facility] ([facility])        Jul 24, 2025 10:00 AM CDT PERIPHERAL LAB with NP OOT [facility] ([facility])        Aug 07, 2025 11:00 AM CDT Follow Up Visit with Jaclyn Dong DO [facility] ([facility])        Oct 23, 2025 11:00 AM CDT Follow Up Visit with Jaclyn Dong DO [facility] ([facility])        Jan 13, 2026 10:40 AM CST Medicare Annual Well Visit with Krupa  NINA Freire [facility], [address], [place] ([facility] [address])              [facility]  [address]  [facility] [address]  [phone] [facility], [facility] [address], [facility]  [address]  [facility] [id number] [phone] [facility], [address], [facility] [address]  [address]  [facility] [id number] [phone]    [facility], [facility] [place]  [address]  [facility] [id number] [phone] [facility], [facility]  [address] [facility]  [facility] [address]  [phone] [facility]  [address]  [facility] [address]  [phone]            Transitional Care Clinic  Was TCC Ordered: No  Was TCC Scheduled: No, Explain scheduled with PCP.    Primary Care Provider (If no TCC appointment)  Does patient already have a PCP appointment scheduled? No  Care Manager Scheduled PCP office TCM appointment with patient    Specialist  Does the patient have any other follow-up appointment(s) that need to be scheduled? Yes   -If yes: Care Manager reviewed upcoming specialist appointments with patient: Yes   -Does the patient need assistance scheduling appointment(s): No      Book By Date: 5/7/25

## 2025-04-25 NOTE — PROGRESS NOTES
PHYSICIAN CLARIFICATION    Additional information related to patient's BMI.    Underweight     This note is part of the patient's medical record.

## 2025-04-28 ENCOUNTER — LAB ENCOUNTER (OUTPATIENT)
Dept: LAB | Age: 70
End: 2025-04-28
Payer: MEDICARE

## 2025-04-28 DIAGNOSIS — M81.0 AGE-RELATED OSTEOPOROSIS WITHOUT CURRENT PATHOLOGICAL FRACTURE: ICD-10-CM

## 2025-04-28 LAB — CALCIUM BLD-MCNC: 9.3 MG/DL (ref 8.7–10.6)

## 2025-04-28 PROCEDURE — 82310 ASSAY OF CALCIUM: CPT

## 2025-04-28 PROCEDURE — 36415 COLL VENOUS BLD VENIPUNCTURE: CPT

## 2025-05-02 ENCOUNTER — TELEPHONE (OUTPATIENT)
Dept: ORTHOPEDICS CLINIC | Facility: CLINIC | Age: 70
End: 2025-05-02

## 2025-05-02 DIAGNOSIS — F11.20 OPIOID DEPENDENCE, UNCOMPLICATED (HCC): ICD-10-CM

## 2025-05-02 DIAGNOSIS — M54.16 LUMBAR RADICULOPATHY: ICD-10-CM

## 2025-05-02 DIAGNOSIS — M48.061 SPINAL STENOSIS OF LUMBAR REGION WITHOUT NEUROGENIC CLAUDICATION: ICD-10-CM

## 2025-05-02 DIAGNOSIS — G24.3 CERVICAL DYSTONIA: ICD-10-CM

## 2025-05-02 DIAGNOSIS — F11.90 CHRONIC NARCOTIC USE: ICD-10-CM

## 2025-05-02 DIAGNOSIS — M54.16 LUMBAR RADICULITIS: ICD-10-CM

## 2025-05-02 NOTE — TELEPHONE ENCOUNTER
Patient called to request a refill on her codeine Rx. She states she is running low and still has pain.    She prefers the [facility] pharmacy in [place] in [facility].    Future Appointments   Date Time Provider Department Center   5/7/2025  9:40 AM Lilli Kramer MD [facility] [place]   5/7/2025  2:00 PM Alec Guthrie PA-C [facility] 75 [facility]   5/8/2025 11:45 AM Iván Arias, PT [facility] [place]   5/13/2025 11:00 AM Iván Arias, PT [facility] [place]   5/15/2025 11:00 AM Iván Arias, PT [facility] [place]   5/20/2025 11:00 AM Iván Arias, PT [facility] [place]   5/22/2025 11:00 AM Iván Arias, PT [facility] [place]   5/27/2025 11:00 AM Iván Arias, PT [facility] [place]   5/29/2025 11:00 AM Iván Arias, PT [facility] [place]   6/3/2025 11:00 AM Iván Arias, PT [facility] [place]   6/4/2025  2:00 PM Janes Cortez MD [facility] ORTHO 75 [facility]   6/5/2025 11:00 AM Iván Arias, PT [facility] [place]   6/10/2025 11:00 AM Iván Arias, PT [facility] [place]   6/12/2025 11:00 AM Iván Arias, PT [facility] [place]   7/16/2025 10:00 AM Gustavo Zhao MD ENIPain [facility] [place]   7/24/2025 10:00 AM NP OOT NP [facility] [place] [facility]   8/7/2025 11:00 AM Jaclyn Dong DO [id number] [facility] [place]   10/23/2025 11:00 AM Jaclyn Dong DO [id number] [facility] [place]   1/13/2026 10:40 AM Mouna Gentile APRN [facility] [place]

## 2025-05-02 NOTE — TELEPHONE ENCOUNTER
Hydrocodone 5-325    DOS: 4/22/25  Right TKA  Last OV: 1/29/25  Last refill date: 4/23/25   #/refills:  60/0  Upcoming appt:   Future Appointments   Date Time Provider Department Center   5/7/2025  2:00 PM Alec Guthrie PA-C [facility] 75 [facility]

## 2025-05-04 RX ORDER — HYDROCODONE BITARTRATE AND ACETAMINOPHEN 5; 325 MG/1; MG/1
1-2 TABLET ORAL EVERY 4 HOURS PRN
Qty: 60 TABLET | Refills: 0 | Status: SHIPPED | OUTPATIENT
Start: 2025-05-04

## 2025-05-07 ENCOUNTER — OFFICE VISIT (OUTPATIENT)
Dept: ORTHOPEDICS CLINIC | Facility: CLINIC | Age: 70
End: 2025-05-07
Payer: MEDICARE

## 2025-05-07 ENCOUNTER — PATIENT MESSAGE (OUTPATIENT)
Dept: ORTHOPEDICS CLINIC | Facility: CLINIC | Age: 70
End: 2025-05-07

## 2025-05-07 ENCOUNTER — OFFICE VISIT (OUTPATIENT)
Dept: INTERNAL MEDICINE CLINIC | Facility: CLINIC | Age: 70
End: 2025-05-07
Payer: MEDICARE

## 2025-05-07 ENCOUNTER — TELEPHONE (OUTPATIENT)
Dept: PAIN CLINIC | Facility: CLINIC | Age: 70
End: 2025-05-07

## 2025-05-07 VITALS
BODY MASS INDEX: 18.31 KG/M2 | OXYGEN SATURATION: 99 % | RESPIRATION RATE: 20 BRPM | SYSTOLIC BLOOD PRESSURE: 118 MMHG | DIASTOLIC BLOOD PRESSURE: 60 MMHG | HEART RATE: 82 BPM | TEMPERATURE: 98 F | HEIGHT: 61 IN | WEIGHT: 97 LBS

## 2025-05-07 VITALS — BODY MASS INDEX: 18.31 KG/M2 | HEIGHT: 61 IN | WEIGHT: 97 LBS

## 2025-05-07 DIAGNOSIS — I95.9 HYPOTENSION, UNSPECIFIED HYPOTENSION TYPE: ICD-10-CM

## 2025-05-07 DIAGNOSIS — Z96.651 STATUS POST TOTAL RIGHT KNEE REPLACEMENT: Primary | ICD-10-CM

## 2025-05-07 DIAGNOSIS — Z96.651 STATUS POST RIGHT KNEE REPLACEMENT: Primary | ICD-10-CM

## 2025-05-07 PROCEDURE — 99495 TRANSJ CARE MGMT MOD F2F 14D: CPT | Performed by: INTERNAL MEDICINE

## 2025-05-07 PROCEDURE — 99024 POSTOP FOLLOW-UP VISIT: CPT | Performed by: PHYSICIAN ASSISTANT

## 2025-05-07 NOTE — TELEPHONE ENCOUNTER
Peggy from [facility] called wanting to speak with providers office regarding HYDROcodone-acetaminophen 5-325 MG Oral Tab. Peggy stated that patient is having the same medication filled by Dr Cortez 60 tablets 1-2 a day and [facility] wanted to make sure this is accurate. Please call Peggy back at [phone].

## 2025-05-07 NOTE — PROGRESS NOTES
[facility]    CHIEF COMPLAINT:    Chief Complaint   Patient presents with    Hospital F/U     4/22/25 Right total knee replacement , Weight bearing as tolerated still has mild swelling around the knee  pain level 6/10 to be  Evaluation by Pt 5/8 1/8/25 Tay, 10/27/22 Colon 10 yrs repeat.                The following individual(s) verbally consented to be recorded using ambient AI listening technology and understand that they can each withdraw their consent to this listening technology at any point by asking the clinician to turn off or pause the recording:    Patient name: Tamara Meraz  Additional names:  none          HISTORY OF PRESENT ILLNESS:  here for hfu after right tka.     History of Present Illness  Deya Meraz is a 70 year old female who presents for follow-up after right knee replacement surgery.    She is experiencing postoperative pain following her right knee replacement surgery, rating it as six out of ten. She manages the pain with Norco, taking it every four to six hours as needed, adjusting the frequency based on her pain levels. Some days she requires it every four hours, while other days she can extend to six hours.    She has completed home physical therapy and is starting outpatient physical therapy at [place]. She is actively icing her knee as part of her recovery regimen. She is not using a cane and is trying to put more weight on her right leg, indicating gradual improvement in mobility. She is not currently driving and anticipates it will take a couple more weeks before she can resume driving.     During her hospital stay for the knee replacement, she experienced low blood pressure and received two boluses of fluid, which helped stabilize her blood pressure. Her blood pressure has been running low, around 118/60, which is lower than her usual levels. She is currently on sotalol for atrial flutter and atorvastatin for cholesterol management. She has atrial  flutter episodes on and off.    No abdominal pain and no significant swelling in her legs.       REVIEW OF SYSTEMS:  See HPI    Current Medications:    Current Medications[1]    PAST MEDICAL, SOCIAL, AND FAMILY HISTORY:  Tobacco:    History   Smoking Status    Former    Types: Cigarettes   Smokeless Tobacco    Never       PHYSICAL EXAM:  /60 (BP Location: Right arm, Patient Position: Sitting, Cuff Size: adult)   Pulse 82   Temp 98 °F (36.7 °C) (Temporal)   Resp 20   Ht 5' 1\" (1.549 m)   Wt 97 lb (44 kg)   SpO2 99%   BMI 18.33 kg/m²    GENERAL: well developed, well nourished,in no apparent distress  LUNGS: clear to auscultation  CARDIO: RRR without murmur  GI: good BS's,no masses, HSM or tenderness  MUSCULOSKELETAL:  no edema, muscle strength normal. Incision covered with bandage, not viewed today as she has an appt with ortho today already.     DATA:  Results for orders placed or performed in visit on 04/28/25   Calcium [E]    Collection Time: 04/28/25  9:12 AM   Result Value Ref Range    Calcium, Total 9.3 8.7 - 10.6 mg/dL     *Note: Due to a large number of results and/or encounters for the requested time period, some results have not been displayed. A complete set of results can be found in Results Review.            ASSESSMENT AND PLAN:  1. Status post total right knee replacement  Recovering well.   Continue icing.   Starting outpatient PT tomorrow.   follow up with ortho.     2. Hypotension, unspecified hypotension type  Bp okay today.   Discussed staying hydrated. Can increase salt a bit if bp less than 100/60.     Continue other meds.           Return in about 2 months (around 7/7/2025) for med check.      Lilli Kramer MD         [1]   Current Outpatient Medications   Medication Sig Dispense Refill    HYDROcodone-acetaminophen 5-325 MG Oral Tab Take 1-2 tablets by mouth every 4 (four) hours as needed for Pain. 60 tablet 0    traMADol 50 MG Oral Tab Take 1 tablet (50 mg total) by mouth every 8  (eight) hours for 15 days. 45 tablet 0    senna-docusate 8.6-50 MG Oral Tab Take 1 tablet by mouth daily as needed. 30 tablet 0    aspirin 81 MG Oral Tab EC Take 1 tablet (81 mg total) by mouth in the morning and 1 tablet (81 mg total) before bedtime. 80 tablet 0    PAROXETINE 20 MG Oral Tab Take 1 tablet (20 mg total) by mouth every morning. 90 tablet 0    FIBER OR Take 1,250 mg by mouth.      ATORVASTATIN 20 MG Oral Tab Take 1 tablet (20 mg total) by mouth nightly. 90 tablet 0    sotalol 80 MG Oral Tab Take 1 tablet (80 mg total) by mouth in the morning and 1 tablet (80 mg total) before bedtime.      Ferrous Sulfate (IRON) 325 (65 Fe) MG Oral Tab Take by mouth every other day.      Probiotic Product (PROBIOTIC-10 OR) Take by mouth in the morning and before bedtime.      Calcium Magnesium Zinc 333-133-5 MG Oral Tab Take 1 tablet by mouth daily.      primidone 50 MG Oral Tab Take 100 mg by mouth every morning and take 150 mg by mouth every evening.      gabapentin 300 MG Oral Cap Take by mouth. Take 600mg in the morning and 600 mg at night      Cyanocobalamin (B-12) 1000 MCG Oral Tab Take 1,000 mcg by mouth daily.

## 2025-05-07 NOTE — TELEPHONE ENCOUNTER
Per LOV on 4/16/2025:  1. Chronic narcotic dependence (HCC)       The patient is a 78-year-old female with a history of chronic back pain.  Patient is managed with hydrocodone through this office.  Patient is due for repeat UDS.  Additionally, scheduled for total knee arthroplasty next week.  Will likely need some additional pain medication postoperatively which can be received through Dr. Cortez's office and that would not constitute a contract violation.

## 2025-05-07 NOTE — PROGRESS NOTES
[facility] Progress Note    Subjective: Patient returns to clinic 2 weeks status post right total knee arthroplasty performed on 4/22/25. They have been taking tramadol, Norco for pain control.  They continue to take aspirin for DVT prophylaxis. They are overall satisfied with their progress.  Denies any fevers, chills, night sweats.  No redness or drainage from the incision.concerning for infection.  She is ambulating completely independently.    Objective: Patient is seated comfortably in the exam chair. Alert and oriented. Nonlabored breathing. Grossly neurologically intact. Incision is clean, dry, and intact with staples in place without any redness or drainage concerning for infection. Demonstrates active knee extension to 0 and knee flexion to 90. Calves are soft and nontender.     Assessment/Plan: Staples were removed in clinic today.  The incision was swabbed with Betadine, Mastisol was applied to either side of the incision, and Steri-Strips were applied over the incision.  I instructed the patient to avoid getting the incision wet in the shower for the next 2 days to allow the staple sites to reepithelialize.  I also recommended avoiding soaking the incision in a pool or tub until the patient is 6 weeks postop.  Continue with aspirin for DVT prophylaxis.  Outpatient physical therapy referral written. Follow-up in 4 weeks with Dr. Cortez for routine 6-week postoperative visit or sooner if any concerns.  The patient's questions were sought and answered satisfactorily.  They are happy with the plan and will follow as advised.    X-rays needed at next visit: Postoperative radiographs right knee and full leg length films        Alec Guthrie PA-C  [facility]    This note was dictated using Dragon software.  While it was briefly proofread prior to completion, some grammatical, spelling, and word choice errors due to dictation may still occur.

## 2025-05-08 ENCOUNTER — OFFICE VISIT (OUTPATIENT)
Dept: PHYSICAL THERAPY | Age: 70
End: 2025-05-08
Attending: ORTHOPAEDIC SURGERY
Payer: MEDICARE

## 2025-05-08 DIAGNOSIS — Z96.651 S/P TOTAL KNEE ARTHROPLASTY, RIGHT: Primary | ICD-10-CM

## 2025-05-08 PROCEDURE — 97110 THERAPEUTIC EXERCISES: CPT | Performed by: PHYSICAL THERAPIST

## 2025-05-08 PROCEDURE — 97161 PT EVAL LOW COMPLEX 20 MIN: CPT | Performed by: PHYSICAL THERAPIST

## 2025-05-08 NOTE — TELEPHONE ENCOUNTER
Contacted pharmacist at this time, relayed the message below. Advised pharmacist to refill after pt is out from  's prescription.Pharmacist vu and no further questions.

## 2025-05-08 NOTE — PROGRESS NOTES
LOWER EXTREMITY EVALUATION:     Diagnosis:   Status post right knee replacement (Z96.651) Patient:  Tamara Meraz (70 year old, female)        Referring Provider: Janes Cortez  Today's Date   5/8/2025    Precautions:  Other (use comment)   Date of Evaluation: 05/08/25  Next MD visit: No data recorded  Date of Surgery: 4/22/25     PATIENT SUMMARY   Summary of chief complaints: post operative R knee pain, stiffness, and swelling. Increased pain at end of the day.  History of current condition: Patient reports a h/o chronic R knee pain R/T OA, now s/p R TKA on 4/22/25. Patient completed home PT on 5/5.   Pain level: current 5 /10, at best 4 /10, at worst 8 /10  Description of symptoms: Achy, throbbing pain, stiffness   Occupation:     Leisure activities/Hobbies: Swimming, plaing with grandkids   Prior level of function: Active but limited with h/o tremors and decreased balance.  Current limitations: Prolonged standing, walking, ascending/descending stairs, squatting, house work, driving  Pt goals:    Past medical history was reviewed with Tamara.  Significant findings include:    Imaging/Tests:     Tamara  has a past medical history of Abdominal pain (2020), Age-related osteoporosis without current pathological fracture (06/01/2021), Anemia, Anxiety (10/20/2022), Arthritis (1980), Atrial flutter, paroxysmal (HCC) (06/06/2018), Back pain, Back problem, Benign essential tremor, Bowel obstruction (HCC), Breast cancer (HCC) (2002), Broken ankle (01/2023), Cataract, Cervical dystonia (06/17/2021), Cervical spine degeneration, Change in hair (NAILS), Closed fracture of left ankle (04/26/2023), Colitis, Constipation, Depression (2010), Easy bruising, Endometriosis, Essential hypertension, Exposure to medical diagnostic radiation (2002), Fatigue (2018), Fracture of distal end of tibia with fibula, left, closed, initial encounter (03/16/2023), Frequent use of laxatives (Miralax), Gastrointestinal hemorrhage  (2018), High blood pressure, High cholesterol, History of blood transfusion, History of breast cancer (2021), History of GI bleed (2021), Hoarseness, chronic, Hyperglycemia (2020), Hyperlipidemia (), Hyponatremia (2020), Hypotension due to hypovolemia (2022), Intestinal infection due to Clostridium difficile (2021), Irregular bowel habits, Ischemic colitis (HCC) (2018), Lacunar infarction ([facility]) (2013), Muscle weakness, Nausea with vomiting (2014), Neuropathy, Osteoarthritis, Osteoarthrosis, unspecified whether generalized or localized, unspecified site, Pacemaker, Pain in joints (), Pain with bowel movements (), Panic disorder, Parkinson disease (), Personal history of antineoplastic chemotherapy (), Plaque psoriasis, Presence of other cardiac implants and grafts (DBS FOR TREMORS), Problems with swallowing, Psoriatic arthropathy ([facility]) (2011), Recurrent major depressive disorder, in remission (2021), Rhinovirus (2023), Sleep disturbance, Status post deep brain stimulator placement, Stented coronary artery ( lower GI), Stress, Uncomfortable fullness after meals, Vaso vagal episode (2022), and Weight loss ().  She  has a past surgical history that includes ; d & c; colonoscopy (2014); tubal ligation; other surgical history; other surgical history; colonoscopy (N/A, 2018); upper gi endoscopy,biopsy; oophorectomy (); total abdom hysterectomy; Brain Surgery (); pain management (Bilateral, 06/15/2021); cervical spine surgery (2004); lumbar spine surgery (1999); appendectomy; hysterectomy (); lumpectomy right (); radiation right (); other; angioplasty (coronary) (); sigmoidoscopy,diagnostic; Cardiac pacemaker placement; spine surgery procedure unlisted; and knee replacement surgery (Right, 2025).    ASSESSMENT  Tamara presents to physical therapy evaluation  with primary c/o post operative R knee pain, stiffness, and swelling. Increased pain at end of the day.. The results of the objective tests and measures show Decreased ROM, decreased strength, swelling, decreased soft tissue and joint mobility, decreased scar mobility, altered gait, decreased balance. Functional deficits include but are not limited to Prolonged standing, walking, ascending/descending stairs, squatting, house work, driving. Signs and symptoms are consistent with diagnosis of Status post right knee replacement (Z96.651). Pt and PT discussed evaluation findings, pathology, POC and HEP.  Pt voiced understanding and performs HEP correctly without reported pain. Skilled Physical Therapy is medically necessary to address the above impairments and reach functional goals.    OBJECTIVE:    Musculoskeletal  Observation: Staples removed at last MD appt, steri strips in place, Incision appears clean and dry with no signs of infection. Ambulating without AD at this time.  Palpation: R calf is supple and non tender with no signs of blood clot.   Accessory Motion:       Edema: Jt line R 32.0 cm, L 30.5 cm   Special Tests:      ROM and Strength: (* denotes performed with pain)  Knee   A/PROM MMT (-/5)    R L R L     Flex 90/100 145 3+ 5     Ext (L3) -10/-5 0 3+ (Good quad function during quad sets) 5         Neurological:  Sensation: Appears intact throughout the R LE to LT     Balance and Functional Mobility:  Gait: pt ambulates on level ground with decreased step length; other (use comment) (antalgic gait)   Tandem Stance: R back:  ; L back:    SLS: R  ,  L         Today's Treatment and Response:   Pt education was provided on exam findings, treatment diagnosis, treatment plan, expectations, and prognosis.  Today's Treatment       5/8/2025   LE Treatment   Therapeutic Exercise PROM R knee  Quad sets x20  Heel slides with strap assist x20  Standing mini squats 1x10  Standing marching x20   Therapeutic Exercise  Minutes 20   Total Time Of Timed Procedures 20   Total Time Of Service-Based Procedures 0   Total Treatment Time 45   HEP Access Code: [email or web address]  URL: [email or web address]  Date: 05/08/2025  Prepared by: Iván Arias    Exercises  - Supine Quadricep Sets  - 2 x daily - 7 x weekly - 2 sets - 10 reps  - Supine Knee Extension Stretch on Towel Roll  - 2 x daily - 7 x weekly - 3 minutes hold  - Active Straight Leg Raise with Quad Set  - 2 x daily - 7 x weekly - 2 sets - 10 reps  - Supine Heel Slide with Strap  - 2 x daily - 7 x weekly - 2 sets - 10 reps  - Seated Heel Slide  - 2 x daily - 7 x weekly - 2 sets - 10 reps  - Standing March with Counter Support  - 1-2 x daily - 7 x weekly - 2 sets - 10 reps  - Mini Squat with Counter Support  - 1-2 x daily - 7 x weekly - 2 sets - 10 reps        Patient was instructed in and issued a HEP for: Access Code: [email or web address]  URL: [email or web address]  Date: 05/08/2025  Prepared by: Iván Arias    Exercises  - Supine Quadricep Sets  - 2 x daily - 7 x weekly - 2 sets - 10 reps  - Supine Knee Extension Stretch on Towel Roll  - 2 x daily - 7 x weekly - 3 minutes hold  - Active Straight Leg Raise with Quad Set  - 2 x daily - 7 x weekly - 2 sets - 10 reps  - Supine Heel Slide with Strap  - 2 x daily - 7 x weekly - 2 sets - 10 reps  - Seated Heel Slide  - 2 x daily - 7 x weekly - 2 sets - 10 reps  - Standing March with Counter Support  - 1-2 x daily - 7 x weekly - 2 sets - 10 reps  - Mini Squat with Counter Support  - 1-2 x daily - 7 x weekly - 2 sets - 10 reps    Charges:  PT EVAL: Low Complexity, 1 Ther Ex  In agreement with evaluation findings and clinical rationale, this evaluation involved LOW COMPLEXITY decision making due to no personal factors/comorbidities, 1-2 body structures involved/activity limitations, and stable symptoms as documented in the evaluation.                                                                                                                 PLAN OF CARE:    Goals: (to be met in 16 visits)    Not Met Progress Toward Partially Met Met   Pt will improve knee extension ROM to 0 deg to allow proper heel strike during gait and terminal knee extension in stance. [] [] [] []   Pt will improve knee AROM flexion to >120 degrees to improve ability to perform transfers. [] [] [] []   Pt will improve quad strength to 5/5 to ascend 1 flight of stairs reciprocally with UE assist. [] [] [] []   Pt will increase hip and knee strength to grossly 4+/5 to be able to get up and down from the floor safely. [] [] [] []   Pt will demonstrate increased hip ER/ABD strength to 4+/5 to perform stepping and squatting activities without excessive femoral IR/ADD. [] [] [] []   Pt will improve tandem standing balance to >10s to improve safety with gait on uneven surfaces such as grass and gravel. [] [] [] []   Pt will be independent and compliant with comprehensive HEP to maintain progress achieved in PT. [] [] [] []          Frequency / Duration: Patient will be seen 2x/week or a total of 16  visits over a 90 day period. Treatment will include: Gait training; Manual Therapy; Neuromuscular Re-education; Therapeutic Activities; Therapeutic Exercise; Home Exercise Program instruction; Other (use comment) (modalities)    Education or treatment limitation: None   Rehab Potential: good     LEFS Score  LEFS Score: (Patient-Rptd) 25 % (5/1/2025 10:55 AM)      Patient/Family/Caregiver was advised of these findings, precautions, and treatment options and has agreed to actively participate in planning and for this course of care.    Thank you for your referral. Please co-sign or sign and return this letter via fax as soon as possible to [phone]. If you have any questions, please contact me at Dept: [phone]    Sincerely,  Electronically signed by therapist: Iván Arias, PT  Physician's certification required: Yes  I certify the need for these  services furnished under this plan of treatment and while under my care.    X___________________________________________________ Date____________________    Certification From: 5/8/2025  To:8/6/2025

## 2025-05-13 ENCOUNTER — OFFICE VISIT (OUTPATIENT)
Dept: PHYSICAL THERAPY | Age: 70
End: 2025-05-13
Attending: ORTHOPAEDIC SURGERY
Payer: MEDICARE

## 2025-05-13 PROCEDURE — 97110 THERAPEUTIC EXERCISES: CPT | Performed by: PHYSICAL THERAPIST

## 2025-05-13 NOTE — PROGRESS NOTES
Patient: Tamara Meraz (70 year old, female) Referring Provider:  Insurance:   Diagnosis: Status post right knee replacement (Z96.651) Janes Cortez  MEDICARE   Date of Surgery: 4/22/25 Next MD visit:  COMMERCIAL   Precautions:  Other (use comment) No data recorded Referral Information:    Date of Evaluation: Req: 0, Auth: 0, Exp:     05/08/25 POC Auth Visits:          Today's Date   5/13/2025    Subjective  Patient states her knee is doing well. Able to ascend and descend stairs with reciprocal pattern while holding onto the railing. HEP is going well.       Pain: 4/10     Objective  R knee ROM -5-105              Assessment  R knee ROM slowly improving, mod pain persists into end ranges with over pressure. Ambulating with minimal gait deviations without use of AD. Progressing well with LE strengthening and balance activities without significant increase in knee symptoms. Demonstrates good balance during tandem standing >20\" B    Goals (to be met in 16 visits)      Not Met Progress Toward Partially Met Met   Pt will improve knee extension ROM to 0 deg to allow proper heel strike during gait and terminal knee extension in stance. [] [] [] []   Pt will improve knee AROM flexion to >120 degrees to improve ability to perform transfers. [] [] [] []   Pt will improve quad strength to 5/5 to ascend 1 flight of stairs reciprocally with UE assist. [] [] [] []   Pt will increase hip and knee strength to grossly 4+/5 to be able to get up and down from the floor safely. [] [] [] []   Pt will demonstrate increased hip ER/ABD strength to 4+/5 to perform stepping and squatting activities without excessive femoral IR/ADD. [] [] [] []   Pt will improve tandem standing balance to >10s to improve safety with gait on uneven surfaces such as grass and gravel. [] [] [] []   Pt will be independent and compliant with comprehensive HEP to maintain progress achieved in PT. [] [] [] []              Plan  Continue to progress with  ROM, strengthening and balance activities as tolerated.    Treatment Last 4 Visits  Treatment Day: 2       5/8/2025 5/13/2025   LE Treatment   Therapeutic Exercise PROM R knee  Quad sets x20  Heel slides with strap assist x20  Standing mini squats 1x10  Standing marching x20 NuStep 5' Lv1  PROM R knee flexion and extension  Quad sets x20  Supine SB HS curls x20  Seated LAQ 2x10  Seated HS curls YTB 2x10    Standing marching 2x10  Side stepping 3 laps  Sit to stands from chair with 1 Airex 2x10  Tandem standing balance 2x30\" ea   Therapeutic Exercise Minutes 20 43   Total Time Of Timed Procedures 20 43   Total Time Of Service-Based Procedures 0 0   Total Treatment Time 45 43   HEP Access Code: [email or web address]  URL: [email or web address]  Date: 05/08/2025  Prepared by: Iván Arias    Exercises  - Supine Quadricep Sets  - 2 x daily - 7 x weekly - 2 sets - 10 reps  - Supine Knee Extension Stretch on Towel Roll  - 2 x daily - 7 x weekly - 3 minutes hold  - Active Straight Leg Raise with Quad Set  - 2 x daily - 7 x weekly - 2 sets - 10 reps  - Supine Heel Slide with Strap  - 2 x daily - 7 x weekly - 2 sets - 10 reps  - Seated Heel Slide  - 2 x daily - 7 x weekly - 2 sets - 10 reps  - Standing March with Counter Support  - 1-2 x daily - 7 x weekly - 2 sets - 10 reps  - Mini Squat with Counter Support  - 1-2 x daily - 7 x weekly - 2 sets - 10 reps         HEP  Access Code: [email or web address]  URL: [email or web address]  Date: 05/08/2025  Prepared by: Iván Arias    Exercises  - Supine Quadricep Sets  - 2 x daily - 7 x weekly - 2 sets - 10 reps  - Supine Knee Extension Stretch on Towel Roll  - 2 x daily - 7 x weekly - 3 minutes hold  - Active Straight Leg Raise with Quad Set  - 2 x daily - 7 x weekly - 2 sets - 10 reps  - Supine Heel Slide with Strap  - 2 x daily - 7 x weekly - 2 sets - 10 reps  - Seated Heel Slide  - 2 x daily - 7 x weekly - 2 sets - 10 reps  - Standing March with Counter  Support  - 1-2 x daily - 7 x weekly - 2 sets - 10 reps  - Mini Squat with Counter Support  - 1-2 x daily - 7 x weekly - 2 sets - 10 reps    Charges     3 Ther Ex

## 2025-05-15 ENCOUNTER — OFFICE VISIT (OUTPATIENT)
Dept: PHYSICAL THERAPY | Age: 70
End: 2025-05-15
Attending: ORTHOPAEDIC SURGERY
Payer: MEDICARE

## 2025-05-15 PROCEDURE — 97110 THERAPEUTIC EXERCISES: CPT | Performed by: PHYSICAL THERAPIST

## 2025-05-15 NOTE — PROGRESS NOTES
Patient: Tamara Meraz (70 year old, female) Referring Provider:  Insurance:   Diagnosis: Status post right knee replacement (Z96.651) Janes Cortez  MEDICARE   Date of Surgery: 4/22/25 Next MD visit:  COMMERCIAL   Precautions:  Other (use comment) No data recorded Referral Information:    Date of Evaluation: Req: 0, Auth: 0, Exp:     05/08/25 POC Auth Visits:          Today's Date   5/15/2025    Subjective  Patient reports increased soreness in the R knee following last treatment but symptoms gradually resolved by next day. Sleeping is getting a little better. Continues to be stiff and sore into extension.       Pain: 3/10     Objective  R knee AROM -5-110              Assessment  Patient was able to achieve near full knee extension with min manual stretching today, lacking only 5 deg from full TKE. Knee flexion ROM continues to slowly improve. Good quad function during quad sets and TKE to reinforce knee ext ROM. Able to perform sit to stand transfers and step ups to 4\" step with proper LE mechanics.    Goals (to be met in 16 visits)      Not Met Progress Toward Partially Met Met   Pt will improve knee extension ROM to 0 deg to allow proper heel strike during gait and terminal knee extension in stance. [] [] [] []   Pt will improve knee AROM flexion to >120 degrees to improve ability to perform transfers. [] [] [] []   Pt will improve quad strength to 5/5 to ascend 1 flight of stairs reciprocally with UE assist. [] [] [] []   Pt will increase hip and knee strength to grossly 4+/5 to be able to get up and down from the floor safely. [] [] [] []   Pt will demonstrate increased hip ER/ABD strength to 4+/5 to perform stepping and squatting activities without excessive femoral IR/ADD. [] [] [] []   Pt will improve tandem standing balance to >10s to improve safety with gait on uneven surfaces such as grass and gravel. [] [] [] []   Pt will be independent and compliant with comprehensive HEP to maintain progress  achieved in PT. [] [] [] []                  Plan  Continue to progress with ROM, strengthening and balance activities as tolerated to improve tolerance to community walking and perform light house work.    Treatment Last 4 Visits  Treatment Day: 3       5/8/2025 5/13/2025 5/15/2025   LE Treatment   Therapeutic Exercise PROM R knee  Quad sets x20  Heel slides with strap assist x20  Standing mini squats 1x10  Standing marching x20 NuStep 5' Lv1  PROM R knee flexion and extension  Quad sets x20  Supine SB HS curls x20  Seated LAQ 2x10  Seated HS curls YTB 2x10    Standing marching 2x10  Side stepping 3 laps  Sit to stands from chair with 1 Airex 2x10  Tandem standing balance 2x30\" ea NuStep 5' Lv1  PROM R knee flexion and extension  Quad sets x20  Supine SB HS curls x20  Seated LAQ 2# 2x10  Seated HS curls YTB 2x10     Step ups 4\" step 2x10  Sit to stands from chair with 1 Airex 2x10  Standing TKEs YTB x20     Neuro Re-Education   Tandem standing balance on Airex 2x30\" ea   Therapeutic Exercise Minutes 20 43 42   Neuro Re-Educ Minutes   3   Total Time Of Timed Procedures 20 43 45   Total Time Of Service-Based Procedures 0 0 0   Total Treatment Time 45 43 45   HEP Access Code: [email or web address]  URL: [email or web address]  Date: 05/08/2025  Prepared by: Iván Arias    Exercises  - Supine Quadricep Sets  - 2 x daily - 7 x weekly - 2 sets - 10 reps  - Supine Knee Extension Stretch on Towel Roll  - 2 x daily - 7 x weekly - 3 minutes hold  - Active Straight Leg Raise with Quad Set  - 2 x daily - 7 x weekly - 2 sets - 10 reps  - Supine Heel Slide with Strap  - 2 x daily - 7 x weekly - 2 sets - 10 reps  - Seated Heel Slide  - 2 x daily - 7 x weekly - 2 sets - 10 reps  - Standing March with Counter Support  - 1-2 x daily - 7 x weekly - 2 sets - 10 reps  - Mini Squat with Counter Support  - 1-2 x daily - 7 x weekly - 2 sets - 10 reps          HEP  Access Code: [email or web address]  URL:  [email or web address]  Date: 05/08/2025  Prepared by: Iván Arias    Exercises  - Supine Quadricep Sets  - 2 x daily - 7 x weekly - 2 sets - 10 reps  - Supine Knee Extension Stretch on Towel Roll  - 2 x daily - 7 x weekly - 3 minutes hold  - Active Straight Leg Raise with Quad Set  - 2 x daily - 7 x weekly - 2 sets - 10 reps  - Supine Heel Slide with Strap  - 2 x daily - 7 x weekly - 2 sets - 10 reps  - Seated Heel Slide  - 2 x daily - 7 x weekly - 2 sets - 10 reps  - Standing March with Counter Support  - 1-2 x daily - 7 x weekly - 2 sets - 10 reps  - Mini Squat with Counter Support  - 1-2 x daily - 7 x weekly - 2 sets - 10 reps    Charges     3 Ther Ex

## 2025-05-20 ENCOUNTER — APPOINTMENT (OUTPATIENT)
Dept: PHYSICAL THERAPY | Age: 70
End: 2025-05-20
Attending: ORTHOPAEDIC SURGERY
Payer: MEDICARE

## 2025-05-21 ENCOUNTER — OFFICE VISIT (OUTPATIENT)
Dept: PHYSICAL THERAPY | Age: 70
End: 2025-05-21
Attending: ORTHOPAEDIC SURGERY
Payer: MEDICARE

## 2025-05-21 PROCEDURE — 97110 THERAPEUTIC EXERCISES: CPT | Performed by: PHYSICAL THERAPIST

## 2025-05-21 NOTE — PROGRESS NOTES
Patient: Tamara Meraz (70 year old, female) Referring Provider:  Insurance:   Diagnosis: Status post right knee replacement (Z96.651) Janes Cortez  MEDICARE   Date of Surgery: 4/22/25 Next MD visit:  COMMERCIAL   Precautions:  Other (use comment) No data recorded Referral Information:    Date of Evaluation: Req: 0, Auth: 0, Exp:     05/08/25 POC Auth Visits:          Today's Date   5/21/2025    Subjective  Patient reports decreased pain levels over the past week. Knee continues to feel stiff into full extension.       Pain: 3/10     Objective  R knee AROM -5-118 deg. Passive extension 0 deg              Assessment  Patient demonstrates improving functional strength with sit to stands from lower chair height and step ups to 8\" step. Improved gait mechanics with increased TKE at heel strike. Knee flexion ROM improving, mild tightness persists into full extension but able to achieve 0 deg extension with manual over pressure stretch.    Goals (to be met in 16 visits)      Not Met Progress Toward Partially Met Met   Pt will improve knee extension ROM to 0 deg to allow proper heel strike during gait and terminal knee extension in stance. [] [] [] []   Pt will improve knee AROM flexion to >120 degrees to improve ability to perform transfers. [] [] [] []   Pt will improve quad strength to 5/5 to ascend 1 flight of stairs reciprocally with UE assist. [] [] [] []   Pt will increase hip and knee strength to grossly 4+/5 to be able to get up and down from the floor safely. [] [] [] []   Pt will demonstrate increased hip ER/ABD strength to 4+/5 to perform stepping and squatting activities without excessive femoral IR/ADD. [] [] [] []   Pt will improve tandem standing balance to >10s to improve safety with gait on uneven surfaces such as grass and gravel. [] [] [] []   Pt will be independent and compliant with comprehensive HEP to maintain progress achieved in PT. [] [] [] []                      Plan  Continue to  progress with ROM, strengthening and balance activities as tolerated to improve tolerance to community walking and performing light house work.    Treatment Last 4 Visits  Treatment Day: 4       5/8/2025 5/13/2025 5/15/2025 5/21/2025   LE Treatment   Therapeutic Exercise PROM R knee  Quad sets x20  Heel slides with strap assist x20  Standing mini squats 1x10  Standing marching x20 NuStep 5' Lv1  PROM R knee flexion and extension  Quad sets x20  Supine SB HS curls x20  Seated LAQ 2x10  Seated HS curls YTB 2x10    Standing marching 2x10  Side stepping 3 laps  Sit to stands from chair with 1 Airex 2x10  Tandem standing balance 2x30\" ea NuStep 5' Lv1  PROM R knee flexion and extension  Quad sets x20  Supine SB HS curls x20  Seated LAQ 2# 2x10  Seated HS curls YTB 2x10     Step ups 4\" step 2x10  Sit to stands from chair with 1 Airex 2x10  Standing TKEs YTB x20   NuStep 5' Lv1  PROM R knee flexion and extension  Quad sets x20  SLR 2x10  Supine SB HS curls x20     Step ups 6\" step 1x10 B  Sit to stands from chair with 1 Airex 2x10  Standing TKEs YTB x20  Shuttle leg press 62# 2x10     Neuro Re-Education   Tandem standing balance on Airex 2x30\" ea    Manual Therapy    STM R knee, quad, ITB   Therapeutic Exercise Minutes 20 43 42 40   Neuro Re-Educ Minutes   3    Manual Therapy Minutes    6   Total Time Of Timed Procedures 20 43 45 46   Total Time Of Service-Based Procedures 0 0 0 0   Total Treatment Time 45 43 45 46   HEP Access Code: [email or web address]  URL: [email or web address]  Date: 05/08/2025  Prepared by: Iván Arias    Exercises  - Supine Quadricep Sets  - 2 x daily - 7 x weekly - 2 sets - 10 reps  - Supine Knee Extension Stretch on Towel Roll  - 2 x daily - 7 x weekly - 3 minutes hold  - Active Straight Leg Raise with Quad Set  - 2 x daily - 7 x weekly - 2 sets - 10 reps  - Supine Heel Slide with Strap  - 2 x daily - 7 x weekly - 2 sets - 10 reps  - Seated Heel Slide  - 2 x daily - 7 x weekly - 2 sets  - 10 reps  - Standing March with Counter Support  - 1-2 x daily - 7 x weekly - 2 sets - 10 reps  - Mini Squat with Counter Support  - 1-2 x daily - 7 x weekly - 2 sets - 10 reps           HEP  Access Code: [email or web address]  URL: [email or web address]  Date: 05/08/2025  Prepared by: Iván Arias    Exercises  - Supine Quadricep Sets  - 2 x daily - 7 x weekly - 2 sets - 10 reps  - Supine Knee Extension Stretch on Towel Roll  - 2 x daily - 7 x weekly - 3 minutes hold  - Active Straight Leg Raise with Quad Set  - 2 x daily - 7 x weekly - 2 sets - 10 reps  - Supine Heel Slide with Strap  - 2 x daily - 7 x weekly - 2 sets - 10 reps  - Seated Heel Slide  - 2 x daily - 7 x weekly - 2 sets - 10 reps  - Standing March with Counter Support  - 1-2 x daily - 7 x weekly - 2 sets - 10 reps  - Mini Squat with Counter Support  - 1-2 x daily - 7 x weekly - 2 sets - 10 reps    Charges     3 Ther Ex

## 2025-05-22 ENCOUNTER — OFFICE VISIT (OUTPATIENT)
Dept: PHYSICAL THERAPY | Age: 70
End: 2025-05-22
Attending: ORTHOPAEDIC SURGERY
Payer: MEDICARE

## 2025-05-22 PROCEDURE — 97110 THERAPEUTIC EXERCISES: CPT | Performed by: PHYSICAL THERAPIST

## 2025-05-22 NOTE — PROGRESS NOTES
Patient: Tamara Meraz (70 year old, female) Referring Provider:  Insurance:   Diagnosis: Status post right knee replacement (Z96.651) Janes Cortez  MEDICARE   Date of Surgery: 4/22/25 Next MD visit:  COMMERCIAL   Precautions:  Other (use comment) No data recorded Referral Information:    Date of Evaluation: Req: 0, Auth: 0, Exp:     05/08/25 POC Auth Visits:          Today's Date   5/22/2025    Subjective  Patient states her knee was a little more sore after stretching knee into extension in PT yesterday, otherwise knee is doing well.       Pain: 3/10     Objective  Normal gait mechanics.              Assessment  Patient continues to make steady progress with her post op rehab with increasing R knee ROM, achieving full extension today. Improving functional strength with step ups and sit to stand squats. Ambulating with normal gait mechanics at this time.    Goals (to be met in 16 visits)      Not Met Progress Toward Partially Met Met   Pt will improve knee extension ROM to 0 deg to allow proper heel strike during gait and terminal knee extension in stance. [] [] [] []   Pt will improve knee AROM flexion to >120 degrees to improve ability to perform transfers. [] [] [] []   Pt will improve quad strength to 5/5 to ascend 1 flight of stairs reciprocally with UE assist. [] [] [] []   Pt will increase hip and knee strength to grossly 4+/5 to be able to get up and down from the floor safely. [] [] [] []   Pt will demonstrate increased hip ER/ABD strength to 4+/5 to perform stepping and squatting activities without excessive femoral IR/ADD. [] [] [] []   Pt will improve tandem standing balance to >10s to improve safety with gait on uneven surfaces such as grass and gravel. [] [] [] []   Pt will be independent and compliant with comprehensive HEP to maintain progress achieved in PT. [] [] [] []                          Plan  Continue to progress with ROM, strengthening and balance activities as tolerated to  improve tolerance to community walking and performing light house work.    Treatment Last 4 Visits  Treatment Day: 5       5/13/2025 5/15/2025 5/21/2025 5/22/2025   LE Treatment   Therapeutic Exercise NuStep 5' Lv1  PROM R knee flexion and extension  Quad sets x20  Supine SB HS curls x20  Seated LAQ 2x10  Seated HS curls YTB 2x10    Standing marching 2x10  Side stepping 3 laps  Sit to stands from chair with 1 Airex 2x10  Tandem standing balance 2x30\" ea NuStep 5' Lv1  PROM R knee flexion and extension  Quad sets x20  Supine SB HS curls x20  Seated LAQ 2# 2x10  Seated HS curls YTB 2x10     Step ups 4\" step 2x10  Sit to stands from chair with 1 Airex 2x10  Standing TKEs YTB x20   NuStep 5' Lv1  PROM R knee flexion and extension  Quad sets x20  SLR 2x10  Supine SB HS curls x20     Step ups 6\" step 1x10 B  Sit to stands from chair with 1 Airex 2x10  Standing TKEs YTB x20  Shuttle leg press 62# 2x10   Recumbent bike 5' Lv2  PROM R knee flexion and extension  Supine SB HS curls x20  LAQ 2# x20     Step ups 6\" step x10 B  Sit to stands from chair 2x10  Standing TKEs RTB x20  Shuttle leg press 62# 2x20  Lateral step overs trav x20  Standing calf stretch on 1/2 foam roll 2x20\" B  Standing hip abd YTB x10 ea     Neuro Re-Education  Tandem standing balance on Airex 2x30\" ea     Manual Therapy   STM R knee, quad, ITB STM R quad, ITB   Therapeutic Exercise Minutes 43 42 40 40   Neuro Re-Educ Minutes  3     Manual Therapy Minutes   6 5   Total Time Of Timed Procedures 43 45 46 45   Total Time Of Service-Based Procedures 0 0 0 0   Total Treatment Time 43 45 46 45        HEP  Access Code: [email or web address]  URL: [email or web address]  Date: 05/08/2025  Prepared by: Iván Arias    Exercises  - Supine Quadricep Sets  - 2 x daily - 7 x weekly - 2 sets - 10 reps  - Supine Knee Extension Stretch on Towel Roll  - 2 x daily - 7 x weekly - 3 minutes hold  - Active Straight Leg Raise with Quad Set  - 2 x daily - 7 x weekly -  2 sets - 10 reps  - Supine Heel Slide with Strap  - 2 x daily - 7 x weekly - 2 sets - 10 reps  - Seated Heel Slide  - 2 x daily - 7 x weekly - 2 sets - 10 reps  - Standing March with Counter Support  - 1-2 x daily - 7 x weekly - 2 sets - 10 reps  - Mini Squat with Counter Support  - 1-2 x daily - 7 x weekly - 2 sets - 10 reps    Charges     3 Ther Ex

## 2025-05-27 ENCOUNTER — OFFICE VISIT (OUTPATIENT)
Dept: PHYSICAL THERAPY | Age: 70
End: 2025-05-27
Attending: ORTHOPAEDIC SURGERY
Payer: MEDICARE

## 2025-05-27 DIAGNOSIS — E78.2 MIXED HYPERLIPIDEMIA: ICD-10-CM

## 2025-05-27 PROCEDURE — 97110 THERAPEUTIC EXERCISES: CPT | Performed by: PHYSICAL THERAPIST

## 2025-05-27 RX ORDER — ATORVASTATIN CALCIUM 20 MG/1
20 TABLET, FILM COATED ORAL NIGHTLY
Qty: 90 TABLET | Refills: 0 | OUTPATIENT
Start: 2025-05-27

## 2025-05-27 NOTE — PROGRESS NOTES
Patient: Tamara Meraz (70 year old, female) Referring Provider:  Insurance:   Diagnosis: Status post right knee replacement (Z96.651) Janes Cortez  MEDICARE   Date of Surgery: 4/22/25 Next MD visit:  COMMERCIAL   Precautions:  Other (use comment) No data recorded Referral Information:    Date of Evaluation: Req: 0, Auth: 0, Exp:     05/08/25 POC Auth Visits:          Today's Date   5/27/2025    Subjective  Patient states she woke up with increased pain in the medial aspect of her R knee.       Pain: 4/10     Objective  R knee PROM 0-127              Assessment  Patient has increased R knee ROM today, including full knee extension and increased flexion to 127 deg. Mild/mod pain persists into end ranges with over pressure. VCs to correct LE mechanics during sit to stand squats, but performed without UE assistance. Minimal gait deviations at this time..    Goals (to be met in 16 visits)      Not Met Progress Toward Partially Met Met   Pt will improve knee extension ROM to 0 deg to allow proper heel strike during gait and terminal knee extension in stance. [] [] [] []   Pt will improve knee AROM flexion to >120 degrees to improve ability to perform transfers. [] [] [] []   Pt will improve quad strength to 5/5 to ascend 1 flight of stairs reciprocally with UE assist. [] [] [] []   Pt will increase hip and knee strength to grossly 4+/5 to be able to get up and down from the floor safely. [] [] [] []   Pt will demonstrate increased hip ER/ABD strength to 4+/5 to perform stepping and squatting activities without excessive femoral IR/ADD. [] [] [] []   Pt will improve tandem standing balance to >10s to improve safety with gait on uneven surfaces such as grass and gravel. [] [] [] []   Pt will be independent and compliant with comprehensive HEP to maintain progress achieved in PT. [] [] [] []                              Plan  Continue to progress with LE strengthening, add 8\" step up next visit.    Treatment Last 4  Visits  Treatment Day: 6       5/15/2025 5/21/2025 5/22/2025 5/27/2025   LE Treatment   Therapeutic Exercise NuStep 5' Lv1  PROM R knee flexion and extension  Quad sets x20  Supine SB HS curls x20  Seated LAQ 2# 2x10  Seated HS curls YTB 2x10     Step ups 4\" step 2x10  Sit to stands from chair with 1 Airex 2x10  Standing TKEs YTB x20   NuStep 5' Lv1  PROM R knee flexion and extension  Quad sets x20  SLR 2x10  Supine SB HS curls x20     Step ups 6\" step 1x10 B  Sit to stands from chair with 1 Airex 2x10  Standing TKEs YTB x20  Shuttle leg press 62# 2x10   Recumbent bike 5' Lv2  PROM R knee flexion and extension  Supine SB HS curls x20  LAQ 2# x20     Step ups 6\" step x10 B  Sit to stands from chair 2x10  Standing TKEs RTB x20  Shuttle leg press 62# 2x20  Lateral step overs trav x20  Standing calf stretch on 1/2 foam roll 2x20\" B  Standing hip abd YTB x10 ea   Recumbent bike 6' Lv2  PROM R knee flexion and extension  Supine heel slides with strap assist x20  LAQ 2# x20     Step ups 6\" step x15 B  Sit to stands from chair 2x10  Standing TKEs RTB x20  Shuttle leg press 62# 2x20  Standing calf stretch on 1/2 foam roll 2x20\" B  Standing hip abd YTB x15 ea        Neuro Re-Education Tandem standing balance on Airex 2x30\" ea      Manual Therapy  STM R knee, quad, ITB STM R quad, ITB STM R knee, quad, ITB   Therapeutic Exercise Minutes 42 40 40 40   Neuro Re-Educ Minutes 3      Manual Therapy Minutes  6 5 6   Total Time Of Timed Procedures 45 46 45 46   Total Time Of Service-Based Procedures 0 0 0 0   Total Treatment Time 45 46 45 46        HEP  Access Code: [email or web address]  URL: [email or web address]  Date: 05/08/2025  Prepared by: Iván Arias    Exercises  - Supine Quadricep Sets  - 2 x daily - 7 x weekly - 2 sets - 10 reps  - Supine Knee Extension Stretch on Towel Roll  - 2 x daily - 7 x weekly - 3 minutes hold  - Active Straight Leg Raise with Quad Set  - 2 x daily - 7 x weekly - 2 sets - 10 reps  -  Supine Heel Slide with Strap  - 2 x daily - 7 x weekly - 2 sets - 10 reps  - Seated Heel Slide  - 2 x daily - 7 x weekly - 2 sets - 10 reps  - Standing March with Counter Support  - 1-2 x daily - 7 x weekly - 2 sets - 10 reps  - Mini Squat with Counter Support  - 1-2 x daily - 7 x weekly - 2 sets - 10 reps    Charges     3 Ther Ex

## 2025-05-29 ENCOUNTER — APPOINTMENT (OUTPATIENT)
Dept: CT IMAGING | Facility: HOSPITAL | Age: 70
End: 2025-05-29
Attending: EMERGENCY MEDICINE
Payer: MEDICARE

## 2025-05-29 ENCOUNTER — OFFICE VISIT (OUTPATIENT)
Dept: PHYSICAL THERAPY | Age: 70
End: 2025-05-29
Attending: ORTHOPAEDIC SURGERY
Payer: MEDICARE

## 2025-05-29 ENCOUNTER — HOSPITAL ENCOUNTER (INPATIENT)
Facility: HOSPITAL | Age: 70
LOS: 1 days | Discharge: HOME | End: 2025-05-30
Attending: EMERGENCY MEDICINE | Admitting: STUDENT IN AN ORGANIZED HEALTH CARE EDUCATION/TRAINING PROGRAM
Payer: MEDICARE

## 2025-05-29 DIAGNOSIS — R26.89 INABILITY TO BEAR WEIGHT: ICD-10-CM

## 2025-05-29 DIAGNOSIS — M17.11 PRIMARY OSTEOARTHRITIS OF RIGHT KNEE: ICD-10-CM

## 2025-05-29 DIAGNOSIS — R42 DIZZINESS: Primary | ICD-10-CM

## 2025-05-29 PROBLEM — R73.9 HYPERGLYCEMIA: Status: ACTIVE | Noted: 2025-05-29

## 2025-05-29 PROBLEM — D64.9 ANEMIA: Status: ACTIVE | Noted: 2025-05-29

## 2025-05-29 LAB
ALBUMIN SERPL-MCNC: 4.4 G/DL (ref 3.2–4.8)
ALBUMIN/GLOB SERPL: 1.6 {RATIO} (ref 1–2)
ALP LIVER SERPL-CCNC: 57 U/L (ref 55–142)
ALT SERPL-CCNC: 18 U/L (ref 10–49)
ANION GAP SERPL CALC-SCNC: 10 MMOL/L (ref 0–18)
AST SERPL-CCNC: 27 U/L (ref ?–34)
BASOPHILS # BLD AUTO: 0.02 X10(3) UL (ref 0–0.2)
BASOPHILS NFR BLD AUTO: 0.5 %
BILIRUB SERPL-MCNC: <0.2 MG/DL (ref 0.2–1.1)
BUN BLD-MCNC: 17 MG/DL (ref 9–23)
CALCIUM BLD-MCNC: 8.8 MG/DL (ref 8.7–10.6)
CHLORIDE SERPL-SCNC: 105 MMOL/L (ref 98–112)
CO2 SERPL-SCNC: 24 MMOL/L (ref 21–32)
CREAT BLD-MCNC: 0.95 MG/DL (ref 0.55–1.02)
EGFRCR SERPLBLD CKD-EPI 2021: 64 ML/MIN/1.73M2 (ref 60–?)
EOSINOPHIL # BLD AUTO: 0.06 X10(3) UL (ref 0–0.7)
EOSINOPHIL NFR BLD AUTO: 1.4 %
ERYTHROCYTE [DISTWIDTH] IN BLOOD BY AUTOMATED COUNT: 12.3 %
ERYTHROCYTE [SEDIMENTATION RATE] IN BLOOD: 6 MM/HR (ref 0–30)
GLOBULIN PLAS-MCNC: 2.8 G/DL (ref 2–3.5)
GLUCOSE BLD-MCNC: 115 MG/DL (ref 70–99)
HCT VFR BLD AUTO: 34 % (ref 35–48)
HGB BLD-MCNC: 11.7 G/DL (ref 12–16)
IMM GRANULOCYTES # BLD AUTO: 0.01 X10(3) UL (ref 0–1)
IMM GRANULOCYTES NFR BLD: 0.2 %
LYMPHOCYTES # BLD AUTO: 2.16 X10(3) UL (ref 1–4)
LYMPHOCYTES NFR BLD AUTO: 49.4 %
MCH RBC QN AUTO: 34 PG (ref 26–34)
MCHC RBC AUTO-ENTMCNC: 34.4 G/DL (ref 31–37)
MCV RBC AUTO: 98.8 FL (ref 80–100)
MONOCYTES # BLD AUTO: 0.53 X10(3) UL (ref 0.1–1)
MONOCYTES NFR BLD AUTO: 12.1 %
NEUTROPHILS # BLD AUTO: 1.59 X10 (3) UL (ref 1.5–7.7)
NEUTROPHILS # BLD AUTO: 1.59 X10(3) UL (ref 1.5–7.7)
NEUTROPHILS NFR BLD AUTO: 36.4 %
OSMOLALITY SERPL CALC.SUM OF ELEC: 290 MOSM/KG (ref 275–295)
PLATELET # BLD AUTO: 224 10(3)UL (ref 150–450)
POTASSIUM SERPL-SCNC: 4.3 MMOL/L (ref 3.5–5.1)
PROT SERPL-MCNC: 7.2 G/DL (ref 5.7–8.2)
RBC # BLD AUTO: 3.44 X10(6)UL (ref 3.8–5.3)
SODIUM SERPL-SCNC: 139 MMOL/L (ref 136–145)
WBC # BLD AUTO: 4.4 X10(3) UL (ref 4–11)

## 2025-05-29 PROCEDURE — 70498 CT ANGIOGRAPHY NECK: CPT | Performed by: EMERGENCY MEDICINE

## 2025-05-29 PROCEDURE — 99223 1ST HOSP IP/OBS HIGH 75: CPT | Performed by: STUDENT IN AN ORGANIZED HEALTH CARE EDUCATION/TRAINING PROGRAM

## 2025-05-29 PROCEDURE — 97140 MANUAL THERAPY 1/> REGIONS: CPT | Performed by: PHYSICAL THERAPIST

## 2025-05-29 PROCEDURE — 97110 THERAPEUTIC EXERCISES: CPT | Performed by: PHYSICAL THERAPIST

## 2025-05-29 PROCEDURE — 70496 CT ANGIOGRAPHY HEAD: CPT | Performed by: EMERGENCY MEDICINE

## 2025-05-29 PROCEDURE — 70450 CT HEAD/BRAIN W/O DYE: CPT | Performed by: EMERGENCY MEDICINE

## 2025-05-29 RX ORDER — LORAZEPAM 2 MG/ML
INJECTION INTRAMUSCULAR
Status: DISPENSED
Start: 2025-05-29 | End: 2025-05-30

## 2025-05-29 RX ORDER — ASPIRIN 81 MG/1
324 TABLET, CHEWABLE ORAL ONCE
Status: DISCONTINUED | OUTPATIENT
Start: 2025-05-29 | End: 2025-05-29

## 2025-05-29 RX ORDER — ONDANSETRON 2 MG/ML
4 INJECTION INTRAMUSCULAR; INTRAVENOUS ONCE
Status: COMPLETED | OUTPATIENT
Start: 2025-05-29 | End: 2025-05-29

## 2025-05-29 RX ORDER — LORAZEPAM 2 MG/ML
1 INJECTION INTRAMUSCULAR ONCE
Status: COMPLETED | OUTPATIENT
Start: 2025-05-29 | End: 2025-05-29

## 2025-05-29 RX ORDER — ONDANSETRON 2 MG/ML
INJECTION INTRAMUSCULAR; INTRAVENOUS
Status: COMPLETED
Start: 2025-05-29 | End: 2025-05-29

## 2025-05-29 RX ORDER — DIPHENHYDRAMINE HYDROCHLORIDE 50 MG/ML
25 INJECTION, SOLUTION INTRAMUSCULAR; INTRAVENOUS ONCE
Status: COMPLETED | OUTPATIENT
Start: 2025-05-29 | End: 2025-05-29

## 2025-05-29 RX ORDER — METOCLOPRAMIDE HYDROCHLORIDE 5 MG/ML
10 INJECTION INTRAMUSCULAR; INTRAVENOUS ONCE
Status: COMPLETED | OUTPATIENT
Start: 2025-05-29 | End: 2025-05-29

## 2025-05-29 RX ORDER — MECLIZINE HYDROCHLORIDE 25 MG/1
25 TABLET ORAL ONCE
Status: DISCONTINUED | OUTPATIENT
Start: 2025-05-29 | End: 2025-05-29

## 2025-05-29 RX ORDER — ASPIRIN 81 MG/1
81 TABLET, CHEWABLE ORAL ONCE
Status: COMPLETED | OUTPATIENT
Start: 2025-05-29 | End: 2025-05-29

## 2025-05-29 RX ORDER — SODIUM CHLORIDE 9 MG/ML
125 INJECTION, SOLUTION INTRAVENOUS CONTINUOUS
Status: DISCONTINUED | OUTPATIENT
Start: 2025-05-29 | End: 2025-05-30

## 2025-05-29 NOTE — PROGRESS NOTES
Patient: Tamara Meraz (70 year old, female) Referring Provider:  Insurance:   Diagnosis: Status post right knee replacement (Z96.651) Janes Cortez  MEDICARE   Date of Surgery: 4/22/25 Next MD visit:  COMMERCIAL   Precautions:  Other (use comment) No data recorded Referral Information:    Date of Evaluation: Req: 0, Auth: 0, Exp:     05/08/25 POC Auth Visits:          Today's Date   5/29/2025    Subjective  Patient reports increased R knee pain last night that persists today. Walked a mile yesterday and developed increased L hip pain that may have effected her gait and led to knee pain.       Pain: 4/10     Objective  AROM R knee 0-120              Assessment  Increased stiffness into knee extension at onset of treatment. Responded well to manual treatment and gentle stretching to restore full active extension. Decreased intensity of treamtent today due to elevated pain levels, tolerated well. Discussed with patient to monitor activity levels based on pain levels next few days.    Goals (to be met in 16 visits)      Not Met Progress Toward Partially Met Met   Pt will improve knee extension ROM to 0 deg to allow proper heel strike during gait and terminal knee extension in stance. [] [] [] []   Pt will improve knee AROM flexion to >120 degrees to improve ability to perform transfers. [] [] [] []   Pt will improve quad strength to 5/5 to ascend 1 flight of stairs reciprocally with UE assist. [] [] [] []   Pt will increase hip and knee strength to grossly 4+/5 to be able to get up and down from the floor safely. [] [] [] []   Pt will demonstrate increased hip ER/ABD strength to 4+/5 to perform stepping and squatting activities without excessive femoral IR/ADD. [] [] [] []   Pt will improve tandem standing balance to >10s to improve safety with gait on uneven surfaces such as grass and gravel. [] [] [] []   Pt will be independent and compliant with comprehensive HEP to maintain progress achieved in PT. [] [] []  []                                  Plan  Continue therapy per POC. Maintain full knee extrension ROM and progress strengthening and balance as tolerated.    Treatment Last 4 Visits  Treatment Day: 7       5/21/2025 5/22/2025 5/27/2025 5/29/2025   LE Treatment   Therapeutic Exercise NuStep 5' Lv1  PROM R knee flexion and extension  Quad sets x20  SLR 2x10  Supine SB HS curls x20     Step ups 6\" step 1x10 B  Sit to stands from chair with 1 Airex 2x10  Standing TKEs YTB x20  Shuttle leg press 62# 2x10   Recumbent bike 5' Lv2  PROM R knee flexion and extension  Supine SB HS curls x20  LAQ 2# x20     Step ups 6\" step x10 B  Sit to stands from chair 2x10  Standing TKEs RTB x20  Shuttle leg press 62# 2x20  Lateral step overs trav x20  Standing calf stretch on 1/2 foam roll 2x20\" B  Standing hip abd YTB x10 ea   Recumbent bike 6' Lv2  PROM R knee flexion and extension  Supine heel slides with strap assist x20  LAQ 2# x20     Step ups 6\" step x15 B  Sit to stands from chair 2x10  Standing TKEs RTB x20  Shuttle leg press 62# 2x20  Standing calf stretch on 1/2 foam roll 2x20\" B  Standing hip abd YTB x15 ea      Recumbent bike 6'  PROM R knee   Heel slides AROM x20  Supine knee extension stretch with heel on towel roll and 2# on thing 2'  Hooklying hip add x20  Hooklying hip abd BTB x20  Seated LAQ 2# 2x10   Manual Therapy STM R knee, quad, ITB STM R quad, ITB STM R knee, quad, ITB STM R quad, ITB, knee   Therapeutic Exercise Minutes 40 40 40 35   Manual Therapy Minutes 6 5 6 10   Total Time Of Timed Procedures 46 45 46 45   Total Time Of Service-Based Procedures 0 0 0 0   Total Treatment Time 46 45 46 45        HEP  Access Code: [email or web address]  URL: [email or web address]  Date: 05/08/2025  Prepared by: Iván Arias    Exercises  - Supine Quadricep Sets  - 2 x daily - 7 x weekly - 2 sets - 10 reps  - Supine Knee Extension Stretch on Towel Roll  - 2 x daily - 7 x weekly - 3 minutes hold  - Active Straight Leg  Raise with Quad Set  - 2 x daily - 7 x weekly - 2 sets - 10 reps  - Supine Heel Slide with Strap  - 2 x daily - 7 x weekly - 2 sets - 10 reps  - Seated Heel Slide  - 2 x daily - 7 x weekly - 2 sets - 10 reps  - Standing March with Counter Support  - 1-2 x daily - 7 x weekly - 2 sets - 10 reps  - Mini Squat with Counter Support  - 1-2 x daily - 7 x weekly - 2 sets - 10 reps    Charges     2 Ther Ex, 1 Manual

## 2025-05-30 VITALS
BODY MASS INDEX: 19 KG/M2 | OXYGEN SATURATION: 97 % | DIASTOLIC BLOOD PRESSURE: 86 MMHG | RESPIRATION RATE: 18 BRPM | HEART RATE: 70 BPM | TEMPERATURE: 98 F | SYSTOLIC BLOOD PRESSURE: 121 MMHG | WEIGHT: 100 LBS

## 2025-05-30 PROBLEM — R26.89 INABILITY TO BEAR WEIGHT: Status: ACTIVE | Noted: 2025-05-30

## 2025-05-30 LAB
ATRIAL RATE: 71 BPM
CHOLEST SERPL-MCNC: 173 MG/DL (ref ?–200)
EST. AVERAGE GLUCOSE BLD GHB EST-MCNC: 85 MG/DL (ref 68–126)
GLUCOSE BLD-MCNC: 85 MG/DL (ref 70–99)
GLUCOSE BLD-MCNC: 87 MG/DL (ref 70–99)
HBA1C MFR BLD: 4.6 % (ref ?–5.7)
HDLC SERPL-MCNC: 62 MG/DL (ref 40–59)
LDLC SERPL CALC-MCNC: 85 MG/DL (ref ?–100)
NONHDLC SERPL-MCNC: 111 MG/DL (ref ?–130)
P AXIS: 74 DEGREES
P-R INTERVAL: 164 MS
Q-T INTERVAL: 444 MS
QRS DURATION: 80 MS
QTC CALCULATION (BEZET): 482 MS
R AXIS: 49 DEGREES
T AXIS: 84 DEGREES
TRIGL SERPL-MCNC: 150 MG/DL (ref 30–149)
VENTRICULAR RATE: 71 BPM
VLDLC SERPL CALC-MCNC: 24 MG/DL (ref 0–30)

## 2025-05-30 PROCEDURE — 99232 SBSQ HOSP IP/OBS MODERATE 35: CPT | Performed by: HOSPITALIST

## 2025-05-30 PROCEDURE — 99223 1ST HOSP IP/OBS HIGH 75: CPT | Performed by: OTHER

## 2025-05-30 RX ORDER — SODIUM CHLORIDE 9 MG/ML
INJECTION, SOLUTION INTRAVENOUS CONTINUOUS
Status: DISCONTINUED | OUTPATIENT
Start: 2025-05-30 | End: 2025-05-30

## 2025-05-30 RX ORDER — ASPIRIN 325 MG
325 TABLET ORAL DAILY
Status: DISCONTINUED | OUTPATIENT
Start: 2025-05-30 | End: 2025-05-30

## 2025-05-30 RX ORDER — ONDANSETRON 2 MG/ML
4 INJECTION INTRAMUSCULAR; INTRAVENOUS EVERY 4 HOURS PRN
Status: ACTIVE | OUTPATIENT
Start: 2025-05-30 | End: 2025-05-30

## 2025-05-30 RX ORDER — ONDANSETRON 2 MG/ML
4 INJECTION INTRAMUSCULAR; INTRAVENOUS EVERY 6 HOURS PRN
Status: DISCONTINUED | OUTPATIENT
Start: 2025-05-30 | End: 2025-05-30

## 2025-05-30 RX ORDER — LORAZEPAM 0.5 MG/1
0.5 TABLET ORAL EVERY 4 HOURS PRN
Status: DISCONTINUED | OUTPATIENT
Start: 2025-05-30 | End: 2025-05-30

## 2025-05-30 RX ORDER — METOCLOPRAMIDE HYDROCHLORIDE 5 MG/ML
5 INJECTION INTRAMUSCULAR; INTRAVENOUS EVERY 8 HOURS PRN
Status: DISCONTINUED | OUTPATIENT
Start: 2025-05-30 | End: 2025-05-30

## 2025-05-30 RX ORDER — PAROXETINE 20 MG/1
20 TABLET, FILM COATED ORAL EVERY MORNING
Status: DISCONTINUED | OUTPATIENT
Start: 2025-05-30 | End: 2025-05-30

## 2025-05-30 RX ORDER — CLOPIDOGREL BISULFATE 75 MG/1
75 TABLET ORAL DAILY
Qty: 20 TABLET | Refills: 0 | Status: SHIPPED | OUTPATIENT
Start: 2025-05-31 | End: 2025-06-20

## 2025-05-30 RX ORDER — ACETAMINOPHEN 325 MG/1
650 TABLET ORAL EVERY 4 HOURS PRN
Status: DISCONTINUED | OUTPATIENT
Start: 2025-05-30 | End: 2025-05-30

## 2025-05-30 RX ORDER — CLOPIDOGREL BISULFATE 75 MG/1
300 TABLET ORAL ONCE
Status: COMPLETED | OUTPATIENT
Start: 2025-05-30 | End: 2025-05-30

## 2025-05-30 RX ORDER — PRIMIDONE 50 MG/1
150 TABLET ORAL NIGHTLY
Status: DISCONTINUED | OUTPATIENT
Start: 2025-05-30 | End: 2025-05-30

## 2025-05-30 RX ORDER — SODIUM CHLORIDE 9 MG/ML
INJECTION, SOLUTION INTRAVENOUS CONTINUOUS
Status: ACTIVE | OUTPATIENT
Start: 2025-05-30 | End: 2025-05-30

## 2025-05-30 RX ORDER — ENOXAPARIN SODIUM 100 MG/ML
30 INJECTION SUBCUTANEOUS DAILY
Status: DISCONTINUED | OUTPATIENT
Start: 2025-05-30 | End: 2025-05-30

## 2025-05-30 RX ORDER — HYDRALAZINE HYDROCHLORIDE 20 MG/ML
10 INJECTION INTRAMUSCULAR; INTRAVENOUS EVERY 2 HOUR PRN
Status: DISCONTINUED | OUTPATIENT
Start: 2025-05-30 | End: 2025-05-30

## 2025-05-30 RX ORDER — HYDROCODONE BITARTRATE AND ACETAMINOPHEN 5; 325 MG/1; MG/1
1 TABLET ORAL EVERY 4 HOURS PRN
Refills: 0 | Status: DISCONTINUED | OUTPATIENT
Start: 2025-05-30 | End: 2025-05-30

## 2025-05-30 RX ORDER — CLOPIDOGREL BISULFATE 75 MG/1
75 TABLET ORAL DAILY
Status: DISCONTINUED | OUTPATIENT
Start: 2025-05-31 | End: 2025-05-30

## 2025-05-30 RX ORDER — PRIMIDONE 50 MG/1
100 TABLET ORAL DAILY
Status: DISCONTINUED | OUTPATIENT
Start: 2025-05-30 | End: 2025-05-30

## 2025-05-30 RX ORDER — MELATONIN
1000 DAILY
Status: DISCONTINUED | OUTPATIENT
Start: 2025-05-30 | End: 2025-05-30

## 2025-05-30 RX ORDER — ATORVASTATIN CALCIUM 40 MG/1
40 TABLET, FILM COATED ORAL NIGHTLY
Status: DISCONTINUED | OUTPATIENT
Start: 2025-05-30 | End: 2025-05-30

## 2025-05-30 RX ORDER — MECLIZINE HCL 12.5 MG 12.5 MG/1
12.5 TABLET ORAL 3 TIMES DAILY PRN
Status: DISCONTINUED | OUTPATIENT
Start: 2025-05-30 | End: 2025-05-30

## 2025-05-30 RX ORDER — ASPIRIN 81 MG/1
81 TABLET, CHEWABLE ORAL DAILY
Status: DISCONTINUED | OUTPATIENT
Start: 2025-06-03 | End: 2025-05-30

## 2025-05-30 RX ORDER — SOTALOL HYDROCHLORIDE 80 MG/1
80 TABLET ORAL
Status: DISCONTINUED | OUTPATIENT
Start: 2025-05-30 | End: 2025-05-30

## 2025-05-30 RX ORDER — GABAPENTIN 300 MG/1
600 CAPSULE ORAL 2 TIMES DAILY
Status: DISCONTINUED | OUTPATIENT
Start: 2025-05-30 | End: 2025-05-30

## 2025-05-30 RX ORDER — ASPIRIN 81 MG/1
81 TABLET, CHEWABLE ORAL 2 TIMES DAILY
Status: DISCONTINUED | OUTPATIENT
Start: 2025-05-31 | End: 2025-05-30

## 2025-05-30 RX ORDER — ATORVASTATIN CALCIUM 40 MG/1
40 TABLET, FILM COATED ORAL NIGHTLY
Qty: 90 TABLET | Refills: 0 | Status: SHIPPED | OUTPATIENT
Start: 2025-05-30

## 2025-05-30 RX ORDER — ACETAMINOPHEN 650 MG/1
650 SUPPOSITORY RECTAL EVERY 4 HOURS PRN
Status: DISCONTINUED | OUTPATIENT
Start: 2025-05-30 | End: 2025-05-30

## 2025-05-30 RX ORDER — MECLIZINE HCL 12.5 MG 12.5 MG/1
12.5 TABLET ORAL 3 TIMES DAILY PRN
Qty: 30 TABLET | Refills: 0 | Status: SHIPPED | OUTPATIENT
Start: 2025-05-30 | End: 2025-06-09 | Stop reason: ALTCHOICE

## 2025-05-30 RX ORDER — ASPIRIN 81 MG/1
81 TABLET, CHEWABLE ORAL DAILY
Qty: 90 TABLET | Refills: 0 | Status: SHIPPED | OUTPATIENT
Start: 2025-05-31

## 2025-05-30 RX ORDER — FERROUS SULFATE 325(65) MG
325 TABLET, DELAYED RELEASE (ENTERIC COATED) ORAL EVERY OTHER DAY
Status: DISCONTINUED | OUTPATIENT
Start: 2025-05-31 | End: 2025-05-30

## 2025-05-30 RX ORDER — ENOXAPARIN SODIUM 100 MG/ML
40 INJECTION SUBCUTANEOUS DAILY
Status: DISCONTINUED | OUTPATIENT
Start: 2025-05-30 | End: 2025-05-30

## 2025-05-30 RX ORDER — LABETALOL HYDROCHLORIDE 5 MG/ML
10 INJECTION, SOLUTION INTRAVENOUS EVERY 10 MIN PRN
Status: DISCONTINUED | OUTPATIENT
Start: 2025-05-30 | End: 2025-05-30

## 2025-05-30 RX ORDER — ASPIRIN 300 MG/1
300 SUPPOSITORY RECTAL DAILY
Status: DISCONTINUED | OUTPATIENT
Start: 2025-05-30 | End: 2025-05-30

## 2025-05-30 NOTE — BH RN ADMISSION NOTE
NURSING ADMISSION NOTE      Patient admitted via Cart  Oriented to room.  Safety precautions initiated.  Bed in low position.  Call light in reach.    Assumed care at 0130  A&Ox4, RA, NSR   C/o knee and spinal pain, norco given   Denies n/v upon arrival to unit   Passed dysphagia screen   NIH 0, Q2 neuros completed, no acute changes   MRI screening complete  Up SBA, denies dizziness   Precautions in place, questions answered

## 2025-05-30 NOTE — SLP NOTE
ADULT SWALLOWING EVALUATION    ASSESSMENT    ASSESSMENT/OVERALL IMPRESSION:  Order received for bedside swallow evaluation per CVA protocol to r/o aspiration. Pt presented to [facility] with c/o dizziness, nausea, vomiting and HA. Pmhx includes A-flutter, HTN, HLD, CVA, Parkinsons with deep brain simulator, anterior and posterior approach cervical spinal surgeries and CAD s/p stent & pacemaker. Pt familiar to this department following previous bedside swallow evaluation completed 6/18/18 which revealed functional swallow mechanism and recommended regular consistency diet with thin liquids. CT Head revealed no acute process. CTA Head and neck revealed aneurysm. Pt passed RN dysphagia screening and placed on a regular consistency diet with thin liquids.     Pt found lying lydia-upright in bed; alert & participatory; able to follow all commands and participate in conversation appropriately; appears to be good historian. Mild dysarthria noted however speech 100% intelligible. She reported distant history of dysphagia following botox in her cervical spinal area however no recent issues swallowing regular consistency diet and thin liquids. Patient did stated that she intermittently requires assist with meal set up and assist due to hand tremors. Oral mech/motor exam revealed patient with her own dentition which appeared mostly intact & functional. Oral cavity appeared clean & moist. No significant oral motor deficits discovered. Clear vocal quality, volitional swallow and cough elicited. Head of bed positioned fully upright and patient assisted with and observed independently feeding herself po trials of thin via cup & straw, pureed and cracker consistencies. Pt required assist at times holding cup to prevent spillage due to hand tremors. She stated that her spouse also assists her at own occasionally. Adequate oral retrieval & containment of bolus with complete mastication and oral transit of bolus given increased time. No  significant oral residue appreciated post prandial. Pharyngeal response appeared timely with adequate hyolaryngeal elevation when palpated and clear vocal quality following all po trials.     Swallow mechanism appears to be grossly intact with no overt clinical s/s of aspiration. Recommend continue regular consistency diet with thin liquids. Supervise/assist with meals as needed. Take pills whole with sips of water. Patient denied any new deficits in communication and reported skills at baseline.     Observed patient taking multiple whole pills with sips of water with no difficulty. Discussed taking one at a time however she stated this is her favored method. Discussed results of exam, aspiration risks, diet recommendations, aspiration precautions and plan with good understanding reported. Assisted patient with ordering breakfast meal and discussed results and recommendations with RN. Will continue to follow as per plan.     Belle Assessment of Swallow Function Score: No abnormality detected    RECOMMENDATIONS   Diet Recommendations - Solids: Regular  Diet Recommendations - Liquids: Thin Liquids                        Compensatory Strategies Recommended: Alternate consistencies  Aspiration Precautions: Upright position, Slow rate, Small bites, Small sips (assist feeding as needed d/t tremors)  Medication Administration Recommendations: No restrictions  Treatment Plan/Recommendations: Dysphagia therapy, Aspiration precautions    HISTORY   MEDICAL HISTORY  Reason for Referral: R/O aspiration    Problem List  Principal Problem:    Dizziness  Active Problems:    Anemia    Hyperglycemia    Inability to bear weight      Past Medical History  Past Medical History[1]    Prior Living Situation: Home with spouse  Diet Prior to Admission: Regular, Thin liquids       Patient/Family Goals: return home    SWALLOWING HISTORY  Current Diet Consistency: Regular, Thin liquids  Dysphagia History: see above  Imaging Results: no recent  CXR    SUBJECTIVE       OBJECTIVE   ORAL MOTOR EXAMINATION  Dentition: Natural, Functional  Symmetry: Within Functional Limits  Strength: Within Functional Limits  Tone: Within Functional Limits  Range of Motion: Within Functional Limits  Rate of Motion: Within Functional Limits    Voice Quality: Clear  Respiratory Status: Unlabored  Consistencies Trialed: Thin liquids, Puree, Hard solid  Method of Presentation: Self presentation, Staff/Clinician assistance, Spoon, Cup, Straw, Single sips  Patient Positioned: Upright, Midline    Oral Phase of Swallow: Within Functional Limits                      Pharyngeal Phase of Swallow:  (appears to be WFL - no overt clinical s/s of aspiration noted)           (Please note: Silent aspiration cannot be evaluated clinically. Videofluoroscopic Swallow Study is required to rule-out silent aspiration.)    Esophageal Phase of Swallow: No complaints consistent with possible esophageal involvement  Comments:               GOALS  Goal #1 The patient will tolerate regular consistency and thin liquids without overt signs or symptoms of aspiration with 90 % accuracy over 1 session(s). Met   Goal #2 The patient/family/caregiver will demonstrate understanding and implementation of aspiration precautions and swallow strategies independently over 1 session(s).   Met   Goal #3 The patient will utilize compensatory strategies as outlined by  BSSE (clinical evaluation) including Slow rate, Small bites, Small sips, Alternate liquids/solids, Upright 90 degrees, Supervision with meals with as needed assistance 100 % of the time across 2 sessions.  Met                              FOLLOW UP  Treatment Plan/Recommendations: Dysphagia therapy, Aspiration precautions  Duration: 1 week  Follow Up Needed (Documentation Required): Yes  SLP Follow-up Date: 05/31/25    Thank you for your referral.   If you have any questions, please contact KATHRYN Alvarez MA, CCC-SLP  Pager [phone]            [1]   Past Medical History:   Abdominal pain    left sided intermittent pain    Age-related osteoporosis without current pathological fracture    Anemia    Arthritis    Atrial flutter, paroxysmal (HCC)    Back pain    Back problem    severe spinal stenosis    Benign essential tremor    Bowel obstruction (HCC)    Breast cancer (HCC)    Broken ankle    Left Ankle    Cataract    Cervical dystonia    DBS b/l    Cervical spine degeneration    Change in hair    Closed fracture of left ankle    Colitis    Ischemic Colitis    Constipation    Easy bruising    Endometriosis    Essential hypertension    Exposure to medical diagnostic radiation    Fatigue    ANEMIA.  I GET INFUSIONS    Fracture of distal end of tibia with fibula, left, closed, initial encounter    Last Assessment & Plan:    Formatting of this note might be different from the original.   Following follow-up, potentially secondary to hypotension/muscle weakness.  X-rays show a left nondisplaced distal fibula fracture.  Exam with soft tissue swelling and bruising.   -Admit inpatient   -telemetry   -prn analgesics   -SCDs   -PT/OT   -Ortho consult      Frequent use of laxatives    Gastrointestinal hemorrhage    High blood pressure    High cholesterol    History of blood transfusion    Jan 2018    History of breast cancer    XRT/Chemo    History of GI bleed    ischmic bowel and AVM.  likely due to vascular dis, smoking. Hypercoag w/u negative. Last admitted 12/20.  Has seen GI recently and colonoscopy has been deferred.      Hoarseness, chronic    Hyperglycemia    Hyperlipidemia    Hyponatremia    Hypotension due to hypovolemia    Intestinal infection due to Clostridium difficile    2019    Irregular bowel habits    Ischemic colitis (HCC)    12/22    Lacunar infarction (HCC)    Muscle weakness    left arm biceps torn    Nausea with vomiting    Neuropathy    Osteoarthritis    Osteoarthrosis, unspecified whether generalized or localized, unspecified site     Pacemaker    Pain in joints    Pain with bowel movements    ISCHEMIC COLITIS    Panic disorder    Parkinson disease (HCC)    Personal history of antineoplastic chemotherapy    Plaque psoriasis    Presence of other cardiac implants and grafts    Psoriatic arthropathy (HCC)    Recurrent major depressive disorder, in remission    Rhinovirus    Sleep disturbance    Status post deep brain stimulator placement    Stented coronary artery    Stress    Uncomfortable fullness after meals    Vaso vagal episode    Weight loss

## 2025-05-30 NOTE — ED PROVIDER NOTES
Patient Seen in: [facility] [place] Emergency Department        History  Chief Complaint   Patient presents with    Dizziness     Stated Complaint:     Subjective:   HPI            This is a 70-year-old female who was brought in by EMS.  By the time I had seen her nurses had already ordered a CT been scheduled to actually get a CT she was actually going to CT as quick exam was done family was in the room.  The patient family said that her symptoms started at about 5 PM sudden the amount of dizziness nausea vomiting Zofran given en route.  The patient complains of also headache, dizziness.  She is unable to really quantify the dizziness.  History of of atrial flutter, ischemic colitis, hemorrhage from GI problems history of blood transfusions bowel obstructions panic disorders osteoarthritis.  She does complain of a headache, dizziness.  Unable to really give me too much information as she was going straight to CT.  The patient denied any fever.  No numbness or weakness she does have Parkinson's.  She is got a deep brain stimulator according to the family.  History of a cardiac pacemaker placed.      Objective:     Past Medical History:    Abdominal pain    left sided intermittent pain    Age-related osteoporosis without current pathological fracture    Anemia    Arthritis    Atrial flutter, paroxysmal (HCC)    Back pain    Back problem    severe spinal stenosis    Benign essential tremor    Bowel obstruction (HCC)    Breast cancer (HCC)    Broken ankle    Left Ankle    Cataract    Cervical dystonia    DBS b/l    Cervical spine degeneration    Change in hair    Closed fracture of left ankle    Colitis    Ischemic Colitis    Constipation    Easy bruising    Endometriosis    Essential hypertension    Exposure to medical diagnostic radiation    Fatigue    ANEMIA.  I GET INFUSIONS    Fracture of distal end of tibia with fibula, left, closed, initial encounter    Last Assessment & Plan:    Formatting of this note might be  different from the original.   Following follow-up, potentially secondary to hypotension/muscle weakness.  X-rays show a left nondisplaced distal fibula fracture.  Exam with soft tissue swelling and bruising.   -Admit inpatient   -telemetry   -prn analgesics   -SCDs   -PT/OT   -Ortho consult      Frequent use of laxatives    Gastrointestinal hemorrhage    High blood pressure    High cholesterol    History of blood transfusion    2018    History of breast cancer    XRT/Chemo    History of GI bleed    ischmic bowel and AVM.  likely due to vascular dis, smoking. Hypercoag w/u negative. Last admitted .  Has seen GI recently and colonoscopy has been deferred.      Hoarseness, chronic    Hyperglycemia    Hyperlipidemia    Hyponatremia    Hypotension due to hypovolemia    Intestinal infection due to Clostridium difficile        Irregular bowel habits    Ischemic colitis (HCC)        Lacunar infarction (HCC)    Muscle weakness    left arm biceps torn    Nausea with vomiting    Neuropathy    Osteoarthritis    Osteoarthrosis, unspecified whether generalized or localized, unspecified site    Pacemaker    Pain in joints    Pain with bowel movements    ISCHEMIC COLITIS    Panic disorder    Parkinson disease (HCC)    Personal history of antineoplastic chemotherapy    Plaque psoriasis    Presence of other cardiac implants and grafts    Psoriatic arthropathy (HCC)    Recurrent major depressive disorder, in remission    Rhinovirus    Sleep disturbance    Status post deep brain stimulator placement    Stented coronary artery    Stress    Uncomfortable fullness after meals    Vaso vagal episode    Weight loss              Past Surgical History:   Procedure Laterality Date    Angioplasty (coronary)      Appendectomy      Brain surgery  2016    Brain stimulator           x3    Cardiac pacemaker placement      Cervical spine surgery  2004    cervical spinal fusion C1-6    Colonoscopy  2014     Procedure: COLONOSCOPY;  Surgeon: Aaron Fair MD;  Location:  ENDOSCOPY    Colonoscopy N/A 2018    Procedure: COLONOSCOPY;  Surgeon: Aaron Fair MD;  Location:  ENDOSCOPY    D & c      Hysterectomy  1992    OVARIES REMOVED AS WELL    Knee replacement surgery Right 2025    Lumbar spine surgery  1999    lumbar laminectomy L1-L4    Lumpectomy right      Oophorectomy      Other      COLON STENTS    Other surgical history      Lt bunionectomy    Other surgical history      stent in [facility]    Pain management Bilateral 06/15/2021    BILATERAL LUMBAR 5 TRANSFORAMINAL LUMBAR EPIDURAL STEROID INJECTION    Radiation right      Sigmoidoscopy,diagnostic      Spine surgery procedure unlisted      Total abdom hysterectomy      Tubal ligation      Upper gi endoscopy,biopsy      ulcer, at [facility]                Social History     Socioeconomic History    Marital status:    Tobacco Use    Smoking status: Former     Current packs/day: 0.00     Average packs/day: 0.5 packs/day for 36.0 years (18.0 ttl pk-yrs)     Types: Cigarettes     Start date: 1982     Quit date: 2018     Years since quittin.4     Passive exposure: Past    Smokeless tobacco: Never    Tobacco comments:     Updated 25   Vaping Use    Vaping status: Never Used   Substance and Sexual Activity    Alcohol use: Yes     Alcohol/week: 14.0 standard drinks of alcohol     Types: 14 Cans of beer per week     Comment: approx 0- 2 beers per day    Drug use: No    Sexual activity: Not Currently   Other Topics Concern    Caffeine Concern No    Exercise No    Seat Belt No    Special Diet Yes     Comment: HOW TO PUT ON WEIGHT    Stress Concern No    Weight Concern Yes     Comment: CAN'T PUT ON WEIGHT     Social Drivers of Health     Food Insecurity: No Food Insecurity (2025)    NCSS - Food Insecurity     Worried About Running Out of Food in the Last Year: No     Ran Out of Food in the Last Year: No   Transportation  Needs: No Transportation Needs (4/22/2025)    NCSS - Transportation     Lack of Transportation: No   Housing Stability: Not At Risk (4/22/2025)    NCSS - Housing/Utilities     Has Housing: Yes     Worried About Losing Housing: No     Unable to Get Utilities: No                                Physical Exam    ED Triage Vitals   BP 05/29/25 1845 (!) 176/109   Pulse 05/29/25 1819 74   Resp 05/29/25 1819 16   Temp 05/29/25 1819 98.4 °F (36.9 °C)   Temp src 05/29/25 1819 Temporal   SpO2 05/29/25 1819 99 %   O2 Device 05/29/25 1819 None (Room air)       Current Vitals:   Vital Signs  BP: 118/82  Pulse: 68  Resp: 12  Temp: 98.4 °F (36.9 °C)  Temp src: Temporal  MAP (mmHg): 93    Oxygen Therapy  SpO2: 96 %  O2 Device: None (Room air)            Physical Exam     General patient looks uncomfortable secondary to headache.  Neck is supple there is no meningismus.: .    The patient is in no respiratory distress. The patient is not septic or toxic    HEENT: Atraumatic, conjunctiva are not pale.  There is no icterus.  Oral mucosa Is wet. The neck is supple. There is no meningismus.  There is no facial asymmetry.    LUNGS: Clear to auscultation, there is no wheezing or retraction.  No crackles.    CV: Cardiovascular is regular without murmurs or rubs.    ABD: The abdomen is soft nondistended nontender.  There is no rebound.  There is no guarding.  Bowel sounds are present.    EXT: There is good pulses bilaterally.  There is no calf tenderness.  There is no rash noted.  There is no edema    NEURO: Alert and oriented x4.      No facial asymmetry      Extraocular muscles are intact.      Muscle strength is 5 out of 5 in both upperand lower extremities    Sensory exam is grossly normal bilaterally upper and lower extremity                    ED Course  Labs Reviewed   COMP METABOLIC PANEL (14) - Abnormal; Notable for the following components:       Result Value    Glucose 115 (*)     Bilirubin, Total <0.2 (*)     All other components  within normal limits   CBC WITH DIFFERENTIAL WITH PLATELET - Abnormal; Notable for the following components:    RBC 3.44 (*)     HGB 11.7 (*)     HCT 34.0 (*)     All other components within normal limits   SED RATE, WESTERGREN (AUTOMATED) - Normal   LIPID PANEL   HEMOGLOBIN A1C   RAINBOW DRAW LAVENDER   RAINBOW DRAW LIGHT GREEN        The patient was placed on monitors, IV was started, blood was drawn.   Workup was done to rule out intracranial bleed, tumor mass  Not presently on some blood thinners from the triage note.       The EKG shows normal sinus rhythm.  There is no acute ST elevations or ischemic findings.  The rest of the EKG including rate rhythm axis and intervals I agree with the EKG report . The rate is 71 normal axis no acute ST elevation..  Normal axis QRS duration is 80    When compared to an old EKG from June 2024 no significant changes        I personally reviewed the radiographs and my individual interpretation shows        Also reviewed official report and it shows       MDM     I did go back and reexamined this patient about 8 PM.  The patient's dizziness is better she has no headache I reexamined her muscle strength is 5 out of 5 sensory exam is normal she has no facial asymmetry no pronator drift normal finger-nose.  Manage score 0.  Plain CT of the brain was negative.  Will attempt to do a stroke at this present time not a TNK candidate.  Possible vertigo.  MRI but may not be able to do it I do not see an acute findings of stroke.    Admission disposition: 5/29/2025 11:32 PM         Platelet count was 224.    I personally reviewed the radiographs and my individual interpretation shows    Metallic objects in the brain, atrophy of the brain    Also reviewed official report and it shows  CTA BRAIN + CTA CAROTIDS (CPT=70496/55802)  Result Date: 5/29/2025  PROCEDURE:  CTA BRAIN + CTA CAROTIDS (CPT=70496/34933)  COMPARISON:  [facility] , CT, CTA BRAIN+CTA NECK+CT BRAIN PERFUSION  (CPT=70496/07683/0042T), 6/19/2018, 1:35 PM.  INDICATIONS:  dizziness ha  TECHNIQUE:  CT angiography of the head and neck were obtained with non-ionic contrast.  3D volume rendering images were obtained by the technologist as well as the radiologist on an independent workstation.  Multiplanar 3D reformatted imaging including multiplanar MIP images were obtained.  Curved planar reformats were performed through the carotid and vertebral arteries. All measurements obtained in this exam were performed using NASCET criteria.  Dose reduction techniques were used. Dose information is transmitted to the ACR (American College of Radiology) NRDR (National Radiology Data Registry) which includes the Dose Index Registry.  PATIENT STATED HISTORY:(As transcribed by Technologist)  Sudden onset dizziness, nausea, vomiting tonight.   CONTRAST USED:  75cc of Isovue 370  FINDINGS:  VASCULATURE:  There is a 2.5 x 2.2 x 2.0 mm aneurysm arising from the lateral wall of the cavernous portion of the left internal carotid artery.  There is moderate calcific plaque involving the cavernous and supraclinoid internal carotid arteries bilaterally.  No evidence of large vessel occlusion or flow limiting intracranial stenosis.   LEFT INTERNAL CAROTID:  There is calcific plaque involving the origin and proximal left internal carotid artery with approximately 30% stenosis.  No hemodynamically significant stenosis or dissection. COMMON CAROTID:  Mild calcific plaque at the carotid bulb and distal common carotid artery.  No hemodynamically significant stenosis or dissection VERTEBRAL:  There is mild narrowing of the V4 segment of the left vertebral artery.  Mild to moderate calcific plaque involving the left vertebral artery.  No hemodynamically significant stenosis or dissection  RIGHT INTERNAL CAROTID:   There is calcific plaque involving the proximal right internal carotid artery with approximately 60% stenosis. COMMON CAROTID:   Mild calcific  plaque at the carotid bulb.  No evidence of dissection VERTEBRAL:   No hemodynamically significant stenosis or dissection  OTHER:  Postsurgical changes of wide laminectomies of the cervical spine with anterior and posterior fusion.  There is stenosis involving the origin of the left subclavian artery.             CONCLUSION:  2.5 x 2.0 x 2.2 mm aneurysm arising from the anterior lateral wall of the cavernous segment of the left internal carotid artery.  There is approximately 60% stenosis involving the origin and proximal right internal carotid artery with atheromatous and calcific plaque.  LOCATION:  [facility]   Dictated by (CST): Adrianna Sands MD on 5/29/2025 at 11:03 PM     Finalized by (CST): Adrianna Sands MD on 5/29/2025 at 11:12 PM       CT BRAIN OR HEAD (CPT=70450)  Result Date: 5/29/2025  PROCEDURE:  CT BRAIN OR HEAD (45596)  COMPARISON:  [facility] , CT, CT BRAIN OR HEAD (91981), 9/13/2023, 9:55 AM.  INDICATIONS:  DIZZINESS  TECHNIQUE:  Noncontrast CT scanning is performed through the brain. Dose reduction techniques were used. Dose information is transmitted to the ACR (American College of Radiology) NRDR (National Radiology Data Registry) which includes the Dose Index Registry.  PATIENT STATED HISTORY: (As transcribed by Technologist)  Patient with nausea and vomiting.    FINDINGS:  VENTRICLES/SULCI:  Stable mild cerebral and cerebellar atrophy. INTRACRANIAL:  No acute intracranial hemorrhage.  No midline shift.  Bilateral intra cranial neuro stimulators appears stable with tips in the region of the thalami. SINUSES:           No sign of acute sinusitis.  MASTOIDS:          No sign of acute inflammation. SKULL:             Post surgical changes bilateral frontal maurisio holes.             CONCLUSION:  Stable noncontrast head CT.  Bilateral neurostimulators.  No CT evidence for acute intracranial process.    LOCATION:  [facility]   Dictated by (CST): Adrianna Sands MD on 5/29/2025 at 7:52 PM     Finalized by (CST):  Adrianna Sands MD on 5/29/2025 at 7:54 PM           The patient has bilateral neurostimulator's and will need to know if we can get the MRI the patient's family is going to bring back the information.  The patient did get a plain CT did not show any acute bleed and CT CTA was done.  I did go back and reexamined her the patient's alert her neck is supple her cranial nerves are grossly intact she has no pronator drift she has normal finger-nose.  The patient's NIH score is 0.  But when he tried to walk her she could not she was very unsteady on her feet you felt like her weak legs were weak and she could not walk well.  There was nothing focal by the stroke was considered although it was difficult to determine seem to be more positional vertigo.  The patient case was discussed with the neurologist Dr. Peoples..  Tenderness score 0.  But because of possibly of his vertigo and the fact that she could not walk well and the patient will need to be admitted.  I went back and reexamined she has no headache I also discussed this case with Dr. David who will see her on consultation but did not feel that he needed anything acute at this present time.  She was given Ativan here.  She refused meclizine and is she says it does give her some side effects.  The patient will be admitted for further evaluation and treatment.    Did discuss this case with the hospitalist from [facility].      I did go back and reexamined her on repeat exam prior to her being admitted her NIH score was 0.  She does have some mild tremors from Parkinson's but no other acute pathology noted.    Medical Decision Making      Disposition and Plan     Clinical Impression:  1. Dizziness    2. Inability to bear weight    #3 dizziness inability ambulate    Disposition:  Admit  5/29/2025 11:32 pm    Follow-up:  No follow-up provider specified.        Medications Prescribed:  Current Discharge Medication List                Supplementary Documentation:          Hospital Problems       Present on Admission  Date Reviewed: 5/7/2025          ICD-10-CM Noted POA    * (Principal) Dizziness R42 5/29/2025 Unknown    Anemia D64.9 5/29/2025 Yes    Hyperglycemia R73.9 5/29/2025 Yes    Inability to bear weight R26.89 5/30/2025 Unknown

## 2025-05-30 NOTE — PROGRESS NOTES
[facility]   part of [facility]     Hospitalist Progress Note     Tamara Meraz Patient Status:  Inpatient    1/15/1955 MRN [id number]   Location [facility] 7NE-A Attending Ekaterina Macdonald, DO   Hosp Day # 0 PCP Lilli Kramer MD     Chief Complaint: dizziness    Subjective:     Patient reports she is no longer dizzy, denies a headache, speech slow which she reports is due to her parkinson's and at her baseline    Objective:    Review of Systems:   A comprehensive review of systems was completed; pertinent positive and negatives stated in subjective.    Vital signs:  Temp:  [97.7 °F (36.5 °C)-98.4 °F (36.9 °C)] 97.7 °F (36.5 °C)  Pulse:  [68-79] 69  Resp:  [12-19] 15  BP: (113-176)/() 129/82  SpO2:  [96 %-100 %] 96 %    Physical Exam:    General: No acute distress  Respiratory: No wheezes, no rhonchi  Cardiovascular: S1, S2, regular rate and rhythm  Abdomen: Soft, Non-tender, non-distended, positive bowel sounds  Neuro: No new focal deficits.   Extremities: No edema      Diagnostic Data:    Labs:  Recent Labs   Lab 25  1825   WBC 4.4   HGB 11.7*   MCV 98.8   .0       Recent Labs   Lab 25  1825   *   BUN 17   CREATSERUM 0.95   CA 8.8   ALB 4.4      K 4.3      CO2 24.0   ALKPHO 57   AST 27   ALT 18   BILT <0.2*   TP 7.2       Estimated Glomerular Filtration Rate: 64 mL/min/1.73m2 (result from lab).    No results for input(s): \"TROP\", \"TROPHS\", \"CK\" in the last 168 hours.    No results for input(s): \"PTP\", \"INR\" in the last 168 hours.         Microbiology    No results found for this visit on 25.      Imaging: Reviewed in Epic.    Medications: Scheduled Medications[1]    Assessment & Plan:      #HTN urgency  #vertigo  #cerebral aneurysm  #carotid stenosis  -BP as high as 176/109  -EKG showing NSR @71 bpm, t wave inversions aVR/V1  -CT head without acute process  -CTA head and neck showing 2.5 x.0 x2.2 mm aneurysm from cavernous segment left  internal carotid artery, 60& stenosis proximal R internal carotid artery  -r/o cerebellar CVA/central vertigo  -CVA protocol  -monitor on tele  -permissive HTN <220/120 x24 hrs: hydralazine, labetalol prn  -PT/OT  -NPO pending bedside dysphagia  -meclizine prn  -neurology c/s in ED  -neurointerventionalist c/s in ED  -MRI pending     #Normocytic anemia  -hgb 11.7: baseline 11.4  -no signs active bleeding  -cont home B12, iron supplement     #HLD  -cont home statin     #chronic pain  -cont home gabapentine, norco     #mood disorder  -cont home paroxetine     #A-flutter  -cont home sotalol     #parkinson ds  -cont home primidone         Tonia Mariano MD    Supplementary Documentation:     Quality:  DVT Mechanical Prophylaxis:   SCDs,    DVT Pharmacologic Prophylaxis   Medication    enoxaparin (Lovenox) 30 MG/0.3ML SUBQ injection 30 mg    DVT Pharmacologic prophylaxis: Aspirin 325 mg           Code Status: Full Code  Alfaro: No urinary catheter in place  Alfaro Duration (in days):   Central line:    RICKEY:     Discharge is dependent on: progress  At this point Ms. Meraz is expected to be discharge to: TBD    The 21st Century Cures Act makes medical notes like these available to patients in the interest of transparency. Please be advised this is a medical document. Medical documents are intended to carry relevant information, facts as evident, and the clinical opinion of the practitioner. The medical note is intended as peer to peer communication and may appear blunt or direct. It is written in medical language and may contain abbreviations or verbiage that are unfamiliar.              **Certification      PHYSICIAN Certification of Need for Inpatient Hospitalization - Initial Certification    Patient will require inpatient services that will reasonably be expected to span two midnight's based on the clinical documentation in H+P.   Based on patients current state of illness, I anticipate that, after discharge, patient  will require TBD.           [1]    aspirin  300 mg Rectal Daily    Or    aspirin  325 mg Oral Daily    atorvastatin  40 mg Oral Nightly    enoxaparin  30 mg Subcutaneous Daily    cyanocobalamin  1,000 mcg Oral Daily    [START ON 5/31/2025] ferrous sulfate  325 mg Oral QOD    gabapentin  600 mg Oral BID    PARoxetine  20 mg Oral QAM    primidone  100 mg Oral Daily    primidone  150 mg Oral Nightly    sotalol  80 mg Oral 2x Daily(Beta Blocker)

## 2025-05-30 NOTE — CONSULTS
[facility]  Interventional Neuroradiology  Consultation Note    Tamara Meraz Patient Status:  Inpatient    1/15/1955 MRN [id number]   Location [facility] 7NE-A Attending Ekaterina Macdonald, DO   Hosp Day # 0 PCP Lilli Kramer MD     REASON FOR CONSULTATION: incidental, small extra-cranial aneurysm & mild ANTONELLA plaque     - No acute neuro intervention warranted   - Appropriate for outpatient consultation; apt placed on AVS     Dr. David aware of the above.     Will sign off. Please call with any further questions/concerns.        NINA Cook  [facility]  2025, 11:48 AM  [facility] [id number]

## 2025-05-30 NOTE — H&P
[facility]     Tamara Meraz Patient Status:  Emergency    1/15/1955 MRN [id number]   Location [facility] EMERGENCY DEPARTMENT Attending Rome Warren MD   Hosp Day # 0 PCP Lilli Kramer MD     Chief Complaint: dizziness    Subjective:    History of Present Illness:     Tamara Meraz is a 70 year old female with PMHx A-flutter/ HTN/ HLD/ CVA/ Parkinson ds/ CAD (s/p stent) who presented to the hospital for dizziness. She was feeling well until around 4 PM when she suddenly felt dizzy.l The room  was spinning and she as nauseous. She had to close her eyes and was unable to ambulate due to the dizziness. She denied any numbness, weakness, speech changes. Her symptoms lasted about 6 hours before resolving. She denied any prior episodes of vertigo.    History/Other:    Past Medical History:  Past Medical History[1]  Past Surgical History:   Past Surgical History[2]   Family History:   Family History[3]  Social History:    reports that she quit smoking about 6 years ago. Her smoking use included cigarettes. She started smoking about 42 years ago. She has a 18 pack-year smoking history. She has been exposed to tobacco smoke. She has never used smokeless tobacco. She reports current alcohol use of about 14.0 standard drinks of alcohol per week. She reports that she does not use drugs.     Allergies: Allergies[4]    Medications:  Medications Ordered Prior to Encounter[5]    Review of Systems:   A comprehensive review of systems was completed.    Pertinent positives and negatives noted in the HPI.    Objective:   Physical Exam:    /74   Pulse 76   Temp 98.4 °F (36.9 °C) (Temporal)   Resp 15   Wt 98 lb (44.5 kg)   SpO2 100%   BMI 18.52 kg/m²   General: No acute distress, Alert  Respiratory: No rhonchi, no wheezes  Cardiovascular: S1, S2. Regular rate and rhythm  Abdomen: Soft, Non-tender, non-distended, positive bowel sounds  Neuro: muscle strength 5/5 bl  upper and lower extremities, sensation to light touch equal bl, tongue protrudes midline, EOM intact bl, no dysarthria or aphasia, intact finger to nose bl  Extremities: No edema      Results:    Labs:      Labs Last 24 Hours:    Recent Labs   Lab 05/29/25  1825   RBC 3.44*   HGB 11.7*   HCT 34.0*   MCV 98.8   MCH 34.0   MCHC 34.4   RDW 12.3   NEPRELIM 1.59   WBC 4.4   .0       Recent Labs   Lab 05/29/25  1825   *   BUN 17   CREATSERUM 0.95   EGFRCR 64   CA 8.8   ALB 4.4      K 4.3      CO2 24.0   ALKPHO 57   AST 27   ALT 18   BILT <0.2*   TP 7.2       Estimated Glomerular Filtration Rate: 64 mL/min/1.73m2 (result from lab).    Lab Results   Component Value Date    PT 15.2 (H) 01/29/2014    PT 13.1 09/07/2013    INR 0.98 04/04/2025    INR 1.03 12/27/2022    INR 0.97 08/21/2022       No results for input(s): \"TROP\", \"TROPHS\", \"CK\" in the last 168 hours.    No results for input(s): \"TROP\", \"PBNP\" in the last 168 hours.    No results for input(s): \"PCT\" in the last 168 hours.    Imaging: Imaging data reviewed in Epic.    Assessment & Plan:      #HTN urgency  #vertigo  #cerebral aneurysm  #carotid stenosis  -BP as high as 176/109  -EKG showing NSR @71 bpm, t wave inversions aVR/V1  -CT head without acute process  -CTA head and neck showing 2.5 x.0 x2.2 mm aneurysm from cavernous segment left internal carotid artery, 60& stenosis proximal R internal carotid artery  -r/o cerebellar CVA/central vertigo  -CVA protocol  -monitor on tele  -permissive HTN <220/120 x24 hrs: hydralazine, labetalol prn  -PT/OT  -NPO pending bedside dysphagia  -meclizine prn  -neurology c/s in ED  -neurointerventionalist c/s in ED    #normocytic anemia  -hgb 11.7: baseline 11.4  -no signs active bleeding  -cont home B12, iron supplement    #HLD  -cont home statin    #chronic pain  -cont home gabapentine, norco    #mood disorder  -cont home paroxetine    #A-flutter  -cont home sotalol    #parkinson ds  -cont home  primidone        Plan of care discussed with ED physician    Ekaterina Macdonald DO    Supplementary Documentation:     The 21st Century Cures Act makes medical notes like these available to patients in the interest of transparency. Please be advised this is a medical document. Medical documents are intended to carry relevant information, facts as evident, and the clinical opinion of the practitioner. The medical note is intended as peer to peer communication and may appear blunt or direct. It is written in medical language and may contain abbreviations or verbiage that are unfamiliar.                                       [1]   Past Medical History:   Abdominal pain    left sided intermittent pain    Age-related osteoporosis without current pathological fracture    Anemia    Anxiety    Arthritis    Atrial flutter, paroxysmal (HCC)    Back pain    Back problem    severe spinal stenosis    Benign essential tremor    Bowel obstruction (HCC)    Breast cancer (HCC)    Broken ankle    Left Ankle    Cataract    Cervical dystonia    DBS b/l    Cervical spine degeneration    Change in hair    Closed fracture of left ankle    Colitis    Ischemic Colitis    Constipation    Depression    Easy bruising    Endometriosis    Essential hypertension    Exposure to medical diagnostic radiation    Fatigue    ANEMIA.  I GET INFUSIONS    Fracture of distal end of tibia with fibula, left, closed, initial encounter    Last Assessment & Plan:    Formatting of this note might be different from the original.   Following follow-up, potentially secondary to hypotension/muscle weakness.  X-rays show a left nondisplaced distal fibula fracture.  Exam with soft tissue swelling and bruising.   -Admit inpatient   -telemetry   -prn analgesics   -SCDs   -PT/OT   -Ortho consult      Frequent use of laxatives    Gastrointestinal hemorrhage    High blood pressure    High cholesterol    History of blood transfusion    Jan 2018    History of breast cancer     XRT/Chemo    History of GI bleed    ischmic bowel and AVM.  likely due to vascular dis, smoking. Hypercoag w/u negative. Last admitted .  Has seen GI recently and colonoscopy has been deferred.      Hoarseness, chronic    Hyperglycemia    Hyperlipidemia    Hyponatremia    Hypotension due to hypovolemia    Intestinal infection due to Clostridium difficile        Irregular bowel habits    Ischemic colitis (HCC)        Lacunar infarction (HCC)    Muscle weakness    left arm biceps torn    Nausea with vomiting    Neuropathy    Osteoarthritis    Osteoarthrosis, unspecified whether generalized or localized, unspecified site    Pacemaker    Pain in joints    Pain with bowel movements    ISCHEMIC COLITIS    Panic disorder    Parkinson disease (HCC)    Personal history of antineoplastic chemotherapy    Plaque psoriasis    Presence of other cardiac implants and grafts    Problems with swallowing    Psoriatic arthropathy (HCC)    Recurrent major depressive disorder, in remission    Rhinovirus    Sleep disturbance    Status post deep brain stimulator placement    Stented coronary artery    Stress    Uncomfortable fullness after meals    Vaso vagal episode    Weight loss   [2]   Past Surgical History:  Procedure Laterality Date    Angioplasty (coronary)      Appendectomy      Brain surgery  2016    Brain stimulator           x3    Cardiac pacemaker placement      Cervical spine surgery  2004    cervical spinal fusion C1-6    Colonoscopy  2014    Procedure: COLONOSCOPY;  Surgeon: Aaron Fair MD;  Location:  ENDOSCOPY    Colonoscopy N/A 2018    Procedure: COLONOSCOPY;  Surgeon: Aaron Fair MD;  Location:  ENDOSCOPY    D & c      Hysterectomy  1992    OVARIES REMOVED AS WELL    Knee replacement surgery Right 2025    Lumbar spine surgery  1999    lumbar laminectomy L1-L4    Lumpectomy right      Oophorectomy      Other      COLON STENTS    Other surgical history       Lt bunionectomy    Other surgical history      stent in [facility]MA    Pain management Bilateral 06/15/2021    BILATERAL LUMBAR 5 TRANSFORAMINAL LUMBAR EPIDURAL STEROID INJECTION    Radiation right  2002    Sigmoidoscopy,diagnostic      Spine surgery procedure unlisted      Total abdom hysterectomy      Tubal ligation      Upper gi endoscopy,biopsy      ulcer, at [facility]   [3]   Family History  Problem Relation Age of Onset    Hypertension Father     Musculo-skelatal Disorder Father         TREMOR    Heart Disorder Mother     Cancer Mother         pharyngeal    Breast Cancer Mother         MYSELF     Breast Cancer Self     Lung Disorder Sister         COPD    Heart Attack Brother 72    Ulcerative Colitis Son    [4]   Allergies  Allergen Reactions    Sulfa Antibiotics RASH and OTHER (SEE COMMENTS)     headache   [5]   Current Facility-Administered Medications on File Prior to Encounter   Medication Dose Route Frequency Provider Last Rate Last Admin    [COMPLETED] sodium chloride 0.9 % IV bolus 500 mL  500 mL Intravenous Once Kunal Pa  mL/hr at 25 0130 500 mL at 25 0130    [COMPLETED] ceFAZolin (Ancef) 2g in 10mL IV syringe premix  2 g Intravenous Once Janes Cortez MD   2 g at 25 1607    [COMPLETED] dexAMETHasone PF (Decadron) 10 MG/ML injection 8 mg  8 mg Intravenous Once Janes Cortez MD   8 mg at 25 0919    [] diphenhydrAMINE (Benadryl) 50 mg/mL  injection 25 mg  25 mg Intravenous Once PRN Janes Cortez MD        [COMPLETED] ceFAZolin (Ancef) 2g in 10mL IV syringe premix  2 g Intravenous Q8H Janes Cortez  mL/hr at 25 0918 2 g at 25 0918    [COMPLETED] acetaminophen (Tylenol) tab 650 mg  650 mg Oral Once Bunny Noble MD   650 mg at 25 1823    [COMPLETED] ondansetron (Zofran) 4 MG/2ML injection              Current Outpatient Medications on File Prior to Encounter   Medication Sig Dispense Refill    HYDROcodone-acetaminophen  5-325 MG Oral Tab Take 1-2 tablets by mouth every 4 (four) hours as needed for Pain. 60 tablet 0    [] traMADol 50 MG Oral Tab Take 1 tablet (50 mg total) by mouth every 8 (eight) hours for 15 days. 45 tablet 0    senna-docusate 8.6-50 MG Oral Tab Take 1 tablet by mouth daily as needed. 30 tablet 0    aspirin 81 MG Oral Tab EC Take 1 tablet (81 mg total) by mouth in the morning and 1 tablet (81 mg total) before bedtime. 80 tablet 0    PAROXETINE 20 MG Oral Tab Take 1 tablet (20 mg total) by mouth every morning. 90 tablet 0    FIBER OR Take 1,250 mg by mouth.      ATORVASTATIN 20 MG Oral Tab Take 1 tablet (20 mg total) by mouth nightly. 90 tablet 0    sotalol 80 MG Oral Tab Take 1 tablet (80 mg total) by mouth in the morning and 1 tablet (80 mg total) before bedtime.      Ferrous Sulfate (IRON) 325 (65 Fe) MG Oral Tab Take by mouth every other day.      Probiotic Product (PROBIOTIC-10 OR) Take by mouth in the morning and before bedtime.      Calcium Magnesium Zinc 333-133-5 MG Oral Tab Take 1 tablet by mouth daily.      primidone 50 MG Oral Tab Take 100 mg by mouth every morning and take 150 mg by mouth every evening.      gabapentin 300 MG Oral Cap Take by mouth. Take 600mg in the morning and 600 mg at night      Cyanocobalamin (B-12) 1000 MCG Oral Tab Take 1,000 mcg by mouth daily.

## 2025-05-30 NOTE — PROGRESS NOTES
Assumed patient care 0730  Patient alert and oriented X4  On room air, VSS, NSR on tele  Patient denies of any pain  Up with standby assist, Voiding,   Tolerating diet  Patient and  at bedside updated on plan of care   Pt cleared by consults for discharge        NURSING DISCHARGE NOTE  All discharge documentation including AVS, follow ups, and medications reviewed with patient and , and verbalized understanding. Signs and symptoms when to call 911 discussed with patient and son at bedside, verbalized understanding.   Discharged Home via Wheelchair.  Accompanied by Family member  Belongings taken by patient/family.

## 2025-05-30 NOTE — PHYSICAL THERAPY NOTE
PHYSICAL THERAPY EVALUATION - INPATIENT     Room Number: 7615/7615-A  Evaluation Date: 5/30/2025  Type of Evaluation: Initial  Physician Order: PT Eval and Treat    Presenting Problem: dizziness  Co-Morbidities : A-flutter/ HTN/ HLD/ CVA/ Parkinson ds/ CAD (s/p stent)  Reason for Therapy: Mobility Dysfunction and Discharge Planning    PHYSICAL THERAPY ASSESSMENT   Patient is a 70 year old female admitted 5/29/2025 for dizziness.  Prior to admission, patient's baseline is indep.  Patient is currently functioning at baseline with bed mobility, transfers, gait, and stair negotiation.  Patient is requiring supervision as a result of the following impairments: medical status.    Patient will benefit from continued skilled PT Services at discharge to promote prior level of function.  Anticipate patient will return home with OP PT. (Cont for R TKA)    Patient has met all skilled IPPT goals at this time. Patient will be discharged from Physical Therapy services.  Please re-order if a new functional limitation presents during this admission.      PLAN DURING HOSPITALIZATION  Nursing Mobility Recommendation : 1 Assist  PT Device Recommendation: None                  PHYSICAL THERAPY MEDICAL/SOCIAL HISTORY  History related to current admission: Patient is a 70 year old female admitted on 5/29/2025: Presented with dizziness lasting 6 hours before improving, dx HTN urgency. Recent R TKA 4/22/25 working with OPPT - states she has 5 visits left.     HOME SITUATION  Type of Home: House  Home Layout: Two level  Stairs to Enter : 3        Stairs to Bedroom: 14    Railing: Yes    Lives With: Spouse    Drives: Yes   Patient Regularly Uses: Reading glasses      Prior Level of [place]: denies falls recently, reports has been working with OPPT on her knee, progressing well and indep with ambulation and ADLs    SUBJECTIVE  \" I feel much better\"     OBJECTIVE  Precautions: Bed/chair alarm  Fall Risk: Standard fall risk    WEIGHT  BEARING RESTRICTION     PAIN ASSESSMENT  Ratin          COGNITION  Overall Cognitive Status:  WFL - within functional limits    RANGE OF MOTION AND STRENGTH ASSESSMENT  Upper extremity ROM and strength are within functional limits     Lower extremity ROM is within functional limits     Lower extremity strength is within functional limits     BALANCE  Static Sitting: Good  Dynamic Sitting: Good  Static Standing: Good  Dynamic Standing: Good    ADDITIONAL TESTS  Additional Tests: Modified Gianluca              Modified Delta: 0                  ACTIVITY TOLERANCE                         O2 WALK       NEUROLOGICAL FINDINGS                        AM-PAC '6-Clicks' INPATIENT SHORT FORM - BASIC MOBILITY  How much difficulty does the patient currently have...  Patient Difficulty: Turning over in bed (including adjusting bedclothes, sheets and blankets)?: None   Patient Difficulty: Sitting down on and standing up from a chair with arms (e.g., wheelchair, bedside commode, etc.): None   Patient Difficulty: Moving from lying on back to sitting on the side of the bed?: None   How much help from another person does the patient currently need...   Help from Another: Moving to and from a bed to a chair (including a wheelchair)?: None   Help from Another: Need to walk in hospital room?: None   Help from Another: Climbing 3-5 steps with a railing?: None     AM-PAC Score:  Raw Score: 24   Approx Degree of Impairment: 0%   Standardized Score (AM-PAC Scale): 61.14   CMS Modifier (G-Code): CH    FUNCTIONAL ABILITY STATUS  Gait Assessment   Functional Mobility/Gait Assessment  Gait Assistance: Supervision  Distance (ft): 200  Assistive Device: None  Pattern: Within Functional Limits  Stairs: Stairs  How Many Stairs: 4  Device: 1 Rail  Assist: Supervision  Pattern: Ascend and Descend  Ascend and Descend : Reciprocal    Skilled Therapy Provided     Bed Mobility:  Rolling: indep  Supine to sit: indep   Sit to supine: NT     Transfer  Mobility:  Sit to stand: indep   Stand to sit: indep  Gait = SBA    Therapist's Comments: Discussed case with RN prior to session initiation. Pt agreeable to participation in therapy. Gait belt donned for out of bed mobility. Able to perform slow horizontal and vertical head turns while ambulation w/o device. Participated in stair training. Encouraged cont HEP for R TKA - performed STS x5 with R bias for knee flexion.       Exercise/Education Provided:  Functional activity tolerated    Patient End of Session: Up in chair, Call light within reach, Needs met, RN aware of session/findings, All patient questions and concerns addressed, Hospital anti-slip socks, Alarm set      Patient Evaluation Complexity Level:  History Low - no personal factors and/or co-morbidities   Examination of body systems Low -  addressing 1-2 elements   Clinical Presentation Low- Stable   Clinical Decision Making Low Complexity       PT Session Time: 17 minutes  Gait Trainin minutes  Therapeutic Activity:  minutes  Neuromuscular Re-education:  minutes  Therapeutic Exercise:  minutes

## 2025-05-30 NOTE — ED QUICK NOTES
Orders for admission, patient is aware of plan and ready to go upstairs. Any questions, please call ED RN JUAN A at extension [phone].     Patient Covid vaccination status: Fully vaccinated     COVID Test Ordered in ED: None    COVID Suspicion at Admission: N/A    Running Infusions: Medication Infusions[1] None    Mental Status/LOC at time of transport: A/O 4    Other pertinent information:   CIWA score: N/A   NIH score:  N/A             [1]    sodium chloride Stopped (05/29/25 7241)

## 2025-05-30 NOTE — DIETARY NOTE
[facility]   part of [facility]   CLINICAL NUTRITION    Tamara Meraz     Admitting diagnosis:  Dizziness [R42]  Inability to bear weight [R26.89]    Ht:  5'2\"  Wt: 45.4 kg (100 lb).   Body mass index is 18.89 kg/m².  IBW: 50kg    Wt Readings from Last 6 Encounters:   05/30/25 45.4 kg (100 lb)   05/30/25 45.4 kg (100 lb)   04/22/25 43.9 kg (96 lb 12.5 oz)   04/18/25 44.2 kg (97 lb 6.4 oz)   03/16/25 44.5 kg (98 lb)   01/24/25 43.1 kg (95 lb)        Labs/Meds reviewed    Diet:       Procedures    Cardiac diet Cardiac; Is Patient on Accuchecks? Yes     Percent Meals Eaten (last 3 days)       Date/Time Percent Meals Eaten (%)    05/30/25 1030 100 %              Pt chart reviewed d/t Low BMI.  Patient reports good appetite at this time.  Nursing notes reports Percent Meals Eaten (%): 100 % intake for last meal.  Tolerating po diet without diarrhea, emesis, or constipation.   No significant weight changes noted.     PMH includes Ischemic Colitis, SBO, HTN, Breast CA, HLD. Pt screened for BMI 18.3. Pt tolerating diet, 100% of breakfast this AM (veggie omelet).  No n/v/d reported. Wt stable per EMR, no wt loss. RD will continue to monitor PO intake and support as appropriate    Patient is at low nutrition risk at this time.    Please consult if patient status changes or nutrition issues arise.    Loyda Vargas RD, LDN, [facility]  Clinical Dietitian  Phone [phone]

## 2025-05-30 NOTE — CONSULTS
[facility]  [facility] Neurology Consult Note    Tamara Meraz Patient Status:  Inpatient    1/15/1955 MRN [id number]   Location [facility] 7NE-A Attending Ekaterina Macdonald, DO   Hosp Day # 0 PCP Lilli Kramer MD     REASON FOR EVALUATION:  Vertigo    HISTORY OF PRESENT ILLNESS:  Ms. Meraz is a 70-year-old woman with essential tremor and parkinsonism with a deep brain stimulator in place, chronic atrial fibrillation with recent left atrial appendage exclusion (amulet device) who was hospitalized for the sudden onset of vertigo yesterday.  She is accompanied by her , who was with her at the time of the event.  She was sitting and watching television when she had the sudden onset of room spinning vertigo.  She was not lightheaded.  She did not have double vision but she felt compelled to keep her eyes closed because it was very nauseating to keep them open she did not have any numbness or weakness at the time of the vertigo.  She went to lie down but that seemed to make things worse.  The vertigo persisted for several hours until she received medications in the emergency department which relieved the symptoms.  She developed a searing pain on the right side of her head early in her stay yesterday that was not pulsatile and did not make her sensitive to light or sound.  She has not had head pain like that before.  She also does not report having migraines in the past.  She has had vertigo in the past but this was in the setting of positional changes and this felt different from those previous episodes.  She currently feels she is back to her baseline.    PAST MEDICAL HISTORY:  Past Medical History:    Abdominal pain    left sided intermittent pain    Age-related osteoporosis without current pathological fracture    Anemia    Arthritis    Atrial flutter, paroxysmal (HCC)    Back pain    Back problem    severe spinal stenosis    Benign essential tremor    Bowel obstruction (HCC)    Breast cancer  (HCC)    Broken ankle    Left Ankle    Cataract    Cervical dystonia    DBS b/l    Cervical spine degeneration    Change in hair    Closed fracture of left ankle    Colitis    Ischemic Colitis    Constipation    Easy bruising    Endometriosis    Essential hypertension    Exposure to medical diagnostic radiation    Fatigue    ANEMIA.  I GET INFUSIONS    Fracture of distal end of tibia with fibula, left, closed, initial encounter    Last Assessment & Plan:    Formatting of this note might be different from the original.   Following follow-up, potentially secondary to hypotension/muscle weakness.  X-rays show a left nondisplaced distal fibula fracture.  Exam with soft tissue swelling and bruising.   -Admit inpatient   -telemetry   -prn analgesics   -SCDs   -PT/OT   -Ortho consult      Frequent use of laxatives    Gastrointestinal hemorrhage    High blood pressure    High cholesterol    History of blood transfusion    Jan 2018    History of breast cancer    XRT/Chemo    History of GI bleed    ischmic bowel and AVM.  likely due to vascular dis, smoking. Hypercoag w/u negative. Last admitted 12/20.  Has seen GI recently and colonoscopy has been deferred.      Hoarseness, chronic    Hyperglycemia    Hyperlipidemia    Hyponatremia    Hypotension due to hypovolemia    Intestinal infection due to Clostridium difficile    2019    Irregular bowel habits    Ischemic colitis (HCC)    12/22    Lacunar infarction (HCC)    Muscle weakness    left arm biceps torn    Nausea with vomiting    Neuropathy    Osteoarthritis    Osteoarthrosis, unspecified whether generalized or localized, unspecified site    Pacemaker    Pain in joints    Pain with bowel movements    ISCHEMIC COLITIS    Panic disorder    Parkinson disease (HCC)    Personal history of antineoplastic chemotherapy    Plaque psoriasis    Presence of other cardiac implants and grafts    Psoriatic arthropathy (HCC)    Recurrent major depressive disorder, in remission     Rhinovirus    Sleep disturbance    Status post deep brain stimulator placement    Stented coronary artery    Stress    Uncomfortable fullness after meals    Vaso vagal episode    Weight loss       PAST SURGICAL HISTORY:  Past Surgical History:   Procedure Laterality Date    Angioplasty (coronary)      Appendectomy      Brain surgery  2016    Brain stimulator           x3    Cardiac pacemaker placement      Cervical spine surgery  2004    cervical spinal fusion C1-6    Colonoscopy  2014    Procedure: COLONOSCOPY;  Surgeon: Aaron Fair MD;  Location:  ENDOSCOPY    Colonoscopy N/A 2018    Procedure: COLONOSCOPY;  Surgeon: Aaron Fair MD;  Location:  ENDOSCOPY    D & c      Hysterectomy  1992    OVARIES REMOVED AS WELL    Knee replacement surgery Right 2025    Lumbar spine surgery  1999    lumbar laminectomy L1-L4    Lumpectomy right      Oophorectomy      Other      COLON STENTS    Other surgical history      Lt bunionectomy    Other surgical history      stent in [facility]    Pain management Bilateral 06/15/2021    BILATERAL LUMBAR 5 TRANSFORAMINAL LUMBAR EPIDURAL STEROID INJECTION    Radiation right      Sigmoidoscopy,diagnostic      Spine surgery procedure unlisted      Total abdom hysterectomy      Tubal ligation      Upper gi endoscopy,biopsy      ulcer, at [facility]       FAMILY HISTORY:  family history includes Breast Cancer in her mother and self; Cancer in her mother; Heart Attack (age of onset: 72) in her brother; Heart Disorder in her mother; Hypertension in her father; Lung Disorder in her sister; Musculo-skelatal Disorder in her father; Ulcerative Colitis in her son.    SOCIAL HISTORY:   reports that she quit smoking about 6 years ago. Her smoking use included cigarettes. She started smoking about 42 years ago. She has a 18 pack-year smoking history. She has been exposed to tobacco smoke. She has never used smokeless tobacco. She reports current alcohol  use of about 14.0 standard drinks of alcohol per week. She reports that she does not use drugs.    ALLERGIES:  Allergies   Allergen Reactions    Sulfa Antibiotics RASH and OTHER (SEE COMMENTS)     headache       MEDICATIONS:  Prior to Admission Medications   Medication Sig    [START ON 5/31/2025] aspirin 81 MG Oral Chew Tab Chew 1 tablet (81 mg total) by mouth daily.    atorvastatin 40 MG Oral Tab Take 1 tablet (40 mg total) by mouth nightly.    [START ON 5/31/2025] clopidogrel 75 MG Oral Tab Take 1 tablet (75 mg total) by mouth daily for 20 days.    meclizine 12.5 MG Oral Tab Take 1 tablet (12.5 mg total) by mouth 3 (three) times daily as needed for Dizziness.     Current Facility-Administered Medications   Medication Dose Route Frequency    sodium chloride 0.9% infusion   Intravenous Continuous    acetaminophen (Tylenol) tab 650 mg  650 mg Oral Q4H PRN    Or    acetaminophen (Tylenol) rectal suppository 650 mg  650 mg Rectal Q4H PRN    labetalol (Trandate) 5 mg/mL injection 10 mg  10 mg Intravenous Q10 Min PRN    hydrALAzine (Apresoline) 20 mg/mL injection 10 mg  10 mg Intravenous Q2H PRN    metoclopramide (Reglan) 5 mg/mL injection 5 mg  5 mg Intravenous Q8H PRN    atorvastatin (Lipitor) tab 40 mg  40 mg Oral Nightly    enoxaparin (Lovenox) 30 MG/0.3ML SUBQ injection 30 mg  30 mg Subcutaneous Daily    meclizine (Antivert) tab 12.5 mg  12.5 mg Oral TID PRN    cyanocobalamin (Vitamin B12) tab 1,000 mcg  1,000 mcg Oral Daily    [START ON 5/31/2025] ferrous sulfate DR tab 325 mg  325 mg Oral QOD    gabapentin (Neurontin) cap 600 mg  600 mg Oral BID    HYDROcodone-acetaminophen (Norco) 5-325 MG per tab 1 tablet  1 tablet Oral Q4H PRN    PARoxetine (Paxil) tab 20 mg  20 mg Oral QAM    primidone (Mysoline) tab 100 mg  100 mg Oral Daily    primidone (Mysoline) tab 150 mg  150 mg Oral Nightly    sotalol (Betapace) tab 80 mg  80 mg Oral 2x Daily(Beta Blocker)    LORazepam (Ativan) tab 0.5 mg  0.5 mg Oral Q4H PRN     clopidogrel (Plavix) tab 300 mg  300 mg Oral Once    [START ON 5/31/2025] clopidogrel (Plavix) tab 75 mg  75 mg Oral Daily    [START ON 6/3/2025] aspirin chewable tab 81 mg  81 mg Oral Daily    [START ON 5/31/2025] aspirin chewable tab 81 mg  81 mg Oral BID    sodium chloride 0.9% infusion  125 mL/hr Intravenous Continuous       REVIEW OF SYSTEMS:  A 10-point system was reviewed.  Pertinent positives and negatives are noted in HPI.      PHYSICAL EXAMINATION:  VITAL SIGNS: /86 (BP Location: Left arm)   Pulse 70   Temp 98.4 °F (36.9 °C) (Oral)   Resp 18   Wt 100 lb (45.4 kg)   SpO2 97%   BMI 18.89 kg/m²   GENERAL:  Patient is a 70 year old female in no acute distress. She is gaunt.  PSYCH: Normal mood, behavior, affect    NEUROLOGICAL:   Mental status: Oriented to person, place, and time  Speech & Language: Fluent, no dysarthria. Mildly hypophonic and slow jasbir.  Comprehension: Intact  Cranial Nerves: conjugate gaze, face symmetric, tongue midline, shoulder shrug equal  Motor: tone, strength are normal in all flexors and extensors   Sensory: Intact to light touch in all limbs  Tendon Reflexes: normal for habitus, no asymmetry  Gait: Deferred    DIAGNOSTIC DATA:  Labs:  Recent Labs   Lab 05/29/25 1825   RBC 3.44*   HGB 11.7*   HCT 34.0*   MCV 98.8   MCH 34.0   MCHC 34.4   RDW 12.3   NEPRELIM 1.59   WBC 4.4   .0         Recent Labs   Lab 05/29/25  1825   *   BUN 17   CREATSERUM 0.95   EGFRCR 64   CA 8.8      K 4.3      CO2 24.0         IMAGING:  CTA BRAIN + CTA CAROTIDS (CPT=70496/59042)  Result Date: 5/29/2025  CONCLUSION:  2.5 x 2.0 x 2.2 mm aneurysm arising from the anterior lateral wall of the cavernous segment of the left internal carotid artery.  There is approximately 60% stenosis involving the origin and proximal right internal carotid artery with atheromatous and calcific plaque.  LOCATION:  [facility]   Dictated by (CST): Adrianna Sands MD on 5/29/2025 at 11:03 PM      Finalized by (CST): Adrianna Sands MD on 5/29/2025 at 11:12 PM       CT BRAIN OR HEAD (CPT=70450)  Result Date: 5/29/2025  CONCLUSION:  Stable noncontrast head CT.  Bilateral neurostimulators.  No CT evidence for acute intracranial process.    LOCATION:  [facility]   Dictated by (CST): Adrianna Sands MD on 5/29/2025 at 7:52 PM     Finalized by (CST): Adrianna Sands MD on 5/29/2025 at 7:54 PM           ASSESSMENT:  Ms. Meraz is a 70-year-old woman with essential tremor and parkinsonism, atrial fibrillation and soft plaque in her left subclavian artery who most likely experienced a peripheral source of vertigo, less likely TIA; incidentally noted asymptomatic right cervical ICA stenosis and left cavernous ICA small unruptured aneurysm    PLAN:  Principal Problem:    Dizziness  Active Problems:    Anemia    Hyperglycemia    Inability to bear weight  No need for stroke protocol.  MRI brain will be very difficult to obtain in the setting of deep brain stimulator and will not add to the picture diagnostically.  Whether she has demonstrable acute cerebral ischemia or not, we have identified risk factors for embolic stroke that can be mitigated.  She has the intracardiac occluder device for protection from atrial fibrillation and I would recommend a short course (21-day total course of aspirin 81 mg daily and clopidogrel 75 mg daily) of dual antiplatelet therapy as well as intensification of statin therapy (atorvastatin 40 mg daily) for the soft plaque in her left subclavian artery that is just proximal to the ostium of the left vertebral artery.  This could conceivably be a nidus for cerebral ischemia in a vertebral basilar distribution.  That said, isolated vertigo is rarely due to acute cerebral ischemia and, again, a more benign peripheral source of vertigo is more likely in this case.    The right carotid stenosis is asymptomatic and the aforementioned medications offer stroke risk reduction as they do for the  subclavian stenosis. She should have repeat neck vessel imaging in 12 months for surveillance.     She has follow up with the neurointerventional clinic to track the asymptomatic extracranial unruptured aneurysm that is unlikely to be a problem for her in general.    All that being the case, no further urgent neurological diagnostics are indicated.  She can dismiss from the hospital and follow up with her established neurologist or with us in the stroke clinic to follow on this issue.    Lavelle Peoples MD

## 2025-05-30 NOTE — OCCUPATIONAL THERAPY NOTE
OCCUPATIONAL THERAPY EVALUATION - INPATIENT    Room Number: 7615/7615-A  Evaluation Date: 5/30/2025     Type of Evaluation: Initial  Presenting Problem: Dizziness, HTN urgency, cerebral aneurysm, carodid stenosis    Physician Order: IP Consult to Occupational Therapy  Reason for Therapy:  ADL/IADL Dysfunction and Discharge Planning    OCCUPATIONAL THERAPY ASSESSMENT   Patient is a 70 year old female admitted on 5/29/2025 with Presenting Problem: Dizziness, HTN urgency, cerebral aneurysm, carodid stenosis. Co-Morbidities : A-flutter/ HTN/ HLD/ CVA/ Parkinson ds/ CAD (s/p stent)  Patient is currently functioning at baseline with all ADL.  Prior to admission, patient's baseline is independent.  Patient met all OT goals at independent level.  Patient reports no further questions/concerns at this time.     EVALUATION SESSION:  Patient at start of session: supine    FUNCTIONAL TRANSFER ASSESSMENT  Sit to Stand: Edge of Bed  Edge of Bed: Independent  Toilet Transfer: Independent    BED MOBILITY  Supine to Sit : Independent     FUNCTIONAL ADL ASSESSMENT  Toileting Seated: Independent  Toileting Standing: Independent    COGNITION  Overall Cognitive Status:  WFL - within functional limits    COGNITION ASSESSMENTS     Upper Extremity:   ROM: within functional limits   Strength: is within functional limits   Coordination:  Gross motor:   Fine motor: intact    EDUCATION PROVIDED  Patient Education : Role of Occupational Therapy; Plan of Care  Patient's Response to Education: Verbalized Understanding    Equipment used: none    Therapist comments: independent supine to sit.  Independent with toilet transfer, toileting, and sink side hand hygiene.   Fine motor and visual perception intact.    Patient End of Session: Up in chair, Needs met, Call light within reach, RN aware of session/findings, All patient questions and concerns addressed    OCCUPATIONAL PROFILE    HOME SITUATION  Type of Home: House  Home Layout: Two level  Lives  With: Spouse    Toilet and Equipment: Standard height toilet  Shower/Tub and Equipment: Walk-in shower (built in bench)  Other Equipment: Reacher, Long-handled shoehorn    Occupation/Status: retired  Hand Dominance: Right  Drives: Yes  Patient Regularly Uses: Reading glasses    Prior Level of Function: independent with ADL and IADL. Working with OPPT post TKR.      PAIN ASSESSMENT  Ratin          OBJECTIVE  Precautions: Bed/chair alarm  Fall Risk: Standard fall risk    WEIGHT BEARING RESTRICTION       AM-PAC ‘6-Clicks’ Inpatient Daily Activity Short Form  -   Putting on and taking off regular lower body clothing?: None  -   Bathing (including washing, rinsing, drying)?: A Little  -   Toileting, which includes using toilet, bedpan or urinal? : None  -   Putting on and taking off regular upper body clothing?: None  -   Taking care of personal grooming such as brushing teeth?: None  -   Eating meals?: None    AM-PAC Score:  Score: 23  Approx Degree of Impairment: 15.86%  Standardized Score (AM-PAC Scale): 51.12    ADDITIONAL TESTS     NEUROLOGICAL FINDINGS  Coordination - Rapid Alternating Movement: Symmetrical  Coordination - Finger Opposition: Symmetrical     PLAN   Patient has been evaluated and presents with no skilled Occupational Therapy needs at this time.  Patient discharged from Occupational Therapy services.  Please re-order if a new functional limitation presents during this admission.         Patient Evaluation Complexity Level:   Occupational Profile/Medical History LOW - Brief history including review of medical or therapy records    Specific performance deficits impacting engagement in ADL/IADL LOW  1 - 3 performance deficits    Client Assessment/Performance Deficits LOW - No comorbidities nor modifications of tasks    Clinical Decision Making LOW - Analysis of occupational profile, problem-focused assessments, limited treatment options    Overall Complexity LOW     OT Session Time: 20 minutes

## 2025-06-01 DIAGNOSIS — F11.20 OPIOID DEPENDENCE, UNCOMPLICATED (HCC): ICD-10-CM

## 2025-06-01 DIAGNOSIS — M54.16 LUMBAR RADICULITIS: ICD-10-CM

## 2025-06-01 DIAGNOSIS — F11.90 CHRONIC NARCOTIC USE: ICD-10-CM

## 2025-06-01 DIAGNOSIS — M54.16 LUMBAR RADICULOPATHY: ICD-10-CM

## 2025-06-01 DIAGNOSIS — G24.3 CERVICAL DYSTONIA: ICD-10-CM

## 2025-06-01 DIAGNOSIS — M48.061 SPINAL STENOSIS OF LUMBAR REGION WITHOUT NEUROGENIC CLAUDICATION: ICD-10-CM

## 2025-06-02 ENCOUNTER — PATIENT OUTREACH (OUTPATIENT)
Dept: CASE MANAGEMENT | Age: 70
End: 2025-06-02

## 2025-06-02 ENCOUNTER — PATIENT MESSAGE (OUTPATIENT)
Dept: PAIN CLINIC | Facility: CLINIC | Age: 70
End: 2025-06-02

## 2025-06-02 DIAGNOSIS — M54.16 LUMBAR RADICULOPATHY: ICD-10-CM

## 2025-06-02 DIAGNOSIS — F11.90 CHRONIC NARCOTIC USE: ICD-10-CM

## 2025-06-02 DIAGNOSIS — F11.20 OPIOID DEPENDENCE, UNCOMPLICATED (HCC): ICD-10-CM

## 2025-06-02 DIAGNOSIS — M48.061 SPINAL STENOSIS OF LUMBAR REGION WITHOUT NEUROGENIC CLAUDICATION: ICD-10-CM

## 2025-06-02 DIAGNOSIS — M54.16 LUMBAR RADICULITIS: ICD-10-CM

## 2025-06-02 DIAGNOSIS — G24.3 CERVICAL DYSTONIA: ICD-10-CM

## 2025-06-02 RX ORDER — HYDROCODONE BITARTRATE AND ACETAMINOPHEN 5; 325 MG/1; MG/1
1-2 TABLET ORAL EVERY 4 HOURS PRN
Qty: 60 TABLET | Refills: 0 | OUTPATIENT
Start: 2025-06-02

## 2025-06-02 NOTE — TELEPHONE ENCOUNTER
Hydrocodone 5/325    DOS: 4/22/25 Right TKA  Last OV: 5/7/25  Last refill date: 5/9/25 #/refills: 90/0  Janes GUILLAUME  Upcoming appt:   Future Appointments   Date Time Provider Department Center   6/3/2025 11:00 AM Iván Arias, PT WDR Phys Thp [facility] [place]   6/4/2025  2:00 PM Janes Cortez MD [facility]   6/5/2025 11:00 AM Iván Arias, PT WDR Phys Thp [facility] [place]   6/10/2025 11:00 AM Iván Arias, PT WDR Phys Thp [facility] [place]   6/12/2025 11:00 AM Iván Arias, PT WDR Phys Thp [facility]   6/19/2025  1:30 PM Michele Blanco MD ENINAPER2 [facility]   7/14/2025  2:20 PM Lilli Kramer MD [facility] [place]   7/16/2025 10:00 AM Gustavo Zhao MD ENIPain [facility]   7/24/2025 10:00 AM NP OOT NP [facility]   8/7/2025 11:00 AM Jaclyn Dong DO [id number] [facility]   10/23/2025 11:00 AM Jaclyn Dong DO [id number] [facility]   1/13/2026 10:40 AM Mouna Gentile APRN [facility] [place]     Confirmed with [facility]  that the prescription was filled 5/9/25 from [facility] for 90 tabs.  Pain management can continue to manage pain medications.

## 2025-06-02 NOTE — PROGRESS NOTES
CAYDEN request    Neuro/Stroke appointment request (discharged 05/30)    Neuro/Stroke - per MD, no stroke protocol    Dr Reeder [facility]  Internal Medicine  [address] [address]  [phone]    Attempt #1:  Left message on voicemail for patient to call transitions specialist back to schedule follow up appointments. Provided Transitions specialist scheduling phone number [phone].

## 2025-06-03 ENCOUNTER — TELEPHONE (OUTPATIENT)
Facility: CLINIC | Age: 70
End: 2025-06-03

## 2025-06-03 ENCOUNTER — OFFICE VISIT (OUTPATIENT)
Dept: PHYSICAL THERAPY | Age: 70
End: 2025-06-03
Attending: ORTHOPAEDIC SURGERY
Payer: MEDICARE

## 2025-06-03 DIAGNOSIS — Z96.651 STATUS POST RIGHT KNEE REPLACEMENT: Primary | ICD-10-CM

## 2025-06-03 PROCEDURE — 97110 THERAPEUTIC EXERCISES: CPT | Performed by: PHYSICAL THERAPIST

## 2025-06-03 RX ORDER — HYDROCODONE BITARTRATE AND ACETAMINOPHEN 5; 325 MG/1; MG/1
1 TABLET ORAL EVERY 8 HOURS PRN
Qty: 90 TABLET | Refills: 0 | Status: SHIPPED | OUTPATIENT
Start: 2025-06-03 | End: 2025-07-03

## 2025-06-03 NOTE — TELEPHONE ENCOUNTER
Medication: HYDROcodone-acetaminophen 5-325 MG     [place]25    Last office visit: 04/16/25  Due back to clinic per last office note:  na  Date next office visit scheduled:  07/16/25    Last URINE Screen: 04/23/25  Screen Results:  please review in chart      Narcotic Contract EXPIRATION date: 12/09/25    Last OV note recommendation:   ASSESSMENT AND PLAN:      1. Chronic narcotic dependence (HCC)       The patient is a 78-year-old female with a history of chronic back pain.  Patient is managed with hydrocodone through this office.  Patient is due for repeat UDS.  Additionally, scheduled for total knee arthroplasty next week.  Will likely need some additional pain medication postoperatively which can be received through Dr. Cortez's office and that would not constitute a contract violation.

## 2025-06-03 NOTE — DISCHARGE SUMMARY
[facility]     Tamara Meraz Patient Status:  Inpatient    1/15/1955 MRN [id number]   Location [facility] 7NE-A Attending No att. providers found   Hosp Day # 1 PCP Lilli Kramer MD     Date of Admission: 2025  Date of Discharge:  2025     Discharge Disposition: Home or Self Care    Discharge Diagnosis:  Hypertensive urgency  Vertigo  Cerebral aneurysm  Carotid stenosis  Normocytic anemia  Hyperlipidemia  Chronic pain  Mood disorder  A.flutter Parkinson's disease    History of Present Illness:   Tamara Meraz is a 70 year old female with PMHx A-flutter/ HTN/ HLD/ CVA/ Parkinson ds/ CAD (s/p stent) who presented to the hospital for dizziness. She was feeling well until around 4 PM when she suddenly felt dizzy.l The room  was spinning and she as nauseous. She had to close her eyes and was unable to ambulate due to the dizziness. She denied any numbness, weakness, speech changes. Her symptoms lasted about 6 hours before resolving. She denied any prior episodes of vertigo.     Brief Synopsis:   Patient is a 70-year-old female admitted with vertigo and hypertensive urgency.  Her blood pressure was closely monitored and controlled during her hospitalization.  She likely had peripheral vertigo causing her symptoms.  She was incidentally noted to have an asymptomatic right cervical ICA stenosis and left cavernous ICA small unruptured aneurysm.  She was seen by neurology as well as neurointerventional radiology.  She remained stable and was discharged in good condition    Lace+ Score: 64  59-90 High Risk  29-58 Medium Risk  0-28   Low Risk       TCM Follow-Up Recommendation:  LACE 29-58: Moderate Risk of readmission after discharge from the hospital.  **Certification    Admission date was 2025.  Inpatient stay was shorter than expected.  Patient's Dizziness was initially serious enough to expect a more lengthy hospitalization but patient improved faster than  expected.                 Procedures during hospitalization:   none    Consultants:  Neurology  Neuro interventional radiology    Discharge Medication List:     Discharge Medications        START taking these medications        Instructions Prescription details   aspirin 81 MG Chew  Replaces: aspirin 81 MG Tbec      Chew 1 tablet (81 mg total) by mouth daily.   Quantity: 90 tablet  Refills: 0     clopidogrel 75 MG Tabs  Commonly known as: Plavix  Notes to patient: Do not stop taking without direction of you doctor.      Take 1 tablet (75 mg total) by mouth daily for 20 days.   Stop taking on: June 20, 2025  Quantity: 20 tablet  Refills: 0     meclizine 12.5 MG Tabs  Commonly known as: Antivert      Take 1 tablet (12.5 mg total) by mouth 3 (three) times daily as needed for Dizziness.   Quantity: 30 tablet  Refills: 0            CHANGE how you take these medications        Instructions Prescription details   atorvastatin 40 MG Tabs  Commonly known as: Lipitor  What changed:   medication strength  how much to take      Take 1 tablet (40 mg total) by mouth nightly.   Quantity: 90 tablet  Refills: 0            CONTINUE taking these medications        Instructions Prescription details   B-12 1000 MCG Tabs      Take 1,000 mcg by mouth in the morning.   Refills: 0     Calcium Magnesium Zinc 333-133-5 MG Tabs      Take 1 tablet by mouth in the morning.   Refills: 0     FIBER OR      Take 1,250 mg by mouth.   Refills: 0     gabapentin 300 MG Caps  Commonly known as: Neurontin      Take by mouth. Take 600mg in the morning and 600 mg at night   Refills: 0     Iron 325 (65 Fe) MG Tabs      Take by mouth every other day.   Refills: 0     PARoxetine 20 MG Tabs  Commonly known as: Paxil      Take 1 tablet (20 mg total) by mouth every morning.   Quantity: 90 tablet  Refills: 0     primidone 50 MG Tabs  Commonly known as: Mysoline      Take 100 mg by mouth every morning and take 150 mg by mouth every evening.   Refills: 0      PROBIOTIC-10 OR      Take by mouth in the morning and before bedtime.   Refills: 0     senna-docusate 8.6-50 MG Tabs  Commonly known as: Senokot-S      Take 1 tablet by mouth daily as needed.   Quantity: 30 tablet  Refills: 0     sotalol 80 MG Tabs  Commonly known as: Betapace      Take 1 tablet (80 mg total) by mouth in the morning and 1 tablet (80 mg total) before bedtime.   Refills: 0            STOP taking these medications      aspirin 81 MG Tbec  Replaced by: aspirin 81 MG Chew        traMADol 50 MG Tabs  Commonly known as: Ultram                  Where to Get Your Medications        These medications were sent to [id number] - [place], [place] - [address] [facility] [phone], [phone]  [address] [facility] [address]      Phone: [phone]   aspirin 81 MG Chew  atorvastatin 40 MG Tabs  clopidogrel 75 MG Tabs  meclizine 12.5 MG Tabs         [place] reviewed[place] n/a    Follow-up appointment:   Lilli Kramer MD  [address]  [address]  [facility] [place] [id number] [phone]    Schedule an appointment as soon as possible for a visit in 1 week(s)      Michele Blanco MD  [address] [place] [place] [id number] [phone]    Follow up on 6/19/2025  1:30pm    Mirta Henry MD  [address]  [address] [address] [phone]    Follow up in 4 week(s)  possible TIA    Appointments for Next 30 Days 6/3/2025 - 7/3/2025        Date Arrival Time Visit Type Length Department Provider     6/4/2025  2:00 PM  POST OP VISIT [[id number]] 20 min [facility] [address] [place], Janes Zheng MD    Patient Instructions:         Location Instructions:     Masks are optional for all patients and visitors, unless otherwise indicated. Note: A $50 fee will be charged for missed appointments or same-day cancellations. Please provide 24 hours' notice if you need to cancel or reschedule your appointment.               6/9/2025 11:00 AM  [facility] FOLLOW UP [[id number]]  60 min [facility], [address] [place], Mouna Ac APRN    Patient Instructions:         Location Instructions:     Masks are optional for all patients and visitors, unless otherwise indicated. Note: A $50 fee will be charged for missed appointments or same-day cancellations. Please provide 24 hours' notice if you need to cancel or reschedule your appointment.               6/10/2025 11:00 AM  [facility] [place]THO [place] [[id number]] 45 min [facility] - Iván Smith, PT    Patient Instructions:         Location Instructions:     Your appointment is scheduled at [address] [place] [address]. Please check in for your appointment at the Registration Desk upon entering the buidling. Please arrive 10 minutes prior to your scheduled appointment time.  Masks are optional for all patients and visitors, unless otherwise indicated.               6/12/2025 11:00 AM  [facility] [place] [[id number]] 45 min [facility] - Iván Smith, PT    Patient Instructions:         Location Instructions:     Your appointment is scheduled at [address] [place] [address]. Please check in for your appointment at the Registration Desk upon entering the buidling. Please arrive 10 minutes prior to your scheduled appointment time.  Masks are optional for all patients and visitors, unless otherwise indicated.               6/17/2025 12:30 PM  [facility] [place] [place] [[id number]] 45 min [facility] - Iván Smith, PT    Patient Instructions:         Location Instructions:     Your appointment is scheduled at [address] [place] [address]. Please check in for your appointment at the Registration Desk upon entering the buidling. Please arrive 10 minutes prior to your scheduled appointment time.  Masks are optional for all patients and visitors, unless otherwise indicated.               6/19/2025  4:30 PM  [facility] [place] [[id number]] 45 min [facility] - Iván Smith, PT    Patient Instructions:         Location Instructions:     Your appointment is scheduled at [address] [place] [address]. Please check in for your appointment at the Registration Desk upon entering the buidling. Please arrive 10 minutes prior to your scheduled appointment time.  Masks are optional for all patients and visitors, unless otherwise indicated.               2025  3:45 PM  [facility] [place] [place] [[id number]] 45 min [facility] - Iván Smith PT    Patient Instructions:         Location Instructions:     Your appointment is scheduled at [address] [place] [address]. Please check in for your appointment at the Registration Desk upon entering the buidling. Please arrive 10 minutes prior to your scheduled appointment time.  Masks are optional for all patients and visitors, unless otherwise indicated.                      Vital signs:       Physical Exam:    General: No acute distress   Lungs: clear to auscultation  Cardiovascular: S1, S2  Abdomen: Soft      -----------------------------------------------------------------------------------------------  PATIENT DISCHARGE INSTRUCTIONS: See electronic chart    Tonia Mariano MD    Total time spent on discharge plannin minutes     The  [employer] Cures Act makes medical notes like these available to patients in the interest of transparency. Please be advised this is a medical document. Medical documents are intended to carry relevant information, facts as evident, and the clinical opinion of the practitioner. The medical note is intended as peer to peer communication and may appear blunt or direct. It is written in medical language and may contain abbreviations or verbiage that are unfamiliar.

## 2025-06-03 NOTE — PROGRESS NOTES
[facility] request/ADT   Hospital Follow up forNeuro/Stroke  (Discharge 5/30 EDW)   Neuro/Stroke - per MD, no stroke protocol      PCP  Lilli Kramer  Internal Medicine  [address] [address]  [phone]  Attempt #2:  Left message on voicemail for patient to call transitions specialist back to schedule follow up appointments. Provided Transitions specialist scheduling phone number [phone].

## 2025-06-03 NOTE — PROGRESS NOTES
Progress Summary  Pt has attended 8 visits in Physical Therapy.     Patient: Tamara Meraz (70 year old, female) Referring Provider:  Insurance:   Diagnosis: Status post right knee replacement (Z96.651) Janes Cortez  MEDICARE   Date of Surgery: 4/22/25 Next MD visit:  COMMERCIAL   Precautions:  Other (use comment) No data recorded Referral Information:    Date of Evaluation: Req: 0, Auth: 0, Exp:     05/08/25 POC Auth Visits:          Today's Date   6/3/2025    Subjective  Patient states she developed severe dizziness last Thursday after she got home from PT and doing some shopping causing vomiting. Went to ER and admitted for 1 night. Symptoms have improved but is referred for MRI. Patient states her knee pain is much improved since last treatment. Patient reports continued limitations with prolonged walking activities.       Pain: 0/10     Objective  See PN       Musculoskeletal  Observation: Incision healing well. Minimal gait deviations at this time.  Palpation: Primarily non tender around the R knee and surrounding musculature  Accessory Motion: Mild restrictions into TKE              Edema: Jt line R 31.6 cm, L 30.5 cm                 ROM and Strength: (* denotes performed with pain)         Knee    A/PROM MMT (-/5)    R L R L     Flex 120/123 145 4 5     Ext (L3) -5/0 0 4 5            Neurological:  Sensation: Appears intact throughout the R LE to LT             Balance and Functional Mobility:  Gait: pt ambulates on level ground without AD and no significant gait deviations             Tandem Stance: R back: >20\" ; L back:  >20\"  SLS: R NT ,  L  NT       Assessment  Patient has completed 8 visits of skilled PT now 6 weeks s/p R TKA. Patient is making good progress with her post op rehab, currently reporting 0/10 pain. Patient demonstrates increasing R knee ROM and LE strength, decreased swelling, improved balance and gait mechanics. Patient is able to ascend and descend stairs with reciprocal  pattern, drive, perform trasfers, and light house work. Patient continues to have objective deficits that include decreased ROM, decreased strength, mild swelling, and decreased balance. Functional deficits include but are not limited to prolonged standing or walking, transfers from lower surfaces, house and yard work.Pt and PT discussed evaluation findings, pathology, POC and HEP.  Pt voiced understanding and performs HEP correctly without reported pain. Skilled Physical Therapy is medically necessary to address the above impairments and reach functional goals.    Goals (to be met in 16 visits)      Not Met Progress Toward Partially Met Met   Pt will improve knee extension ROM to 0 deg to allow proper heel strike during gait and terminal knee extension in stance. [] [] [] [x]   Pt will improve knee AROM flexion to >120 degrees to improve ability to perform transfers. [] [] [] [x]   Pt will improve quad strength to 5/5 to ascend 1 flight of stairs reciprocally with UE assist. [] [] [x] []   Pt will increase hip and knee strength to grossly 4+/5 to be able to get up and down from the floor safely. [] [x] [] []   Pt will demonstrate increased hip ER/ABD strength to 4+/5 to perform stepping and squatting activities without excessive femoral IR/ADD. [] [] [x] []   Pt will improve tandem standing balance to >10s to improve safety with gait on uneven surfaces such as grass and gravel. [] [x] [] []   Pt will be independent and compliant with comprehensive HEP to maintain progress achieved in PT. [] [x] [] []             Post LEFS Score  Post LEFS Score: (Patient-Rptd) 66.25 % (6/3/2025 11:32 AM)    41.25 % improvement    Plan: Continue skilled Physical Therapy 1-2 x/week or a total of 4 visits over a 90 day period. Treatment will include: Ther Ex, Ther activity, NMR, manual therapy, gait training, modalities       Patient/Family/Caregiver was advised of these findings, precautions, and treatment options and has agreed to  actively participate in planning and for this course of care.    Thank you for your referral. If you have any questions, please contact me at Dept: [phone].    Sincerely,  Electronically signed by therapist: Iván Arias PT     Physician's certification required:  Yes  Please co-sign or sign and return this letter via fax as soon as possible to [phone].   I certify the need for these services furnished under this plan of treatment and while under my care.    X___________________________________________________ Date____________________    Certification From: 6/3/2025  To:9/1/2025        Plan  Continue therapy per POC    Treatment Last 4 Visits  Treatment Day: 8       5/22/2025 5/27/2025 5/29/2025 6/3/2025   LE Treatment   Therapeutic Exercise Recumbent bike 5' Lv2  PROM R knee flexion and extension  Supine SB HS curls x20  LAQ 2# x20     Step ups 6\" step x10 B  Sit to stands from chair 2x10  Standing TKEs RTB x20  Shuttle leg press 62# 2x20  Lateral step overs trav x20  Standing calf stretch on 1/2 foam roll 2x20\" B  Standing hip abd YTB x10 ea   Recumbent bike 6' Lv2  PROM R knee flexion and extension  Supine heel slides with strap assist x20  LAQ 2# x20     Step ups 6\" step x15 B  Sit to stands from chair 2x10  Standing TKEs RTB x20  Shuttle leg press 62# 2x20  Standing calf stretch on 1/2 foam roll 2x20\" B  Standing hip abd YTB x15 ea      Recumbent bike 6'  PROM R knee   Heel slides AROM x20  Supine knee extension stretch with heel on towel roll and 2# on thing 2'  Hooklying hip add x20  Hooklying hip abd BTB x20  Seated LAQ 2# 2x10 Reassessment  Recumbent bike 6'  PROM R knee  Heel slides x20  Lateral step overs 6\" step x10  Shuttle leg press 68# 3x10  Shuttle SL leg press R 31# 2x10  Standing TKEs RTB x20  Standing hip abd RTB 2x10     Manual Therapy STM R quad, ITB STM R knee, quad, ITB STM R quad, ITB, knee    Therapeutic Exercise Minutes 40 40 35 45   Manual Therapy Minutes 5 6 10    Total  Time Of Timed Procedures 45 46 45 45   Total Time Of Service-Based Procedures 0 0 0 0   Total Treatment Time 45 46 45 45        HEP  Access Code: [email or web address]  URL: [email or web address]  Date: 05/08/2025  Prepared by: Iván Arias    Exercises  - Supine Quadricep Sets  - 2 x daily - 7 x weekly - 2 sets - 10 reps  - Supine Knee Extension Stretch on Towel Roll  - 2 x daily - 7 x weekly - 3 minutes hold  - Active Straight Leg Raise with Quad Set  - 2 x daily - 7 x weekly - 2 sets - 10 reps  - Supine Heel Slide with Strap  - 2 x daily - 7 x weekly - 2 sets - 10 reps  - Seated Heel Slide  - 2 x daily - 7 x weekly - 2 sets - 10 reps  - Standing March with Counter Support  - 1-2 x daily - 7 x weekly - 2 sets - 10 reps  - Mini Squat with Counter Support  - 1-2 x daily - 7 x weekly - 2 sets - 10 reps    Charges     3 Ther Ex

## 2025-06-04 ENCOUNTER — OFFICE VISIT (OUTPATIENT)
Dept: ORTHOPEDICS CLINIC | Facility: CLINIC | Age: 70
End: 2025-06-04
Payer: MEDICARE

## 2025-06-04 ENCOUNTER — HOSPITAL ENCOUNTER (OUTPATIENT)
Dept: GENERAL RADIOLOGY | Age: 70
Discharge: HOME | End: 2025-06-04
Attending: ORTHOPAEDIC SURGERY
Payer: MEDICARE

## 2025-06-04 DIAGNOSIS — Z96.651 STATUS POST RIGHT KNEE REPLACEMENT: Primary | ICD-10-CM

## 2025-06-04 DIAGNOSIS — Z96.651 STATUS POST RIGHT KNEE REPLACEMENT: ICD-10-CM

## 2025-06-04 PROCEDURE — 73562 X-RAY EXAM OF KNEE 3: CPT | Performed by: ORTHOPAEDIC SURGERY

## 2025-06-04 PROCEDURE — 99024 POSTOP FOLLOW-UP VISIT: CPT | Performed by: ORTHOPAEDIC SURGERY

## 2025-06-04 PROCEDURE — 77073 BONE LENGTH STUDIES: CPT | Performed by: ORTHOPAEDIC SURGERY

## 2025-06-04 NOTE — PROGRESS NOTES
[facility] request/ADT   Hospital Follow up forNeuro/Stroke  (Discharge 5/30 EDW)   Neuro/Stroke - per MD, no stroke protocol      PCP  Lilli Kramer  Internal Medicine  [address] [address]  [phone]  Unable to contact pt after multiple attempts  Attempt #3:  Left message on voicemail for patient to call transitions specialist back to schedule follow up appointments. Provided Transitions specialist scheduling phone number [phone]. Closing encounter. Will re-open if patient returns call.

## 2025-06-04 NOTE — PROGRESS NOTES
[facility] Progress Note    Subjective: Very pleasant 70-year-old female returns to clinic today 6 weeks postop from right knee replacement.  She was recently discharged from the hospital, had acute onset of dizziness about 1 week ago that lasted 7 hours, reports that she has never had any issue like this in the past and she was admitted for workup of neurologic issue but everything was negative.  Symptoms resolved after 7 hours.  She notes that she was delayed a bit with therapy, however states she has been doing well regardless, ambulating without an assist device since a few days after surgery.  She notes that she is not having any pain with the knee, she is back on her home preoperative pain medication regimen.  She notes the knee has been moving well, states therapy had her flexing to 123.  She is looking to restart physical therapy now.  She is very happy with her outcome.    Objective: Patient appears comfortable, very pleasant.  Right knee incision is well-healed.  She demonstrates extension within a degree of full, flexion to 120.  Knee is appropriate stable to varus and valgus stress and anterior drawer throughout range of motion.      Imaging: X-rays of the right knee personally viewed, independently interpreted and radiology report read.  Full-length films demonstrate hip-knee-ankle axis falling through the center of the trochlear groove.  Dedicated knee films with tibial component within a degree or 2 of neutral AP alignment, touch varus.  Posterior slope of the tibial component 6 to 7 degrees.  Patella tracking centrally.      Assessment/Plan: 70-year-old female 6 weeks postop status post right total knee arthroplasty.  Doing very well, happy with her outcome.  Did place a new therapy order for her to restart since her hospitalization, discussed transitioning into home exercise program as she feels able.  No restrictions in the incision at this point.  She is done with DVT prophylaxis.  She does  not require any pain medications.  We did discuss follow-up at 1 year from surgery with repeat imaging.  She does state that she has some pain in her hips from arthritis, on review of the imaging moderate of the right hip and mild of the left.  She may follow-up for that sooner, although she states it is not too bothersome.    Janes Cortez MD, FAAOS  [facility]  Phone [phone]  Fax [phone]

## 2025-06-05 ENCOUNTER — APPOINTMENT (OUTPATIENT)
Dept: PHYSICAL THERAPY | Age: 70
End: 2025-06-05
Attending: ORTHOPAEDIC SURGERY
Payer: MEDICARE

## 2025-06-06 ENCOUNTER — HOSPITAL ENCOUNTER (OUTPATIENT)
Dept: CT IMAGING | Facility: HOSPITAL | Age: 70
Discharge: HOME | End: 2025-06-06
Attending: INTERNAL MEDICINE
Payer: MEDICARE

## 2025-06-06 DIAGNOSIS — Z87.891 PERSONAL HISTORY OF NICOTINE DEPENDENCE: ICD-10-CM

## 2025-06-06 DIAGNOSIS — Z12.2 ENCOUNTER FOR SCREENING FOR LUNG CANCER: ICD-10-CM

## 2025-06-06 PROCEDURE — 71271 CT THORAX LUNG CANCER SCR C-: CPT | Performed by: INTERNAL MEDICINE

## 2025-06-09 ENCOUNTER — OFFICE VISIT (OUTPATIENT)
Dept: INTERNAL MEDICINE CLINIC | Facility: CLINIC | Age: 70
End: 2025-06-09
Payer: MEDICARE

## 2025-06-09 ENCOUNTER — TELEPHONE (OUTPATIENT)
Dept: INTERNAL MEDICINE CLINIC | Facility: CLINIC | Age: 70
End: 2025-06-09

## 2025-06-09 VITALS
HEIGHT: 61 IN | TEMPERATURE: 97 F | SYSTOLIC BLOOD PRESSURE: 110 MMHG | HEART RATE: 67 BPM | WEIGHT: 97.38 LBS | OXYGEN SATURATION: 98 % | RESPIRATION RATE: 16 BRPM | DIASTOLIC BLOOD PRESSURE: 64 MMHG | BODY MASS INDEX: 18.39 KG/M2

## 2025-06-09 DIAGNOSIS — Z95.0 PACEMAKER: ICD-10-CM

## 2025-06-09 DIAGNOSIS — D46.9 MDS (MYELODYSPLASTIC SYNDROME) (HCC): ICD-10-CM

## 2025-06-09 DIAGNOSIS — Z09 HOSPITAL DISCHARGE FOLLOW-UP: Primary | ICD-10-CM

## 2025-06-09 DIAGNOSIS — K55.1 SUPERIOR MESENTERIC ARTERY ATHEROSCLEROSIS (HCC): ICD-10-CM

## 2025-06-09 DIAGNOSIS — K55.9 ISCHEMIC COLITIS (HCC): ICD-10-CM

## 2025-06-09 DIAGNOSIS — I72.0 CAROTID ANEURYSM, LEFT: ICD-10-CM

## 2025-06-09 DIAGNOSIS — G25.0 ESSENTIAL TREMOR: ICD-10-CM

## 2025-06-09 DIAGNOSIS — G20.A1 PARKINSON'S DISEASE, UNSPECIFIED WHETHER DYSKINESIA PRESENT, UNSPECIFIED WHETHER MANIFESTATIONS FLUCTUATE (HCC): ICD-10-CM

## 2025-06-09 DIAGNOSIS — I70.0 AORTO-ILIAC ATHEROSCLEROSIS: ICD-10-CM

## 2025-06-09 DIAGNOSIS — E78.2 MIXED HYPERLIPIDEMIA: ICD-10-CM

## 2025-06-09 DIAGNOSIS — Z95.818 PRESENCE OF WATCHMAN LEFT ATRIAL APPENDAGE CLOSURE DEVICE: ICD-10-CM

## 2025-06-09 DIAGNOSIS — R42 VERTIGO: ICD-10-CM

## 2025-06-09 DIAGNOSIS — I25.10 CAD IN NATIVE ARTERY: ICD-10-CM

## 2025-06-09 DIAGNOSIS — D64.9 NORMOCYTIC ANEMIA: ICD-10-CM

## 2025-06-09 DIAGNOSIS — R42 DIZZINESS: ICD-10-CM

## 2025-06-09 DIAGNOSIS — J44.9 CHRONIC OBSTRUCTIVE PULMONARY DISEASE, UNSPECIFIED COPD TYPE (HCC): ICD-10-CM

## 2025-06-09 DIAGNOSIS — I73.9 PAD (PERIPHERAL ARTERY DISEASE): ICD-10-CM

## 2025-06-09 DIAGNOSIS — I48.0 PAROXYSMAL ATRIAL FIBRILLATION (HCC): ICD-10-CM

## 2025-06-09 DIAGNOSIS — Z96.89 S/P DEEP BRAIN STIMULATOR PLACEMENT: ICD-10-CM

## 2025-06-09 DIAGNOSIS — I77.9 BILATERAL CAROTID ARTERY DISEASE, UNSPECIFIED TYPE: ICD-10-CM

## 2025-06-09 DIAGNOSIS — I10 ESSENTIAL HYPERTENSION: ICD-10-CM

## 2025-06-09 DIAGNOSIS — Z86.73 HISTORY OF CVA (CEREBROVASCULAR ACCIDENT): ICD-10-CM

## 2025-06-09 DIAGNOSIS — I70.8 AORTO-ILIAC ATHEROSCLEROSIS: ICD-10-CM

## 2025-06-09 DIAGNOSIS — I65.21 STENOSIS OF RIGHT CAROTID ARTERY: ICD-10-CM

## 2025-06-09 NOTE — PROGRESS NOTES
Subjective:   Tamara Meraz is a 70 year old female who presents for hospital follow up.   She was discharged from [facility] to Home or Self Care  Admission Date: 5/29/25   Discharge Date: 5/30/25  Hospital Discharge Diagnosis: dizziness     Interactive contact within 2 business days post discharge first initiated on Date: 6/2/25    I accessed [employer] and/or Care Everywhere and personally reviewed the following for the recent hospitalization: provider notes, consults, summaries, labs and other test results and the pertinent findings are documented below.     HPI:   History of Present Illness:   Tamara Meraz is a 70 year old female with PMHx A-flutter/ HTN/ HLD/ CVA/ Parkinson ds/ CAD (s/p stent) who presented to the hospital for dizziness. She was feeling well until around 4 PM when she suddenly felt dizzy.l The room  was spinning and she as nauseous. She had to close her eyes and was unable to ambulate due to the dizziness. She denied any numbness, weakness, speech changes. Her symptoms lasted about 6 hours before resolving. She denied any prior episodes of vertigo.      Brief Synopsis:   Patient is a 70-year-old female admitted with vertigo and hypertensive urgency.  Her blood pressure was closely monitored and controlled during her hospitalization.  She likely had peripheral vertigo causing her symptoms.  She was incidentally noted to have an asymptomatic right cervical ICA stenosis and left cavernous ICA small unruptured aneurysm.  She was seen by neurology as well as neurointerventional radiology.  She remained stable and was discharged in good condition     Since discharge:   She is doing much better. No more dizziness now but does report it was spinning more then lightheadedness. Nausea was after the spinning started. It is also resolved now. She denies constipation, symptoms of DVT or PNA. Strength is at baseline. Started on plavix on DC and no SE.     History/Other:   Current  Medications:  Medication Reconciliation:  I am aware of an inpatient discharge within the last 30 days.  The discharge medication list has been reconciled with the patient's current medication list and reviewed by me. See medication list for additions of new medication, and changes to current doses of medications and discontinued medications.  Outpatient Medications Marked as Taking for the 6/9/25 encounter (Office Visit) with Mouna Gentile APRN   Medication Sig    HYDROcodone-acetaminophen 5-325 MG Oral Tab Take 1 tablet by mouth every 8 (eight) hours as needed for Pain.    aspirin 81 MG Oral Chew Tab Chew 1 tablet (81 mg total) by mouth daily.    atorvastatin 40 MG Oral Tab Take 1 tablet (40 mg total) by mouth nightly.    clopidogrel 75 MG Oral Tab Take 1 tablet (75 mg total) by mouth daily for 20 days.    senna-docusate 8.6-50 MG Oral Tab Take 1 tablet by mouth daily as needed.    PAROXETINE 20 MG Oral Tab Take 1 tablet (20 mg total) by mouth every morning.    FIBER OR Take 1,250 mg by mouth.    sotalol 80 MG Oral Tab Take 1 tablet (80 mg total) by mouth in the morning and 1 tablet (80 mg total) before bedtime.    Ferrous Sulfate (IRON) 325 (65 Fe) MG Oral Tab Take by mouth every other day.    Probiotic Product (PROBIOTIC-10 OR) Take by mouth in the morning and before bedtime.    Calcium Magnesium Zinc 333-133-5 MG Oral Tab Take 1 tablet by mouth in the morning.    primidone 50 MG Oral Tab Take 100 mg by mouth every morning and take 150 mg by mouth every evening.    gabapentin 300 MG Oral Cap Take by mouth. Take 600mg in the morning and 600 mg at night    Cyanocobalamin (B-12) 1000 MCG Oral Tab Take 1,000 mcg by mouth in the morning.       Review of Systems:  GENERAL: weight stable, energy stable, no sweating  SKIN: denies any unusual skin lesions  EYES: denies blurred vision or double vision  HEENT: denies nasal congestion, sinus pain or ST  LUNGS: denies shortness of breath with exertion  CARDIOVASCULAR:  denies chest pain on exertion or palpitations  GI: denies abdominal pain, denies heartburn, denies diarrhea  MUSCULOSKELETAL: denies pain, normal range of motion of extremities  NEURO: denies headaches, denies dizziness, denies weakness  PSYCHE: denies depression or anxiety  HEMATOLOGIC: denies bruising, denies bleeding  ENDOCRINE: denies thyroid history  ALL/ASTHMA: denies hx of allergy or asthma    Objective:   No LMP recorded. Patient has had a hysterectomy.  Estimated body mass index is 18.4 kg/m² as calculated from the following:    Height as of this encounter: 5' 1\" (1.549 m).    Weight as of this encounter: 97 lb 6.4 oz (44.2 kg).   /64 (BP Location: Left arm, Patient Position: Sitting, Cuff Size: adult)   Pulse 67   Temp 97.3 °F (36.3 °C) (Temporal)   Resp 16   Ht 5' 1\" (1.549 m)   Wt 97 lb 6.4 oz (44.2 kg)   SpO2 98%   BMI 18.40 kg/m²    GENERAL: well developed, well nourished, in no apparent distress  HEENT: atraumatic, normocephalic  LUNGS: clear to auscultation  CARDIO: RRR without murmur, pacer   GI: no masses, HSM or tenderness  MUSCULOSKELETAL: back is not tender, FROM of the extremities  EXTREMITIES: no edema or calve tenderness   NEURO: Oriented times three, tremor present.     Assessment & Plan:   1. Hospital discharge follow-up (Primary)  -     CBC With Differential With Platelet; Future; Expected date: 06/10/2025  -     Basic Metabolic Panel (8); Future; Expected date: 06/10/2025  - repeat labs within a few months    2. Dizziness  3. Vertigo  -     Basic Metabolic Panel (8); Future; Expected date: 06/10/2025  - vertigo exercises provided  - return to ER as needed for emergent symptoms    4. Stenosis of right carotid artery  5. Carotid aneurysm, left  - see cardiology and neuro    6. Normocytic anemia  -     CBC With Differential With Platelet; Future; Expected date: 06/10/2025  - repeat lab ordered    7. Mixed hyperlipidemia  - continue current management    8. CAD in native  artery  - stable. No coronary symptoms. Continue current management.   - continue to see cardiology    9. Paroxysmal atrial fibrillation (HCC)  10. Presence of Watchman left atrial appendage closure device  11. Pacemaker  12. Bilateral carotid artery disease, unspecified type  13. PAD (peripheral artery disease)  14. 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HCC)  15. Aorto-iliac atherosclerosis  16. Essential hypertension  - continue management per cards    17. Chronic obstructive pulmonary disease, unspecified COPD type (HCC)  - stable. CTM    18. Ischemic colitis (HCC)  - stable. CTM    19. MDS (myelodysplastic syndrome) ([facility])  - stable. CTM    20. Parkinson's disease, unspecified whether dyskinesia present, unspecified whether manifestations fluctuate ([facility])  21. History of CVA (cerebrovascular accident)  22. Essential tremor  23. S/P deep brain stimulator placement  - stable. Continue to see neuro  Overview:  Lacunar , on CT  - continue to see neuro  - return to ER as needed for emergent symptoms      No follow-ups on file.     Follow up as needed or when routine care is due

## 2025-06-09 NOTE — TELEPHONE ENCOUNTER
Pt was seen today for HFU. She has been trying to get an apt with cards at [facility] and has not gotten a call back. Can we please call their office and ask them to call her for an apt. Thanks.

## 2025-06-09 NOTE — PATIENT INSTRUCTIONS
See cardiology asap    See neurology     Get your labs done within a few months. No need to fast.    Go to the ER for any emergent symptoms. If you cannot get there safely call 911.      Continue to see your movement specialist.     Follow up as needed or when routine care is due    Increase your hydration, be active, eat fruits and veggies, drink hot decaf liquids. If no improvement start metamucil daily. If no improvement start mirilax daily as needed. If still no improvement do prune punch (4oz of orange juice or apple if you have acid reflux, 4 oz of prune juice heated in the microwave together until hot and add a dose of milk of magnesia from over the counter) daily as needed.   Constipation (Adult)  Constipation means that you have bowel movements that are less frequent than usual. Stools often become very hard and difficult to pass.  Constipation is very common. At some point in life, it affects almost everyone. Since everyone's bowel habits are different, what is constipation to one person may not be to another. Your healthcare provider may do tests to diagnose constipation. It depends on what he or she finds when evaluating you.    Symptoms of constipation include:  Abdominal pain  Bloating  Vomiting  Painful bowel movements  Itching, swelling, bleeding, or pain around the anus  Causes  Constipation can have many causes. These include:  Diet low in fiber  Too much dairy  Not drinking enough liquids  Lack of exercise or physical activity (especially true for older adults)  Changes in lifestyle or daily routine, including pregnancy, aging, work, and travel  Frequent use or misuse of laxatives  Ignoring the urge to have a bowel movement or delaying it until later  Medicines, such as certain prescription pain medicines, iron supplements, antacids, certain antidepressants, and calcium supplements  Diseases like irritable bowel syndrome, bowel obstructions, stroke, diabetes, thyroid disease, Parkinson disease,  hemorrhoids, and colon cancer  Complications  Potential complications of constipation can include:  Hemorrhoids  Rectal bleeding from hemorrhoids or anal fissures (skin tears)  Hernias  Dependency on laxatives  Chronic constipation  Fecal impaction, a severe form of constipation in which a large amount of hard stool is in your rectum that you can't pass  Bowel obstruction or perforation  Home care  All treatment should be done after talking with your healthcare provider. This is especially true if you have another medical problems, are taking prescription medicines, or are an older adult. Treatment most often involves lifestyle changes. You may also need medicines. Your healthcare provider will tell you which will work best for you. Follow the advice below to help avoid this problem in the future.  Lifestyle changes  These lifestyle changes can help prevent constipation:  Diet. Eat a high-fiber diet, with fresh fruit and vegetables, and reduce dairy intake, meats, and processed foods  Fluids. It's important to get enough fluids each day. Drink plenty of water when you eat more fiber. If you are on diet that limits the amount of fluid you can have, talk about this with your healthcare provider.  Regular exercise. Check with your healthcare provider first.  Medicines  Take any medicines as directed. Some laxatives are safe to use only every now and then. Others can be taken on a regular basis. While laxatives don't cause bowel dependence, they are treating the symptoms. So your constipation may return if you don't make other changes. Talk with your healthcare provider or pharmacist if you have questions.  Prescription pain medicines can cause constipation. If you are taking this kind of medicine, ask your healthcare provider if you should also take a stool softener.  Medicines you may take to treat constipation include:  Fiber supplements  Stool softeners  Laxatives  Enemas  Rectal suppositories  Follow-up  care  Follow up with your healthcare provider if symptoms don't get better in the next few days. You may need to have more tests or see a specialist.  Call 911  Call 911 if any of these occur:  Trouble breathing  Stiff, rigid abdomen that is severely painful to touch  Confusion  Fainting or loss of consciousness  Rapid heart rate  Chest pain  When to seek medical advice  Call your healthcare provider right away if any of these occur:  Fever of 100.4°F (38°C) or higher, or as directed by your healthcare provider  Failure to resume normal bowel movements  Pain in your abdomen or back gets worse  Nausea or vomiting  Swelling in your abdomen  Blood in the stool  Black, tarry stool  Involuntary weight loss  Weakness  [employer] last reviewed this educational content on 6/1/2018  © 0455-2956 [employer]. All rights reserved. This information is not intended as a substitute for professional medical care. Always follow your healthcare professional's instructions.

## 2025-06-10 ENCOUNTER — OFFICE VISIT (OUTPATIENT)
Dept: PHYSICAL THERAPY | Age: 70
End: 2025-06-10
Attending: ORTHOPAEDIC SURGERY
Payer: MEDICARE

## 2025-06-10 PROCEDURE — 97110 THERAPEUTIC EXERCISES: CPT | Performed by: PHYSICAL THERAPIST

## 2025-06-10 PROCEDURE — 97112 NEUROMUSCULAR REEDUCATION: CPT | Performed by: PHYSICAL THERAPIST

## 2025-06-10 PROCEDURE — 97140 MANUAL THERAPY 1/> REGIONS: CPT | Performed by: PHYSICAL THERAPIST

## 2025-06-10 NOTE — PROGRESS NOTES
Patient: Tamara Meraz (70 year old, female) Referring Provider:  Insurance:   Diagnosis: Status post right knee replacement (Z96.651) Janes Cortez  MEDICARE   Date of Surgery: 4/22/25 Next MD visit:  COMMERCIAL   Precautions:  Other (use comment) No data recorded Referral Information:    Date of Evaluation: Req: 0, Auth: 0, Exp:     05/08/25 POC Auth Visits:          Today's Date   6/10/2025    Subjective  Patient states she saw the MD last week, pleased with progress. Cleared to return to the pool for exercise. Patient reports no significant knee pain at this time but continues to feel swollen. Continue to feel a little unsteady walking on uneven surfaces.       Pain: 0/10     Objective  R knee flexion  AROM 127 deg              Assessment  Patient has mild peripatellar swelling in the R knee, addressed with STM. R knee ROM continues to improve with increased knee flexion to 127 deg. Mild joint restriction persists into TKE, lacking slight knee extension at heel strike while ambulating. Progressing well with CKC ex's including squatting and step ups/downs. Remains unsteady during SLS or walking along uneven surfaces.    Goals (to be met in 16 visits)      Not Met Progress Toward Partially Met Met   Pt will improve knee extension ROM to 0 deg to allow proper heel strike during gait and terminal knee extension in stance. [] [] [] [x]   Pt will improve knee AROM flexion to >120 degrees to improve ability to perform transfers. [] [] [] [x]   Pt will improve quad strength to 5/5 to ascend 1 flight of stairs reciprocally with UE assist. [] [] [x] []   Pt will increase hip and knee strength to grossly 4+/5 to be able to get up and down from the floor safely. [] [x] [] []   Pt will demonstrate increased hip ER/ABD strength to 4+/5 to perform stepping and squatting activities without excessive femoral IR/ADD. [] [] [x] []   Pt will improve tandem standing balance to >10s to improve safety with gait on uneven  surfaces such as grass and gravel. [] [x] [] []   Pt will be independent and compliant with comprehensive HEP to maintain progress achieved in PT. [] [x] [] []                Plan  Continue to progress with strengthening and balance activities to allow patient to safely walk on uneven surfaces and reduce fall risk.    Treatment Last 4 Visits  Treatment Day: 9       5/27/2025 5/29/2025 6/3/2025 6/10/2025   LE Treatment   Therapeutic Exercise Recumbent bike 6' Lv2  PROM R knee flexion and extension  Supine heel slides with strap assist x20  LAQ 2# x20     Step ups 6\" step x15 B  Sit to stands from chair 2x10  Standing TKEs RTB x20  Shuttle leg press 62# 2x20  Standing calf stretch on 1/2 foam roll 2x20\" B  Standing hip abd YTB x15 ea      Recumbent bike 6'  PROM R knee   Heel slides AROM x20  Supine knee extension stretch with heel on towel roll and 2# on thing 2'  Hooklying hip add x20  Hooklying hip abd BTB x20  Seated LAQ 2# 2x10 Reassessment  Recumbent bike 6'  PROM R knee  Heel slides x20  Lateral step overs 6\" step x10  Shuttle leg press 68# 3x10  Shuttle SL leg press R 31# 2x10  Standing TKEs RTB x20  Standing hip abd RTB 2x10   Recumbent bike 6'  PROM R knee  SB HS curls x20  Forward step ups 8\" step x10 B  Shuttle leg press 68# 3x10  Shuttle SL leg press R 31# 2x10  Standing TKEs RTB x20       Neuro Re-Education    Standing Cone taps x10 B  Lateral walking on balance beam 4 laps  Forward walking on balance beam 4 laps   Manual Therapy STM R knee, quad, ITB STM R quad, ITB, knee  STM R knee, quad, ITB   Therapeutic Exercise Minutes 40 35 45 25   Neuro Re-Educ Minutes    8   Manual Therapy Minutes 6 10  10   Total Time Of Timed Procedures 46 45 45 43   Total Time Of Service-Based Procedures 0 0 0 0   Total Treatment Time 46 45 45 43        HEP  Access Code: [email or web address]  URL: [email or web address]  Date: 05/08/2025  Prepared by: Iván Arias    Exercises  - Supine Quadricep Sets  - 2 x daily -  7 x weekly - 2 sets - 10 reps  - Supine Knee Extension Stretch on Towel Roll  - 2 x daily - 7 x weekly - 3 minutes hold  - Active Straight Leg Raise with Quad Set  - 2 x daily - 7 x weekly - 2 sets - 10 reps  - Supine Heel Slide with Strap  - 2 x daily - 7 x weekly - 2 sets - 10 reps  - Seated Heel Slide  - 2 x daily - 7 x weekly - 2 sets - 10 reps  - Standing March with Counter Support  - 1-2 x daily - 7 x weekly - 2 sets - 10 reps  - Mini Squat with Counter Support  - 1-2 x daily - 7 x weekly - 2 sets - 10 reps    Charges     1 Ther Ex, 1 NMR, 1 Manual

## 2025-06-12 ENCOUNTER — APPOINTMENT (OUTPATIENT)
Dept: PHYSICAL THERAPY | Age: 70
End: 2025-06-12
Attending: ORTHOPAEDIC SURGERY
Payer: MEDICARE

## 2025-06-17 ENCOUNTER — OFFICE VISIT (OUTPATIENT)
Dept: PHYSICAL THERAPY | Age: 70
End: 2025-06-17
Attending: ORTHOPAEDIC SURGERY
Payer: MEDICARE

## 2025-06-17 ENCOUNTER — HOSPITAL ENCOUNTER (OUTPATIENT)
Dept: MAMMOGRAPHY | Facility: HOSPITAL | Age: 70
Discharge: HOME | End: 2025-06-17
Attending: INTERNAL MEDICINE
Payer: MEDICARE

## 2025-06-17 DIAGNOSIS — Z85.3 HISTORY OF BREAST CANCER: ICD-10-CM

## 2025-06-17 DIAGNOSIS — R92.30 DENSE BREAST TISSUE ON MAMMOGRAM: ICD-10-CM

## 2025-06-17 PROCEDURE — 76641 ULTRASOUND BREAST COMPLETE: CPT | Performed by: INTERNAL MEDICINE

## 2025-06-17 PROCEDURE — 97140 MANUAL THERAPY 1/> REGIONS: CPT | Performed by: PHYSICAL THERAPIST

## 2025-06-17 PROCEDURE — 97110 THERAPEUTIC EXERCISES: CPT | Performed by: PHYSICAL THERAPIST

## 2025-06-17 NOTE — PROGRESS NOTES
Discharge Summary  Pt has attended 10 visits in Physical Therapy.       Patient: Tamara Meraz (70 year old, female) Referring Provider:  Insurance:   Diagnosis: Status post right knee replacement (Z96.651) Janes Cortez  MEDICARE   Date of Surgery: 4/22/25 Next MD visit:  COMMERCIAL   Precautions:  Other (use comment) No data recorded Referral Information:    Date of Evaluation: Req: 0, Auth: 0, Exp:     05/08/25 POC Auth Visits:          Today's Date   6/17/2025    Subjective  Patient states her knee is doing very well at this time. Patient denies any significant functional deficits and only occasional soreness/stiffness in the knee. Patient has returned to regular water classes 3x/wk with good tolerance.       Pain: 0/10     Objective  See DC report for details       Musculoskeletal  Observation: Mature scar  Palpation: Primarily non tender around the R knee and surrounding musculature  Accessory Motion: Mild restrictions into TKE              Edema: Jt line R 31.3 cm, L 30.5 cm                 ROM and Strength: (* denotes performed with pain)              Knee    A/PROM MMT (-/5)    R L R L     Flex 123/127 145 5 5     Ext (L3) -5/0 0 5 5            Neurological:  Sensation: Appears intact throughout the R LE to LT             Balance and Functional Mobility:  Gait: pt ambulates on level ground without AD and normal gait mechanics            Tandem Stance: R back: >20\" ; L back:  >20\"  SLS: R <5\" ,  L  <5\"       Assessment  Patient has completed 10 visits of skilled PT now 8 weeks s/p R TKA. Patient has made excellent progress with her post op rehab, currently reporting 0/10 pain. Patient demonstrates improved R knee ROM and LE strength, decreased swelling, improved balance and gait mechanics. Patient is able to ascend and descend stairs with reciprocal pattern, drive, perform trasfers, tolerate standing and walking for longer periods of time, and perform light house work. Patient denies any significant  functional deficits at this time. She has returned to regular water classes and is independent with her HEP. Patient will DC from PT at this time.    Goals (to be met in 16 visits)      Not Met Progress Toward Partially Met Met   Pt will improve knee extension ROM to 0 deg to allow proper heel strike during gait and terminal knee extension in stance. [] [] [] [x]   Pt will improve knee AROM flexion to >120 degrees to improve ability to perform transfers. [] [] [] [x]   Pt will improve quad strength to 5/5 to ascend 1 flight of stairs reciprocally with UE assist. [] [] [] [x]   Pt will increase hip and knee strength to grossly 4+/5 to be able to get up and down from the floor safely. [] [] [] [x]   Pt will demonstrate increased hip ER/ABD strength to 4+/5 to perform stepping and squatting activities without excessive femoral IR/ADD. [] [] [] [x]   Pt will improve tandem standing balance to >10s to improve safety with gait on uneven surfaces such as grass and gravel. [] [] [] [x]   Pt will be independent and compliant with comprehensive HEP to maintain progress achieved in PT. [] [] [] [x]         Plan: Patient will DC from PT at this time.      Patient/Family/Caregiver was advised of these findings, precautions, and treatment options and has agreed to actively participate in planning and for this course of care.    Thank you for your referral. If you have any questions, please contact me at Dept: [phone].    Sincerely,  Electronically signed by therapist: Iván Arias, PT     Physician's certification required:  Yes  Please co-sign or sign and return this letter via fax as soon as possible to [phone].   I certify the need for these services furnished under this plan of treatment and while under my care.    X___________________________________________________ Date____________________    Certification From: 6/17/2025  To:9/15/2025                  Plan  Patient will DC to HEP at this time    Treatment  Last 4 Visits  Treatment Day: 10       5/29/2025 6/3/2025 6/10/2025 6/17/2025   LE Treatment   Therapeutic Exercise Recumbent bike 6'  PROM R knee   Heel slides AROM x20  Supine knee extension stretch with heel on towel roll and 2# on thing 2'  Hooklying hip add x20  Hooklying hip abd BTB x20  Seated LAQ 2# 2x10 Reassessment  Recumbent bike 6'  PROM R knee  Heel slides x20  Lateral step overs 6\" step x10  Shuttle leg press 68# 3x10  Shuttle SL leg press R 31# 2x10  Standing TKEs RTB x20  Standing hip abd RTB 2x10   Recumbent bike 6'  PROM R knee  SB HS curls x20  Forward step ups 8\" step x10 B  Shuttle leg press 68# 3x10  Shuttle SL leg press R 31# 2x10  Standing TKEs RTB x20     Recumbent bike 8'  Reassessment  PROM R knee  Forward step ups 8\" step x10 B  Shuttle leg press 68# 3x10  Shuttle SL leg press R 31# 2x10  Standing TKEs RTB x20     Neuro Re-Education   Standing Cone taps x10 B  Lateral walking on balance beam 4 laps  Forward walking on balance beam 4 laps Standing Cone taps x10 B  3 way stepping x5 B     Manual Therapy STM R quad, ITB, knee  STM R knee, quad, ITB STM R ITB, quad  Jt mobs R PF joint med, sup-inf glides   Therapeutic Exercise Minutes 35 45 25 30   Neuro Re-Educ Minutes   8 5   Manual Therapy Minutes 10  10 8   Total Time Of Timed Procedures 45 45 43 43   Total Time Of Service-Based Procedures 0 0 0 0   Total Treatment Time 45 45 43 43        HEP  Access Code: [email or web address]  URL: [email or web address]  Date: 05/08/2025  Prepared by: Iván Arias    Exercises  - Supine Quadricep Sets  - 2 x daily - 7 x weekly - 2 sets - 10 reps  - Supine Knee Extension Stretch on Towel Roll  - 2 x daily - 7 x weekly - 3 minutes hold  - Active Straight Leg Raise with Quad Set  - 2 x daily - 7 x weekly - 2 sets - 10 reps  - Supine Heel Slide with Strap  - 2 x daily - 7 x weekly - 2 sets - 10 reps  - Seated Heel Slide  - 2 x daily - 7 x weekly - 2 sets - 10 reps  - Standing March with Counter  Support  - 1-2 x daily - 7 x weekly - 2 sets - 10 reps  - Mini Squat with Counter Support  - 1-2 x daily - 7 x weekly - 2 sets - 10 reps    Charges     2 Ther Ex, 1 Manual

## 2025-06-19 ENCOUNTER — APPOINTMENT (OUTPATIENT)
Dept: PHYSICAL THERAPY | Age: 70
End: 2025-06-19
Attending: ORTHOPAEDIC SURGERY
Payer: MEDICARE

## 2025-06-24 ENCOUNTER — APPOINTMENT (OUTPATIENT)
Dept: PHYSICAL THERAPY | Age: 70
End: 2025-06-24
Attending: ORTHOPAEDIC SURGERY
Payer: MEDICARE

## 2025-07-04 DIAGNOSIS — E78.2 MIXED HYPERLIPIDEMIA: ICD-10-CM

## 2025-07-04 RX ORDER — ATORVASTATIN CALCIUM 20 MG/1
20 TABLET, FILM COATED ORAL NIGHTLY
Qty: 90 TABLET | Refills: 0 | OUTPATIENT
Start: 2025-07-04

## 2025-07-04 NOTE — TELEPHONE ENCOUNTER
Last OV relevant to medication: 6/9/25  Last refill date: 5/30     #/refills: 90!!   Note to pharmacy   When pt was asked to return for OV: 1 mo  Upcoming appt/reason: /14/25  Was pt informed of any over due labs: ordered at 6/9 appt    40 mg filled at discharge, is now on that. 20 mg Rx denied.

## 2025-07-06 ENCOUNTER — PATIENT MESSAGE (OUTPATIENT)
Dept: PAIN CLINIC | Facility: CLINIC | Age: 70
End: 2025-07-06

## 2025-07-06 DIAGNOSIS — F11.20 OPIOID DEPENDENCE, UNCOMPLICATED (HCC): ICD-10-CM

## 2025-07-06 DIAGNOSIS — G24.3 CERVICAL DYSTONIA: ICD-10-CM

## 2025-07-06 DIAGNOSIS — M48.061 SPINAL STENOSIS OF LUMBAR REGION WITHOUT NEUROGENIC CLAUDICATION: ICD-10-CM

## 2025-07-06 DIAGNOSIS — F11.90 CHRONIC NARCOTIC USE: ICD-10-CM

## 2025-07-06 DIAGNOSIS — M54.16 LUMBAR RADICULOPATHY: ICD-10-CM

## 2025-07-06 DIAGNOSIS — M54.16 LUMBAR RADICULITIS: ICD-10-CM

## 2025-07-07 RX ORDER — HYDROCODONE BITARTRATE AND ACETAMINOPHEN 5; 325 MG/1; MG/1
1 TABLET ORAL EVERY 8 HOURS PRN
Qty: 90 TABLET | Refills: 0 | Status: SHIPPED | OUTPATIENT
Start: 2025-07-07 | End: 2025-08-06

## 2025-07-07 NOTE — TELEPHONE ENCOUNTER
Medication: HYDROcodone-acetaminophen 5-325 MG     [place] 6/9/25  Patient also picked up   4/23/25 Tramadol 50mg #60 tabs from Alec Guthrie  4/23/25 Buda 5/325mg #60 tabs from Janes Cortez   5/4/25 Norco 5/325mg #60 tabs from Janes Cortez     Last office visit: 4/16/25  Due back to clinic per last office note:  n/a  Date next office visit scheduled:  7/16/25    Last URINE Screen: 4/17/25  Screen Results:  not reviewed by provider yet      Narcotic Contract EXPIRATION date: 12/9/25    Last OV note recommendation: The patient is a 78-year-old female with a history of chronic back pain.  Patient is managed with hydrocodone through this office.  Patient is due for repeat UDS.  Additionally, scheduled for total knee arthroplasty next week.  Will likely need some additional pain medication postoperatively which can be received through Dr. Cortez's office and that would not constitute a contract violation.

## 2025-07-14 ENCOUNTER — OFFICE VISIT (OUTPATIENT)
Dept: INTERNAL MEDICINE CLINIC | Facility: CLINIC | Age: 70
End: 2025-07-14
Payer: MEDICARE

## 2025-07-14 VITALS
HEIGHT: 61 IN | BODY MASS INDEX: 18.26 KG/M2 | TEMPERATURE: 98 F | SYSTOLIC BLOOD PRESSURE: 132 MMHG | DIASTOLIC BLOOD PRESSURE: 70 MMHG | WEIGHT: 96.69 LBS | RESPIRATION RATE: 16 BRPM | HEART RATE: 64 BPM

## 2025-07-14 DIAGNOSIS — I10 ESSENTIAL HYPERTENSION: Primary | ICD-10-CM

## 2025-07-14 DIAGNOSIS — I48.0 PAROXYSMAL ATRIAL FIBRILLATION (HCC): ICD-10-CM

## 2025-07-14 DIAGNOSIS — M81.0 AGE-RELATED OSTEOPOROSIS WITHOUT CURRENT PATHOLOGICAL FRACTURE: ICD-10-CM

## 2025-07-14 DIAGNOSIS — F32.1 CURRENT MODERATE EPISODE OF MAJOR DEPRESSIVE DISORDER WITHOUT PRIOR EPISODE (HCC): ICD-10-CM

## 2025-07-14 DIAGNOSIS — G20.A1 PARKINSON'S DISEASE, UNSPECIFIED WHETHER DYSKINESIA PRESENT, UNSPECIFIED WHETHER MANIFESTATIONS FLUCTUATE (HCC): Chronic | ICD-10-CM

## 2025-07-14 DIAGNOSIS — Z87.19 HISTORY OF ISCHEMIC COLITIS: ICD-10-CM

## 2025-07-14 DIAGNOSIS — R63.4 WEIGHT LOSS: ICD-10-CM

## 2025-07-14 DIAGNOSIS — D46.9 MDS (MYELODYSPLASTIC SYNDROME) (HCC): ICD-10-CM

## 2025-07-14 DIAGNOSIS — E78.2 MIXED HYPERLIPIDEMIA: ICD-10-CM

## 2025-07-14 PROCEDURE — 99214 OFFICE O/P EST MOD 30 MIN: CPT | Performed by: INTERNAL MEDICINE

## 2025-07-14 PROCEDURE — G2211 COMPLEX E/M VISIT ADD ON: HCPCS | Performed by: INTERNAL MEDICINE

## 2025-07-14 NOTE — PROGRESS NOTES
[facility]    CHIEF COMPLAINT:    Chief Complaint   Patient presents with    Medication Follow-Up     1/8/25-mammo. 10/27/22-colon-repeat 10 years              The following individual(s) verbally consented to be recorded using ambient AI listening technology and understand that they can each withdraw their consent to this listening technology at any point by asking the clinician to turn off or pause the recording:    Patient name: Tamara Meraz  Additional names:  none          HISTORY OF PRESENT ILLNESS:  here for med check.   Htn: off meds sec to hypotension and vasovagal syncope. Blood pressure controlled off meds.      Hyperlipidemia: on statin. No muscle aches.      Afib: seeing cardiology. On aspirin only. S/p amulet procedure in 2024.      Depression: on paroxetine. Off cymbalta. Symptoms controlled.      Copd: not on inhalers. Breathing is fine.      Osteoporosis: Seeing dr. Dong.  She got a prolia injection in April 2025.      Parkinson's disease: sees neuro. on primidone and gabapentin.      history of ischemic bowel: with gi bleeds. recurrent episodes. Seeing gi. Currently stable.      Mds: sees dr. Gunter. Also with history of breast cancer in 2005.      Quit smoking in 2018. Had ct lung cancer screening in 6/2025.    She has a hard time gaining weight. Has lost 1 lb since last visit here. Wonders if her thyroid needs to be checked.     History of Present Illness  She has a history of hypertension, which led to an ER visit in June due to elevated blood pressure. During a prior hospitalization, she initially experienced low blood pressure, which later increased. Neurology was consulted, and she was prescribed a short course of aspirin and Plavix, which she has completed. Her current blood pressure readings show variability, with a recent reading of 101/62 at [facility] for her Botox treatment and 132/70 at the clinic. No lightheadedness or dizziness is reported with these  fluctuations.    She was found to have a ica aneurysm. She has a follow up with neuro soon.   Her atorvastatin was increased to 40mg daily.     She reports weight loss, noting a decrease from 97 pounds in May 2025 to 96 pounds currently. Her diet remains unchanged. She dislikes Ensure or Boost but is open to finding a suitable calorie shake. She is sensitive to cold, which she associates with her weight loss.    Her past medical history includes depression, for which she is on paroxetine, and COPD, for which she is not using any inhalers. She is also being treated for osteoporosis with Prolia, last administered in April. She has Parkinson's disease and is under the care of a neurologist. She has a history of ischemic bowel and MDS, with low iron noted during a recent hospitalization but no current iron supplementation. Her breast cancer screenings are up to date, and she had a lung cancer screening in June, which was normal.       REVIEW OF SYSTEMS:  See HPI    Current Medications:    Current Medications[1]    PAST MEDICAL, SOCIAL, AND FAMILY HISTORY:  Tobacco:    History   Smoking Status    Former    Types: Cigarettes   Smokeless Tobacco    Never       PHYSICAL EXAM:  /70 (BP Location: Right arm, Patient Position: Sitting, Cuff Size: adult)   Pulse 64   Temp 97.5 °F (36.4 °C) (Temporal)   Resp 16   Ht 5' 1\" (1.549 m)   Wt 96 lb 11.2 oz (43.9 kg)   Breastfeeding No   BMI 18.27 kg/m²    GENERAL: well developed, well nourished,in no apparent distress  LUNGS: clear to auscultation  CARDIO: RRR without murmur  GI: good BS's,no masses, HSM or tenderness  MUSCULOSKELETAL:  no edema, muscle strength normal.     DATA:  Results for orders placed or performed during the hospital encounter of 05/29/25   EKG 12 Lead    Collection Time: 05/29/25  6:21 PM   Result Value Ref Range    Ventricular rate 71 BPM    Atrial rate 71 BPM    P-R Interval 164 ms    QRS Duration 80 ms    Q-T Interval 444 ms    QTC Calculation  (Bezet) 482 ms    P Axis 74 degrees    R Axis 49 degrees    T Axis 84 degrees   Comp Metabolic Panel (14)    Collection Time: 05/29/25  6:25 PM   Result Value Ref Range    Glucose 115 (H) 70 - 99 mg/dL    Sodium 139 136 - 145 mmol/L    Potassium 4.3 3.5 - 5.1 mmol/L    Chloride 105 98 - 112 mmol/L    CO2 24.0 21.0 - 32.0 mmol/L    Anion Gap 10 0 - 18 mmol/L    BUN 17 9 - 23 mg/dL    Creatinine 0.95 0.55 - 1.02 mg/dL    Calcium, Total 8.8 8.7 - 10.6 mg/dL    Calculated Osmolality 290 275 - 295 mOsm/kg    eGFR-Cr 64 >=60 mL/min/1.73m2    AST 27 <34 U/L    ALT 18 10 - 49 U/L    Alkaline Phosphatase 57 55 - 142 U/L    Bilirubin, Total <0.2 (L) 0.2 - 1.1 mg/dL    Total Protein 7.2 5.7 - 8.2 g/dL    Albumin 4.4 3.2 - 4.8 g/dL    Globulin  2.8 2.0 - 3.5 g/dL    A/G Ratio 1.6 1.0 - 2.0   CBC With Differential With Platelet    Collection Time: 05/29/25  6:25 PM   Result Value Ref Range    WBC 4.4 4.0 - 11.0 x10(3) uL    RBC 3.44 (L) 3.80 - 5.30 x10(6)uL    HGB 11.7 (L) 12.0 - 16.0 g/dL    HCT 34.0 (L) 35.0 - 48.0 %    .0 150.0 - 450.0 10(3)uL    MCV 98.8 80.0 - 100.0 fL    MCH 34.0 26.0 - 34.0 pg    MCHC 34.4 31.0 - 37.0 g/dL    RDW 12.3 %    Neutrophil Absolute Prelim 1.59 1.50 - 7.70 x10 (3) uL    Neutrophil Absolute 1.59 1.50 - 7.70 x10(3) uL    Lymphocyte Absolute 2.16 1.00 - 4.00 x10(3) uL    Monocyte Absolute 0.53 0.10 - 1.00 x10(3) uL    Eosinophil Absolute 0.06 0.00 - 0.70 x10(3) uL    Basophil Absolute 0.02 0.00 - 0.20 x10(3) uL    Immature Granulocyte Absolute 0.01 0.00 - 1.00 x10(3) uL    Neutrophil % 36.4 %    Lymphocyte % 49.4 %    Monocyte % 12.1 %    Eosinophil % 1.4 %    Basophil % 0.5 %    Immature Granulocyte % 0.2 %   Sed Rate, Kianaren (Automated)    Collection Time: 05/29/25  6:25 PM   Result Value Ref Range    Sed Rate 6 0 - 30 mm/Hr   Lipid Panel    Collection Time: 05/29/25  6:25 PM   Result Value Ref Range    Cholesterol, Total 173 <200 mg/dL    HDL Cholesterol 62 (H) 40 - 59 mg/dL     Triglycerides 150 (H) 30 - 149 mg/dL    LDL Cholesterol 85 <100 mg/dL    VLDL 24 0 - 30 mg/dL    Non HDL Chol 111 <130 mg/dL   Hemoglobin A1C    Collection Time: 05/29/25  6:25 PM   Result Value Ref Range    HgbA1C 4.6 <5.7 %    Estimated Average Glucose 85 68 - 126 mg/dL   RAINBOW DRAW LAVENDER    Collection Time: 05/29/25  6:25 PM   Result Value Ref Range    Hold Lavender Auto Resulted    RAINBOW DRAW LIGHT GREEN    Collection Time: 05/29/25  6:25 PM   Result Value Ref Range    Hold Lt Green Auto Resulted    POCT Glucose    Collection Time: 05/30/25  5:48 AM   Result Value Ref Range    POC Glucose 85 70 - 99 mg/dL   POCT Glucose    Collection Time: 05/30/25 12:17 PM   Result Value Ref Range    POC Glucose 87 70 - 99 mg/dL     *Note: Due to a large number of results and/or encounters for the requested time period, some results have not been displayed. A complete set of results can be found in Results Review.            ASSESSMENT AND PLAN:  1. Essential hypertension  Off meds. Bp is a bit labile. Monitor.   No symptoms with the low bp.     2. Mixed hyperlipidemia  Continue statin. Dose was increased.     3. Current moderate episode of major depressive disorder without prior episode ([facility])  Continue paroxetine.     4. Paroxysmal atrial fibrillation (HCC)  Continue meds.   On aspirin and sotalol.   follow up with cardiology.     5. Age-related osteoporosis without current pathological fracture  Continue meds per endocrine.     6. Parkinson's disease, unspecified whether dyskinesia present, unspecified whether manifestations fluctuate ([facility])  follow up with neuro.     7. History of ischemic colitis  Continue follow up with gi.     8. MDS (myelodysplastic syndrome) ([facility])  follow up with hematology.     9. Weight loss  Will check tsh.   Discussed increase calorie intake.   - TSH W Reflex To Free T4 [E]; Future        Return in about 6 months (around 1/14/2026) for annual wellness visit, med check.      Lilli Kramer MD          [1]   Current Outpatient Medications   Medication Sig Dispense Refill    HYDROcodone-acetaminophen 5-325 MG Oral Tab Take 1 tablet by mouth every 8 (eight) hours as needed for Pain. 90 tablet 0    aspirin 81 MG Oral Chew Tab Chew 1 tablet (81 mg total) by mouth daily. 90 tablet 0    atorvastatin 40 MG Oral Tab Take 1 tablet (40 mg total) by mouth nightly. 90 tablet 0    PAROXETINE 20 MG Oral Tab Take 1 tablet (20 mg total) by mouth every morning. 90 tablet 0    FIBER OR Take 1,250 mg by mouth.      sotalol 80 MG Oral Tab Take 1 tablet (80 mg total) by mouth in the morning and 1 tablet (80 mg total) before bedtime.      Ferrous Sulfate (IRON) 325 (65 Fe) MG Oral Tab Take by mouth every other day.      Probiotic Product (PROBIOTIC-10 OR) Take by mouth in the morning and before bedtime.      Calcium Magnesium Zinc 333-133-5 MG Oral Tab Take 1 tablet by mouth in the morning.      primidone 50 MG Oral Tab Take 100 mg by mouth every morning and take 150 mg by mouth every evening.      gabapentin 300 MG Oral Cap Take by mouth. Take 600mg in the morning and 600 mg at night      Cyanocobalamin (B-12) 1000 MCG Oral Tab Take 1,000 mcg by mouth in the morning.

## 2025-07-16 PROBLEM — F11.20 CHRONIC NARCOTIC DEPENDENCE (HCC): Status: ACTIVE | Noted: 2025-07-16

## 2025-07-16 RX ORDER — PAROXETINE 20 MG/1
20 TABLET, FILM COATED ORAL EVERY MORNING
Qty: 90 TABLET | Refills: 1 | Status: SHIPPED | OUTPATIENT
Start: 2025-07-16

## 2025-07-16 NOTE — TELEPHONE ENCOUNTER
Psychiatric Non-Scheduled (Anti-Anxiety) Lwpbvs09/15/2025 10:12 AM   Protocol Details In person appointment or virtual visit in the past 6 mos or appointment in next 3 mos    Depression Screening completed within the past 12 months    Medication is active on med list      3. Current moderate episode of major depressive disorder without prior episode (HCC)  Continue paroxetine.   Future Appointments   Date Time Provider Department Center   7/24/2025 10:00 AM NP OOT NP SW [facility]   8/6/2025 11:40 AM Apolonia Callaway MD ENIDG [facility] [place]   8/7/2025 11:00 AM Jaclyn Dong DO [id number] [facility] [place]   10/23/2025 11:00 AM Jaclyn Dong DO [id number] [facility] [place]   12/12/2025  1:00 PM Gustavo Zhao MD ENIPain [facility] [place]   1/13/2026 12:20 PM Lilli Kramer MD EMG 29 [facility] [place]   1/31/2026 11:00 AM  NE RM1 EH [facility]   4/22/2026  2:00 PM Janes Cortez MD EMG ORTHO 75 [facility]

## 2025-07-24 ENCOUNTER — NURSE ONLY (OUTPATIENT)
Facility: LOCATION | Age: 70
End: 2025-07-24
Attending: INTERNAL MEDICINE
Payer: MEDICARE

## 2025-07-24 ENCOUNTER — LAB ENCOUNTER (OUTPATIENT)
Dept: LAB | Age: 70
End: 2025-07-24
Attending: ANESTHESIOLOGY
Payer: MEDICARE

## 2025-07-24 DIAGNOSIS — F11.20 CHRONIC NARCOTIC DEPENDENCE (HCC): ICD-10-CM

## 2025-07-24 DIAGNOSIS — D50.0 IRON DEFICIENCY ANEMIA DUE TO CHRONIC BLOOD LOSS: ICD-10-CM

## 2025-07-24 DIAGNOSIS — R63.4 WEIGHT LOSS: ICD-10-CM

## 2025-07-24 LAB
BASOPHILS # BLD AUTO: 0.03 X10(3) UL (ref 0–0.2)
BASOPHILS NFR BLD AUTO: 0.6 %
DEPRECATED HBV CORE AB SER IA-ACNC: 220 NG/ML (ref 50–306)
EOSINOPHIL # BLD AUTO: 0.04 X10(3) UL (ref 0–0.7)
EOSINOPHIL NFR BLD AUTO: 0.8 %
ERYTHROCYTE [DISTWIDTH] IN BLOOD BY AUTOMATED COUNT: 12.9 %
HCT VFR BLD AUTO: 39.5 % (ref 35–48)
HGB BLD-MCNC: 13.6 G/DL (ref 12–16)
IMM GRANULOCYTES # BLD AUTO: 0.01 X10(3) UL (ref 0–1)
IMM GRANULOCYTES NFR BLD: 0.2 %
IRON SATN MFR SERPL: 45 % (ref 15–50)
IRON SERPL-MCNC: 108 UG/DL (ref 50–170)
LYMPHOCYTES # BLD AUTO: 2.29 X10(3) UL (ref 1–4)
LYMPHOCYTES NFR BLD AUTO: 47.4 %
MCH RBC QN AUTO: 33.4 PG (ref 26–34)
MCHC RBC AUTO-ENTMCNC: 34.4 G/DL (ref 31–37)
MCV RBC AUTO: 97.1 FL (ref 80–100)
MONOCYTES # BLD AUTO: 0.46 X10(3) UL (ref 0.1–1)
MONOCYTES NFR BLD AUTO: 9.5 %
NEUTROPHILS # BLD AUTO: 2 X10 (3) UL (ref 1.5–7.7)
NEUTROPHILS # BLD AUTO: 2 X10(3) UL (ref 1.5–7.7)
NEUTROPHILS NFR BLD AUTO: 41.5 %
PLATELET # BLD AUTO: 238 10(3)UL (ref 150–450)
RBC # BLD AUTO: 4.07 X10(6)UL (ref 3.8–5.3)
TOTAL IRON BINDING CAPACITY: 242 UG/DL (ref 250–425)
TRANSFERRIN SERPL-MCNC: 185 MG/DL (ref 250–380)
TSI SER-ACNC: 2.7 UIU/ML (ref 0.55–4.78)
WBC # BLD AUTO: 4.8 X10(3) UL (ref 4–11)

## 2025-07-24 PROCEDURE — 80307 DRUG TEST PRSMV CHEM ANLYZR: CPT

## 2025-08-04 DIAGNOSIS — F11.20 OPIOID DEPENDENCE, UNCOMPLICATED (HCC): ICD-10-CM

## 2025-08-04 DIAGNOSIS — F11.90 CHRONIC NARCOTIC USE: ICD-10-CM

## 2025-08-04 DIAGNOSIS — G24.3 CERVICAL DYSTONIA: ICD-10-CM

## 2025-08-04 DIAGNOSIS — M48.061 SPINAL STENOSIS OF LUMBAR REGION WITHOUT NEUROGENIC CLAUDICATION: ICD-10-CM

## 2025-08-04 DIAGNOSIS — M54.16 LUMBAR RADICULOPATHY: ICD-10-CM

## 2025-08-04 DIAGNOSIS — M54.16 LUMBAR RADICULITIS: ICD-10-CM

## 2025-08-04 RX ORDER — HYDROCODONE BITARTRATE AND ACETAMINOPHEN 5; 325 MG/1; MG/1
1 TABLET ORAL EVERY 8 HOURS PRN
Qty: 90 TABLET | Refills: 0 | Status: SHIPPED | OUTPATIENT
Start: 2025-08-06 | End: 2025-09-05

## 2025-08-06 ENCOUNTER — OFFICE VISIT (OUTPATIENT)
Facility: CLINIC | Age: 70
End: 2025-08-06

## 2025-08-06 VITALS — SYSTOLIC BLOOD PRESSURE: 106 MMHG | RESPIRATION RATE: 16 BRPM | HEART RATE: 64 BPM | DIASTOLIC BLOOD PRESSURE: 70 MMHG

## 2025-08-06 DIAGNOSIS — I67.1 CEREBRAL ANEURYSM (HCC): ICD-10-CM

## 2025-08-06 DIAGNOSIS — R42 DIZZINESS: Primary | ICD-10-CM

## 2025-08-06 DIAGNOSIS — I65.21 RIGHT-SIDED EXTRACRANIAL CAROTID ARTERY STENOSIS: ICD-10-CM

## 2025-08-06 PROCEDURE — 99215 OFFICE O/P EST HI 40 MIN: CPT | Performed by: OTHER

## (undated) DIAGNOSIS — M54.16 LUMBAR RADICULOPATHY: ICD-10-CM

## (undated) DIAGNOSIS — M48.061 SPINAL STENOSIS OF LUMBAR REGION WITHOUT NEUROGENIC CLAUDICATION: ICD-10-CM

## (undated) DIAGNOSIS — E78.00 PURE HYPERCHOLESTEROLEMIA: ICD-10-CM

## (undated) DIAGNOSIS — F33.40 RECURRENT MAJOR DEPRESSIVE DISORDER, IN REMISSION (HCC): ICD-10-CM

## (undated) DEVICE — FILTERLINE NASAL ADULT O2/CO2

## (undated) DEVICE — PAIN TRAY: Brand: MEDLINE INDUSTRIES, INC.

## (undated) DEVICE — 1200CC GUARDIAN II: Brand: GUARDIAN

## (undated) DEVICE — SKN PREP SPNG STKS PVP PNT STR: Brand: MEDLINE INDUSTRIES, INC.

## (undated) DEVICE — GLOVE SURG SENSICARE SZ 7-1/2

## (undated) DEVICE — IMPLANTABLE DEVICE
Type: IMPLANTABLE DEVICE | Site: KNEE | Status: NON-FUNCTIONAL
Brand: BIOMET® BONE CEMENT R

## (undated) DEVICE — SUT ETHBND XL 5 30IN V-37 NABSRB GRN 40MM 1/2

## (undated) DEVICE — 3M™ RED DOT™ MONITORING ELECTRODE WITH FOAM TAPE AND STICKY GEL, 50/BAG, 20/CASE, 72/PLT 2570: Brand: RED DOT™

## (undated) DEVICE — ENDOSCOPY PACK UPPER: Brand: MEDLINE INDUSTRIES, INC.

## (undated) DEVICE — REMOVER DURAPREP 3M

## (undated) DEVICE — COVER,LIGHT,CAMERA,HARD,1/PK,STRL: Brand: MEDLINE

## (undated) DEVICE — BANDAID COVERLET 1X3

## (undated) DEVICE — NEEDLE SPINAL 22X3-1/2 BLK

## (undated) DEVICE — SYRINGE MED 30ML STD CLR PLAS LL TIP N CTRL

## (undated) DEVICE — NEPTUNE E-SEP SMOKE EVACUATION PENCIL, COATED, 70MM BLADE, PUSH BUTTON SWITCH: Brand: NEPTUNE E-SEP

## (undated) DEVICE — NEEDLE SPINAL 22X7 405149

## (undated) DEVICE — RECIPROCATING BLADE, DOUBLE SIDED, OFFSET  (70.0 X 1.0 X 12.5MM)

## (undated) DEVICE — WRAP COMPR UNIV KNEE HOT CLD GEL MICWV AND

## (undated) DEVICE — SHORT THREADED PINS PACK: Brand: KNEE INSTRUMENTS

## (undated) DEVICE — HOOD: Brand: FLYTE

## (undated) DEVICE — PADDING CAST 4INX4YD 100% COT SFT SLF BOND

## (undated) DEVICE — GLOVE SUR 8 SENSICARE PI PIP CRM PWD F

## (undated) DEVICE — SOLUTION PREP 4OZ 10% POVIDONE IOD SCR TOP

## (undated) DEVICE — SOLUTION SCRB 4OZ 4% CHG ANTISEPSIS HIBICLN

## (undated) DEVICE — CONTAINER,SPECIMEN,PNEU TUBE,4OZ,OR STRL: Brand: MEDLINE

## (undated) DEVICE — ENDOSCOPY PACK - LOWER: Brand: MEDLINE INDUSTRIES, INC.

## (undated) DEVICE — 3 BONE CEMENT MIXER: Brand: MIXEVAC

## (undated) DEVICE — GLOVE SURG SENSICARE SZ 6-1/2

## (undated) DEVICE — ANTISEPTIC 4OZ 70% ISO ALC

## (undated) DEVICE — Device: Brand: DEFENDO AIR/WATER/SUCTION AND BIOPSY VALVE

## (undated) DEVICE — THREADED PINS PACK: Brand: KNEE INSTRUMENTS

## (undated) DEVICE — SOLUTION IRRIG 3000ML 0.9% NACL FLX CONT

## (undated) DEVICE — GLOVE SUR 7.5 SENSICARE PI PIP GRN PWD F

## (undated) DEVICE — TOWEL,OR,DSP,ST,BLUE,DLX,2/PK,40PK/CS: Brand: MEDLINE

## (undated) DEVICE — BANDAGE,COHESIVE,TAN,4X5YD,LF,STRL: Brand: MEDLINE

## (undated) DEVICE — HOOD, PEEL-AWAY: Brand: FLYTE

## (undated) DEVICE — CATH GOLD PROBE HEMOGLIDE 7FR

## (undated) DEVICE — ANTIBACTERIAL UNDYED BRAIDED (POLYGLACTIN 910), SYNTHETIC ABSORBABLE SUTURE: Brand: COATED VICRYL

## (undated) DEVICE — GOWN,SIRUS,FABRNF,XL,20/CS: Brand: MEDLINE

## (undated) DEVICE — SMOOTH PINS PACK: Brand: KNEE INSTRUMENTS

## (undated) DEVICE — GLOVE SUR 7.5 SENSICARE PI PIP CRM PWD F

## (undated) DEVICE — DRAPE,U/SHT,SPLIT,FILM,60X84,STERILE: Brand: MEDLINE

## (undated) DEVICE — TOTAL HIP CDS: Brand: MEDLINE INDUSTRIES, INC.

## (undated) DEVICE — SWORD PIN PACK: Brand: KNEE INSTRUMENTS

## (undated) DEVICE — FORCEP BIOPSY RJ4 LG CAP W/ND

## (undated) DEVICE — DRESSING AQUACEL W/ SILVER 3.5 X12 IN

## (undated) DEVICE — STRYKER PERFORMANCE SERIES SAGITTAL BLADE: Brand: STRYKER PERFORMANCE SERIES

## (undated) DEVICE — DRAPE,TOWEL,LARGE,INVISISHIELD: Brand: MEDLINE

## (undated) DEVICE — GLOVE SUR 8.5 SENSICARE PI PIP GRN PWD F

## (undated) DEVICE — NEEDLE SPNL 18GA L3.5IN PNK QNCKE STYL DISP

## (undated) DEVICE — CAPSULE ENDO PILL CAM SB3

## (undated) DEVICE — TIP CLEANER: Brand: VALLEYLAB

## (undated) DEVICE — 3M™ IOBAN™ 2 ANTIMICROBIAL INCISE DRAPE 6651EZ: Brand: IOBAN™ 2

## (undated) DEVICE — DISPOSABLE TOURNIQUET CUFF SINGLE BLADDER, DUAL PORT AND QUICK CONNECT CONNECTOR: Brand: COLOR CUFF

## (undated) DEVICE — MARKER SKIN 2 TIP

## (undated) DEVICE — LAPAROTOMY SPONGE - RF AND X-RAY DETECTABLE PRE-WASHED: Brand: SITUATE

## (undated) DEVICE — SLEEVE COMPR MD KNEE LEN SGL USE KENDALL SCD

## (undated) DEVICE — SCREWS PACK: Brand: KNEE INSTRUMENTS

## (undated) NOTE — ED AVS SNAPSHOT
Karishma Hay   MRN: [id number]    Department:  [facility] Emergency Department   Date of Visit:  1/4/2018           Disclosure     Insurance plans vary and the physician(s) referred by the ER may not be covered by your plan.  Please contact tell this physician (or your personal doctor if your instructions are to return to your personal doctor) about any new or lasting problems. The primary care or specialist physician will see patients referred from the [facility] Emergency Department.  Cheryle Levins

## (undated) NOTE — LETTER
[facility] FOR BLOOD OR BLOOD COMPONENTS      In the course of your treatment, it may become necessary to administer a transfusion of blood or blood components.  This form provides basic information concerning this proc If loss of blood poses serious threats in the course of your treatment, THERE IS NO EFFECTIVE ALTERNATIVE TO BLOOD TRANSFUSION.  However, if you have any further questions on this matter, your physician will fully explain the alternatives to you if it has n

## (undated) NOTE — LETTER
Patient Name: Tamara Meraz        : 1/15/1955       Medical Record #: [id number]    CONSENT FOR PROCEDURES/SEDATION    Date: 2024       Time: 10:44 AM        1. I authorize the performance upon Tamara Meraz the following:    __________________________________________________________________________    2. I authorize Dr. Sunshine (and whomever is designated as the doctor’s assistant), to perform the above mentioned procedures.    3. If any unforeseen conditions arise during this procedure calling for additional procedures, operations, or medications (including anesthesia and blood transfusion), I  further request and authorize the doctor to do whatever he/she deems advisable in my interest.    4. I consent to the taking and reproduction of any photographs in the course of this procedure for professional purposes.    5. I consent to the administration of such sedation as may be considered necessary or advisable by the physician responsible for this service, with the exception of  _____________________________.    6. I have been informed by my doctor of the nature and purpose of this procedure/sedation, possible alternative methods of treatment, risk involved and possible complications.      Signature of Patient:___________________________  Date: 24    Signature of person authorized to consent for patient: Relationship to patient:  ___________________________    ___________________    Witness: ____________________     Date: 24    Provider: ____________________     Date: 24

## (undated) NOTE — LETTER
CLARIFICATION FOR E-SSS    To: DR ADAMS     Patient Name: Tamara Meraz-Age / Sex: 1/15/1955-A: 70 y  female   Medical Records: [id number] CSN: [id number]      Procedure Description:  Right total knee arthroplasty    Please note that clarification is needed on the Electronic-Surgery Scheduling Sheet (E-SSS)  the original is included with this letter. Please have physician review and make changes on the faxed copy of the E-SSS.      Other: THE PRE OP DIAGNOSIS ON THE SSS STATES OSTEOARTHRITIS IN LEFT KNEE.  SURGERY IS ON RIGHT KNEE.     ALL CHANGES MUST BE DOCUMENTED ON THE E-SSS AND SIGNED BY THE PHYSICIAN    After physician has made the changes and signed the E-SSS please fax it to [phone] and these changes will then be made in Epic by the OR schedulers.     If you have any questions please call Pre-Admission Testing at [phone]    Thank you

## (undated) NOTE — LETTER
23  [facility] Orthopedic Surgery   Pre-Operative Clearance Request    Patient Name:   Hubert Martínez             :   1/15/1955    Surgeon: Dr. Fanny Steen             Date of Surgery: 23     Surgical Procedure: LEFT THUMB MCP JOINT ARTHRODESIS. Please complete all of the following 2-3 weeks PRIOR TO your scheduled surgery to avoid potential cancellation. [x]  History and Physical       [x]  Medical  Clearance                      **Please fax test results, H&P, and clearance to [phone] and to P. A. T at [phone]**

## (undated) NOTE — Clinical Note
Je Chen-  I'm not comfortable providing this new pt w/chronic opiates for her pain, I was hoping that your clinic could establish and pain contract with her and provide her with the Rx. Hopefully with her treatment there she can actually wean off.   I have

## (undated) NOTE — LETTER
25      Orthopedic Surgery   Pre-Operative Clearance Request    Patient Name:   Tamara Meraz             :   1/15/1955    Surgeon: Dr. Cortez             Date of Surgery: 2025    Surgical Procedure: Right total knee arthroplasty       MUST COMPLETE ALL OF THE FOLLOWING 2-3 WEEKS PRIOR TO YOUR SURGERY TO AVOID CANCELLATION, DUE TO THE RULE THEIR WILL BE NO EXCEPTIONS!      [x]  History and Physical      [x]  Medical  Clearance                   Required pre-op testing to be ordered:                      [x]  CBC W/Diff                                                                 [x]  CMP                                                                                                                                                                                                                                        [x]  PT/INR  [x]  PTT  [x]  Type and Screen                       **Please fax test results, H&P, and clearance to [phone] and to P.A.T at [phone]**

## (undated) NOTE — IP AVS SNAPSHOT
[facility] [place] [address] [place], [address] ~ [phone]                Discharge Summary   4/6/2017    Onalee Carbon           Admission Information        Provider Department    4/6/2017 Sonya Zavaleta MD  4nw-A Commonly known as:  PLAVIX   Next dose due:  4/8/2017        Take 75 mg by mouth daily.                             DULoxetine HCl 60 MG Cpep   Last time this was given:  30 mg on 4/7/2017  8:19 AM   Commonly known as:  CYMBALTA   Next dose due:  4/8/2017 Contact information:    [id number]  [phone]        Immunization History as of 4/7/2017  Never Reviewed    No immunizations on file.       Recent Hematology Lab Results  (Last 3 results in the past 90 days)    WBC RBC Hem - If you are a smoker or have smoked in the last 12 months, we encourage you to explore options for quitting.     - If you have concerns related to behavioral health issues or thoughts of harming yourself, contact [facility] a your medications while at home.          Blood Pressure and Cardiac Medications     Flecainide Acetate 100 MG Oral Tab         Use: Treat abnormal blood pressure (high or low), cardiac conditions; and/or abnormal heart rates/rhythms   Most common side effec What to report to your healthcare team: Difficulty urinating, dizziness, no bowel movement in 2+ days, unresolved pain           Non-Narcotic Pain Medications     aspirin 81 MG Oral Tab       Use: Treat pain, fever, inflammation   Most common side effects:

## (undated) NOTE — LETTER
OUTSIDE TESTING RESULT REQUEST     IMPORTANT: FOR YOUR IMMEDIATE ATTENTION  Please FAX all test results listed below to: [phone]     Testing already done on or about: recently     * * * * If testing is NOT complete, arrange with patient A.S.A.P. * * * *      Patient Name: Tamara Meraz  Surgery Date: 2025  Medical Record: [id number]  CSN: [id number]  : 1/15/1955 - A: 70 y     Sex: female  Surgeon(s):  Janes Cortez MD  Procedure: Right total knee arthroplasty  Anesthesia Type: Choice     Surgeon: Janes Cortez MD     The following Testing and Time Line are REQUIRED PER ANESTHESIA     EKG READ AND SIGNED WITHIN   90 days      Thank You,   Sent by:JELLY

## (undated) NOTE — LETTER
02/10/25      Orthopedic Surgery   Pre-Operative Clearance Request    Patient Name:   Tamara Meraz             :   1/15/1955    Surgeon: Dr. Cortez             Date of Surgery: 2025    Surgical Procedure: Right total knee arthroplasty       MUST COMPLETE ALL OF THE FOLLOWING 2-3 WEEKS PRIOR TO YOUR SURGERY TO AVOID CANCELLATION, DUE TO THE RULE THEIR WILL BE NO EXCEPTIONS!            [x]  Cardiac Clearance                                    **Please fax test results, H&P, and clearance to [phone] and to P.A.T at [phone]**

## (undated) NOTE — LETTER
Coronavirus Disease 2019 (COVID-19)     [facility] is committed to the safety and well-being of our patients, members, employees, and communities.  As concerns arise about the new strain of coronavirus that causes COVID-19, [facility] medical appointment, call the healthcare provider ahead of time and tell them that you have or may have COVID-19.  5. For medical emergencies, call 911 and notify the dispatch personnel that you have or may have COVID-19.   6. Cover your cough and sneezes. days have passed since symptoms first appeared OR if asymptomatic patient or date of symptom onset is unclear then use 10 days post COVID test date.    · At least 20 days have passed for severe illness (requiring hospitalization) OR if you are immunocomprom required to have a repeat COVID-19 test in order to be eligible to donate. If you’re instructed by Asia Rahman that a repeat test is required, please contact the [facility] COVID-19 Nurse Triage Line at [phone].     Additional Information      You can

## (undated) NOTE — ED AVS SNAPSHOT
Leti Me   MRN: [id number]    Department:  [facility] Emergency Department   Date of Visit:  12/10/2018           Disclosure     Insurance plans vary and the physician(s) referred by the ER may not be covered by your plan.  Please contac tell this physician (or your personal doctor if your instructions are to return to your personal doctor) about any new or lasting problems. The primary care or specialist physician will see patients referred from the [facility] Emergency Department.  Tristan Keenan

## (undated) NOTE — LETTER
[employer] Cardiac Device Communication Tool    Preop to complete    [facility] ([facility] [place]) Phone: [phone] Fax: [phone]   Patient Name Tamara Meraz   Patient  - AGE - SEX 1/15/1955 - A: 70 y - female   Surgical Date 2025   Surgical Procedure Right total knee arthroplasty   Surgical Location [facility]   Type of cautery anticipated: Monopolar         Device Clinic to Complete the Information Below, Sign and Fax to [phone]    Pacemaker or ICD    Atrial or atrial-ventricular lead?        Indication for device    Is patient pacemaker dependent?    Has pt had routine f/u and is battery life > 3 months    Is ICD programmed to inhibit therapy w/magnet?    Does device have rate response or other sensor?      Surgical Procedure above [facility] Surgical Procedure @ [facility] and below   < 6 in. from ICD: Reprogram therapies OFF w/  asynchronous pacing if PM dependent  < 6 in. from PM: Reprogram asynchronous if PM   dependent ICD: No Change  PM: No Change   > 6 in. from ICD: Magnet*  > 6 in. from PM:  No Change*  * If PM dependent observe for pacing inhibition and minimize cautery if inhibition is seen                                              Bipolar cautery: No Change     Cardiac Device Management Plan (check one)     ___  Reprogram (PAT Dept to provide Rep w/ arrival date/time)   ___ Magnet    ___ No Change    Comments: ___________________________________________________________________        Signature: ________________________________ Date: ____________ Time: ______________     Print Name: ___________________________________

## (undated) NOTE — LETTER
[facility]  [address]    Consent for Operation    Date: __6/3/2018_____    Time: __10:00am___    1.  I authorize the performance upon Martin Nieto the following operation:    Procedure(s):  COLONOSCOPY     2. I a 5. I consent to the photographing or videotaping of the operations or procedures to be performed, including appropriate portions of my body for medical, scientific, or educational purposes, provided my identity is not revealed by the pictures or by descrip 9. If I have a Do Not Resuscitate order in place, the surgeon and I (or the individual authorized to consent on my behalf) will discuss and agree as to whether the Do Not Resuscitate order will remain in effect or will be discontinued during the performanc a. Allow the anesthesiologist (anesthesia doctor) to give me medicine and do additional procedures as necessary.  Some examples are: Starting or using an “IV” to give me medicine, fluids or blood during my procedure, and having a breathing tube placed to he 7. Regional Anesthesia (“spinal”, “epidural”, & “nerve blocks”): I understand that rare but potential complications include headache, bleeding, infection, seizure, irregular heart rhythms, and nerve injury.     I can change my mind about having anesthesia

## (undated) NOTE — LETTER
Requirements for Pre-Operative Clearance Requests  **All Fields Must Be Completed**    2025    Dear Surgeon,    We have been notified that your patient Tamara Meraz  1/15/1955, is scheduled for surgery and that you would like us to clear him/her for this procedure.  In order to comply with governmental coding requirements and in order to bill for our services, the following is required for us to be able to see this patient for pre-operative clearance.     Pre-op appointment is scheduled on 25 with Mari Gentile NP     Comorbid diagnosis for which clearance is requested:          Surgical Diagnosis:            Procedure:           Surgeon:            Date of surgery:          Length of Surgery:      __________________________________________  Type of anesthesia:     __________________________________________  Location of surgery:           Phone:          Fax:         Completed pre-operative clearance should be faxed to:    Attention:          Phone:         Fax:         Check appropriate boxes:    ¨ Pre-op testing ordered by surgeon  ¨ Pre-op testing to be ordered by pre-admission testing  ¨ No pre-op testing needed  ¨ This Pre Op Appt ok to be signed by NP  When this form is complete, please fax back to [phone]

## (undated) NOTE — LETTER
Your patient was recently seen at the [facility] for a hospital follow-up visit. The visit note is attached. Please contact the clinic with any questions at [phone].     Thank you,  Tonja Yancey, APRN

## (undated) NOTE — Clinical Note
Je Kramer, pt feeling better since discharge.  Has scheduled visit with you.  Thank you, Deyanira